# Patient Record
Sex: FEMALE | Race: WHITE | NOT HISPANIC OR LATINO | Employment: OTHER | URBAN - METROPOLITAN AREA
[De-identification: names, ages, dates, MRNs, and addresses within clinical notes are randomized per-mention and may not be internally consistent; named-entity substitution may affect disease eponyms.]

---

## 2017-01-24 RX ORDER — INSULIN ASPART 100 [IU]/ML
INJECTION, SOLUTION INTRAVENOUS; SUBCUTANEOUS
Qty: 45 ML | Refills: 5 | Status: SHIPPED | OUTPATIENT
Start: 2017-01-24 | End: 2017-06-29 | Stop reason: SDUPTHER

## 2017-01-25 ENCOUNTER — TELEPHONE (OUTPATIENT)
Dept: ENDOCRINOLOGY | Facility: CLINIC | Age: 62
End: 2017-01-25

## 2017-01-25 NOTE — TELEPHONE ENCOUNTER
A quantity override was approved by her insurance company, [email or web address] and [employer] for her Novolog Flexpens. She uses 45 ml a month.

## 2017-01-31 RX ORDER — CALCIUM CITRATE/VITAMIN D3 200MG-6.25
TABLET ORAL
Qty: 100 EACH | Refills: 12 | Status: SHIPPED | OUTPATIENT
Start: 2017-01-31 | End: 2018-02-27 | Stop reason: SDUPTHER

## 2017-02-06 ENCOUNTER — LAB VISIT (OUTPATIENT)
Dept: LAB | Facility: HOSPITAL | Age: 62
End: 2017-02-06
Attending: INTERNAL MEDICINE
Payer: MEDICAID

## 2017-02-06 DIAGNOSIS — I10 ESSENTIAL HYPERTENSION: ICD-10-CM

## 2017-02-06 DIAGNOSIS — Z79.4 TYPE 2 DIABETES MELLITUS WITH MICROALBUMINURIA, WITH LONG-TERM CURRENT USE OF INSULIN: ICD-10-CM

## 2017-02-06 DIAGNOSIS — E89.0 POSTOPERATIVE HYPOTHYROIDISM: ICD-10-CM

## 2017-02-06 DIAGNOSIS — R80.9 TYPE 2 DIABETES MELLITUS WITH MICROALBUMINURIA, WITH LONG-TERM CURRENT USE OF INSULIN: ICD-10-CM

## 2017-02-06 DIAGNOSIS — E11.29 TYPE 2 DIABETES MELLITUS WITH MICROALBUMINURIA, WITH LONG-TERM CURRENT USE OF INSULIN: ICD-10-CM

## 2017-02-06 DIAGNOSIS — E78.5 HYPERLIPIDEMIA, UNSPECIFIED HYPERLIPIDEMIA TYPE: ICD-10-CM

## 2017-02-06 LAB
ALBUMIN SERPL BCP-MCNC: 3.9 G/DL
ALP SERPL-CCNC: 69 U/L
ALT SERPL W/O P-5'-P-CCNC: 32 U/L
ANION GAP SERPL CALC-SCNC: 10 MMOL/L
AST SERPL-CCNC: 38 U/L
BILIRUB SERPL-MCNC: 0.4 MG/DL
BUN SERPL-MCNC: 24 MG/DL
CALCIUM SERPL-MCNC: 9.8 MG/DL
CHLORIDE SERPL-SCNC: 105 MMOL/L
CO2 SERPL-SCNC: 22 MMOL/L
CREAT SERPL-MCNC: 0.9 MG/DL
EST. GFR  (AFRICAN AMERICAN): >60 ML/MIN/1.73 M^2
EST. GFR  (NON AFRICAN AMERICAN): >60 ML/MIN/1.73 M^2
GLUCOSE SERPL-MCNC: 234 MG/DL
POTASSIUM SERPL-SCNC: 5.2 MMOL/L
PROT SERPL-MCNC: 7.6 G/DL
SODIUM SERPL-SCNC: 137 MMOL/L
TSH SERPL DL<=0.005 MIU/L-ACNC: 0.47 UIU/ML

## 2017-02-06 PROCEDURE — 83036 HEMOGLOBIN GLYCOSYLATED A1C: CPT

## 2017-02-06 PROCEDURE — 36415 COLL VENOUS BLD VENIPUNCTURE: CPT | Mod: PO

## 2017-02-06 PROCEDURE — 80053 COMPREHEN METABOLIC PANEL: CPT

## 2017-02-06 PROCEDURE — 84443 ASSAY THYROID STIM HORMONE: CPT

## 2017-02-07 LAB
ESTIMATED AVG GLUCOSE: 177 MG/DL
HBA1C MFR BLD HPLC: 7.8 %

## 2017-02-13 ENCOUNTER — OFFICE VISIT (OUTPATIENT)
Dept: ENDOCRINOLOGY | Facility: CLINIC | Age: 62
End: 2017-02-13
Payer: MEDICAID

## 2017-02-13 VITALS
HEART RATE: 86 BPM | DIASTOLIC BLOOD PRESSURE: 78 MMHG | SYSTOLIC BLOOD PRESSURE: 138 MMHG | HEIGHT: 61 IN | BODY MASS INDEX: 39.23 KG/M2 | WEIGHT: 207.81 LBS

## 2017-02-13 DIAGNOSIS — E03.9 HYPOTHYROIDISM, UNSPECIFIED TYPE: ICD-10-CM

## 2017-02-13 DIAGNOSIS — I10 ESSENTIAL HYPERTENSION: ICD-10-CM

## 2017-02-13 DIAGNOSIS — E11.8 TYPE 2 DIABETES MELLITUS WITH COMPLICATION, WITH LONG-TERM CURRENT USE OF INSULIN: Primary | ICD-10-CM

## 2017-02-13 DIAGNOSIS — Z79.4 TYPE 2 DIABETES MELLITUS WITH COMPLICATION, WITH LONG-TERM CURRENT USE OF INSULIN: Primary | ICD-10-CM

## 2017-02-13 DIAGNOSIS — E78.5 HYPERLIPIDEMIA, UNSPECIFIED HYPERLIPIDEMIA TYPE: ICD-10-CM

## 2017-02-13 PROCEDURE — 99999 PR PBB SHADOW E&M-EST. PATIENT-LVL III: CPT | Mod: PBBFAC,,, | Performed by: INTERNAL MEDICINE

## 2017-02-13 PROCEDURE — 99213 OFFICE O/P EST LOW 20 MIN: CPT | Mod: PBBFAC,PO | Performed by: INTERNAL MEDICINE

## 2017-02-13 PROCEDURE — 99214 OFFICE O/P EST MOD 30 MIN: CPT | Mod: S$PBB,,, | Performed by: INTERNAL MEDICINE

## 2017-02-13 RX ORDER — PEN NEEDLE, DIABETIC 31 GX5/16"
NEEDLE, DISPOSABLE MISCELLANEOUS
Refills: 0 | COMMUNITY
Start: 2017-01-19 | End: 2017-10-12 | Stop reason: SDUPTHER

## 2017-02-13 NOTE — MR AVS SNAPSHOT
[facility] Endocrinology  [address]  [facility] [address]  Phone: [phone]  Fax: [phone]                  Jessica Rojas   2017 8:30 AM   Office Visit    Description:  Female : 1955   Provider:  Fortino Qureshi DO   Department:  [place] - Endocrinology           Diagnoses this Visit        Comments    Type 2 diabetes mellitus with complication, with long-term current use of insulin    -  Primary     Hypothyroidism, unspecified type                To Do List           Future Appointments        Provider Department Dept Phone    2017 11:30 AM LABORATORY, [place] [facility] [phone]    7/3/2017 8:15 AM LABORATORY, [place] [facility] [phone]    7/10/2017 10:30 AM Fortino Qureshi DO [facility] [phone]      Goals (5 Years of Data)     None      [facility]     [facility] Nurse Care [facility] -  Assistance  Registered nurses in the [facility] provide clinical advisement, health education, appointment booking, and other advisory services.  Call for this free service at [phone].             Medications           Message regarding Medications     Verify the changes and/or additions to your medication regime listed below are the same as discussed with your clinician today.  If any of these changes or additions are incorrect, please notify your healthcare provider.        STOP taking these medications     hydrocodone-acetaminophen 7.5-325mg (NORCO) 7.5-325 mg per tablet Take 1 tablet by mouth every 4 (four) hours as needed for Pain.    ondansetron (ZOFRAN-ODT) 8 MG TbDL Take 1 tablet (8 mg total) by mouth every 8 (eight) hours as needed.           Verify that the below list of medications is an accurate representation of the medications you are currently taking.  If none reported, the list may be blank. If incorrect, please contact your healthcare provider. Carry this list with you in  "case of emergency.           Current Medications     BD INSULIN PEN NEEDLE UF MINI 31 gauge x 3/16" Ndle use as directed with insulin    budesonide-formoterol 160-4.5 mcg (SYMBICORT) 160-4.5 mcg/actuation HFAA Inhale 2 puffs into the lungs.    cholecalciferol, vitamin D3, (VITAMIN D3) 400 unit Cap Take 1,200 Units by mouth once daily.    cyclobenzaprine (FLEXERIL) 10 MG tablet TAKE ONE TABLET BY MOUTH ONCE DAILY IN THE EVENING    ezetimibe (ZETIA) 10 mg tablet Take 1 tablet (10 mg total) by mouth once daily.    gabapentin (NEURONTIN) 400 MG capsule Take 1 capsule (400 mg total) by mouth 3 (three) times daily.    ibuprofen (ADVIL) 200 MG tablet Take 200 mg by mouth every 6 (six) hours as needed for Pain.    insulin aspart (NOVOLOG FLEXPEN) 100 unit/mL InPn pen Inject 40 units AC, unless eating small meals then use 35 units AC, plus SSI. TDD is 150 units    insulin detemir (LEVEMIR FLEXTOUCH) 100 unit/mL (3 mL) SubQ InPn pen Inject 80 Units into the skin 2 (two) times daily.    levothyroxine (SYNTHROID) 200 MCG tablet 200 mcg 5 days a week and 1.5 tablets 2 days a week (Tues and Thurs).    meclizine (ANTIVERT) 25 mg tablet Take 1 tablet (25 mg total) by mouth 3 (three) times daily as needed.    meloxicam (MOBIC) 15 MG tablet Take 15 mg by mouth once daily.    MULTIVITAMIN W-MINERALS/LUTEIN (CENTRUM SILVER ORAL) Take 1 tablet by mouth once daily.     TRUE METRIX GLUCOSE TEST STRIP Strp test FOUR TIMES DAILY or as directed    valsartan-hydrochlorothiazide (DIOVAN-HCT) 320-12.5 mg per tablet Take 1 tablet by mouth once daily.    VITAMIN D2 50,000 unit capsule TAKE ONE CAPSULE BY MOUTH EVERY 7 DAYS           Clinical Reference Information           Your Vitals Were     BP Pulse Height Weight BMI    138/78 (BP Location: Right arm, Patient Position: Sitting, BP Method: Manual) 86 5' 0.5" (1.537 m) 94.3 kg (207 lb 12.5 oz) 39.91 kg/m2      Blood Pressure          Most Recent Value    BP  138/78      Allergies as of 2/13/2017 "     Lipitor [Atorvastatin]      Immunizations Administered on Date of Encounter - 2/13/2017     None      Orders Placed During Today's Visit     Future Labs/Procedures Expected by Expires    Comprehensive metabolic panel  2/13/2017 2/14/2018    Hemoglobin A1c  2/13/2017 2/14/2018    TSH  2/13/2017 2/13/2018      Language Assistance Services     ATTENTION: Language assistance services are available, free of charge. Please call [phone].      ATENCIÓN: Si habla español, tiene a bridges disposición servicios gratuitos de asistencia lingüística. Llame al [phone].     CHÚ Ý: N?u b?n nói Ti?ng Vi?t, có các d?ch v? h? tr? ngôn ng? mi?n phí dành cho b?n. G?i s? [phone].         [place] - Endocrinology complies with applicable Federal civil rights laws and does not discriminate on the basis of race, color, national origin, age, disability, or sex.

## 2017-02-13 NOTE — PROGRESS NOTES
CHIEF COMPLAINT: DM 2/Hypothyroidism  61 year old being seen as a f/u. Nodules was found on on a PET scan. FNA 11/25/14 shows adenomatous nodule with cystic changes. S/P thyroidectomy 5/24/16. Levemir 80 BID. Humalog 40 TID. Saw DM ED 4/2016. No Glucose logs today. Had an episode of hypoglycemia but thinks that it was due to eating less than before. She does take insulin immediately before eating now. She does have some paresthesias. Optometry 11/2/16. Tolerating zetia.         PATH  THYROID GLAND, TOTAL THYROIDECTOMY:  - NODULAR GOITER WITH FOCAL DEGENERATIVE CHANGES.  - NO EVIDENCE OF MALIGNANCY.        PAST MEDICAL HISTORY/PAST SURGICAL HISTORY:  Reviewed in [facility]    SOCIAL HISTORY: No T/A.    FAMILY HISTORY:  No known thyroid disease or thyroid cancer. + DM 2.     MEDICATIONS/ALLERGIES: The patient's MedCard has been updated and reviewed.      ROS:   Constitutional: weight stable.   Eyes: No recent visual changes  ENT: No dysphagia  Cardiovascular: Denies current anginal symptoms  Respiratory: SOB stable  Gastrointestinal: No N/V  GenitoUrinary - No dysuria  Skin: No new skin rash  Neurologic: No focal neurologic complaints  Remainder ROS negative        PE:    GENERAL: Well developed, well nourished.  PSYCH:  appropriate mood and affect  EYES:  PERRL, EOM intact.  ENT: Nares patent, oropharynx clear, mucosa pink,   NECK: Supple, trachea midline, thyroidectomy scar intact  CHEST: Resp even and unlabored, CTA bilateral.  CARDIAC: RRR, S1, S2 heard, no murmurs, rubs, S3, or S4      Results for ROBERTO MULLIGAN (MRN [id number]) as of 2/13/2017 08:54   Ref. Range 2/6/2017 09:31   Sodium Latest Ref Range: 136 - 145 mmol/L 137   Potassium Latest Ref Range: 3.5 - 5.1 mmol/L 5.2 (H)   Chloride Latest Ref Range: 95 - 110 mmol/L 105   CO2 Latest Ref Range: 23 - 29 mmol/L 22 (L)   Anion Gap Latest Ref Range: 8 - 16 mmol/L 10   BUN, Bld Latest Ref Range: 8 - 23 mg/dL 24 (H)   Creatinine Latest Ref Range: 0.5 - 1.4 mg/dL 0.9    eGFR if non African American Latest Ref Range: >60 mL/min/1.73 m^2 >60.0   eGFR if African American Latest Ref Range: >60 mL/min/1.73 m^2 >60.0   Glucose Latest Ref Range: 70 - 110 mg/dL 234 (H)   Calcium Latest Ref Range: 8.7 - 10.5 mg/dL 9.8   Alkaline Phosphatase Latest Ref Range: 55 - 135 U/L 69   Total Protein Latest Ref Range: 6.0 - 8.4 g/dL 7.6   Albumin Latest Ref Range: 3.5 - 5.2 g/dL 3.9   Total Bilirubin Latest Ref Range: 0.1 - 1.0 mg/dL 0.4   AST Latest Ref Range: 10 - 40 U/L 38   ALT Latest Ref Range: 10 - 44 U/L 32   Hemoglobin A1C Latest Ref Range: 4.5 - 6.2 % 7.8 (H)   Estimated Avg Glucose Latest Ref Range: 68 - 131 mg/dL 177 (H)   TSH Latest Ref Range: 0.400 - 4.000 uIU/mL 0.471           ASSESSMENT/PLAN:  1. Post Surgical Hypothyroidism- For MNG. Path benign. TSH WNL. Continue current tx.      2. HTN- controlled. Continue current Tx.     3. DM 2- With nephropathy and neuropathy. Discussed eating adequate amount of carbs when taking insulin. She will send us logs so we can make changes    5. Hyperlipidemia- cannot tolerate statin. Taking zetia and seems to be tolerating well.     FOLLOWUP  F/U 4 months with CMP, A1c, TSH,

## 2017-02-20 RX ORDER — INSULIN GLARGINE 100 [IU]/ML
80 INJECTION, SOLUTION SUBCUTANEOUS 2 TIMES DAILY
Qty: 2 BOX | Refills: 3 | Status: SHIPPED | OUTPATIENT
Start: 2017-02-20 | End: 2017-02-22 | Stop reason: SDUPTHER

## 2017-02-20 NOTE — TELEPHONE ENCOUNTER
A prior auth was done for Levemir via [employer].Her insurnace would only approve Lantus. A new rx for lantus was sent to her pharmacy for Lantus. .

## 2017-02-20 NOTE — TELEPHONE ENCOUNTER
----- Message from Mahesh Shaw sent at 2/20/2017  1:48 PM CST -----  Contact: Kam steele/ [facility]  Need a new prescription for pt for Sendy Cramer(insline pin)  Call Back#[phone]  Thanks

## 2017-02-22 ENCOUNTER — TELEPHONE (OUTPATIENT)
Dept: ENDOCRINOLOGY | Facility: CLINIC | Age: 62
End: 2017-02-22

## 2017-02-22 ENCOUNTER — PATIENT MESSAGE (OUTPATIENT)
Dept: ENDOCRINOLOGY | Facility: CLINIC | Age: 62
End: 2017-02-22

## 2017-02-22 RX ORDER — INSULIN GLARGINE 100 [IU]/ML
80 INJECTION, SOLUTION SUBCUTANEOUS 2 TIMES DAILY
Qty: 3 BOX | Refills: 3 | Status: SHIPPED | OUTPATIENT
Start: 2017-02-22 | End: 2017-06-14 | Stop reason: SDUPTHER

## 2017-02-22 NOTE — TELEPHONE ENCOUNTER
I spoke to Virginia at [facility]. I had previously did a PA for Levemir, which the patient had taken for 2 years, but it was denied. I did another PA for Lantus, and it was denied, so I sent another PA via [employer], and am awaiting a response.

## 2017-02-22 NOTE — TELEPHONE ENCOUNTER
----- Message from Cris Plascencia sent at 2/22/2017  1:31 PM CST -----  Contact: Virginia from [facility] [phone]  Erica called and asked for a call back about the lantis it needs a prior authorization.

## 2017-03-29 ENCOUNTER — LAB VISIT (OUTPATIENT)
Dept: LAB | Facility: HOSPITAL | Age: 62
End: 2017-03-29
Attending: NURSE PRACTITIONER
Payer: MEDICAID

## 2017-03-29 ENCOUNTER — PATIENT MESSAGE (OUTPATIENT)
Dept: ENDOCRINOLOGY | Facility: CLINIC | Age: 62
End: 2017-03-29

## 2017-03-29 ENCOUNTER — OFFICE VISIT (OUTPATIENT)
Dept: FAMILY MEDICINE | Facility: CLINIC | Age: 62
End: 2017-03-29
Payer: MEDICAID

## 2017-03-29 VITALS — TEMPERATURE: 99 F | BODY MASS INDEX: 40.06 KG/M2 | WEIGHT: 212.19 LBS | OXYGEN SATURATION: 97 % | HEIGHT: 61 IN

## 2017-03-29 DIAGNOSIS — G89.29 CHRONIC BACK PAIN GREATER THAN 3 MONTHS DURATION: ICD-10-CM

## 2017-03-29 DIAGNOSIS — M54.9 CHRONIC BACK PAIN GREATER THAN 3 MONTHS DURATION: ICD-10-CM

## 2017-03-29 DIAGNOSIS — E55.9 VITAMIN D DEFICIENCY: ICD-10-CM

## 2017-03-29 DIAGNOSIS — M54.16 LUMBAR RADICULOPATHY: Primary | ICD-10-CM

## 2017-03-29 LAB — 25(OH)D3+25(OH)D2 SERPL-MCNC: 26 NG/ML

## 2017-03-29 PROCEDURE — 82306 VITAMIN D 25 HYDROXY: CPT

## 2017-03-29 PROCEDURE — 36415 COLL VENOUS BLD VENIPUNCTURE: CPT | Mod: PO

## 2017-03-29 PROCEDURE — 99213 OFFICE O/P EST LOW 20 MIN: CPT | Mod: S$PBB,,, | Performed by: NURSE PRACTITIONER

## 2017-03-29 PROCEDURE — 99999 PR PBB SHADOW E&M-EST. PATIENT-LVL IV: CPT | Mod: PBBFAC,,, | Performed by: NURSE PRACTITIONER

## 2017-03-29 NOTE — MR AVS SNAPSHOT
"    [facility]  [address] [place] [address]  Phone: [phone]  Fax: [phone]                  Jessica Rojas   3/29/2017 9:00 AM   Office Visit    Description:  Female : 1955   Provider:  Sanjuana Gleason NP   Department:  [facility]           Reason for Visit     Back Pain           Diagnoses this Visit        Comments    Lumbar radiculopathy    -  Primary     Chronic back pain greater than 3 months duration                To Do List           Future Appointments        Provider Department Dept Phone    2017 11:30 AM LABORATORY, [place] [facility] [phone]    7/3/2017 8:15 AM [facility], [place] [facility] [phone]    7/10/2017 10:30 AM DO [facility] [phone]      Goals (5 Years of Data)     None      [facility]     [facility] Nurse Care [facility] -  Assistance  Registered nurses in the [facility] provide clinical advisement, health education, appointment booking, and other advisory services.  Call for this free service at [phone].             Medications           Message regarding Medications     Verify the changes and/or additions to your medication regime listed below are the same as discussed with your clinician today.  If any of these changes or additions are incorrect, please notify your healthcare provider.             Verify that the below list of medications is an accurate representation of the medications you are currently taking.  If none reported, the list may be blank. If incorrect, please contact your healthcare provider. Carry this list with you in case of emergency.           Current Medications     BD INSULIN PEN NEEDLE UF MINI 31 gauge x 3/" Ndle use as directed with insulin    budesonide-formoterol 160-4.5 mcg (SYMBICORT) 160-4.5 mcg/actuation HFAA Inhale 2 puffs into the lungs.    cholecalciferol, " "vitamin D3, (VITAMIN D3) 400 unit Cap Take 1,200 Units by mouth once daily.    cyclobenzaprine (FLEXERIL) 10 MG tablet TAKE ONE TABLET BY MOUTH ONCE DAILY IN THE EVENING    ezetimibe (ZETIA) 10 mg tablet Take 1 tablet (10 mg total) by mouth once daily.    gabapentin (NEURONTIN) 400 MG capsule Take 1 capsule (400 mg total) by mouth 3 (three) times daily.    ibuprofen (ADVIL) 200 MG tablet Take 200 mg by mouth every 6 (six) hours as needed for Pain.    insulin aspart (NOVOLOG FLEXPEN) 100 unit/mL InPn pen Inject 40 units AC, unless eating small meals then use 35 units AC, plus SSI. TDD is 150 units    insulin glargine (LANTUS SOLOSTAR) 100 unit/mL (3 mL) InPn pen Inject 80 Units into the skin 2 (two) times daily.    levothyroxine (SYNTHROID) 200 MCG tablet 200 mcg 5 days a week and 1.5 tablets 2 days a week (Tues and Thurs).    meclizine (ANTIVERT) 25 mg tablet Take 1 tablet (25 mg total) by mouth 3 (three) times daily as needed.    meloxicam (MOBIC) 15 MG tablet Take 15 mg by mouth once daily.    MULTIVITAMIN W-MINERALS/LUTEIN (CENTRUM SILVER ORAL) Take 1 tablet by mouth once daily.     TRUE METRIX GLUCOSE TEST STRIP Strp test FOUR TIMES DAILY or as directed    valsartan-hydrochlorothiazide (DIOVAN-HCT) 320-12.5 mg per tablet Take 1 tablet by mouth once daily.    VITAMIN D2 50,000 unit capsule TAKE ONE CAPSULE BY MOUTH EVERY 7 DAYS           Clinical Reference Information           Your Vitals Were     Temp Height Weight SpO2 BMI    99.4 °F (37.4 °C) 5' 1" (1.549 m) 96.2 kg (212 lb 3.1 oz) 97% 40.09 kg/m2      Allergies as of 3/29/2017     Lipitor [Atorvastatin]      Immunizations Administered on Date of Encounter - 3/29/2017     None      Orders Placed During Today's Visit      Normal Orders This Visit    Ambulatory referral to Neurosurgery       Instructions    Ultram request sent to PCP to approve  Report to ER immediately if symptoms worsen       Language Assistance Services     ATTENTION: Language assistance " services are available, free of charge. Please call [phone].      ATENCIÓN: Si habla halie, tiene a bridges disposición servicios gratuitos de asistencia lingüística. Llame al [phone].     CHÚ Ý: N?u b?n nói Ti?ng Vi?t, có các d?ch v? h? tr? ngôn ng? mi?n phí dành cho b?n. G?i s? [phone].         [facility] complies with applicable Federal civil rights laws and does not discriminate on the basis of race, color, national origin, age, disability, or sex.

## 2017-03-29 NOTE — PROGRESS NOTES
Subjective:       Patient ID: Jessica Rojas is a 61 y.o. female.    Chief Complaint: Back Pain    Back Pain   This is a chronic problem. The current episode started more than 1 year ago. The problem occurs daily. The problem has been gradually worsening since onset. The pain is present in the lumbar spine. The quality of the pain is described as aching. Radiates to: Bilateral hips. The pain is moderate. The pain is the same all the time. The symptoms are aggravated by sitting, standing, lying down and bending. Pertinent negatives include no abdominal pain, bladder incontinence, bowel incontinence, chest pain, dysuria, fever, headaches, leg pain, numbness, paresis, paresthesias, pelvic pain, perianal numbness, tingling, weakness or weight loss. Risk factors include history of cancer, obesity and menopause. She has tried muscle relaxant, NSAIDs, walking and analgesics (Physical therapy) for the symptoms. The treatment provided no relief (MRI done 8/2016; xray l spine 6/2016: advised to see neurosurgery by PCP; did not make appt. Decline pain management also recommended by PCP).   Pt also requests vitamin D check; takes oral vitamin D for deficiency.  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/2014    Mediastinal lymphadenopathy 8/26/2015    Obesity, morbid 5/6/2014    Pituitary adenoma 1980's    took parladel for 3 yrs    Pneumonia     Thyroid disease     Wheeze 6/23/2014     Social History     Social History    Marital status:      Spouse name: N/A    Number of children: N/A     Years of education: N/A     Occupational History    housewife      Social History Main Topics    Smoking status: Never Smoker    Smokeless tobacco: Never Used    Alcohol use Yes      Comment: once per month    Drug use: No    Sexual activity: Not on file     Social History Narrative     Past Surgical History:   Procedure Laterality Date    APPENDECTOMY      BRONCHOSCOPY  2014, 2014     SECTION  , 1986    x2    LUNG REMOVAL, PARTIAL Right     with 17 lymph nodes    THYROIDECTOMY  2016    TONSILLECTOMY  1969       Review of Systems   Constitutional: Negative.  Negative for fever and weight loss.   HENT: Negative.    Eyes: Negative.    Respiratory: Negative.    Cardiovascular: Negative.  Negative for chest pain.   Gastrointestinal: Negative.  Negative for abdominal pain and bowel incontinence.   Endocrine: Negative.    Genitourinary: Negative.  Negative for bladder incontinence, dysuria and pelvic pain.   Musculoskeletal: Positive for back pain.   Skin: Negative.    Allergic/Immunologic: Negative.    Neurological: Negative.  Negative for tingling, weakness, numbness, headaches and paresthesias.   Psychiatric/Behavioral: Negative.        Objective:      Physical Exam   Constitutional: She is oriented to person, place, and time. She appears well-developed and well-nourished.   HENT:   Head: Normocephalic.   Right Ear: External ear normal.   Left Ear: External ear normal.   Nose: Nose normal.   Mouth/Throat: Oropharynx is clear and moist.   Eyes: Conjunctivae are normal. Pupils are equal, round, and reactive to light.   Neck: Normal range of motion. Neck supple.   Cardiovascular: Normal rate, regular rhythm and normal heart sounds.    Pulmonary/Chest: Effort normal.   Abdominal: Soft. Bowel sounds are normal.   Musculoskeletal:        Lumbar back: She exhibits pain. She exhibits normal range of motion, no tenderness, no bony tenderness, no swelling, no edema, no laceration, no  spasm and normal pulse.   Neurological: She is alert and oriented to person, place, and time.   Skin: Skin is warm and dry.   Psychiatric: She has a normal mood and affect. Her behavior is normal. Judgment and thought content normal.   Nursing note and vitals reviewed.      Assessment:       1. Lumbar radiculopathy    2. Chronic back pain greater than 3 months duration    3. Vitamin D deficiency        Plan:           Jessica was seen today for back pain.    Diagnoses and all orders for this visit:    Lumbar radiculopathy  Chronic back pain greater than 3 months duration  -     Ambulatory referral to Neurosurgery  Ultram request sent to PCP to approve    Vitamin D deficiency  -     Vitamin D; Future

## 2017-03-31 ENCOUNTER — TELEPHONE (OUTPATIENT)
Dept: FAMILY MEDICINE | Facility: CLINIC | Age: 62
End: 2017-03-31

## 2017-03-31 DIAGNOSIS — E55.9 VITAMIN D DEFICIENCY: Primary | ICD-10-CM

## 2017-03-31 NOTE — TELEPHONE ENCOUNTER
Reviewed; vitamin D insufficiency noted. Continue oral vitamin D as prescribed. Repeat vit D in 6 m     Pt states she been taking the supplement for the last 5 months asking can she increase dosage.

## 2017-04-05 ENCOUNTER — TELEPHONE (OUTPATIENT)
Dept: NEUROSURGERY | Facility: CLINIC | Age: 62
End: 2017-04-05

## 2017-04-05 NOTE — TELEPHONE ENCOUNTER
Pt called to schedule an appt per referral. Appt date, time, and location given. Pt verbalized understanding.

## 2017-04-17 ENCOUNTER — OFFICE VISIT (OUTPATIENT)
Dept: NEUROSURGERY | Facility: CLINIC | Age: 62
End: 2017-04-17
Payer: MEDICAID

## 2017-04-17 ENCOUNTER — TELEPHONE (OUTPATIENT)
Dept: PAIN MEDICINE | Facility: CLINIC | Age: 62
End: 2017-04-17

## 2017-04-17 VITALS
WEIGHT: 209.56 LBS | HEIGHT: 61 IN | SYSTOLIC BLOOD PRESSURE: 177 MMHG | DIASTOLIC BLOOD PRESSURE: 97 MMHG | BODY MASS INDEX: 39.56 KG/M2 | HEART RATE: 91 BPM

## 2017-04-17 DIAGNOSIS — E11.8 UNCONTROLLED TYPE 2 DIABETES MELLITUS WITH COMPLICATION, UNSPECIFIED LONG TERM INSULIN USE STATUS: ICD-10-CM

## 2017-04-17 DIAGNOSIS — C34.01 MALIGNANT NEOPLASM OF HILUS OF RIGHT LUNG: ICD-10-CM

## 2017-04-17 DIAGNOSIS — R07.81 RIB PAIN: ICD-10-CM

## 2017-04-17 DIAGNOSIS — E66.01 MORBID OBESITY, UNSPECIFIED OBESITY TYPE: ICD-10-CM

## 2017-04-17 DIAGNOSIS — M54.10 RADICULOPATHY, UNSPECIFIED SPINAL REGION: Primary | ICD-10-CM

## 2017-04-17 DIAGNOSIS — C7A.090 MALIGNANT CARCINOID TUMOR OF BRONCHUS AND LUNG: ICD-10-CM

## 2017-04-17 DIAGNOSIS — D3A.00 CARCINOID TUMOR: ICD-10-CM

## 2017-04-17 DIAGNOSIS — E11.65 UNCONTROLLED TYPE 2 DIABETES MELLITUS WITH COMPLICATION, UNSPECIFIED LONG TERM INSULIN USE STATUS: ICD-10-CM

## 2017-04-17 PROCEDURE — 99204 OFFICE O/P NEW MOD 45 MIN: CPT | Mod: S$GLB,,, | Performed by: NEUROLOGICAL SURGERY

## 2017-04-17 NOTE — PROGRESS NOTES
Neurosurgery History and Physical    Patient ID: Jessica Rojas is a 61 y.o. female.    Chief Complaint   Patient presents with    Lumbar Spine Pain (L-Spine)     Chronic back pain. Pt states pain to entire spine. No pain to legs. denies weakness, numbness, and tingling. Denies bowel and bladder dysfunction. PT without relief.          Review of Systems   Constitutional: Negative.    HENT: Negative.    Eyes: Negative.    Respiratory: Negative.    Cardiovascular: Negative.    Gastrointestinal: Negative.    Endocrine: Negative.    Genitourinary: Negative.    Musculoskeletal: Positive for back pain.   Skin: Negative.    Allergic/Immunologic: Negative.    Neurological: Negative for weakness and numbness.   Hematological: Negative.    Psychiatric/Behavioral: Negative.        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/2014    Mediastinal lymphadenopathy 8/26/2015    Obesity, morbid 5/6/2014    Pituitary adenoma 1980's    took parladel for 3 yrs    Pneumonia     Thyroid disease     Wheeze 6/23/2014     Social History     Social History    Marital status:      Spouse name: N/A    Number of children: N/A    Years of education: N/A     Occupational History    housewife      Social History Main Topics    Smoking status: Never Smoker    Smokeless tobacco: Never Used    Alcohol use Yes      Comment: once per month    Drug use: No    Sexual activity: Not on file     Other Topics Concern    Not on file     Social History Narrative     Family History   Problem Relation Age of Onset     "Cancer Maternal Aunt      breast    Cancer Maternal Grandmother      unknown    Cancer Maternal Aunt      unknown    Diabetes Mother     Glaucoma Mother     Diabetes Father     Diabetes Sister     Macular degeneration Sister     Amblyopia Neg Hx     Blindness Neg Hx     Cataracts Neg Hx     Hypertension Neg Hx     Retinal detachment Neg Hx     Stroke Neg Hx     Strabismus Neg Hx     Thyroid disease Neg Hx      Review of patient's allergies indicates:   Allergen Reactions    Lipitor [atorvastatin] Other (See Comments)     Myalgia, muscle pain       Current Outpatient Prescriptions:     BD INSULIN PEN NEEDLE UF MINI 31 gauge x 3/16" Ndle, use as directed with insulin, Disp: , Rfl: 0    budesonide-formoterol 160-4.5 mcg (SYMBICORT) 160-4.5 mcg/actuation HFAA, Inhale 2 puffs into the lungs., Disp: , Rfl:     cholecalciferol, vitamin D3, (VITAMIN D3) 400 unit Cap, Take 1,200 Units by mouth once daily., Disp: , Rfl:     ezetimibe (ZETIA) 10 mg tablet, Take 1 tablet (10 mg total) by mouth once daily., Disp: 30 tablet, Rfl: 11    gabapentin (NEURONTIN) 400 MG capsule, Take 1 capsule (400 mg total) by mouth 3 (three) times daily., Disp: 90 capsule, Rfl: 11    GLUCOSAMINE HCL/CHONDR MCCARTHY A NA (OSTEO BI-FLEX ORAL), Take by mouth once daily., Disp: , Rfl:     ibuprofen (ADVIL) 200 MG tablet, Take 200 mg by mouth every 6 (six) hours as needed for Pain., Disp: , Rfl:     insulin aspart (NOVOLOG FLEXPEN) 100 unit/mL InPn pen, Inject 40 units AC, unless eating small meals then use 35 units AC, plus SSI. TDD is 150 units, Disp: 45 mL, Rfl: 5    insulin glargine (LANTUS SOLOSTAR) 100 unit/mL (3 mL) InPn pen, Inject 80 Units into the skin 2 (two) times daily., Disp: 3 Box, Rfl: 3    levothyroxine (SYNTHROID) 200 MCG tablet, 200 mcg 5 days a week and 1.5 tablets 2 days a week (Tues and Thurs)., Disp: 35 tablet, Rfl: 6    meclizine (ANTIVERT) 25 mg tablet, Take 1 tablet (25 mg total) by mouth 3 (three) times " "daily as needed., Disp: 30 tablet, Rfl: 0    meloxicam (MOBIC) 15 MG tablet, Take 15 mg by mouth once daily., Disp: , Rfl: 6    MULTIVITAMIN W-MINERALS/LUTEIN (CENTRUM SILVER ORAL), Take 1 tablet by mouth once daily. , Disp: , Rfl:     TRUE METRIX GLUCOSE TEST STRIP Strp, test FOUR TIMES DAILY or as directed, Disp: 100 each, Rfl: 12    valsartan-hydrochlorothiazide (DIOVAN-HCT) 320-12.5 mg per tablet, Take 1 tablet by mouth once daily., Disp: 30 tablet, Rfl: 11    VITAMIN D2 50,000 unit capsule, TAKE ONE CAPSULE BY MOUTH EVERY 7 DAYS, Disp: 4 capsule, Rfl: 11    cyclobenzaprine (FLEXERIL) 10 MG tablet, TAKE ONE TABLET BY MOUTH ONCE DAILY IN THE EVENING, Disp: 30 tablet, Rfl: 11  Blood pressure (!) 177/97, pulse 91, height 5' 1" (1.549 m), weight 95.1 kg (209 lb 8.8 oz).      Neurologic Exam     Mental Status   Oriented to person, place, and time.   Attention: normal. Concentration: normal.   Speech: speech is normal   Level of consciousness: alert  Knowledge: good.     Cranial Nerves     CN II   Visual acuity: normal    CN III, IV, VI   Pupils are equal, round, and reactive to light.  Extraocular motions are normal.     CN V   Facial sensation intact.     CN VII   Facial expression full, symmetric.     CN VIII   Hearing: intact    CN IX, X   Palate: symmetric    CN XI   CN XI normal.     CN XII   CN XII normal.     Motor Exam   Muscle bulk: normal  Overall muscle tone: normal  Right arm pronator drift: absent  Left arm pronator drift: absent    Strength   Right neck flexion: 5/5  Left neck flexion: 5/5  Right neck extension: 5/5  Left neck extension: 5/5  Right deltoid: 5/5  Left deltoid: 5/5  Right biceps: 5/5  Left biceps: 5/5  Right triceps: 5/5  Left triceps: 5/5  Right wrist flexion: 5/5  Left wrist flexion: 5/5  Right wrist extension: 5/5  Left wrist extension: 5/5  Right interossei: 5/5  Left interossei: 5/5  Right abdominals: 5/5  Left abdominals: 5/5  Right iliopsoas: 5/5  Left iliopsoas: 5/5  Right " quadriceps: 5/5  Left quadriceps: 5/5  Right hamstrin/5  Left hamstrin/5  Right glutei: 5/5  Left glutei: 5/5  Right anterior tibial: 5/5  Left anterior tibial: 5/5  Right posterior tibial: 5/5  Left posterior tibial: 5/5  Right peroneal: 5/5  Left peroneal: 5/5  Right gastroc: 5/5  Left gastroc: 5/5    Sensory Exam   Light touch normal.     Gait, Coordination, and Reflexes     Gait  Gait: normal    Coordination   Romberg: negative  Finger to nose coordination: normal    Tremor   Resting tremor: absent    Reflexes   Right brachioradialis: 2+  Left brachioradialis: 2+  Right biceps: 2+  Left biceps: 2+  Right triceps: 2+  Left triceps: 2+  Right patellar: 3+  Left patellar: 3+  Right achilles: 1+  Left achilles: 1+  Right plantar: normal  Left plantar: normal  Right Chase: absent  Left Chase: absent  Right ankle clonus: absent  Left ankle clonus: absent      Physical Exam   Constitutional: She is oriented to person, place, and time. She appears well-developed and well-nourished.   HENT:   Head: Normocephalic and atraumatic.   Eyes: EOM are normal. Pupils are equal, round, and reactive to light.   Neck: Normal range of motion. Neck supple.   Cardiovascular: Normal rate and regular rhythm.    Pulmonary/Chest: Effort normal.   Abdominal: Soft.   Musculoskeletal: Normal range of motion.   Neurological: She is alert and oriented to person, place, and time. She has a normal Finger-Nose-Finger Test and a normal Romberg Test. Gait normal.   Reflex Scores:       Tricep reflexes are 2+ on the right side and 2+ on the left side.       Bicep reflexes are 2+ on the right side and 2+ on the left side.       Brachioradialis reflexes are 2+ on the right side and 2+ on the left side.       Patellar reflexes are 3+ on the right side and 3+ on the left side.       Achilles reflexes are 1+ on the right side and 1+ on the left side.  Skin: Skin is warm and dry.   Psychiatric: She has a normal mood and affect. Her speech is  "normal and behavior is normal. Judgment and thought content normal.   Nursing note and vitals reviewed.      Vital Signs  Pulse: 91  BP: (!) 177/97  Pain Score:   7  Height and Weight  Height: 5' 1" (154.9 cm)  Weight: 95.1 kg (209 lb 8.8 oz)  BSA (Calculated - sq m): 2.02 sq meters  BMI (Calculated): 39.7  Weight in (lb) to have BMI = 25: 132]    Provider dictation:  I reviewed the imaging. She has mild degenerative changes in her lumbar spine with no compression of the neural elements. She does have some disc degeneration and Schmorl's nodes in the visualized portion of the lower thoracic spine. Pure axial back pain with unremarkable neuro exam. No Neurosurgical intervention is indicated at this point. She has had PT but states it did not help and is not interested in it. I will refer her to Pain.    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

## 2017-04-17 NOTE — MR AVS SNAPSHOT
"    [facility] Neurosurgery  [address] [facility] [address]  [facility] [address]  Phone: [phone]  Fax: [phone]                  Jessica Rojas   2017 11:30 AM   Office Visit    Description:  Female : 1955   Provider:  Ranjeet Bragg MD   Department:  [facility] - Neurosurgery           Reason for Visit     Lumbar Spine Pain (L-Spine)           Diagnoses this Visit        Comments    Radiculopathy, unspecified spinal region    -  Primary            To Do List           Future Appointments        Provider Department Dept Phone    2017 11:30 AM LABORATORY, [place] [facility] [phone]    2017 9:00 AM LABORATORY, [place] [facility] [phone]    2017 2:30 PM Fortino Qureshi DO [facility] Endocrinology [phone]      Goals (5 Years of Data)     None      [facility]     [facility] Nurse Care Line -  Assistance  Unless otherwise directed by your provider, please contact [facility], our nurse care line that is available for  assistance.     Registered nurses in the [facility] provide: appointment scheduling, clinical advisement, health education, and other advisory services.  Call: [phone] (toll free)               Medications           Message regarding Medications     Verify the changes and/or additions to your medication regime listed below are the same as discussed with your clinician today.  If any of these changes or additions are incorrect, please notify your healthcare provider.             Verify that the below list of medications is an accurate representation of the medications you are currently taking.  If none reported, the list may be blank. If incorrect, please contact your healthcare provider. Carry this list with you in case of emergency.           Current Medications     BD INSULIN PEN NEEDLE UF MINI 31 gauge x 3/16" Ndle use as directed with insulin    budesonide-formoterol " "160-4.5 mcg (SYMBICORT) 160-4.5 mcg/actuation HFAA Inhale 2 puffs into the lungs.    cholecalciferol, vitamin D3, (VITAMIN D3) 400 unit Cap Take 1,200 Units by mouth once daily.    ezetimibe (ZETIA) 10 mg tablet Take 1 tablet (10 mg total) by mouth once daily.    gabapentin (NEURONTIN) 400 MG capsule Take 1 capsule (400 mg total) by mouth 3 (three) times daily.    GLUCOSAMINE HCL/CHONDR MCCARTHY A NA (OSTEO BI-FLEX ORAL) Take by mouth once daily.    ibuprofen (ADVIL) 200 MG tablet Take 200 mg by mouth every 6 (six) hours as needed for Pain.    insulin aspart (NOVOLOG FLEXPEN) 100 unit/mL InPn pen Inject 40 units AC, unless eating small meals then use 35 units AC, plus SSI. TDD is 150 units    insulin glargine (LANTUS SOLOSTAR) 100 unit/mL (3 mL) InPn pen Inject 80 Units into the skin 2 (two) times daily.    levothyroxine (SYNTHROID) 200 MCG tablet 200 mcg 5 days a week and 1.5 tablets 2 days a week (Tues and Thurs).    meclizine (ANTIVERT) 25 mg tablet Take 1 tablet (25 mg total) by mouth 3 (three) times daily as needed.    meloxicam (MOBIC) 15 MG tablet Take 15 mg by mouth once daily.    MULTIVITAMIN W-MINERALS/LUTEIN (CENTRUM SILVER ORAL) Take 1 tablet by mouth once daily.     TRUE METRIX GLUCOSE TEST STRIP Strp test FOUR TIMES DAILY or as directed    valsartan-hydrochlorothiazide (DIOVAN-HCT) 320-12.5 mg per tablet Take 1 tablet by mouth once daily.    VITAMIN D2 50,000 unit capsule TAKE ONE CAPSULE BY MOUTH EVERY 7 DAYS    cyclobenzaprine (FLEXERIL) 10 MG tablet TAKE ONE TABLET BY MOUTH ONCE DAILY IN THE EVENING           Clinical Reference Information           Your Vitals Were     BP Pulse Height Weight BMI    177/97 91 5' 1" (1.549 m) 95.1 kg (209 lb 8.8 oz) 39.59 kg/m2      Blood Pressure          Most Recent Value    BP  (!)  177/97      Allergies as of 4/17/2017     Lipitor [Atorvastatin]      Immunizations Administered on Date of Encounter - 4/17/2017     None      Orders Placed During Today's Visit      Normal " Orders This Visit    Ambulatory Referral to Pain Clinic       Language Assistance Services     ATTENTION: Language assistance services are available, free of charge. Please call [phone].      ATENCIÓN: Si habla halie, tiene a bridges disposición servicios gratuitos de asistencia lingüística. Llame al [phone].     CHÚ Ý: N?u b?n nói Ti?ng Vi?t, có các d?ch v? h? tr? ngôn ng? mi?n phí dành cho b?n. G?i s? [phone].         [facility] complies with applicable Federal civil rights laws and does not discriminate on the basis of race, color, national origin, age, disability, or sex.

## 2017-04-17 NOTE — LETTER
April 17, 2017      [place]  [address] [address]  [place] [address]           [facility] - Neurosurgery  [address] [facility] [address] [place] [address]  Phone: [phone]  Fax: [phone]          Patient: Jessica Rojas   MR Number: [id number]   YOB: 1955   Date of Visit: 4/17/2017       Dear Sanjuana Gleason:    Thank you for referring Jessica Rojas to me for evaluation. Attached you will find relevant portions of my assessment and plan of care.    If you have questions, please do not hesitate to call me. I look forward to following Jessica Rojas along with you.    Sincerely,    Ranjeet Bragg MD    Enclosure  CC:  No Recipients    If you would like to receive this communication electronically, please contact [email or web address] or [phone] to request more information on [facility] access.    For providers and/or their staff who would like to refer a patient to [facility], please contact us through our one-stop-shop provider referral line, [facility], at [phone].    If you feel you have received this communication in error or would no longer like to receive these types of communications, please e-mail [email or web address]

## 2017-04-17 NOTE — TELEPHONE ENCOUNTER
----- Message from Tamera Boykin LPN sent at 4/17/2017 12:21 PM CDT -----  Please call pt to schedule appt. Thank you!

## 2017-04-17 NOTE — TELEPHONE ENCOUNTER
Spoke with the patient regarding her referral to pain Management. The patient has Medicaid as a primary insurance and under the Medicaid guidelines, Pain management is not a covered service. Based on these guidelines we are unable to see the patient.

## 2017-05-15 ENCOUNTER — LAB VISIT (OUTPATIENT)
Dept: LAB | Facility: HOSPITAL | Age: 62
End: 2017-05-15
Attending: FAMILY MEDICINE
Payer: MEDICAID

## 2017-05-15 ENCOUNTER — OFFICE VISIT (OUTPATIENT)
Dept: FAMILY MEDICINE | Facility: CLINIC | Age: 62
End: 2017-05-15
Payer: MEDICAID

## 2017-05-15 VITALS
HEART RATE: 84 BPM | SYSTOLIC BLOOD PRESSURE: 136 MMHG | DIASTOLIC BLOOD PRESSURE: 88 MMHG | WEIGHT: 210.13 LBS | BODY MASS INDEX: 39.67 KG/M2 | HEIGHT: 61 IN

## 2017-05-15 DIAGNOSIS — E55.9 VITAMIN D DEFICIENCY: ICD-10-CM

## 2017-05-15 DIAGNOSIS — R25.1 TREMOR: ICD-10-CM

## 2017-05-15 DIAGNOSIS — E11.8 UNCONTROLLED TYPE 2 DIABETES MELLITUS WITH COMPLICATION, UNSPECIFIED LONG TERM INSULIN USE STATUS: ICD-10-CM

## 2017-05-15 DIAGNOSIS — I10 BENIGN ESSENTIAL HTN: ICD-10-CM

## 2017-05-15 DIAGNOSIS — E11.65 UNCONTROLLED TYPE 2 DIABETES MELLITUS WITH COMPLICATION, UNSPECIFIED LONG TERM INSULIN USE STATUS: ICD-10-CM

## 2017-05-15 DIAGNOSIS — E04.1 NODULAR THYROID DISEASE: ICD-10-CM

## 2017-05-15 DIAGNOSIS — M54.10 RADICULOPATHY, UNSPECIFIED SPINAL REGION: Primary | ICD-10-CM

## 2017-05-15 LAB
25(OH)D3+25(OH)D2 SERPL-MCNC: 42 NG/ML
FOLATE SERPL-MCNC: 10.1 NG/ML
T4 FREE SERPL-MCNC: 1.93 NG/DL
TSH SERPL DL<=0.005 MIU/L-ACNC: <0.01 UIU/ML
VIT B12 SERPL-MCNC: 418 PG/ML

## 2017-05-15 PROCEDURE — 82306 VITAMIN D 25 HYDROXY: CPT

## 2017-05-15 PROCEDURE — 99999 PR PBB SHADOW E&M-EST. PATIENT-LVL II: CPT | Mod: PBBFAC,,, | Performed by: FAMILY MEDICINE

## 2017-05-15 PROCEDURE — 82746 ASSAY OF FOLIC ACID SERUM: CPT

## 2017-05-15 PROCEDURE — 84439 ASSAY OF FREE THYROXINE: CPT

## 2017-05-15 PROCEDURE — 36415 COLL VENOUS BLD VENIPUNCTURE: CPT | Mod: PO

## 2017-05-15 PROCEDURE — 84443 ASSAY THYROID STIM HORMONE: CPT

## 2017-05-15 PROCEDURE — 99214 OFFICE O/P EST MOD 30 MIN: CPT | Mod: S$PBB,,, | Performed by: FAMILY MEDICINE

## 2017-05-15 PROCEDURE — 82607 VITAMIN B-12: CPT

## 2017-05-15 RX ORDER — GABAPENTIN 400 MG/1
400 CAPSULE ORAL 4 TIMES DAILY
Qty: 120 CAPSULE | Refills: 11 | Status: SHIPPED | OUTPATIENT
Start: 2017-05-15 | End: 2018-04-27 | Stop reason: SDUPTHER

## 2017-05-15 NOTE — PROGRESS NOTES
"The patient presents today co persistent upper lumbar pain radiating to low back p several years-rec NS con. He rec PMR but ins not covering . Meloxicam 15 and PT helped for a short time then relapsed. Inna Chastity 400 tid .She followed w neuroendocrine lung cancer p lobectomy. Also fu DM on insulin-to submit reading soon to Dr Qureshi . 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 2014    Mediastinal lymphadenopathy 2015    Obesity, morbid 2014    Pituitary adenoma 's    took parladel for 3 yrs    Pneumonia     Thyroid disease     Wheeze 2014     Past Surgical History:   Procedure Laterality Date    APPENDECTOMY      BRONCHOSCOPY  2014, 2014     SECTION  , 1986    x2    LUNG REMOVAL, PARTIAL Right     with 17 lymph nodes    THYROIDECTOMY  2016    TONSILLECTOMY  1969     Review of patient's allergies indicates:   Allergen Reactions    Lipitor [atorvastatin] Other (See Comments)     Myalgia, muscle pain     Current Outpatient Prescriptions on File Prior to Visit   Medication Sig Dispense Refill    BD INSULIN PEN NEEDLE UF MINI 31 gauge x 3/16" Ndle use as directed with insulin  0    cholecalciferol, vitamin D3, (VITAMIN D3) 400 unit Cap Take 1,200 Units by mouth once daily.      cyclobenzaprine (FLEXERIL) 10 MG tablet TAKE ONE TABLET BY MOUTH ONCE DAILY IN THE EVENING 30 tablet 11    ezetimibe (ZETIA) 10 mg tablet Take 1 tablet (10 mg " total) by mouth once daily. 30 tablet 11    gabapentin (NEURONTIN) 400 MG capsule Take 1 capsule (400 mg total) by mouth 3 (three) times daily. 90 capsule 11    GLUCOSAMINE HCL/CHONDR MCCARTHY A NA (OSTEO BI-FLEX ORAL) Take by mouth once daily.      ibuprofen (ADVIL) 200 MG tablet Take 200 mg by mouth every 6 (six) hours as needed for Pain.      insulin aspart (NOVOLOG FLEXPEN) 100 unit/mL InPn pen Inject 40 units AC, unless eating small meals then use 35 units AC, plus SSI. TDD is 150 units 45 mL 5    insulin glargine (LANTUS SOLOSTAR) 100 unit/mL (3 mL) InPn pen Inject 80 Units into the skin 2 (two) times daily. 3 Box 3    levothyroxine (SYNTHROID) 200 MCG tablet 200 mcg 5 days a week and 1.5 tablets 2 days a week (Tues and Thurs). 35 tablet 6    meclizine (ANTIVERT) 25 mg tablet Take 1 tablet (25 mg total) by mouth 3 (three) times daily as needed. 30 tablet 0    meloxicam (MOBIC) 15 MG tablet Take 15 mg by mouth once daily.  6    MULTIVITAMIN W-MINERALS/LUTEIN (CENTRUM SILVER ORAL) Take 1 tablet by mouth once daily.       TRUE METRIX GLUCOSE TEST STRIP Strp test FOUR TIMES DAILY or as directed 100 each 12    valsartan-hydrochlorothiazide (DIOVAN-HCT) 320-12.5 mg per tablet Take 1 tablet by mouth once daily. 30 tablet 11    VITAMIN D2 50,000 unit capsule TAKE ONE CAPSULE BY MOUTH EVERY 7 DAYS 4 capsule 11    [DISCONTINUED] budesonide-formoterol 160-4.5 mcg (SYMBICORT) 160-4.5 mcg/actuation HFAA Inhale 2 puffs into the lungs.       No current facility-administered medications on file prior to visit.      Social History     Social History    Marital status:      Spouse name: N/A    Number of children: N/A    Years of education: N/A     Occupational History    housewife      Social History Main Topics    Smoking status: Never Smoker    Smokeless tobacco: Never Used    Alcohol use Yes      Comment: once per month    Drug use: No    Sexual activity: Not on file     Other Topics Concern    Not on  file     Social History Narrative     Family History   Problem Relation Age of Onset    Cancer Maternal Aunt      breast    Cancer Maternal Grandmother      unknown    Cancer Maternal Aunt      unknown    Diabetes Mother     Glaucoma Mother     Diabetes Father     Diabetes Sister     Macular degeneration Sister     Amblyopia Neg Hx     Blindness Neg Hx     Cataracts Neg Hx     Hypertension Neg Hx     Retinal detachment Neg Hx     Stroke Neg Hx     Strabismus Neg Hx     Thyroid disease Neg Hx          ROS:GENERAL: No fever, chills, fatigability or weight loss.  SKIN: No rashes, itching or changes in color or texture of skin.  HEAD: No headaches or recent head trauma.EYES: Visual acuity fine. No photophobia, ocular pain or diplopia.EARS: Denies ear pain, discharge or vertigo.NOSE: No loss of smell, no epistaxis or postnasal drip.MOUTH & THROAT: No hoarseness or change in voice. No excessive gum bleeding.NODES: Denies swollen glands.  CHEST: Denies ESPINAL, cyanosis, wheezing, cough and sputum production.  CARDIOVASCULAR: Denies chest pain, PND, orthopnea or reduced exercise tolerance.  ABDOMEN: Appetite fine. No weight loss. Denies diarrhea, abdominal pain, hematemesis or blood in stool.  URINARY: No flank pain, dysuria or hematuria.  PERIPHERAL VASCULAR: No claudication or cyanosis.  MUSCULOSKELETAL: See above.  NEUROLOGIC: No history of seizures, paralysis, alteration of gait or coordination.  PE:   HEAD: Normocephalic, atraumatic.EYES: PERRL. EOMI.   EARS: TM's intact. Light reflex normal. No retraction or perforation.   NOSE: Mucosa pink. Airway clear.MOUTH & THROAT: No tonsillar enlargement. No pharyngeal erythema or exudate. No stridor.  NODES: No cervical, axillary or inguinal lymph node enlargement.  CHEST: Lungs clear to auscultation.  CARDIOVASCULAR: Normal S1, S2. No rubs, murmurs or gallops.  ABDOMEN: Bowel sounds normal. Not distended. Soft. No tenderness or masses.  MUSCULOSKELETAL: Tender  T10-L3 mild spasm;neg SLR   NEUROLOGIC: Cranial Nerves: II-XII grossly intact.  Motor: 5/5 strength major flexors/extensors.  DTR's: Knees, Ankles 2+ and equal bilaterally; downgoing toes.  Sensory: Intact to light touch distally.  Gait & Posture: Normal gait and fine motion. No cerebellar signs.     Diagnoses and all orders for this visit:    Radiculopathy, unspecified spinal region    Nodular thyroid disease  -     TSH; Future  -     T4, free; Future    Uncontrolled type 2 diabetes mellitus with complication, unspecified long term insulin use status    Benign essential HTN    Tremor  -     Folate; Future  -     Vitamin B12; Future    Vitamin D deficiency  -     Vitamin D; Future    Other orders  -     gabapentin (NEURONTIN) 400 MG capsule; Take 1 capsule (400 mg total) by mouth 4 (four) times daily.      Endo fu  FU Neuroendochrine

## 2017-05-16 ENCOUNTER — TELEPHONE (OUTPATIENT)
Dept: FAMILY MEDICINE | Facility: CLINIC | Age: 62
End: 2017-05-16

## 2017-05-16 DIAGNOSIS — E04.1 NODULAR THYROID DISEASE: Primary | ICD-10-CM

## 2017-05-17 ENCOUNTER — TELEPHONE (OUTPATIENT)
Dept: ENDOCRINOLOGY | Facility: CLINIC | Age: 62
End: 2017-05-17

## 2017-05-17 ENCOUNTER — PATIENT MESSAGE (OUTPATIENT)
Dept: ENDOCRINOLOGY | Facility: CLINIC | Age: 62
End: 2017-05-17

## 2017-05-17 DIAGNOSIS — E03.9 HYPOTHYROIDISM, UNSPECIFIED TYPE: Primary | ICD-10-CM

## 2017-05-17 RX ORDER — LEVOTHYROXINE SODIUM 200 UG/1
200 TABLET ORAL DAILY
Qty: 30 TABLET | Refills: 6 | Status: SHIPPED | OUTPATIENT
Start: 2017-05-17 | End: 2017-06-29

## 2017-05-17 NOTE — TELEPHONE ENCOUNTER
----- Message from Virginia Mcpherson sent at 5/17/2017  2:11 PM CDT -----  Contact: self   Placed call to pod, patient missed call from your office please call back at [phone] (oddu)

## 2017-05-17 NOTE — TELEPHONE ENCOUNTER
Verify that currently on synthroid 200 mcg 5 days a week and 1.5 tablets 2 days a week. Decrease to o200 mcg daily. Check TSH 6 weeks.

## 2017-06-08 ENCOUNTER — TELEPHONE (OUTPATIENT)
Dept: FAMILY MEDICINE | Facility: CLINIC | Age: 62
End: 2017-06-08

## 2017-06-08 ENCOUNTER — PATIENT MESSAGE (OUTPATIENT)
Dept: FAMILY MEDICINE | Facility: CLINIC | Age: 62
End: 2017-06-08

## 2017-06-08 DIAGNOSIS — G89.29 CHRONIC BACK PAIN, UNSPECIFIED BACK LOCATION, UNSPECIFIED BACK PAIN LATERALITY: Primary | ICD-10-CM

## 2017-06-08 DIAGNOSIS — M54.9 CHRONIC BACK PAIN, UNSPECIFIED BACK LOCATION, UNSPECIFIED BACK PAIN LATERALITY: Primary | ICD-10-CM

## 2017-06-08 DIAGNOSIS — R25.1 TREMOR: ICD-10-CM

## 2017-06-13 ENCOUNTER — OFFICE VISIT (OUTPATIENT)
Dept: FAMILY MEDICINE | Facility: CLINIC | Age: 62
End: 2017-06-13
Payer: MEDICAID

## 2017-06-13 ENCOUNTER — HOSPITAL ENCOUNTER (OUTPATIENT)
Dept: RADIOLOGY | Facility: HOSPITAL | Age: 62
Discharge: HOME | End: 2017-06-13
Payer: MEDICAID

## 2017-06-13 VITALS
OXYGEN SATURATION: 95 % | DIASTOLIC BLOOD PRESSURE: 86 MMHG | TEMPERATURE: 98 F | BODY MASS INDEX: 39.38 KG/M2 | SYSTOLIC BLOOD PRESSURE: 132 MMHG | WEIGHT: 208.56 LBS | HEART RATE: 97 BPM | HEIGHT: 61 IN

## 2017-06-13 DIAGNOSIS — J20.9 ACUTE BRONCHITIS, UNSPECIFIED ORGANISM: ICD-10-CM

## 2017-06-13 DIAGNOSIS — J20.9 ACUTE BRONCHITIS, UNSPECIFIED ORGANISM: Primary | ICD-10-CM

## 2017-06-13 PROCEDURE — 99999 PR PBB SHADOW E&M-EST. PATIENT-LVL IV: CPT | Mod: PBBFAC,,,

## 2017-06-13 PROCEDURE — 71020 XR CHEST PA AND LATERAL: CPT | Mod: TC,PO

## 2017-06-13 PROCEDURE — 99213 OFFICE O/P EST LOW 20 MIN: CPT | Mod: S$PBB,,,

## 2017-06-13 PROCEDURE — 71020 XR CHEST PA AND LATERAL: CPT | Mod: 26,,, | Performed by: RADIOLOGY

## 2017-06-13 RX ORDER — HYDROCODONE POLISTIREX AND CHLORPHENIRAMINE POLISTIREX 10; 8 MG/5ML; MG/5ML
5 SUSPENSION, EXTENDED RELEASE ORAL EVERY 12 HOURS PRN
Qty: 115 ML | Refills: 0 | Status: SHIPPED | OUTPATIENT
Start: 2017-06-13 | End: 2017-06-14

## 2017-06-13 RX ORDER — METHYLPREDNISOLONE 4 MG/1
TABLET ORAL
Qty: 1 PACKAGE | Refills: 0 | Status: SHIPPED | OUTPATIENT
Start: 2017-06-13 | End: 2017-06-19

## 2017-06-13 NOTE — PROGRESS NOTES
Subjective:       Patient ID: Jessica Rojas is a 61 y.o. female.    Chief Complaint: Cough    HPI   Patient presents today with a one-week complaint of a cough that she says is constant and moderate in intensity.  She voices associated postnasal drip and a low-grade fever that has been 100.5°F at its acme.  The patient denies any shortness of breath but does voice some wheezing upon waking in the mornings.  She also voices some associated chest wall pain with the cough and with deep breathing.  She cannot identify exacerbating or mitigating factors.     Review of Systems   Constitutional: Positive for fever. Negative for activity change, appetite change, fatigue and unexpected weight change.   HENT: Positive for postnasal drip. Negative for ear pain.    Eyes: Negative.    Respiratory: Positive for wheezing. Negative for chest tightness and shortness of breath.         Chest wall pain with cough   Cardiovascular: Negative for chest pain, palpitations and leg swelling.   Gastrointestinal: Negative for constipation, diarrhea, nausea and vomiting.   Endocrine: Negative.    Genitourinary: Negative.    Musculoskeletal: Negative.    Skin: Negative for color change.   Allergic/Immunologic: Negative.    Neurological: Negative for dizziness, weakness, light-headedness and headaches.   Hematological: Negative.    Psychiatric/Behavioral: Negative for sleep disturbance.         Objective:      Physical Exam   Constitutional: She is oriented to person, place, and time. She appears well-developed and well-nourished. No distress.   HENT:   Head: Normocephalic and atraumatic. Hair is normal.   Right Ear: Hearing, tympanic membrane, external ear and ear canal normal.   Left Ear: Hearing, tympanic membrane, external ear and ear canal normal.   Nose: No mucosal edema, rhinorrhea, nose lacerations, sinus tenderness, nasal deformity, septal deviation or nasal septal hematoma. No epistaxis.  No foreign bodies. Right sinus exhibits no  maxillary sinus tenderness and no frontal sinus tenderness. Left sinus exhibits no maxillary sinus tenderness and no frontal sinus tenderness.   Mouth/Throat: Uvula is midline, oropharynx is clear and moist and mucous membranes are normal.   Eyes: Conjunctivae are normal. Pupils are equal, round, and reactive to light. Right eye exhibits no discharge. Left eye exhibits no discharge.   Neck: Trachea normal, normal range of motion and phonation normal. Neck supple. No tracheal deviation present.   Cardiovascular: Normal rate, regular rhythm, normal heart sounds and intact distal pulses.  Exam reveals no gallop and no friction rub.    No murmur heard.  Pulmonary/Chest: Effort normal and breath sounds normal. No respiratory distress. She has no decreased breath sounds. She has no wheezes. She has no rhonchi. She has no rales. She exhibits no tenderness.   Musculoskeletal: Normal range of motion.   Lymphadenopathy:        Head (right side): No submental, no submandibular, no tonsillar, no preauricular, no posterior auricular and no occipital adenopathy present.        Head (left side): No submental, no submandibular, no tonsillar, no preauricular, no posterior auricular and no occipital adenopathy present.     She has no cervical adenopathy.        Right cervical: No superficial cervical, no deep cervical and no posterior cervical adenopathy present.       Left cervical: No superficial cervical, no deep cervical and no posterior cervical adenopathy present.   Neurological: She is alert and oriented to person, place, and time. She exhibits normal muscle tone. GCS eye subscore is 4. GCS verbal subscore is 5. GCS motor subscore is 6.   Skin: Skin is warm and dry. No rash noted. She is not diaphoretic. No erythema. No pallor.   Psychiatric: She has a normal mood and affect. Her speech is normal and behavior is normal. Judgment and thought content normal.       Assessment:       1. Acute bronchitis, unspecified organism           Plan:   Acute bronchitis, unspecified organism  -     hydrocodone-chlorpheniramine (TUSSIONEX) 10-8 mg/5 mL suspension; Take 5 mLs by mouth every 12 (twelve) hours as needed.  Dispense: 115 mL; Refill: 0  -     X-Ray Chest PA And Lateral; Future; Expected date: 06/13/2017  -     methylPREDNISolone (MEDROL DOSEPACK) 4 mg tablet; use as directed  Dispense: 1 Package; Refill: 0            Disclaimer: This note is prepared using voice recognition software.  As such there may be errors in the dictation.  It has not been proofread.

## 2017-06-14 ENCOUNTER — TELEPHONE (OUTPATIENT)
Dept: FAMILY MEDICINE | Facility: CLINIC | Age: 62
End: 2017-06-14

## 2017-06-14 DIAGNOSIS — J20.9 ACUTE BRONCHITIS, UNSPECIFIED ORGANISM: Primary | ICD-10-CM

## 2017-06-14 RX ORDER — BENZONATATE 100 MG/1
200 CAPSULE ORAL 3 TIMES DAILY PRN
Qty: 30 CAPSULE | Refills: 0 | Status: SHIPPED | OUTPATIENT
Start: 2017-06-14 | End: 2017-06-24

## 2017-06-14 NOTE — TELEPHONE ENCOUNTER
----- Message from Jessica Arreaga sent at 6/13/2017  4:26 PM CDT -----  [facility]   Caller states that the TUSSIONEX is not covered under pt insurance and want to see if something else can be sent in for pt.     ...[phone]

## 2017-06-15 RX ORDER — INSULIN GLARGINE 100 [IU]/ML
80 INJECTION, SOLUTION SUBCUTANEOUS 2 TIMES DAILY
Qty: 45 ML | Refills: 3 | Status: SHIPPED | OUTPATIENT
Start: 2017-06-15 | End: 2017-06-29 | Stop reason: SDUPTHER

## 2017-06-15 RX ORDER — LEVOTHYROXINE SODIUM 200 UG/1
TABLET ORAL
Qty: 35 TABLET | Refills: 3 | Status: SHIPPED | OUTPATIENT
Start: 2017-06-15 | End: 2017-06-29

## 2017-06-23 ENCOUNTER — LAB VISIT (OUTPATIENT)
Dept: LAB | Facility: HOSPITAL | Age: 62
End: 2017-06-23
Attending: FAMILY MEDICINE
Payer: MEDICAID

## 2017-06-23 DIAGNOSIS — E11.8 TYPE 2 DIABETES MELLITUS WITH COMPLICATION, WITH LONG-TERM CURRENT USE OF INSULIN: ICD-10-CM

## 2017-06-23 DIAGNOSIS — E03.9 HYPOTHYROIDISM, UNSPECIFIED TYPE: ICD-10-CM

## 2017-06-23 DIAGNOSIS — Z79.4 TYPE 2 DIABETES MELLITUS WITH COMPLICATION, WITH LONG-TERM CURRENT USE OF INSULIN: ICD-10-CM

## 2017-06-23 LAB
ALBUMIN SERPL BCP-MCNC: 3.7 G/DL
ALP SERPL-CCNC: 87 U/L
ALT SERPL W/O P-5'-P-CCNC: 15 U/L
ANION GAP SERPL CALC-SCNC: 7 MMOL/L
AST SERPL-CCNC: 14 U/L
BILIRUB SERPL-MCNC: 0.5 MG/DL
BUN SERPL-MCNC: 26 MG/DL
CALCIUM SERPL-MCNC: 9.5 MG/DL
CHLORIDE SERPL-SCNC: 105 MMOL/L
CO2 SERPL-SCNC: 29 MMOL/L
CREAT SERPL-MCNC: 0.9 MG/DL
EST. GFR  (AFRICAN AMERICAN): >60 ML/MIN/1.73 M^2
EST. GFR  (NON AFRICAN AMERICAN): >60 ML/MIN/1.73 M^2
ESTIMATED AVG GLUCOSE: 131 MG/DL
GLUCOSE SERPL-MCNC: 99 MG/DL
HBA1C MFR BLD HPLC: 6.2 %
POTASSIUM SERPL-SCNC: 4.4 MMOL/L
PROT SERPL-MCNC: 7.2 G/DL
SODIUM SERPL-SCNC: 141 MMOL/L
T4 FREE SERPL-MCNC: 1.56 NG/DL
TSH SERPL DL<=0.005 MIU/L-ACNC: 0.1 UIU/ML

## 2017-06-23 PROCEDURE — 80053 COMPREHEN METABOLIC PANEL: CPT

## 2017-06-23 PROCEDURE — 83036 HEMOGLOBIN GLYCOSYLATED A1C: CPT

## 2017-06-23 PROCEDURE — 36415 COLL VENOUS BLD VENIPUNCTURE: CPT | Mod: PO

## 2017-06-23 PROCEDURE — 84443 ASSAY THYROID STIM HORMONE: CPT

## 2017-06-23 PROCEDURE — 84439 ASSAY OF FREE THYROXINE: CPT

## 2017-06-29 ENCOUNTER — OFFICE VISIT (OUTPATIENT)
Dept: ENDOCRINOLOGY | Facility: CLINIC | Age: 62
End: 2017-06-29
Payer: MEDICAID

## 2017-06-29 VITALS
HEART RATE: 76 BPM | WEIGHT: 209.31 LBS | DIASTOLIC BLOOD PRESSURE: 72 MMHG | SYSTOLIC BLOOD PRESSURE: 126 MMHG | BODY MASS INDEX: 39.55 KG/M2

## 2017-06-29 DIAGNOSIS — G25.2 COARSE TREMORS: ICD-10-CM

## 2017-06-29 DIAGNOSIS — E11.8 TYPE 2 DIABETES MELLITUS WITH COMPLICATION, WITH LONG-TERM CURRENT USE OF INSULIN: ICD-10-CM

## 2017-06-29 DIAGNOSIS — E78.5 HYPERLIPIDEMIA, UNSPECIFIED HYPERLIPIDEMIA TYPE: ICD-10-CM

## 2017-06-29 DIAGNOSIS — Z79.4 TYPE 2 DIABETES MELLITUS WITH COMPLICATION, WITH LONG-TERM CURRENT USE OF INSULIN: ICD-10-CM

## 2017-06-29 DIAGNOSIS — I10 BENIGN ESSENTIAL HTN: ICD-10-CM

## 2017-06-29 DIAGNOSIS — E03.9 HYPOTHYROIDISM, UNSPECIFIED TYPE: Primary | ICD-10-CM

## 2017-06-29 PROCEDURE — 4010F ACE/ARB THERAPY RXD/TAKEN: CPT | Mod: ,,, | Performed by: INTERNAL MEDICINE

## 2017-06-29 PROCEDURE — 3044F HG A1C LEVEL LT 7.0%: CPT | Mod: ,,, | Performed by: INTERNAL MEDICINE

## 2017-06-29 PROCEDURE — 99999 PR PBB SHADOW E&M-EST. PATIENT-LVL III: CPT | Mod: PBBFAC,,, | Performed by: INTERNAL MEDICINE

## 2017-06-29 PROCEDURE — 99213 OFFICE O/P EST LOW 20 MIN: CPT | Mod: PBBFAC,PO | Performed by: INTERNAL MEDICINE

## 2017-06-29 PROCEDURE — 99214 OFFICE O/P EST MOD 30 MIN: CPT | Mod: S$PBB,,, | Performed by: INTERNAL MEDICINE

## 2017-06-29 RX ORDER — EZETIMIBE 10 MG/1
10 TABLET ORAL DAILY
Qty: 30 TABLET | Refills: 3 | Status: SHIPPED | OUTPATIENT
Start: 2017-06-29 | End: 2018-09-18

## 2017-06-29 RX ORDER — LEVOTHYROXINE SODIUM 175 UG/1
175 TABLET ORAL DAILY
Qty: 30 TABLET | Refills: 6 | Status: SHIPPED | OUTPATIENT
Start: 2017-06-29 | End: 2017-08-21 | Stop reason: DRUGHIGH

## 2017-06-29 RX ORDER — INSULIN GLARGINE 100 [IU]/ML
35 INJECTION, SOLUTION SUBCUTANEOUS 2 TIMES DAILY
Qty: 45 ML | Refills: 3 | Status: SHIPPED | OUTPATIENT
Start: 2017-06-29 | End: 2017-10-30 | Stop reason: SDUPTHER

## 2017-06-29 RX ORDER — PROPRANOLOL HYDROCHLORIDE 10 MG/1
10 TABLET ORAL 3 TIMES DAILY
Qty: 90 TABLET | Refills: 0 | Status: SHIPPED | OUTPATIENT
Start: 2017-06-29 | End: 2017-07-27 | Stop reason: SDUPTHER

## 2017-06-29 RX ORDER — INSULIN ASPART 100 [IU]/ML
INJECTION, SOLUTION INTRAVENOUS; SUBCUTANEOUS
Qty: 45 ML | Refills: 5 | Status: SHIPPED | OUTPATIENT
Start: 2017-06-29 | End: 2018-03-28 | Stop reason: SDUPTHER

## 2017-06-29 NOTE — PROGRESS NOTES
CHIEF COMPLAINT: DM 2/Hypothyroidism  61 year old being seen as a f/u. Nodules was found on on a PET scan. FNA 11/25/14 shows adenomatous nodule with cystic changes. S/P thyroidectomy 5/24/16. Lantus 40 BID. Humalog 40 TID. Saw DM ED 4/2016. On synthroid 200 mcg once daily. + Tremors. No palpitations. Has been getting more agitated. Getting hypoglycemic at night. Appears she decreased to 40 BID of Lantus. Still getting some hypoglycemia. Usually takes humalog 30 with meals. Stopped zetia.             PATH  THYROID GLAND, TOTAL THYROIDECTOMY:  - NODULAR GOITER WITH FOCAL DEGENERATIVE CHANGES.  - NO EVIDENCE OF MALIGNANCY.        PAST MEDICAL HISTORY/PAST SURGICAL HISTORY:  Reviewed in [facility]    SOCIAL HISTORY: No T/A.    FAMILY HISTORY:  No known thyroid disease or thyroid cancer. + DM 2.     MEDICATIONS/ALLERGIES: The patient's MedCard has been updated and reviewed.      ROS:   Constitutional: weight stable.   Eyes: No recent visual changes  ENT: No dysphagia  Cardiovascular: Denies current anginal symptoms  Respiratory: SOB stable  Gastrointestinal: No N/V  GenitoUrinary - No dysuria  Skin: No new skin rash  Neurologic: No focal neurologic complaints  Remainder ROS negative        PE:    GENERAL: Well developed, well nourished.  NECK: Supple, trachea midline, thyroidectomy scar intact  CHEST: Resp even and unlabored, CTA bilateral.  CARDIAC: RRR, S1, S2 heard, no murmurs, rubs, S3, or S4  FEET: Normal monofilament. No cuts or abrasions. 2 + pedal pulses.         Results for ROBERTO MULLIGAN (MRN [id number]) as of 6/29/2017 14:33   Ref. Range 6/23/2017 08:57   Sodium Latest Ref Range: 136 - 145 mmol/L 141   Potassium Latest Ref Range: 3.5 - 5.1 mmol/L 4.4   Chloride Latest Ref Range: 95 - 110 mmol/L 105   CO2 Latest Ref Range: 23 - 29 mmol/L 29   Anion Gap Latest Ref Range: 8 - 16 mmol/L 7 (L)   BUN, Bld Latest Ref Range: 8 - 23 mg/dL 26 (H)   Creatinine Latest Ref Range: 0.5 - 1.4 mg/dL 0.9   eGFR if non African  American Latest Ref Range: >60 mL/min/1.73 m^2 >60.0   eGFR if African American Latest Ref Range: >60 mL/min/1.73 m^2 >60.0   Glucose Latest Ref Range: 70 - 110 mg/dL 99   Calcium Latest Ref Range: 8.7 - 10.5 mg/dL 9.5   Alkaline Phosphatase Latest Ref Range: 55 - 135 U/L 87   Total Protein Latest Ref Range: 6.0 - 8.4 g/dL 7.2   Albumin Latest Ref Range: 3.5 - 5.2 g/dL 3.7   Total Bilirubin Latest Ref Range: 0.1 - 1.0 mg/dL 0.5   AST Latest Ref Range: 10 - 40 U/L 14   ALT Latest Ref Range: 10 - 44 U/L 15   Hemoglobin A1C Latest Ref Range: 4.0 - 5.6 % 6.2 (H)   Estimated Avg Glucose Latest Ref Range: 68 - 131 mg/dL 131   TSH Latest Ref Range: 0.400 - 4.000 uIU/mL 0.097 (L)   Free T4 Latest Ref Range: 0.71 - 1.51 ng/dL 1.56 (H)         ASSESSMENT/PLAN:  1. Post Surgical Hypothyroidism- For MNG. Path benign. Decrease synthroid to 175 mcg.     2. HTN- controlled. Continue current Tx.     3. DM 2- With nephropathy and neuropathy. Advised that she not self medicate. She should notify us when she gets hypoglycemic. Also must bring a log each visit. Decrease Lantus to 35 BID and humalog 30 with meals. Call if developing hypoglycemia still.     5. Hyperlipidemia- cannot tolerate statin. Restart zetia  5. Tremors- PRN propranolol. If no improvement after normalization of thyroid levels will need to see neurology.     FOLLOWUP  TSH- 6 weeks  F/U 4 months with CMP, A1c, TSH,

## 2017-07-03 ENCOUNTER — PATIENT MESSAGE (OUTPATIENT)
Dept: ENDOCRINOLOGY | Facility: CLINIC | Age: 62
End: 2017-07-03

## 2017-07-11 ENCOUNTER — PATIENT MESSAGE (OUTPATIENT)
Dept: FAMILY MEDICINE | Facility: CLINIC | Age: 62
End: 2017-07-11

## 2017-07-11 DIAGNOSIS — R25.1 TREMOR: Primary | ICD-10-CM

## 2017-07-12 ENCOUNTER — TELEPHONE (OUTPATIENT)
Dept: NEUROLOGY | Facility: CLINIC | Age: 62
End: 2017-07-12

## 2017-07-12 NOTE — TELEPHONE ENCOUNTER
----- Message from Linh Nunez sent at 7/12/2017  2:55 PM CDT -----  Contact: Deni Ngo Pt states she is returning a call to you    Pt contact number [phone]  Thanks

## 2017-07-12 NOTE — TELEPHONE ENCOUNTER
----- Message from Krista Carlson sent at 7/11/2017  4:44 PM CDT -----  Contact: PT  PT is requesting to schedule a NP appt @ the [facility]      Please call [phone]

## 2017-07-12 NOTE — TELEPHONE ENCOUNTER
----- Message from Jose Clement sent at 7/12/2017 10:59 AM CDT -----  Contact: Self [phone]  Patient is returning a missed call about scheduling an appt,  pls return call

## 2017-07-25 ENCOUNTER — OFFICE VISIT (OUTPATIENT)
Dept: NEUROLOGY | Facility: CLINIC | Age: 62
End: 2017-07-25
Payer: MEDICAID

## 2017-07-25 DIAGNOSIS — R29.898 COGWHEEL RIGIDITY: ICD-10-CM

## 2017-07-25 DIAGNOSIS — R59.0 HILAR LYMPHADENOPATHY: ICD-10-CM

## 2017-07-25 DIAGNOSIS — G20.C PARKINSONISM, UNSPECIFIED PARKINSONISM TYPE: ICD-10-CM

## 2017-07-25 DIAGNOSIS — E55.9 VITAMIN D DEFICIENCY: ICD-10-CM

## 2017-07-25 DIAGNOSIS — E89.0 POSTOPERATIVE HYPOTHYROIDISM: ICD-10-CM

## 2017-07-25 DIAGNOSIS — E04.1 NODULAR THYROID DISEASE: ICD-10-CM

## 2017-07-25 DIAGNOSIS — R59.0 MEDIASTINAL LYMPHADENOPATHY: ICD-10-CM

## 2017-07-25 DIAGNOSIS — C7A.090 MALIGNANT CARCINOID TUMOR OF BRONCHUS AND LUNG: ICD-10-CM

## 2017-07-25 DIAGNOSIS — G25.2 RESTING TREMOR: Primary | ICD-10-CM

## 2017-07-25 PROCEDURE — 99203 OFFICE O/P NEW LOW 30 MIN: CPT | Mod: S$PBB,,, | Performed by: PSYCHIATRY & NEUROLOGY

## 2017-07-25 PROCEDURE — 99999 PR PBB SHADOW E&M-EST. PATIENT-LVL I: CPT | Mod: PBBFAC,,, | Performed by: PSYCHIATRY & NEUROLOGY

## 2017-07-25 PROCEDURE — 99211 OFF/OP EST MAY X REQ PHY/QHP: CPT | Mod: PBBFAC,25 | Performed by: PSYCHIATRY & NEUROLOGY

## 2017-07-25 NOTE — PROGRESS NOTES
"Patient Name: Jessica Rojas  MRN: [id number]    CC: tremor    HPI: Jessica Rojas is a 61 y.o. female with PMHx of carcinoid tumor (lung), multinodular goiter (s/p thyroidectomy), post-op acquired hypothyroidism, DMII, Vitamin D deficiency who presents to neurology clinic for evaluation of tremor. Patient reports a 4 month history of tremors, more notable in her hands; mostly at rest, improves with movement, L hand worse than the R. Reports symptoms having worsened in the past 6 weeks. Was seen by endocrinology; concerns for over dosing of synthroid given elevated T4 and low TSH; dose was decreased 4 weeks back with no improvement in her tremors. Patient states no concerns of tremors prior to 4 months back. Patient also reports history of nightmares in her sleep, also has noticed changes in her walk in the past month.  reported to have been telling her to "pick her feet up more when she walks".   No recurrence of tumor per imaging from last year; patient due for follow up 1 year scan and oncology visit with Dr. Quintero in October 2017      ROS:   Review of Systems   Constitutional: Negative for malaise/fatigue. Negative for weight loss.   HENT: Negative for hearing loss.   Eyes: Negative for blurred vision and double vision.   Respiratory: Negative for shortness of breath and stridor.   Cardiovascular: Negative for chest pain and palpitations.   Gastrointestinal: Negative for nausea, vomiting and constipation.   Genitourinary: Negative for frequency. Negative for urgency.   Musculoskeletal: Negative for joint pain.    Skin: Negative for rash.   Neurological: Negative for focal weakness and seizures.   Endo/Heme/Allergies: Does not bruise/bleed easily.   Psychiatric/Behavioral: Negative for memory loss. Negative for depression and hallucinations. The patient is not nervous/anxious.      Past Medical History  Past Medical History:   Diagnosis Date    Abdominal pain 2/27/2015    Diabetes mellitus, type 2     " "DM (diabetes mellitus)     on insulin    Elevated transaminase level 4/18/2016    Encounter for blood transfusion     History of neuroendocrine cancer     HTN (hypertension) 5/6/2014    Hypercalcemia 4/18/2016    Lung cancer, hilus 5/6/2014    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    Malignant carcinoid tumor of the bronchus and lung 5/2014    Mediastinal lymphadenopathy 8/26/2015    Obesity, morbid 5/6/2014    Pituitary adenoma 1980's    took parladel for 3 yrs    Pneumonia     Thyroid disease     Wheeze 6/23/2014       Medications    Current Outpatient Prescriptions:     BD INSULIN PEN NEEDLE UF MINI 31 gauge x 3/16" Ndle, use as directed with insulin, Disp: , Rfl: 0    cholecalciferol, vitamin D3, (VITAMIN D3) 400 unit Cap, Take 1,200 Units by mouth once daily., Disp: , Rfl:     ezetimibe (ZETIA) 10 mg tablet, Take 1 tablet (10 mg total) by mouth once daily., Disp: 30 tablet, Rfl: 3    gabapentin (NEURONTIN) 400 MG capsule, Take 1 capsule (400 mg total) by mouth 4 (four) times daily., Disp: 120 capsule, Rfl: 11    GLUCOSAMINE HCL/CHONDR MCCARTHY A NA (OSTEO BI-FLEX ORAL), Take by mouth once daily., Disp: , Rfl:     insulin aspart (NOVOLOG FLEXPEN) 100 unit/mL InPn pen, Inject 30 units AC, unless eating small meals then use 25 units AC, plus SSI., Disp: 45 mL, Rfl: 5    insulin glargine (LANTUS SOLOSTAR) 100 unit/mL (3 mL) InPn pen, Inject 35 Units into the skin 2 (two) times daily., Disp: 45 mL, Rfl: 3    levothyroxine (SYNTHROID) 175 MCG tablet, Take 1 tablet (175 mcg total) by mouth once daily., Disp: 30 tablet, Rfl: 6    meclizine (ANTIVERT) 25 mg tablet, Take 1 tablet (25 mg total) by mouth 3 (three) times daily as needed., Disp: 30 tablet, Rfl: 0    meloxicam (MOBIC) 15 MG tablet, Take 15 mg by mouth once daily., Disp: , Rfl: 6    MULTIVITAMIN W-MINERALS/LUTEIN (CENTRUM SILVER ORAL), Take 1 tablet by mouth once daily. , Disp: , Rfl:     propranolol (INDERAL) 10 MG " tablet, Take 1 tablet (10 mg total) by mouth 3 (three) times daily., Disp: 90 tablet, Rfl: 0    TRUE METRIX GLUCOSE TEST STRIP Strp, test FOUR TIMES DAILY or as directed, Disp: 100 each, Rfl: 12    valsartan-hydrochlorothiazide (DIOVAN-HCT) 320-12.5 mg per tablet, Take 1 tablet by mouth once daily., Disp: 30 tablet, Rfl: 11    VITAMIN D2 50,000 unit capsule, TAKE ONE CAPSULE BY MOUTH EVERY 7 DAYS, Disp: 4 capsule, Rfl: 11    Allergies  Review of patient's allergies indicates:   Allergen Reactions    Propranolol Nausea And Vomiting     Drops pressure and raised sugar    Lipitor [atorvastatin] Other (See Comments)     Myalgia, muscle pain       Social History  Social History     Social History    Marital status:      Spouse name: N/A    Number of children: N/A    Years of education: N/A     Occupational History    housewife      Social History Main Topics    Smoking status: Never Smoker    Smokeless tobacco: Never Used    Alcohol use Yes      Comment: once per month    Drug use: No    Sexual activity: Not on file     Other Topics Concern    Not on file     Social History Narrative    No narrative on file       Family History  Family History   Problem Relation Age of Onset    Cancer Maternal Aunt      breast    Cancer Maternal Grandmother      unknown    Cancer Maternal Aunt      unknown    Diabetes Mother     Glaucoma Mother     Diabetes Father     Diabetes Sister     Macular degeneration Sister     Amblyopia Neg Hx     Blindness Neg Hx     Cataracts Neg Hx     Hypertension Neg Hx     Retinal detachment Neg Hx     Stroke Neg Hx     Strabismus Neg Hx     Thyroid disease Neg Hx        Physical Exam  There were no vitals taken for this visit.    General appearance: Well-developed, well-groomed.     Neurologic Exam: The patient is awake, alert and oriented. Language is fluent. Recent and remote memory are normal. Attention span and concentration are normal. Fund of knowledge is  appropriate.     Cranial nerves: pupils are round and reactive to light and accommodation. Visual fields are full to confrontation. Ocular motility is full in all cardinal positions of gaze. Facial sensation is normal to pinprick and light touch. Facial activation is symmetric. Hearing is normal bilaterally. Palate elevates symmetrically and gag reflex is intact bilaterally. Shoulder elevation is symmetric and full strength bilaterally. Tongue is midline and neck rotation strength is normal bilaterally. Neck range of motion is normal.     Motor examination of all extremities demonstrates normal bulk and tone in all four limbs. There are no atrophy or fasciculations. Strength is 5/5 in the upper and lower extremities bilaterally without pronator drift.     Sensory examination is normal to pinprick, vibration and proprioception in the upper and lower extremities bilaterally.     Deep tendon reflexes are 2+ and symmetric in the upper and lower extremities bilaterally.     Gait: decreased arm swing (L>R). Robotic gait.     Movement exam: +masked facies. +hypophonic voice.  Bradykinesia (mild to moderate; L>R). Resting and postural tremor in the upper extremities (L>R). +increased tone and cogwheel rigidity (mild to moderate, L>R).  Postural instability not evaluated.    Coordination: Finger to nose and heel to shin testing is normal in both upper and lower extremities. Rapid alternating movements are normal in both upper and lower extremities.       Lab and Test Results    WBC   Date Value Ref Range Status   05/17/2016 10.11 3.90 - 12.70 K/uL Final   04/18/2016 9.36 3.90 - 12.70 K/uL Final   02/16/2016 12.22 3.90 - 12.70 K/uL Final     Hemoglobin   Date Value Ref Range Status   05/17/2016 14.8 12.0 - 16.0 g/dL Final   04/18/2016 14.6 12.0 - 16.0 g/dL Final   02/16/2016 15.7 12.0 - 16.0 g/dL Final     POC Hematocrit   Date Value Ref Range Status   08/07/2014 34 (L) 36 - 54 %PCV Final     Hematocrit   Date Value Ref Range  Status   05/17/2016 44.8 37.0 - 48.5 % Final   04/18/2016 43.8 37.0 - 48.5 % Final   02/16/2016 47.3 37.0 - 48.5 % Final     Platelets   Date Value Ref Range Status   05/17/2016 255 150 - 350 K/uL Final   04/18/2016 222 150 - 350 K/uL Final   02/16/2016 278 150 - 350 K/uL Final     Glucose   Date Value Ref Range Status   06/23/2017 99 70 - 110 mg/dL Final   02/06/2017 234 (H) 70 - 110 mg/dL Final   10/03/2016 218 (H) 70 - 110 mg/dL Final     Sodium   Date Value Ref Range Status   06/23/2017 141 136 - 145 mmol/L Final   02/06/2017 137 136 - 145 mmol/L Final   10/03/2016 142 136 - 145 mmol/L Final     Potassium   Date Value Ref Range Status   06/23/2017 4.4 3.5 - 5.1 mmol/L Final   02/06/2017 5.2 (H) 3.5 - 5.1 mmol/L Final     Comment:     *Slightly Hemolyzed   10/03/2016 4.8 3.5 - 5.1 mmol/L Final     Chloride   Date Value Ref Range Status   06/23/2017 105 95 - 110 mmol/L Final   02/06/2017 105 95 - 110 mmol/L Final   10/03/2016 105 95 - 110 mmol/L Final     CO2   Date Value Ref Range Status   06/23/2017 29 23 - 29 mmol/L Final   02/06/2017 22 (L) 23 - 29 mmol/L Final   10/03/2016 26 23 - 29 mmol/L Final     BUN, Bld   Date Value Ref Range Status   06/23/2017 26 (H) 8 - 23 mg/dL Final   02/06/2017 24 (H) 8 - 23 mg/dL Final   10/03/2016 21 8 - 23 mg/dL Final     Creatinine   Date Value Ref Range Status   06/23/2017 0.9 0.5 - 1.4 mg/dL Final   02/06/2017 0.9 0.5 - 1.4 mg/dL Final   10/03/2016 1.0 0.5 - 1.4 mg/dL Final     Calcium   Date Value Ref Range Status   06/23/2017 9.5 8.7 - 10.5 mg/dL Final   02/06/2017 9.8 8.7 - 10.5 mg/dL Final   10/03/2016 9.6 8.7 - 10.5 mg/dL Final     Magnesium   Date Value Ref Range Status   02/16/2016 2.5 1.6 - 2.6 mg/dL Final   08/15/2014 2.0 1.6 - 2.6 mg/dL Final   08/15/2014 2.0 1.6 - 2.6 mg/dL Final     Phosphorus   Date Value Ref Range Status   08/15/2014 2.8 2.7 - 4.5 mg/dL Final   08/15/2014 2.8 2.7 - 4.5 mg/dL Final   08/14/2014 4.1 2.7 - 4.5 mg/dL Final     Alkaline Phosphatase    Date Value Ref Range Status   06/23/2017 87 55 - 135 U/L Final   02/06/2017 69 55 - 135 U/L Final   10/03/2016 71 55 - 135 U/L Final     ALT   Date Value Ref Range Status   06/23/2017 15 10 - 44 U/L Final   02/06/2017 32 10 - 44 U/L Final   10/03/2016 37 10 - 44 U/L Final     AST   Date Value Ref Range Status   06/23/2017 14 10 - 40 U/L Final   02/06/2017 38 10 - 40 U/L Final   10/03/2016 36 10 - 40 U/L Final       Assessment and Plan    Jessica Rojas is a 61 y.o. female with PMHx of carcinoid tumor (lung), multinodular goiter (s/p thyroidectomy), post-op acquired hypothyroidism, DMII, Vitamin D deficiency presenting to neurology clinic with acute parkinsonism. No reported history of anti-psychotic or anti-emetics use as an outpatient.     Problem List Items Addressed This Visit     Malignant carcinoid tumor of bronchus and lung    Overview     S/p right lung - middle and lower lobectomy in 2014; without evidence of recurrence         Current Assessment & Plan     Follow up with Dr. Quintero in clinic and 1 year scan due in 10/2017         Mediastinal lymphadenopathy    Nodular thyroid disease    Parkinsonism    Overview     Acute parkinsonism         Current Assessment & Plan     Will consider trial of L dopa in the future         Relevant Orders    MRI Brain W WO Contrast    CODI    RHEUMATOID FACTOR    ANTI -SSA ANTIBODY    ANTI-SSB ANTIBODY    Paraneoplastic Autoantibody Evaulation, Serum    THYROID PEROXIDASE ANTIBODY    SEDIMENTATION RATE, MANUAL    C-REACTIVE PROTEIN    Resting tremor - Primary    Cogwheel rigidity    Vitamin D deficiency    Current Assessment & Plan     Continue vitamin D supplementation         Postoperative hypothyroidism    Overview     - s/p total thyroidectomy in 2016 (parathyroids intact) with post op hypothyroidism; on synthroid         Current Assessment & Plan     Sees Dr. Qureshi in endocrinology; synthroid doses being adjusted given concern for overdosing - noted on labs since  "05/2017; with elevated T4 levels and subsequent lowering of TSH         Relevant Orders    THYROID PEROXIDASE ANTIBODY    Hilar lymphadenopathy    Overview     S/p transbronchial biopsy by Dr. Koroma in 2015  Pathology - "non caseating granuloma"         Current Assessment & Plan     ACE levels ordered; patient not currently on an ACE-I; on an ARB         Relevant Orders    ANGIOTENSIN CONVERTING ENZYME      Other Visit Diagnoses    None.       Follow up in 4 weeks or sooner as needed.       Елена Aviles  Neurology Resident - PGY 2  Department of Neurology  [address] [place] [id number]    "

## 2017-07-25 NOTE — ASSESSMENT & PLAN NOTE
Sees Dr. Qureshi in endocrinology; synthroid doses being adjusted given concern for overdosing - noted on labs since 05/2017; with elevated T4 levels and subsequent lowering of TSH

## 2017-07-27 ENCOUNTER — PATIENT MESSAGE (OUTPATIENT)
Dept: NEUROLOGY | Facility: CLINIC | Age: 62
End: 2017-07-27

## 2017-07-27 RX ORDER — PROPRANOLOL HYDROCHLORIDE 10 MG/1
TABLET ORAL
Qty: 90 TABLET | Refills: 3 | Status: SHIPPED | OUTPATIENT
Start: 2017-07-27 | End: 2018-08-07

## 2017-08-01 NOTE — PROGRESS NOTES
Patient seen and examined.  I agree with the history, exam, assessment and plan within the resident's note with any notable exceptions detailed below:    Fairly acute parkinsonism, will consider ldopa trial +/- DaTSCAN, with workup for reversibles now.          Leonel Canales MD, MPH  Division of Movement and Memory Disorders  [facility]

## 2017-08-02 ENCOUNTER — PATIENT MESSAGE (OUTPATIENT)
Dept: NEUROLOGY | Facility: CLINIC | Age: 62
End: 2017-08-02

## 2017-08-02 RX ORDER — CARBIDOPA AND LEVODOPA 25; 100 MG/1; MG/1
TABLET ORAL
Qty: 90 TABLET | Refills: 2 | Status: SHIPPED | OUTPATIENT
Start: 2017-08-02 | End: 2017-10-02 | Stop reason: SDUPTHER

## 2017-08-03 DIAGNOSIS — G20.C PARKINSONISM: Primary | ICD-10-CM

## 2017-08-04 ENCOUNTER — HOSPITAL ENCOUNTER (OUTPATIENT)
Dept: RADIOLOGY | Facility: HOSPITAL | Age: 62
Discharge: HOME | End: 2017-08-04
Attending: PSYCHIATRY & NEUROLOGY
Payer: MEDICAID

## 2017-08-04 DIAGNOSIS — G20.C PARKINSONISM, UNSPECIFIED PARKINSONISM TYPE: ICD-10-CM

## 2017-08-04 PROCEDURE — 70553 MRI BRAIN STEM W/O & W/DYE: CPT | Mod: TC,PO

## 2017-08-04 PROCEDURE — 70553 MRI BRAIN STEM W/O & W/DYE: CPT | Mod: 26,,, | Performed by: RADIOLOGY

## 2017-08-04 PROCEDURE — A9585 GADOBUTROL INJECTION: HCPCS | Mod: PO | Performed by: PSYCHIATRY & NEUROLOGY

## 2017-08-04 PROCEDURE — 25500020 PHARM REV CODE 255: Mod: PO | Performed by: PSYCHIATRY & NEUROLOGY

## 2017-08-04 RX ORDER — GADOBUTROL 604.72 MG/ML
9 INJECTION INTRAVENOUS
Status: COMPLETED | OUTPATIENT
Start: 2017-08-04 | End: 2017-08-04

## 2017-08-04 RX ADMIN — GADOBUTROL 9 ML: 604.72 INJECTION INTRAVENOUS at 09:08

## 2017-08-11 ENCOUNTER — PATIENT MESSAGE (OUTPATIENT)
Dept: NEUROLOGY | Facility: CLINIC | Age: 62
End: 2017-08-11

## 2017-08-15 ENCOUNTER — OFFICE VISIT (OUTPATIENT)
Dept: NEUROLOGY | Facility: CLINIC | Age: 62
End: 2017-08-15
Payer: MEDICAID

## 2017-08-15 VITALS
HEART RATE: 96 BPM | DIASTOLIC BLOOD PRESSURE: 91 MMHG | SYSTOLIC BLOOD PRESSURE: 151 MMHG | WEIGHT: 207 LBS | HEIGHT: 61 IN | BODY MASS INDEX: 39.08 KG/M2

## 2017-08-15 DIAGNOSIS — G20.C PARKINSONISM, UNSPECIFIED PARKINSONISM TYPE: Primary | ICD-10-CM

## 2017-08-15 PROCEDURE — 99214 OFFICE O/P EST MOD 30 MIN: CPT | Mod: PBBFAC | Performed by: PSYCHIATRY & NEUROLOGY

## 2017-08-15 PROCEDURE — 99214 OFFICE O/P EST MOD 30 MIN: CPT | Mod: S$PBB,,, | Performed by: PSYCHIATRY & NEUROLOGY

## 2017-08-15 PROCEDURE — 3080F DIAST BP >= 90 MM HG: CPT | Mod: ,,, | Performed by: PSYCHIATRY & NEUROLOGY

## 2017-08-15 PROCEDURE — 3077F SYST BP >= 140 MM HG: CPT | Mod: ,,, | Performed by: PSYCHIATRY & NEUROLOGY

## 2017-08-15 PROCEDURE — 99999 PR PBB SHADOW E&M-EST. PATIENT-LVL IV: CPT | Mod: PBBFAC,,, | Performed by: PSYCHIATRY & NEUROLOGY

## 2017-08-15 PROCEDURE — 3008F BODY MASS INDEX DOCD: CPT | Mod: ,,, | Performed by: PSYCHIATRY & NEUROLOGY

## 2017-08-15 RX ORDER — ALBUTEROL SULFATE 90 UG/1
1-2 AEROSOL, METERED RESPIRATORY (INHALATION)
COMMUNITY
End: 2018-09-11

## 2017-08-15 RX ORDER — ONDANSETRON 4 MG/1
4 TABLET, ORALLY DISINTEGRATING ORAL
COMMUNITY
Start: 2015-08-06 | End: 2018-09-18

## 2017-08-16 ENCOUNTER — LAB VISIT (OUTPATIENT)
Dept: LAB | Facility: HOSPITAL | Age: 62
End: 2017-08-16
Attending: FAMILY MEDICINE
Payer: MEDICAID

## 2017-08-16 DIAGNOSIS — E04.1 NODULAR THYROID DISEASE: ICD-10-CM

## 2017-08-16 LAB
T4 FREE SERPL-MCNC: 1.7 NG/DL
TSH SERPL DL<=0.005 MIU/L-ACNC: 0.02 UIU/ML

## 2017-08-16 PROCEDURE — 84443 ASSAY THYROID STIM HORMONE: CPT

## 2017-08-16 PROCEDURE — 36415 COLL VENOUS BLD VENIPUNCTURE: CPT | Mod: PO

## 2017-08-16 PROCEDURE — 84436 ASSAY OF TOTAL THYROXINE: CPT

## 2017-08-16 PROCEDURE — 84439 ASSAY OF FREE THYROXINE: CPT

## 2017-08-17 ENCOUNTER — TELEPHONE (OUTPATIENT)
Dept: FAMILY MEDICINE | Facility: CLINIC | Age: 62
End: 2017-08-17

## 2017-08-17 DIAGNOSIS — E04.1 NODULAR THYROID DISEASE: Primary | ICD-10-CM

## 2017-08-17 LAB — T4 SERPL-MCNC: 12.9 UG/DL

## 2017-08-17 NOTE — PROGRESS NOTES
"Patient Name: Jessica Rojas  MRN: [id number]    CC: tremor    Interval history:    Since last visit; patient was started on sinemet  TID; currently on Week 2 of the titration schedule; reported fatigue the first week which has now resolved; states tremor and stiffness is some better but not optimal. Denies worsening of dizziness or symptomatic orthostatic hypotension on current regimen. MRI brain with some areas of white matter changes that are possible areas of demyelination - images reviewed with Dr. Hogan    HPI: Jessica Rojas is a 61 y.o. female with PMHx of carcinoid tumor (lung), multinodular goiter (s/p thyroidectomy), post-op acquired hypothyroidism, DMII, Vitamin D deficiency who presents to neurology clinic for evaluation of tremor. Patient reports a 4 month history of tremors, more notable in her hands; mostly at rest, improves with movement, L hand worse than the R. Reports symptoms having worsened in the past 6 weeks. Was seen by endocrinology; concerns for over dosing of synthroid given elevated T4 and low TSH; dose was decreased 4 weeks back with no improvement in her tremors. Patient states no concerns of tremors prior to 4 months back. Patient also reports history of nightmares in her sleep, also has noticed changes in her walk in the past month.  reported to have been telling her to "pick her feet up more when she walks".   No recurrence of tumor per imaging from last year; patient due for follow up 1 year scan and oncology visit with Dr. Quintero in October 2017      ROS:   Review of Systems   Constitutional: Negative for malaise/fatigue. Negative for weight loss.   HENT: Negative for hearing loss.   Eyes: Negative for blurred vision and double vision.   Respiratory: Negative for shortness of breath and stridor.   Cardiovascular: Negative for chest pain and palpitations.   Gastrointestinal: Negative for nausea, vomiting and constipation.   Genitourinary: Negative for frequency. " "Negative for urgency.   Musculoskeletal: Negative for joint pain.    Skin: Negative for rash.   Neurological: Negative for focal weakness and seizures.   Endo/Heme/Allergies: Does not bruise/bleed easily.   Psychiatric/Behavioral: Negative for memory loss. Negative for depression and hallucinations. The patient is not nervous/anxious.      Past Medical History  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/2014    Mediastinal lymphadenopathy 8/26/2015    Obesity, morbid 5/6/2014    Pituitary adenoma 1980's    took parladel for 3 yrs    Pneumonia     Thyroid disease     Wheeze 6/23/2014       Medications    Current Outpatient Prescriptions:     albuterol 90 mcg/actuation inhaler, Inhale 1-2 puffs into the lungs., Disp: , Rfl:     BD INSULIN PEN NEEDLE UF MINI 31 gauge x 3/16" Ndle, use as directed with insulin, Disp: , Rfl: 0    carbidopa-levodopa  mg (SINEMET)  mg per tablet, 3X daily; every 8 hours. 0.5 tab,0.5 tab,0.5 [id number].1 tab,0.5 tab,0.5 yhdO0locc. 1 tab,1 tab,0.5 [id number]. Then 1 tab,1 tab,1 tab daily., Disp: 90 tablet, Rfl: 2    cholecalciferol, vitamin D3, (VITAMIN D3) 400 unit Cap, Take 1,200 Units by mouth once daily., Disp: , Rfl:     ezetimibe (ZETIA) 10 mg tablet, Take 1 tablet (10 mg total) by mouth once daily., Disp: 30 tablet, Rfl: 3    gabapentin (NEURONTIN) 400 MG capsule, Take 1 capsule (400 mg total) by mouth 4 (four) times daily., Disp: 120 capsule, Rfl: 11    GLUCOSAMINE HCL/CHONDR MCCARTHY A NA (OSTEO BI-FLEX ORAL), Take by mouth once daily., Disp: , Rfl: "     insulin aspart (NOVOLOG FLEXPEN) 100 unit/mL InPn pen, Inject 30 units AC, unless eating small meals then use 25 units AC, plus SSI., Disp: 45 mL, Rfl: 5    insulin glargine (LANTUS SOLOSTAR) 100 unit/mL (3 mL) InPn pen, Inject 35 Units into the skin 2 (two) times daily., Disp: 45 mL, Rfl: 3    levothyroxine (SYNTHROID) 175 MCG tablet, Take 1 tablet (175 mcg total) by mouth once daily., Disp: 30 tablet, Rfl: 6    meclizine (ANTIVERT) 25 mg tablet, Take 1 tablet (25 mg total) by mouth 3 (three) times daily as needed., Disp: 30 tablet, Rfl: 0    meloxicam (MOBIC) 15 MG tablet, Take 15 mg by mouth once daily., Disp: , Rfl: 6    MULTIVITAMIN W-MINERALS/LUTEIN (CENTRUM SILVER ORAL), Take 1 tablet by mouth once daily. , Disp: , Rfl:     ondansetron (ZOFRAN-ODT) 4 MG TbDL, Take 4 mg by mouth., Disp: , Rfl:     propranolol (INDERAL) 10 MG tablet, TAKE ONE TABLET BY MOUTH THREE TIMES DAILY, Disp: 90 tablet, Rfl: 3    TRUE METRIX GLUCOSE TEST STRIP Strp, test FOUR TIMES DAILY or as directed, Disp: 100 each, Rfl: 12    valsartan-hydrochlorothiazide (DIOVAN-HCT) 320-12.5 mg per tablet, Take 1 tablet by mouth once daily., Disp: 30 tablet, Rfl: 11    VITAMIN D2 50,000 unit capsule, TAKE ONE CAPSULE BY MOUTH EVERY 7 DAYS, Disp: 4 capsule, Rfl: 11    Allergies  Review of patient's allergies indicates:   Allergen Reactions    Propranolol Nausea And Vomiting     Drops pressure and raised sugar    Lipitor [atorvastatin] Other (See Comments)     Myalgia, muscle pain       Social History  Social History     Social History    Marital status:      Spouse name: N/A    Number of children: N/A    Years of education: N/A     Occupational History    housewife      Social History Main Topics    Smoking status: Never Smoker    Smokeless tobacco: Never Used    Alcohol use Yes      Comment: once per month    Drug use: No    Sexual activity: Not on file     Other Topics Concern    Not on file     Social History  "Narrative    No narrative on file       Family History  Family History   Problem Relation Age of Onset    Cancer Maternal Aunt      breast    Cancer Maternal Grandmother      unknown    Cancer Maternal Aunt      unknown    Diabetes Mother     Glaucoma Mother     Diabetes Father     Diabetes Sister     Macular degeneration Sister     Amblyopia Neg Hx     Blindness Neg Hx     Cataracts Neg Hx     Hypertension Neg Hx     Retinal detachment Neg Hx     Stroke Neg Hx     Strabismus Neg Hx     Thyroid disease Neg Hx        Physical Exam  BP (!) 151/91   Pulse 96   Ht 5' 1" (1.549 m)   Wt 93.9 kg (207 lb 0.2 oz)   BMI 39.11 kg/m²     General appearance: Well-developed, well-groomed.     Neurologic Exam: The patient is awake, alert and oriented. Language is fluent. Recent and remote memory are normal. Attention span and concentration are normal. Fund of knowledge is appropriate.     Cranial nerves: pupils are round and reactive to light and accommodation. Visual fields are full to confrontation. Ocular motility is full in all cardinal positions of gaze. Facial sensation is normal to pinprick and light touch. Facial activation is symmetric. Hearing is normal bilaterally. Palate elevates symmetrically and gag reflex is intact bilaterally. Shoulder elevation is symmetric and full strength bilaterally. Tongue is midline and neck rotation strength is normal bilaterally. Neck range of motion is normal.     Motor examination of all extremities demonstrates normal bulk and tone in all four limbs. There are no atrophy or fasciculations. Strength is 5/5 in the upper and lower extremities bilaterally without pronator drift.     Sensory examination is normal to pinprick, vibration and proprioception in the upper and lower extremities bilaterally.     Deep tendon reflexes are 2+ and symmetric in the upper and lower extremities bilaterally.     Gait: decreased arm swing (L>R). Robotic gait.     Movement exam: +masked " facies. +hypophonic voice.  Bradykinesia (mild to moderate; L>R). Resting and postural tremor in the upper extremities (L>R). +increased tone and cogwheel rigidity (mild to moderate, L>R).  Postural instability not evaluated.    Coordination: Finger to nose and heel to shin testing is normal in both upper and lower extremities. Rapid alternating movements are normal in both upper and lower extremities.       Lab and Test Results    WBC   Date Value Ref Range Status   05/17/2016 10.11 3.90 - 12.70 K/uL Final   04/18/2016 9.36 3.90 - 12.70 K/uL Final   02/16/2016 12.22 3.90 - 12.70 K/uL Final     Hemoglobin   Date Value Ref Range Status   05/17/2016 14.8 12.0 - 16.0 g/dL Final   04/18/2016 14.6 12.0 - 16.0 g/dL Final   02/16/2016 15.7 12.0 - 16.0 g/dL Final     POC Hematocrit   Date Value Ref Range Status   08/07/2014 34 (L) 36 - 54 %PCV Final     Hematocrit   Date Value Ref Range Status   05/17/2016 44.8 37.0 - 48.5 % Final   04/18/2016 43.8 37.0 - 48.5 % Final   02/16/2016 47.3 37.0 - 48.5 % Final     Platelets   Date Value Ref Range Status   05/17/2016 255 150 - 350 K/uL Final   04/18/2016 222 150 - 350 K/uL Final   02/16/2016 278 150 - 350 K/uL Final     Glucose   Date Value Ref Range Status   06/23/2017 99 70 - 110 mg/dL Final   02/06/2017 234 (H) 70 - 110 mg/dL Final   10/03/2016 218 (H) 70 - 110 mg/dL Final     Sodium   Date Value Ref Range Status   06/23/2017 141 136 - 145 mmol/L Final   02/06/2017 137 136 - 145 mmol/L Final   10/03/2016 142 136 - 145 mmol/L Final     Potassium   Date Value Ref Range Status   06/23/2017 4.4 3.5 - 5.1 mmol/L Final   02/06/2017 5.2 (H) 3.5 - 5.1 mmol/L Final     Comment:     *Slightly Hemolyzed   10/03/2016 4.8 3.5 - 5.1 mmol/L Final     Chloride   Date Value Ref Range Status   06/23/2017 105 95 - 110 mmol/L Final   02/06/2017 105 95 - 110 mmol/L Final   10/03/2016 105 95 - 110 mmol/L Final     CO2   Date Value Ref Range Status   06/23/2017 29 23 - 29 mmol/L Final   02/06/2017  22 (L) 23 - 29 mmol/L Final   10/03/2016 26 23 - 29 mmol/L Final     BUN, Bld   Date Value Ref Range Status   06/23/2017 26 (H) 8 - 23 mg/dL Final   02/06/2017 24 (H) 8 - 23 mg/dL Final   10/03/2016 21 8 - 23 mg/dL Final     Creatinine   Date Value Ref Range Status   08/04/2017 0.9 0.5 - 1.4 mg/dL Final   06/23/2017 0.9 0.5 - 1.4 mg/dL Final   02/06/2017 0.9 0.5 - 1.4 mg/dL Final     Calcium   Date Value Ref Range Status   06/23/2017 9.5 8.7 - 10.5 mg/dL Final   02/06/2017 9.8 8.7 - 10.5 mg/dL Final   10/03/2016 9.6 8.7 - 10.5 mg/dL Final     Magnesium   Date Value Ref Range Status   02/16/2016 2.5 1.6 - 2.6 mg/dL Final   08/15/2014 2.0 1.6 - 2.6 mg/dL Final   08/15/2014 2.0 1.6 - 2.6 mg/dL Final     Phosphorus   Date Value Ref Range Status   08/15/2014 2.8 2.7 - 4.5 mg/dL Final   08/15/2014 2.8 2.7 - 4.5 mg/dL Final   08/14/2014 4.1 2.7 - 4.5 mg/dL Final     Alkaline Phosphatase   Date Value Ref Range Status   06/23/2017 87 55 - 135 U/L Final   02/06/2017 69 55 - 135 U/L Final   10/03/2016 71 55 - 135 U/L Final     ALT   Date Value Ref Range Status   06/23/2017 15 10 - 44 U/L Final   02/06/2017 32 10 - 44 U/L Final   10/03/2016 37 10 - 44 U/L Final     AST   Date Value Ref Range Status   06/23/2017 14 10 - 40 U/L Final   02/06/2017 38 10 - 40 U/L Final   10/03/2016 36 10 - 40 U/L Final     MRI brain W WO contrast (08/04/2017)             L cerebellar, right temporal./insula, periventricular white matter lesions. Some of the periventricular lesions are perpendicular to corpus callosum    Assessment and Plan    Jessica T Graham is a 61 y.o. female with PMHx of carcinoid tumor (lung), multinodular goiter (s/p thyroidectomy), post-op acquired hypothyroidism, DMII, Vitamin D deficiency with acute parkinsonism. No reported history of anti-psychotic or anti-emetics use as an outpatient. MRI concerning for areas that could be potential areas of demyelination; will workup with imaging of spine and obtain LP. Labs  unremarkable.     Problem List Items Addressed This Visit     Parkinsonism - Primary    Overview     Acute parkinsonism;  Continue sinemet         Relevant Orders    MRI Cervical Spine W WO Cont    MRI Thoracic Spine W WO Contrast    FL Lumbar Puncture (xpd)    CSF cell count with differential    MS PROFILE    Glucose, CSF    Protein, CSF    Cytology Specimen-Medical Cytology (Fluid/Wash/Brush)    CULTURE, CSF  (INCLUDES STAIN)    Miscellaneous Sendout Test Blood    NMO AQUAPORIN-4-IGG, SERUM    Freeze and Hold,       Other Visit Diagnoses    None.       Follow up after LP and MRI or as needed.       Елена Aviles  Neurology Resident - PGY 2  Department of Neurology  [address] [place] [address]

## 2017-08-18 ENCOUNTER — PATIENT MESSAGE (OUTPATIENT)
Dept: HEMATOLOGY/ONCOLOGY | Facility: CLINIC | Age: 62
End: 2017-08-18

## 2017-08-18 ENCOUNTER — PATIENT MESSAGE (OUTPATIENT)
Dept: ENDOCRINOLOGY | Facility: CLINIC | Age: 62
End: 2017-08-18

## 2017-08-20 RX ORDER — VALSARTAN AND HYDROCHLOROTHIAZIDE 320; 12.5 MG/1; MG/1
TABLET, FILM COATED ORAL
Qty: 30 TABLET | Refills: 12 | Status: SHIPPED | OUTPATIENT
Start: 2017-08-20 | End: 2018-08-22 | Stop reason: SDUPTHER

## 2017-08-21 ENCOUNTER — DOCUMENTATION ONLY (OUTPATIENT)
Dept: FAMILY MEDICINE | Facility: CLINIC | Age: 62
End: 2017-08-21

## 2017-08-21 RX ORDER — LEVOTHYROXINE SODIUM 150 UG/1
150 TABLET ORAL DAILY
COMMUNITY
End: 2017-08-23 | Stop reason: SDUPTHER

## 2017-08-23 ENCOUNTER — PATIENT MESSAGE (OUTPATIENT)
Dept: FAMILY MEDICINE | Facility: CLINIC | Age: 62
End: 2017-08-23

## 2017-08-23 RX ORDER — LEVOTHYROXINE SODIUM 150 UG/1
150 TABLET ORAL DAILY
Qty: 30 TABLET | Refills: 6 | Status: SHIPPED | OUTPATIENT
Start: 2017-08-23 | End: 2017-10-30 | Stop reason: SDUPTHER

## 2017-08-23 NOTE — PROGRESS NOTES
Patient seen and examined.  I agree with the history, exam, assessment and plan within the resident's note.        Leonel Canales MD, MPH  Division of Movement and Memory Disorders  [facility]

## 2017-08-28 ENCOUNTER — HOSPITAL ENCOUNTER (OUTPATIENT)
Dept: RADIOLOGY | Facility: HOSPITAL | Age: 62
Discharge: HOME | End: 2017-08-28
Attending: PSYCHIATRY & NEUROLOGY
Payer: MEDICAID

## 2017-08-28 ENCOUNTER — HOSPITAL ENCOUNTER (OUTPATIENT)
Dept: RADIOLOGY | Facility: HOSPITAL | Age: 62
Discharge: HOME | End: 2017-08-28
Attending: INTERNAL MEDICINE
Payer: MEDICAID

## 2017-08-28 DIAGNOSIS — G20.C PARKINSONISM, UNSPECIFIED PARKINSONISM TYPE: ICD-10-CM

## 2017-08-28 DIAGNOSIS — Z91.09 ENVIRONMENTAL ALLERGIES: ICD-10-CM

## 2017-08-28 DIAGNOSIS — C7A.090 MALIGNANT CARCINOID TUMOR OF BRONCHUS AND LUNG: ICD-10-CM

## 2017-08-28 PROCEDURE — A9585 GADOBUTROL INJECTION: HCPCS | Mod: PO | Performed by: PSYCHIATRY & NEUROLOGY

## 2017-08-28 PROCEDURE — 71250 CT THORAX DX C-: CPT | Mod: TC,PO

## 2017-08-28 PROCEDURE — 72157 MRI CHEST SPINE W/O & W/DYE: CPT | Mod: 26,,, | Performed by: RADIOLOGY

## 2017-08-28 PROCEDURE — 72156 MRI NECK SPINE W/O & W/DYE: CPT | Mod: TC,PO

## 2017-08-28 PROCEDURE — 71250 CT THORAX DX C-: CPT | Mod: 26,,, | Performed by: RADIOLOGY

## 2017-08-28 PROCEDURE — 72156 MRI NECK SPINE W/O & W/DYE: CPT | Mod: 26,,, | Performed by: RADIOLOGY

## 2017-08-28 PROCEDURE — 72157 MRI CHEST SPINE W/O & W/DYE: CPT | Mod: TC,PO

## 2017-08-28 PROCEDURE — 25500020 PHARM REV CODE 255: Mod: PO | Performed by: PSYCHIATRY & NEUROLOGY

## 2017-08-28 RX ORDER — GADOBUTROL 604.72 MG/ML
9 INJECTION INTRAVENOUS
Status: COMPLETED | OUTPATIENT
Start: 2017-08-28 | End: 2017-08-28

## 2017-08-28 RX ADMIN — GADOBUTROL 9 ML: 604.72 INJECTION INTRAVENOUS at 02:08

## 2017-08-30 ENCOUNTER — PATIENT MESSAGE (OUTPATIENT)
Dept: NEUROLOGY | Facility: CLINIC | Age: 62
End: 2017-08-30

## 2017-09-02 ENCOUNTER — PATIENT MESSAGE (OUTPATIENT)
Dept: NEUROLOGY | Facility: CLINIC | Age: 62
End: 2017-09-02

## 2017-09-06 ENCOUNTER — TELEPHONE (OUTPATIENT)
Dept: HEMATOLOGY/ONCOLOGY | Facility: CLINIC | Age: 62
End: 2017-09-06

## 2017-09-06 DIAGNOSIS — C7A.090 MALIGNANT CARCINOID TUMOR OF THE BRONCHUS AND LUNG: Primary | ICD-10-CM

## 2017-09-06 NOTE — TELEPHONE ENCOUNTER
----- Message from Jabier Vela sent at 9/6/2017  9:46 AM CDT -----  Contact: [email or web address]  Appointment Request From: Jessica Rojas    With Provider: Justen Quintero DO, FACP [[facility]]    Would Accept With:Only the person I've selected    Preferred Date Range: From 9/6/2017 To 10/31/2017    Preferred Times: Any    Reason for visit: Request an Appt    Comments:  need appt. for my yearly check up a morning appt. is best for me

## 2017-09-11 ENCOUNTER — PATIENT MESSAGE (OUTPATIENT)
Dept: NEUROLOGY | Facility: CLINIC | Age: 62
End: 2017-09-11

## 2017-09-12 ENCOUNTER — OFFICE VISIT (OUTPATIENT)
Dept: NEUROLOGY | Facility: CLINIC | Age: 62
End: 2017-09-12
Payer: MEDICAID

## 2017-09-12 VITALS
BODY MASS INDEX: 39.54 KG/M2 | HEART RATE: 88 BPM | HEIGHT: 61 IN | WEIGHT: 209.44 LBS | SYSTOLIC BLOOD PRESSURE: 160 MMHG | DIASTOLIC BLOOD PRESSURE: 89 MMHG

## 2017-09-12 DIAGNOSIS — G20.C PARKINSONISM, UNSPECIFIED PARKINSONISM TYPE: Primary | ICD-10-CM

## 2017-09-12 PROCEDURE — 3079F DIAST BP 80-89 MM HG: CPT | Mod: ,,, | Performed by: PSYCHIATRY & NEUROLOGY

## 2017-09-12 PROCEDURE — 99214 OFFICE O/P EST MOD 30 MIN: CPT | Mod: S$PBB,,, | Performed by: PSYCHIATRY & NEUROLOGY

## 2017-09-12 PROCEDURE — 3008F BODY MASS INDEX DOCD: CPT | Mod: ,,, | Performed by: PSYCHIATRY & NEUROLOGY

## 2017-09-12 PROCEDURE — 99214 OFFICE O/P EST MOD 30 MIN: CPT | Mod: PBBFAC | Performed by: PSYCHIATRY & NEUROLOGY

## 2017-09-12 PROCEDURE — 99999 PR PBB SHADOW E&M-EST. PATIENT-LVL IV: CPT | Mod: PBBFAC,,, | Performed by: PSYCHIATRY & NEUROLOGY

## 2017-09-12 PROCEDURE — 3077F SYST BP >= 140 MM HG: CPT | Mod: ,,, | Performed by: PSYCHIATRY & NEUROLOGY

## 2017-09-12 NOTE — PROGRESS NOTES
"Patient Name: Jessica Rojas  MRN: [id number]    CC: tremor    Interval history:  Patient feels like the stiffness has improved and the left hand tremors have improved some but still present. Currently on sinemet 25/100; 1 pill TID (6:30, 2:30, 10pm). Around 5pm she feels like her tremors are the worst. No reported dyskinesia or orthostatic symptoms since being on the medications. Has been at full dose for 2 weeks. Symptoms have not been interfering with her daily activities. Patient to get LP rescheduled.     HPI: Jessica Rojas is a 61 y.o. female with PMHx of carcinoid tumor (lung), multinodular goiter (s/p thyroidectomy), post-op acquired hypothyroidism, DMII, Vitamin D deficiency who presents to neurology clinic for evaluation of tremor. Patient reports a 4 month history of tremors, more notable in her hands; mostly at rest, improves with movement, L hand worse than the R. Reports symptoms having worsened in the past 6 weeks. Was seen by endocrinology; concerns for over dosing of synthroid given elevated T4 and low TSH; dose was decreased 4 weeks back with no improvement in her tremors. Patient states no concerns of tremors prior to 4 months back. Patient also reports history of nightmares in her sleep, also has noticed changes in her walk in the past month.  reported to have been telling her to "pick her feet up more when she walks".   No recurrence of tumor per imaging from last year; patient due for follow up 1 year scan and oncology visit with Dr. Quintero in October 2017    ROS:   Review of Systems   Constitutional: Negative for malaise/fatigue. Negative for weight loss.   HENT: Negative for hearing loss.   Eyes: Negative for blurred vision and double vision.   Respiratory: Negative for shortness of breath and stridor.   Cardiovascular: Negative for chest pain and palpitations.   Gastrointestinal: Negative for nausea, vomiting and constipation.   Genitourinary: Negative for frequency. Negative for " "urgency.   Musculoskeletal: Negative for joint pain.    Skin: Negative for rash.   Neurological: Negative for focal weakness and seizures.   Endo/Heme/Allergies: Does not bruise/bleed easily.   Psychiatric/Behavioral: Negative for memory loss. Negative for depression and hallucinations. The patient is not nervous/anxious.      Past Medical History  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/2014    Mediastinal lymphadenopathy 8/26/2015    Obesity, morbid 5/6/2014    Pituitary adenoma 1980's    took parladel for 3 yrs    Pneumonia     Thyroid disease     Wheeze 6/23/2014       Medications    Current Outpatient Prescriptions:     albuterol 90 mcg/actuation inhaler, Inhale 1-2 puffs into the lungs., Disp: , Rfl:     BD INSULIN PEN NEEDLE UF MINI 31 gauge x 3/16" Ndle, use as directed with insulin, Disp: , Rfl: 0    carbidopa-levodopa  mg (SINEMET)  mg per tablet, 3X daily; every 8 hours. 0.5 tab,0.5 tab,0.5 [id number] tab,0.5 tab,0.5 [id number]. 1 tab,1 tab,0.5 [id number]. Then 1 tab,1 tab,1 tab daily., Disp: 90 tablet, Rfl: 2    cholecalciferol, vitamin D3, (VITAMIN D3) 400 unit Cap, Take 1,200 Units by mouth once daily., Disp: , Rfl:     ezetimibe (ZETIA) 10 mg tablet, Take 1 tablet (10 mg total) by mouth once daily., Disp: 30 tablet, Rfl: 3    gabapentin (NEURONTIN) 400 MG capsule, Take 1 capsule (400 mg total) by mouth 4 (four) times daily., Disp: 120 capsule, Rfl: 11    GLUCOSAMINE HCL/CHONDR MCCARTHY A NA (OSTEO BI-FLEX ORAL), Take by mouth once daily., Disp: , Rfl:     insulin " aspart (NOVOLOG FLEXPEN) 100 unit/mL InPn pen, Inject 30 units AC, unless eating small meals then use 25 units AC, plus SSI., Disp: 45 mL, Rfl: 5    insulin glargine (LANTUS SOLOSTAR) 100 unit/mL (3 mL) InPn pen, Inject 35 Units into the skin 2 (two) times daily., Disp: 45 mL, Rfl: 3    levothyroxine (SYNTHROID) 150 MCG tablet, Take 1 tablet (150 mcg total) by mouth once daily., Disp: 30 tablet, Rfl: 6    meclizine (ANTIVERT) 25 mg tablet, Take 1 tablet (25 mg total) by mouth 3 (three) times daily as needed., Disp: 30 tablet, Rfl: 0    meloxicam (MOBIC) 15 MG tablet, Take 15 mg by mouth once daily., Disp: , Rfl: 6    MULTIVITAMIN W-MINERALS/LUTEIN (CENTRUM SILVER ORAL), Take 1 tablet by mouth once daily. , Disp: , Rfl:     ondansetron (ZOFRAN-ODT) 4 MG TbDL, Take 4 mg by mouth., Disp: , Rfl:     propranolol (INDERAL) 10 MG tablet, TAKE ONE TABLET BY MOUTH THREE TIMES DAILY, Disp: 90 tablet, Rfl: 3    TRUE METRIX GLUCOSE TEST STRIP Strp, test FOUR TIMES DAILY or as directed, Disp: 100 each, Rfl: 12    valsartan-hydrochlorothiazide (DIOVAN-HCT) 320-12.5 mg per tablet, TAKE ONE TABLET BY MOUTH ONCE DAILY, Disp: 30 tablet, Rfl: 12    VITAMIN D2 50,000 unit capsule, TAKE ONE CAPSULE BY MOUTH EVERY 7 DAYS, Disp: 4 capsule, Rfl: 11    Allergies  Review of patient's allergies indicates:   Allergen Reactions    Propranolol Nausea And Vomiting     Drops pressure and raised sugar    Lipitor [atorvastatin] Other (See Comments)     Myalgia, muscle pain       Social History  Social History     Social History    Marital status:      Spouse name: N/A    Number of children: N/A    Years of education: N/A     Occupational History    housewife      Social History Main Topics    Smoking status: Never Smoker    Smokeless tobacco: Never Used    Alcohol use Yes      Comment: once per month    Drug use: No    Sexual activity: Not on file     Other Topics Concern    Not on file     Social History Narrative     "No narrative on file       Family History  Family History   Problem Relation Age of Onset    Cancer Maternal Aunt      breast    Cancer Maternal Grandmother      unknown    Cancer Maternal Aunt      unknown    Diabetes Mother     Glaucoma Mother     Diabetes Father     Diabetes Sister     Macular degeneration Sister     Amblyopia Neg Hx     Blindness Neg Hx     Cataracts Neg Hx     Hypertension Neg Hx     Retinal detachment Neg Hx     Stroke Neg Hx     Strabismus Neg Hx     Thyroid disease Neg Hx        Physical Exam  BP (!) 160/89   Pulse 88   Ht 5' 1" (1.549 m)   Wt 95 kg (209 lb 7 oz)   BMI 39.57 kg/m²     General appearance: Well-developed, well-groomed.     Neurologic Exam: The patient is awake, alert and oriented. Language is fluent. Recent and remote memory are normal. Attention span and concentration are normal. Fund of knowledge is appropriate.     Cranial nerves: pupils are round and reactive to light and accommodation. Visual fields are full to confrontation. Ocular motility is full in all cardinal positions of gaze. Facial sensation is normal to pinprick and light touch. Facial activation is symmetric. Hearing is normal bilaterally. Palate elevates symmetrically and gag reflex is intact bilaterally. Shoulder elevation is symmetric and full strength bilaterally. Tongue is midline and neck rotation strength is normal bilaterally. Neck range of motion is normal.     Motor examination of all extremities demonstrates normal bulk and tone in all four limbs. There are no atrophy or fasciculations. Strength is 5/5 in the upper and lower extremities bilaterally without pronator drift.     Sensory examination is normal to pinprick, vibration and proprioception in the upper and lower extremities bilaterally.     Deep tendon reflexes are 2+ and symmetric in the upper and lower extremities bilaterally.     Gait: decreased arm swing (L>R). Robotic gait.     Movement exam: +masked facies. " +hypophonic voice.  Bradykinesia (mild to moderate; L>R). Resting and postural tremor in the upper extremities (L>R). +increased tone and cogwheel rigidity (mild to moderate, L>R).  Postural instability not evaluated.    Coordination: Finger to nose and heel to shin testing is normal in both upper and lower extremities. Rapid alternating movements are normal in both upper and lower extremities.       Lab and Test Results    WBC   Date Value Ref Range Status   05/17/2016 10.11 3.90 - 12.70 K/uL Final   04/18/2016 9.36 3.90 - 12.70 K/uL Final   02/16/2016 12.22 3.90 - 12.70 K/uL Final     Hemoglobin   Date Value Ref Range Status   05/17/2016 14.8 12.0 - 16.0 g/dL Final   04/18/2016 14.6 12.0 - 16.0 g/dL Final   02/16/2016 15.7 12.0 - 16.0 g/dL Final     POC Hematocrit   Date Value Ref Range Status   08/07/2014 34 (L) 36 - 54 %PCV Final     Hematocrit   Date Value Ref Range Status   05/17/2016 44.8 37.0 - 48.5 % Final   04/18/2016 43.8 37.0 - 48.5 % Final   02/16/2016 47.3 37.0 - 48.5 % Final     Platelets   Date Value Ref Range Status   05/17/2016 255 150 - 350 K/uL Final   04/18/2016 222 150 - 350 K/uL Final   02/16/2016 278 150 - 350 K/uL Final     Glucose   Date Value Ref Range Status   06/23/2017 99 70 - 110 mg/dL Final   02/06/2017 234 (H) 70 - 110 mg/dL Final   10/03/2016 218 (H) 70 - 110 mg/dL Final     Sodium   Date Value Ref Range Status   06/23/2017 141 136 - 145 mmol/L Final   02/06/2017 137 136 - 145 mmol/L Final   10/03/2016 142 136 - 145 mmol/L Final     Potassium   Date Value Ref Range Status   06/23/2017 4.4 3.5 - 5.1 mmol/L Final   02/06/2017 5.2 (H) 3.5 - 5.1 mmol/L Final     Comment:     *Slightly Hemolyzed   10/03/2016 4.8 3.5 - 5.1 mmol/L Final     Chloride   Date Value Ref Range Status   06/23/2017 105 95 - 110 mmol/L Final   02/06/2017 105 95 - 110 mmol/L Final   10/03/2016 105 95 - 110 mmol/L Final     CO2   Date Value Ref Range Status   06/23/2017 29 23 - 29 mmol/L Final   02/06/2017 22 (L) 23  - 29 mmol/L Final   10/03/2016 26 23 - 29 mmol/L Final     BUN, Bld   Date Value Ref Range Status   06/23/2017 26 (H) 8 - 23 mg/dL Final   02/06/2017 24 (H) 8 - 23 mg/dL Final   10/03/2016 21 8 - 23 mg/dL Final     Creatinine   Date Value Ref Range Status   08/04/2017 0.9 0.5 - 1.4 mg/dL Final   06/23/2017 0.9 0.5 - 1.4 mg/dL Final   02/06/2017 0.9 0.5 - 1.4 mg/dL Final     Calcium   Date Value Ref Range Status   06/23/2017 9.5 8.7 - 10.5 mg/dL Final   02/06/2017 9.8 8.7 - 10.5 mg/dL Final   10/03/2016 9.6 8.7 - 10.5 mg/dL Final     Magnesium   Date Value Ref Range Status   02/16/2016 2.5 1.6 - 2.6 mg/dL Final   08/15/2014 2.0 1.6 - 2.6 mg/dL Final   08/15/2014 2.0 1.6 - 2.6 mg/dL Final     Phosphorus   Date Value Ref Range Status   08/15/2014 2.8 2.7 - 4.5 mg/dL Final   08/15/2014 2.8 2.7 - 4.5 mg/dL Final   08/14/2014 4.1 2.7 - 4.5 mg/dL Final     Alkaline Phosphatase   Date Value Ref Range Status   06/23/2017 87 55 - 135 U/L Final   02/06/2017 69 55 - 135 U/L Final   10/03/2016 71 55 - 135 U/L Final     ALT   Date Value Ref Range Status   06/23/2017 15 10 - 44 U/L Final   02/06/2017 32 10 - 44 U/L Final   10/03/2016 37 10 - 44 U/L Final     AST   Date Value Ref Range Status   06/23/2017 14 10 - 40 U/L Final   02/06/2017 38 10 - 40 U/L Final   10/03/2016 36 10 - 40 U/L Final     MRI brain W WO contrast (08/04/2017)             L cerebellar, right temporal./insula, periventricular white matter lesions. Some of the periventricular lesions are perpendicular to corpus callosum    Assessment and Plan    Jessica Rojas is a 61 y.o. female with PMHx of carcinoid tumor (lung), multinodular goiter (s/p thyroidectomy), post-op acquired hypothyroidism, DMII, Vitamin D deficiency with acute parkinsonism. No reported history of anti-psychotic or anti-emetics use as an outpatient. MRI concerning for areas that could be potential areas of demyelination; MRI c and t spine unremarkable. Patient to reschedule LP. Labs  unremarkable.     Problem List Items Addressed This Visit     Parkinsonism - Primary    Overview     Acute parkinsonism;  Continue sinemet         Current Assessment & Plan     - Recommended PT for balance and gait training  - Recommended taking third dose of sinemet in the evening rather than at 10pm for symptom relief.          Relevant Orders    Ambulatory Referral to Physical/Occupational Therapy    Ambulatory Referral to Physical/Occupational Therapy      Other Visit Diagnoses    None.         Елена Aviles  Neurology Resident - PGY 2  Department of Neurology  [address] [place] [id number]

## 2017-09-13 ENCOUNTER — TELEPHONE (OUTPATIENT)
Dept: HEMATOLOGY/ONCOLOGY | Facility: CLINIC | Age: 62
End: 2017-09-13

## 2017-09-13 NOTE — TELEPHONE ENCOUNTER
----- Message from Etta Abel RN sent at 9/12/2017  5:16 PM CDT -----  Was this taken care of?  Etta  ----- Message -----  From: Kvng Vasques  Sent: 9/7/2017   9:54 AM  To: Etta Abel RN, #    Etta Kay I am not sure, [place] do you know? Thanks   ----- Message -----  From: Etta Abel RN  Sent: 9/7/2017   9:00 AM  To: Kvng Vasques    what day and time does she need an appt? I am sorry   Etta  ----- Message -----  From: Etta Abel RN  Sent: 9/6/2017   5:11 PM  To: Etta Abel RN    Caller: lena Samson with Dr. Quintero   Previous Messages        ----- Message -----   From: Carolyn Amador RN   Sent: 9/6/2017  11:13 AM   To: Kvng Vasques     She is calling to schedule her yearly appt.  I will put in an order for a non-contrasted CT of the chest which she needs also.

## 2017-09-16 NOTE — ASSESSMENT & PLAN NOTE
- Recommended PT for balance and gait training  - Recommended taking third dose of sinemet in the evening rather than at 10pm for symptom relief.

## 2017-09-18 ENCOUNTER — PATIENT MESSAGE (OUTPATIENT)
Dept: NEUROLOGY | Facility: CLINIC | Age: 62
End: 2017-09-18

## 2017-09-26 ENCOUNTER — HOSPITAL ENCOUNTER (OUTPATIENT)
Dept: RADIOLOGY | Facility: HOSPITAL | Age: 62
Discharge: HOME | End: 2017-09-26
Attending: PSYCHIATRY & NEUROLOGY
Payer: MEDICAID

## 2017-09-26 DIAGNOSIS — G20.C PARKINSONISM, UNSPECIFIED PARKINSONISM TYPE: ICD-10-CM

## 2017-09-26 LAB
CLARITY CSF: ABNORMAL
COLOR CSF: ABNORMAL
CSF, COMMENT: ABNORMAL
GLUCOSE CSF-MCNC: 65 MG/DL
LYMPHOCYTES NFR CSF MANUAL: 33 %
MONOS+MACROS NFR CSF MANUAL: 3 %
NEUTROPHILS NFR CSF MANUAL: 64 %
PROT CSF-MCNC: 87 MG/DL
RBC # CSF: ABNORMAL /CU MM
SPECIMEN VOL CSF: 1 ML
WBC # CSF: 21 /CU MM

## 2017-09-26 PROCEDURE — 82945 GLUCOSE OTHER FLUID: CPT

## 2017-09-26 PROCEDURE — 62270 DX LMBR SPI PNXR: CPT | Mod: ,,, | Performed by: RADIOLOGY

## 2017-09-26 PROCEDURE — 83916 OLIGOCLONAL BANDS: CPT | Mod: 91

## 2017-09-26 PROCEDURE — 89051 BODY FLUID CELL COUNT: CPT

## 2017-09-26 PROCEDURE — 87205 SMEAR GRAM STAIN: CPT

## 2017-09-26 PROCEDURE — 87070 CULTURE OTHR SPECIMN AEROBIC: CPT

## 2017-09-26 PROCEDURE — 84157 ASSAY OF PROTEIN OTHER: CPT

## 2017-09-26 PROCEDURE — 99000 SPECIMEN HANDLING OFFICE-LAB: CPT

## 2017-09-26 PROCEDURE — 62270 DX LMBR SPI PNXR: CPT | Mod: TC

## 2017-09-26 PROCEDURE — 88108 CYTOPATH CONCENTRATE TECH: CPT | Performed by: PATHOLOGY

## 2017-09-26 PROCEDURE — 77003 FLUOROGUIDE FOR SPINE INJECT: CPT | Mod: 26,,, | Performed by: RADIOLOGY

## 2017-09-26 NOTE — H&P
"Radiology History & Physical      SUBJECTIVE:     Chief Complaint: Tremor, concern for Parkinsonism.     History of Present Illness:  Jessica Rojas is a 61 y.o. female who presents for LP to obtain CSF fluid. Patient has developed a tremor over the last 2 months and there is concern that she may have acute Parkinsonism.      Past Medical History:   Diagnosis Date    Abdominal pain 2015    Diabetes mellitus, type 2     DM (diabetes mellitus)     on insulin    Elevated transaminase level 2016    Encounter for blood transfusion     History of neuroendocrine cancer     HTN (hypertension) 2014    Hypercalcemia 2016    Lung cancer, hilus 2014    Malignant carcinoid tumor of bronchus and lung 2014    Malignant carcinoid tumor of the bronchus and lung 2014    Mediastinal lymphadenopathy 2015    Obesity, morbid 2014    Pituitary adenoma     took parladel for 3 yrs    Pneumonia     Thyroid disease     Wheeze 2014     Past Surgical History:   Procedure Laterality Date    APPENDECTOMY      BRONCHOSCOPY  2014, 2014     SECTION  , 1986    x2    LUNG REMOVAL, PARTIAL Right     with 17 lymph nodes    THYROIDECTOMY  2016    TONSILLECTOMY  1969       Home Meds:   Prior to Admission medications    Medication Sig Start Date End Date Taking? Authorizing Provider   albuterol 90 mcg/actuation inhaler Inhale 1-2 puffs into the lungs.    Historical Provider, MD   BD INSULIN PEN NEEDLE UF MINI 31 gauge x 3/16" Ndle use as directed with insulin 17   Historical Provider, MD   carbidopa-levodopa  mg (SINEMET)  mg per tablet 3X daily; every 8 hours. 0.5 tab,0.5 tab,0.5 [id number].1 tab,0.5 tab,0.5 [id number]. 1 tab,1 tab,0.5 [id number]. Then 1 tab,1 tab,1 tab daily. 17   Елена Aviles MD   cholecalciferol, vitamin D3, (VITAMIN D3) 400 unit Cap Take 1,200 Units by mouth once daily.    Historical Provider, MD   ezetimibe " (ZETIA) 10 mg tablet Take 1 tablet (10 mg total) by mouth once daily. 6/29/17 6/29/18  Fortino Qureshi DO   gabapentin (NEURONTIN) 400 MG capsule Take 1 capsule (400 mg total) by mouth 4 (four) times daily. 5/15/17 5/15/18  Jose Balbuena MD   GLUCOSAMINE HCL/CHONDR MCCARTHY A NA (OSTEO BI-FLEX ORAL) Take by mouth once daily.    Historical Provider, MD   insulin aspart (NOVOLOG FLEXPEN) 100 unit/mL InPn pen Inject 30 units AC, unless eating small meals then use 25 units AC, plus SSI. 6/29/17   Fortino Qureshi DO   insulin glargine (LANTUS SOLOSTAR) 100 unit/mL (3 mL) InPn pen Inject 35 Units into the skin 2 (two) times daily. 6/29/17 6/29/18  Fortino Qureshi DO   levothyroxine (SYNTHROID) 150 MCG tablet Take 1 tablet (150 mcg total) by mouth once daily. 8/23/17   Jose Balbuena MD   meclizine (ANTIVERT) 25 mg tablet Take 1 tablet (25 mg total) by mouth 3 (three) times daily as needed. 2/29/16   Any Hansen NP   meloxicam (MOBIC) 15 MG tablet Take 15 mg by mouth once daily. 11/30/15   Historical Provider, MD   MULTIVITAMIN W-MINERALS/LUTEIN (CENTRUM SILVER ORAL) Take 1 tablet by mouth once daily.     Historical Provider, MD   ondansetron (ZOFRAN-ODT) 4 MG TbDL Take 4 mg by mouth. 8/6/15   Historical Provider, MD   propranolol (INDERAL) 10 MG tablet TAKE ONE TABLET BY MOUTH THREE TIMES DAILY 7/27/17   Fortino Qureshi DO   TRUE METRIX GLUCOSE TEST STRIP Strp test FOUR TIMES DAILY or as directed 1/31/17   Fortino Qursehi DO   valsartan-hydrochlorothiazide (DIOVAN-HCT) 320-12.5 mg per tablet TAKE ONE TABLET BY MOUTH ONCE DAILY 8/20/17   Jose Balbuena MD   VITAMIN D2 50,000 unit capsule TAKE ONE CAPSULE BY MOUTH EVERY 7 DAYS 12/21/16   Lottie Tovar NP     Anticoagulants/Antiplatelets: no anticoagulation    Allergies:   Review of patient's allergies indicates:   Allergen Reactions    Propranolol Nausea And Vomiting     Drops pressure and raised sugar    Lipitor [atorvastatin] Other (See Comments)     Myalgia, muscle pain      Sedation History:  no adverse reactions    Review of Systems:   Hematological: no known coagulopathies  Respiratory: no shortness of breath  Cardiovascular: no chest pain  Gastrointestinal: no abdominal pain  Genito-Urinary: no dysuria  Musculoskeletal: Positive for tremor  Neurological: no TIA or stroke symptoms  Positive for tremor.          OBJECTIVE:     Vital Signs (Most Recent)       Physical Exam:  ASA: 2  Mallampati: 2    General: no acute distress  Mental Status: alert and oriented to person, place and time  HEENT: normocephalic, atraumatic  Chest: unlabored breathing  Abdomen: nondistended  Extremity: moves all extremities    Laboratory  Lab Results   Component Value Date    INR 1.0 08/09/2014       Lab Results   Component Value Date    WBC 10.11 05/17/2016    HGB 14.8 05/17/2016    HCT 44.8 05/17/2016    MCV 93 05/17/2016     05/17/2016      Lab Results   Component Value Date    GLU 99 06/23/2017     06/23/2017    K 4.4 06/23/2017     06/23/2017    CO2 29 06/23/2017    BUN 26 (H) 06/23/2017    CREATININE 0.9 08/04/2017    CALCIUM 9.5 06/23/2017    MG 2.5 02/16/2016    ALT 15 06/23/2017    AST 14 06/23/2017    ALBUMIN 3.7 06/23/2017    BILITOT 0.5 06/23/2017       ASSESSMENT/PLAN:     Sedation Plan: Local  Patient will undergo LP to obtain CSF.    Ankit Velazquez MD  PGY - 2  Department of Radiology

## 2017-09-26 NOTE — PROCEDURES
Radiology Post-Procedure Note    Pre Op Diagnosis: Acute parkinsonism    Post Op Diagnosis: Same    Procedure: lumbar puncture    Procedure performed by: Joseph TREVINO, Loida Huntley MD and Ankit Velazquez MD.    Written Informed Consent Obtained: Yes    Specimen Removed: 8 mL CSF    Estimated Blood Loss: Minimal    Findings: Following written informed consent and sterile prep and drape, a 22 gauge spinal needle was inserted at L5-S1 intralaminar space under fluoroscopic surveillance.  8 mL blood-tinged CSF removed and sent to the lab for further analysis.  There were no complications.    Patient tolerated procedure well.    Ankit Velazquez MD  PGY - 2  Department of Radiology

## 2017-09-28 LAB
ALBUMIN CSF-MCNC: 53.3 MG/DL
ALBUMIN SERPL-MCNC: 4520 MG/DL
IGG CSF-MCNC: 5.5 MG/DL
IGG SERPL-MCNC: 857 MG/DL
IGG SYNTH RATE SER+CSF CALC-MRATE: 4.11 MG/24 H
IGG/ALB CLEAR SER+CSF-RTO: 0.53
IGG/ALB CSF: 0.1 {RATIO}
IGG/ALB SER: 0.19 {RATIO}
OLIGOCLONAL BANDS CSF ELPH-IMP: 0 BANDS
OLIGOCLONAL BANDS CSF ELPH-IMP: 2 BANDS
OLIGOCLONAL BANDS SERPL: 2 BANDS

## 2017-09-29 ENCOUNTER — PATIENT MESSAGE (OUTPATIENT)
Dept: NEUROLOGY | Facility: CLINIC | Age: 62
End: 2017-09-29

## 2017-10-01 LAB
CMV SPEC QL SHELL VIAL CULT: NO GROWTH
GRAM STN SPEC: NORMAL
GRAM STN SPEC: NORMAL

## 2017-10-02 ENCOUNTER — PATIENT MESSAGE (OUTPATIENT)
Dept: NEUROLOGY | Facility: CLINIC | Age: 62
End: 2017-10-02

## 2017-10-02 RX ORDER — CARBIDOPA AND LEVODOPA 25; 100 MG/1; MG/1
TABLET ORAL
Qty: 150 TABLET | Refills: 6 | Status: SHIPPED | OUTPATIENT
Start: 2017-10-02 | End: 2019-08-22

## 2017-10-04 RX ORDER — CARBIDOPA AND LEVODOPA 25; 100 MG/1; MG/1
TABLET ORAL
Qty: 120 TABLET | Refills: 6 | Status: SHIPPED | OUTPATIENT
Start: 2017-10-04 | End: 2018-08-07 | Stop reason: SDUPTHER

## 2017-10-06 DIAGNOSIS — E11.9 TYPE 2 DIABETES MELLITUS WITHOUT COMPLICATION: ICD-10-CM

## 2017-10-09 ENCOUNTER — PATIENT MESSAGE (OUTPATIENT)
Dept: NEUROLOGY | Facility: CLINIC | Age: 62
End: 2017-10-09

## 2017-10-12 RX ORDER — PEN NEEDLE, DIABETIC 31 GX5/16"
NEEDLE, DISPOSABLE MISCELLANEOUS
Qty: 150 EACH | Refills: 12 | Status: SHIPPED | OUTPATIENT
Start: 2017-10-12 | End: 2019-03-27

## 2017-10-16 ENCOUNTER — PATIENT MESSAGE (OUTPATIENT)
Dept: NEUROLOGY | Facility: CLINIC | Age: 62
End: 2017-10-16

## 2017-10-17 ENCOUNTER — LAB VISIT (OUTPATIENT)
Dept: LAB | Facility: HOSPITAL | Age: 62
End: 2017-10-17
Attending: FAMILY MEDICINE
Payer: MEDICAID

## 2017-10-17 DIAGNOSIS — E04.1 NODULAR THYROID DISEASE: ICD-10-CM

## 2017-10-17 LAB
T4 FREE SERPL-MCNC: 1.55 NG/DL
T4 SERPL-MCNC: 11.6 UG/DL
TSH SERPL DL<=0.005 MIU/L-ACNC: 0.18 UIU/ML

## 2017-10-17 PROCEDURE — 84439 ASSAY OF FREE THYROXINE: CPT | Mod: 91

## 2017-10-17 PROCEDURE — 36415 COLL VENOUS BLD VENIPUNCTURE: CPT | Mod: PO

## 2017-10-17 PROCEDURE — 84443 ASSAY THYROID STIM HORMONE: CPT

## 2017-10-17 PROCEDURE — 84436 ASSAY OF TOTAL THYROXINE: CPT

## 2017-10-18 ENCOUNTER — PATIENT MESSAGE (OUTPATIENT)
Dept: NEUROLOGY | Facility: CLINIC | Age: 62
End: 2017-10-18

## 2017-10-25 ENCOUNTER — OFFICE VISIT (OUTPATIENT)
Dept: HEMATOLOGY/ONCOLOGY | Facility: CLINIC | Age: 62
End: 2017-10-25
Payer: MEDICAID

## 2017-10-25 VITALS
BODY MASS INDEX: 39.57 KG/M2 | OXYGEN SATURATION: 95 % | SYSTOLIC BLOOD PRESSURE: 139 MMHG | RESPIRATION RATE: 18 BRPM | HEART RATE: 89 BPM | TEMPERATURE: 98 F | DIASTOLIC BLOOD PRESSURE: 67 MMHG | WEIGHT: 209.44 LBS

## 2017-10-25 DIAGNOSIS — Z85.110 HISTORY OF MALIGNANT CARCINOID TUMOR OF BRONCHUS AND LUNG: ICD-10-CM

## 2017-10-25 PROCEDURE — 99214 OFFICE O/P EST MOD 30 MIN: CPT | Mod: PBBFAC | Performed by: INTERNAL MEDICINE

## 2017-10-25 PROCEDURE — 99214 OFFICE O/P EST MOD 30 MIN: CPT | Mod: S$PBB,,, | Performed by: INTERNAL MEDICINE

## 2017-10-25 PROCEDURE — 99999 PR PBB SHADOW E&M-EST. PATIENT-LVL IV: CPT | Mod: PBBFAC,,, | Performed by: INTERNAL MEDICINE

## 2017-10-25 NOTE — PROGRESS NOTES
NOLANETS:  [facility]  A collaboration between [facility] and [facility]    PATIENT: Jessica Rojas  MRN: [id number]  DATE: 10/25/2017      Diagnosis:   1. 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 Rojas is a 61 y.o. female seen in follow up for a bronchial carcinoid.  Since I had seen her one year ago she has been diagnosed with Parkinson's disease.  She is currently under the care of a neurologist who is adjusting medications.  She states she still has some ongoing wheezing which happens intermittently.  She also complains of chest wall pain site of prior surgery.  She has no other new complaints.    Her history dates to 2009 when she was diagnosed with pneumonia.  She had yearly bouts of this until last year when she had a CT scan done which showed a right hilar mass, 3.4 cm.  A bronchoscopy was done showing an obstructive tumor in the right bronchus intermedius.  Pathology was initially read as possible small cell.  Re-review here shows and atypical carcinoid, intermediate grade, well differentiated with Ki-67 of 4%.   She underwent a right middle and right lower bilobectomy on 8/7/14.  She was found to have a T2a, N0, M0 typical carcinoid.      Past Medical History:   Past Medical History:   Diagnosis Date    Abdominal pain 2/27/2015    Diabetes mellitus, type 2     DM (diabetes mellitus)     on insulin    Elevated transaminase level 4/18/2016    Encounter for blood transfusion     History of malignant carcinoid tumor of bronchus and lung 10/25/2017    History of neuroendocrine cancer      "HTN (hypertension) 2014    Hypercalcemia 2016    Lung cancer, hilus 2014    Malignant carcinoid tumor of bronchus and lung 2014    Malignant carcinoid tumor of the bronchus and lung 2014    Mediastinal lymphadenopathy 2015    Obesity, morbid 2014    Pituitary adenoma     took parladel for 3 yrs    Pneumonia     Thyroid disease     Wheeze 2014       Past Surgical HIstory:   Past Surgical History:   Procedure Laterality Date    APPENDECTOMY      BRONCHOSCOPY  2014, 2014     SECTION  , 1986    x2    LUNG REMOVAL, PARTIAL Right     with 17 lymph nodes    THYROIDECTOMY  2016    TONSILLECTOMY  1969       Family History:   Family History   Problem Relation Age of Onset    Cancer Maternal Aunt      breast    Cancer Maternal Grandmother      unknown    Cancer Maternal Aunt      unknown    Diabetes Mother     Glaucoma Mother     Diabetes Father     Diabetes Sister     Macular degeneration Sister     Amblyopia Neg Hx     Blindness Neg Hx     Cataracts Neg Hx     Hypertension Neg Hx     Retinal detachment Neg Hx     Stroke Neg Hx     Strabismus Neg Hx     Thyroid disease Neg Hx        Social History:  reports that she has never smoked. She has never used smokeless tobacco. She reports that she drinks alcohol. She reports that she does not use drugs.    Allergies:  Allergies   Allergen Reactions    Propranolol Nausea And Vomiting     Drops pressure and raised sugar    Lipitor [Atorvastatin] Other (See Comments)     Myalgia, muscle pain       Medications:  Current Outpatient Prescriptions   Medication Sig Dispense Refill    albuterol 90 mcg/actuation inhaler Inhale 1-2 puffs into the lungs.      BD INSULIN PEN NEEDLE UF MINI 31 gauge x 3/16" Ndle use as directed with insulin 150 each 12    carbidopa-levodopa  mg (SINEMET)  mg per tablet 1.5 tabs, 3 times daily, 6-8 hours apart. 150 tablet 6    carbidopa-levodopa "  mg (SINEMET)  mg per tablet Please take 1.5 tabs three times daily. 120 tablet 6    cholecalciferol, vitamin D3, (VITAMIN D3) 400 unit Cap Take 1,200 Units by mouth once daily.      ezetimibe (ZETIA) 10 mg tablet Take 1 tablet (10 mg total) by mouth once daily. 30 tablet 3    gabapentin (NEURONTIN) 400 MG capsule Take 1 capsule (400 mg total) by mouth 4 (four) times daily. 120 capsule 11    GLUCOSAMINE HCL/CHONDR MCCARTHY A NA (OSTEO BI-FLEX ORAL) Take by mouth once daily.      insulin aspart (NOVOLOG FLEXPEN) 100 unit/mL InPn pen Inject 30 units AC, unless eating small meals then use 25 units AC, plus SSI. 45 mL 5    insulin glargine (LANTUS SOLOSTAR) 100 unit/mL (3 mL) InPn pen Inject 35 Units into the skin 2 (two) times daily. 45 mL 3    levothyroxine (SYNTHROID) 150 MCG tablet Take 1 tablet (150 mcg total) by mouth once daily. 30 tablet 6    meclizine (ANTIVERT) 25 mg tablet Take 1 tablet (25 mg total) by mouth 3 (three) times daily as needed. 30 tablet 0    meloxicam (MOBIC) 15 MG tablet Take 15 mg by mouth once daily.  6    MULTIVITAMIN W-MINERALS/LUTEIN (CENTRUM SILVER ORAL) Take 1 tablet by mouth once daily.       ondansetron (ZOFRAN-ODT) 4 MG TbDL Take 4 mg by mouth.      propranolol (INDERAL) 10 MG tablet TAKE ONE TABLET BY MOUTH THREE TIMES DAILY 90 tablet 3    TRUE METRIX GLUCOSE TEST STRIP Strp test FOUR TIMES DAILY or as directed 100 each 12    valsartan-hydrochlorothiazide (DIOVAN-HCT) 320-12.5 mg per tablet TAKE ONE TABLET BY MOUTH ONCE DAILY 30 tablet 12    VITAMIN D2 50,000 unit capsule TAKE ONE CAPSULE BY MOUTH EVERY 7 DAYS 4 capsule 11     No current facility-administered medications for this visit.        Review of Systems   Constitutional: Negative for appetite change, chills, fatigue, fever and unexpected weight change.        No flushing   HENT: Negative for congestion, hearing loss, nosebleeds and postnasal drip.    Eyes: Negative for visual disturbance.   Respiratory:  Positive for wheezing. Negative for cough and shortness of breath.    Cardiovascular: Negative for chest pain and palpitations.   Gastrointestinal: Negative for abdominal pain, blood in stool, constipation, diarrhea, nausea and vomiting.   Genitourinary: Negative for dysuria and frequency.   Musculoskeletal: Negative for back pain and gait problem.        Right-sided chest wall pain   Skin: Negative for color change and rash.   Allergic/Immunologic: Positive for environmental allergies.   Neurological: Positive for tremors. Negative for dizziness, weakness and headaches.   Hematological: Negative for adenopathy. Does not bruise/bleed easily.   Psychiatric/Behavioral: Negative for confusion. The patient is not nervous/anxious.        ECOG Performance Status: 0     Objective:      Vitals:   Vitals:    10/25/17 0950   BP: 139/67   BP Location: Right arm   Patient Position: Sitting   BP Method: Medium (Automatic)   Pulse: 89   Resp: 18   Temp: 98 °F (36.7 °C)   TempSrc: Oral   SpO2: 95%   Weight: 95 kg (209 lb 7 oz)     BMI: Body mass index is 39.57 kg/m².    Physical Exam   Constitutional: She is oriented to person, place, and time. She appears well-developed and well-nourished. No distress.   HENT:   Head: Normocephalic.   Mouth/Throat: No oropharyngeal exudate.   Eyes: EOM are normal. No scleral icterus.   Neck: Neck supple. No tracheal deviation present. No thyromegaly present.   Cardiovascular: Normal rate and regular rhythm.    Pulmonary/Chest: Effort normal. No respiratory distress. She has wheezes. She has no rales.   Abdominal: Soft. She exhibits no distension and no mass. There is no tenderness. There is no rebound and no guarding.   Musculoskeletal: Normal range of motion. She exhibits no edema.   Lymphadenopathy:     She has no cervical adenopathy.   Neurological: She is alert and oriented to person, place, and time. No cranial nerve deficit.   Skin: Skin is warm and dry.   Psychiatric: She has a normal mood  and affect.       Laboratory Data:     Lab Visit on 10/17/2017   Component Date Value Ref Range Status    TSH 10/17/2017 0.183* 0.400 - 4.000 uIU/mL Final    T4, Total 10/17/2017 11.6* 4.5 - 11.5 ug/dL Final    Free T4 10/17/2017 1.55* 0.71 - 1.51 ng/dL Final   Lab Visit on 10/17/2017   Component Date Value Ref Range Status    Sodium 10/17/2017 138  136 - 145 mmol/L Final    Potassium 10/17/2017 4.2  3.5 - 5.1 mmol/L Final    Chloride 10/17/2017 102  95 - 110 mmol/L Final    CO2 10/17/2017 23  23 - 29 mmol/L Final    Glucose 10/17/2017 129* 70 - 110 mg/dL Final    BUN, Bld 10/17/2017 24* 8 - 23 mg/dL Final    Creatinine 10/17/2017 0.9  0.5 - 1.4 mg/dL Final    Calcium 10/17/2017 9.9  8.7 - 10.5 mg/dL Final    Total Protein 10/17/2017 7.7  6.0 - 8.4 g/dL Final    Albumin 10/17/2017 3.8  3.5 - 5.2 g/dL Final    Total Bilirubin 10/17/2017 0.4  0.1 - 1.0 mg/dL Final    Comment: For infants and newborns, interpretation of results should be based  on gestational age, weight and in agreement with clinical  observations.  Premature Infant recommended reference ranges:  Up to 24 hours.............<8.0 mg/dL  Up to 48 hours............<12.0 mg/dL  3-5 days..................<15.0 mg/dL  6-29 days.................<15.0 mg/dL      Alkaline Phosphatase 10/17/2017 92  55 - 135 U/L Final    AST 10/17/2017 17  10 - 40 U/L Final    ALT 10/17/2017 7* 10 - 44 U/L Final    Anion Gap 10/17/2017 13  8 - 16 mmol/L Final    eGFR if African American 10/17/2017 >60.0  >60 mL/min/1.73 m^2 Final    eGFR if non African American 10/17/2017 >60.0  >60 mL/min/1.73 m^2 Final    Comment: Calculation used to obtain the estimated glomerular filtration  rate (eGFR) is the CKD-EPI equation. Since race is unknown   in our information system, the eGFR values for   -American and Non--American patients are given   for each creatinine result.      Hemoglobin A1C 10/18/2017 6.4* 4.0 - 5.6 % Final    Comment: According to ADA  guidelines, hemoglobin A1c <7.0% represents  optimal control in non-pregnant diabetic patients. Different  metrics may apply to specific patient populations.   Standards of Medical Care in Diabetes-2016.  For the purpose of screening for the presence of diabetes:  <5.7%     Consistent with the absence of diabetes  5.7-6.4%  Consistent with increasing risk for diabetes   (prediabetes)  >or=6.5%  Consistent with diabetes  Currently, no consensus exists for use of hemoglobin A1c  for diagnosis of diabetes for children.  This Hemoglobin A1c assay has significant interference with fetal   hemoglobin   (HbF). The results are invalid for patients with abnormal amounts of   HbF,   including those with known Hereditary Persistence   of Fetal Hemoglobin. Heterozygous hemoglobin variants (HbAS, HbAC,   HbAD, HbAE, HbA2) do not significantly interfere with this assay;   however, presence of multiple variants in a sample may impact the %   interference.      Estimated Avg Glucose 10/18/2017 137* 68 - 131 mg/dL Final    TSH 10/17/2017 0.178* 0.400 - 4.000 uIU/mL Final    Free T4 10/17/2017 1.50  0.71 - 1.51 ng/dL Final   Hospital Outpatient Visit on 09/26/2017   Component Date Value Ref Range Status    Heme Aliquot 09/26/2017 1.0  mL Final    Appearance, CSF 09/26/2017 Slightly bloody* Clear Final    Color, CSF 09/26/2017 Red* Colorless Final    WBC, CSF 09/26/2017 21* 0 - 5 /cu mm Final    RBC, CSF 09/26/2017 34736* 0 /cu mm Final    Segmented Neutrophils, CSF 09/26/2017 64* 0 - 6 % Final    Lymphs, CSF 09/26/2017 33* 40 - 80 % Final    Mono/Macrophage, CSF 09/26/2017 3* 15 - 45 % Final    CSF, Comment 09/26/2017 SEE COMMENT   Final    Comment: See comment  SUPERNATANT IS COLORLESS      CSF CULTURE 10/01/2017 No Growth   Final    Gram Stain Result 10/01/2017 No WBC's   Final    Gram Stain Result 10/01/2017 No organisms seen   Final    Glucose, CSF 09/26/2017 65  40 - 70 mg/dL Final    CSF Bands 09/28/2017 2   bands Final    Olig Bands Interpretation, CSF 09/28/2017 0  <4 bands Final    Comment: The oligoclonal band assay detected 3 or fewer unique IgG   bands in the CSF. This is a negative result.      Serum Bands 09/28/2017 2  bands Final    IgG Index, CSF 09/28/2017 0.53  <=0.85 Final    IgG, CSF 09/28/2017 5.5  <=8.1 mg/dL Final    Comment: -------------------ADDITIONAL INFORMATION-------------------  This test has been modified from the 's   instructions. Its performance characteristics were   determined by [facility] in a manner consistent with   CLIA requirements. This test has not been cleared or   approved by the U.S. Food and Drug Administration.      Albumin, CSF 09/28/2017 53.3* <=27.0 mg/dL Final    Comment: -------------------ADDITIONAL INFORMATION-------------------  This test has been modified from the 's   instructions. Its performance characteristics were   determined by [facility] in a manner consistent with   CLIA requirements. This test has not been cleared or   approved by the U.S. Food and Drug Administration.      IgG/Albumin Ratio, CSF 09/28/2017 0.10  <=0.21 Final    IgG Synthetic Rate 09/28/2017 4.11  <=12 mg/24 h Final    IgG,S 09/28/2017 857  767 - 1590 mg/dL Final    Albumin, Serum 09/28/2017 4520  3200 - 4800 mg/dL Final    IgG/Albumin, S 09/28/2017 0.19  <=0.40 Final    Comment: Test Performed by:  [facility] - [address] [address]      Protein, CSF 09/26/2017 87* 15 - 40 mg/dL Final    Comment: Infants can have higher CSF protein results due to increased  permeability of the blood-brain barrier.       Neuroendocrine Labs Latest Ref Rng 7/29/2015   EXT 5 HIAA BLOOD 0 - 22 ng/ml 14   EXT SEROTONIN 56 - 244 ng/ml    EXT CHROMOGRANIN A 0 - 15 ng/ml 14           FINAL PATHOLOGIC DIAGNOSIS 8/7/14  1. Right fifth rib (demineralized), biopsy:  Bone with trilineage bone marrow.  Negative for  neoplasm.    2. Right pleura, partial pleurectomy:  Pleural tissue with congested vasculature and no evidence of neoplasm.    3. Right lung, right middle and lower lobes, lobectomies:  Typical carcinoid tumor, multifocal with 2 lesions, measuring 4.5 cm and 0.5 cm in greatest dimension  respectively.  Mitotic index: 1 mitosis seen in 10 high powered fields.  No evidence of necrosis or significant nuclear pleomorphism are seen.  Ki-67 proliferation index: 4% of tumor cells staining.  Bronchial and vascular margins are negative for malignancy.  17 benign lymph nodes (0/17).  Separate lesonal area consists of lung with necrotizing graulomatous inflammation and calcified granulomata.  Special stains for mycobacterial and fungi are completed on the granulomatous lesion and are negative for fungal  and mycobacterial organisms with satisfactory positive control.  See synoptic report in comment section for further details.     4. Right lung lymph node, station 4, biopsy:  Fibroadipose tissue, negative for lymph nodes.  No evidence of neoplasm.    5. Right lung lymph nodes, station for alpha, biopsy:  Two (2) lymph nodes, negative for neoplasm (0/2).  Comment: Synoptic report  Specimen: Lung, right middle and lower lobes  Specimen integrity: Intact  Specimen laterality: Right  Tumor site: Right bronchus intermedius  Tumor size:  Lesion#1: 4.5 x 4 x 2.5 cm  Lesion#2: 0.5 cm in greatest dimension.  Tumor focality: Multifocal  Histologic Type:  Typical carcinoid tumor  Visceral Pleural Invasion: Not identified  Tumor Extension: Tumor involves the bronchus intermedius  Margins: Bronchial & Vasular margins are uninvolved by tumor  Distance of tumor from closest bronchial margin: 0.2 cm.  Treatment effect: Unknown  Lymphovascular invasion: Not identified.  Pathologic Staging:  Primary tumor:  pT2a: Tumor greater than 3 cm but 5 cm or less in greatest dimension without evidence of invasion into the main  bronchus  Regional lymph  nodes:  pN0: No regional lymph node metastasis  Number examined: 16  Number involved: 0  Distant metastasis:  pMX: Cannot be assessed.  Note: Immunohistochemical stain results:  AE1/AE3: Negative in tumor cells.  Chromogranin staining:  Intensity Positive cells (0%)  0: 0%  1+: 10%  2+: 85%  3+: 5%  Synaptophysin staining:  Intensity Positive cells (0%)  0: 0%  1+: 0%  2+: 0%  3+: 100%  CD31: 44 vessels per 1 HPF  Factor VIII: 69 vessels per 1 HPF  Ki-67: 4% cells staining  Note: This tumor has only 1 mitotic figure in 10 high power fields, no evidence of necrosis and fairly bland  appearing nulei with salt and pepper chromatin pattern and no significant nuclear pleomorphism. This is best  categorized as a typical carcinoid tumor.  All immunostains have satisfactory positive and negative controls.    IMAGING   CT 8/28/17  Coronary calcifications are noted postsurgical changes are noted with multiple unfused rib fractures in the right hemithorax including at the L3, L4, L5, L6 levels    A surgical line appears to be present at the right lung hilum that appears to relate to lobectomy, reportedly a lower and middle lobectomy.    A 5 mm pulmonary nodule is noted near the right lung hilum unchanged since 10/17/16. A tiny 2 mm nodule is noted at the right lung base on image 37 unchanged.    A 3 mm nodule is noted at the left lung apex on image #10 unchanged.     A 4 mm nodule is noted on image #16 within the left upper lobe unchanged. A 3 mm nodule is noted on image #21 in the left upper lobe unchanged and on image 22 unchanged.     A 3 mm nodule is noted on image 36 in the left lower lobe unchanged. At the left lung base approximately 5 nodule is noted the largest is of 6 mm unchanged      Several normal-sized mediastinal nodes are noted unchanged since 10/17/16.    Density is noted within the gallbladder as can be seen with cholelithiasis with a calcification of approximately 3 mm.   Impression         Evidence of  postsurgical change with multiple pulmonary nodules the largest of 6 mm unchanged since 10/17/16 and 11/4/15 suggesting a benign etiology. No worrisome changes are noted.          Assessment:       1. History of malignant carcinoid tumor of bronchus and lung           Plan:       Mrs. Rojas continues to do well from an oncologic perspective.  We have reviewed her images from this year and also last year and make comparisons.  These are essentially unchanged and she has no detrimental changes or evidence of recurrent disease.  We will continue ongoing surveillance with a noncontrasted CT in one year.  All questions were answered and she is agreeable with this plan.    Justen Quintero DO, [facility]  Hematology & Oncology  [address]  [place]  [address]  ph. [phone]; [phone]  fax. [phone]    25 minutes were spent in coordination of patient's care, record review and counseling.  More than 50% of the time was face-to-face.

## 2017-10-26 ENCOUNTER — OFFICE VISIT (OUTPATIENT)
Dept: FAMILY MEDICINE | Facility: CLINIC | Age: 62
End: 2017-10-26
Payer: MEDICAID

## 2017-10-26 VITALS
BODY MASS INDEX: 41.46 KG/M2 | DIASTOLIC BLOOD PRESSURE: 85 MMHG | HEIGHT: 60 IN | TEMPERATURE: 98 F | WEIGHT: 211.19 LBS | HEART RATE: 86 BPM | SYSTOLIC BLOOD PRESSURE: 139 MMHG

## 2017-10-26 DIAGNOSIS — E66.01 OBESITY, MORBID: ICD-10-CM

## 2017-10-26 DIAGNOSIS — E11.9 TYPE 2 DIABETES MELLITUS WITHOUT COMPLICATION, WITH LONG-TERM CURRENT USE OF INSULIN: ICD-10-CM

## 2017-10-26 DIAGNOSIS — M54.5 CHRONIC BILATERAL LOW BACK PAIN, WITH SCIATICA PRESENCE UNSPECIFIED: Primary | ICD-10-CM

## 2017-10-26 DIAGNOSIS — M54.10 RADICULOPATHY, UNSPECIFIED SPINAL REGION: ICD-10-CM

## 2017-10-26 DIAGNOSIS — G89.29 CHRONIC BILATERAL LOW BACK PAIN, WITH SCIATICA PRESENCE UNSPECIFIED: Primary | ICD-10-CM

## 2017-10-26 DIAGNOSIS — Z79.4 TYPE 2 DIABETES MELLITUS WITHOUT COMPLICATION, WITH LONG-TERM CURRENT USE OF INSULIN: ICD-10-CM

## 2017-10-26 DIAGNOSIS — G20.C PARKINSONISM, UNSPECIFIED PARKINSONISM TYPE: ICD-10-CM

## 2017-10-26 PROBLEM — R59.0 HILAR LYMPHADENOPATHY: Status: RESOLVED | Noted: 2017-07-25 | Resolved: 2017-10-26

## 2017-10-26 PROCEDURE — 99214 OFFICE O/P EST MOD 30 MIN: CPT | Mod: S$PBB,,, | Performed by: FAMILY MEDICINE

## 2017-10-26 PROCEDURE — 99999 PR PBB SHADOW E&M-EST. PATIENT-LVL IV: CPT | Mod: PBBFAC,,, | Performed by: FAMILY MEDICINE

## 2017-10-26 PROCEDURE — 99214 OFFICE O/P EST MOD 30 MIN: CPT | Mod: PBBFAC,PO | Performed by: FAMILY MEDICINE

## 2017-10-26 RX ORDER — NABUMETONE 500 MG/1
1000 TABLET, FILM COATED ORAL 2 TIMES DAILY
Qty: 60 TABLET | Refills: 6 | Status: SHIPPED | OUTPATIENT
Start: 2017-10-26 | End: 2018-04-27 | Stop reason: SDUPTHER

## 2017-10-26 NOTE — PROGRESS NOTES
"The patient presents today co persistent upper lumbar pain radiating to low back and occasionally to ankles bilaterally p several months. Meloxicam 15 and PT helped some but mod severe w standing and bending, sitting helps.She followed w neuroendocrine lung cancer p lobectomy. Also fu DM on insulin Last a1c 6.4   Also follows w Neurology recent dx PD    Past Medical History:  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 2014    Mediastinal lymphadenopathy 2015    Obesity, morbid 2014    Parkinsons 10/2017    Pituitary adenoma 's    took parladel for 3 yrs    Pneumonia     Thyroid disease     Wheeze 2014     Past Surgical History:   Procedure Laterality Date    APPENDECTOMY      BRONCHOSCOPY  2014, 2014     SECTION  , 1986    x2    LUNG REMOVAL, PARTIAL Right     with 17 lymph nodes    THYROIDECTOMY  2016    TONSILLECTOMY  1969     Allergies   Allergen Reactions    Propranolol Nausea And Vomiting     Drops pressure and raised sugar    Lipitor [Atorvastatin] Other (See Comments)     Myalgia, muscle pain     Current Outpatient Prescriptions on File Prior to Visit   Medication Sig Dispense Refill    albuterol 90 mcg/actuation inhaler Inhale 1-2 puffs into the lungs.      BD INSULIN PEN NEEDLE UF MINI 31 gauge x 3/16" Ndle use as directed with insulin 150 each 12    carbidopa-levodopa  mg (SINEMET)  mg per tablet 1.5 tabs, 3 times daily, 6-8 hours apart. 150 " tablet 6    carbidopa-levodopa  mg (SINEMET)  mg per tablet Please take 1.5 tabs three times daily. 120 tablet 6    cholecalciferol, vitamin D3, (VITAMIN D3) 400 unit Cap Take 1,200 Units by mouth once daily.      ezetimibe (ZETIA) 10 mg tablet Take 1 tablet (10 mg total) by mouth once daily. 30 tablet 3    gabapentin (NEURONTIN) 400 MG capsule Take 1 capsule (400 mg total) by mouth 4 (four) times daily. 120 capsule 11    GLUCOSAMINE HCL/CHONDR MCCARTHY A NA (OSTEO BI-FLEX ORAL) Take by mouth once daily.      insulin aspart (NOVOLOG FLEXPEN) 100 unit/mL InPn pen Inject 30 units AC, unless eating small meals then use 25 units AC, plus SSI. 45 mL 5    insulin glargine (LANTUS SOLOSTAR) 100 unit/mL (3 mL) InPn pen Inject 35 Units into the skin 2 (two) times daily. 45 mL 3    levothyroxine (SYNTHROID) 150 MCG tablet Take 1 tablet (150 mcg total) by mouth once daily. 30 tablet 6    meclizine (ANTIVERT) 25 mg tablet Take 1 tablet (25 mg total) by mouth 3 (three) times daily as needed. 30 tablet 0    meloxicam (MOBIC) 15 MG tablet Take 15 mg by mouth once daily.  6    MULTIVITAMIN W-MINERALS/LUTEIN (CENTRUM SILVER ORAL) Take 1 tablet by mouth once daily.       ondansetron (ZOFRAN-ODT) 4 MG TbDL Take 4 mg by mouth.      propranolol (INDERAL) 10 MG tablet TAKE ONE TABLET BY MOUTH THREE TIMES DAILY 90 tablet 3    TRUE METRIX GLUCOSE TEST STRIP Strp test FOUR TIMES DAILY or as directed 100 each 12    valsartan-hydrochlorothiazide (DIOVAN-HCT) 320-12.5 mg per tablet TAKE ONE TABLET BY MOUTH ONCE DAILY 30 tablet 12    VITAMIN D2 50,000 unit capsule TAKE ONE CAPSULE BY MOUTH EVERY 7 DAYS 4 capsule 11     No current facility-administered medications on file prior to visit.      Social History     Social History    Marital status:      Spouse name: N/A    Number of children: N/A    Years of education: N/A     Occupational History    housewife      Social History Main Topics    Smoking status: Never  Smoker    Smokeless tobacco: Never Used    Alcohol use Yes      Comment: once per month    Drug use: No    Sexual activity: Not on file     Other Topics Concern    Not on file     Social History Narrative    No narrative on file     Family History   Problem Relation Age of Onset    Cancer Maternal Aunt      breast    Cancer Maternal Grandmother      unknown    Cancer Maternal Aunt      unknown    Diabetes Mother     Glaucoma Mother     Diabetes Father     Diabetes Sister     Macular degeneration Sister     Amblyopia Neg Hx     Blindness Neg Hx     Cataracts Neg Hx     Hypertension Neg Hx     Retinal detachment Neg Hx     Stroke Neg Hx     Strabismus Neg Hx     Thyroid disease Neg Hx          ROS:GENERAL: No fever, chills, fatigability or weight loss.  SKIN: No rashes, itching or changes in color or texture of skin.  HEAD: No headaches or recent head trauma.EYES: Visual acuity fine. No photophobia, ocular pain or diplopia.EARS: Denies ear pain, discharge or vertigo.NOSE: No loss of smell, no epistaxis or postnasal drip.MOUTH & THROAT: No hoarseness or change in voice. No excessive gum bleeding.NODES: Denies swollen glands.  CHEST: Denies ESPINAL, cyanosis, wheezing, cough and sputum production.  CARDIOVASCULAR: Denies chest pain, PND, orthopnea or reduced exercise tolerance.  ABDOMEN: Appetite fine. No weight loss. Denies diarrhea, abdominal pain, hematemesis or blood in stool.  URINARY: No flank pain, dysuria or hematuria.  PERIPHERAL VASCULAR: No claudication or cyanosis.  MUSCULOSKELETAL: See above.  NEUROLOGIC: No history of seizures, paralysis, alteration of gait or coordination.  PE:   HEAD: Normocephalic, atraumatic.EYES: PERRL. EOMI.   EARS: TM's intact. Light reflex normal. No retraction or perforation.   NOSE: Mucosa pink. Airway clear.MOUTH & THROAT: No tonsillar enlargement. No pharyngeal erythema or exudate. No stridor.  NODES: No cervical, axillary or inguinal lymph node  enlargement.  CHEST: Lungs clear to auscultation.  CARDIOVASCULAR: Normal S1, S2. No rubs, murmurs or gallops.  ABDOMEN: Bowel sounds normal. Not distended. Soft. No tenderness or masses.  MUSCULOSKELETAL: Tender T10-L3 mild spasm;neg SLR   NEUROLOGIC: Cranial Nerves: II-XII grossly intact.  Motor: 5/5 strength major flexors/extensors.  DTR's: Knees, Ankles 2+ and equal bilaterally; downgoing toes.  Sensory: Intact to light touch distally.  Gait & Posture: Normal gait and fine motion. No cerebellar signs.     Jessica was seen today for follow-up.    Diagnoses and all orders for this visit:    Chronic bilateral low back pain, with sciatica presence unspecified    Type 2 diabetes mellitus without complication, with long-term current use of insulin    Obesity, morbid    Radiculopathy, unspecified spinal region    Parkinsonism, unspecified Parkinsonism type      Trial relafen instead of meloxicam 15 qd pc   Endo fu  NS consult   FU Neuroendochrine

## 2017-10-30 ENCOUNTER — OFFICE VISIT (OUTPATIENT)
Dept: ENDOCRINOLOGY | Facility: CLINIC | Age: 62
End: 2017-10-30
Payer: MEDICAID

## 2017-10-30 VITALS
DIASTOLIC BLOOD PRESSURE: 90 MMHG | HEIGHT: 60 IN | SYSTOLIC BLOOD PRESSURE: 152 MMHG | HEART RATE: 88 BPM | WEIGHT: 211 LBS | BODY MASS INDEX: 41.43 KG/M2

## 2017-10-30 DIAGNOSIS — E11.8 TYPE 2 DIABETES MELLITUS WITH COMPLICATION, WITH LONG-TERM CURRENT USE OF INSULIN: ICD-10-CM

## 2017-10-30 DIAGNOSIS — I10 BENIGN ESSENTIAL HTN: ICD-10-CM

## 2017-10-30 DIAGNOSIS — E03.9 HYPOTHYROIDISM, UNSPECIFIED TYPE: Primary | ICD-10-CM

## 2017-10-30 DIAGNOSIS — Z79.4 TYPE 2 DIABETES MELLITUS WITH COMPLICATION, WITH LONG-TERM CURRENT USE OF INSULIN: ICD-10-CM

## 2017-10-30 PROCEDURE — 99999 PR PBB SHADOW E&M-EST. PATIENT-LVL III: CPT | Mod: PBBFAC,,, | Performed by: INTERNAL MEDICINE

## 2017-10-30 PROCEDURE — 99213 OFFICE O/P EST LOW 20 MIN: CPT | Mod: PBBFAC,PO | Performed by: INTERNAL MEDICINE

## 2017-10-30 PROCEDURE — 99214 OFFICE O/P EST MOD 30 MIN: CPT | Mod: S$PBB,,, | Performed by: INTERNAL MEDICINE

## 2017-10-30 RX ORDER — LEVOTHYROXINE SODIUM 137 UG/1
137 TABLET ORAL DAILY
Qty: 30 TABLET | Refills: 3 | Status: SHIPPED | OUTPATIENT
Start: 2017-10-30 | End: 2018-01-29 | Stop reason: SDUPTHER

## 2017-10-30 RX ORDER — INSULIN GLARGINE 100 [IU]/ML
INJECTION, SOLUTION SUBCUTANEOUS
Qty: 45 ML | Refills: 3
Start: 2017-10-30 | End: 2018-08-07

## 2017-10-30 NOTE — PROGRESS NOTES
CHIEF COMPLAINT: DM 2/Hypothyroidism  61 year old being seen as a f/u. Nodules was found on on a PET scan. FNA 11/25/14 shows adenomatous nodule with cystic changes. S/P thyroidectomy 5/24/16. Lantus 35 BID. Humalog 35 TID. Saw DM ED 4/2016. On synthroid 150 mcg once daily. No palpitations. No worsening of tremors. No paresthesias. Eye exam due in Nov. Apparently having hypoglycemia. Happening at night. Did not write hypoglycemic episodes on glucose logs.       PATH  THYROID GLAND, TOTAL THYROIDECTOMY:  - NODULAR GOITER WITH FOCAL DEGENERATIVE CHANGES.  - NO EVIDENCE OF MALIGNANCY.        PAST MEDICAL HISTORY/PAST SURGICAL HISTORY:  Reviewed in [place]    SOCIAL HISTORY: No T/A.    FAMILY HISTORY:  No known thyroid disease or thyroid cancer. + DM 2.     MEDICATIONS/ALLERGIES: The patient's MedCard has been updated and reviewed.      ROS:   Constitutional: weight stable.   Eyes: No recent visual changes  ENT: No dysphagia  Cardiovascular: Denies current anginal symptoms  Respiratory: SOB stable  Gastrointestinal: No N/V  GenitoUrinary - No dysuria  Skin: No new skin rash  Neurologic: No focal neurologic complaints  Remainder ROS negative        PE:    GENERAL: Well developed, well nourished.  NECK: Supple, trachea midline, thyroidectomy scar intact  CHEST: Resp even and unlabored, CTA bilateral.  CARDIAC: RRR, S1, S2 heard, no murmurs, rubs, S3, or S4  FEET: Normal monofilament. No cuts or abrasions. 2 + pedal pulses.       Results for ROBERTO MULLIGAN (MRN [id number]) as of 10/30/2017 09:57   Ref. Range 10/17/2017 10:46 10/17/2017 10:46   Sodium Latest Ref Range: 136 - 145 mmol/L 138    Potassium Latest Ref Range: 3.5 - 5.1 mmol/L 4.2    Chloride Latest Ref Range: 95 - 110 mmol/L 102    CO2 Latest Ref Range: 23 - 29 mmol/L 23    Anion Gap Latest Ref Range: 8 - 16 mmol/L 13    BUN, Bld Latest Ref Range: 8 - 23 mg/dL 24 (H)    Creatinine Latest Ref Range: 0.5 - 1.4 mg/dL 0.9    eGFR if non African American Latest Ref Range:  >60 mL/min/1.73 m^2 >60.0    eGFR if African American Latest Ref Range: >60 mL/min/1.73 m^2 >60.0    Glucose Latest Ref Range: 70 - 110 mg/dL 129 (H)    Calcium Latest Ref Range: 8.7 - 10.5 mg/dL 9.9    Alkaline Phosphatase Latest Ref Range: 55 - 135 U/L 92    Total Protein Latest Ref Range: 6.0 - 8.4 g/dL 7.7    Albumin Latest Ref Range: 3.5 - 5.2 g/dL 3.8    Total Bilirubin Latest Ref Range: 0.1 - 1.0 mg/dL 0.4    AST Latest Ref Range: 10 - 40 U/L 17    ALT Latest Ref Range: 10 - 44 U/L 7 (L)    Hemoglobin A1C Latest Ref Range: 4.0 - 5.6 % 6.4 (H)    Estimated Avg Glucose Latest Ref Range: 68 - 131 mg/dL 137 (H)    TSH Latest Ref Range: 0.400 - 4.000 uIU/mL 0.183 (L) 0.178 (L)   T4 Total Latest Ref Range: 4.5 - 11.5 ug/dL 11.6 (H)    Free T4 Latest Ref Range: 0.71 - 1.51 ng/dL 1.55 (H) 1.50         ASSESSMENT/PLAN:  1. Post Surgical Hypothyroidism- For MNG. Path benign. Decrease synthroid to 137 mcg.     2. HTN- Will have her f/u with PCP.     3. DM 2- With nephropathy and neuropathy. Discussed importance of writing hypoglycemic episodes on her glucose logs.  Decrease to lantus 35/30.     5. Hyperlipidemia-     5. parkinsons- no worsening of tremors    FOLLOWUP  TSH- 6 weeks  F/U 4 months with CMP, A1c, TSH, FLP

## 2017-11-01 ENCOUNTER — PATIENT MESSAGE (OUTPATIENT)
Dept: NEUROLOGY | Facility: CLINIC | Age: 62
End: 2017-11-01

## 2017-11-02 ENCOUNTER — TELEPHONE (OUTPATIENT)
Dept: NEUROLOGY | Facility: CLINIC | Age: 62
End: 2017-11-02

## 2017-11-02 NOTE — TELEPHONE ENCOUNTER
----- Message from Lexy Caceres sent at 11/1/2017  9:55 AM CDT -----  Contact: self  Patient called regarding scheduling an appt from a referral from Dr. Balbuena. Please contact [phone] ([email or web address])

## 2017-11-03 NOTE — TELEPHONE ENCOUNTER
Left voice message with pt to set up appt from referral. Pt needs imaging, appt is to be made with PA.

## 2017-11-07 NOTE — TELEPHONE ENCOUNTER
Attempted to make appt and was unable to, pt johanna Kiglore, tried to give her Dr. Hidalgo's phone number and she stated that she would try a doctor in [place] first.

## 2017-11-11 RX ORDER — RASAGILINE 1 MG/1
1 TABLET ORAL DAILY
Qty: 30 TABLET | Refills: 3 | Status: SHIPPED | OUTPATIENT
Start: 2017-11-11 | End: 2017-12-01 | Stop reason: CLARIF

## 2017-11-17 ENCOUNTER — PATIENT MESSAGE (OUTPATIENT)
Dept: NEUROLOGY | Facility: CLINIC | Age: 62
End: 2017-11-17

## 2017-12-01 ENCOUNTER — PATIENT MESSAGE (OUTPATIENT)
Dept: NEUROLOGY | Facility: CLINIC | Age: 62
End: 2017-12-01

## 2017-12-01 ENCOUNTER — PATIENT MESSAGE (OUTPATIENT)
Dept: PSYCHIATRY | Facility: HOSPITAL | Age: 62
End: 2017-12-01

## 2017-12-01 RX ORDER — SELEGILINE HYDROCHLORIDE 5 MG/1
5 CAPSULE ORAL 2 TIMES DAILY
Qty: 60 CAPSULE | Refills: 6 | Status: SHIPPED | OUTPATIENT
Start: 2017-12-01 | End: 2018-05-25 | Stop reason: SDUPTHER

## 2017-12-11 ENCOUNTER — LAB VISIT (OUTPATIENT)
Dept: LAB | Facility: HOSPITAL | Age: 62
End: 2017-12-11
Attending: INTERNAL MEDICINE
Payer: MEDICAID

## 2017-12-11 DIAGNOSIS — E03.9 HYPOTHYROIDISM, UNSPECIFIED TYPE: ICD-10-CM

## 2017-12-11 DIAGNOSIS — E11.8 TYPE 2 DIABETES MELLITUS WITH COMPLICATION, WITH LONG-TERM CURRENT USE OF INSULIN: ICD-10-CM

## 2017-12-11 DIAGNOSIS — Z79.4 TYPE 2 DIABETES MELLITUS WITH COMPLICATION, WITH LONG-TERM CURRENT USE OF INSULIN: ICD-10-CM

## 2017-12-11 LAB — TSH SERPL DL<=0.005 MIU/L-ACNC: 0.88 UIU/ML

## 2017-12-11 PROCEDURE — 84443 ASSAY THYROID STIM HORMONE: CPT

## 2017-12-11 PROCEDURE — 36415 COLL VENOUS BLD VENIPUNCTURE: CPT | Mod: PO

## 2018-01-29 RX ORDER — LEVOTHYROXINE SODIUM 137 UG/1
137 TABLET ORAL DAILY
Qty: 30 TABLET | Refills: 3 | Status: SHIPPED | OUTPATIENT
Start: 2018-01-29 | End: 2018-05-25 | Stop reason: SDUPTHER

## 2018-02-15 ENCOUNTER — LAB VISIT (OUTPATIENT)
Dept: LAB | Facility: HOSPITAL | Age: 63
End: 2018-02-15
Attending: FAMILY MEDICINE
Payer: MEDICAID

## 2018-02-15 DIAGNOSIS — E03.9 HYPOTHYROIDISM, UNSPECIFIED TYPE: ICD-10-CM

## 2018-02-15 DIAGNOSIS — Z79.4 TYPE 2 DIABETES MELLITUS WITH COMPLICATION, WITH LONG-TERM CURRENT USE OF INSULIN: ICD-10-CM

## 2018-02-15 DIAGNOSIS — E11.8 TYPE 2 DIABETES MELLITUS WITH COMPLICATION, WITH LONG-TERM CURRENT USE OF INSULIN: ICD-10-CM

## 2018-02-15 LAB
ALBUMIN SERPL BCP-MCNC: 3.8 G/DL
ALP SERPL-CCNC: 74 U/L
ALT SERPL W/O P-5'-P-CCNC: 15 U/L
ANION GAP SERPL CALC-SCNC: 13 MMOL/L
AST SERPL-CCNC: 12 U/L
BILIRUB SERPL-MCNC: 0.5 MG/DL
BUN SERPL-MCNC: 20 MG/DL
CALCIUM SERPL-MCNC: 9.9 MG/DL
CHLORIDE SERPL-SCNC: 100 MMOL/L
CHOLEST SERPL-MCNC: 302 MG/DL
CHOLEST/HDLC SERPL: 5.3 {RATIO}
CO2 SERPL-SCNC: 28 MMOL/L
CREAT SERPL-MCNC: 0.8 MG/DL
EST. GFR  (AFRICAN AMERICAN): >60 ML/MIN/1.73 M^2
EST. GFR  (NON AFRICAN AMERICAN): >60 ML/MIN/1.73 M^2
ESTIMATED AVG GLUCOSE: 134 MG/DL
GLUCOSE SERPL-MCNC: 168 MG/DL
HBA1C MFR BLD HPLC: 6.3 %
HDLC SERPL-MCNC: 57 MG/DL
HDLC SERPL: 18.9 %
LDLC SERPL CALC-MCNC: 200.4 MG/DL
NONHDLC SERPL-MCNC: 245 MG/DL
POTASSIUM SERPL-SCNC: 4.5 MMOL/L
PROT SERPL-MCNC: 7.8 G/DL
SODIUM SERPL-SCNC: 141 MMOL/L
TRIGL SERPL-MCNC: 223 MG/DL
TSH SERPL DL<=0.005 MIU/L-ACNC: 1.12 UIU/ML

## 2018-02-15 PROCEDURE — 84443 ASSAY THYROID STIM HORMONE: CPT

## 2018-02-15 PROCEDURE — 36415 COLL VENOUS BLD VENIPUNCTURE: CPT | Mod: PO

## 2018-02-15 PROCEDURE — 80061 LIPID PANEL: CPT

## 2018-02-15 PROCEDURE — 83036 HEMOGLOBIN GLYCOSYLATED A1C: CPT

## 2018-02-15 PROCEDURE — 80053 COMPREHEN METABOLIC PANEL: CPT

## 2018-02-22 ENCOUNTER — CLINICAL SUPPORT (OUTPATIENT)
Dept: FAMILY MEDICINE | Facility: CLINIC | Age: 63
End: 2018-02-22
Attending: INTERNAL MEDICINE
Payer: MEDICAID

## 2018-02-22 ENCOUNTER — OFFICE VISIT (OUTPATIENT)
Dept: ENDOCRINOLOGY | Facility: CLINIC | Age: 63
End: 2018-02-22
Payer: MEDICAID

## 2018-02-22 VITALS
DIASTOLIC BLOOD PRESSURE: 76 MMHG | RESPIRATION RATE: 18 BRPM | HEART RATE: 95 BPM | SYSTOLIC BLOOD PRESSURE: 133 MMHG | HEIGHT: 61 IN | BODY MASS INDEX: 40.02 KG/M2 | WEIGHT: 212 LBS

## 2018-02-22 DIAGNOSIS — E11.69 HYPERLIPIDEMIA ASSOCIATED WITH TYPE 2 DIABETES MELLITUS: ICD-10-CM

## 2018-02-22 DIAGNOSIS — E78.5 HYPERLIPIDEMIA ASSOCIATED WITH TYPE 2 DIABETES MELLITUS: ICD-10-CM

## 2018-02-22 DIAGNOSIS — E03.9 HYPOTHYROIDISM, UNSPECIFIED TYPE: ICD-10-CM

## 2018-02-22 DIAGNOSIS — E11.8 TYPE 2 DIABETES MELLITUS WITH COMPLICATION, WITH LONG-TERM CURRENT USE OF INSULIN: ICD-10-CM

## 2018-02-22 DIAGNOSIS — Z79.4 TYPE 2 DIABETES MELLITUS WITH COMPLICATION, WITH LONG-TERM CURRENT USE OF INSULIN: Primary | ICD-10-CM

## 2018-02-22 DIAGNOSIS — E11.8 TYPE 2 DIABETES MELLITUS WITH COMPLICATION, WITH LONG-TERM CURRENT USE OF INSULIN: Primary | ICD-10-CM

## 2018-02-22 DIAGNOSIS — Z79.4 TYPE 2 DIABETES MELLITUS WITH COMPLICATION, WITH LONG-TERM CURRENT USE OF INSULIN: ICD-10-CM

## 2018-02-22 PROCEDURE — 99212 OFFICE O/P EST SF 10 MIN: CPT | Mod: PBBFAC,27,PO,25

## 2018-02-22 PROCEDURE — 99999 PR PBB SHADOW E&M-EST. PATIENT-LVL II: CPT | Mod: PBBFAC,,,

## 2018-02-22 PROCEDURE — 99214 OFFICE O/P EST MOD 30 MIN: CPT | Mod: PBBFAC,PO,25 | Performed by: INTERNAL MEDICINE

## 2018-02-22 PROCEDURE — 99999 PR PBB SHADOW E&M-EST. PATIENT-LVL IV: CPT | Mod: PBBFAC,,, | Performed by: INTERNAL MEDICINE

## 2018-02-22 PROCEDURE — 3008F BODY MASS INDEX DOCD: CPT | Mod: ,,, | Performed by: INTERNAL MEDICINE

## 2018-02-22 PROCEDURE — 92250 FUNDUS PHOTOGRAPHY W/I&R: CPT | Mod: 26,S$PBB,, | Performed by: OPTOMETRIST

## 2018-02-22 PROCEDURE — 99214 OFFICE O/P EST MOD 30 MIN: CPT | Mod: S$PBB,,, | Performed by: INTERNAL MEDICINE

## 2018-02-22 PROCEDURE — 92250 FUNDUS PHOTOGRAPHY W/I&R: CPT | Mod: 50,PBBFAC,PO

## 2018-02-22 RX ORDER — SIMVASTATIN 10 MG/1
10 TABLET, FILM COATED ORAL NIGHTLY
Qty: 30 TABLET | Refills: 3 | Status: SHIPPED | OUTPATIENT
Start: 2018-02-22 | End: 2018-05-25 | Stop reason: SDUPTHER

## 2018-02-22 NOTE — PROGRESS NOTES
CHIEF COMPLAINT: DM 2/Hypothyroidism  62 year old being seen as a f/u. Nodules was found on on a PET scan. FNA 11/25/14 shows adenomatous nodule with cystic changes. S/P thyroidectomy 5/24/16. Basaglar 35/30. Humalog 35 TID. Saw DM ED 4/2016. On synthroid 137 mcg once daily. Has history of myalgias with statin. States that insurance not covering Zetia. Being treated for parkinsons. No paresthesias. No falls. No Hypoglycemia.                     PATH  THYROID GLAND, TOTAL THYROIDECTOMY:  - NODULAR GOITER WITH FOCAL DEGENERATIVE CHANGES.  - NO EVIDENCE OF MALIGNANCY.        PAST MEDICAL HISTORY/PAST SURGICAL HISTORY:  Reviewed in [facility]    SOCIAL HISTORY: No T/A.    FAMILY HISTORY:  No known thyroid disease or thyroid cancer. + DM 2.     MEDICATIONS/ALLERGIES: The patient's MedCard has been updated and reviewed.      ROS:   Constitutional: weight stable.   Eyes: No recent visual changes  ENT: No dysphagia  Cardiovascular: Denies current anginal symptoms  Respiratory: SOB stable  Gastrointestinal: No N/V  GenitoUrinary - No dysuria  Skin: No new skin rash  Neurologic: No focal neurologic complaints  Remainder ROS negative        PE:    GENERAL: Well developed, well nourished.  NECK: Supple, trachea midline, thyroidectomy scar intact  CHEST: Resp even and unlabored, CTA bilateral.  CARDIAC: RRR, S1, S2 heard, no murmurs, rubs, S3, or S4  FEET: Normal monofilament. No cuts or abrasions. 2 + pedal pulses.     Results for RAÚL MULLIGANA FOREIGN (MRN [id number]) as of 2/22/2018 09:27   Ref. Range 2/15/2018 08:53   Sodium Latest Ref Range: 136 - 145 mmol/L 141   Potassium Latest Ref Range: 3.5 - 5.1 mmol/L 4.5   Chloride Latest Ref Range: 95 - 110 mmol/L 100   CO2 Latest Ref Range: 23 - 29 mmol/L 28   Anion Gap Latest Ref Range: 8 - 16 mmol/L 13   BUN, Bld Latest Ref Range: 8 - 23 mg/dL 20   Creatinine Latest Ref Range: 0.5 - 1.4 mg/dL 0.8   eGFR if non African American Latest Ref Range: >60 mL/min/1.73 m^2 >60.0   eGFR if African  American Latest Ref Range: >60 mL/min/1.73 m^2 >60.0   Glucose Latest Ref Range: 70 - 110 mg/dL 168 (H)   Calcium Latest Ref Range: 8.7 - 10.5 mg/dL 9.9   Alkaline Phosphatase Latest Ref Range: 55 - 135 U/L 74   Total Protein Latest Ref Range: 6.0 - 8.4 g/dL 7.8   Albumin Latest Ref Range: 3.5 - 5.2 g/dL 3.8   Total Bilirubin Latest Ref Range: 0.1 - 1.0 mg/dL 0.5   AST Latest Ref Range: 10 - 40 U/L 12   ALT Latest Ref Range: 10 - 44 U/L 15   Triglycerides Latest Ref Range: 30 - 150 mg/dL 223 (H)   Cholesterol Latest Ref Range: 120 - 199 mg/dL 302 (H)   HDL Latest Ref Range: 40 - 75 mg/dL 57   LDL Cholesterol Latest Ref Range: 63.0 - 159.0 mg/dL 200.4 (H)   Total Cholesterol/HDL Ratio Latest Ref Range: 2.0 - 5.0  5.3 (H)   Hemoglobin A1C Latest Ref Range: 4.0 - 5.6 % 6.3 (H)   Estimated Avg Glucose Latest Ref Range: 68 - 131 mg/dL 134 (H)   TSH Latest Ref Range: 0.400 - 4.000 uIU/mL 1.118            ASSESSMENT/PLAN:  1. Post Surgical Hypothyroidism- For MNG. Path benign. Continue current dose of synthroid.     2. HTN- controlled. Continue current Tx.     3. DM 2- With nephropathy and neuropathy. Denies hypoglycemia. A1c shows good control. Past due for eye exam. Would continue current Tx.     5. Hyperlipidemia- Has had myalgias with lipitor. Can try low dose simvastatin to see if she can tolerate.     5. parkinsons- no worsening of tremors    FOLLOWUP  Eye camera  F/U 4 months with CMP, A1c, TSH, FLP

## 2018-02-22 NOTE — PROGRESS NOTES
Jessica Rojas is a 62 y.o. female here for a diabetic eye screening with non-dilated fundus photos per Dr Qureshi.    Patient cooperative?: Yes  Small pupils?: Yes  Last eye exam: 11/2/16    For exam results, see Encounter Report.

## 2018-02-28 RX ORDER — CALCIUM CITRATE/VITAMIN D3 200MG-6.25
TABLET ORAL
Qty: 150 EACH | Refills: 6 | Status: SHIPPED | OUTPATIENT
Start: 2018-02-28 | End: 2018-08-22 | Stop reason: SDUPTHER

## 2018-03-09 DIAGNOSIS — E11.9 TYPE 2 DIABETES MELLITUS WITHOUT COMPLICATION: ICD-10-CM

## 2018-03-29 RX ORDER — INSULIN ASPART 100 [IU]/ML
INJECTION, SOLUTION INTRAVENOUS; SUBCUTANEOUS
Qty: 45 ML | Refills: 1 | Status: SHIPPED | OUTPATIENT
Start: 2018-03-29 | End: 2018-06-25 | Stop reason: SDUPTHER

## 2018-04-20 ENCOUNTER — PATIENT MESSAGE (OUTPATIENT)
Dept: NEUROLOGY | Facility: CLINIC | Age: 63
End: 2018-04-20

## 2018-04-27 RX ORDER — GABAPENTIN 400 MG/1
CAPSULE ORAL
Qty: 120 CAPSULE | Refills: 12 | Status: SHIPPED | OUTPATIENT
Start: 2018-04-27 | End: 2019-05-14 | Stop reason: SDUPTHER

## 2018-04-27 RX ORDER — NABUMETONE 500 MG/1
TABLET, FILM COATED ORAL
Qty: 60 TABLET | Refills: 12 | Status: SHIPPED | OUTPATIENT
Start: 2018-04-27 | End: 2019-03-20 | Stop reason: SDUPTHER

## 2018-05-15 ENCOUNTER — PATIENT MESSAGE (OUTPATIENT)
Dept: NEUROLOGY | Facility: CLINIC | Age: 63
End: 2018-05-15

## 2018-05-16 RX ORDER — AMANTADINE HYDROCHLORIDE 100 MG/1
100 CAPSULE, GELATIN COATED ORAL 2 TIMES DAILY
Qty: 60 CAPSULE | Refills: 11 | Status: SHIPPED | OUTPATIENT
Start: 2018-05-16 | End: 2019-04-16 | Stop reason: SDUPTHER

## 2018-05-25 RX ORDER — LEVOTHYROXINE SODIUM 137 UG/1
TABLET ORAL
Qty: 30 TABLET | Refills: 3 | Status: SHIPPED | OUTPATIENT
Start: 2018-05-25 | End: 2018-09-21 | Stop reason: SDUPTHER

## 2018-05-25 RX ORDER — SELEGILINE HYDROCHLORIDE 5 MG/1
CAPSULE ORAL
Qty: 60 CAPSULE | Refills: 6 | Status: SHIPPED | OUTPATIENT
Start: 2018-05-25 | End: 2018-12-18 | Stop reason: SDUPTHER

## 2018-05-25 RX ORDER — SIMVASTATIN 10 MG/1
TABLET, FILM COATED ORAL
Qty: 30 TABLET | Refills: 3 | Status: SHIPPED | OUTPATIENT
Start: 2018-05-25 | End: 2018-09-21 | Stop reason: SDUPTHER

## 2018-06-14 RX ORDER — INSULIN GLARGINE 100 [IU]/ML
35 INJECTION, SOLUTION SUBCUTANEOUS 2 TIMES DAILY
Qty: 45 ML | Refills: 3 | Status: SHIPPED | OUTPATIENT
Start: 2018-06-14 | End: 2019-03-27

## 2018-06-22 DIAGNOSIS — Z12.39 BREAST CANCER SCREENING: ICD-10-CM

## 2018-06-26 RX ORDER — INSULIN ASPART 100 [IU]/ML
INJECTION, SOLUTION INTRAVENOUS; SUBCUTANEOUS
Qty: 45 ML | Refills: 12 | Status: SHIPPED | OUTPATIENT
Start: 2018-06-26 | End: 2018-10-21

## 2018-07-20 ENCOUNTER — PATIENT MESSAGE (OUTPATIENT)
Dept: ADMINISTRATIVE | Facility: HOSPITAL | Age: 63
End: 2018-07-20

## 2018-07-25 ENCOUNTER — PATIENT MESSAGE (OUTPATIENT)
Dept: HEMATOLOGY/ONCOLOGY | Facility: CLINIC | Age: 63
End: 2018-07-25

## 2018-07-25 ENCOUNTER — PATIENT MESSAGE (OUTPATIENT)
Dept: ENDOCRINOLOGY | Facility: CLINIC | Age: 63
End: 2018-07-25

## 2018-07-27 ENCOUNTER — PATIENT MESSAGE (OUTPATIENT)
Dept: NEUROLOGY | Facility: HOSPITAL | Age: 63
End: 2018-07-27

## 2018-07-31 ENCOUNTER — OFFICE VISIT (OUTPATIENT)
Dept: NEUROSURGERY | Facility: CLINIC | Age: 63
End: 2018-07-31
Payer: MEDICAID

## 2018-07-31 ENCOUNTER — PATIENT MESSAGE (OUTPATIENT)
Dept: NEUROLOGY | Facility: CLINIC | Age: 63
End: 2018-07-31

## 2018-07-31 ENCOUNTER — PATIENT MESSAGE (OUTPATIENT)
Dept: ENDOCRINOLOGY | Facility: CLINIC | Age: 63
End: 2018-07-31

## 2018-07-31 VITALS
SYSTOLIC BLOOD PRESSURE: 155 MMHG | BODY MASS INDEX: 39.61 KG/M2 | DIASTOLIC BLOOD PRESSURE: 72 MMHG | WEIGHT: 209.81 LBS | HEIGHT: 61 IN | TEMPERATURE: 98 F

## 2018-07-31 DIAGNOSIS — M54.10 RADICULOPATHY, UNSPECIFIED SPINAL REGION: Primary | ICD-10-CM

## 2018-07-31 DIAGNOSIS — M54.16 LUMBAR RADICULOPATHY: ICD-10-CM

## 2018-07-31 PROCEDURE — 99999 PR PBB SHADOW E&M-EST. PATIENT-LVL III: CPT | Mod: PBBFAC,,, | Performed by: NEUROLOGICAL SURGERY

## 2018-07-31 PROCEDURE — 99213 OFFICE O/P EST LOW 20 MIN: CPT | Mod: PBBFAC | Performed by: NEUROLOGICAL SURGERY

## 2018-07-31 PROCEDURE — 99214 OFFICE O/P EST MOD 30 MIN: CPT | Mod: S$PBB,,, | Performed by: NEUROLOGICAL SURGERY

## 2018-07-31 RX ORDER — PEN NEEDLE, DIABETIC 32GX 5/32"
NEEDLE, DISPOSABLE MISCELLANEOUS
Refills: 12 | COMMUNITY
Start: 2018-07-24 | End: 2019-06-10

## 2018-07-31 NOTE — LETTER
August 5, 2018      Jose Balbuena MD  [address] [facility] [address]           [facility] - Neurosurgery 7th Fl  [address]  [place] [address]  Phone: [phone]          Patient: Jessica Rojas   MR Number: [id number]   YOB: 1955   Date of Visit: 7/31/2018       Dear Dr. Jose Balbuena:    Thank you for referring Jessica Rojas to me for evaluation. Attached you will find relevant portions of my assessment and plan of care.    If you have questions, please do not hesitate to call me. I look forward to following Jessica Rojas along with you.    Sincerely,    Rubio Sahu MD    Enclosure  CC:  No Recipients    If you would like to receive this communication electronically, please contact [email or web address] or [phone] to request more information on [facility] access.    For providers and/or their staff who would like to refer a patient to [facility], please contact us through our one-stop-shop provider referral line, Krys Salinas, at [phone].    If you feel you have received this communication in error or would no longer like to receive these types of communications, please e-mail [email or web address]

## 2018-07-31 NOTE — PROGRESS NOTES
Subjective:    I, Lorie Lemus, attest that this documentation has been prepared under the direction and in the presence of ALEXEI Sahu MD.     Patient ID: Jessica Rojas is a 62 y.o. female.    Chief Complaint: Consult    HPI   Pt is a 62 y.o. female who presents per referral by Dr. Jose Balbuena for evaluation of  cervical spine. Pt states that she had endured mid and lower back pain radiating to LLE for the past 5 years. Pt has apparently been recently diagnosed with parkinson's.       Review of Systems   Constitutional: Negative for chills, fatigue and fever.   HENT: Negative for sinus pressure and trouble swallowing.    Eyes: Negative.  Negative for visual disturbance.   Respiratory: Negative.    Cardiovascular: Negative.    Gastrointestinal: Negative.  Negative for nausea and vomiting.   Endocrine: Negative.    Genitourinary: Negative.    Musculoskeletal: Negative.    Neurological: Negative for dizziness, seizures, syncope, speech difficulty, weakness and numbness.       Objective:      Physical Exam:  Nursing note and vitals reviewed.    Constitutional: She appears well-developed.     Eyes: Pupils are equal, round, and reactive to light. Conjunctivae and EOM are normal.     Cardiovascular: Normal rate, regular rhythm, normal pulses and intact distal pulses.     Abdominal: Soft.     Psych/Behavior: She is alert. She is oriented to person, place, and time. She has a normal mood and affect.     Musculoskeletal: Gait is normal.        Neck: Range of motion is full. There is no tenderness. Muscle strength is 5/5. Tone is normal.        Back: Range of motion is full. There is no tenderness. Muscle strength is 5/5. Tone is normal.        Right Upper Extremities: Range of motion is full. There is no tenderness. Muscle strength is 5/5. Tone is normal.        Left Upper Extremities: Range of motion is full. There is no tenderness. Muscle strength is 5/5. Tone is normal.       Right Lower Extremities: Range of motion is  full. There is no tenderness. Muscle strength is 5/5. Tone is normal.        Left Lower Extremities: Range of motion is full. There is no tenderness. Muscle strength is 5/5. Tone is normal.     Neurological:        Coordination: She has a normal Romberg Test, normal finger to nose coordination, normal heel to shin coordination and normal tandem walking coordination.        DTRs: DTRs are normal. Tricep reflexes are 2+ on the right side and 2+ on the left side. Bicep reflexes are 2+ on the right side and 2+ on the left side. Brachioradialis reflexes are 2+ on the right side and 2+ on the left side. Patellar reflexes are 2+ on the right side and 2+ on the left side. Achilles reflexes are 2+ on the right side and 2+ on the left side.        Cranial nerves: Cranial nerve(s) II, III, IV, V, VI, VII, VIII, IX, X, XI and XII are intact.       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 She has been imaged din the past. Last imaging in 2017. Brain and cervical MRI scan. No thoracic MRI scan.     Does not have signs of myelopathy.   Evidence of radiculopathy. Negative SLR.   No focal deficits.   Back paini to palpation.       Imaging:   MRI T spine, dated 8/28/2017, I don't see anything other than several hemangiomas.     MRI spine, dated 8/28/2018, C, shows C4-5 and C6-7 disc bulge and some mild cervical stenosis, but no cord signal change.     IALEXEI MD, personally reviewed the imaging and interpreted independent of the radiology report.    Assessment/Plan:   Pt with lumbar radic. I think we need updated MRI scan given that she has a tumor history. We will have her see one of our PA-C when that is done.     IALEXEI MD, personally performed the services described in this documentation. All medical record entries made by the scribe, Lorie Lemus, were at my direction and in my presence.  I have reviewed the chart and agree that  the record reflects my personal performance and is accurate and complete.

## 2018-08-01 ENCOUNTER — PATIENT MESSAGE (OUTPATIENT)
Dept: ENDOCRINOLOGY | Facility: CLINIC | Age: 63
End: 2018-08-01

## 2018-08-01 DIAGNOSIS — E11.8 TYPE 2 DIABETES MELLITUS WITH COMPLICATION, WITH LONG-TERM CURRENT USE OF INSULIN: Primary | ICD-10-CM

## 2018-08-01 DIAGNOSIS — Z79.4 TYPE 2 DIABETES MELLITUS WITH COMPLICATION, WITH LONG-TERM CURRENT USE OF INSULIN: Primary | ICD-10-CM

## 2018-08-01 DIAGNOSIS — E03.9 HYPOTHYROIDISM, UNSPECIFIED TYPE: ICD-10-CM

## 2018-08-06 ENCOUNTER — HOSPITAL ENCOUNTER (OUTPATIENT)
Dept: RADIOLOGY | Facility: HOSPITAL | Age: 63
Discharge: HOME | End: 2018-08-06
Attending: FAMILY MEDICINE
Payer: MEDICAID

## 2018-08-06 VITALS — HEIGHT: 61 IN | BODY MASS INDEX: 39.63 KG/M2 | WEIGHT: 209.88 LBS

## 2018-08-06 DIAGNOSIS — Z12.39 BREAST CANCER SCREENING: ICD-10-CM

## 2018-08-06 PROCEDURE — 77067 SCR MAMMO BI INCL CAD: CPT | Mod: TC,PO

## 2018-08-06 PROCEDURE — 77063 BREAST TOMOSYNTHESIS BI: CPT | Mod: 26,,, | Performed by: RADIOLOGY

## 2018-08-06 PROCEDURE — 77067 SCR MAMMO BI INCL CAD: CPT | Mod: 26,,, | Performed by: RADIOLOGY

## 2018-08-07 ENCOUNTER — TELEPHONE (OUTPATIENT)
Dept: ENDOCRINOLOGY | Facility: CLINIC | Age: 63
End: 2018-08-07

## 2018-08-07 ENCOUNTER — OFFICE VISIT (OUTPATIENT)
Dept: ENDOCRINOLOGY | Facility: CLINIC | Age: 63
End: 2018-08-07
Payer: MEDICAID

## 2018-08-07 ENCOUNTER — TELEPHONE (OUTPATIENT)
Dept: PODIATRY | Facility: CLINIC | Age: 63
End: 2018-08-07

## 2018-08-07 VITALS
DIASTOLIC BLOOD PRESSURE: 68 MMHG | WEIGHT: 212.06 LBS | BODY MASS INDEX: 40.04 KG/M2 | HEART RATE: 81 BPM | SYSTOLIC BLOOD PRESSURE: 122 MMHG | HEIGHT: 61 IN

## 2018-08-07 DIAGNOSIS — E11.21 TYPE 2 DIABETES MELLITUS WITH DIABETIC NEPHROPATHY, WITH LONG-TERM CURRENT USE OF INSULIN: ICD-10-CM

## 2018-08-07 DIAGNOSIS — I10 BENIGN ESSENTIAL HTN: ICD-10-CM

## 2018-08-07 DIAGNOSIS — E55.9 VITAMIN D DEFICIENCY: ICD-10-CM

## 2018-08-07 DIAGNOSIS — E89.0 POSTOPERATIVE HYPOTHYROIDISM: Primary | ICD-10-CM

## 2018-08-07 DIAGNOSIS — Z79.4 TYPE 2 DIABETES MELLITUS WITH DIABETIC NEPHROPATHY, WITH LONG-TERM CURRENT USE OF INSULIN: ICD-10-CM

## 2018-08-07 PROCEDURE — 99999 PR PBB SHADOW E&M-EST. PATIENT-LVL IV: CPT | Mod: PBBFAC,,, | Performed by: NURSE PRACTITIONER

## 2018-08-07 PROCEDURE — 99213 OFFICE O/P EST LOW 20 MIN: CPT | Mod: S$PBB,,, | Performed by: NURSE PRACTITIONER

## 2018-08-07 PROCEDURE — 99214 OFFICE O/P EST MOD 30 MIN: CPT | Mod: PBBFAC,PO | Performed by: NURSE PRACTITIONER

## 2018-08-07 NOTE — TELEPHONE ENCOUNTER
Spoke with patient.  Informed her that Dr Aldana would be in surgery on 13th at 1 and offered her a later appointment that day.  Patient refused a later appointment.  Several options given.  Appointment made for 20th per request.

## 2018-08-07 NOTE — PROGRESS NOTES
CC: This 62 y.o. female presents for management of diabetes and chronic conditions pending review including HTN, HLP, morbid obesity, hypothyroidism     HPI: She was diagnosed with T2DM in ~ 2013. She was hospitalized r/t DM in 2016 for DKA.   Family hx of DM: mom, dad, and sisters  Denies missing doses of DM medication.   hypoglycemia at home- rare 60 in the middle of the night- will wake her up, also her dog will wake her up if BG low    monitoring BG at home: ac/hs and pp 2 hr            Diet: Eats 2  Meals a day, snacks as below  Brunch at 10am  Snack- tomato and cucumber   Dinner 6-7 pm  Exercise:  Walks daily, 30 minutes  CURRENT DM MEDS: Basaglar 35/30. Humalog 25-35 u AC    Timing prandial insulin 5-15 minutes before meals: yes  Vial/pen:  Uses pens  Glucometer type:  ? 2017    Standards of Care:  Eye exam: 2/2018 No DM retinopathy    Nodules was found on on a PET scan. FNA 11/25/14 shows adenomatous nodule with cystic changes. S/P thyroidectomy 5/24/16.       On synthroid 137 mcg once daily. Has history of myalgias with statin.   Being treated for parkinsons. No paresthesias. No falls. No Hypoglycemia.     ROS:   Gen: Appetite good, no weight gain or loss, + fatigue and weakness.  Skin: Skin is intact and heals well, no rashes, no hair changes  Eyes: Denies visual disturbances  Resp: no SOB or ESPINAL, no cough  Cardiac: No palpitations, chest pain, no edema   GI: No nausea or vomiting, diarrhea, constipation, or abdominal pain.  /GYN: No nocturia, burning or pain.   MS/Neuro: Denies numbness/ tingling in BLE; Gait steady, speech clear  Psych: Denies drug/ETOH abuse, no hx of depression.  Other systems: negative.    PE:  GENERAL: Well developed, well nourished.  PSYCH: AAOx3, appropriate mood and affect, pleasant expression, conversant, appears relaxed, well groomed.   EYES: Conjunctiva, corneas clear  NECK: Supple, trachea midline   ABDOMEN: Soft, non-tender, non-distended   VASCULAR: DP pulses +2/4  bilaterally, no edema.  NEURO: Gait steady  SKIN: Skin warm and dry no acanthosis nigracans.  FOOT EXAMINATION: 2/2018     Lab Results   Component Value Date    MICALBCREAT 232.0 (H) 10/03/2016       Hemoglobin A1C   Date Value Ref Range Status   08/06/2018 6.2 (H) 4.0 - 5.6 % Final     Comment:     ADA Screening Guidelines:  5.7-6.4%  Consistent with prediabetes  >or=6.5%  Consistent with diabetes  High levels of fetal hemoglobin interfere with the HbA1C  assay. Heterozygous hemoglobin variants (HbS, HgC, etc)do  not significantly interfere with this assay.   However, presence of multiple variants may affect accuracy.     02/15/2018 6.3 (H) 4.0 - 5.6 % Final     Comment:     According to ADA guidelines, hemoglobin A1c <7.0% represents  optimal control in non-pregnant diabetic patients. Different  metrics may apply to specific patient populations.   Standards of Medical Care in Diabetes-2016.  For the purpose of screening for the presence of diabetes:  <5.7%     Consistent with the absence of diabetes  5.7-6.4%  Consistent with increasing risk for diabetes   (prediabetes)  >or=6.5%  Consistent with diabetes  Currently, no consensus exists for use of hemoglobin A1c  for diagnosis of diabetes for children.  This Hemoglobin A1c assay has significant interference with fetal   hemoglobin   (HbF). The results are invalid for patients with abnormal amounts of   HbF,   including those with known Hereditary Persistence   of Fetal Hemoglobin. Heterozygous hemoglobin variants (HbAS, HbAC,   HbAD, HbAE, HbA2) do not significantly interfere with this assay;   however, presence of multiple variants in a sample may impact the %   interference.     10/17/2017 6.4 (H) 4.0 - 5.6 % Final     Comment:     According to ADA guidelines, hemoglobin A1c <7.0% represents  optimal control in non-pregnant diabetic patients. Different  metrics may apply to specific patient populations.   Standards of Medical Care in Diabetes-2016.  For the  purpose of screening for the presence of diabetes:  <5.7%     Consistent with the absence of diabetes  5.7-6.4%  Consistent with increasing risk for diabetes   (prediabetes)  >or=6.5%  Consistent with diabetes  Currently, no consensus exists for use of hemoglobin A1c  for diagnosis of diabetes for children.  This Hemoglobin A1c assay has significant interference with fetal   hemoglobin   (HbF). The results are invalid for patients with abnormal amounts of   HbF,   including those with known Hereditary Persistence   of Fetal Hemoglobin. Heterozygous hemoglobin variants (HbAS, HbAC,   HbAD, HbAE, HbA2) do not significantly interfere with this assay;   however, presence of multiple variants in a sample may impact the %   interference.          ASSESSMENT and PLAN:    1. T2DM with nephropathy-   No changes bg well controlled  Continue checking bg ac/hs  Discussed A1c and BG goals.   Reviewed  hypoglycemia, s/s and appropriate tx.   Instructed to monitor BG and bring meter/ log to every clinic visit.   - takes ASA, ACEi, statin    2. HTN - controlled, continue meds as previously prescribed and monitor.     3. HLP - on statin therapy, LFTs WNL    4. Post Surgical Hypothyroidism- For MNG. Path benign. Continue current dose of synthroid. Has f/u w Dr Qureshi in feb 2019    5. Vitamin d deficiency - continue OTC replacement, recheck lab w RTC    6. Morbid obesity portion control, exercise as tolerated, increases insulin resistance      Follow-up: in 3 months with lab prior

## 2018-08-20 ENCOUNTER — OFFICE VISIT (OUTPATIENT)
Dept: PODIATRY | Facility: CLINIC | Age: 63
End: 2018-08-20
Payer: MEDICAID

## 2018-08-20 VITALS — WEIGHT: 211.44 LBS | BODY MASS INDEX: 39.92 KG/M2 | HEIGHT: 61 IN

## 2018-08-20 DIAGNOSIS — L84 CORN OR CALLUS: ICD-10-CM

## 2018-08-20 DIAGNOSIS — E11.51 TYPE II DIABETES MELLITUS WITH PERIPHERAL CIRCULATORY DISORDER: Primary | ICD-10-CM

## 2018-08-20 PROCEDURE — 99202 OFFICE O/P NEW SF 15 MIN: CPT | Mod: S$PBB,,, | Performed by: PODIATRIST

## 2018-08-20 PROCEDURE — 99213 OFFICE O/P EST LOW 20 MIN: CPT | Mod: PBBFAC,PN | Performed by: PODIATRIST

## 2018-08-20 PROCEDURE — 99999 PR PBB SHADOW E&M-EST. PATIENT-LVL III: CPT | Mod: PBBFAC,,, | Performed by: PODIATRIST

## 2018-08-20 NOTE — LETTER
August 20, 2018      Daniela Sauer, DNP, NP  [address] [facility] [address] [address]           [facility]  [address] [facility] [address] [place] [address]  Phone: [phone]          Patient: Jessica Rojas   MR Number: [id number]   YOB: 1955   Date of Visit: 8/20/2018       Dear Daniela Sauer:    Thank you for referring Jessica Rojas to me for evaluation. Attached you will find relevant portions of my assessment and plan of care.    If you have questions, please do not hesitate to call me. I look forward to following Jessica Rojas along with you.    Sincerely,    Candido Aldana, KESHAWN    Enclosure  CC:  No Recipients    If you would like to receive this communication electronically, please contact [email or web address] or [phone] to request more information on [facility] access.    For providers and/or their staff who would like to refer a patient to [facility], please contact us through our one-stop-shop provider referral line, [facility] , at [phone].    If you feel you have received this communication in error or would no longer like to receive these types of communications, please e-mail [email or web address]

## 2018-08-20 NOTE — PROGRESS NOTES
Subjective:      Patient ID: Jessica Rojas is a 62 y.o. female.    Chief Complaint: Foot Ulcer (Left foot. bottom of big toe )    Jessica is a 62 y.o. female who presents to the clinic upon referral from Dr. Sauer  for evaluation and treatment of diabetic feet. 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 Patient relates no major problem with feet. Only complaints today consist of left great toe there is a lesion concerning for an ulcer, she thought it was a corn then noticed some skin came off, is concerned as she is diabetic.    PCP: Jose Balbuena MD      Current shoe gear: joelle    Hemoglobin A1C   Date Value Ref Range Status   08/06/2018 6.2 (H) 4.0 - 5.6 % Final     Comment:     ADA Screening Guidelines:  5.7-6.4%  Consistent with prediabetes  >or=6.5%  Consistent with diabetes  High levels of fetal hemoglobin interfere with the HbA1C  assay. Heterozygous hemoglobin variants (HbS, HgC, etc)do  not significantly interfere with this assay.   However, presence of multiple variants may affect accuracy.     02/15/2018 6.3 (H) 4.0 - 5.6 % Final     Comment:     According to ADA guidelines, hemoglobin A1c <7.0% represents  optimal control in non-pregnant diabetic patients. Different  metrics may apply to specific patient populations.   Standards of Medical Care in Diabetes-2016.  For the  purpose of screening for the presence of diabetes:  <5.7%     Consistent with the absence of diabetes  5.7-6.4%  Consistent with increasing risk for diabetes   (prediabetes)  >or=6.5%  Consistent with diabetes  Currently, no consensus exists for use of hemoglobin A1c  for diagnosis of diabetes for children.  This Hemoglobin A1c assay has significant interference with fetal   hemoglobin   (HbF). The results are invalid for patients with abnormal amounts of   HbF,   including those with known Hereditary Persistence   of Fetal Hemoglobin. Heterozygous hemoglobin variants (HbAS, HbAC,   HbAD, HbAE, HbA2) do not significantly interfere with this assay;   however, presence of multiple variants in a sample may impact the %   interference.     10/17/2017 6.4 (H) 4.0 - 5.6 % Final     Comment:     According to ADA guidelines, hemoglobin A1c <7.0% represents  optimal control in non-pregnant diabetic patients. Different  metrics may apply to specific patient populations.   Standards of Medical Care in Diabetes-2016.  For the purpose of screening for the presence of diabetes:  <5.7%     Consistent with the absence of diabetes  5.7-6.4%  Consistent with increasing risk for diabetes   (prediabetes)  >or=6.5%  Consistent with diabetes  Currently, no consensus exists for use of hemoglobin A1c  for diagnosis of diabetes for children.  This Hemoglobin A1c assay has significant interference with fetal   hemoglobin   (HbF). The results are invalid for patients with abnormal amounts of   HbF,   including those with known Hereditary Persistence   of Fetal Hemoglobin. Heterozygous hemoglobin variants (HbAS, HbAC,   HbAD, HbAE, HbA2) do not significantly interfere with this assay;   however, presence of multiple variants in a sample may impact the %   interference.             Review of Systems   Constitution: Negative for chills and fever.   Cardiovascular: Negative for claudication and leg swelling.   Respiratory: Negative for shortness  of breath.    Skin: Positive for suspicious lesions. Negative for itching, nail changes and rash.   Musculoskeletal: Negative for muscle cramps, muscle weakness and myalgias.   Gastrointestinal: Negative for nausea and vomiting.   Neurological: Negative for focal weakness, loss of balance, numbness and paresthesias.           Objective:      Physical Exam   Constitutional: She is oriented to person, place, and time. She appears well-developed and well-nourished. No distress.   Cardiovascular:   Pulses:       Dorsalis pedis pulses are 2+ on the right side, and 2+ on the left side.        Posterior tibial pulses are 1+ on the right side, and 1+ on the left side.   < 3 sec capillary refill time to toes 1-5 bilateral. Toes and feet are warm to touch proximally with normal distal cooling b/l. There is some hair growth on the feet and toes b/l. There is minimal edema b/l. No spider veins or varicosities present b/l.      Musculoskeletal:   Equinus noted b/l ankles with < 10 deg DF noted. MMT 5/5 in DF/PF/Inv/Ev resistance with no reproduction of pain in any direction. Passive range of motion of ankle and pedal joints is painless b/l.     Neurological: She is alert and oriented to person, place, and time. She has normal strength. She displays no atrophy and no tremor. No sensory deficit. She exhibits normal muscle tone.   Reflex Scores:       Achilles reflexes are 2+ on the right side and 2+ on the left side.  Negative tinel sign bilateral.   Skin: Skin is warm, dry and intact. Lesion noted. No abrasion, no bruising, no burn, no ecchymosis, no laceration, no petechiae and no rash noted. She is not diaphoretic. No cyanosis or erythema. No pallor. Nails show no clubbing.   Skin temperature, texture and turgor within normal limits    Left plantar hallux IPJ there is a discolored hyperkeratotic lesion, however after trimming the underlying skin is intact and there is no noted ulceration, erythema, or drainage.   Psychiatric:  She has a normal mood and affect. Her behavior is normal.             Assessment:       Encounter Diagnoses   Name Primary?    Type II diabetes mellitus with peripheral circulatory disorder Yes    Corn or callus          Plan:       Jessica was seen today for foot ulcer.    Diagnoses and all orders for this visit:    Type II diabetes mellitus with peripheral circulatory disorder    Corn or callus      I counseled the patient on her conditions, their implications and medical management.    Shoe inspection. Diabetic Foot Education. Patient reminded of the importance of good nutrition and blood sugar control to help prevent podiatric complications of diabetes. Patient instructed on proper foot hygeine. We discussed wearing proper shoe gear, daily foot inspections, never walking without protective shoe gear, never putting sharp instruments to feet    With patient's verbal consent the hyperkeratotic lesion left hallux was trimmed to healthy appearing skin using a # 15 scalpel. Patient relates relief of pain following the procedure. Patient tolerated the procedure well without complications. No anesthesia or hemostasis required. No blood loss. No underlying ulceration    She will keep an eye on the area for future problems    Return 1 year diabetic foot check up.    Candido Aldana DPM

## 2018-08-22 RX ORDER — CALCIUM CITRATE/VITAMIN D3 200MG-6.25
TABLET ORAL
Qty: 150 EACH | Refills: 12 | Status: SHIPPED | OUTPATIENT
Start: 2018-08-22 | End: 2019-02-06 | Stop reason: SDUPTHER

## 2018-08-22 RX ORDER — VALSARTAN AND HYDROCHLOROTHIAZIDE 320; 12.5 MG/1; MG/1
TABLET, FILM COATED ORAL
Qty: 30 TABLET | Refills: 12 | Status: SHIPPED | OUTPATIENT
Start: 2018-08-22 | End: 2019-08-16 | Stop reason: SDUPTHER

## 2018-09-10 ENCOUNTER — HOSPITAL ENCOUNTER (OUTPATIENT)
Dept: RADIOLOGY | Facility: HOSPITAL | Age: 63
Discharge: HOME | End: 2018-09-10
Attending: INTERNAL MEDICINE
Payer: MEDICAID

## 2018-09-10 DIAGNOSIS — Z85.110 HISTORY OF MALIGNANT CARCINOID TUMOR OF BRONCHUS AND LUNG: ICD-10-CM

## 2018-09-10 PROCEDURE — 71250 CT THORAX DX C-: CPT | Mod: TC,PO

## 2018-09-10 PROCEDURE — 71250 CT THORAX DX C-: CPT | Mod: 26,,, | Performed by: RADIOLOGY

## 2018-09-11 ENCOUNTER — OFFICE VISIT (OUTPATIENT)
Dept: NEUROLOGY | Facility: HOSPITAL | Age: 63
End: 2018-09-11
Attending: INTERNAL MEDICINE
Payer: MEDICAID

## 2018-09-11 ENCOUNTER — TELEPHONE (OUTPATIENT)
Dept: NEUROLOGY | Facility: HOSPITAL | Age: 63
End: 2018-09-11

## 2018-09-11 ENCOUNTER — OFFICE VISIT (OUTPATIENT)
Dept: NEUROLOGY | Facility: CLINIC | Age: 63
End: 2018-09-11
Payer: MEDICAID

## 2018-09-11 VITALS
DIASTOLIC BLOOD PRESSURE: 70 MMHG | HEART RATE: 87 BPM | BODY MASS INDEX: 39.75 KG/M2 | WEIGHT: 210.56 LBS | SYSTOLIC BLOOD PRESSURE: 123 MMHG | HEIGHT: 61 IN

## 2018-09-11 VITALS
HEIGHT: 61 IN | DIASTOLIC BLOOD PRESSURE: 84 MMHG | TEMPERATURE: 99 F | SYSTOLIC BLOOD PRESSURE: 127 MMHG | HEART RATE: 84 BPM | OXYGEN SATURATION: 100 % | BODY MASS INDEX: 39.75 KG/M2 | WEIGHT: 210.56 LBS

## 2018-09-11 DIAGNOSIS — E83.52 HYPERCALCEMIA: ICD-10-CM

## 2018-09-11 DIAGNOSIS — G25.2 RESTING TREMOR: ICD-10-CM

## 2018-09-11 DIAGNOSIS — G47.52 REM SLEEP BEHAVIOR DISORDER: ICD-10-CM

## 2018-09-11 DIAGNOSIS — M54.16 LUMBAR RADICULOPATHY: ICD-10-CM

## 2018-09-11 DIAGNOSIS — R91.8 PULMONARY NODULES: ICD-10-CM

## 2018-09-11 DIAGNOSIS — C7A.090 MALIGNANT CARCINOID TUMOR OF BRONCHUS AND LUNG: Primary | ICD-10-CM

## 2018-09-11 DIAGNOSIS — C7A.090 MALIGNANT CARCINOID TUMOR OF BRONCHUS AND LUNG: ICD-10-CM

## 2018-09-11 DIAGNOSIS — E55.9 VITAMIN D DEFICIENCY: ICD-10-CM

## 2018-09-11 DIAGNOSIS — E89.0 POST-OPERATIVE HYPOTHYROIDISM: ICD-10-CM

## 2018-09-11 DIAGNOSIS — G20.C PARKINSONISM, UNSPECIFIED PARKINSONISM TYPE: Primary | ICD-10-CM

## 2018-09-11 DIAGNOSIS — R93.0 ABNORMAL MRI OF HEAD: ICD-10-CM

## 2018-09-11 PROCEDURE — 99999 PR PBB SHADOW E&M-EST. PATIENT-LVL IV: CPT | Mod: PBBFAC,,, | Performed by: PSYCHIATRY & NEUROLOGY

## 2018-09-11 PROCEDURE — 99215 OFFICE O/P EST HI 40 MIN: CPT | Performed by: INTERNAL MEDICINE

## 2018-09-11 PROCEDURE — 99214 OFFICE O/P EST MOD 30 MIN: CPT | Mod: ,,, | Performed by: INTERNAL MEDICINE

## 2018-09-11 PROCEDURE — 99214 OFFICE O/P EST MOD 30 MIN: CPT | Mod: PBBFAC,27 | Performed by: PSYCHIATRY & NEUROLOGY

## 2018-09-11 PROCEDURE — 99214 OFFICE O/P EST MOD 30 MIN: CPT | Mod: S$PBB,,, | Performed by: PSYCHIATRY & NEUROLOGY

## 2018-09-11 NOTE — ASSESSMENT & PLAN NOTE
Followed by Dr. Quintero - seen in clinic today  Recent imaging with evidence of pulmonary nodules - plan at this time to do surveillance imaging

## 2018-09-11 NOTE — PROGRESS NOTES
"Patient Name: Jessica Rojas  MRN: [id number]    CC: tremor    Interval history:  Patient now on sinemet 25/100 - 1.5 tabs TID and selegiline 5mg BID. Since being started on the selegiline in May 2018 - patient reports significant improvement in her symptoms.   She has complaints of low back pain that does not radiate - she is seeing Dr. Sahu in NSGY clinic with MR lumbar spine pending  Patient is following up with Dr. Quintero - recent imaging with pulmonary nodules not present on prior imaging.     Interval history (9/2017):  Patient feels like the stiffness has improved and the left hand tremors have improved some but still present. Currently on sinemet 25/100; 1 pill TID (6:30, 2:30, 10pm). Around 5pm she feels like her tremors are the worst. No reported dyskinesia or orthostatic symptoms since being on the medications. Has been at full dose for 2 weeks. Symptoms have not been interfering with her daily activities. Patient to get LP rescheduled.     HPI: Jessica Rojas is a 62 y.o. female with PMHx of carcinoid tumor (lung) Aug 2014, multinodular goiter (s/p thyroidectomy) Aug 2015, post-op acquired hypothyroidism, DMII, Vitamin D deficiency who presents to neurology clinic for evaluation of tremor. Patient reports a 4 month history of tremors, more notable in her hands; mostly at rest, improves with movement, L hand worse than the R. Reports symptoms having worsened in the past 6 weeks. Was seen by endocrinology; concerns for over dosing of synthroid given elevated T4 and low TSH; dose was decreased 4 weeks back with no improvement in her tremors. Patient states no concerns of tremors prior to 4 months back. Patient also reports history of nightmares in her sleep, also has noticed changes in her walk in the past month.  reported to have been telling her to "pick her feet up more when she walks".   No recurrence of tumor per imaging from last year; patient due for follow up 1 year scan and oncology visit " with Dr. Quintero in October 2017    ROS:   Review of Systems   Constitutional: Negative for malaise/fatigue. Negative for weight loss.   HENT: Negative for hearing loss.   Eyes: Negative for blurred vision and double vision.   Respiratory: Negative for shortness of breath and stridor.   Cardiovascular: Negative for chest pain and palpitations.   Gastrointestinal: Negative for nausea, vomiting and constipation.   Genitourinary: Negative for frequency. Negative for urgency.   Musculoskeletal: Negative for joint pain.    Skin: Negative for rash.   Neurological: Negative for focal weakness and seizures.   Endo/Heme/Allergies: Does not bruise/bleed easily.   Psychiatric/Behavioral: Negative for memory loss. Negative for depression and hallucinations. The patient is not nervous/anxious.      Past Medical History  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    Malignant carcinoid tumor of the bronchus and lung 5/2014    Mediastinal lymphadenopathy 8/26/2015    Obesity, morbid 5/6/2014    Parkinsons 10/2017    Pituitary adenoma 1980's    took parladel for 3 yrs    Pneumonia     Pulmonary nodules 9/11/2018    Thyroid disease     Wheeze 6/23/2014       Medications    Current Outpatient Medications:     amantadine HCl (SYMMETREL) 100 mg capsule, Take 1 capsule (100 mg total) by mouth 2 (two) times daily. Once a day for 1 week and then twice a day the following week and thereafter., Disp: 60 capsule, Rfl: 11    BASAGLAR KWIKPEN U-100 INSULIN 100 unit/mL (3 mL) InPn pen, Inject 35 Units  "into the skin 2 (two) times daily., Disp: 45 mL, Rfl: 3    BD INSULIN PEN NEEDLE UF MINI 31 gauge x 3/16" Ndle, use as directed with insulin, Disp: 150 each, Rfl: 12    carbidopa-levodopa  mg (SINEMET)  mg per tablet, 1.5 tabs, 3 times daily, 6-8 hours apart., Disp: 150 tablet, Rfl: 6    cholecalciferol, vitamin D3, (VITAMIN D3) 400 unit Cap, Take 1,200 Units by mouth once daily., Disp: , Rfl:     ezetimibe (ZETIA) 10 mg tablet, Take 1 tablet (10 mg total) by mouth once daily., Disp: 30 tablet, Rfl: 3    gabapentin (NEURONTIN) 400 MG capsule, TAKE ONE CAPSULE BY MOUTH FOUR TIMES DAILY, Disp: 120 capsule, Rfl: 12    GLUCOSAMINE HCL/CHONDR MCCARTHY A NA (OSTEO BI-FLEX ORAL), Take by mouth once daily., Disp: , Rfl:     levothyroxine (SYNTHROID) 137 MCG Tab tablet, TAKE ONE TABLET BY MOUTH ONCE DAILY, Disp: 30 tablet, Rfl: 3    meclizine (ANTIVERT) 25 mg tablet, Take 1 tablet (25 mg total) by mouth 3 (three) times daily as needed., Disp: 30 tablet, Rfl: 0    MULTIVITAMIN W-MINERALS/LUTEIN (CENTRUM SILVER ORAL), Take 1 tablet by mouth once daily. , Disp: , Rfl:     nabumetone (RELAFEN) 500 MG tablet, TAKE TWO TABLETS BY MOUTH twice daily, Disp: 60 tablet, Rfl: 12    NOVOLOG FLEXPEN U-100 INSULIN 100 unit/mL InPn pen, inject 30 units SUBCUTANEOUSLY BEFORE MEALS, unless eating small meals then use 25 units BEFORE MEALS, Disp: 45 mL, Rfl: 12    ondansetron (ZOFRAN-ODT) 4 MG TbDL, Take 4 mg by mouth., Disp: , Rfl:     selegiline (ELDEPRYL) 5 mg Cap, TAKE ONE CAPSULE BY MOUTH twice daily, Disp: 60 capsule, Rfl: 6    simvastatin (ZOCOR) 10 MG tablet, TAKE ONE TABLET BY MOUTH EVERY EVENING, Disp: 30 tablet, Rfl: 3    TRUE METRIX GLUCOSE TEST STRIP Strp, TEST BLOOD SUGAR FOUR TIMES DAILY OR as directed, Disp: 150 each, Rfl: 12    ULTICARE PEN NEEDLE 31 gauge x 1/4" Ndle, use with insulin SIX times DAILY or as directed, Disp: , Rfl: 12    valsartan-hydrochlorothiazide (DIOVAN-HCT) 320-12.5 mg per tablet, " TAKE ONE TABLET BY MOUTH ONCE DAILY, Disp: 30 tablet, Rfl: 12    VITAMIN D2 50,000 unit capsule, TAKE ONE CAPSULE BY MOUTH EVERY 7 DAYS, Disp: 4 capsule, Rfl: 11    Allergies  Review of patient's allergies indicates:   Allergen Reactions    Propranolol Nausea And Vomiting     Drops pressure and raised sugar    Lipitor [atorvastatin] Other (See Comments)     Myalgia, muscle pain       Social History  Social History     Socioeconomic History    Marital status:      Spouse name: Not on file    Number of children: Not on file    Years of education: Not on file    Highest education level: Not on file   Social Needs    Financial resource strain: Not on file    Food insecurity - worry: Not on file    Food insecurity - inability: Not on file    Transportation needs - medical: Not on file    Transportation needs - non-medical: Not on file   Occupational History    Occupation: housewife   Tobacco Use    Smoking status: Never Smoker    Smokeless tobacco: Never Used   Substance and Sexual Activity    Alcohol use: Yes     Comment: once per month    Drug use: No    Sexual activity: Not on file   Other Topics Concern    Not on file   Social History Narrative    Not on file       Family History  Family History   Problem Relation Age of Onset    Cancer Maternal Aunt         breast    Cancer Maternal Grandmother         unknown    Cancer Maternal Aunt         unknown    Diabetes Mother     Glaucoma Mother     Diabetes Father     Diabetes Sister     Macular degeneration Sister     Breast cancer Paternal Aunt     Breast cancer Paternal Aunt     Amblyopia Neg Hx     Blindness Neg Hx     Cataracts Neg Hx     Hypertension Neg Hx     Retinal detachment Neg Hx     Stroke Neg Hx     Strabismus Neg Hx     Thyroid disease Neg Hx        Physical Exam  There were no vitals taken for this visit.    General appearance: Well-developed, well-groomed.     Neurologic Exam: The patient is awake, alert and  oriented. Language is fluent. Recent and remote memory are normal. Attention span and concentration are normal. Fund of knowledge is appropriate.     Cranial nerves: pupils are round and reactive to light and accommodation. Visual fields are full to confrontation. Ocular motility is full in all cardinal positions of gaze. Facial sensation is normal to pinprick and light touch. Facial activation is symmetric. Hearing is normal bilaterally. Palate elevates symmetrically and gag reflex is intact bilaterally. Shoulder elevation is symmetric and full strength bilaterally. Tongue is midline and neck rotation strength is normal bilaterally. Neck range of motion is normal.     Motor examination of all extremities demonstrates normal bulk and tone in all four limbs. There are no atrophy or fasciculations. Strength is 5/5 in the upper and lower extremities bilaterally without pronator drift.     Sensory examination is normal to pinprick, vibration and proprioception in the upper and lower extremities bilaterally.     Deep tendon reflexes are 2+ and symmetric in the upper and lower extremities bilaterally.     Gait: decreased arm swing (L>R). Robotic gait.     Movement exam: +masked facies. +hypophonic voice.  Bradykinesia (mild to moderate; L>R). Resting and postural tremor in the upper extremities (L>R). +increased tone and cogwheel rigidity (mild to moderate, L>R).  Postural instability not evaluated.    Coordination: Finger to nose and heel to shin testing is normal in both upper and lower extremities. Rapid alternating movements are normal in both upper and lower extremities.       Lab and Test Results    WBC   Date Value Ref Range Status   05/17/2016 10.11 3.90 - 12.70 K/uL Final   04/18/2016 9.36 3.90 - 12.70 K/uL Final   02/16/2016 12.22 3.90 - 12.70 K/uL Final     Hemoglobin   Date Value Ref Range Status   05/17/2016 14.8 12.0 - 16.0 g/dL Final   04/18/2016 14.6 12.0 - 16.0 g/dL Final   02/16/2016 15.7 12.0 - 16.0  g/dL Final     POC Hematocrit   Date Value Ref Range Status   08/07/2014 34 (L) 36 - 54 %PCV Final     Hematocrit   Date Value Ref Range Status   05/17/2016 44.8 37.0 - 48.5 % Final   04/18/2016 43.8 37.0 - 48.5 % Final   02/16/2016 47.3 37.0 - 48.5 % Final     Platelets   Date Value Ref Range Status   05/17/2016 255 150 - 350 K/uL Final   04/18/2016 222 150 - 350 K/uL Final   02/16/2016 278 150 - 350 K/uL Final     Glucose   Date Value Ref Range Status   08/06/2018 186 (H) 70 - 110 mg/dL Final   02/15/2018 168 (H) 70 - 110 mg/dL Final   10/17/2017 129 (H) 70 - 110 mg/dL Final     Sodium   Date Value Ref Range Status   08/06/2018 141 136 - 145 mmol/L Final   02/15/2018 141 136 - 145 mmol/L Final   10/17/2017 138 136 - 145 mmol/L Final     Potassium   Date Value Ref Range Status   08/06/2018 4.3 3.5 - 5.1 mmol/L Final   02/15/2018 4.5 3.5 - 5.1 mmol/L Final   10/17/2017 4.2 3.5 - 5.1 mmol/L Final     Comment:     *Slightly Hemolyzed     Chloride   Date Value Ref Range Status   08/06/2018 101 95 - 110 mmol/L Final   02/15/2018 100 95 - 110 mmol/L Final   10/17/2017 102 95 - 110 mmol/L Final     CO2   Date Value Ref Range Status   08/06/2018 28 23 - 29 mmol/L Final   02/15/2018 28 23 - 29 mmol/L Final   10/17/2017 23 23 - 29 mmol/L Final     BUN, Bld   Date Value Ref Range Status   08/06/2018 24 (H) 8 - 23 mg/dL Final   02/15/2018 20 8 - 23 mg/dL Final   10/17/2017 24 (H) 8 - 23 mg/dL Final     Creatinine   Date Value Ref Range Status   08/06/2018 1.1 0.5 - 1.4 mg/dL Final   02/15/2018 0.8 0.5 - 1.4 mg/dL Final   10/17/2017 0.9 0.5 - 1.4 mg/dL Final     Calcium   Date Value Ref Range Status   08/06/2018 10.3 8.7 - 10.5 mg/dL Final   02/15/2018 9.9 8.7 - 10.5 mg/dL Final   10/17/2017 9.9 8.7 - 10.5 mg/dL Final     Magnesium   Date Value Ref Range Status   02/16/2016 2.5 1.6 - 2.6 mg/dL Final   08/15/2014 2.0 1.6 - 2.6 mg/dL Final   08/15/2014 2.0 1.6 - 2.6 mg/dL Final     Phosphorus   Date Value Ref Range Status    08/15/2014 2.8 2.7 - 4.5 mg/dL Final   08/15/2014 2.8 2.7 - 4.5 mg/dL Final   08/14/2014 4.1 2.7 - 4.5 mg/dL Final     Alkaline Phosphatase   Date Value Ref Range Status   08/06/2018 90 55 - 135 U/L Final   02/15/2018 74 55 - 135 U/L Final   10/17/2017 92 55 - 135 U/L Final     ALT   Date Value Ref Range Status   08/06/2018 <5 (L) 10 - 44 U/L Final   02/15/2018 15 10 - 44 U/L Final   10/17/2017 7 (L) 10 - 44 U/L Final     AST   Date Value Ref Range Status   08/06/2018 12 10 - 40 U/L Final   02/15/2018 12 10 - 40 U/L Final   10/17/2017 17 10 - 40 U/L Final     MRI brain W WO contrast (08/04/2017)             L cerebellar, right temporal./insula, periventricular white matter lesions. Some of the periventricular lesions are perpendicular to corpus callosum    Assessment and Plan    Jessica Rojas is a 62 y.o. female with PMHx of carcinoid tumor (lung), multinodular goiter (s/p thyroidectomy), post-op acquired hypothyroidism, DMII, Vitamin D deficiency with parkinsonism. No reported history of anti-psychotic or anti-emetics use as an outpatient. MRI concerning for areas that could be potential areas of demyelination; MRI c and t spine unremarkable. LP done with normal WBC and protein when corrected for RBC.     Problem List Items Addressed This Visit        1 - High    Parkinsonism - Primary    Overview     Parkinsonism (L side tremor predominant)         Current Assessment & Plan     stable  Continue sinemet 25/100 - 1.5 tabs TID and selegiline 5mg BID            2     Resting tremor    Overview     Left hand predominant         REM sleep behavior disorder    Current Assessment & Plan     Part of non motor sx of parkinsons  Taking selegiline earlier on in the evening and so unlikely to be contributing to symptoms.   Recommend melatonin 3-5mg nightly            3     Abnormal MRI of head    Overview     MRI brain done after onset of parkinsonian symptoms in 8/2017         Current Assessment & Plan     LP done after  MRI given some concern for possible demyelinating lesions - CSF WBC: 21, RBC 85746, protein 87. WBC corrected for RBC is within normal limits. Protein correction for RBC in the CSF is 76.   MS profile unremarkable.   Likely these are chronic ischemic white matter changes            4     Radiculopathy    Overview     Low back pain. Followed by NSGY - Dr. Sahu         Current Assessment & Plan     MRI lumbar spine pending - ordered by NSGY         Vitamin D deficiency    Current Assessment & Plan     Continue supplementation            5     RESOLVED: Hypercalcemia       6     Malignant carcinoid tumor of bronchus and lung    Overview     S/p right lung - middle and lower lobectomy in 2014; without evidence of recurrence         Current Assessment & Plan     Followed by Dr. Quintero - seen in clinic today  Recent imaging with evidence of pulmonary nodules - plan at this time to do surveillance imaging         Post-operative hypothyroidism    Overview     - s/p total thyroidectomy in 2016 (parathyroids intact) with post op hypothyroidism; on synthroid         Current Assessment & Plan     Stable. Follow up with endocrinology         Pulmonary nodules    Current Assessment & Plan     Follow up with Dr. Quintero             Follow up in 6 months    Елена Cowan  Neurology Resident - PGY 4  Department of Neurology  [facility] [place] [address]

## 2018-09-11 NOTE — ASSESSMENT & PLAN NOTE
Part of non motor sx of parkinsons  Taking selegiline earlier on in the evening and so unlikely to be contributing to symptoms.   Recommend melatonin 3-5mg nightly

## 2018-09-11 NOTE — ASSESSMENT & PLAN NOTE
LP done after MRI given some concern for possible demyelinating lesions - CSF WBC: 21, RBC 47483, protein 87. WBC corrected for RBC is within normal limits. Protein correction for RBC in the CSF is 76.   MS profile unremarkable.   Likely these are chronic ischemic white matter changes

## 2018-09-11 NOTE — PROGRESS NOTES
NOLANETS:  [facility]  A collaboration between [facility] and [facility]    PATIENT: Jessica Rojas  MRN: [id number]  DATE: 9/11/2018      Diagnosis:   1. Malignant carcinoid tumor of bronchus and lung    2. 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 Rojas is a 62 y.o. female seen in follow up for a bronchial carcinoid.  She states she generally has been feeling well. She does note some intermittent wheezing.  Denies any cough or shortness of breath.  She has no other new complaints.    Her history dates to 2009 when she was diagnosed with pneumonia.  She had yearly bouts of this until last year when she had a CT scan done which showed a right hilar mass, 3.4 cm.  A bronchoscopy was done showing an obstructive tumor in the right bronchus intermedius.  Pathology was initially read as possible small cell.  Re-review here shows and atypical carcinoid, intermediate grade, well differentiated with Ki-67 of 4%.   She underwent a right middle and right lower bilobectomy on 8/7/14.  She was found to have a T2a, N0, M0 typical carcinoid.      Past Medical History:   Past Medical History:   Diagnosis Date    Abdominal pain 2/27/2015    Diabetes mellitus, type 2     DM (diabetes mellitus)     on insulin    Elevated transaminase level 4/18/2016    Encounter for blood transfusion     History of malignant carcinoid tumor of bronchus and lung 10/25/2017    History of neuroendocrine cancer     HTN (hypertension) 5/6/2014    Hypercalcemia 4/18/2016    Lung cancer, hilus 5/6/2014    Malignant carcinoid tumor of bronchus and  lung 2014    Malignant carcinoid tumor of the bronchus and lung 2014    Mediastinal lymphadenopathy 2015    Obesity, morbid 2014    Parkinsons 10/2017    Pituitary adenoma     took parladel for 3 yrs    Pneumonia     Pulmonary nodules 2018    Thyroid disease     Wheeze 2014       Past Surgical HIstory:   Past Surgical History:   Procedure Laterality Date    APPENDECTOMY      BREAST CYST ASPIRATION      BRONCHOSCOPY  2014, 2014    BRONCHOSCOPY N/A 2015    Performed by [facility] at [facility] OR [address] [place] [address]    BRONCHOSCOPY-FIBEROPTIC N/A 2014    Performed by Jose Bland MD at [facility] OR [address] [place] [address]    BRONCHOSCOPY-OPERATIVE,FLEXIBLE Right 2014    Performed by Jong Wiggins MD at [facility] OR [address] [place] [address]     SECTION  , 1986    x2    DISSECTION-LYMPH NODE Right 2014    Performed by Jose [facility]land MD at [facility] OR [address] [place] [address]    ENDOBRONCHIAL ULTRASOUND (EBUS) N/A 2015    [facility]    LAVAGE-ALVEOLAR Right 2014    Performed by Jong Wiggins MD at [facility] OR [address] [place] [address]    LAVAGE-ALVEOLAR N/A 2014    Performed by Jose [facility]land MD at [facility] OR [address] [place] [address]    LUNG REMOVAL, PARTIAL Right     with 17 lymph nodes    THORACOTOMY/LOBECTOMY RML SLEEVE LOBECTOMY WITH BRONCHOPLASTY CPT 97180 Right 2014    Performed by Jose Bland MD at [facility] OR [address] [place] [address]    THYROIDECTOMY  2016    THYROIDECTOMY N/A 2016    [email or web address] OR    TONSILLECTOMY  1969       Family History:   Family History   Problem Relation Age of Onset    Cancer Maternal Aunt         breast    Cancer Maternal Grandmother         unknown    Cancer Maternal Aunt         unknown    Diabetes Mother     Glaucoma Mother     Diabetes Father     Diabetes Sister     Macular degeneration Sister     Breast cancer Paternal Aunt     Breast cancer Paternal Aunt     Amblyopia Neg  "Hx     Blindness Neg Hx     Cataracts Neg Hx     Hypertension Neg Hx     Retinal detachment Neg Hx     Stroke Neg Hx     Strabismus Neg Hx     Thyroid disease Neg Hx        Social History:  reports that  has never smoked. she has never used smokeless tobacco. She reports that she drinks alcohol. She reports that she does not use drugs.    Allergies:  Allergies   Allergen Reactions    Propranolol Nausea And Vomiting     Drops pressure and raised sugar    Lipitor [Atorvastatin] Other (See Comments)     Myalgia, muscle pain       Medications:  Current Outpatient Medications   Medication Sig Dispense Refill    amantadine HCl (SYMMETREL) 100 mg capsule Take 1 capsule (100 mg total) by mouth 2 (two) times daily. Once a day for 1 week and then twice a day the following week and thereafter. 60 capsule 11    BASAGLAR KWIKPEN U-100 INSULIN 100 unit/mL (3 mL) InPn pen Inject 35 Units into the skin 2 (two) times daily. 45 mL 3    BD INSULIN PEN NEEDLE UF MINI 31 gauge x 3/16" Ndle use as directed with insulin 150 each 12    carbidopa-levodopa  mg (SINEMET)  mg per tablet 1.5 tabs, 3 times daily, 6-8 hours apart. 150 tablet 6    cholecalciferol, vitamin D3, (VITAMIN D3) 400 unit Cap Take 1,200 Units by mouth once daily.      gabapentin (NEURONTIN) 400 MG capsule TAKE ONE CAPSULE BY MOUTH FOUR TIMES DAILY 120 capsule 12    GLUCOSAMINE HCL/CHONDR MCCARTHY A NA (OSTEO BI-FLEX ORAL) Take by mouth once daily.      levothyroxine (SYNTHROID) 137 MCG Tab tablet TAKE ONE TABLET BY MOUTH ONCE DAILY 30 tablet 3    MULTIVITAMIN W-MINERALS/LUTEIN (CENTRUM SILVER ORAL) Take 1 tablet by mouth once daily.       nabumetone (RELAFEN) 500 MG tablet TAKE TWO TABLETS BY MOUTH twice daily 60 tablet 12    NOVOLOG FLEXPEN U-100 INSULIN 100 unit/mL InPn pen inject 30 units SUBCUTANEOUSLY BEFORE MEALS, unless eating small meals then use 25 units BEFORE MEALS 45 mL 12    ondansetron (ZOFRAN-ODT) 4 MG TbDL Take 4 mg by mouth.   " "   selegiline (ELDEPRYL) 5 mg Cap TAKE ONE CAPSULE BY MOUTH twice daily 60 capsule 6    simvastatin (ZOCOR) 10 MG tablet TAKE ONE TABLET BY MOUTH EVERY EVENING 30 tablet 3    TRUE METRIX GLUCOSE TEST STRIP Strp TEST BLOOD SUGAR FOUR TIMES DAILY OR as directed 150 each 12    ULTICARE PEN NEEDLE 31 gauge x 1/4" Ndle use with insulin SIX times DAILY or as directed  12    valsartan-hydrochlorothiazide (DIOVAN-HCT) 320-12.5 mg per tablet TAKE ONE TABLET BY MOUTH ONCE DAILY 30 tablet 12    VITAMIN D2 50,000 unit capsule TAKE ONE CAPSULE BY MOUTH EVERY 7 DAYS 4 capsule 11    ezetimibe (ZETIA) 10 mg tablet Take 1 tablet (10 mg total) by mouth once daily. 30 tablet 3    meclizine (ANTIVERT) 25 mg tablet Take 1 tablet (25 mg total) by mouth 3 (three) times daily as needed. 30 tablet 0     No current facility-administered medications for this visit.        Review of Systems   Constitutional: Negative for appetite change, chills, fatigue, fever and unexpected weight change.        No flushing   HENT: Negative for congestion, hearing loss, nosebleeds and postnasal drip.    Eyes: Negative for visual disturbance.   Respiratory: Positive for wheezing. Negative for cough and shortness of breath.    Cardiovascular: Negative for chest pain and palpitations.   Gastrointestinal: Negative for abdominal pain, blood in stool, constipation, diarrhea, nausea and vomiting.   Genitourinary: Negative for dysuria and frequency.   Musculoskeletal: Negative for back pain and gait problem.        Right-sided chest wall pain   Skin: Negative for color change and rash.   Allergic/Immunologic: Positive for environmental allergies.   Neurological: Positive for tremors. Negative for dizziness, weakness and headaches.   Hematological: Negative for adenopathy. Does not bruise/bleed easily.   Psychiatric/Behavioral: Negative for confusion. The patient is not nervous/anxious.        ECOG Performance Status: 0     Objective:      Vitals:   Vitals:    " "09/11/18 1123   BP: 127/84   BP Location: Right arm   Patient Position: Sitting   BP Method: Large (Automatic)   Pulse: 84   Temp: 99.1 °F (37.3 °C)   TempSrc: Oral   SpO2: 100%   Weight: 95.5 kg (210 lb 8.6 oz)   Height: 5' 1" (1.549 m)     BMI: Body mass index is 39.78 kg/m².    Physical Exam   Constitutional: She is oriented to person, place, and time. She appears well-developed and well-nourished. No distress.   HENT:   Head: Normocephalic.   Mouth/Throat: No oropharyngeal exudate.   Eyes: EOM are normal. No scleral icterus.   Neck: Neck supple. No tracheal deviation present. No thyromegaly present.   Cardiovascular: Normal rate and regular rhythm.   Pulmonary/Chest: Effort normal. No respiratory distress. She has wheezes. She has no rales.   Abdominal: Soft. She exhibits no distension and no mass. There is no tenderness. There is no rebound and no guarding.   Musculoskeletal: Normal range of motion. She exhibits no edema.   Lymphadenopathy:     She has no cervical adenopathy.   Neurological: She is alert and oriented to person, place, and time. No cranial nerve deficit.   Skin: Skin is warm and dry.   Psychiatric: She has a normal mood and affect.       Laboratory Data:     No visits with results within 1 Month(s) from this visit.   Latest known visit with results is:   Lab Visit on 08/06/2018   Component Date Value Ref Range Status    Sodium 08/06/2018 141  136 - 145 mmol/L Final    Potassium 08/06/2018 4.3  3.5 - 5.1 mmol/L Final    Chloride 08/06/2018 101  95 - 110 mmol/L Final    CO2 08/06/2018 28  23 - 29 mmol/L Final    Glucose 08/06/2018 186* 70 - 110 mg/dL Final    BUN, Bld 08/06/2018 24* 8 - 23 mg/dL Final    Creatinine 08/06/2018 1.1  0.5 - 1.4 mg/dL Final    Calcium 08/06/2018 10.3  8.7 - 10.5 mg/dL Final    Total Protein 08/06/2018 7.7  6.0 - 8.4 g/dL Final    Albumin 08/06/2018 4.0  3.5 - 5.2 g/dL Final    Total Bilirubin 08/06/2018 0.5  0.1 - 1.0 mg/dL Final    Comment: For infants and " newborns, interpretation of results should be based  on gestational age, weight and in agreement with clinical  observations.  Premature Infant recommended reference ranges:  Up to 24 hours.............<8.0 mg/dL  Up to 48 hours............<12.0 mg/dL  3-5 days..................<15.0 mg/dL  6-29 days.................<15.0 mg/dL      Alkaline Phosphatase 08/06/2018 90  55 - 135 U/L Final    AST 08/06/2018 12  10 - 40 U/L Final    ALT 08/06/2018 <5* 10 - 44 U/L Final    Anion Gap 08/06/2018 12  8 - 16 mmol/L Final    eGFR if African American 08/06/2018 >60.0  >60 mL/min/1.73 m^2 Final    eGFR if non African American 08/06/2018 53.2* >60 mL/min/1.73 m^2 Final    Comment: Calculation used to obtain the estimated glomerular filtration  rate (eGFR) is the CKD-EPI equation.       Hemoglobin A1C 08/06/2018 6.2* 4.0 - 5.6 % Final    Comment: ADA Screening Guidelines:  5.7-6.4%  Consistent with prediabetes  >or=6.5%  Consistent with diabetes  High levels of fetal hemoglobin interfere with the HbA1C  assay. Heterozygous hemoglobin variants (HbS, HgC, etc)do  not significantly interfere with this assay.   However, presence of multiple variants may affect accuracy.      Estimated Avg Glucose 08/06/2018 131  68 - 131 mg/dL Final    Cholesterol 08/06/2018 275* 120 - 199 mg/dL Final    Comment: The National Cholesterol Education Program (NCEP) has set the  following guidelines (reference ranges) for Cholesterol:  Optimal.....................<200 mg/dL  Borderline High.............200-239 mg/dL  High........................> or = 240 mg/dL      Triglycerides 08/06/2018 263* 30 - 150 mg/dL Final    Comment: The National Cholesterol Education Program (NCEP) has set the  following guidelines (reference values) for triglycerides:  Normal......................<150 mg/dL  Borderline High.............150-199 mg/dL  High........................200-499 mg/dL      HDL 08/06/2018 57  40 - 75 mg/dL Final    Comment: The National  Cholesterol Education Program (NCEP) has set the  following guidelines (reference values) for HDL Cholesterol:  Low...............<40 mg/dL  Optimal...........>60 mg/dL      LDL Cholesterol 08/06/2018 165.4* 63.0 - 159.0 mg/dL Final    Comment: The National Cholesterol Education Program (NCEP) has set the  following guidelines (reference values) for LDL Cholesterol:  Optimal.......................<130 mg/dL  Borderline High...............130-159 mg/dL  High..........................160-189 mg/dL  Very High.....................>190 mg/dL      HDL/Chol Ratio 08/06/2018 20.7  20.0 - 50.0 % Final    Total Cholesterol/HDL Ratio 08/06/2018 4.8  2.0 - 5.0 Final    Non-HDL Cholesterol 08/06/2018 218  mg/dL Final    Comment: Risk category and Non-HDL cholesterol goals:  Coronary heart disease (CHD)or equivalent (10-year risk of CHD >20%):  Non-HDL cholesterol goal     <130 mg/dL  Two or more CHD risk factors and 10-year risk of CHD <= 20%:  Non-HDL cholesterol goal     <160 mg/dL  0 to 1 CHD risk factor:  Non-HDL cholesterol goal     <190 mg/dL      TSH 08/06/2018 0.566  0.400 - 4.000 uIU/mL Final     Neuroendocrine Labs Latest Ref Rng 7/29/2015   EXT 5 HIAA BLOOD 0 - 22 ng/ml 14   EXT SEROTONIN 56 - 244 ng/ml    EXT CHROMOGRANIN A 0 - 15 ng/ml 14           FINAL PATHOLOGIC DIAGNOSIS 8/7/14  1. Right fifth rib (demineralized), biopsy:  Bone with trilineage bone marrow.  Negative for neoplasm.    2. Right pleura, partial pleurectomy:  Pleural tissue with congested vasculature and no evidence of neoplasm.    3. Right lung, right middle and lower lobes, lobectomies:  Typical carcinoid tumor, multifocal with 2 lesions, measuring 4.5 cm and 0.5 cm in greatest dimension  respectively.  Mitotic index: 1 mitosis seen in 10 high powered fields.  No evidence of necrosis or significant nuclear pleomorphism are seen.  Ki-67 proliferation index: 4% of tumor cells staining.  Bronchial and vascular margins are negative for  malignancy.  17 benign lymph nodes (0/17).  Separate lesonal area consists of lung with necrotizing graulomatous inflammation and calcified granulomata.  Special stains for mycobacterial and fungi are completed on the granulomatous lesion and are negative for fungal  and mycobacterial organisms with satisfactory positive control.  See synoptic report in comment section for further details.     4. Right lung lymph node, station 4, biopsy:  Fibroadipose tissue, negative for lymph nodes.  No evidence of neoplasm.    5. Right lung lymph nodes, station for alpha, biopsy:  Two (2) lymph nodes, negative for neoplasm (0/2).  Comment: Synoptic report  Specimen: Lung, right middle and lower lobes  Specimen integrity: Intact  Specimen laterality: Right  Tumor site: Right bronchus intermedius  Tumor size:  Lesion#1: 4.5 x 4 x 2.5 cm  Lesion#2: 0.5 cm in greatest dimension.  Tumor focality: Multifocal  Histologic Type:  Typical carcinoid tumor  Visceral Pleural Invasion: Not identified  Tumor Extension: Tumor involves the bronchus intermedius  Margins: Bronchial & Vasular margins are uninvolved by tumor  Distance of tumor from closest bronchial margin: 0.2 cm.  Treatment effect: Unknown  Lymphovascular invasion: Not identified.  Pathologic Staging:  Primary tumor:  pT2a: Tumor greater than 3 cm but 5 cm or less in greatest dimension without evidence of invasion into the main  bronchus  Regional lymph nodes:  pN0: No regional lymph node metastasis  Number examined: 16  Number involved: 0  Distant metastasis:  pMX: Cannot be assessed.  Note: Immunohistochemical stain results:  AE1/AE3: Negative in tumor cells.  Chromogranin staining:  Intensity Positive cells (0%)  0: 0%  1+: 10%  2+: 85%  3+: 5%  Synaptophysin staining:  Intensity Positive cells (0%)  0: 0%  1+: 0%  2+: 0%  3+: 100%  CD31: 44 vessels per 1 HPF  Factor VIII: 69 vessels per 1 HPF  Ki-67: 4% cells staining  Note: This tumor has only 1 mitotic figure in 10 high power  fields, no evidence of necrosis and fairly bland  appearing nulei with salt and pepper chromatin pattern and no significant nuclear pleomorphism. This is best  categorized as a typical carcinoid tumor.  All immunostains have satisfactory positive and negative controls.    IMAGING:  CT 09/10/2018  EXAMINATION:  CT CHEST WITHOUT CONTRAST    CLINICAL HISTORY:  Personal history of malignant carcinoid tumor of bronchus and lung    TECHNIQUE:  Low dose axial images, sagittal and coronal reformations were obtained from the thoracic inlet to the lung bases. Contrast was not administered.    COMPARISON:  08/28/2017 and 10/17/2016    FINDINGS:  Postsurgical changes appear to be present involving the right lung with removal of the right lower lobe suspected.    Some coronary calcifications are noted.    Calcification of the splenic artery is noted possibly with some ectasia or dilatation to 7 mm unchanged.  Unchanged geographic liver changes are noted with some atrophy of the left lobe since 10/17/2016    Cholelithiasis is suspected    Within the left upper lobe on image 40 sequence 4 a 3 mm nodule is noted unchanged    A 2-3 mm nodule is noted on image 44 sequence 4 in the left upper lobe new since the priors.  A 2 mm nodule is noted on image 50 sequence 4 in the left upper lobe not definitively seen on the priors.  A 2 mm nodule is noted on image 64 in the left upper lobe not obviously seen on the priors.A 3 mm nodule is noted on image 92 in the left lower lobe not obviously seen on the priors.    A 4 mm nodule is noted on image number 64 and left upper lobe stable since the priors.    A 4 mm nodule is noted on image 77 in the left upper lobe stable since the priors.    A 4 mm nodule is noted on image 91 in the left upper lobe stable since the priors.   A nodule is noted on image number 95 in the left upper lobe stable since the priors.    Within left lower lobe nodule is noted on image 151 of 5 mm stable since the prior.   In the left lower lobe on image 64 a 3 mm nodule is noted stable since the priors.  On image 168 3 mm nodule is noted stable since the priors.  On image 16 8 a 2nd 3 mm nodule is noted stable since the priors.  A 3 mm nodule is noted on image 174 stable since the priors.  A nodule is noted on 179 and left lower lobe stable since the priors.  A 6 mm nodule is noted on image 184 in the left lower lobe stable since the prior.  A 5-6 mm nodule is noted on image 195 in the left lower lobe stable since the prior.    On the right a 3 mm nodule is noted on image 64 favored to be stable since the prior.  On image 82 a stable nodule is noted of 3 mm in the right upper lobe.  On image 122 a nodule is noted in the right upper lobe or right middle lobe that appears stable since the prior of 5 mm      Impression       Numerous small pulmonary nodules at least 4 of which are questioned not to have been visualized on the prior of 08/28/2017 but that are of less than 4 mm in size.  Follow-up in 6 months to a year for stability would be reasonable.  The majority of the nodules are stable                Assessment:       1. Malignant carcinoid tumor of bronchus and lung    2. Pulmonary nodules           Plan:       Mrs. Rojas scan appears relatively stable from 1 year ago.  It is noted that she has potentially a few new subcentimeter nodules.  At this point we will continue to monitor and check a CT at 6 months instead of a year.  All questions were answered and she is agreeable with this plan.  Report any new symptoms in the interim.    Justen Quintero DO, [facility]  Hematology & Oncology  [address], [address]  [place]  [address]  ph. [phone]; [phone]  fax. [phone]    25 minutes were spent in coordination of patient's care, record review and counseling.  More than 50% of the time was face-to-face.

## 2018-09-18 ENCOUNTER — OFFICE VISIT (OUTPATIENT)
Dept: NEUROSURGERY | Facility: CLINIC | Age: 63
End: 2018-09-18
Payer: MEDICAID

## 2018-09-18 ENCOUNTER — HOSPITAL ENCOUNTER (OUTPATIENT)
Dept: RADIOLOGY | Facility: HOSPITAL | Age: 63
Discharge: HOME | End: 2018-09-18
Attending: NEUROLOGICAL SURGERY
Payer: MEDICAID

## 2018-09-18 VITALS
HEART RATE: 88 BPM | BODY MASS INDEX: 39.68 KG/M2 | TEMPERATURE: 98 F | SYSTOLIC BLOOD PRESSURE: 125 MMHG | WEIGHT: 210 LBS | DIASTOLIC BLOOD PRESSURE: 83 MMHG

## 2018-09-18 DIAGNOSIS — M47.816 LUMBAR SPONDYLOSIS: Primary | ICD-10-CM

## 2018-09-18 DIAGNOSIS — M54.10 RADICULOPATHY, UNSPECIFIED SPINAL REGION: ICD-10-CM

## 2018-09-18 DIAGNOSIS — M43.16 SPONDYLOLISTHESIS OF LUMBAR REGION: ICD-10-CM

## 2018-09-18 PROCEDURE — 99214 OFFICE O/P EST MOD 30 MIN: CPT | Mod: PBBFAC,25 | Performed by: PHYSICIAN ASSISTANT

## 2018-09-18 PROCEDURE — 99999 PR PBB SHADOW E&M-EST. PATIENT-LVL IV: CPT | Mod: PBBFAC,,, | Performed by: PHYSICIAN ASSISTANT

## 2018-09-18 PROCEDURE — 72158 MRI LUMBAR SPINE W/O & W/DYE: CPT | Mod: TC

## 2018-09-18 PROCEDURE — 72158 MRI LUMBAR SPINE W/O & W/DYE: CPT | Mod: 26,,, | Performed by: RADIOLOGY

## 2018-09-18 PROCEDURE — 25500020 PHARM REV CODE 255: Performed by: NEUROLOGICAL SURGERY

## 2018-09-18 PROCEDURE — 99214 OFFICE O/P EST MOD 30 MIN: CPT | Mod: S$PBB,,, | Performed by: PHYSICIAN ASSISTANT

## 2018-09-18 PROCEDURE — A9585 GADOBUTROL INJECTION: HCPCS | Performed by: NEUROLOGICAL SURGERY

## 2018-09-18 RX ORDER — GUAIFENESIN 600 MG/1
1200 TABLET, EXTENDED RELEASE ORAL
COMMUNITY
End: 2019-03-27

## 2018-09-18 RX ORDER — VALSARTAN AND HYDROCHLOROTHIAZIDE 320; 12.5 MG/1; MG/1
1 TABLET, FILM COATED ORAL
COMMUNITY
End: 2018-09-18

## 2018-09-18 RX ORDER — GADOBUTROL 604.72 MG/ML
10 INJECTION INTRAVENOUS
Status: COMPLETED | OUTPATIENT
Start: 2018-09-18 | End: 2018-09-18

## 2018-09-18 RX ADMIN — GADOBUTROL 10 ML: 604.72 INJECTION INTRAVENOUS at 12:09

## 2018-09-18 NOTE — PROGRESS NOTES
[facility] - Neurosurgery 7th Fl  Neurosurgery    SUBJECTIVE:     History of Present Illness:  Jessica Rojas is a 62 y.o. female seen by Dr. Sahu previously for low back and RLE pain. She was referred for MRI prior to her appt with me. She reports constant lbp worse with standing and improved with leaning forward. RLE radicular pain which travels down the lateral aspect of her leg. Back pain>leg pain.     Review of patient's allergies indicates:   Allergen Reactions    Propranolol Nausea And Vomiting     Drops pressure and raised sugar    Lipitor [atorvastatin] Other (See Comments)     Myalgia, muscle pain       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    Malignant carcinoid tumor of the bronchus and lung 5/2014    Mediastinal lymphadenopathy 8/26/2015    Obesity, morbid 5/6/2014    Parkinsons 10/2017    Pituitary adenoma 1980's    took parladel for 3 yrs    Pneumonia     Postoperative hypothyroidism 7/25/2017    - s/p total thyroidectomy in 2016 (parathyroids intact) with post op hypothyroidism; on synthroid    Pulmonary nodules 9/11/2018    Thyroid disease     Wheeze 6/23/2014       Past Surgical History:   Procedure Laterality Date    APPENDECTOMY  1959    BREAST CYST ASPIRATION      BRONCHOSCOPY  4/2014, 5/2014    BRONCHOSCOPY N/A 9/1/2015    Performed by [facility] at [facility] OR [facility]    BRONCHOSCOPY-FIBEROPTIC N/A 8/7/2014    Performed by Jose [facility]land [facility] at [facility] OR [facility]    BRONCHOSCOPY-OPERATIVE,FLEXIBLE Right 8/11/2014    [facility]     SECTION  , 1986    x2    DISSECTION-LYMPH NODE Right 2014    Performed by Jose Bland MD at [facility] OR [address] [place] [address]    ENDOBRONCHIAL ULTRASOUND (EBUS) N/A 2015    [facility]    LAVAGE-ALVEOLAR Right 2014    Performed by Jong Wiggins MD at [facility] OR [address] [place] [address]    LAVAGE-ALVEOLAR N/A 2014    Performed by Jose Bland MD at [facility] OR [address] [place] [address]    LUNG REMOVAL, PARTIAL Right     with 17 lymph nodes    THORACOTOMY/LOBECTOMY RML SLEEVE LOBECTOMY WITH BRONCHOPLASTY CPT 19282 Right 2014    Performed by Jose Bland MD at [facility] OR [address] [place] [address]    THYROIDECTOMY  2016    THYROIDECTOMY N/A 2016    Performed by [facility] at [facility]    TONSILLECTOMY  1969        Family History   Problem Relation Age of Onset    Cancer Maternal Aunt         breast    Cancer Maternal Grandmother         unknown    Cancer Maternal Aunt         unknown    Diabetes Mother     Glaucoma Mother     Diabetes Father     Diabetes Sister     Macular degeneration Sister     Breast cancer Paternal Aunt     Breast cancer Paternal Aunt     Amblyopia Neg Hx     Blindness Neg Hx     Cataracts Neg Hx     Hypertension Neg Hx     Retinal detachment Neg Hx     Stroke Neg Hx     Strabismus Neg Hx     Thyroid disease Neg Hx        Social History     Socioeconomic History    Marital status:      Spouse name: Not on file    Number of children: Not on file    Years of education: Not on file    Highest education level: Not on file   Social Needs    Financial resource strain: Not on file    Food insecurity - worry: Not on file    Food insecurity - inability: Not on file    Transportation needs - medical: Not on file    Transportation needs - non-medical: Not on file   Occupational History    Occupation: housewife   Tobacco Use    Smoking status: Never Smoker    Smokeless tobacco:  Never Used   Substance and Sexual Activity    Alcohol use: Yes     Comment: once per month    Drug use: No    Sexual activity: Not on file   Other Topics Concern    Not on file   Social History Narrative    Not on file       Review of Systems:  Constitutional: no fever, chills or night sweats. No changes in weight   Eyes: no visual changes   ENT: no nasal congestion or sore throat   Respiratory: no cough or shortness of breath   Cardiovascular: no chest pain or palpitations   Gastrointestinal: no nausea or vomiting   Genitourinary: no hematuria or dysuria   Integument/Breast: no rash or pruritis   Hematologic/Lymphatic: no easy bruising or lymphadenopathy   Musculoskeletal: no arthralgias or myalgias.   Neurological: no seizures or tremors   Behavioral/Psych: no auditory or visual hallucinations   Endocrine: no heat or cold intolerance     OBJECTIVE:     Vital Signs (Most Recent):  Vitals:    09/18/18 1404   BP: 125/83   Pulse: 88   Temp: 98.2 °F (36.8 °C)   Weight: 95.3 kg (210 lb)       Physical Exam:  General: obese, no distress  Head: normocephalic, atraumatic  Neurologic: Alert and oriented. Thought content appropriate.  GCS: Motor: 6/Verbal: 5/Eyes: 4 GCS Total: 15  Mental Status: Awake, Alert, Oriented x 4  Language: No aphasia  Speech: No dysarthria  Cranial nerves: face symmetric, tongue midline, CN II-XII grossly intact.   Eyes: pupils equal, round, reactive to light with accomodation, EOMI.  Pulmonary: normal respirations, no signs of respiratory distress  Abdomen: soft, non-distended, not tender to palpation  Sensory: intact to light touch throughout    Motor Strength: Moves all extremities spontaneously with good tone.  Full strength upper and lower extremities. No abnormal movements seen.     Strength  Deltoids Triceps Biceps Wrist Extension Wrist Flexion Hand    Upper: R 5/5 5/5 5/5 5/5 5/5 5/5    L 5/5 5/5 5/5 5/5 5/5 5/5     Iliopsoas Quadriceps Knee  Flexion Tibialis  anterior Gastro-  cnemius EHL   Lower: R 5/5 5/5 5/5 5/5 5/5 5/5    L 5/5 5/5 5/5 5/5 5/5 5/5     DTR's: 2 + and symmetric in UE and LE  Pronator Drift: no drift noted  Finger-to-nose: Intact bilaterally  Chase: absent  Clonus: absent  Babinski: absent  Pulses: 2+ and symmetric radial and dorsalis pedis.  Skin: Skin is warm, dry and intact.    Diagnostic Results:  MRI lumbar spine reviewed 09/18/2018  Multilevel spondylosis. Grade I spondylolisthesis of L4-5 with facet hypertrophy and bilateral nf stenosis. Facet hypertrophy also noted of L3-4 with resultant bilateral nf stenosis.    ASSESSMENT/PLAN:     Jessica Rojas is a 62 y.o. female with lumbar spondylolisthesis of L4-5. Will refer for flexion extension films and facet injections of L3-4, L4-5 considering her back pain bothers her most. Discussed weight loss, pt does not wish to exercise. Discussed diet, pt defers nutrition referral. Discussed PT, pt defers this as well, stating there are no clinics which accept medicaid close to her but she will complete exercises at home. F/u after the injection.        Daylin Graves PA-C  Neurosurgery

## 2018-09-20 ENCOUNTER — PATIENT MESSAGE (OUTPATIENT)
Dept: NEUROSURGERY | Facility: CLINIC | Age: 63
End: 2018-09-20

## 2018-09-21 ENCOUNTER — PATIENT MESSAGE (OUTPATIENT)
Dept: NEUROSURGERY | Facility: CLINIC | Age: 63
End: 2018-09-21

## 2018-09-21 RX ORDER — SIMVASTATIN 10 MG/1
TABLET, FILM COATED ORAL
Qty: 30 TABLET | Refills: 3 | Status: SHIPPED | OUTPATIENT
Start: 2018-09-21 | End: 2019-01-17 | Stop reason: SDUPTHER

## 2018-09-21 RX ORDER — LEVOTHYROXINE SODIUM 137 UG/1
TABLET ORAL
Qty: 30 TABLET | Refills: 3 | Status: SHIPPED | OUTPATIENT
Start: 2018-09-21 | End: 2019-01-17 | Stop reason: SDUPTHER

## 2018-10-03 ENCOUNTER — TELEPHONE (OUTPATIENT)
Dept: INTERVENTIONAL RADIOLOGY/VASCULAR | Facility: HOSPITAL | Age: 63
End: 2018-10-03

## 2018-10-04 ENCOUNTER — HOSPITAL ENCOUNTER (OUTPATIENT)
Dept: INTERVENTIONAL RADIOLOGY/VASCULAR | Facility: HOSPITAL | Age: 63
Discharge: HOME | End: 2018-10-04
Attending: PHYSICIAN ASSISTANT
Payer: MEDICAID

## 2018-10-04 VITALS
OXYGEN SATURATION: 99 % | HEART RATE: 78 BPM | RESPIRATION RATE: 18 BRPM | DIASTOLIC BLOOD PRESSURE: 88 MMHG | SYSTOLIC BLOOD PRESSURE: 152 MMHG

## 2018-10-04 DIAGNOSIS — M47.816 LUMBAR SPONDYLOSIS: ICD-10-CM

## 2018-10-04 PROCEDURE — 64493 INJ PARAVERT F JNT L/S 1 LEV: CPT | Mod: 50,,, | Performed by: RADIOLOGY

## 2018-10-04 PROCEDURE — 64494 INJ PARAVERT F JNT L/S 2 LEV: CPT | Mod: 50,,, | Performed by: RADIOLOGY

## 2018-10-04 PROCEDURE — 64494 INJ PARAVERT F JNT L/S 2 LEV: CPT | Mod: 50

## 2018-10-04 PROCEDURE — 27200940 IR FACET INJ LUMBAR 1ST VERT BI

## 2018-10-04 NOTE — PROGRESS NOTES
Bilateral L3-4 and L4-5 facet injections completed. PT given discharge instructions. Pt verbalizes understanding of instructions.Pt escorted to lobby to meet .

## 2018-10-04 NOTE — PROCEDURES
Radiology Post-Procedure Note    Pre Op Diagnosis: LBP    Post Op Diagnosis: Same    Procedure: Lumbar Facet    Procedure performed by: Anthony Goff MD    Written Informed Consent Obtained: Yes    Specimen Removed: NO    Estimated Blood Loss: Minimal    Findings: Successful bilateral L3-4 and L4-5 facet injections    Level injected: L3-L4 and L4-5  Needle used: 22 gauge  Dose:  20 mg Depo-methylprednisolone x4   1 mL Lidocaine 1% MPF x4    Patient tolerated procedure well.    Anthony Goff MD  Diagnostic and Interventional Radiologist  Department of Radiology  Pager: [phone]

## 2018-10-04 NOTE — H&P
Radiology History & Physical      SUBJECTIVE:     Chief Complaint: Low back pain and RLE pain    History of Present Illness:  Jessica Rojas is a 62 y.o. female who presents for Bilateral L3-4 and L4-5 facet injections.    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 2014    Mediastinal lymphadenopathy 2015    Obesity, morbid 2014    Parkinsons 10/2017    Pituitary adenoma 's    took parladel for 3 yrs    Pneumonia     Postoperative hypothyroidism 2017    - s/p total thyroidectomy in  (parathyroids intact) with post op hypothyroidism; on synthroid    Pulmonary nodules 2018    Thyroid disease     Wheeze 2014     Past Surgical History:   Procedure Laterality Date    APPENDECTOMY      BREAST CYST ASPIRATION      BRONCHOSCOPY  2014, 2014    BRONCHOSCOPY N/A 2015    [facility]formed by [facility] at [facility] OR 2ND [facility]    BRONCHOSCOPY-FIBEROPTIC N/A 2014    Performed by [facility]land [facility] at [facility] OR [facility]    BRONCHOSCOPY-OPERATIVE,FLEXIBLE Right 2014    Performed by Jong [facility]iggins MD at [facility] OR [facility]     SECTION  , 1986    x2    DISSECTION-LYMPH NODE Right 2014    Performed by [facility]land [facility] at [facility] OR [facility]    ENDOBRONCHIAL ULTRASOUND (EBUS) N/A 2015    Performed by [facility] at [facility] OR [facility]    LAVAGE-ALVEOLAR Right 2014    [facility]    LAVAGE-ALVEOLAR N/A  "8/7/2014    [facility] by [facility] at [facility] OR 2ND [facility]    LUNG REMOVAL, PARTIAL Right 2014    with 17 lymph nodes    THORACOTOMY/LOBECTOMY RML SLEEVE LOBECTOMY WITH BRONCHOPLASTY CPT 33018 Right 8/7/2014    Performed by [facility] at [facility] OR 2ND [facility]LR    THYROIDECTOMY  05/24/2016    THYROIDECTOMY N/A 5/24/2016    [facility] at [facility] OR    TONSILLECTOMY  [address] [place] [address] Meds:   Prior to Admission medications    Medication Sig Start Date End Date Taking? Authorizing Provider   amantadine HCl (SYMMETREL) 100 mg capsule Take 1 capsule (100 mg total) by mouth 2 (two) times daily. Once a day for 1 week and then twice a day the following week and thereafter. 5/16/18 5/16/19  Елена Cowan MD   BASAGLAR KWIKPEN U-100 INSULIN 100 unit/mL (3 mL) InPn pen Inject 35 Units into the skin 2 (two) times daily. 6/14/18 6/14/19  Fortino Qureshi DO   BD INSULIN PEN NEEDLE UF MINI 31 gauge x 3/16" Ndle use as directed with insulin 10/12/17   Fortino Qureshi DO   BLACK COHOSH ORAL Take 135 mg by mouth.    Historical Provider, MD   carbidopa-levodopa  mg (SINEMET)  mg per tablet 1.5 tabs, 3 times daily, 6-8 hours apart. 10/2/17   Елена Cowan MD   cholecalciferol, vitamin D3, (VITAMIN D3) 400 unit Cap Take 1,200 Units by mouth once daily.    Historical Provider, MD   gabapentin (NEURONTIN) 400 MG capsule TAKE ONE CAPSULE BY MOUTH FOUR TIMES DAILY  Patient taking differently: TAKE ONE CAPSULE BY MOUTH FOUR TIMES DAILY or 3 TIMES 4/27/18   Jose Balbuena MD   GLUCOSAMINE HCL/CHONDR MCCARTHY A NA (OSTEO BI-FLEX ORAL) Take by mouth once daily.    Historical Provider, MD   guaiFENesin (MUCINEX) 600 mg 12 hr tablet Take 1,200 mg by mouth.    Historical Provider, MD   insulin regular 100 unit/mL Inj injection Inject 5 Units into the skin.    Historical Provider, MD   levothyroxine (SYNTHROID) 137 MCG Tab tablet TAKE ONE TABLET BY MOUTH ONCE DAILY 9/21/18   Fortino Qureshi DO   MULTIVITAMIN " "W-MINERALS/LUTEIN (CENTRUM SILVER ORAL) Take 1 tablet by mouth once daily.     Historical Provider, MD   nabumetone (RELAFEN) 500 MG tablet TAKE TWO TABLETS BY MOUTH twice daily 4/27/18   Jose Balbuena MD   NOVOLOG FLEXPEN U-100 INSULIN 100 unit/mL InPn pen inject 30 units SUBCUTANEOUSLY BEFORE MEALS, unless eating small meals then use 25 units BEFORE MEALS 6/26/18   Izabel Aguilar, APRN,ANP-C   selegiline (ELDEPRYL) 5 mg Cap TAKE ONE CAPSULE BY MOUTH twice daily 5/25/18   Елена Cowan MD   simvastatin (ZOCOR) 10 MG tablet TAKE ONE TABLET BY MOUTH EVERY EVENING 9/21/18   Fortino Qureshi, DO   TRUE METRIX GLUCOSE TEST STRIP Strp TEST BLOOD SUGAR FOUR TIMES DAILY OR as directed 8/22/18   Fortino Qureshi, DO   ULTICARE PEN NEEDLE 31 gauge x 1/4" Ndle use with insulin SIX times DAILY or as directed 7/24/18   Historical Provider, MD   valsartan-hydrochlorothiazide (DIOVAN-HCT) 320-12.5 mg per tablet TAKE ONE TABLET BY MOUTH ONCE DAILY 8/22/18   Jose Balbuena MD   VITAMIN D2 50,000 unit capsule TAKE ONE CAPSULE BY MOUTH EVERY 7 DAYS 12/21/16   Lotite Tovar NP     Anticoagulants/Antiplatelets: no anticoagulation    Allergies:   Review of patient's allergies indicates:   Allergen Reactions    Propranolol Nausea And Vomiting     Drops pressure and raised sugar    Lipitor [atorvastatin] Other (See Comments)     Myalgia, muscle pain     Sedation History:  no adverse reactions    Review of Systems:   Hematological: no known coagulopathies  Respiratory: no shortness of breath  Cardiovascular: no chest pain  Gastrointestinal: no abdominal pain  Genito-Urinary: no dysuria  Musculoskeletal: negative  Neurological: no TIA or stroke symptoms         OBJECTIVE:     Vital Signs (Most Recent)       Physical Exam:  ASA: 2  Mallampati: 2    General: no acute distress  Mental Status: alert and oriented to person, place and time  HEENT: normocephalic, atraumatic  Chest: unlabored breathing  Heart: regular heart rate  Abdomen: " nondistended  Extremity: moves all extremities    Laboratory  Lab Results   Component Value Date    INR 1.0 08/09/2014       Lab Results   Component Value Date    WBC 10.11 05/17/2016    HGB 14.8 05/17/2016    HCT 44.8 05/17/2016    MCV 93 05/17/2016     05/17/2016      Lab Results   Component Value Date     (H) 08/06/2018     08/06/2018    K 4.3 08/06/2018     08/06/2018    CO2 28 08/06/2018    BUN 24 (H) 08/06/2018    CREATININE 1.1 08/06/2018    CALCIUM 10.3 08/06/2018    MG 2.5 02/16/2016    ALT <5 (L) 08/06/2018    AST 12 08/06/2018    ALBUMIN 4.0 08/06/2018    BILITOT 0.5 08/06/2018       ASSESSMENT/PLAN:     Sedation Plan: Local    Patient will undergo bilateral L3-4 and L4-5 facet injections.      Dipak Rubio MD  Radiology, PGY - III  [phone]

## 2018-10-16 ENCOUNTER — OFFICE VISIT (OUTPATIENT)
Dept: FAMILY MEDICINE | Facility: CLINIC | Age: 63
End: 2018-10-16
Payer: MEDICAID

## 2018-10-16 ENCOUNTER — PATIENT MESSAGE (OUTPATIENT)
Dept: FAMILY MEDICINE | Facility: CLINIC | Age: 63
End: 2018-10-16

## 2018-10-16 VITALS
BODY MASS INDEX: 41.66 KG/M2 | SYSTOLIC BLOOD PRESSURE: 121 MMHG | HEART RATE: 82 BPM | WEIGHT: 212.19 LBS | TEMPERATURE: 98 F | HEIGHT: 60 IN | DIASTOLIC BLOOD PRESSURE: 73 MMHG

## 2018-10-16 DIAGNOSIS — Z12.11 COLON CANCER SCREENING: ICD-10-CM

## 2018-10-16 DIAGNOSIS — J06.9 UPPER RESPIRATORY TRACT INFECTION, UNSPECIFIED TYPE: Primary | ICD-10-CM

## 2018-10-16 PROCEDURE — 99999 PR PBB SHADOW E&M-EST. PATIENT-LVL IV: CPT | Mod: PBBFAC,,, | Performed by: FAMILY MEDICINE

## 2018-10-16 PROCEDURE — 99214 OFFICE O/P EST MOD 30 MIN: CPT | Mod: PBBFAC,PO | Performed by: FAMILY MEDICINE

## 2018-10-16 PROCEDURE — 99213 OFFICE O/P EST LOW 20 MIN: CPT | Mod: S$PBB,,, | Performed by: FAMILY MEDICINE

## 2018-10-16 RX ORDER — MONTELUKAST SODIUM 10 MG/1
10 TABLET ORAL NIGHTLY
Qty: 30 TABLET | Refills: 3 | Status: SHIPPED | OUTPATIENT
Start: 2018-10-16 | End: 2019-01-17 | Stop reason: SDUPTHER

## 2018-10-16 RX ORDER — BENZONATATE 200 MG/1
200 CAPSULE ORAL 3 TIMES DAILY PRN
Qty: 30 CAPSULE | Refills: 1 | Status: SHIPPED | OUTPATIENT
Start: 2018-10-16 | End: 2018-10-26

## 2018-10-16 NOTE — PROGRESS NOTES
Jessica Rojas presents with moderate upper respiratory congestion,rhinnorhea,moderate cough past 2-3 days. OTC help slightly. Denies nausea,vomiting,diarrhea or significant fever.    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 2014    Mediastinal lymphadenopathy 2015    Obesity, morbid 2014    Parkinsons 10/2017    Pituitary adenoma 's    took parladel for 3 yrs    Pneumonia     Postoperative hypothyroidism 2017    - s/p total thyroidectomy in  (parathyroids intact) with post op hypothyroidism; on synthroid    Pulmonary nodules 2018    Thyroid disease     Wheeze 2014     Past Surgical History:   Procedure Laterality Date    APPENDECTOMY      BREAST CYST ASPIRATION      BRONCHOSCOPY  2014, 2014    BRONCHOSCOPY N/A 2015    Performed by [facility] at [facility] OR 2ND FLR    BRONCHOSCOPY-FIBEROPTIC N/A 2014    Performed by Jose Bland MD at [facility] OR 2ND [facility]LR    BRONCHOSCOPY-OPERATIVE,FLEXIBLE Right 2014    Performed by Jong Wiggins MD at [facility] OR 2ND FLR     SECTION  , 1986    x2    DISSECTION-LYMPH NODE Right 2014    Performed by Jose [facility]land MD at [facility] OR 2ND FLR    ENDOBRONCHIAL ULTRASOUND (EBUS) N/A 2015    Performed by Soni Koroma MD at [facility] OR 2ND [facility]LR    LAVAGE-ALVEOLAR Right 2014    Performed by Jong Wiggins MD at [facility] OR 2ND FLR    LAVAGE-ALVEOLAR N/A 2014    Performed by Jose PRATHER  "MD Baldo at [facility] OR 2ND FLR    LUNG REMOVAL, PARTIAL Right 2014    with 17 lymph nodes    THORACOTOMY/LOBECTOMY RML SLEEVE LOBECTOMY WITH BRONCHOPLASTY CPT 21949 Right 8/7/2014    Performed by [facility]land MD at [facility] OR 2ND FLR    THYROIDECTOMY  05/24/2016    THYROIDECTOMY N/A 5/24/2016    [facility]formed by [facility]et [facility] OR    TONSILLECTOMY  1969     Review of patient's allergies indicates:   Allergen Reactions    Propranolol Nausea And Vomiting     Drops pressure and raised sugar    Lipitor [atorvastatin] Other (See Comments)     Myalgia, muscle pain     Current Outpatient Medications on File Prior to Visit   Medication Sig Dispense Refill    amantadine HCl (SYMMETREL) 100 mg capsule Take 1 capsule (100 mg total) by mouth 2 (two) times daily. Once a day for 1 week and then twice a day the following week and thereafter. 60 capsule 11    BASAGLAR KWIKPEN U-100 INSULIN 100 unit/mL (3 mL) InPn pen Inject 35 Units into the skin 2 (two) times daily. 45 mL 3    BD INSULIN PEN NEEDLE UF MINI 31 gauge x 3/16" Ndle use as directed with insulin 150 each 12    BLACK COHOSH ORAL Take 135 mg by mouth.      carbidopa-levodopa  mg (SINEMET)  mg per tablet 1.5 tabs, 3 times daily, 6-8 hours apart. 150 tablet 6    cholecalciferol, vitamin D3, (VITAMIN D3) 400 unit Cap Take 1,200 Units by mouth once daily.      gabapentin (NEURONTIN) 400 MG capsule TAKE ONE CAPSULE BY MOUTH FOUR TIMES DAILY (Patient taking differently: TAKE ONE CAPSULE BY MOUTH FOUR TIMES DAILY or 3 TIMES) 120 capsule 12    GLUCOSAMINE HCL/CHONDR MCCARTHY A NA (OSTEO BI-FLEX ORAL) Take by mouth once daily.      guaiFENesin (MUCINEX) 600 mg 12 hr tablet Take 1,200 mg by mouth.      insulin regular 100 unit/mL Inj injection Inject 5 Units into the skin.       levothyroxine (SYNTHROID) 137 MCG Tab tablet TAKE ONE TABLET BY MOUTH ONCE DAILY 30 tablet 3    MULTIVITAMIN W-MINERALS/LUTEIN (CENTRUM SILVER ORAL) Take 1 tablet by " "mouth once daily.       nabumetone (RELAFEN) 500 MG tablet TAKE TWO TABLETS BY MOUTH twice daily 60 tablet 12    selegiline (ELDEPRYL) 5 mg Cap TAKE ONE CAPSULE BY MOUTH twice daily 60 capsule 6    simvastatin (ZOCOR) 10 MG tablet TAKE ONE TABLET BY MOUTH EVERY EVENING 30 tablet 3    TRUE METRIX GLUCOSE TEST STRIP Strp TEST BLOOD SUGAR FOUR TIMES DAILY OR as directed 150 each 12    ULTICARE PEN NEEDLE 31 gauge x 1/4" Ndle use with insulin SIX times DAILY or as directed  12    valsartan-hydrochlorothiazide (DIOVAN-HCT) 320-12.5 mg per tablet TAKE ONE TABLET BY MOUTH ONCE DAILY 30 tablet 12    VITAMIN D2 50,000 unit capsule TAKE ONE CAPSULE BY MOUTH EVERY 7 DAYS 4 capsule 11    NOVOLOG FLEXPEN U-100 INSULIN 100 unit/mL InPn pen inject 30 units SUBCUTANEOUSLY BEFORE MEALS, unless eating small meals then use 25 units BEFORE MEALS 45 mL 12     No current facility-administered medications on file prior to visit.      Social History     Socioeconomic History    Marital status:      Spouse name: Not on file    Number of children: Not on file    Years of education: Not on file    Highest education level: Not on file   Social Needs    Financial resource strain: Not on file    Food insecurity - worry: Not on file    Food insecurity - inability: Not on file    Transportation needs - medical: Not on file    Transportation needs - non-medical: Not on file   Occupational History    Occupation: housewife   Tobacco Use    Smoking status: Never Smoker    Smokeless tobacco: Never Used   Substance and Sexual Activity    Alcohol use: Yes     Comment: once per month    Drug use: No    Sexual activity: Not on file   Other Topics Concern    Not on file   Social History Narrative    Not on file     Family History   Problem Relation Age of Onset    Cancer Maternal Aunt         breast    Cancer Maternal Grandmother         unknown    Cancer Maternal Aunt         unknown    Diabetes Mother     Glaucoma " Mother     Diabetes Father     Diabetes Sister     Macular degeneration Sister     Breast cancer Paternal Aunt     Breast cancer Paternal Aunt     Amblyopia Neg Hx     Blindness Neg Hx     Cataracts Neg Hx     Hypertension Neg Hx     Retinal detachment Neg Hx     Stroke Neg Hx     Strabismus Neg Hx     Thyroid disease Neg Hx          ROS:  SKIN: No rashes, itching or changes in color or texture of skin.  EYES: Visual acuity fine. No photophobia, ocular pain or diplopia.EARS: Denies ear pain, discharge or vertigo.NOSE: No loss of smell, no epistaxis some postnasal drip.MOUTH & THROAT: No hoarseness or change in voice. No excessive gum bleeding.CHEST: Denies ESPINAL, cyanosis, wheezing  CARDIOVASCULAR: Denies chest pain, PND, orthopnea or reduced exercise tolerance.  ABDOMEN:  No weight loss.No abdominal pain, no hematemesis or blood in stool.  URINARY: No flank pain, dysuria or hematuria.  PERIPHERAL VASCULAR: No claudication or cyanosis.  MUSCULOSKELETAL: Negative   NEUROLOGIC: No history of seizures, paralysis, alteration of gait or coordination.    PE: Vital signs as noted  Heent:Normocephalic with no recent cranial trauma,PERRLA,EOMI,conjunctiva clear,fundi reveal no hemmorhage exudate or papilledema.Otic canals clear, tympanic membranes slightly dull bilaterally.Nasal mucosa slightly red and edematous.Posterior pharynx slightly red but without exudate.  Neck:Supple with minimal anterior cervical adenopathy.  Chest:Clear bilateral breath sounds with mild scattered ronchi  Heart:Regular rhthym without murmer  Abdomen:Soft, non tender,no masses, no hepatosplenomegalyExtremeties and Neurologic:Grossly within normal limits  Impression: Upper Respiratory Infection. 465.9  Plan:   Montelusantosh Sepulvedaon

## 2018-10-17 ENCOUNTER — PATIENT MESSAGE (OUTPATIENT)
Dept: FAMILY MEDICINE | Facility: CLINIC | Age: 63
End: 2018-10-17

## 2018-10-17 RX ORDER — CEPHALEXIN 500 MG/1
500 CAPSULE ORAL EVERY 12 HOURS
Qty: 14 CAPSULE | Refills: 0 | Status: SHIPPED | OUTPATIENT
Start: 2018-10-17 | End: 2019-02-14

## 2018-10-18 ENCOUNTER — APPOINTMENT (OUTPATIENT)
Dept: LAB | Facility: HOSPITAL | Age: 63
End: 2018-10-18
Attending: FAMILY MEDICINE
Payer: MEDICAID

## 2018-10-19 RX ORDER — CARBIDOPA AND LEVODOPA 25; 100 MG/1; MG/1
TABLET ORAL
Qty: 120 TABLET | Refills: 6 | Status: SHIPPED | OUTPATIENT
Start: 2018-10-19 | End: 2019-03-27

## 2018-10-19 NOTE — TELEPHONE ENCOUNTER
Received PA for Novolog. Called in script for Admelog with same instructions per Daniela. Called pt and adv of change, voiced understanding.

## 2018-10-21 RX ORDER — INSULIN LISPRO 100 [IU]/ML
INJECTION, SOLUTION INTRAVENOUS; SUBCUTANEOUS
Qty: 30 ML | Refills: 11 | Status: SHIPPED | OUTPATIENT
Start: 2018-10-21 | End: 2019-02-14

## 2018-10-29 ENCOUNTER — TELEPHONE (OUTPATIENT)
Dept: ENDOCRINOLOGY | Facility: CLINIC | Age: 63
End: 2018-10-29

## 2018-10-29 NOTE — TELEPHONE ENCOUNTER
----- Message from Cris Plascencia sent at 10/29/2018  9:22 AM CDT -----  Contact: Jessica from [facility] [phone] ref [id number]    Jessica called about the prior authorization [facility] log Flex pen the Pa was faxed on Oct 26

## 2018-10-31 ENCOUNTER — TELEPHONE (OUTPATIENT)
Dept: FAMILY MEDICINE | Facility: CLINIC | Age: 63
End: 2018-10-31

## 2018-10-31 ENCOUNTER — PATIENT MESSAGE (OUTPATIENT)
Dept: FAMILY MEDICINE | Facility: CLINIC | Age: 63
End: 2018-10-31

## 2018-10-31 NOTE — TELEPHONE ENCOUNTER
Ok per Dr. Balbuena, message sent to pt through [employer] to let her know that the forms will be left at the

## 2018-10-31 NOTE — TELEPHONE ENCOUNTER
----- Message from Marilou Alejo MA sent at 10/31/2018  8:39 AM CDT -----  Pt is asking if you can clear her for cataract surgery without an appt. She was last seen on 10/16/18

## 2018-11-01 ENCOUNTER — TELEPHONE (OUTPATIENT)
Dept: ENDOCRINOLOGY | Facility: CLINIC | Age: 63
End: 2018-11-01

## 2018-11-01 NOTE — TELEPHONE ENCOUNTER
----- Message from Maggie Taylor sent at 11/1/2018  2:34 PM CDT -----  Contact: danie-cover my meds  called rg status of novalog flex pen 100 unit req, faxed 10/29/18...[phone]

## 2018-11-02 ENCOUNTER — PATIENT MESSAGE (OUTPATIENT)
Dept: FAMILY MEDICINE | Facility: CLINIC | Age: 63
End: 2018-11-02

## 2018-11-02 RX ORDER — ALBUTEROL SULFATE 90 UG/1
2 AEROSOL, METERED RESPIRATORY (INHALATION) EVERY 6 HOURS PRN
Qty: 18 G | Refills: 0 | Status: SHIPPED | OUTPATIENT
Start: 2018-11-02 | End: 2018-12-18 | Stop reason: SDUPTHER

## 2018-12-03 ENCOUNTER — LAB VISIT (OUTPATIENT)
Dept: LAB | Facility: HOSPITAL | Age: 63
End: 2018-12-03
Attending: NURSE PRACTITIONER
Payer: MEDICARE

## 2018-12-03 DIAGNOSIS — Z79.4 TYPE 2 DIABETES MELLITUS WITH DIABETIC NEPHROPATHY, WITH LONG-TERM CURRENT USE OF INSULIN: ICD-10-CM

## 2018-12-03 DIAGNOSIS — E11.21 TYPE 2 DIABETES MELLITUS WITH DIABETIC NEPHROPATHY, WITH LONG-TERM CURRENT USE OF INSULIN: ICD-10-CM

## 2018-12-03 LAB
25(OH)D3+25(OH)D2 SERPL-MCNC: 57 NG/ML
ALBUMIN SERPL BCP-MCNC: 3.7 G/DL
ALP SERPL-CCNC: 73 U/L
ALT SERPL W/O P-5'-P-CCNC: <5 U/L
ANION GAP SERPL CALC-SCNC: 10 MMOL/L
AST SERPL-CCNC: 10 U/L
BILIRUB SERPL-MCNC: 0.4 MG/DL
BUN SERPL-MCNC: 22 MG/DL
CALCIUM SERPL-MCNC: 9.5 MG/DL
CHLORIDE SERPL-SCNC: 97 MMOL/L
CHOLEST SERPL-MCNC: 235 MG/DL
CHOLEST/HDLC SERPL: 4.1 {RATIO}
CO2 SERPL-SCNC: 30 MMOL/L
CREAT SERPL-MCNC: 0.9 MG/DL
EST. GFR  (AFRICAN AMERICAN): >60 ML/MIN/1.73 M^2
EST. GFR  (NON AFRICAN AMERICAN): >60 ML/MIN/1.73 M^2
ESTIMATED AVG GLUCOSE: 128 MG/DL
GLUCOSE SERPL-MCNC: 171 MG/DL
HBA1C MFR BLD HPLC: 6.1 %
HDLC SERPL-MCNC: 58 MG/DL
HDLC SERPL: 24.7 %
LDLC SERPL CALC-MCNC: 136 MG/DL
NONHDLC SERPL-MCNC: 177 MG/DL
POTASSIUM SERPL-SCNC: 4.4 MMOL/L
PROT SERPL-MCNC: 7.4 G/DL
SODIUM SERPL-SCNC: 137 MMOL/L
TRIGL SERPL-MCNC: 205 MG/DL
TSH SERPL DL<=0.005 MIU/L-ACNC: 0.64 UIU/ML

## 2018-12-03 PROCEDURE — 82306 VITAMIN D 25 HYDROXY: CPT

## 2018-12-03 PROCEDURE — 36415 COLL VENOUS BLD VENIPUNCTURE: CPT | Mod: PO

## 2018-12-03 PROCEDURE — 80061 LIPID PANEL: CPT

## 2018-12-03 PROCEDURE — 83036 HEMOGLOBIN GLYCOSYLATED A1C: CPT

## 2018-12-03 PROCEDURE — 80053 COMPREHEN METABOLIC PANEL: CPT

## 2018-12-03 PROCEDURE — 84443 ASSAY THYROID STIM HORMONE: CPT

## 2018-12-10 ENCOUNTER — OFFICE VISIT (OUTPATIENT)
Dept: ENDOCRINOLOGY | Facility: CLINIC | Age: 63
End: 2018-12-10
Payer: MEDICARE

## 2018-12-10 VITALS
BODY MASS INDEX: 40.4 KG/M2 | HEART RATE: 88 BPM | HEIGHT: 61 IN | WEIGHT: 214 LBS | RESPIRATION RATE: 18 BRPM | SYSTOLIC BLOOD PRESSURE: 148 MMHG | DIASTOLIC BLOOD PRESSURE: 82 MMHG

## 2018-12-10 DIAGNOSIS — Z79.4 TYPE 2 DIABETES MELLITUS WITH DIABETIC NEPHROPATHY, WITH LONG-TERM CURRENT USE OF INSULIN: Primary | ICD-10-CM

## 2018-12-10 DIAGNOSIS — E11.21 TYPE 2 DIABETES MELLITUS WITH DIABETIC NEPHROPATHY, WITH LONG-TERM CURRENT USE OF INSULIN: Primary | ICD-10-CM

## 2018-12-10 PROCEDURE — 99213 OFFICE O/P EST LOW 20 MIN: CPT | Mod: S$PBB,,, | Performed by: NURSE PRACTITIONER

## 2018-12-10 PROCEDURE — 99215 OFFICE O/P EST HI 40 MIN: CPT | Mod: PBBFAC,PO | Performed by: NURSE PRACTITIONER

## 2018-12-10 PROCEDURE — 99999 PR PBB SHADOW E&M-EST. PATIENT-LVL V: CPT | Mod: PBBFAC,,, | Performed by: NURSE PRACTITIONER

## 2018-12-10 RX ORDER — INSULIN LISPRO 100 U/ML
INJECTION, SOLUTION SUBCUTANEOUS
Refills: 11 | COMMUNITY
Start: 2018-11-19 | End: 2019-03-18 | Stop reason: CLARIF

## 2018-12-18 RX ORDER — ALBUTEROL SULFATE 90 UG/1
AEROSOL, METERED RESPIRATORY (INHALATION)
Qty: 18 G | Refills: 12 | Status: SHIPPED | OUTPATIENT
Start: 2018-12-18 | End: 2020-02-25

## 2018-12-19 RX ORDER — SELEGILINE HYDROCHLORIDE 5 MG/1
CAPSULE ORAL
Qty: 60 CAPSULE | Refills: 6 | Status: SHIPPED | OUTPATIENT
Start: 2018-12-19 | End: 2019-08-27 | Stop reason: SDUPTHER

## 2019-01-08 ENCOUNTER — TELEPHONE (OUTPATIENT)
Dept: NEUROLOGY | Facility: HOSPITAL | Age: 64
End: 2019-01-08

## 2019-01-08 DIAGNOSIS — C7A.090 MALIGNANT CARCINOID TUMOR OF BRONCHUS AND LUNG: Primary | ICD-10-CM

## 2019-01-17 RX ORDER — SIMVASTATIN 10 MG/1
TABLET, FILM COATED ORAL
Qty: 30 TABLET | Refills: 3 | Status: SHIPPED | OUTPATIENT
Start: 2019-01-17 | End: 2019-05-14 | Stop reason: SDUPTHER

## 2019-01-17 RX ORDER — MONTELUKAST SODIUM 10 MG/1
TABLET ORAL
Qty: 30 TABLET | Refills: 12 | Status: SHIPPED | OUTPATIENT
Start: 2019-01-17 | End: 2020-02-10

## 2019-01-17 RX ORDER — LEVOTHYROXINE SODIUM 137 UG/1
TABLET ORAL
Qty: 30 TABLET | Refills: 3 | Status: SHIPPED | OUTPATIENT
Start: 2019-01-17 | End: 2019-05-14 | Stop reason: SDUPTHER

## 2019-02-06 ENCOUNTER — PATIENT MESSAGE (OUTPATIENT)
Dept: ENDOCRINOLOGY | Facility: CLINIC | Age: 64
End: 2019-02-06

## 2019-02-06 RX ORDER — LANCETS 33 GAUGE
1 EACH MISCELLANEOUS 4 TIMES DAILY
Qty: 200 EACH | Refills: 11 | Status: SHIPPED | OUTPATIENT
Start: 2019-02-06 | End: 2019-02-20 | Stop reason: SDUPTHER

## 2019-02-07 ENCOUNTER — LAB VISIT (OUTPATIENT)
Dept: LAB | Facility: HOSPITAL | Age: 64
End: 2019-02-07
Attending: FAMILY MEDICINE
Payer: MEDICARE

## 2019-02-07 DIAGNOSIS — E03.9 HYPOTHYROIDISM, UNSPECIFIED TYPE: ICD-10-CM

## 2019-02-07 DIAGNOSIS — E11.8 TYPE 2 DIABETES MELLITUS WITH COMPLICATION, WITH LONG-TERM CURRENT USE OF INSULIN: ICD-10-CM

## 2019-02-07 DIAGNOSIS — Z79.4 TYPE 2 DIABETES MELLITUS WITH COMPLICATION, WITH LONG-TERM CURRENT USE OF INSULIN: ICD-10-CM

## 2019-02-07 LAB
ALBUMIN SERPL BCP-MCNC: 3.9 G/DL
ALP SERPL-CCNC: 69 U/L
ALT SERPL W/O P-5'-P-CCNC: <5 U/L
ANION GAP SERPL CALC-SCNC: 10 MMOL/L
AST SERPL-CCNC: 13 U/L
BILIRUB SERPL-MCNC: 0.4 MG/DL
BUN SERPL-MCNC: 28 MG/DL
CALCIUM SERPL-MCNC: 9.8 MG/DL
CHLORIDE SERPL-SCNC: 101 MMOL/L
CHOLEST SERPL-MCNC: 225 MG/DL
CHOLEST/HDLC SERPL: 3.9 {RATIO}
CO2 SERPL-SCNC: 29 MMOL/L
CREAT SERPL-MCNC: 1 MG/DL
EST. GFR  (AFRICAN AMERICAN): >60 ML/MIN/1.73 M^2
EST. GFR  (NON AFRICAN AMERICAN): 59.3 ML/MIN/1.73 M^2
ESTIMATED AVG GLUCOSE: 137 MG/DL
GLUCOSE SERPL-MCNC: 137 MG/DL
HBA1C MFR BLD HPLC: 6.4 %
HDLC SERPL-MCNC: 57 MG/DL
HDLC SERPL: 25.3 %
LDLC SERPL CALC-MCNC: 129 MG/DL
NONHDLC SERPL-MCNC: 168 MG/DL
POTASSIUM SERPL-SCNC: 4.5 MMOL/L
PROT SERPL-MCNC: 7.3 G/DL
SODIUM SERPL-SCNC: 140 MMOL/L
TRIGL SERPL-MCNC: 195 MG/DL
TSH SERPL DL<=0.005 MIU/L-ACNC: 1.25 UIU/ML

## 2019-02-07 PROCEDURE — 84443 ASSAY THYROID STIM HORMONE: CPT

## 2019-02-07 PROCEDURE — 36415 COLL VENOUS BLD VENIPUNCTURE: CPT | Mod: PO

## 2019-02-07 PROCEDURE — 80053 COMPREHEN METABOLIC PANEL: CPT

## 2019-02-07 PROCEDURE — 83036 HEMOGLOBIN GLYCOSYLATED A1C: CPT

## 2019-02-07 PROCEDURE — 80061 LIPID PANEL: CPT

## 2019-02-14 ENCOUNTER — OFFICE VISIT (OUTPATIENT)
Dept: ENDOCRINOLOGY | Facility: CLINIC | Age: 64
End: 2019-02-14
Payer: MEDICARE

## 2019-02-14 VITALS
HEIGHT: 61 IN | BODY MASS INDEX: 40.44 KG/M2 | SYSTOLIC BLOOD PRESSURE: 110 MMHG | WEIGHT: 214.19 LBS | DIASTOLIC BLOOD PRESSURE: 64 MMHG | HEART RATE: 87 BPM

## 2019-02-14 DIAGNOSIS — R13.10 DYSPHAGIA, UNSPECIFIED TYPE: ICD-10-CM

## 2019-02-14 DIAGNOSIS — N18.6 TYPE 2 DIABETES MELLITUS WITH CHRONIC KIDNEY DISEASE ON CHRONIC DIALYSIS, WITH LONG-TERM CURRENT USE OF INSULIN: ICD-10-CM

## 2019-02-14 DIAGNOSIS — Z99.2 TYPE 2 DIABETES MELLITUS WITH CHRONIC KIDNEY DISEASE ON CHRONIC DIALYSIS, WITH LONG-TERM CURRENT USE OF INSULIN: ICD-10-CM

## 2019-02-14 DIAGNOSIS — E03.9 HYPOTHYROIDISM, UNSPECIFIED TYPE: Primary | ICD-10-CM

## 2019-02-14 DIAGNOSIS — E11.22 TYPE 2 DIABETES MELLITUS WITH CHRONIC KIDNEY DISEASE ON CHRONIC DIALYSIS, WITH LONG-TERM CURRENT USE OF INSULIN: ICD-10-CM

## 2019-02-14 DIAGNOSIS — I10 BENIGN ESSENTIAL HTN: ICD-10-CM

## 2019-02-14 DIAGNOSIS — Z79.4 TYPE 2 DIABETES MELLITUS WITH CHRONIC KIDNEY DISEASE ON CHRONIC DIALYSIS, WITH LONG-TERM CURRENT USE OF INSULIN: ICD-10-CM

## 2019-02-14 DIAGNOSIS — E78.5 HYPERLIPIDEMIA, UNSPECIFIED HYPERLIPIDEMIA TYPE: ICD-10-CM

## 2019-02-14 PROCEDURE — 99213 OFFICE O/P EST LOW 20 MIN: CPT | Mod: PBBFAC,PO | Performed by: INTERNAL MEDICINE

## 2019-02-14 PROCEDURE — 99499 RISK ADDL DX/OHS AUDIT: ICD-10-PCS | Mod: S$GLB,,, | Performed by: INTERNAL MEDICINE

## 2019-02-14 PROCEDURE — 99214 OFFICE O/P EST MOD 30 MIN: CPT | Mod: S$PBB,,, | Performed by: INTERNAL MEDICINE

## 2019-02-14 PROCEDURE — 99499 UNLISTED E&M SERVICE: CPT | Mod: S$PBB,,, | Performed by: INTERNAL MEDICINE

## 2019-02-14 PROCEDURE — 99214 PR OFFICE/OUTPT VISIT, EST, LEVL IV, 30-39 MIN: ICD-10-PCS | Mod: S$PBB,,, | Performed by: INTERNAL MEDICINE

## 2019-02-14 PROCEDURE — 99999 PR PBB SHADOW E&M-EST. PATIENT-LVL III: CPT | Mod: PBBFAC,,, | Performed by: INTERNAL MEDICINE

## 2019-02-14 PROCEDURE — 99999 PR PBB SHADOW E&M-EST. PATIENT-LVL III: ICD-10-PCS | Mod: PBBFAC,,, | Performed by: INTERNAL MEDICINE

## 2019-02-14 RX ORDER — AMOXICILLIN 500 MG
4 CAPSULE ORAL DAILY
COMMUNITY
End: 2024-01-26

## 2019-02-14 NOTE — PROGRESS NOTES
CHIEF COMPLAINT: DM 2/Hypothyroidism  63 year old being seen as a f/u. Nodules was found on on a PET scan. FNA 11/25/14 shows adenomatous nodule with cystic changes. S/P thyroidectomy 5/24/16. Basaglar 35/35. Admelog 30 TID. Saw DM ED 4/2016. On synthroid 137 mcg once daily. Has history of myalgias with statin. Currently on simvastatin 10 mg. No myalgias. + Fatigue. Gets 6 hours of sleep. She did get a flu vaccine at [facility] in Dec.           PATH  THYROID GLAND, TOTAL THYROIDECTOMY:  - NODULAR GOITER WITH FOCAL DEGENERATIVE CHANGES.  - NO EVIDENCE OF MALIGNANCY.        PAST MEDICAL HISTORY/PAST SURGICAL HISTORY:  Reviewed in [employer]    SOCIAL HISTORY: No T/A.    FAMILY HISTORY:  No known thyroid disease or thyroid cancer. + DM 2.     MEDICATIONS/ALLERGIES: The patient's MedCard has been updated and reviewed.      ROS:   Constitutional: weight stable.   Eyes: No recent visual changes  ENT: + dysphagia  Cardiovascular: Denies current anginal symptoms  Respiratory: No SOB  Gastrointestinal: No N/V  GenitoUrinary - No dysuria  Skin: No new skin rash  Neurologic: States parkinsons under control.   Remainder ROS negative        PE:    GENERAL: Well developed, well nourished.  NECK: Supple, trachea midline, thyroidectomy scar intact  CHEST: Resp even and unlabored, CTA bilateral.  CARDIAC: RRR, S1, S2 heard, no murmurs, rubs, S3, or S4  FEET: Normal monofilament. No cuts or abrasions. 2 + pedal pulses.       Results for ROBERTO MULLIGAN (MRN [id number]) as of 2/14/2019 08:35   Ref. Range 2/7/2019 08:01   Sodium Latest Ref Range: 136 - 145 mmol/L 140   Potassium Latest Ref Range: 3.5 - 5.1 mmol/L 4.5   Chloride Latest Ref Range: 95 - 110 mmol/L 101   CO2 Latest Ref Range: 23 - 29 mmol/L 29   Anion Gap Latest Ref Range: 8 - 16 mmol/L 10   BUN, Bld Latest Ref Range: 8 - 23 mg/dL 28 (H)   Creatinine Latest Ref Range: 0.5 - 1.4 mg/dL 1.0   eGFR if non African American Latest Ref Range: >60 mL/min/1.73 m^2 59.3  (A)   eGFR if  Latest Ref Range: >60 mL/min/1.73 m^2 >60.0   Glucose Latest Ref Range: 70 - 110 mg/dL 137 (H)   Calcium Latest Ref Range: 8.7 - 10.5 mg/dL 9.8   Alkaline Phosphatase Latest Ref Range: 55 - 135 U/L 69   Total Protein Latest Ref Range: 6.0 - 8.4 g/dL 7.3   Albumin Latest Ref Range: 3.5 - 5.2 g/dL 3.9   Total Bilirubin Latest Ref Range: 0.1 - 1.0 mg/dL 0.4   AST Latest Ref Range: 10 - 40 U/L 13   ALT Latest Ref Range: 10 - 44 U/L <5 (L)   Triglycerides Latest Ref Range: 30 - 150 mg/dL 195 (H)   Cholesterol Latest Ref Range: 120 - 199 mg/dL 225 (H)   HDL Latest Ref Range: 40 - 75 mg/dL 57   HDL/Chol Ratio Latest Ref Range: 20.0 - 50.0 % 25.3   LDL Cholesterol Latest Ref Range: 63.0 - 159.0 mg/dL 129.0   Non-HDL Cholesterol Latest Units: mg/dL 168   Total Cholesterol/HDL Ratio Latest Ref Range: 2.0 - 5.0  3.9   Hemoglobin A1C External Latest Ref Range: 4.0 - 5.6 % 6.4 (H)   Estimated Avg Glucose Latest Ref Range: 68 - 131 mg/dL 137 (H)   TSH Latest Ref Range: 0.400 - 4.000 uIU/mL 1.254         ASSESSMENT/PLAN:  1. Post Surgical Hypothyroidism- For MNG. Path benign. TSH WNL. Discussed unlikely that the fatigue is thyroid related based on current Tx. Possible that fatigue may be multifactorial including from medications. Continue current dose of synthroid. Can f/u with me PRN if uncontrolled.     2. HTN- controlled. Continue current Tx.     3. DM 2- With nephropathy and neuropathy. Denies hypoglycemia. A1c shows good control. Update HM. Updated foot exam and she did receive flu vaccine.      4. Hyperlipidemia- Has had myalgias with lipitor. Tolerating lower dose of statin     5. parkinsons- no worsening of tremors    6. Dysphagia- discussed seeing GI. States she would like to discuss with her neurologist 1st.     FOLLOWUP  AVS

## 2019-02-18 ENCOUNTER — TELEPHONE (OUTPATIENT)
Dept: ENDOCRINOLOGY | Facility: CLINIC | Age: 64
End: 2019-02-18

## 2019-02-18 NOTE — TELEPHONE ENCOUNTER
----- Message from Ursula Blanc sent at 2/18/2019 10:30 AM CST -----  Contact: Britany from [employer] [facility]  Type: Needs Medical Advice    Who Called:  Britany  Best Call Back Number: [phone]  Additional Information:  States needs diagnosis code for diabetes to do the request. Thanks!

## 2019-02-18 NOTE — TELEPHONE ENCOUNTER
Spoke with Britany with [facility], dx code given, faxed clinical notes as requested

## 2019-02-19 ENCOUNTER — PATIENT MESSAGE (OUTPATIENT)
Dept: ENDOCRINOLOGY | Facility: CLINIC | Age: 64
End: 2019-02-19

## 2019-02-19 NOTE — TELEPHONE ENCOUNTER
Dr. Qureshi, pt states the Dx code used for her 2/14/19 visit is incorrect.  She does not have kidney disease and has never been on dialysis.  Can this be changed?

## 2019-02-20 ENCOUNTER — TELEPHONE (OUTPATIENT)
Dept: ENDOCRINOLOGY | Facility: CLINIC | Age: 64
End: 2019-02-20

## 2019-02-20 RX ORDER — LANCETS 33 GAUGE
EACH MISCELLANEOUS
Qty: 250 EACH | Refills: 11 | Status: SHIPPED | OUTPATIENT
Start: 2019-02-20 | End: 2022-06-01

## 2019-02-20 NOTE — TELEPHONE ENCOUNTER
Spoke to pt, she stated [facility] adv her sending enough testing supplies for QID and she test 7 or 8 times daily and wants new scripts sent to [facility]. Please advise.

## 2019-02-26 ENCOUNTER — PATIENT OUTREACH (OUTPATIENT)
Dept: ADMINISTRATIVE | Facility: HOSPITAL | Age: 64
End: 2019-02-26

## 2019-03-18 ENCOUNTER — PATIENT MESSAGE (OUTPATIENT)
Dept: ENDOCRINOLOGY | Facility: CLINIC | Age: 64
End: 2019-03-18

## 2019-03-18 RX ORDER — INSULIN LISPRO 100 U/ML
INJECTION, SOLUTION SUBCUTANEOUS
Refills: 11 | Status: CANCELLED | OUTPATIENT
Start: 2019-03-18

## 2019-03-18 RX ORDER — INSULIN ASPART 100 [IU]/ML
30 INJECTION, SOLUTION INTRAVENOUS; SUBCUTANEOUS
Qty: 2 BOX | Refills: 11 | Status: SHIPPED | OUTPATIENT
Start: 2019-03-18 | End: 2019-04-16

## 2019-03-20 RX ORDER — NABUMETONE 500 MG/1
TABLET, FILM COATED ORAL
Qty: 60 TABLET | Refills: 12 | Status: SHIPPED | OUTPATIENT
Start: 2019-03-20 | End: 2019-03-27

## 2019-03-22 ENCOUNTER — HOSPITAL ENCOUNTER (OUTPATIENT)
Dept: RADIOLOGY | Facility: HOSPITAL | Age: 64
Discharge: HOME | End: 2019-03-22
Attending: INTERNAL MEDICINE
Payer: MEDICARE

## 2019-03-22 DIAGNOSIS — C7A.090 MALIGNANT CARCINOID TUMOR OF BRONCHUS AND LUNG: ICD-10-CM

## 2019-03-22 PROCEDURE — 71250 CT THORAX DX C-: CPT | Mod: 26,,, | Performed by: RADIOLOGY

## 2019-03-22 PROCEDURE — 71250 CT CHEST WITHOUT CONTRAST: ICD-10-PCS | Mod: 26,,, | Performed by: RADIOLOGY

## 2019-03-22 PROCEDURE — 71250 CT THORAX DX C-: CPT | Mod: TC,PO

## 2019-03-27 ENCOUNTER — OFFICE VISIT (OUTPATIENT)
Dept: NEUROLOGY | Facility: CLINIC | Age: 64
End: 2019-03-27
Payer: MEDICARE

## 2019-03-27 ENCOUNTER — OFFICE VISIT (OUTPATIENT)
Dept: HEMATOLOGY/ONCOLOGY | Facility: CLINIC | Age: 64
End: 2019-03-27
Payer: MEDICARE

## 2019-03-27 VITALS
WEIGHT: 212.31 LBS | DIASTOLIC BLOOD PRESSURE: 73 MMHG | HEIGHT: 61 IN | BODY MASS INDEX: 40.08 KG/M2 | HEART RATE: 80 BPM | RESPIRATION RATE: 16 BRPM | OXYGEN SATURATION: 95 % | TEMPERATURE: 98 F | SYSTOLIC BLOOD PRESSURE: 134 MMHG

## 2019-03-27 DIAGNOSIS — E53.8 DEFICIENCY OF OTHER SPECIFIED B GROUP VITAMINS: ICD-10-CM

## 2019-03-27 DIAGNOSIS — G47.00 INSOMNIA, UNSPECIFIED TYPE: ICD-10-CM

## 2019-03-27 DIAGNOSIS — R91.8 PULMONARY NODULES: ICD-10-CM

## 2019-03-27 DIAGNOSIS — G20.C PARKINSONISM, UNSPECIFIED PARKINSONISM TYPE: ICD-10-CM

## 2019-03-27 DIAGNOSIS — M54.16 LUMBAR RADICULOPATHY: ICD-10-CM

## 2019-03-27 DIAGNOSIS — C7A.090 MALIGNANT CARCINOID TUMOR OF BRONCHUS AND LUNG: Primary | ICD-10-CM

## 2019-03-27 DIAGNOSIS — G25.2 RESTING TREMOR: ICD-10-CM

## 2019-03-27 DIAGNOSIS — E55.9 VITAMIN D DEFICIENCY: ICD-10-CM

## 2019-03-27 DIAGNOSIS — K59.00 CONSTIPATION, UNSPECIFIED CONSTIPATION TYPE: ICD-10-CM

## 2019-03-27 DIAGNOSIS — G47.52 REM SLEEP BEHAVIOR DISORDER: ICD-10-CM

## 2019-03-27 DIAGNOSIS — D49.1 NEOPLASM OF LUNG: ICD-10-CM

## 2019-03-27 DIAGNOSIS — R53.83 FATIGUE, UNSPECIFIED TYPE: Primary | ICD-10-CM

## 2019-03-27 DIAGNOSIS — E89.0 POST-OPERATIVE HYPOTHYROIDISM: ICD-10-CM

## 2019-03-27 PROCEDURE — 3078F PR MOST RECENT DIASTOLIC BLOOD PRESSURE < 80 MM HG: ICD-10-PCS | Mod: CPTII,S$GLB,, | Performed by: INTERNAL MEDICINE

## 2019-03-27 PROCEDURE — 3066F NEPHROPATHY DOC TX: CPT | Mod: CPTII,GC,S$GLB, | Performed by: PSYCHIATRY & NEUROLOGY

## 2019-03-27 PROCEDURE — 99999 PR PBB SHADOW E&M-EST. PATIENT-LVL III: ICD-10-PCS | Mod: PBBFAC,GC,, | Performed by: PSYCHIATRY & NEUROLOGY

## 2019-03-27 PROCEDURE — 3075F SYST BP GE 130 - 139MM HG: CPT | Mod: CPTII,S$GLB,, | Performed by: INTERNAL MEDICINE

## 2019-03-27 PROCEDURE — 1101F PT FALLS ASSESS-DOCD LE1/YR: CPT | Mod: CPTII,S$GLB,, | Performed by: INTERNAL MEDICINE

## 2019-03-27 PROCEDURE — 1101F PR PT FALLS ASSESS DOC 0-1 FALLS W/OUT INJ PAST YR: ICD-10-PCS | Mod: CPTII,S$GLB,, | Performed by: INTERNAL MEDICINE

## 2019-03-27 PROCEDURE — 3061F NEG MICROALBUMINURIA REV: CPT | Mod: CPTII,GC,S$GLB, | Performed by: PSYCHIATRY & NEUROLOGY

## 2019-03-27 PROCEDURE — 3075F PR MOST RECENT SYSTOLIC BLOOD PRESS GE 130-139MM HG: ICD-10-PCS | Mod: CPTII,S$GLB,, | Performed by: INTERNAL MEDICINE

## 2019-03-27 PROCEDURE — 99999 PR PBB SHADOW E&M-EST. PATIENT-LVL III: CPT | Mod: PBBFAC,,, | Performed by: INTERNAL MEDICINE

## 2019-03-27 PROCEDURE — 3008F PR BODY MASS INDEX (BMI) DOCUMENTED: ICD-10-PCS | Mod: CPTII,S$GLB,, | Performed by: INTERNAL MEDICINE

## 2019-03-27 PROCEDURE — 3008F BODY MASS INDEX DOCD: CPT | Mod: CPTII,S$GLB,, | Performed by: INTERNAL MEDICINE

## 2019-03-27 PROCEDURE — 99214 OFFICE O/P EST MOD 30 MIN: CPT | Mod: GC,S$GLB,, | Performed by: PSYCHIATRY & NEUROLOGY

## 2019-03-27 PROCEDURE — 99214 OFFICE O/P EST MOD 30 MIN: CPT | Mod: S$GLB,,, | Performed by: INTERNAL MEDICINE

## 2019-03-27 PROCEDURE — 99999 PR PBB SHADOW E&M-EST. PATIENT-LVL III: ICD-10-PCS | Mod: PBBFAC,,, | Performed by: INTERNAL MEDICINE

## 2019-03-27 PROCEDURE — 3044F HG A1C LEVEL LT 7.0%: CPT | Mod: CPTII,GC,S$GLB, | Performed by: PSYCHIATRY & NEUROLOGY

## 2019-03-27 PROCEDURE — 99999 PR PBB SHADOW E&M-EST. PATIENT-LVL III: CPT | Mod: PBBFAC,GC,, | Performed by: PSYCHIATRY & NEUROLOGY

## 2019-03-27 PROCEDURE — 99214 PR OFFICE/OUTPT VISIT, EST, LEVL IV, 30-39 MIN: ICD-10-PCS | Mod: S$GLB,,, | Performed by: INTERNAL MEDICINE

## 2019-03-27 PROCEDURE — 99499 RISK ADDL DX/OHS AUDIT: ICD-10-PCS | Mod: S$GLB,,, | Performed by: INTERNAL MEDICINE

## 2019-03-27 PROCEDURE — 99499 UNLISTED E&M SERVICE: CPT | Mod: S$GLB,,, | Performed by: INTERNAL MEDICINE

## 2019-03-27 PROCEDURE — 3078F DIAST BP <80 MM HG: CPT | Mod: CPTII,S$GLB,, | Performed by: INTERNAL MEDICINE

## 2019-03-27 RX ORDER — CLONAZEPAM 0.5 MG/1
0.5 TABLET ORAL NIGHTLY
Qty: 30 TABLET | Refills: 5 | Status: SHIPPED | OUTPATIENT
Start: 2019-03-27 | End: 2020-02-25

## 2019-03-27 RX ORDER — INSULIN LISPRO 100 U/ML
INJECTION, SOLUTION SUBCUTANEOUS
Refills: 11 | COMMUNITY
Start: 2019-03-20 | End: 2019-06-10

## 2019-03-27 RX ORDER — TALC
6 POWDER (GRAM) TOPICAL NIGHTLY
COMMUNITY
End: 2024-01-26

## 2019-03-27 NOTE — PATIENT INSTRUCTIONS
Constipation:  - Drink prune juice everyday  - Take fiber supplement every as well.   - Take miralax only if you have not had a bowel movement in 24 hours.     Insomnia  - Take the second dose of the selegiline earlier in the day.  - Start klonopin 0.5mg at night for sleep.

## 2019-03-27 NOTE — PROGRESS NOTES
NOLANETS:  [facility]  A collaboration between [facility] and [facility]    PATIENT: Jessica Rojas  MRN: [id number]  DATE: 3/27/2019      Diagnosis:   1. Malignant carcinoid tumor of bronchus and lung    2. 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 Rojas is a 63 y.o. female seen in follow up for a bronchial carcinoid.  She states she generally has been feeling well. She still has some intermittent wheezing.  No other new complaints.    Her history dates to 2009 when she was diagnosed with pneumonia.  She had yearly bouts of this until last year when she had a CT scan done which showed a right hilar mass, 3.4 cm.  A bronchoscopy was done showing an obstructive tumor in the right bronchus intermedius.  Pathology was initially read as possible small cell.  Re-review here shows and atypical carcinoid, intermediate grade, well differentiated with Ki-67 of 4%.   She underwent a right middle and right lower bilobectomy on 8/7/14.  She was found to have a T2a, N0, M0 typical carcinoid.      Past Medical History:   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    Malignant carcinoid tumor of the bronchus  and lung 2014    Mediastinal lymphadenopathy 2015    Obesity, morbid 2014    Parkinsons 10/2017    Pituitary adenoma 's    took parladel for 3 yrs    Pneumonia     Postoperative hypothyroidism 2017    - s/p total thyroidectomy in  (parathyroids intact) with post op hypothyroidism; on synthroid    Pulmonary nodules 2018    Thyroid disease     Wheeze 2014       Past Surgical HIstory:   Past Surgical History:   Procedure Laterality Date    APPENDECTOMY      BREAST CYST ASPIRATION      BRONCHOSCOPY  2014, 2014    BRONCHOSCOPY N/A 2015    Performed by [facility]i Koroma [facility] at [facility] OR [address] [place] [address]    BRONCHOSCOPY-FIBEROPTIC N/A 2014    Performed by Jose Bland MD at [facility] OR [address] [place] [address]    BRONCHOSCOPY-OPERATIVE,FLEXIBLE Right 2014    Performed by Jong Wiggins MD at [facility] OR [address] [place] [address]     SECTION  , 1986    x2    DISSECTION-LYMPH NODE Right 2014    Performed by Jose Bland MD at [facility] OR [address] [place] [address]    ENDOBRONCHIAL ULTRASOUND (EBUS) N/A 2015    [facility]    LAVAGE-ALVEOLAR Right 2014    Performed by Jong Wiggins MD at [facility] OR [address] [place] [address]    LAVAGE-ALVEOLAR N/A 2014    Performed by Jose Bland MD at [facility] OR [address] [place] [address]    LUNG REMOVAL, PARTIAL Right     with 17 lymph nodes    THORACOTOMY/LOBECTOMY RML SLEEVE LOBECTOMY WITH BRONCHOPLASTY CPT 22132 Right 2014    [facility]    THYROIDECTOMY  2016    THYROIDECTOMY N/A 2016    [facility] OR    TONSILLECTOMY         Family History:   Family History   Problem Relation Age of Onset    Cancer Maternal Aunt         breast    Cancer Maternal Grandmother         unknown    Cancer Maternal Aunt         unknown    Diabetes Mother     Glaucoma Mother     Diabetes Father     Diabetes Sister     Macular degeneration Sister      Breast cancer Paternal Aunt     Breast cancer Paternal Aunt     Amblyopia Neg Hx     Blindness Neg Hx     Cataracts Neg Hx     Hypertension Neg Hx     Retinal detachment Neg Hx     Stroke Neg Hx     Strabismus Neg Hx     Thyroid disease Neg Hx        Social History:  reports that she has never smoked. She has never used smokeless tobacco. She reports that she drinks alcohol. She reports that she does not use drugs.    Allergies:  Allergies   Allergen Reactions    Propranolol Nausea And Vomiting     Drops pressure and raised sugar    Lipitor [Atorvastatin] Other (See Comments)     Myalgia, muscle pain       Medications:  Current Outpatient Medications   Medication Sig Dispense Refill    ADMELOG SOLOSTAR U-100 INSULIN 100 unit/mL pen INJECT 30 units SUBCUTANEOUSLY before meals, unless eating small meals then use 25 units before meals  11    amantadine HCl (SYMMETREL) 100 mg capsule Take 1 capsule (100 mg total) by mouth 2 (two) times daily. Once a day for 1 week and then twice a day the following week and thereafter. 60 capsule 11    blood sugar diagnostic (TRUE METRIX GLUCOSE TEST STRIP) Strp Check bg 7-8 times/day 250 each 12    carbidopa-levodopa  mg (SINEMET)  mg per tablet 1.5 tabs, 3 times daily, 6-8 hours apart. 150 tablet 6    cholecalciferol, vitamin D3, (VITAMIN D3) 400 unit Cap Take 1,200 Units by mouth once daily.      fish oil-omega-3 fatty acids 300-1,000 mg capsule Take 4 capsules by mouth once daily.      gabapentin (NEURONTIN) 400 MG capsule TAKE ONE CAPSULE BY MOUTH FOUR TIMES DAILY (Patient taking differently: 1 tab BID) 120 capsule 12    GLUCOSAMINE HCL/CHONDR MCCARTHY A NA (OSTEO BI-FLEX ORAL) Take by mouth once daily.      insulin aspart U-100 (NOVOLOG FLEXPEN U-100 INSULIN) 100 unit/mL InPn pen Inject 30 Units into the skin 3 (three) times daily with meals. 2 Box 11    lancets 33 gauge Misc Checks bg 7-8 times a dayTrue metrix 250 each 11    levothyroxine  "(SYNTHROID) 137 MCG Tab tablet TAKE ONE TABLET BY MOUTH ONCE DAILY 30 tablet 3    melatonin 3 mg Tab Take by mouth.      montelukast (SINGULAIR) 10 mg tablet TAKE ONE TABLET BY MOUTH EVERY EVENING 30 tablet 12    MULTIVITAMIN W-MINERALS/LUTEIN (CENTRUM SILVER ORAL) Take 1 tablet by mouth once daily.       selegiline (ELDEPRYL) 5 mg Cap TAKE ONE CAPSULE BY MOUTH twice daily 60 capsule 6    simvastatin (ZOCOR) 10 MG tablet TAKE ONE TABLET BY MOUTH EVERY EVENING 30 tablet 3    ULTICARE PEN NEEDLE 31 gauge x 1/4" Ndle use with insulin SIX times DAILY or as directed  12    valsartan-hydrochlorothiazide (DIOVAN-HCT) 320-12.5 mg per tablet TAKE ONE TABLET BY MOUTH ONCE DAILY 30 tablet 12    VENTOLIN HFA 90 mcg/actuation inhaler Inhale 2 puffs into the lungs every 6 (six) hours as needed for Wheezing. Rescue 18 g 12    clonazePAM (KLONOPIN) 0.5 MG tablet Take 1 tablet (0.5 mg total) by mouth every evening. 30 tablet 5     No current facility-administered medications for this visit.        Review of Systems   Constitutional: Negative for appetite change, chills, fatigue, fever and unexpected weight change.        No flushing   HENT: Negative for congestion, hearing loss, nosebleeds and postnasal drip.    Eyes: Negative for visual disturbance.   Respiratory: Positive for wheezing. Negative for cough and shortness of breath.    Cardiovascular: Negative for chest pain and palpitations.   Gastrointestinal: Negative for abdominal pain, blood in stool, constipation, diarrhea, nausea and vomiting.   Genitourinary: Negative for dysuria and frequency.   Musculoskeletal: Negative for back pain and gait problem.        Right-sided chest wall pain   Skin: Negative for color change and rash.   Allergic/Immunologic: Positive for environmental allergies.   Neurological: Positive for tremors. Negative for dizziness, weakness and headaches.   Hematological: Negative for adenopathy. Does not bruise/bleed easily. " "  Psychiatric/Behavioral: Negative for confusion. The patient is not nervous/anxious.        ECOG Performance Status: 0     Objective:      Vitals:   Vitals:    03/27/19 1153   BP: 134/73   Pulse: 80   Resp: 16   Temp: 98 °F (36.7 °C)   SpO2: 95%   Weight: 96.3 kg (212 lb 4.9 oz)   Height: 5' 1" (1.549 m)     BMI: Body mass index is 40.11 kg/m².    Physical Exam   Constitutional: She is oriented to person, place, and time. She appears well-developed and well-nourished. No distress.   HENT:   Head: Normocephalic.   Mouth/Throat: No oropharyngeal exudate.   Eyes: EOM are normal. No scleral icterus.   Neck: Neck supple. No tracheal deviation present. No thyromegaly present.   Cardiovascular: Normal rate and regular rhythm.   Pulmonary/Chest: Effort normal. No respiratory distress. She has no wheezes. She has no rales.   Abdominal: Soft. She exhibits no distension and no mass. There is no tenderness. There is no rebound and no guarding.   Musculoskeletal: Normal range of motion. She exhibits no edema.   Lymphadenopathy:     She has no cervical adenopathy.   Neurological: She is alert and oriented to person, place, and time. No cranial nerve deficit.   Skin: Skin is warm and dry.   Psychiatric: She has a normal mood and affect.       Laboratory Data:     No visits with results within 1 Month(s) from this visit.   Latest known visit with results is:   Lab Visit on 02/07/2019   Component Date Value Ref Range Status    TSH 02/07/2019 1.254  0.400 - 4.000 uIU/mL Final    Hemoglobin A1C 02/07/2019 6.4* 4.0 - 5.6 % Final    Comment: ADA Screening Guidelines:  5.7-6.4%  Consistent with prediabetes  >or=6.5%  Consistent with diabetes  High levels of fetal hemoglobin interfere with the HbA1C  assay. Heterozygous hemoglobin variants (HbS, HgC, etc)do  not significantly interfere with this assay.   However, presence of multiple variants may affect accuracy.      Estimated Avg Glucose 02/07/2019 137* 68 - 131 mg/dL Final    " Cholesterol 02/07/2019 225* 120 - 199 mg/dL Final    Comment: The National Cholesterol Education Program (NCEP) has set the  following guidelines (reference ranges) for Cholesterol:  Optimal.....................<200 mg/dL  Borderline High.............200-239 mg/dL  High........................> or = 240 mg/dL      Triglycerides 02/07/2019 195* 30 - 150 mg/dL Final    Comment: The National Cholesterol Education Program (NCEP) has set the  following guidelines (reference values) for triglycerides:  Normal......................<150 mg/dL  Borderline High.............150-199 mg/dL  High........................200-499 mg/dL      HDL 02/07/2019 57  40 - 75 mg/dL Final    Comment: The National Cholesterol Education Program (NCEP) has set the  following guidelines (reference values) for HDL Cholesterol:  Low...............<40 mg/dL  Optimal...........>60 mg/dL      LDL Cholesterol 02/07/2019 129.0  63.0 - 159.0 mg/dL Final    Comment: The National Cholesterol Education Program (NCEP) has set the  following guidelines (reference values) for LDL Cholesterol:  Optimal.......................<130 mg/dL  Borderline High...............130-159 mg/dL  High..........................160-189 mg/dL  Very High.....................>190 mg/dL      HDL/Chol Ratio 02/07/2019 25.3  20.0 - 50.0 % Final    Total Cholesterol/HDL Ratio 02/07/2019 3.9  2.0 - 5.0 Final    Non-HDL Cholesterol 02/07/2019 168  mg/dL Final    Comment: Risk category and Non-HDL cholesterol goals:  Coronary heart disease (CHD)or equivalent (10-year risk of CHD >20%):  Non-HDL cholesterol goal     <130 mg/dL  Two or more CHD risk factors and 10-year risk of CHD <= 20%:  Non-HDL cholesterol goal     <160 mg/dL  0 to 1 CHD risk factor:  Non-HDL cholesterol goal     <190 mg/dL      Sodium 02/07/2019 140  136 - 145 mmol/L Final    Potassium 02/07/2019 4.5  3.5 - 5.1 mmol/L Final    Chloride 02/07/2019 101  95 - 110 mmol/L Final    CO2 02/07/2019 29  23 - 29 mmol/L  Final    Glucose 02/07/2019 137* 70 - 110 mg/dL Final    BUN, Bld 02/07/2019 28* 8 - 23 mg/dL Final    Creatinine 02/07/2019 1.0  0.5 - 1.4 mg/dL Final    Calcium 02/07/2019 9.8  8.7 - 10.5 mg/dL Final    Total Protein 02/07/2019 7.3  6.0 - 8.4 g/dL Final    Albumin 02/07/2019 3.9  3.5 - 5.2 g/dL Final    Total Bilirubin 02/07/2019 0.4  0.1 - 1.0 mg/dL Final    Comment: For infants and newborns, interpretation of results should be based  on gestational age, weight and in agreement with clinical  observations.  Premature Infant recommended reference ranges:  Up to 24 hours.............<8.0 mg/dL  Up to 48 hours............<12.0 mg/dL  3-5 days..................<15.0 mg/dL  6-29 days.................<15.0 mg/dL      Alkaline Phosphatase 02/07/2019 69  55 - 135 U/L Final    AST 02/07/2019 13  10 - 40 U/L Final    ALT 02/07/2019 <5* 10 - 44 U/L Final    Anion Gap 02/07/2019 10  8 - 16 mmol/L Final    eGFR if  02/07/2019 >60.0  >60 mL/min/1.73 m^2 Final    eGFR if non African American 02/07/2019 59.3* >60 mL/min/1.73 m^2 Final    Comment: Calculation used to obtain the estimated glomerular filtration  rate (eGFR) is the CKD-EPI equation.        Neuroendocrine Labs Latest Ref Rng 7/29/2015   EXT 5 HIAA BLOOD 0 - 22 ng/ml 14   EXT SEROTONIN 56 - 244 ng/ml    EXT CHROMOGRANIN A 0 - 15 ng/ml 14           FINAL PATHOLOGIC DIAGNOSIS 8/7/14  1. Right fifth rib (demineralized), biopsy:  Bone with trilineage bone marrow.  Negative for neoplasm.    2. Right pleura, partial pleurectomy:  Pleural tissue with congested vasculature and no evidence of neoplasm.    3. Right lung, right middle and lower lobes, lobectomies:  Typical carcinoid tumor, multifocal with 2 lesions, measuring 4.5 cm and 0.5 cm in greatest dimension  respectively.  Mitotic index: 1 mitosis seen in 10 high powered fields.  No evidence of necrosis or significant nuclear pleomorphism are seen.  Ki-67 proliferation index: 4% of tumor  cells staining.  Bronchial and vascular margins are negative for malignancy.  17 benign lymph nodes (0/17).  Separate lesonal area consists of lung with necrotizing graulomatous inflammation and calcified granulomata.  Special stains for mycobacterial and fungi are completed on the granulomatous lesion and are negative for fungal  and mycobacterial organisms with satisfactory positive control.  See synoptic report in comment section for further details.     4. Right lung lymph node, station 4, biopsy:  Fibroadipose tissue, negative for lymph nodes.  No evidence of neoplasm.    5. Right lung lymph nodes, station for alpha, biopsy:  Two (2) lymph nodes, negative for neoplasm (0/2).  Comment: Synoptic report  Specimen: Lung, right middle and lower lobes  Specimen integrity: Intact  Specimen laterality: Right  Tumor site: Right bronchus intermedius  Tumor size:  Lesion#1: 4.5 x 4 x 2.5 cm  Lesion#2: 0.5 cm in greatest dimension.  Tumor focality: Multifocal  Histologic Type:  Typical carcinoid tumor  Visceral Pleural Invasion: Not identified  Tumor Extension: Tumor involves the bronchus intermedius  Margins: Bronchial & Vasular margins are uninvolved by tumor  Distance of tumor from closest bronchial margin: 0.2 cm.  Treatment effect: Unknown  Lymphovascular invasion: Not identified.  Pathologic Staging:  Primary tumor:  pT2a: Tumor greater than 3 cm but 5 cm or less in greatest dimension without evidence of invasion into the main  bronchus  Regional lymph nodes:  pN0: No regional lymph node metastasis  Number examined: 16  Number involved: 0  Distant metastasis:  pMX: Cannot be assessed.  Note: Immunohistochemical stain results:  AE1/AE3: Negative in tumor cells.  Chromogranin staining:  Intensity Positive cells (0%)  0: 0%  1+: 10%  2+: 85%  3+: 5%  Synaptophysin staining:  Intensity Positive cells (0%)  0: 0%  1+: 0%  2+: 0%  3+: 100%  CD31: 44 vessels per 1 HPF  Factor VIII: 69 vessels per 1 HPF  Ki-67: 4% cells  staining  Note: This tumor has only 1 mitotic figure in 10 high power fields, no evidence of necrosis and fairly bland  appearing nulei with salt and pepper chromatin pattern and no significant nuclear pleomorphism. This is best  categorized as a typical carcinoid tumor.  All immunostains have satisfactory positive and negative controls.    IMAGING:  CT 03/22/2019  COMPARISON:  09/10/2018    FINDINGS:  The thyroid gland appears small or absent.  Please clinically correlate.    Atherosclerotic calcifications are noted at the aortic arch.  Coronary calcifications are noted.    Curvilinear calcification is noted at the splenic hilum suggestive of a fusiform aneurysm of the splenic artery to 7 mm without detrimental change    Calcification is noted within the gallbladder suggestive of cholelithiasis    Postsurgical changes appear to be present involving the right lung hilum suggestive of removal of the right lower lobe and possibly middle lobe    Band like changes are noted at the right lung base similar to on the prior suggestive of atelectasis or scarring.    At the left lung apex image 49 and image 47 two 3 mm nodules are noted without detrimental change, on image 55 a 3 mm nodule is noted stable since the prior in the left upper lobe on image 70 and 69 within left upper lobe multiple small adjacent nodules are noted stable since 09/10/2018.  On image 81 in the left upper lobe a stable nodule is noted of 4 mm, on image 84 a stable 4 mm nodule is noted adjacent to the left lung hilum on image 93 in the left upper lobe a stable nodule is noted of 3-4 mm, on image 96 a stable 3 mm nodule is noted in the left upper lobe, a stable nodule is noted on image 112 within the left upper lobe on image 139 a stable nodule is noted within the left lower lobe of less than 3 mm, on image 150 in the left lower lobe a ground-glass nodule is noted stable since the prior of approximately 6 mm, a stable 5 mm nodule is noted on image 157  in the left lower lobe, a stable 2-3 mm nodule is noted on image 159 sequence 4 in the left lower lobe, a stable nodule is noted on image 155 in the left lower lobe, a 2nd stable nodule is noted on image 159, 3 stable nodules are noted on image 175, 3 stable nodules are noted on image 182 in the left lower lobe, 2 stable nodules are noted on image 186 in the left lower lobe, a stable nodule is noted on image 190 in that 1 in the left lower lobe    Within the right upper lobe a 3 mm nodule is noted on image 68 a 3 mm nodule is noted on image 74 sequence 4 in the right upper lobe 2 less than 3 mm nodules are noted on image 92 sequence 4 stable, a stable nodule is noted on image 132, a stable nodule is noted on image 127, a stable nodule is noted on 163 in the right lower lung zone      Impression       Numerous pulmonary nodules without convincing detrimental change since 09/10/2018 the largest of 6 mm.  Longer term follow-up for size stability is necessary imaging in 1 year would be suggested                Assessment:       1. Malignant carcinoid tumor of bronchus and lung    2. Pulmonary nodules           Plan:       Mrs. Rojas is doing well from an oncologic standpoint.  Review of her CT shows no detrimental changes.  We did discuss further treatment options should enlarging or new nodules show including checking a gallium 68 PET-CT followed by possible Lanreotide PRRT or Afinitor.  For now we will continue surveillance and repeat a CT in another 12 months or sooner if symptoms develop.  All questions were answered and she is agreeable with this plan.    Justen Quintero DO, [facility]  Hematology & Oncology  [address] [place] [address], [address]  [facility] [place]  [address]  ph. [phone]; [phone]  fax. [phone]    25 minutes were spent in coordination of patient's care, record review and counseling.  More than 50% of the time was face-to-face.

## 2019-03-28 ENCOUNTER — PATIENT MESSAGE (OUTPATIENT)
Dept: HEMATOLOGY/ONCOLOGY | Facility: CLINIC | Age: 64
End: 2019-03-28

## 2019-03-28 ENCOUNTER — PATIENT MESSAGE (OUTPATIENT)
Dept: ENDOCRINOLOGY | Facility: CLINIC | Age: 64
End: 2019-03-28

## 2019-03-28 RX ORDER — INSULIN GLARGINE 100 [IU]/ML
35 INJECTION, SOLUTION SUBCUTANEOUS 2 TIMES DAILY
COMMUNITY
End: 2019-06-10 | Stop reason: SDUPTHER

## 2019-03-31 PROBLEM — K59.00 CONSTIPATION: Status: ACTIVE | Noted: 2019-03-31

## 2019-03-31 PROBLEM — G47.00 INSOMNIA: Status: ACTIVE | Noted: 2019-03-31

## 2019-03-31 NOTE — PROGRESS NOTES
Patient Name: Jessica Rojas  MRN: [id number]    CC: tremor    Interval history (3/27/19)  Since last visit, patient has been stable. Reports she is less stiffer and noticed improvement in tremors on the sinemet. No reported worsening of orthostasis that is occasional now. No falls. No dyskinesias.   Patient does report trouble with going to sleep. Takes melatonin 10mg nightly which has helped with getting to sleep but does wake up around 3AM. She takes her selegiline second dose around 5pm.  also reports that 3-4 nights a week, patient has vivid dreams and patient does not feel like she has restful sleep.   Also has concerns of constipation - takes stool softener daily, alternates between miralax and benefiber and with this regimen, is able to go only once every 3 days.     Interval history (9/11/18):  Patient now on sinemet 25/100 - 1.5 tabs TID and selegiline 5mg BID. Since being started on the selegiline in May 2018 - patient reports significant improvement in her symptoms.   She has complaints of low back pain that does not radiate - she is seeing Dr. Sahu in NSGY clinic with MR lumbar spine pending  Patient is following up with Dr. Quintero - recent imaging with pulmonary nodules not present on prior imaging.     Interval history (9/2017):  Patient feels like the stiffness has improved and the left hand tremors have improved some but still present. Currently on sinemet 25/100; 1 pill TID (6:30, 2:30, 10pm). Around 5pm she feels like her tremors are the worst. No reported dyskinesia or orthostatic symptoms since being on the medications. Has been at full dose for 2 weeks. Symptoms have not been interfering with her daily activities. Patient to get LP rescheduled.     HPI: Jessica Rojas is a 63 y.o. female with PMHx of carcinoid tumor (lung) Aug 2014, multinodular goiter (s/p thyroidectomy) Aug 2015, post-op acquired hypothyroidism, DMII, Vitamin D deficiency who presents to neurology clinic for evaluation  "of tremor. Patient reports a 4 month history of tremors, more notable in her hands; mostly at rest, improves with movement, L hand worse than the R. Reports symptoms having worsened in the past 6 weeks. Was seen by endocrinology; concerns for over dosing of synthroid given elevated T4 and low TSH; dose was decreased 4 weeks back with no improvement in her tremors. Patient states no concerns of tremors prior to 4 months back. Patient also reports history of nightmares in her sleep, also has noticed changes in her walk in the past month.  reported to have been telling her to "pick her feet up more when she walks".   No recurrence of tumor per imaging from last year; patient due for follow up 1 year scan and oncology visit with Dr. Quintero in October 2017    ROS:   Review of Systems   Constitutional: Negative for malaise. Negative for weight loss.   HENT: Negative for hearing loss.   Eyes: Negative for blurred vision and double vision.   Respiratory: Negative for shortness of breath and stridor.   Cardiovascular: Negative for chest pain and palpitations.   Gastrointestinal: Negative for nausea, vomiting. +constipation  Genitourinary: Negative for frequency. Negative for urgency.   Musculoskeletal: Negative for joint pain.    Skin: Negative for rash.   Neurological: Negative for focal weakness and seizures.   Endo/Heme/Allergies: Does not bruise/bleed easily.   Psychiatric/Behavioral: Negative for memory loss. Negative for depression and hallucinations. The patient is not nervous/anxious.      Past Medical History  Past Medical History:   Diagnosis Date    Abdominal pain 2/27/2015    Diabetes mellitus, type 2     DM (diabetes mellitus)     on insulin    Elevated transaminase level 4/18/2016    Encounter for blood transfusion     History of malignant carcinoid tumor of bronchus and lung 10/25/2017    History of neuroendocrine cancer     HTN (hypertension) 5/6/2014    Hypercalcemia 4/18/2016    Lung " cancer, hilus 5/6/2014    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    Malignant carcinoid tumor of the bronchus and lung 5/2014    Mediastinal lymphadenopathy 8/26/2015    Obesity, morbid 5/6/2014    Parkinsons 10/2017    Pituitary adenoma 1980's    took parladel for 3 yrs    Pneumonia     Postoperative hypothyroidism 7/25/2017    - s/p total thyroidectomy in 2016 (parathyroids intact) with post op hypothyroidism; on synthroid    Pulmonary nodules 9/11/2018    Thyroid disease     Wheeze 6/23/2014       Medications    Current Outpatient Medications:     ADMELOG SOLOSTAR U-100 INSULIN 100 unit/mL pen, INJECT 30 units SUBCUTANEOUSLY before meals, unless eating small meals then use 25 units before meals, Disp: , Rfl: 11    amantadine HCl (SYMMETREL) 100 mg capsule, Take 1 capsule (100 mg total) by mouth 2 (two) times daily. Once a day for 1 week and then twice a day the following week and thereafter., Disp: 60 capsule, Rfl: 11    blood sugar diagnostic (TRUE METRIX GLUCOSE TEST STRIP) Strp, Check bg 7-8 times/day, Disp: 250 each, Rfl: 12    carbidopa-levodopa  mg (SINEMET)  mg per tablet, 1.5 tabs, 3 times daily, 6-8 hours apart., Disp: 150 tablet, Rfl: 6    cholecalciferol, vitamin D3, (VITAMIN D3) 400 unit Cap, Take 1,200 Units by mouth once daily., Disp: , Rfl:     clonazePAM (KLONOPIN) 0.5 MG tablet, Take 1 tablet (0.5 mg total) by mouth every evening., Disp: 30 tablet, Rfl: 5    fish oil-omega-3 fatty acids 300-1,000 mg capsule, Take 4 capsules by mouth once daily., Disp: , Rfl:     gabapentin (NEURONTIN) 400 MG capsule, TAKE ONE CAPSULE BY MOUTH FOUR TIMES DAILY (Patient taking differently: 1 tab BID), Disp: 120 capsule, Rfl: 12    GLUCOSAMINE HCL/CHONDR MCCARTHY A NA (OSTEO BI-FLEX ORAL), Take by mouth once daily., Disp: , Rfl:     insulin (BASAGLAR KWIKPEN U-100 INSULIN) glargine 100 units/mL (3mL) SubQ pen, Inject into the skin., Disp: , Rfl:     insulin aspart U-100  "(NOVOLOG FLEXPEN U-100 INSULIN) 100 unit/mL InPn pen, Inject 30 Units into the skin 3 (three) times daily with meals., Disp: 2 Box, Rfl: 11    lancets 33 gauge Misc, Checks bg 7-8 times a dayTrue metrix, Disp: 250 each, Rfl: 11    levothyroxine (SYNTHROID) 137 MCG Tab tablet, TAKE ONE TABLET BY MOUTH ONCE DAILY, Disp: 30 tablet, Rfl: 3    melatonin 3 mg Tab, Take by mouth., Disp: , Rfl:     montelukast (SINGULAIR) 10 mg tablet, TAKE ONE TABLET BY MOUTH EVERY EVENING, Disp: 30 tablet, Rfl: 12    MULTIVITAMIN W-MINERALS/LUTEIN (CENTRUM SILVER ORAL), Take 1 tablet by mouth once daily. , Disp: , Rfl:     selegiline (ELDEPRYL) 5 mg Cap, TAKE ONE CAPSULE BY MOUTH twice daily, Disp: 60 capsule, Rfl: 6    simvastatin (ZOCOR) 10 MG tablet, TAKE ONE TABLET BY MOUTH EVERY EVENING, Disp: 30 tablet, Rfl: 3    ULTICARE PEN NEEDLE 31 gauge x 1/4" Ndle, use with insulin SIX times DAILY or as directed, Disp: , Rfl: 12    valsartan-hydrochlorothiazide (DIOVAN-HCT) 320-12.5 mg per tablet, TAKE ONE TABLET BY MOUTH ONCE DAILY, Disp: 30 tablet, Rfl: 12    VENTOLIN HFA 90 mcg/actuation inhaler, Inhale 2 puffs into the lungs every 6 (six) hours as needed for Wheezing. Rescue, Disp: 18 g, Rfl: 12    Allergies  Review of patient's allergies indicates:   Allergen Reactions    Propranolol Nausea And Vomiting     Drops pressure and raised sugar    Lipitor [atorvastatin] Other (See Comments)     Myalgia, muscle pain       Social History  Social History     Socioeconomic History    Marital status:      Spouse name: Not on file    Number of children: Not on file    Years of education: Not on file    Highest education level: Not on file   Occupational History    Occupation: housewife   Social Needs    Financial resource strain: Not on file    Food insecurity:     Worry: Not on file     Inability: Not on file    Transportation needs:     Medical: Not on file     Non-medical: Not on file   Tobacco Use    Smoking status: " Never Smoker    Smokeless tobacco: Never Used   Substance and Sexual Activity    Alcohol use: Yes     Comment: once per month    Drug use: No    Sexual activity: Not on file   Lifestyle    Physical activity:     Days per week: Not on file     Minutes per session: Not on file    Stress: Not on file   Relationships    Social connections:     Talks on phone: Not on file     Gets together: Not on file     Attends Gnosticist service: Not on file     Active member of club or organization: Not on file     Attends meetings of clubs or organizations: Not on file     Relationship status: Not on file    Intimate partner violence:     Fear of current or ex partner: Not on file     Emotionally abused: Not on file     Physically abused: Not on file     Forced sexual activity: Not on file   Other Topics Concern    Not on file   Social History Narrative    Not on file       Family History  Family History   Problem Relation Age of Onset    Cancer Maternal Aunt         breast    Cancer Maternal Grandmother         unknown    Cancer Maternal Aunt         unknown    Diabetes Mother     Glaucoma Mother     Diabetes Father     Diabetes Sister     Macular degeneration Sister     Breast cancer Paternal Aunt     Breast cancer Paternal Aunt     Amblyopia Neg Hx     Blindness Neg Hx     Cataracts Neg Hx     Hypertension Neg Hx     Retinal detachment Neg Hx     Stroke Neg Hx     Strabismus Neg Hx     Thyroid disease Neg Hx        Physical Exam  There were no vitals taken for this visit.    General appearance: Well-developed, well-groomed.     Neurologic Exam: The patient is awake, alert and oriented. Language is fluent. Recent and remote memory are normal. Attention span and concentration are normal. Fund of knowledge is appropriate.     Cranial nerves: pupils are round and reactive to light and accommodation. Visual fields are full to confrontation. Ocular motility is full in all cardinal positions of gaze. Facial  sensation is normal to pinprick and light touch. Facial activation is symmetric. Hearing is normal bilaterally. Palate elevates symmetrically and gag reflex is intact bilaterally. Shoulder elevation is symmetric and full strength bilaterally. Tongue is midline and neck rotation strength is normal bilaterally. Neck range of motion is normal.     Motor examination of all extremities demonstrates normal bulk and tone in all four limbs. There are no atrophy or fasciculations. Strength is 5/5 in the upper and lower extremities bilaterally without pronator drift.     Sensory examination is normal to pinprick, vibration and proprioception in the upper and lower extremities bilaterally.     Deep tendon reflexes are 2+ and symmetric in the upper and lower extremities bilaterally.     Gait: decreased arm swing (L>R). No notable turn en bloc or festination today     Movement exam: +masked facies. +hypophonic voice.  Bradykinesia (mild to moderate; L>R). Resting and postural tremor in the upper extremities (L>R). +increased tone and cogwheel rigidity (mild to moderate, L>R).  Postural instability not evaluated.    Coordination: Finger to nose and heel to shin testing is normal in both upper and lower extremities. Rapid alternating movements are normal in both upper and lower extremities.       Lab and Test Results    WBC   Date Value Ref Range Status   05/17/2016 10.11 3.90 - 12.70 K/uL Final   04/18/2016 9.36 3.90 - 12.70 K/uL Final   02/16/2016 12.22 3.90 - 12.70 K/uL Final     Hemoglobin   Date Value Ref Range Status   05/17/2016 14.8 12.0 - 16.0 g/dL Final   04/18/2016 14.6 12.0 - 16.0 g/dL Final   02/16/2016 15.7 12.0 - 16.0 g/dL Final     POC Hematocrit   Date Value Ref Range Status   08/07/2014 34 (L) 36 - 54 %PCV Final     Hematocrit   Date Value Ref Range Status   05/17/2016 44.8 37.0 - 48.5 % Final   04/18/2016 43.8 37.0 - 48.5 % Final   02/16/2016 47.3 37.0 - 48.5 % Final     Platelets   Date Value Ref Range Status    05/17/2016 255 150 - 350 K/uL Final   04/18/2016 222 150 - 350 K/uL Final   02/16/2016 278 150 - 350 K/uL Final     Glucose   Date Value Ref Range Status   02/07/2019 137 (H) 70 - 110 mg/dL Final   12/03/2018 171 (H) 70 - 110 mg/dL Final   08/06/2018 186 (H) 70 - 110 mg/dL Final     Sodium   Date Value Ref Range Status   02/07/2019 140 136 - 145 mmol/L Final   12/03/2018 137 136 - 145 mmol/L Final   08/06/2018 141 136 - 145 mmol/L Final     Potassium   Date Value Ref Range Status   02/07/2019 4.5 3.5 - 5.1 mmol/L Final   12/03/2018 4.4 3.5 - 5.1 mmol/L Final   08/06/2018 4.3 3.5 - 5.1 mmol/L Final     Chloride   Date Value Ref Range Status   02/07/2019 101 95 - 110 mmol/L Final   12/03/2018 97 95 - 110 mmol/L Final   08/06/2018 101 95 - 110 mmol/L Final     CO2   Date Value Ref Range Status   02/07/2019 29 23 - 29 mmol/L Final   12/03/2018 30 (H) 23 - 29 mmol/L Final   08/06/2018 28 23 - 29 mmol/L Final     BUN, Bld   Date Value Ref Range Status   02/07/2019 28 (H) 8 - 23 mg/dL Final   12/03/2018 22 8 - 23 mg/dL Final   08/06/2018 24 (H) 8 - 23 mg/dL Final     Creatinine   Date Value Ref Range Status   02/07/2019 1.0 0.5 - 1.4 mg/dL Final   12/03/2018 0.9 0.5 - 1.4 mg/dL Final   08/06/2018 1.1 0.5 - 1.4 mg/dL Final     Calcium   Date Value Ref Range Status   02/07/2019 9.8 8.7 - 10.5 mg/dL Final   12/03/2018 9.5 8.7 - 10.5 mg/dL Final   08/06/2018 10.3 8.7 - 10.5 mg/dL Final     Magnesium   Date Value Ref Range Status   02/16/2016 2.5 1.6 - 2.6 mg/dL Final   08/15/2014 2.0 1.6 - 2.6 mg/dL Final   08/15/2014 2.0 1.6 - 2.6 mg/dL Final     Phosphorus   Date Value Ref Range Status   08/15/2014 2.8 2.7 - 4.5 mg/dL Final   08/15/2014 2.8 2.7 - 4.5 mg/dL Final   08/14/2014 4.1 2.7 - 4.5 mg/dL Final     Alkaline Phosphatase   Date Value Ref Range Status   02/07/2019 69 55 - 135 U/L Final   12/03/2018 73 55 - 135 U/L Final   08/06/2018 90 55 - 135 U/L Final     ALT   Date Value Ref Range Status   02/07/2019 <5 (L) 10 - 44  U/L Final   12/03/2018 <5 (L) 10 - 44 U/L Final   08/06/2018 <5 (L) 10 - 44 U/L Final     AST   Date Value Ref Range Status   02/07/2019 13 10 - 40 U/L Final   12/03/2018 10 10 - 40 U/L Final   08/06/2018 12 10 - 40 U/L Final     MRI brain W WO contrast (08/04/2017)             L cerebellar, right temporal./insula, periventricular white matter lesions. Some of the periventricular lesions are perpendicular to corpus callosum    Assessment and Plan    Jessica Rojas is a 63 y.o. female with PMHx of carcinoid tumor (lung), multinodular goiter (s/p thyroidectomy), post-op acquired hypothyroidism, DMII, Vitamin D deficiency with parkinsonism. No reported history of anti-psychotic or anti-emetics use as an outpatient. MRI concerning for areas that could be potential areas of demyelination; MRI c and t spine unremarkable. LP done with normal WBC and protein when corrected for RBC.     Problem List Items Addressed This Visit        1 - High    Parkinsonism    Overview     Parkinsonism (L side tremor predominant)         Current Assessment & Plan     - continue selegiline 5mg BID and sinemet 25/100 1.5 tabs TID         Relevant Orders    Ambulatory consult to Physical Therapy       2     Resting tremor    Overview     Left hand predominant         REM sleep behavior disorder    Current Assessment & Plan     Start klonopin 0.5mg nightly         Constipation    Overview     Likely non motor sx of PD         Current Assessment & Plan     - continue stool softener daily.   - taker fiber daily and add prune juice daily  - use miralax as needed if no BM in over 24 hours            4     Lumbar radiculopathy    Overview     Low back pain. Followed by NSGY - Dr. Sahu         Relevant Orders    Ambulatory consult to Physical Therapy    Vitamin D deficiency       6     Post-operative hypothyroidism    Overview     - s/p total thyroidectomy in 2016 (parathyroids intact) with post op hypothyroidism; on synthroid            Unprioritized     Insomnia    Current Assessment & Plan     Melatonin helping get to sleep however not restful sleep and reported vivid dreaming    - start klonopin 0.5mg nightly for REM sleep d/o  - take second dose of selegiline earlier in the day to avoid side effect of insomnia  - continue melatonin PRN           Other Visit Diagnoses     Fatigue, unspecified type    -  Primary    Relevant Orders    VITAMIN B12 (Completed)    Deficiency of other specified B group vitamins         Relevant Orders    VITAMIN B12 (Completed)        [facility]  Neurology Resident - PGY 4  Department of Neurology  [address] [place] [id number]

## 2019-03-31 NOTE — ASSESSMENT & PLAN NOTE
- continue stool softener daily.   - taker fiber daily and add prune juice daily  - use miralax as needed if no BM in over 24 hours

## 2019-03-31 NOTE — ASSESSMENT & PLAN NOTE
Melatonin helping get to sleep however not restful sleep and reported vivid dreaming    - start klonopin 0.5mg nightly for REM sleep d/o  - take second dose of selegiline earlier in the day to avoid side effect of insomnia  - continue melatonin PRN

## 2019-04-04 RX ORDER — CARBIDOPA AND LEVODOPA 25; 100 MG/1; MG/1
TABLET ORAL
Qty: 120 TABLET | Refills: 6 | Status: SHIPPED | OUTPATIENT
Start: 2019-04-04 | End: 2019-12-17 | Stop reason: SDUPTHER

## 2019-04-16 ENCOUNTER — OFFICE VISIT (OUTPATIENT)
Dept: FAMILY MEDICINE | Facility: CLINIC | Age: 64
End: 2019-04-16
Payer: MEDICARE

## 2019-04-16 VITALS
WEIGHT: 212.38 LBS | TEMPERATURE: 98 F | DIASTOLIC BLOOD PRESSURE: 66 MMHG | HEART RATE: 83 BPM | HEIGHT: 61 IN | SYSTOLIC BLOOD PRESSURE: 118 MMHG | BODY MASS INDEX: 40.1 KG/M2

## 2019-04-16 DIAGNOSIS — E89.0 POST-OPERATIVE HYPOTHYROIDISM: ICD-10-CM

## 2019-04-16 DIAGNOSIS — Z79.4 TYPE 2 DIABETES MELLITUS WITH DIABETIC NEPHROPATHY, WITH LONG-TERM CURRENT USE OF INSULIN: ICD-10-CM

## 2019-04-16 DIAGNOSIS — I70.0 ATHEROSCLEROSIS OF AORTA: ICD-10-CM

## 2019-04-16 DIAGNOSIS — M54.16 LUMBAR RADICULOPATHY: ICD-10-CM

## 2019-04-16 DIAGNOSIS — E66.01 OBESITY, MORBID: ICD-10-CM

## 2019-04-16 DIAGNOSIS — Z85.110 HISTORY OF MALIGNANT CARCINOID TUMOR OF BRONCHUS AND LUNG: ICD-10-CM

## 2019-04-16 DIAGNOSIS — Z23 IMMUNIZATION DUE: ICD-10-CM

## 2019-04-16 DIAGNOSIS — E11.21 TYPE 2 DIABETES MELLITUS WITH DIABETIC NEPHROPATHY, WITH LONG-TERM CURRENT USE OF INSULIN: ICD-10-CM

## 2019-04-16 DIAGNOSIS — G20.C PARKINSONISM, UNSPECIFIED PARKINSONISM TYPE: Primary | ICD-10-CM

## 2019-04-16 DIAGNOSIS — I10 BENIGN ESSENTIAL HTN: ICD-10-CM

## 2019-04-16 PROCEDURE — 99499 RISK ADDL DX/OHS AUDIT: ICD-10-PCS | Mod: S$GLB,,, | Performed by: FAMILY MEDICINE

## 2019-04-16 PROCEDURE — G0009 PNEUMOCOCCAL POLYSACCHARIDE VACCINE 23-VALENT =>2YO SQ IM: ICD-10-PCS | Mod: S$GLB,,, | Performed by: FAMILY MEDICINE

## 2019-04-16 PROCEDURE — 99214 PR OFFICE/OUTPT VISIT, EST, LEVL IV, 30-39 MIN: ICD-10-PCS | Mod: 25,S$GLB,, | Performed by: FAMILY MEDICINE

## 2019-04-16 PROCEDURE — 90732 PNEUMOCOCCAL POLYSACCHARIDE VACCINE 23-VALENT =>2YO SQ IM: ICD-10-PCS | Mod: S$GLB,,, | Performed by: FAMILY MEDICINE

## 2019-04-16 PROCEDURE — 99214 OFFICE O/P EST MOD 30 MIN: CPT | Mod: 25,S$GLB,, | Performed by: FAMILY MEDICINE

## 2019-04-16 PROCEDURE — 3074F PR MOST RECENT SYSTOLIC BLOOD PRESSURE < 130 MM HG: ICD-10-PCS | Mod: CPTII,S$GLB,, | Performed by: FAMILY MEDICINE

## 2019-04-16 PROCEDURE — 99499 UNLISTED E&M SERVICE: CPT | Mod: S$GLB,,, | Performed by: FAMILY MEDICINE

## 2019-04-16 PROCEDURE — 90732 PPSV23 VACC 2 YRS+ SUBQ/IM: CPT | Mod: S$GLB,,, | Performed by: FAMILY MEDICINE

## 2019-04-16 PROCEDURE — 99999 PR PBB SHADOW E&M-EST. PATIENT-LVL IV: CPT | Mod: PBBFAC,,, | Performed by: FAMILY MEDICINE

## 2019-04-16 PROCEDURE — G0009 ADMIN PNEUMOCOCCAL VACCINE: HCPCS | Mod: S$GLB,,, | Performed by: FAMILY MEDICINE

## 2019-04-16 PROCEDURE — 3044F PR MOST RECENT HEMOGLOBIN A1C LEVEL <7.0%: ICD-10-PCS | Mod: CPTII,S$GLB,, | Performed by: FAMILY MEDICINE

## 2019-04-16 PROCEDURE — 3078F DIAST BP <80 MM HG: CPT | Mod: CPTII,S$GLB,, | Performed by: FAMILY MEDICINE

## 2019-04-16 PROCEDURE — 3008F PR BODY MASS INDEX (BMI) DOCUMENTED: ICD-10-PCS | Mod: CPTII,S$GLB,, | Performed by: FAMILY MEDICINE

## 2019-04-16 PROCEDURE — 1101F PR PT FALLS ASSESS DOC 0-1 FALLS W/OUT INJ PAST YR: ICD-10-PCS | Mod: CPTII,S$GLB,, | Performed by: FAMILY MEDICINE

## 2019-04-16 PROCEDURE — 3044F HG A1C LEVEL LT 7.0%: CPT | Mod: CPTII,S$GLB,, | Performed by: FAMILY MEDICINE

## 2019-04-16 PROCEDURE — 3078F PR MOST RECENT DIASTOLIC BLOOD PRESSURE < 80 MM HG: ICD-10-PCS | Mod: CPTII,S$GLB,, | Performed by: FAMILY MEDICINE

## 2019-04-16 PROCEDURE — 1101F PT FALLS ASSESS-DOCD LE1/YR: CPT | Mod: CPTII,S$GLB,, | Performed by: FAMILY MEDICINE

## 2019-04-16 PROCEDURE — 99999 PR PBB SHADOW E&M-EST. PATIENT-LVL IV: ICD-10-PCS | Mod: PBBFAC,,, | Performed by: FAMILY MEDICINE

## 2019-04-16 PROCEDURE — 3008F BODY MASS INDEX DOCD: CPT | Mod: CPTII,S$GLB,, | Performed by: FAMILY MEDICINE

## 2019-04-16 PROCEDURE — 3074F SYST BP LT 130 MM HG: CPT | Mod: CPTII,S$GLB,, | Performed by: FAMILY MEDICINE

## 2019-04-16 RX ORDER — INSULIN GLARGINE 100 [IU]/ML
INJECTION, SOLUTION SUBCUTANEOUS
Qty: 45 ML | Refills: 3 | Status: SHIPPED | OUTPATIENT
Start: 2019-04-16 | End: 2019-12-03

## 2019-04-16 RX ORDER — AMANTADINE HYDROCHLORIDE 100 MG/1
CAPSULE, GELATIN COATED ORAL
Qty: 60 CAPSULE | Refills: 11 | Status: SHIPPED | OUTPATIENT
Start: 2019-04-16 | End: 2020-05-18 | Stop reason: SDUPTHER

## 2019-04-16 NOTE — PROGRESS NOTES
The patient presents today to fu HBP stable now but having incr LBP worse w standing and not much better p injections. Pain is 10/10   Follows c Neurology parkinsonism; Endo-DM hx surg hypothyroid dt MNG  And Heme /Onc w hx carcinoid lung  Past Medical History:  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 2014    Mediastinal lymphadenopathy 2015    Obesity, morbid 2014    Parkinsons 10/2017    Pituitary adenoma 's    took parladel for 3 yrs    Pneumonia     Postoperative hypothyroidism 2017    - s/p total thyroidectomy in  (parathyroids intact) with post op hypothyroidism; on synthroid    Pulmonary nodules 2018    Thyroid disease     Wheeze 2014     Past Surgical History:   Procedure Laterality Date    APPENDECTOMY      BREAST CYST ASPIRATION      BRONCHOSCOPY  2014, 2014    BRONCHOSCOPY N/A 2015    [facility]    BRONCHOSCOPY-FIBEROPTIC N/A 2014    [facility]formed [facility]land [facility] at [facility]    BRONCHOSCOPY-OPERATIVE,FLEXIBLE Right 2014    [facility] at [facility]     SECTION  , 1986    x2    DISSECTION-LYMPH NODE Right 2014    [facility]    ENDOBRONCHIAL ULTRASOUND (EBUS) N/A 2015    [facility]    LAVAGE-ALVEOLAR Right 2014    Performed [facility] at  [facility]    LAVAGE-ALVEOLAR N/A 8/7/2014    Per[facility] at [facility]    LUNG REMOVAL, PARTIAL Right 2014    with 17 lymph nodes    THORACOTOMY/LOBECTOMY RML SLEEVE LOBECTOMY WITH BRONCHOPLASTY CPT 65940 Right 8/7/2014    Per[facility] at [facility]    THYROIDECTOMY  05/24/2016    THYROIDECTOMY N/A 5/24/2016    [facility]    TONSILLECTOMY  1969     Review of patient's allergies indicates:   Allergen Reactions    Propranolol Nausea And Vomiting     Drops pressure and raised sugar    Lipitor [atorvastatin] Other (See Comments)     Myalgia, muscle pain     Current Outpatient Medications on File Prior to Visit   Medication Sig Dispense Refill    ADMELOG SOLOSTAR U-100 INSULIN 100 unit/mL pen INJECT 30 units SUBCUTANEOUSLY before meals, unless eating small meals then use 25 units before meals  11    amantadine HCl (SYMMETREL) 100 mg capsule Take 1 capsule (100 mg total) by mouth 2 (two) times daily. Once a day for 1 week and then twice a day the following week and thereafter. 60 capsule 11    blood sugar diagnostic (TRUE METRIX GLUCOSE TEST STRIP) Strp Check bg 7-8 times/day 250 each 12    carbidopa-levodopa  mg (SINEMET)  mg per tablet 1.5 tabs, 3 times daily, 6-8 hours apart. 150 tablet 6    carbidopa-levodopa  mg (SINEMET)  mg per tablet TAKE 1 AND 1/2 TABLETS BY MOUTH THREE TIMES DAILY 120 tablet 6    cholecalciferol, vitamin D3, (VITAMIN D3) 400 unit Cap Take 1,200 Units by mouth once daily.      clonazePAM (KLONOPIN) 0.5 MG tablet Take 1 tablet (0.5 mg total) by mouth every evening. 30 tablet 5    fish oil-omega-3 fatty acids 300-1,000 mg capsule Take 4 capsules by mouth once daily.      gabapentin (NEURONTIN) 400 MG capsule TAKE ONE CAPSULE BY MOUTH FOUR TIMES DAILY (Patient taking differently: 1 tab BID) 120 capsule 12    GLUCOSAMINE HCL/CHONDR MCCARTHY A NA (OSTEO BI-FLEX ORAL)  "Take by mouth once daily.      insulin (BASAGLAR KWIKPEN U-100 INSULIN) glargine 100 units/mL (3mL) SubQ pen Inject into the skin.      insulin aspart U-100 (NOVOLOG FLEXPEN U-100 INSULIN) 100 unit/mL InPn pen Inject 30 Units into the skin 3 (three) times daily with meals. 2 Box 11    lancets 33 gauge Misc Checks bg 7-8 times a dayTrue metrix 250 each 11    levothyroxine (SYNTHROID) 137 MCG Tab tablet TAKE ONE TABLET BY MOUTH ONCE DAILY 30 tablet 3    melatonin 3 mg Tab Take by mouth.      montelukast (SINGULAIR) 10 mg tablet TAKE ONE TABLET BY MOUTH EVERY EVENING 30 tablet 12    MULTIVITAMIN W-MINERALS/LUTEIN (CENTRUM SILVER ORAL) Take 1 tablet by mouth once daily.       selegiline (ELDEPRYL) 5 mg Cap TAKE ONE CAPSULE BY MOUTH twice daily 60 capsule 6    simvastatin (ZOCOR) 10 MG tablet TAKE ONE TABLET BY MOUTH EVERY EVENING 30 tablet 3    ULTICARE PEN NEEDLE 31 gauge x 1/4" Ndle use with insulin SIX times DAILY or as directed  12    valsartan-hydrochlorothiazide (DIOVAN-HCT) 320-12.5 mg per tablet TAKE ONE TABLET BY MOUTH ONCE DAILY 30 tablet 12    VENTOLIN HFA 90 mcg/actuation inhaler Inhale 2 puffs into the lungs every 6 (six) hours as needed for Wheezing. Rescue 18 g 12     No current facility-administered medications on file prior to visit.      Social History     Socioeconomic History    Marital status:      Spouse name: Not on file    Number of children: Not on file    Years of education: Not on file    Highest education level: Not on file   Occupational History    Occupation: housewife   Social Needs    Financial resource strain: Not on file    Food insecurity:     Worry: Not on file     Inability: Not on file    Transportation needs:     Medical: Not on file     Non-medical: Not on file   Tobacco Use    Smoking status: Never Smoker    Smokeless tobacco: Never Used   Substance and Sexual Activity    Alcohol use: Yes     Comment: once per month    Drug use: No    Sexual " activity: Not on file   Lifestyle    Physical activity:     Days per week: Not on file     Minutes per session: Not on file    Stress: Not on file   Relationships    Social connections:     Talks on phone: Not on file     Gets together: Not on file     Attends Denominational service: Not on file     Active member of club or organization: Not on file     Attends meetings of clubs or organizations: Not on file     Relationship status: Not on file   Other Topics Concern    Not on file   Social History Narrative    Not on file     Family History   Problem Relation Age of Onset    Cancer Maternal Aunt         breast    Cancer Maternal Grandmother         unknown    Cancer Maternal Aunt         unknown    Diabetes Mother     Glaucoma Mother     Diabetes Father     Diabetes Sister     Macular degeneration Sister     Breast cancer Paternal Aunt     Breast cancer Paternal Aunt     Amblyopia Neg Hx     Blindness Neg Hx     Cataracts Neg Hx     Hypertension Neg Hx     Retinal detachment Neg Hx     Stroke Neg Hx     Strabismus Neg Hx     Thyroid disease Neg Hx          ROS:GENERAL: No fever, chills, fatigability or weight loss.  SKIN: No rashes, itching or changes in color or texture of skin.  HEAD: No headaches or recent head trauma.EYES: Visual acuity fine. No photophobia, ocular pain or diplopia.EARS: Denies ear pain, discharge or vertigo.NOSE: No loss of smell, no epistaxis or postnasal drip.MOUTH & THROAT: No hoarseness or change in voice. No excessive gum bleeding.NODES: Denies swollen glands.  CHEST: Denies ESPINAL, cyanosis, wheezing, cough and sputum production.  CARDIOVASCULAR: Denies chest pain, PND, orthopnea or reduced exercise tolerance.  ABDOMEN: Appetite fine. No weight loss. Denies diarrhea, abdominal pain, hematemesis or blood in stool.  URINARY: No flank pain, dysuria or hematuria.  PERIPHERAL VASCULAR: No claudication or cyanosis.  MUSCULOSKELETAL: See above.  NEUROLOGIC: No history of  seizures, paralysis, alteration of gait or coordination.  PE:   HEAD: Normocephalic, atraumatic.EYES: PERRL. EOMI.   EARS: TM's intact. Light reflex normal. No retraction or perforation.   NOSE: Mucosa pink. Airway clear.MOUTH & THROAT: No tonsillar enlargement. No pharyngeal erythema or exudate. No stridor.  NODES: No cervical, axillary or inguinal lymph node enlargement.  CHEST: Lungs clear to auscultation.  CARDIOVASCULAR: Normal S1, S2. No rubs, murmurs or gallops.  ABDOMEN: Bowel sounds normal. Not distended. Soft. No tenderness or masses.  MUSCULOSKELETAL: No palpable abnormality  NEUROLOGIC: Cranial Nerves: II-XII grossly intact.  Motor: 5/5 strength major flexors/extensors.  DTR's: Knees, Ankles 2+ and equal bilaterally; downgoing toes.  Sensory: Intact to light touch distally.  Gait & Posture: Normal gait and fine motion. No cerebellar signs.     Impression:HBP  DJD   Jessica was seen today for f/u htn and moles on back.    Diagnoses and all orders for this visit:    Parkinsonism, unspecified Parkinsonism type    Lumbar radiculopathy    History of malignant carcinoid tumor of bronchus and lung    Post-operative hypothyroidism    Type 2 diabetes mellitus with diabetic nephropathy, with long-term current use of insulin    Benign essential HTN    Atherosclerosis of aorta    Obesity, morbid      Plan:Lab eval  Rec diet and ex recs   Careful trial diclofenac 75 bid w precautions  May add extra dose tarun 400   Fu Endo con  Fu Heme Onc

## 2019-04-29 ENCOUNTER — PATIENT MESSAGE (OUTPATIENT)
Dept: NEUROLOGY | Facility: HOSPITAL | Age: 64
End: 2019-04-29

## 2019-05-14 RX ORDER — GABAPENTIN 400 MG/1
CAPSULE ORAL
Qty: 120 CAPSULE | Refills: 12 | Status: SHIPPED | OUTPATIENT
Start: 2019-05-14 | End: 2020-08-19

## 2019-05-15 RX ORDER — SIMVASTATIN 10 MG/1
TABLET, FILM COATED ORAL
Qty: 30 TABLET | Refills: 3 | Status: SHIPPED | OUTPATIENT
Start: 2019-05-15 | End: 2019-09-12 | Stop reason: SDUPTHER

## 2019-05-15 RX ORDER — LEVOTHYROXINE SODIUM 137 UG/1
TABLET ORAL
Qty: 30 TABLET | Refills: 3 | Status: SHIPPED | OUTPATIENT
Start: 2019-05-15 | End: 2019-09-12 | Stop reason: SDUPTHER

## 2019-06-03 ENCOUNTER — LAB VISIT (OUTPATIENT)
Dept: LAB | Facility: HOSPITAL | Age: 64
End: 2019-06-03
Attending: NURSE PRACTITIONER
Payer: MEDICARE

## 2019-06-03 DIAGNOSIS — Z79.4 TYPE 2 DIABETES MELLITUS WITH DIABETIC NEPHROPATHY, WITH LONG-TERM CURRENT USE OF INSULIN: ICD-10-CM

## 2019-06-03 DIAGNOSIS — E11.21 TYPE 2 DIABETES MELLITUS WITH DIABETIC NEPHROPATHY, WITH LONG-TERM CURRENT USE OF INSULIN: ICD-10-CM

## 2019-06-03 LAB
ALBUMIN SERPL BCP-MCNC: 3.9 G/DL (ref 3.5–5.2)
ALP SERPL-CCNC: 70 U/L (ref 55–135)
ALT SERPL W/O P-5'-P-CCNC: 10 U/L (ref 10–44)
ANION GAP SERPL CALC-SCNC: 13 MMOL/L (ref 8–16)
AST SERPL-CCNC: 12 U/L (ref 10–40)
BILIRUB SERPL-MCNC: 0.3 MG/DL (ref 0.1–1)
BUN SERPL-MCNC: 32 MG/DL (ref 8–23)
CALCIUM SERPL-MCNC: 9.6 MG/DL (ref 8.7–10.5)
CHLORIDE SERPL-SCNC: 102 MMOL/L (ref 95–110)
CO2 SERPL-SCNC: 26 MMOL/L (ref 23–29)
CREAT SERPL-MCNC: 1.3 MG/DL (ref 0.5–1.4)
EST. GFR  (AFRICAN AMERICAN): 49.7 ML/MIN/1.73 M^2
EST. GFR  (NON AFRICAN AMERICAN): 43.2 ML/MIN/1.73 M^2
ESTIMATED AVG GLUCOSE: 131 MG/DL (ref 68–131)
GLUCOSE SERPL-MCNC: 209 MG/DL (ref 70–110)
HBA1C MFR BLD HPLC: 6.2 % (ref 4–5.6)
POTASSIUM SERPL-SCNC: 4 MMOL/L (ref 3.5–5.1)
PROT SERPL-MCNC: 7.4 G/DL (ref 6–8.4)
SODIUM SERPL-SCNC: 141 MMOL/L (ref 136–145)
T4 FREE SERPL-MCNC: 1.49 NG/DL (ref 0.71–1.51)
TSH SERPL DL<=0.005 MIU/L-ACNC: 1.15 UIU/ML (ref 0.4–4)

## 2019-06-03 PROCEDURE — 80053 COMPREHEN METABOLIC PANEL: CPT

## 2019-06-03 PROCEDURE — 36415 COLL VENOUS BLD VENIPUNCTURE: CPT | Mod: PO

## 2019-06-03 PROCEDURE — 83036 HEMOGLOBIN GLYCOSYLATED A1C: CPT

## 2019-06-03 PROCEDURE — 84439 ASSAY OF FREE THYROXINE: CPT

## 2019-06-03 PROCEDURE — 84443 ASSAY THYROID STIM HORMONE: CPT

## 2019-06-07 NOTE — PROGRESS NOTES
CC: This 63 y.o. female presents for management of diabetes and chronic conditions pending review including HTN, HLP, morbid obesity, hypothyroidism     HPI: She was diagnosed with T2DM in ~ 2013. She was hospitalized r/t DM in 2016 for DKA.   Family hx of DM: mom, dad, and sisters     hypoglycemia at home- very rare,  also her dog will wake her up if BG low     monitoring BG at home: ac/hs and pp 2 hr  Brings logs         Diet: Eats 2-3  Meals a day, snacks as below. Eats mostly at home   Brunch at 10am   Snack- tomato and cucumber  Dinner 6-7 pm  Exercise: rides her bike (stationary) or walks daily, 30 minutes  CURRENT DM MEDS: Basaglar 35/30. Humalog 25-35 u AC    Timing prandial insulin 5-15 minutes before meals: yes  Vial/pen:  Uses pens  Glucometer type:  Relion    Standards of Care:  Eye exam: 12/2018 No DM retinopathy       Nodules was found on on a PET scan. FNA 11/25/14 shows adenomatous nodule with cystic changes. S/P thyroidectomy 5/24/16.       On synthroid 137 mcg once daily. Takes all alone with water 30-60 mins prior to first meal     Has history of myalgias with statin higher doses and intensity    Being treated for parkinsons. No paresthesias. No falls. No Hypoglycemia.     ROS:   Gen: Appetite good,  Weight stable, + fatigue and weakness.  Skin: Skin is intact and heals well, no rashes, no hair changes  Eyes: Denies visual disturbances  Resp: no SOB or ESPINAL, no cough  Cardiac: No palpitations, chest pain, no edema   GI: No nausea or vomiting, diarrhea, constipation, or abdominal pain.  /GYN: No nocturia, burning or pain.   MS/Neuro: Gait steady, speech clear  Psych: Denies drug/ETOH abuse, no hx of depression.  Other systems: negative.    PE:  GENERAL: Well developed, well nourished.  PSYCH: AAOx3, appropriate mood and affect, pleasant expression, conversant, appears relaxed, well groomed.   EYES: Conjunctiva, corneas clear  NECK: Supple, trachea midline   ABDOMEN: Soft, non-tender, non-distended    VASCULAR: DP pulses +2/4 bilaterally, 1+ bLE edema.  NEURO: Gait steady  SKIN: Skin warm and dry no acanthosis nigracans.  FOOT EXAMINATION: 2/2018     Lab Results   Component Value Date    MICALBCREAT 12.0 12/03/2018       Hemoglobin A1C   Date Value Ref Range Status   06/03/2019 6.2 (H) 4.0 - 5.6 % Final     Comment:     ADA Screening Guidelines:  5.7-6.4%  Consistent with prediabetes  >or=6.5%  Consistent with diabetes  High levels of fetal hemoglobin interfere with the HbA1C  assay. Heterozygous hemoglobin variants (HbS, HgC, etc)do  not significantly interfere with this assay.   However, presence of multiple variants may affect accuracy.     02/07/2019 6.4 (H) 4.0 - 5.6 % Final     Comment:     ADA Screening Guidelines:  5.7-6.4%  Consistent with prediabetes  >or=6.5%  Consistent with diabetes  High levels of fetal hemoglobin interfere with the HbA1C  assay. Heterozygous hemoglobin variants (HbS, HgC, etc)do  not significantly interfere with this assay.   However, presence of multiple variants may affect accuracy.     12/03/2018 6.1 (H) 4.0 - 5.6 % Final     Comment:     ADA Screening Guidelines:  5.7-6.4%  Consistent with prediabetes  >or=6.5%  Consistent with diabetes  High levels of fetal hemoglobin interfere with the HbA1C  assay. Heterozygous hemoglobin variants (HbS, HgC, etc)do  not significantly interfere with this assay.   However, presence of multiple variants may affect accuracy.          ASSESSMENT and PLAN:    1. T2DM with nephropathy-   No changes bg well controlled  Continue checking bg ac/hs    2. HTN - controlled, continue meds as previously prescribed and monitor.     3. HLP - on statin therapy, LFTs WNL. Myalgia with higher dose statin,fish oil to 2 tabs bid    4. Post Surgical Hypothyroidism- For MNG. Path benign. Continue current dose of synthroid     5. Vitamin d deficiency - continue OTC replacement, at goal, check lab annually    6. Morbid obesity portion control, exercise as  tolerated, increases insulin resistance. Body mass index is 39.93 kg/m².         Follow-up: in 6 months with lab prior

## 2019-06-10 ENCOUNTER — OFFICE VISIT (OUTPATIENT)
Dept: ENDOCRINOLOGY | Facility: CLINIC | Age: 64
End: 2019-06-10
Payer: MEDICARE

## 2019-06-10 VITALS
BODY MASS INDEX: 39.9 KG/M2 | RESPIRATION RATE: 18 BRPM | DIASTOLIC BLOOD PRESSURE: 78 MMHG | WEIGHT: 211.31 LBS | HEIGHT: 61 IN | HEART RATE: 88 BPM | SYSTOLIC BLOOD PRESSURE: 144 MMHG

## 2019-06-10 DIAGNOSIS — E55.9 VITAMIN D DEFICIENCY: ICD-10-CM

## 2019-06-10 DIAGNOSIS — N18.30 CHRONIC KIDNEY DISEASE, STAGE III (MODERATE): ICD-10-CM

## 2019-06-10 DIAGNOSIS — E11.21 TYPE 2 DIABETES MELLITUS WITH DIABETIC NEPHROPATHY, WITH LONG-TERM CURRENT USE OF INSULIN: Primary | ICD-10-CM

## 2019-06-10 DIAGNOSIS — Z79.4 TYPE 2 DIABETES MELLITUS WITH DIABETIC NEPHROPATHY, WITH LONG-TERM CURRENT USE OF INSULIN: Primary | ICD-10-CM

## 2019-06-10 DIAGNOSIS — E89.0 POST-OPERATIVE HYPOTHYROIDISM: ICD-10-CM

## 2019-06-10 DIAGNOSIS — I10 BENIGN ESSENTIAL HTN: ICD-10-CM

## 2019-06-10 DIAGNOSIS — E66.01 OBESITY, MORBID: ICD-10-CM

## 2019-06-10 PROCEDURE — 99999 PR PBB SHADOW E&M-EST. PATIENT-LVL IV: CPT | Mod: PBBFAC,,, | Performed by: NURSE PRACTITIONER

## 2019-06-10 PROCEDURE — 99999 PR PBB SHADOW E&M-EST. PATIENT-LVL IV: ICD-10-PCS | Mod: PBBFAC,,, | Performed by: NURSE PRACTITIONER

## 2019-06-10 PROCEDURE — 99213 OFFICE O/P EST LOW 20 MIN: CPT | Mod: S$GLB,,, | Performed by: NURSE PRACTITIONER

## 2019-06-10 PROCEDURE — 99213 PR OFFICE/OUTPT VISIT, EST, LEVL III, 20-29 MIN: ICD-10-PCS | Mod: S$GLB,,, | Performed by: NURSE PRACTITIONER

## 2019-06-10 RX ORDER — PEN NEEDLE, DIABETIC 30 GX3/16"
NEEDLE, DISPOSABLE MISCELLANEOUS
Qty: 400 EACH | Refills: 3 | Status: SHIPPED | OUTPATIENT
Start: 2019-06-10 | End: 2020-06-05 | Stop reason: SDUPTHER

## 2019-06-10 RX ORDER — INSULIN LISPRO 100 [IU]/ML
30 INJECTION, SOLUTION INTRAVENOUS; SUBCUTANEOUS
Qty: 30 ML | Refills: 12
Start: 2019-06-10 | End: 2019-12-03 | Stop reason: SDUPTHER

## 2019-06-10 RX ORDER — NABUMETONE 500 MG/1
1000 TABLET, FILM COATED ORAL 2 TIMES DAILY
Refills: 12 | COMMUNITY
Start: 2019-05-30 | End: 2019-10-16

## 2019-06-10 RX ORDER — ISOPROPYL ALCOHOL 70 ML/100ML
SWAB TOPICAL
Qty: 400 EACH | Refills: 4 | Status: SHIPPED | OUTPATIENT
Start: 2019-06-10

## 2019-06-10 NOTE — TELEPHONE ENCOUNTER
Contacted pt via PP messages    appears well-developed and well-nourished. No distress. HENT:   Head: Normocephalic and atraumatic. Right Ear: External ear normal.   Left Ear: External ear normal.   Neck: Normal range of motion. Neck supple. Pulmonary/Chest: Effort normal. No respiratory distress. Abdominal: There is tenderness in the epigastric area. Neurological: He is alert. Skin: Skin is warm and dry. He is not diaphoretic. Psychiatric: He has a normal mood and affect. His behavior is normal.   Vitals reviewed. POC Testing Today:No results found for this visit on 06/10/19. Assessment & Plan    Diagnosis Orders   1. Epigastric pain  omeprazole (PRILOSEC) 40 MG delayed release capsule     Will start on PPI, then follow up with pcp to determine efficacy and discuss chronic management if neccessary. No orders of the defined types were placed in this encounter. Patient Instructions       Patient Education        Learning About Acid-Reducing Medicines  What are they? Acid-reducing medicines can help relieve heartburn and other symptoms of indigestion. They can help prevent damage to your digestive system from stomach acids. They also are used to treat reflux and ulcer symptoms. These medicines include H2 blockers and proton pump inhibitors (PPIs). They help your stomach make less acid. You can buy them over the counter. Some of them also come in prescription strengths. Antacids can also help relieve heartburn symptoms. They reduce the acid that is already in your stomach. You can buy them over the counter. Which medicine is best for you depends on what is causing your symptoms. How do they work? Acid-reducing medicines work in two ways. H2 blockers and proton pump inhibitors (PPIs) lower the amount of acid your stomach makes. They don't work on the acid that's already there. Antacids work by making stomach juices less acidic. But your heartburn may come back as your stomach makes more acid.   What are some learn more? Go to [email or web address]://[email or web address] org and sign in to your [employer] account. Enter [phone] in the [facility] box to learn more about \"Learning About Acid-Reducing Medicines. \"     If you do not have an account, please click on the \"Sign Up Now\" link. Current as of: November 7, 2018  Content Version: 12.0  © 1200-4245 [employer]. Care instructions adapted under license by [facility] ([facility]). If you have questions about a medical condition or this instruction, always ask your healthcare professional. [place] [address] any warranty or liability for your use of this information. Patient Education        Indigestion (Dyspepsia or Heartburn): Care Instructions  Your Care Instructions  Sometimes it can be hard to pinpoint the cause of indigestion. (It is also called dyspepsia or heartburn.) Most cases of an upset stomach with bloating, burning, burping, and nausea are minor and go away within several hours. Home treatment and over-the-counter medicine often are able to control symptoms. But if you take medicine to relieve your indigestion without making diet and lifestyle changes, your symptoms are likely to return again and again. If you get indigestion often, it may be a sign of a more serious medical problem. Be sure to follow up with your doctor, who may want to do tests to be sure of the cause of your indigestion. Follow-up care is a key part of your treatment and safety. Be sure to make and go to all appointments, and call your doctor if you are having problems. It's also a good idea to know your test results and keep a list of the medicines you take. How can you care for yourself at home? · Your doctor may recommend over-the-counter medicine. For mild or occasional indigestion, antacids such as Gaviscon, Mylanta, Maalox, or Tums, may help. Be safe with medicines. Be careful when you take over-the-counter antacid medicines.  Many of these medicines have aspirin in them. Read the label to make sure that you are not taking more than the recommended dose. Too much aspirin can be harmful. · Your doctor also may recommend over-the-counter acid reducers, such as Pepcid AC, Tagamet HB, Zantac 75, or Prilosec. Read and follow all instructions on the label. If you use these medicines often, talk with your doctor. · Change your eating habits. ? It's best to eat several small meals instead of two or three large meals. ? After you eat, wait 2 to 3 hours before you lie down. ? Chocolate, mint, and alcohol can make GERD worse. ? Spicy foods, foods that have a lot of acid (like tomatoes and oranges), and coffee can make GERD symptoms worse in some people. If your symptoms are worse after you eat a certain food, you may want to stop eating that food to see if your symptoms get better. · Do not smoke or chew tobacco. Smoking can make GERD worse. If you need help quitting, talk to your doctor about stop-smoking programs and medicines. These can increase your chances of quitting for good. · If you have GERD symptoms at night, raise the head of your bed 6 to 8 inches. You can do this by putting the frame on blocks or placing a foam wedge under the head of your mattress. (Adding extra pillows does not work.)  · Do not wear tight clothing around your middle. · Lose weight if you need to. Losing just 5 to 10 pounds can help. · Do not take anti-inflammatory medicines, such as aspirin, ibuprofen (Advil, Motrin), or naproxen (Aleve). These can irritate the stomach. If you need a pain medicine, try acetaminophen (Tylenol), which does not cause stomach upset. When should you call for help?   Call your doctor now or seek immediate medical care if:    · You have new or worse belly pain.     · You are vomiting.    Watch closely for changes in your health, and be sure to contact your doctor if:    · You have new or worse symptoms of indigestion.     · You have trouble or pain swallowing.     · You are losing weight.     · You do not get better as expected. Where can you learn more? Go to [email or web address] org and sign in to your [employer] account. Enter K604 in the [facility] box to learn more about \"Indigestion (Dyspepsia or Heartburn): Care Instructions. \"     If you do not have an account, please click on the \"Sign Up Now\" link. Current as of: November 7, 2018  Content Version: 12.0  © 7022-8374 [employer], [employer]. Care instructions adapted under license by [facility] [place] [address] If you have questions about a medical condition or this instruction, always ask your healthcare professional. [place] [address] any warranty or liability for your use of this information. Return in about 3 weeks (around 6/30/2019), or if symptoms worsen or fail to improve, for follow up with your PCP. Side effects and adverse effects of any medication prescribed today, as well as treatment plan/rationale, follow-up care, and result expectations have been discussed with the patient. Expresses understanding and desires to proceed with treatment plan. Discussed signs and symptoms which require immediate follow-up in ED/call to 911. Understanding verbalized. I have reviewed and updated the electronic medical record.     Janiya Norwoodite, APRN - CNP

## 2019-07-18 RX ORDER — SELEGILINE HYDROCHLORIDE 5 MG/1
CAPSULE ORAL
Qty: 60 CAPSULE | Refills: 6 | OUTPATIENT
Start: 2019-07-18

## 2019-08-16 RX ORDER — VALSARTAN AND HYDROCHLOROTHIAZIDE 320; 12.5 MG/1; MG/1
TABLET, FILM COATED ORAL
Qty: 30 TABLET | Refills: 12 | Status: SHIPPED | OUTPATIENT
Start: 2019-08-16 | End: 2020-08-19 | Stop reason: DRUGHIGH

## 2019-08-20 ENCOUNTER — TELEPHONE (OUTPATIENT)
Dept: INTERNAL MEDICINE | Facility: CLINIC | Age: 64
End: 2019-08-20

## 2019-08-20 NOTE — TELEPHONE ENCOUNTER
Spoke with patient and scheduled an appointment with Dr. Jose Balbuena on 10/16/2019 at 10:00am for hypertension.

## 2019-08-21 NOTE — PROGRESS NOTES
Jessica NAPOLES Chief Complaints during this visit:  New Patient visit for  PD    Елена Cowan MD  [address] [place] [address]    Primary Care Physician  Jose Balbuena MD  [address]  [place] [address]          History of present illness:   63 y.o. W seen in f/u for Parkinson's Disease.      No falls.  Left hand tremors.  Sleeping better since melatonin, less active.  In past month, getting a bit more achey in neck, back (had resolved after starting carbidopa-levodopa).    Continued constipation.  Never went to PT (was watching grandson)      Interval history (3/27/19)  Since last visit, patient has been stable. Reports she is less stiffer and noticed improvement in tremors on the sinemet. No reported worsening of orthostasis that is occasional now. No falls. No dyskinesias.   Patient does report trouble with going to sleep. Takes melatonin 10mg nightly which has helped with getting to sleep but does wake up around 3AM. She takes her selegiline second dose around 5pm.  also reports that 3-4 nights a week, patient has vivid dreams and patient does not feel like she has restful sleep.   Also has concerns of constipation - takes stool softener daily, alternates between miralax and benefiber and with this regimen, is able to go only once every 3 days.     Interval history (9/11/18):  Patient now on sinemet 25/100 - 1.5 tabs TID and selegiline 5mg BID. Since being started on the selegiline in May 2018 - patient reports significant improvement in her symptoms.   She has complaints of low back pain that does not radiate - she is seeing Dr. Sauh in NSGY clinic with MR lumbar spine pending  Patient is following up with Dr. Quintero - recent imaging with pulmonary nodules not present on prior imaging.     Interval history (9/2017):  Patient feels like the stiffness has improved and the left hand tremors have improved some but still present. Currently on sinemet 25/100; 1 pill TID (6:30, 2:30,  "10pm). Around 5pm she feels like her tremors are the worst. No reported dyskinesia or orthostatic symptoms since being on the medications. Has been at full dose for 2 weeks. Symptoms have not been interfering with her daily activities. Patient to get LP rescheduled.     HPI: Jessica Rojas is a 63 y.o. female with PMHx of carcinoid tumor (lung) Aug 2014, multinodular goiter (s/p thyroidectomy) Aug 2015, post-op acquired hypothyroidism, DMII, Vitamin D deficiency who presents to neurology clinic for evaluation of tremor. Patient reports a 4 month history of tremors, more notable in her hands; mostly at rest, improves with movement, L hand worse than the R. Reports symptoms having worsened in the past 6 weeks. Was seen by endocrinology; concerns for over dosing of synthroid given elevated T4 and low TSH; dose was decreased 4 weeks back with no improvement in her tremors. Patient states no concerns of tremors prior to 4 months back. Patient also reports history of nightmares in her sleep, also has noticed changes in her walk in the past month.  reported to have been telling her to "pick her feet up more when she walks".   No recurrence of tumor per imaging from last year; patient due for follow up 1 year scan and oncology visit with Dr. Quintero in October 2017    II.  Review of systems:  As in HPI,  otherwise, balance 3 systems reviewed and are negative.    III.  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    Malignant carcinoid tumor of the bronchus and lung 5/2014    " Mediastinal lymphadenopathy 8/26/2015    Obesity, morbid 5/6/2014    Parkinsons 10/2017    Pituitary adenoma 1980's    took parladel for 3 yrs    Pneumonia     Postoperative hypothyroidism 7/25/2017    - s/p total thyroidectomy in 2016 (parathyroids intact) with post op hypothyroidism; on synthroid    Pulmonary nodules 9/11/2018    Thyroid disease     Wheeze 6/23/2014     Family History   Problem Relation Age of Onset    Cancer Maternal Aunt         breast    Cancer Maternal Grandmother         unknown    Cancer Maternal Aunt         unknown    Diabetes Mother     Glaucoma Mother     Diabetes Father     Diabetes Sister     Macular degeneration Sister     Breast cancer Paternal Aunt     Breast cancer Paternal Aunt     Amblyopia Neg Hx     Blindness Neg Hx     Cataracts Neg Hx     Hypertension Neg Hx     Retinal detachment Neg Hx     Stroke Neg Hx     Strabismus Neg Hx     Thyroid disease Neg Hx      Social history:          Medication Sig    alcohol swabs (ALCOHOL WIPES) PadM Uses 5 daily    amantadine HCl (SYMMETREL) 100 mg capsule TAKE ONE CAPSULE BY MOUTH twice daily (taking am,qhs)    BASAGLAR KWIKPEN U-100 INSULIN glargine 100 units/mL (3mL) SubQ pen INJECT 35 UNITS SUBCUTANEOUSLY twice daily    blood sugar diagnostic (TRUE METRIX GLUCOSE TEST STRIP) Strp Check bg 7-8 times/day    carbidopa-levodopa  mg (SINEMET)  mg per tablet TAKE 1 AND 1/2 TABLETS BY MOUTH THREE TIMES DAILY    cholecalciferol, vitamin D3, (VITAMIN D3) 400 unit Cap Take 1,200 Units by mouth once daily.    clonazePAM (KLONOPIN) 0.5 MG tablet Take 1 tablet (0.5 mg total) by mouth every evening.    cyanocobalamin (VITAMIN B-12) 1000 MCG tablet Take 100 mcg by mouth once daily.    fish oil-omega-3 fatty acids 300-1,000 mg capsule Take 4 capsules by mouth once daily.    gabapentin (NEURONTIN) 400 MG capsule TAKE ONE CAPSULE BY MOUTH FOUR TIMES DAILY    GLUCOSAMINE HCL/CHONDR MCCARTHY A NA (OSTEO  "BI-FLEX ORAL) Take by mouth once daily.    insulin lispro (HUMALOG KWIKPEN INSULIN) 100 unit/mL pen Inject 30 Units into the skin 3 (three) times daily before meals.    lancets 33 gauge Misc Checks bg 7-8 times a dayTrue metrix    levothyroxine (SYNTHROID) 137 MCG Tab tablet TAKE ONE TABLET BY MOUTH ONCE DAILY    melatonin 3 mg Tab Take by mouth.    montelukast (SINGULAIR) 10 mg tablet TAKE ONE TABLET BY MOUTH EVERY EVENING    MULTIVITAMIN W-MINERALS/LUTEIN (CENTRUM SILVER ORAL) Take 1 tablet by mouth once daily.     nabumetone (RELAFEN) 500 MG tablet Take 1,000 mg by mouth 2 (two) times daily.    pen needle, diabetic (BD ANA 2ND GEN PEN NEEDLE) 32 gauge x 5/32" Ndle Uses 5 daily    selegiline (ELDEPRYL) 5 mg Cap TAKE ONE CAPSULE BY MOUTH twice daily    simvastatin (ZOCOR) 10 MG tablet TAKE ONE TABLET BY MOUTH EVERY EVENING    valsartan-hydrochlorothiazide (DIOVAN-HCT) 320-12.5 mg per tablet TAKE ONE TABLET BY MOUTH ONCE DAILY    VENTOLIN HFA 90 mcg/actuation inhaler Inhale 2 puffs into the lungs every 6 (six) hours as needed for Wheezing. Rescue          No current facility-administered medications on file prior to visit.        PRIOR problem-specific medications tried:  none    Review of patient's allergies indicates:   Allergen Reactions    Propranolol Nausea And Vomiting     Drops pressure and raised sugar    Lipitor [atorvastatin] Other (See Comments)     Myalgia, muscle pain       IV. Physical Exam    Vitals:    08/22/19 0949   BP: 125/76   Pulse: 87   Weight: 95.1 kg (209 lb 10.5 oz)   Height: 5' 1" (1.549 m)     General appearance: Well nourished, well developed, no acute distress.         Cardiovascular:  pedal pulses 2, no edema or cyanosis, heart regular rate and rhythym, no carotid bruits.         -------------------------------------------------------------  Facial Expression: 2: Mild: In addition to decreased eye-blink frequency, Masked facies present in the lower  face as well, namely " fewer movements around the mouth, such as less  spontaneous smiling, but lips not parted.      Affect: full       Orientation to time & place:  Oriented to time, place, person and situation       Attention & concentration:  Normal attention span and concentration       Memory:  Recent and remote memory intact  Language: Spontaneous, fluent; able to repeat and name objects        Fund of knowledge:  Aware of current events        Speech:  1: Slight: Loss of modulation, diction or volume, but still all words easy to understand.   -------------------------------------------------------  Cranial nerves: glasses, visual fields full, optic discs not visualized, pupils equal round and reactive, extraocular movements intact,       facial sensation intact, face symmetrical, hearing intact to whisper, palate raises midline, shoulder shrug strength normal, tongue protrudes midline.        -------------------------------------------------------  Musculoskeletal  Muscle tone: LUE cogwheel      Muscle Bulk: all 4 extremities normal        Muscle strength:  5/5 in all 4 extremities        No pronator drift  Sensation: Intact to light touch in all extremities               --------------------------------------------------------------  Cerebellar and Coordination  Gait:  1: Slight: Independent walking with minor gait impairment.        Finger-nose: no dysmetria       Rapid Alternating Movements (pronation/supination):  R normal; L normal  --------------------------------------------------------------  Abnormality of movement (bradykinesia, hyperkinesia) present? Yes, current dyskinesias right hand, foot and neck    Tremor present?   No   Posture: 1: Slight: Not quite erect, but posture could be normal for older person.     V.  Laboratory/ Radiological Data: (Personally reviewed images)            MRI brain W WO contrast (08/04/2017)               L cerebellar, right temporal./insula, periventricular white matter lesions. Some of  the periventricular lesions are perpendicular to corpus callosum    CSF 9/26/17:  Oligoclonal bands 0    VI. Assessment and Plan            Problem List Items Addressed This Visit        1 - High    Parkinson's disease - Primary    Overview     2018 left tremor; 2019 R dyskinesias         Current Assessment & Plan     Tremor and apraxia controlled, perhaps even over-controlled by exam today.  Responsive to carbidopa-levodopa.   -> no changes in dosing, but advised amantadine morning and noon rather than evening   -> consider adding in agonist with goal of reducing carbidopa-levodopa doses (given her youth and dyskinesias)            2     Levodopa-induced dyskinesia    Overview     Right hand, foot.         Current Assessment & Plan     New onset?  Obviously has started this year, but as early as April?  I cannot determine why amantadine was started in April.   -> continue amantadine            3     REM sleep behavior disorder    Current Assessment & Plan     Better since taking melatonin            4     Constipation    Overview     Likely non motor sx of PD         Current Assessment & Plan     Moderate, not well controlled.   -> given instructions today   -> consider linzess            5     Pulmonary nodules    Current Assessment & Plan     Being followed, no ca recurrence, per patient.         History of malignant carcinoid tumor of bronchus and lung       6     Type 2 diabetes mellitus with diabetic nephropathy, with long-term current use of insulin    Current Assessment & Plan     controlled                     Follow up in about 3 months (around 11/22/2019) for PD.   Jay scott.  Will alternate NP/MD visits q3-4 months.

## 2019-08-22 ENCOUNTER — OFFICE VISIT (OUTPATIENT)
Dept: NEUROLOGY | Facility: CLINIC | Age: 64
End: 2019-08-22
Payer: MEDICARE

## 2019-08-22 VITALS
SYSTOLIC BLOOD PRESSURE: 125 MMHG | WEIGHT: 209.69 LBS | HEIGHT: 61 IN | BODY MASS INDEX: 39.59 KG/M2 | DIASTOLIC BLOOD PRESSURE: 76 MMHG | HEART RATE: 87 BPM

## 2019-08-22 DIAGNOSIS — T42.8X5A LEVODOPA-INDUCED DYSKINESIA: ICD-10-CM

## 2019-08-22 DIAGNOSIS — G20.C PARKINSONISM, UNSPECIFIED PARKINSONISM TYPE: Primary | ICD-10-CM

## 2019-08-22 DIAGNOSIS — E11.21 TYPE 2 DIABETES MELLITUS WITH DIABETIC NEPHROPATHY, WITH LONG-TERM CURRENT USE OF INSULIN: ICD-10-CM

## 2019-08-22 DIAGNOSIS — K59.00 CONSTIPATION, UNSPECIFIED CONSTIPATION TYPE: ICD-10-CM

## 2019-08-22 DIAGNOSIS — Z79.4 TYPE 2 DIABETES MELLITUS WITH DIABETIC NEPHROPATHY, WITH LONG-TERM CURRENT USE OF INSULIN: ICD-10-CM

## 2019-08-22 DIAGNOSIS — G24.01 LEVODOPA-INDUCED DYSKINESIA: ICD-10-CM

## 2019-08-22 DIAGNOSIS — G47.52 REM SLEEP BEHAVIOR DISORDER: ICD-10-CM

## 2019-08-22 DIAGNOSIS — G20.A1 PARKINSON'S DISEASE: ICD-10-CM

## 2019-08-22 DIAGNOSIS — R91.8 PULMONARY NODULES: ICD-10-CM

## 2019-08-22 DIAGNOSIS — Z85.110 HISTORY OF MALIGNANT CARCINOID TUMOR OF BRONCHUS AND LUNG: ICD-10-CM

## 2019-08-22 PROBLEM — G47.00 INSOMNIA: Status: RESOLVED | Noted: 2019-03-31 | Resolved: 2019-08-22

## 2019-08-22 PROCEDURE — 3078F PR MOST RECENT DIASTOLIC BLOOD PRESSURE < 80 MM HG: ICD-10-PCS | Mod: CPTII,S$GLB,, | Performed by: PSYCHIATRY & NEUROLOGY

## 2019-08-22 PROCEDURE — 3074F PR MOST RECENT SYSTOLIC BLOOD PRESSURE < 130 MM HG: ICD-10-PCS | Mod: CPTII,S$GLB,, | Performed by: PSYCHIATRY & NEUROLOGY

## 2019-08-22 PROCEDURE — 3078F DIAST BP <80 MM HG: CPT | Mod: CPTII,S$GLB,, | Performed by: PSYCHIATRY & NEUROLOGY

## 2019-08-22 PROCEDURE — 99214 PR OFFICE/OUTPT VISIT, EST, LEVL IV, 30-39 MIN: ICD-10-PCS | Mod: S$GLB,,, | Performed by: PSYCHIATRY & NEUROLOGY

## 2019-08-22 PROCEDURE — 3074F SYST BP LT 130 MM HG: CPT | Mod: CPTII,S$GLB,, | Performed by: PSYCHIATRY & NEUROLOGY

## 2019-08-22 PROCEDURE — 99499 UNLISTED E&M SERVICE: CPT | Mod: S$GLB,,, | Performed by: PSYCHIATRY & NEUROLOGY

## 2019-08-22 PROCEDURE — 99999 PR PBB SHADOW E&M-EST. PATIENT-LVL IV: ICD-10-PCS | Mod: PBBFAC,,, | Performed by: PSYCHIATRY & NEUROLOGY

## 2019-08-22 PROCEDURE — 3008F PR BODY MASS INDEX (BMI) DOCUMENTED: ICD-10-PCS | Mod: CPTII,S$GLB,, | Performed by: PSYCHIATRY & NEUROLOGY

## 2019-08-22 PROCEDURE — 99999 PR PBB SHADOW E&M-EST. PATIENT-LVL IV: CPT | Mod: PBBFAC,,, | Performed by: PSYCHIATRY & NEUROLOGY

## 2019-08-22 PROCEDURE — 3044F HG A1C LEVEL LT 7.0%: CPT | Mod: CPTII,S$GLB,, | Performed by: PSYCHIATRY & NEUROLOGY

## 2019-08-22 PROCEDURE — 1101F PR PT FALLS ASSESS DOC 0-1 FALLS W/OUT INJ PAST YR: ICD-10-PCS | Mod: CPTII,S$GLB,, | Performed by: PSYCHIATRY & NEUROLOGY

## 2019-08-22 PROCEDURE — 3044F PR MOST RECENT HEMOGLOBIN A1C LEVEL <7.0%: ICD-10-PCS | Mod: CPTII,S$GLB,, | Performed by: PSYCHIATRY & NEUROLOGY

## 2019-08-22 PROCEDURE — 99214 OFFICE O/P EST MOD 30 MIN: CPT | Mod: S$GLB,,, | Performed by: PSYCHIATRY & NEUROLOGY

## 2019-08-22 PROCEDURE — 1101F PT FALLS ASSESS-DOCD LE1/YR: CPT | Mod: CPTII,S$GLB,, | Performed by: PSYCHIATRY & NEUROLOGY

## 2019-08-22 PROCEDURE — 99499 RISK ADDL DX/OHS AUDIT: ICD-10-PCS | Mod: S$GLB,,, | Performed by: PSYCHIATRY & NEUROLOGY

## 2019-08-22 PROCEDURE — 3008F BODY MASS INDEX DOCD: CPT | Mod: CPTII,S$GLB,, | Performed by: PSYCHIATRY & NEUROLOGY

## 2019-08-22 RX ORDER — LANOLIN ALCOHOL/MO/W.PET/CERES
100 CREAM (GRAM) TOPICAL DAILY
COMMUNITY

## 2019-08-22 NOTE — LETTER
August 22, 2019      Елена Cowan MD  [address]  [facility] [address]           [facility]  [address] [facility] [address] [place] [address]  Phone: [phone]  Fax: [phone]          Patient: Jessica Rojas   MR Number: [id number]   YOB: 1955   Date of Visit: 8/22/2019       Dear Dr. Елена Cowan:    Thank you for referring Jessica Rojas to me for evaluation. Attached you will find relevant portions of my assessment and plan of care.    If you have questions, please do not hesitate to call me. I look forward to following Jessica Rojas along with you.    Sincerely,    Jeri Lux MD    Enclosure  CC:  No Recipients    If you would like to receive this communication electronically, please contact [email or web address] or [phone] to request more information on [facility] Link access.    For providers and/or their staff who would like to refer a patient to [facility], please contact us through our one-stop-shop provider referral line, [facility], at [phone].    If you feel you have received this communication in error or would no longer like to receive these types of communications, please e-mail [email or web address]

## 2019-08-22 NOTE — ASSESSMENT & PLAN NOTE
New onset?  Obviously has started this year, but as early as April?  I cannot determine why amantadine was started in April.   -> continue amantadine

## 2019-08-22 NOTE — ASSESSMENT & PLAN NOTE
Tremor and apraxia controlled, perhaps even over-controlled by exam today.  Responsive to carbidopa-levodopa.   -> no changes in dosing, but advised amantadine morning and noon rather than evening   -> consider adding in agonist with goal of reducing carbidopa-levodopa doses (given her youth and dyskinesias)

## 2019-08-29 RX ORDER — SELEGILINE HYDROCHLORIDE 5 MG/1
5 CAPSULE ORAL 2 TIMES DAILY
Qty: 60 CAPSULE | Refills: 6 | Status: SHIPPED | OUTPATIENT
Start: 2019-08-29 | End: 2020-02-24

## 2019-09-12 RX ORDER — SIMVASTATIN 10 MG/1
TABLET, FILM COATED ORAL
Qty: 30 TABLET | Refills: 3 | Status: SHIPPED | OUTPATIENT
Start: 2019-09-12 | End: 2019-12-03 | Stop reason: SDUPTHER

## 2019-09-12 RX ORDER — LEVOTHYROXINE SODIUM 137 UG/1
TABLET ORAL
Qty: 30 TABLET | Refills: 3 | Status: SHIPPED | OUTPATIENT
Start: 2019-09-12 | End: 2019-12-03 | Stop reason: SDUPTHER

## 2019-10-16 ENCOUNTER — OFFICE VISIT (OUTPATIENT)
Dept: FAMILY MEDICINE | Facility: CLINIC | Age: 64
End: 2019-10-16
Payer: MEDICARE

## 2019-10-16 VITALS
SYSTOLIC BLOOD PRESSURE: 133 MMHG | BODY MASS INDEX: 39.39 KG/M2 | HEART RATE: 83 BPM | HEIGHT: 61 IN | DIASTOLIC BLOOD PRESSURE: 74 MMHG | WEIGHT: 208.63 LBS | TEMPERATURE: 98 F

## 2019-10-16 DIAGNOSIS — Z00.00 ROUTINE CHECK-UP: Primary | ICD-10-CM

## 2019-10-16 PROCEDURE — 99999 PR PBB SHADOW E&M-EST. PATIENT-LVL IV: ICD-10-PCS | Mod: PBBFAC,,, | Performed by: FAMILY MEDICINE

## 2019-10-16 PROCEDURE — 99397 PER PM REEVAL EST PAT 65+ YR: CPT | Mod: S$GLB,,, | Performed by: FAMILY MEDICINE

## 2019-10-16 PROCEDURE — 3078F DIAST BP <80 MM HG: CPT | Mod: CPTII,S$GLB,, | Performed by: FAMILY MEDICINE

## 2019-10-16 PROCEDURE — 3078F PR MOST RECENT DIASTOLIC BLOOD PRESSURE < 80 MM HG: ICD-10-PCS | Mod: CPTII,S$GLB,, | Performed by: FAMILY MEDICINE

## 2019-10-16 PROCEDURE — 3075F PR MOST RECENT SYSTOLIC BLOOD PRESS GE 130-139MM HG: ICD-10-PCS | Mod: CPTII,S$GLB,, | Performed by: FAMILY MEDICINE

## 2019-10-16 PROCEDURE — 99999 PR PBB SHADOW E&M-EST. PATIENT-LVL IV: CPT | Mod: PBBFAC,,, | Performed by: FAMILY MEDICINE

## 2019-10-16 PROCEDURE — 99397 PR PREVENTIVE VISIT,EST,65 & OVER: ICD-10-PCS | Mod: S$GLB,,, | Performed by: FAMILY MEDICINE

## 2019-10-16 PROCEDURE — 3075F SYST BP GE 130 - 139MM HG: CPT | Mod: CPTII,S$GLB,, | Performed by: FAMILY MEDICINE

## 2019-10-16 RX ORDER — MELOXICAM 15 MG/1
15 TABLET ORAL DAILY
Qty: 30 TABLET | Refills: 11 | Status: SHIPPED | OUTPATIENT
Start: 2019-10-16 | End: 2020-08-19 | Stop reason: ALTCHOICE

## 2019-10-16 NOTE — PROGRESS NOTES
The patient presents today for general health evaluation and counseling      Past Medical History:  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 2014    Mediastinal lymphadenopathy 2015    Obesity, morbid 2014    Parkinsons 10/2017    Pituitary adenoma 's    took parladel for 3 yrs    Pneumonia     Postoperative hypothyroidism 2017    - s/p total thyroidectomy in  (parathyroids intact) with post op hypothyroidism; on synthroid    Pulmonary nodules 2018    Thyroid disease     Wheeze 2014     Past Surgical History:   Procedure Laterality Date    APPENDECTOMY      BREAST CYST ASPIRATION      BRONCHOSCOPY  2014, 2014     SECTION  , 1986    x2    EYE SURGERY      LUNG REMOVAL, PARTIAL Right     with 17 lymph nodes    THYROIDECTOMY  2016    TONSILLECTOMY  1969     Review of patient's allergies indicates:   Allergen Reactions    Propranolol Nausea And Vomiting     Drops pressure and raised sugar    Lipitor [atorvastatin] Other (See Comments)     Myalgia, muscle pain     Current Outpatient Medications on File Prior to Visit   Medication Sig Dispense Refill    alcohol swabs (ALCOHOL WIPES) PadM Uses 5 daily 400 each 4    amantadine HCl (SYMMETREL) 100 mg capsule TAKE ONE CAPSULE BY MOUTH twice daily 60 capsule 11    BASAGLAR KWIKPEN U-100 INSULIN glargine 100 units/mL (3mL) SubQ pen INJECT 35 UNITS SUBCUTANEOUSLY twice daily 45 mL 3    blood sugar  "diagnostic (TRUE METRIX GLUCOSE TEST STRIP) Strp Check bg 7-8 times/day 250 each 12    carbidopa-levodopa  mg (SINEMET)  mg per tablet TAKE 1 AND 1/2 TABLETS BY MOUTH THREE TIMES DAILY 120 tablet 6    cholecalciferol, vitamin D3, (VITAMIN D3) 400 unit Cap Take 1,200 Units by mouth once daily.      clonazePAM (KLONOPIN) 0.5 MG tablet Take 1 tablet (0.5 mg total) by mouth every evening. 30 tablet 5    cyanocobalamin (VITAMIN B-12) 1000 MCG tablet Take 100 mcg by mouth once daily.      fish oil-omega-3 fatty acids 300-1,000 mg capsule Take 4 capsules by mouth once daily.      gabapentin (NEURONTIN) 400 MG capsule TAKE ONE CAPSULE BY MOUTH FOUR TIMES DAILY 120 capsule 12    GLUCOSAMINE HCL/CHONDR MCCARTHY A NA (OSTEO BI-FLEX ORAL) Take by mouth once daily.      insulin lispro (HUMALOG KWIKPEN INSULIN) 100 unit/mL pen Inject 30 Units into the skin 3 (three) times daily before meals. 30 mL 12    lancets 33 gauge Misc Checks bg 7-8 times a dayTrue metrix 250 each 11    levothyroxine (SYNTHROID) 137 MCG Tab tablet TAKE ONE TABLET BY MOUTH ONCE DAILY 30 tablet 3    melatonin 3 mg Tab Take by mouth.      montelukast (SINGULAIR) 10 mg tablet TAKE ONE TABLET BY MOUTH EVERY EVENING 30 tablet 12    MULTIVITAMIN W-MINERALS/LUTEIN (CENTRUM SILVER ORAL) Take 1 tablet by mouth once daily.       pen needle, diabetic (BD ANA 2ND GEN PEN NEEDLE) 32 gauge x 5/32" Ndle Uses 5 daily 400 each 3    selegiline (ELDEPRYL) 5 mg Cap Take 1 capsule (5 mg total) by mouth 2 (two) times daily. 60 capsule 6    simvastatin (ZOCOR) 10 MG tablet TAKE ONE TABLET BY MOUTH ONCE DAILY IN THE EVENING 30 tablet 3    valsartan-hydrochlorothiazide (DIOVAN-HCT) 320-12.5 mg per tablet TAKE ONE TABLET BY MOUTH ONCE DAILY 30 tablet 12    VENTOLIN HFA 90 mcg/actuation inhaler Inhale 2 puffs into the lungs every 6 (six) hours as needed for Wheezing. Rescue 18 g 12    [DISCONTINUED] nabumetone (RELAFEN) 500 MG tablet Take 1,000 mg by mouth 2 " (two) times daily.  12     No current facility-administered medications on file prior to visit.      Social History     Socioeconomic History    Marital status:      Spouse name: Not on file    Number of children: Not on file    Years of education: Not on file    Highest education level: Not on file   Occupational History    Occupation: housewife   Social Needs    Financial resource strain: Not on file    Food insecurity:     Worry: Not on file     Inability: Not on file    Transportation needs:     Medical: Not on file     Non-medical: Not on file   Tobacco Use    Smoking status: Never Smoker    Smokeless tobacco: Never Used   Substance and Sexual Activity    Alcohol use: Yes     Comment: once per month    Drug use: No    Sexual activity: Not on file   Lifestyle    Physical activity:     Days per week: Not on file     Minutes per session: Not on file    Stress: Not on file   Relationships    Social connections:     Talks on phone: Not on file     Gets together: Not on file     Attends Islam service: Not on file     Active member of club or organization: Not on file     Attends meetings of clubs or organizations: Not on file     Relationship status: Not on file   Other Topics Concern    Not on file   Social History Narrative    Not on file     Family History   Problem Relation Age of Onset    Cancer Maternal Aunt         breast    Cancer Maternal Grandmother         unknown    Cancer Maternal Aunt         unknown    Diabetes Mother     Glaucoma Mother     Diabetes Father     Diabetes Sister     Macular degeneration Sister     Breast cancer Paternal Aunt     Breast cancer Paternal Aunt     Amblyopia Neg Hx     Blindness Neg Hx     Cataracts Neg Hx     Hypertension Neg Hx     Retinal detachment Neg Hx     Stroke Neg Hx     Strabismus Neg Hx     Thyroid disease Neg Hx          ROS:GENERAL: No fever, chills, fatigability or weight loss.  SKIN: No rashes, itching or changes  in color or texture of skin.  HEAD: No headaches or recent head trauma.EYES: Visual acuity fine. No photophobia, ocular pain or diplopia.EARS: Denies ear pain, discharge or vertigo.NOSE: No loss of smell, no epistaxis or postnasal drip.MOUTH & THROAT: No hoarseness or change in voice. No excessive gum bleeding.NODES: Denies swollen glands.  CHEST: Denies ESPINAL, cyanosis, wheezing, cough and sputum production.  CARDIOVASCULAR: Denies chest pain, PND, orthopnea or reduced exercise tolerance.  ABDOMEN: Appetite fine. No weight loss. Denies diarrhea, abdominal pain, hematemesis or blood in stool.  URINARY: No flank pain, dysuria or hematuria.  PERIPHERAL VASCULAR: No claudication or cyanosis.  MUSCULOSKELETAL: See above.  NEUROLOGIC: No history of seizures, paralysis, alteration of gait or coordination.  PE:    HEAD: Normocephalic, atraumatic.EYES: PERRL. EOMI.   EARS: TM's intact. Light reflex normal. No retraction or perforation.   NOSE: Mucosa pink. Airway clear.MOUTH & THROAT: No tonsillar enlargement. No pharyngeal erythema or exudate. No stridor.  NODES: No cervical, axillary or inguinal lymph node enlargement.  CHEST: Lungs clear to auscultation.  CARDIOVASCULAR: Normal S1, S2. No rubs, murmurs or gallops.  ABDOMEN: Bowel sounds normal. Not distended. Soft. No tenderness or masses.  MUSCULOSKELETAL: No palpable abnormality  NEUROLOGIC: Cranial Nerves: II-XII grossly intact.  Motor: 5/5 strength major flexors/extensors.  DTR's: Knees, Ankles 2+ and equal bilaterally; downgoing toes.  Sensory: Intact to light touch distally.  Gait & Posture: Normal gait and fine motion. No cerebellar signs.     Impression:Routine health check  Plan:Lab eval  Rec diet and ex recs  Rev age appropriate screenings    Health Maintenance Due   Topic Date Due    DEXA SCAN  06/20/2019

## 2019-11-11 RX ORDER — NABUMETONE 500 MG/1
TABLET, FILM COATED ORAL
Qty: 60 TABLET | Refills: 12 | Status: SHIPPED | OUTPATIENT
Start: 2019-11-11 | End: 2019-11-25

## 2019-11-25 ENCOUNTER — OFFICE VISIT (OUTPATIENT)
Dept: NEUROLOGY | Facility: CLINIC | Age: 64
End: 2019-11-25
Payer: MEDICARE

## 2019-11-25 ENCOUNTER — TELEPHONE (OUTPATIENT)
Dept: NEUROLOGY | Facility: CLINIC | Age: 64
End: 2019-11-25

## 2019-11-25 VITALS — WEIGHT: 209.69 LBS | HEIGHT: 61 IN | BODY MASS INDEX: 39.59 KG/M2 | RESPIRATION RATE: 20 BRPM

## 2019-11-25 DIAGNOSIS — G24.01 LEVODOPA-INDUCED DYSKINESIA: ICD-10-CM

## 2019-11-25 DIAGNOSIS — E11.21 TYPE 2 DIABETES MELLITUS WITH DIABETIC NEPHROPATHY, WITH LONG-TERM CURRENT USE OF INSULIN: ICD-10-CM

## 2019-11-25 DIAGNOSIS — G47.52 REM SLEEP BEHAVIOR DISORDER: ICD-10-CM

## 2019-11-25 DIAGNOSIS — G20.A1 PARKINSON DISEASE: Primary | ICD-10-CM

## 2019-11-25 DIAGNOSIS — G47.00 INSOMNIA, UNSPECIFIED TYPE: ICD-10-CM

## 2019-11-25 DIAGNOSIS — M54.16 LUMBAR RADICULOPATHY: ICD-10-CM

## 2019-11-25 DIAGNOSIS — Z85.110 HISTORY OF MALIGNANT CARCINOID TUMOR OF BRONCHUS AND LUNG: ICD-10-CM

## 2019-11-25 DIAGNOSIS — Z79.4 TYPE 2 DIABETES MELLITUS WITH DIABETIC NEPHROPATHY, WITH LONG-TERM CURRENT USE OF INSULIN: ICD-10-CM

## 2019-11-25 DIAGNOSIS — T42.8X5A LEVODOPA-INDUCED DYSKINESIA: ICD-10-CM

## 2019-11-25 DIAGNOSIS — K59.00 CONSTIPATION, UNSPECIFIED CONSTIPATION TYPE: ICD-10-CM

## 2019-11-25 PROCEDURE — 3008F PR BODY MASS INDEX (BMI) DOCUMENTED: ICD-10-PCS | Mod: CPTII,S$GLB,, | Performed by: NURSE PRACTITIONER

## 2019-11-25 PROCEDURE — 1101F PR PT FALLS ASSESS DOC 0-1 FALLS W/OUT INJ PAST YR: ICD-10-PCS | Mod: CPTII,S$GLB,, | Performed by: NURSE PRACTITIONER

## 2019-11-25 PROCEDURE — 99215 OFFICE O/P EST HI 40 MIN: CPT | Mod: S$GLB,,, | Performed by: NURSE PRACTITIONER

## 2019-11-25 PROCEDURE — 99215 PR OFFICE/OUTPT VISIT, EST, LEVL V, 40-54 MIN: ICD-10-PCS | Mod: S$GLB,,, | Performed by: NURSE PRACTITIONER

## 2019-11-25 PROCEDURE — 1101F PT FALLS ASSESS-DOCD LE1/YR: CPT | Mod: CPTII,S$GLB,, | Performed by: NURSE PRACTITIONER

## 2019-11-25 PROCEDURE — 3008F BODY MASS INDEX DOCD: CPT | Mod: CPTII,S$GLB,, | Performed by: NURSE PRACTITIONER

## 2019-11-25 PROCEDURE — 99999 PR PBB SHADOW E&M-EST. PATIENT-LVL III: CPT | Mod: PBBFAC,,, | Performed by: NURSE PRACTITIONER

## 2019-11-25 PROCEDURE — 3044F PR MOST RECENT HEMOGLOBIN A1C LEVEL <7.0%: ICD-10-PCS | Mod: CPTII,S$GLB,, | Performed by: NURSE PRACTITIONER

## 2019-11-25 PROCEDURE — 3044F HG A1C LEVEL LT 7.0%: CPT | Mod: CPTII,S$GLB,, | Performed by: NURSE PRACTITIONER

## 2019-11-25 PROCEDURE — 99999 PR PBB SHADOW E&M-EST. PATIENT-LVL III: ICD-10-PCS | Mod: PBBFAC,,, | Performed by: NURSE PRACTITIONER

## 2019-11-25 RX ORDER — PRAMIPEXOLE DIHYDROCHLORIDE 0.25 MG/1
TABLET ORAL
Qty: 90 TABLET | Refills: 11 | Status: SHIPPED | OUTPATIENT
Start: 2019-11-25 | End: 2020-04-01 | Stop reason: SDUPTHER

## 2019-11-25 RX ORDER — PRAMIPEXOLE DIHYDROCHLORIDE 0.25 MG/1
TABLET ORAL
Qty: 90 TABLET | Refills: 11 | Status: SHIPPED | OUTPATIENT
Start: 2019-11-25 | End: 2019-11-25 | Stop reason: SDUPTHER

## 2019-11-25 NOTE — TELEPHONE ENCOUNTER
----- Message from Virgie Solis NP sent at 11/25/2019  1:32 PM CST -----  If you can call pharmacy and back and let them know I re-did the Rx but it is 1/2 to 1 tab TID as directed.   ----- Message -----  From: Chantale Kaufman MA  Sent: 11/25/2019   1:07 PM CST  To: Virgie Solis NP    Good afternoon,     Kathe is clarification on Mirapex. How many times a day is the pt to take 1/2 to 1 tab?      ----- Message -----  From: Yarelis Shay  Sent: 11/25/2019  12:12 PM CST  To: Will ALMODOVAR Staff    Type:  Pharmacy Calling to Clarify an RX    Name of Caller:  Kathe   Pharmacy Name:    [facility] [place] [facility] [address] [address]  [address]  [facility] [address]  Phone: [phone] Fax: [phone]  Prescription Name:  pramipexole (MIRAPEX) 0.25 MG tablet  What do they need to clarify?:  Frequency : 1/2 to 1 tablet how often   Best Call Back Number:  [phone]   Additional Information: please clarify

## 2019-11-25 NOTE — LETTER
November 25, 2019      Jeri Lux MD  [address] [facility] [address]           [facility]  [address] [place] [address]  Phone: [phone]  Fax: [phone]          Patient: Jessica Rojas   MR Number: [id number]   YOB: 1955   Date of Visit: 11/25/2019       Dear Dr. Jeri Lux:    Thank you for referring Jessica Rojas to me for evaluation. Attached you will find relevant portions of my assessment and plan of care.    If you have questions, please do not hesitate to call me. I look forward to following Jessica Rojas along with you.    Sincerely,    Virgie Solis NP    Enclosure  CC:  No Recipients    If you would like to receive this communication electronically, please contact [email or web address] or [phone] to request more information on [email or web address] Link access.    For providers and/or their staff who would like to refer a patient to [facility], please contact us through our one-stop-shop provider referral line, [facility], at [phone].    If you feel you have received this communication in error or would no longer like to receive these types of communications, please e-mail [email or web address]

## 2019-11-25 NOTE — PATIENT INSTRUCTIONS
Switch melatonin from 3 mg to 5 mg bi-layer tablet (immediate and extended release) to see if this helps maintain sleep.     Try taking pills with applesauce, pudding, butter or olive oil to see if this helps.     Add pramipexole (Mirapex) 0.25 mg as follows...  Week 1- take 1/2 tablet three times daily + carbidopa/levodopa 1.5 tablets  Week 2 and after- take 1 tablet three times daily with carbidopa/levodopa but reduce to 1 tablet     This is a low dose. It can be increased if needed. Please watch for the SE we discussed.     Call for problems or questions.     Also consider having the 2nd back injection to see if this helps.     Follow up with Dr. Lux 3-4 months.

## 2019-11-25 NOTE — PROGRESS NOTES
Name: Jessica Rojas  MRN: [id number]   CSN: [id number]      Date: 11-25-19      Referring physician:  Jeri Lux MD  [address] [place] [address]    Subjective:      Chief Complaint: Parkinson's disease     History of Present Illness (HPI):    Jessica Rojas is a 63 y.o. right-handed female who presents today for a follow-up evaluation of Parkinson's Disease (2018 L tremor, 2019 dyskinesias) and is alone. Last seen by Dr. Lux 8-22-19. At that time, she asked her to take amantadine morning and noon to see if this helped insomnia. They also discussed constipation in Parkinson's disease.     Changing dose of amantadine at noon, did not prevent her from awaking at 2AM. She is not sure what awakes her. She will sometimes drift back off but it never feels like a deep sleep. This is not nightly but probably 2-3 times per week.   Takes melatonin- about 30 minutes before bedtime. No trouble falling asleep.   Avoids caffeine after 6PM.     Does not have lot of tremor at this point.   + stiff all over, back is the worst   Slowness at times, not often.  Balance okay. No falls. Shuffles at times but will try to correct this once she realizes. She has found that mineral oil helps.    Swallowing more noticeable with pills only- feels like gets clogged- no choking or coughing. No trouble with swallowing food, liquid.     Currently taking carbidopa/levodopa - 1.5 tabs  6AM, noon, 6PM  Taking amantadine 6A and noon  No pattern to back pain. Hurts when she bends down to pick something up. Just picking up gallon of milk will cause back spasms.   The back is mainly triggered by standing too long (at stove) or bending, lifting. She has had steroid shot in back and did not help (had one injection).     When asked about dyskinesias, says she notices that her shirts want to come off L shoulder and not stay up. She says she does note a little movement that is not bothersome, more in the evening. She says it is not a jerk or  wiggle, not tremor but movement.     Still has vivid dreams.        Review of Systems   HENT: Positive for trouble swallowing. Negative for drooling.    Gastrointestinal: Positive for constipation.   Musculoskeletal: Negative for gait problem.   Neurological: Positive for tremors (minimal).   Psychiatric/Behavioral: Positive for sleep disturbance. Negative for hallucinations.       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    Social History: The patient  reports that she has never smoked. She has never used smokeless tobacco. She reports that she drinks alcohol. She reports that she does not use drugs.    Family History: Their family history includes Breast cancer in her paternal aunt and paternal aunt; Cancer in her maternal aunt, maternal aunt, and maternal grandmother; Diabetes in her father, mother, and sister; Glaucoma in her mother; Macular degeneration in her sister.    Allergies: Propranolol and Lipitor [atorvastatin]     Meds:   Current Outpatient Medications on File Prior to Visit   Medication Sig Dispense Refill    alcohol swabs (ALCOHOL WIPES) Pad Uses 5 daily 400 each 4    amantadine HCl (SYMMETREL) 100 mg capsule TAKE ONE CAPSULE BY MOUTH twice daily 60 capsule 11    BASAGLAR  "KWIKPEN U-100 INSULIN glargine 100 units/mL (3mL) SubQ pen INJECT 35 UNITS SUBCUTANEOUSLY twice daily 45 mL 3    blood sugar diagnostic (TRUE METRIX GLUCOSE TEST STRIP) Strp Check bg 7-8 times/day 250 each 12    carbidopa-levodopa  mg (SINEMET)  mg per tablet TAKE 1 AND 1/2 TABLETS BY MOUTH THREE TIMES DAILY 120 tablet 6    cholecalciferol, vitamin D3, (VITAMIN D3) 400 unit Cap Take 1,200 Units by mouth once daily.      clonazePAM (KLONOPIN) 0.5 MG tablet Take 1 tablet (0.5 mg total) by mouth every evening. 30 tablet 5    cyanocobalamin (VITAMIN B-12) 1000 MCG tablet Take 100 mcg by mouth once daily.      fish oil-omega-3 fatty acids 300-1,000 mg capsule Take 4 capsules by mouth once daily.      gabapentin (NEURONTIN) 400 MG capsule TAKE ONE CAPSULE BY MOUTH FOUR TIMES DAILY 120 capsule 12    GLUCOSAMINE HCL/CHONDR MCCARTHY A NA (OSTEO BI-FLEX ORAL) Take by mouth once daily.      insulin lispro (HUMALOG KWIKPEN INSULIN) 100 unit/mL pen Inject 30 Units into the skin 3 (three) times daily before meals. 30 mL 12    lancets 33 gauge Misc Checks bg 7-8 times a dayTrue metrix 250 each 11    levothyroxine (SYNTHROID) 137 MCG Tab tablet TAKE ONE TABLET BY MOUTH ONCE DAILY 30 tablet 3    melatonin 3 mg Tab Take by mouth.      meloxicam (MOBIC) 15 MG tablet Take 1 tablet (15 mg total) by mouth once daily. 30 tablet 11    montelukast (SINGULAIR) 10 mg tablet TAKE ONE TABLET BY MOUTH EVERY EVENING 30 tablet 12    MULTIVITAMIN W-MINERALS/LUTEIN (CENTRUM SILVER ORAL) Take 1 tablet by mouth once daily.       pen needle, diabetic (BD ANA 2ND GEN PEN NEEDLE) 32 gauge x 5/32" Ndle Uses 5 daily 400 each 3    selegiline (ELDEPRYL) 5 mg Cap Take 1 capsule (5 mg total) by mouth 2 (two) times daily. 60 capsule 6    simvastatin (ZOCOR) 10 MG tablet TAKE ONE TABLET BY MOUTH ONCE DAILY IN THE EVENING 30 tablet 3    valsartan-hydrochlorothiazide (DIOVAN-HCT) 320-12.5 mg per tablet TAKE ONE TABLET BY MOUTH ONCE DAILY " "30 tablet 12    VENTOLIN HFA 90 mcg/actuation inhaler Inhale 2 puffs into the lungs every 6 (six) hours as needed for Wheezing. Rescue 18 g 12    [DISCONTINUED] nabumetone (RELAFEN) 500 MG tablet TAKE TWO TABLETS BY MOUTH twice daily 60 tablet 12     No current facility-administered medications on file prior to visit.        Objective:     Physical Exam:    Vitals:    11/25/19 0945   Resp: 20   Weight: 95.1 kg (209 lb 10.5 oz)   Height: 5' 1" (1.549 m)     Body mass index is 39.61 kg/m².    Constitutional  Well-developed, well-nourished, appears stated age   Cardiovascular  Radial pulses 2+ and symmetric, no LE edema bilaterally     ..III.  MOTOR EXAMINATION - last dose 4.5 hours ago       Speech  1 - Slight loss of expression, dictation, and/or volumn.   Facial Expression  1 - Minimal Hypomimia, could be normal "Poker Face".   Tremor at Rest:      Face, lips, chin 0 - Absent.    Hands:      right 0 - Absent.    left 0 - Absent.    Feet:      right 0 - Absent.    left 0 - Absent.    Action or Postural Tremor of Hands      right 0 - Absent.    left 0 - Absent.    Rigidity      Neck 1 - Slight or detectable only when activated by mirror or other movements.   Upper Extremity: Right 1 - Slight or detectable only when activated by mirror or other movements.   Upper Extremity: Left 2 - Mild or moderate.   Lower Extremity: Right 0 - Absent.   Lower Extremity: Left 1 - Slight or detectable only when activated by mirror or other movements.   Finger Taps      right 1 - Mild slowing and/or reduction in amplitude.   left 1 - Mild slowing and/or reduction in amplitude.   Hand Movements      right 0 - Normal.   left 1 - Mild slowing and/or reduction in amplitude.   Rapid Alternating Movements of Hands      right 0 - Normal.   left 2 - Moderately impaired. Definite and early fatiguing. May have occasional arrests in movement.   Leg Agility      right 0 - Normal.   left 2 - Moderately impaired. Definite and early fatiguing. May " have occasional arrests in movement.   Arising from Chair  1 - Slow; or may need more than one attempt.   Posture  2 - Moderately stooped posture, definitely abnormal; can be slightly leaning ot one side.    Gait  1 - Walks slowly, may shuffle with short steps, but no festination (hastening steps) or propulsion.   Postural Stability (Response to sudden, strong posterior displacement produced by pull on shoulders while patient erect with eyes open and feet slightly apart. Patient is prepared, and can have had some practice runs.)  deferred   Body Bradykinesia and Hypokinesia (Combining slowness, hesitancy, decreased armswing, small amplitude, and poverty of movement in general)  1 - Minimal slowness, giving movement a deliberate character; could be normal for some persons. Possibly reduced amplitude.     Subtle, intermittent, generalized dyskinesias. Noted slight dyskinesia mouth on diversion only.   With ambulation, obvious drop to left shoulder. Keeps L elbow flexed.         Laboratory Results:  No visits with results within 3 Month(s) from this visit.   Latest known visit with results is:   Lab Visit on 06/03/2019   Component Date Value Ref Range Status    Hemoglobin A1C 06/03/2019 6.2* 4.0 - 5.6 % Final    Estimated Avg Glucose 06/03/2019 131  68 - 131 mg/dL Final    Sodium 06/03/2019 141  136 - 145 mmol/L Final    Potassium 06/03/2019 4.0  3.5 - 5.1 mmol/L Final    Chloride 06/03/2019 102  95 - 110 mmol/L Final    CO2 06/03/2019 26  23 - 29 mmol/L Final    Glucose 06/03/2019 209* 70 - 110 mg/dL Final    BUN, Bld 06/03/2019 32* 8 - 23 mg/dL Final    Creatinine 06/03/2019 1.3  0.5 - 1.4 mg/dL Final    Calcium 06/03/2019 9.6  8.7 - 10.5 mg/dL Final    Total Protein 06/03/2019 7.4  6.0 - 8.4 g/dL Final    Albumin 06/03/2019 3.9  3.5 - 5.2 g/dL Final    Total Bilirubin 06/03/2019 0.3  0.1 - 1.0 mg/dL Final    Alkaline Phosphatase 06/03/2019 70  55 - 135 U/L Final    AST 06/03/2019 12  10 - 40 U/L Final     ALT 06/03/2019 10  10 - 44 U/L Final    Anion Gap 06/03/2019 13  8 - 16 mmol/L Final    eGFR if  06/03/2019 49.7* >60 mL/min/1.73 m^2 Final    eGFR if non African American 06/03/2019 43.2* >60 mL/min/1.73 m^2 Final    TSH 06/03/2019 1.147  0.400 - 4.000 uIU/mL Final    Free T4 06/03/2019 1.49  0.71 - 1.51 ng/dL Final       Imaging:   Per Dr. Lux notes:  MRI brain W WO contrast (08/04/2017)               L cerebellar, right temporal./insula, periventricular white matter lesions. Some of the periventricular lesions are perpendicular to corpus callosum     CSF 9/26/17:  Oligoclonal bands 0  Assessment and Plan     Parkinson disease  -     pramipexole (MIRAPEX) 0.25 MG tablet; Please take 1/2 to 1 tab as directed.  Dispense: 90 tablet; Refill: 11    Levodopa-induced dyskinesia    Constipation, unspecified constipation type    Lumbar radiculopathy    History of malignant carcinoid tumor of bronchus and lung    REM sleep behavior disorder    Type 2 diabetes mellitus with diabetic nephropathy, with long-term current use of insulin    Insomnia, unspecified type        Medical Decision Making:    L-dopa responsive but early dyskinesias. No apraxia noted today.     Add pramipexole (Mirapex) 0.25 mg as follows...  Week 1- take 1/2 tablet three times daily + carbidopa/levodopa 1.5 tablets  Week 2 and after- take 1 tablet three times daily with carbidopa/levodopa but reduce to 1 tablet   Discussed SE of Mirapex- ICD.     Switch melatonin to extended release 5 mg to see if this helps.     Try applesauce, pudding, butter to pills to see if this helps swallow pills. Does not have issues with food, liquids, saliva. If worsens, let us know.     She has had one steroid injection for back. Did not help so did not go back. Enc her to consider completing series.      Follow up with Dr. Lux 3-4 mos Jay.       I spent 45 minutes face-to-face with the patient with >50% of the time spent with counseling and  education regarding:  - results of data, diagnosis, and recommendations stated above  - the prognosis of PD  - risks and benefits of MPX  - importance of diet and exercise    Virgie Solis, CHAMP, NP-C  Division of Movement and Memory Disorders  [facility]  [phone]

## 2019-11-26 ENCOUNTER — LAB VISIT (OUTPATIENT)
Dept: LAB | Facility: HOSPITAL | Age: 64
End: 2019-11-26
Attending: FAMILY MEDICINE
Payer: MEDICARE

## 2019-11-26 DIAGNOSIS — E11.21 TYPE 2 DIABETES MELLITUS WITH DIABETIC NEPHROPATHY, WITH LONG-TERM CURRENT USE OF INSULIN: ICD-10-CM

## 2019-11-26 DIAGNOSIS — Z79.4 TYPE 2 DIABETES MELLITUS WITH DIABETIC NEPHROPATHY, WITH LONG-TERM CURRENT USE OF INSULIN: ICD-10-CM

## 2019-11-26 DIAGNOSIS — N18.30 CHRONIC KIDNEY DISEASE, STAGE III (MODERATE): ICD-10-CM

## 2019-11-26 LAB
25(OH)D3+25(OH)D2 SERPL-MCNC: 38 NG/ML (ref 30–96)
ALBUMIN SERPL BCP-MCNC: 3.9 G/DL (ref 3.5–5.2)
ALP SERPL-CCNC: 73 U/L (ref 55–135)
ALT SERPL W/O P-5'-P-CCNC: 10 U/L (ref 10–44)
ANION GAP SERPL CALC-SCNC: 9 MMOL/L (ref 8–16)
AST SERPL-CCNC: 17 U/L (ref 10–40)
BILIRUB SERPL-MCNC: 0.4 MG/DL (ref 0.1–1)
BUN SERPL-MCNC: 23 MG/DL (ref 8–23)
CALCIUM SERPL-MCNC: 10.2 MG/DL (ref 8.7–10.5)
CHLORIDE SERPL-SCNC: 103 MMOL/L (ref 95–110)
CHOLEST SERPL-MCNC: 219 MG/DL (ref 120–199)
CHOLEST/HDLC SERPL: 3.3 {RATIO} (ref 2–5)
CO2 SERPL-SCNC: 28 MMOL/L (ref 23–29)
CREAT SERPL-MCNC: 1.1 MG/DL (ref 0.5–1.4)
EST. GFR  (AFRICAN AMERICAN): >60 ML/MIN/1.73 M^2
EST. GFR  (NON AFRICAN AMERICAN): 52.8 ML/MIN/1.73 M^2
ESTIMATED AVG GLUCOSE: 137 MG/DL (ref 68–131)
GLUCOSE SERPL-MCNC: 140 MG/DL (ref 70–110)
HBA1C MFR BLD HPLC: 6.4 % (ref 4–5.6)
HDLC SERPL-MCNC: 66 MG/DL (ref 40–75)
HDLC SERPL: 30.1 % (ref 20–50)
LDLC SERPL CALC-MCNC: 123.4 MG/DL (ref 63–159)
NONHDLC SERPL-MCNC: 153 MG/DL
POTASSIUM SERPL-SCNC: 4.6 MMOL/L (ref 3.5–5.1)
PROT SERPL-MCNC: 7.3 G/DL (ref 6–8.4)
SODIUM SERPL-SCNC: 140 MMOL/L (ref 136–145)
T4 FREE SERPL-MCNC: 1.18 NG/DL (ref 0.71–1.51)
TRIGL SERPL-MCNC: 148 MG/DL (ref 30–150)
TSH SERPL DL<=0.005 MIU/L-ACNC: 1.41 UIU/ML (ref 0.4–4)

## 2019-11-26 PROCEDURE — 84443 ASSAY THYROID STIM HORMONE: CPT

## 2019-11-26 PROCEDURE — 82306 VITAMIN D 25 HYDROXY: CPT

## 2019-11-26 PROCEDURE — 84439 ASSAY OF FREE THYROXINE: CPT

## 2019-11-26 PROCEDURE — 80061 LIPID PANEL: CPT

## 2019-11-26 PROCEDURE — 83036 HEMOGLOBIN GLYCOSYLATED A1C: CPT

## 2019-11-26 PROCEDURE — 80053 COMPREHEN METABOLIC PANEL: CPT

## 2019-11-26 PROCEDURE — 36415 COLL VENOUS BLD VENIPUNCTURE: CPT | Mod: PO

## 2019-12-03 ENCOUNTER — OFFICE VISIT (OUTPATIENT)
Dept: ENDOCRINOLOGY | Facility: CLINIC | Age: 64
End: 2019-12-03
Payer: MEDICARE

## 2019-12-03 VITALS
WEIGHT: 213.38 LBS | DIASTOLIC BLOOD PRESSURE: 90 MMHG | HEIGHT: 61 IN | BODY MASS INDEX: 40.29 KG/M2 | SYSTOLIC BLOOD PRESSURE: 150 MMHG | HEART RATE: 88 BPM

## 2019-12-03 DIAGNOSIS — E89.0 POST-OPERATIVE HYPOTHYROIDISM: ICD-10-CM

## 2019-12-03 DIAGNOSIS — E55.9 VITAMIN D DEFICIENCY: ICD-10-CM

## 2019-12-03 DIAGNOSIS — E11.21 TYPE 2 DIABETES MELLITUS WITH DIABETIC NEPHROPATHY, WITH LONG-TERM CURRENT USE OF INSULIN: Primary | ICD-10-CM

## 2019-12-03 DIAGNOSIS — I10 BENIGN ESSENTIAL HTN: ICD-10-CM

## 2019-12-03 DIAGNOSIS — Z79.4 TYPE 2 DIABETES MELLITUS WITH DIABETIC NEPHROPATHY, WITH LONG-TERM CURRENT USE OF INSULIN: Primary | ICD-10-CM

## 2019-12-03 DIAGNOSIS — E78.2 MIXED HYPERLIPIDEMIA: ICD-10-CM

## 2019-12-03 DIAGNOSIS — E66.01 OBESITY, MORBID: ICD-10-CM

## 2019-12-03 PROCEDURE — 99999 PR PBB SHADOW E&M-EST. PATIENT-LVL IV: CPT | Mod: PBBFAC,,, | Performed by: NURSE PRACTITIONER

## 2019-12-03 PROCEDURE — 99499 UNLISTED E&M SERVICE: CPT | Mod: S$GLB,,, | Performed by: NURSE PRACTITIONER

## 2019-12-03 PROCEDURE — 99214 OFFICE O/P EST MOD 30 MIN: CPT | Mod: S$GLB,,, | Performed by: NURSE PRACTITIONER

## 2019-12-03 PROCEDURE — 99999 PR PBB SHADOW E&M-EST. PATIENT-LVL IV: ICD-10-PCS | Mod: PBBFAC,,, | Performed by: NURSE PRACTITIONER

## 2019-12-03 PROCEDURE — 99499 RISK ADDL DX/OHS AUDIT: ICD-10-PCS | Mod: S$GLB,,, | Performed by: NURSE PRACTITIONER

## 2019-12-03 PROCEDURE — 99214 PR OFFICE/OUTPT VISIT, EST, LEVL IV, 30-39 MIN: ICD-10-PCS | Mod: S$GLB,,, | Performed by: NURSE PRACTITIONER

## 2019-12-03 RX ORDER — LEVOTHYROXINE SODIUM 137 UG/1
137 TABLET ORAL DAILY
Qty: 30 TABLET | Refills: 12 | Status: SHIPPED | OUTPATIENT
Start: 2019-12-03 | End: 2020-01-13

## 2019-12-03 RX ORDER — SIMVASTATIN 10 MG/1
TABLET, FILM COATED ORAL
Qty: 30 TABLET | Refills: 12 | Status: SHIPPED | OUTPATIENT
Start: 2019-12-03 | End: 2020-01-13

## 2019-12-03 RX ORDER — INSULIN ASPART 100 [IU]/ML
INJECTION, SOLUTION INTRAVENOUS; SUBCUTANEOUS
Qty: 1 BOX | Refills: 11 | OUTPATIENT
Start: 2019-12-03

## 2019-12-03 RX ORDER — INSULIN GLARGINE 100 [IU]/ML
INJECTION, SOLUTION SUBCUTANEOUS
Qty: 30 ML | Refills: 12 | Status: SHIPPED | OUTPATIENT
Start: 2019-12-03 | End: 2020-01-13

## 2019-12-03 RX ORDER — INSULIN LISPRO 100 [IU]/ML
30 INJECTION, SOLUTION INTRAVENOUS; SUBCUTANEOUS
Qty: 30 ML | Refills: 12 | Status: SHIPPED | OUTPATIENT
Start: 2019-12-03 | End: 2020-12-07 | Stop reason: SDUPTHER

## 2019-12-03 NOTE — PROGRESS NOTES
CC: This 63 y.o. female presents for management of diabetes and chronic conditions pending review including HTN, HLP, morbid obesity, hypothyroidism     HPI: She was diagnosed with T2DM in ~ . She was hospitalized r/t DM in 2016 for DKA.   Family hx of DM: mom, dad, and sisters     hypoglycemia at home-  Having lower readings 50-75 ~ 3 pm occasionally      monitoring BG at home: ac/hs + 2 hrs pp  Fastin-120s  Pp 2hr 155-180  Lunch: 140s  2 hrs 55,   Supper 130-135  -160s    Diet: Eats 2-3  Meals a day, snacks as below. Eats mostly at home   Eats breakfast 8am - oatmeal w blueberries  Snack- cherry tomatoes  Lunch- yogurt w fruit  Dinner- 6 home cooked- red bens, no rice, or homemade soup, sweet potatoes   Exercise: rides her bike (stationary)   daily, 30 minutes  CURRENT DM MEDS: Basaglar 35/30. Humalog 30 u AC    Timing prandial insulin 5-15 minutes before meals: yes  Vial/pen:  Uses pens  Glucometer type:  Relion    Standards of Care:  Eye exam: 2018 No DM retinopathy (Dr Ramirez)     Nodules was found on on a PET scan. FNA 14 shows adenomatous nodule with cystic changes. S/P thyroidectomy 16.       On synthroid 137 mcg once daily. Takes all alone with water 30-60 mins prior to first meal     Has history of myalgias with statin higher doses and intensity    Being treated for parkinsons. No paresthesias. No falls. No Hypoglycemia.     ROS:   Gen: Appetite good,  Weight stable, + fatigue and weakness.  Skin: Skin is intact and heals well, no rashes, no hair changes  Eyes: Denies visual disturbances  Resp: no SOB or ESPINAL, no cough  Cardiac: No palpitations, chest pain, trace BLE edema   GI: No nausea or vomiting, diarrhea, constipation, or abdominal pain.  /GYN: No nocturia, burning or pain.   MS/Neuro: Gait steady, speech clear, toes tingle at night  Psych: Denies drug/ETOH abuse, no hx of depression.  Other systems: negative.    PE:  GENERAL: Well developed, well  nourished.  PSYCH: AAOx3, appropriate mood and affect, pleasant expression, conversant, appears relaxed, well groomed.   EYES: Conjunctiva, corneas clear  NECK: Supple, trachea midline   ABDOMEN: Soft, non-tender, non-distended   VASCULAR: DP pulses +2/4 bilaterally, trace bLE edema.  NEURO: Gait steady  SKIN: Skin warm and dry no acanthosis nigracans.  FOOT EXAMINATION: 2/2019    Lab Results   Component Value Date    MICALBCREAT 10.7 11/26/2019       Hemoglobin A1C   Date Value Ref Range Status   11/26/2019 6.4 (H) 4.0 - 5.6 % Final     Comment:     ADA Screening Guidelines:  5.7-6.4%  Consistent with prediabetes  >or=6.5%  Consistent with diabetes  High levels of fetal hemoglobin interfere with the HbA1C  assay. Heterozygous hemoglobin variants (HbS, HgC, etc)do  not significantly interfere with this assay.   However, presence of multiple variants may affect accuracy.     06/03/2019 6.2 (H) 4.0 - 5.6 % Final     Comment:     ADA Screening Guidelines:  5.7-6.4%  Consistent with prediabetes  >or=6.5%  Consistent with diabetes  High levels of fetal hemoglobin interfere with the HbA1C  assay. Heterozygous hemoglobin variants (HbS, HgC, etc)do  not significantly interfere with this assay.   However, presence of multiple variants may affect accuracy.     02/07/2019 6.4 (H) 4.0 - 5.6 % Final     Comment:     ADA Screening Guidelines:  5.7-6.4%  Consistent with prediabetes  >or=6.5%  Consistent with diabetes  High levels of fetal hemoglobin interfere with the HbA1C  assay. Heterozygous hemoglobin variants (HbS, HgC, etc)do  not significantly interfere with this assay.   However, presence of multiple variants may affect accuracy.          ASSESSMENT and PLAN:    1. T2DM with nephropathy-   Decrease lunch dose of Humalog to 25 units  Change basaglar to lantus for ins coverage purposes  RX for Continuous Glucose Monitor   -Recommend Dexcom  Continuous Glucose monitor                         Pt tests glucoses a minimum of 4 x  a day.                            Pt would benefit from therapeutic continuous glucose monitor to be able                         to make frequent insulin adjustments, based on current glucoses.   Continue checking bg 4 x's a day    2. HTN - uncontrolled today, continue meds as previously prescribed and monitor.     3. HLP - on statin therapy, LFTs WNL. Myalgia with higher dose statin,fish oil  2 tabs bid    4. Post Surgical Hypothyroidism- For MNG. Pathology benign. Continue current dose of synthroid, labs w RTC    5. Vitamin d deficiency - continue OTC replacement, at goal, check lab annually    6. Morbid obesity - portion control, exercise as tolerated, increases insulin resistance. Body mass index is 40.32 kg/m².           Follow-up: in 6 months with lab prior

## 2019-12-12 ENCOUNTER — TELEPHONE (OUTPATIENT)
Dept: ENDOCRINOLOGY | Facility: CLINIC | Age: 64
End: 2019-12-12

## 2019-12-12 NOTE — TELEPHONE ENCOUNTER
----- Message from Jaycee Slade sent at 12/12/2019 12:33 PM CST -----  Contact: [phone]/ Daniela with [employer]   Daniela with [employer] would like to speak with you in regards to patient testing and injection frequency. Please call.

## 2019-12-17 ENCOUNTER — PATIENT MESSAGE (OUTPATIENT)
Dept: NEUROLOGY | Facility: CLINIC | Age: 64
End: 2019-12-17

## 2019-12-17 RX ORDER — CARBIDOPA AND LEVODOPA 25; 100 MG/1; MG/1
TABLET ORAL
Qty: 135 TABLET | Refills: 6 | Status: SHIPPED | OUTPATIENT
Start: 2019-12-17 | End: 2020-05-27

## 2020-01-13 RX ORDER — INSULIN GLARGINE 100 [IU]/ML
INJECTION, SOLUTION SUBCUTANEOUS
Qty: 45 ML | Refills: 3 | Status: SHIPPED | OUTPATIENT
Start: 2020-01-13 | End: 2020-02-25 | Stop reason: ALTCHOICE

## 2020-01-13 RX ORDER — LEVOTHYROXINE SODIUM 137 UG/1
TABLET ORAL
Qty: 30 TABLET | Refills: 12 | Status: SHIPPED | OUTPATIENT
Start: 2020-01-13 | End: 2020-12-07 | Stop reason: SDUPTHER

## 2020-01-13 RX ORDER — SIMVASTATIN 10 MG/1
TABLET, FILM COATED ORAL
Qty: 30 TABLET | Refills: 12 | Status: SHIPPED | OUTPATIENT
Start: 2020-01-13 | End: 2021-01-04

## 2020-02-10 RX ORDER — MONTELUKAST SODIUM 10 MG/1
TABLET ORAL
Qty: 30 TABLET | Refills: 4 | Status: SHIPPED | OUTPATIENT
Start: 2020-02-10 | End: 2020-05-26

## 2020-02-18 ENCOUNTER — PATIENT OUTREACH (OUTPATIENT)
Dept: ADMINISTRATIVE | Facility: HOSPITAL | Age: 65
End: 2020-02-18

## 2020-02-19 ENCOUNTER — PATIENT MESSAGE (OUTPATIENT)
Dept: ENDOCRINOLOGY | Facility: CLINIC | Age: 65
End: 2020-02-19

## 2020-02-21 ENCOUNTER — PATIENT MESSAGE (OUTPATIENT)
Dept: ENDOCRINOLOGY | Facility: CLINIC | Age: 65
End: 2020-02-21

## 2020-02-22 ENCOUNTER — LAB VISIT (OUTPATIENT)
Dept: LAB | Facility: HOSPITAL | Age: 65
End: 2020-02-22
Payer: MEDICARE

## 2020-02-22 DIAGNOSIS — Z12.11 COLON CANCER SCREENING: ICD-10-CM

## 2020-02-22 PROCEDURE — 82274 ASSAY TEST FOR BLOOD FECAL: CPT

## 2020-02-24 RX ORDER — SELEGILINE HYDROCHLORIDE 5 MG/1
CAPSULE ORAL
Qty: 60 CAPSULE | Refills: 6 | Status: SHIPPED | OUTPATIENT
Start: 2020-02-24 | End: 2020-04-01 | Stop reason: SDUPTHER

## 2020-02-25 ENCOUNTER — OFFICE VISIT (OUTPATIENT)
Dept: FAMILY MEDICINE | Facility: CLINIC | Age: 65
End: 2020-02-25
Payer: MEDICARE

## 2020-02-25 ENCOUNTER — TELEPHONE (OUTPATIENT)
Dept: FAMILY MEDICINE | Facility: CLINIC | Age: 65
End: 2020-02-25

## 2020-02-25 VITALS
DIASTOLIC BLOOD PRESSURE: 74 MMHG | SYSTOLIC BLOOD PRESSURE: 138 MMHG | TEMPERATURE: 98 F | BODY MASS INDEX: 39.65 KG/M2 | WEIGHT: 210 LBS | HEIGHT: 61 IN | HEART RATE: 87 BPM

## 2020-02-25 DIAGNOSIS — E78.2 MIXED HYPERLIPIDEMIA: ICD-10-CM

## 2020-02-25 DIAGNOSIS — E11.21 TYPE 2 DIABETES MELLITUS WITH DIABETIC NEPHROPATHY, WITH LONG-TERM CURRENT USE OF INSULIN: ICD-10-CM

## 2020-02-25 DIAGNOSIS — Z85.110 HISTORY OF MALIGNANT CARCINOID TUMOR OF BRONCHUS AND LUNG: ICD-10-CM

## 2020-02-25 DIAGNOSIS — I10 BENIGN ESSENTIAL HTN: Primary | ICD-10-CM

## 2020-02-25 DIAGNOSIS — E89.0 POST-OPERATIVE HYPOTHYROIDISM: ICD-10-CM

## 2020-02-25 DIAGNOSIS — Z79.4 TYPE 2 DIABETES MELLITUS WITH DIABETIC NEPHROPATHY, WITH LONG-TERM CURRENT USE OF INSULIN: ICD-10-CM

## 2020-02-25 DIAGNOSIS — Z12.11 COLON CANCER SCREENING: ICD-10-CM

## 2020-02-25 DIAGNOSIS — G20.A1 PARKINSON'S DISEASE: ICD-10-CM

## 2020-02-25 DIAGNOSIS — M54.16 LUMBAR RADICULOPATHY: ICD-10-CM

## 2020-02-25 DIAGNOSIS — Z78.0 ASYMPTOMATIC AGE-RELATED POSTMENOPAUSAL STATE: ICD-10-CM

## 2020-02-25 PROCEDURE — 99999 PR PBB SHADOW E&M-EST. PATIENT-LVL V: CPT | Mod: PBBFAC,,, | Performed by: INTERNAL MEDICINE

## 2020-02-25 PROCEDURE — 3078F DIAST BP <80 MM HG: CPT | Mod: CPTII,S$GLB,, | Performed by: INTERNAL MEDICINE

## 2020-02-25 PROCEDURE — 99214 PR OFFICE/OUTPT VISIT, EST, LEVL IV, 30-39 MIN: ICD-10-PCS | Mod: S$GLB,,, | Performed by: INTERNAL MEDICINE

## 2020-02-25 PROCEDURE — 1101F PT FALLS ASSESS-DOCD LE1/YR: CPT | Mod: CPTII,S$GLB,, | Performed by: INTERNAL MEDICINE

## 2020-02-25 PROCEDURE — 99499 UNLISTED E&M SERVICE: CPT | Mod: S$GLB,,, | Performed by: INTERNAL MEDICINE

## 2020-02-25 PROCEDURE — 3075F SYST BP GE 130 - 139MM HG: CPT | Mod: CPTII,S$GLB,, | Performed by: INTERNAL MEDICINE

## 2020-02-25 PROCEDURE — 3075F PR MOST RECENT SYSTOLIC BLOOD PRESS GE 130-139MM HG: ICD-10-PCS | Mod: CPTII,S$GLB,, | Performed by: INTERNAL MEDICINE

## 2020-02-25 PROCEDURE — 3044F PR MOST RECENT HEMOGLOBIN A1C LEVEL <7.0%: ICD-10-PCS | Mod: CPTII,S$GLB,, | Performed by: INTERNAL MEDICINE

## 2020-02-25 PROCEDURE — 99999 PR PBB SHADOW E&M-EST. PATIENT-LVL V: ICD-10-PCS | Mod: PBBFAC,,, | Performed by: INTERNAL MEDICINE

## 2020-02-25 PROCEDURE — 99214 OFFICE O/P EST MOD 30 MIN: CPT | Mod: S$GLB,,, | Performed by: INTERNAL MEDICINE

## 2020-02-25 PROCEDURE — 1159F PR MEDICATION LIST DOCUMENTED IN MEDICAL RECORD: ICD-10-PCS | Mod: S$GLB,,, | Performed by: INTERNAL MEDICINE

## 2020-02-25 PROCEDURE — 1126F PR PAIN SEVERITY QUANTIFIED, NO PAIN PRESENT: ICD-10-PCS | Mod: S$GLB,,, | Performed by: INTERNAL MEDICINE

## 2020-02-25 PROCEDURE — 1101F PR PT FALLS ASSESS DOC 0-1 FALLS W/OUT INJ PAST YR: ICD-10-PCS | Mod: CPTII,S$GLB,, | Performed by: INTERNAL MEDICINE

## 2020-02-25 PROCEDURE — 3044F HG A1C LEVEL LT 7.0%: CPT | Mod: CPTII,S$GLB,, | Performed by: INTERNAL MEDICINE

## 2020-02-25 PROCEDURE — 1126F AMNT PAIN NOTED NONE PRSNT: CPT | Mod: S$GLB,,, | Performed by: INTERNAL MEDICINE

## 2020-02-25 PROCEDURE — 3078F PR MOST RECENT DIASTOLIC BLOOD PRESSURE < 80 MM HG: ICD-10-PCS | Mod: CPTII,S$GLB,, | Performed by: INTERNAL MEDICINE

## 2020-02-25 PROCEDURE — 99499 RISK ADDL DX/OHS AUDIT: ICD-10-PCS | Mod: S$GLB,,, | Performed by: INTERNAL MEDICINE

## 2020-02-25 PROCEDURE — 1159F MED LIST DOCD IN RCRD: CPT | Mod: S$GLB,,, | Performed by: INTERNAL MEDICINE

## 2020-02-25 RX ORDER — INSULIN GLARGINE 100 [IU]/ML
30 INJECTION, SOLUTION SUBCUTANEOUS 2 TIMES DAILY
COMMUNITY
End: 2020-08-19 | Stop reason: SDUPTHER

## 2020-02-25 RX ORDER — NABUMETONE 500 MG/1
1000 TABLET, FILM COATED ORAL 2 TIMES DAILY
Refills: 12 | COMMUNITY
Start: 2019-12-11 | End: 2021-02-22

## 2020-02-25 NOTE — ASSESSMENT & PLAN NOTE
-Followed by Dr. Quintero with oncology  -S/p resection of R middle and lower lung resection  -Followed with surveillance CT

## 2020-02-25 NOTE — ASSESSMENT & PLAN NOTE
Last A1C   Lab Results   Component Value Date    HGBA1C 6.4 (H) 11/26/2019     Current meds: Lantus 35 u am; 30u pm; Humalog 30 AC but uses more with sliding scale  Foot exam completed- No- Followed by podiatry. Need f/u appt  Eye exam completed- No- Plan to follow up soon  Microalbumin completed- Yes  On statin therapy- yes  On ACEI/ARB- yes    Followed by endocrinology as well. Intolerant of metformin. Endocrinology attempted to get patient a Dexcom meter but she could not obtain  Reports glucose ranging from 130-160. She has noticed several occasional swings since switching from basaglar to lantus. Not keeping log now as she usually does this the month leading up to endo appt. Does not have another appt until June. Discussed starting a log and returning back to see me if swings are still occurring.

## 2020-02-25 NOTE — PROGRESS NOTES
Assessment/Plan:    Lumbar radiculopathy  -Chronic lower back pain  -Previously followed by neurosurgery with injections, however no improvement of pain so she has not continued to see them  -Uses PRN aleve and biofreeze for pain. Also on relafen 1000 mg BID    Parkinson's disease  -Followed by neurology, Dr. Lux  -Currently on carbidope-levodopa  mg TID, pramipexole 1 mg TID, selegiline 5 mg BID and amantidine 100 mg BID    History of malignant carcinoid tumor of bronchus and lung  -Followed by Dr. Quintero with oncology  -S/p resection of R middle and lower lung resection  -Followed with surveillance CT    Benign essential HTN  -At goal today  -Currently on valsartan-HCTZ 320-12.5 mg daily  -Denies adverse effects of medications  -Continue lifestyle modification with low sodium diet and exercise     Mixed hyperlipidemia  -Remains on statin    Post-operative hypothyroidism  Lab Results   Component Value Date    TSH 1.410 11/26/2019     - s/p total thyroidectomy in 2016 (parathyroids intact) with post op hypothyroidis  -Remains on synthroid 137 mcg  -Followed by endocrinology, Dr. Qureshi    Type 2 diabetes mellitus with diabetic nephropathy, with long-term current use of insulin  Last A1C   Lab Results   Component Value Date    HGBA1C 6.4 (H) 11/26/2019     Current meds: Lantus 35 u am; 30u pm; Humalog 30 AC but uses more with sliding scale  Foot exam completed- No- Followed by podiatry. Need f/u appt  Eye exam completed- No- Plan to follow up soon  Microalbumin completed- Yes  On statin therapy- yes  On ACEI/ARB- yes    Followed by endocrinology as well. Intolerant of metformin. Endocrinology attempted to get patient a Dexcom meter but she could not obtain  Reports glucose ranging from 130-160. She has noticed several occasional swings since switching from basaglar to lantus. Not keeping log now as she usually does this the month leading up to endo appt. Does not have another appt until June. Discussed starting  a log and returning back to see me if swings are still occurring.    _____________________________________________________________________________________________________________________________________________________    Orders this visit:    Benign essential HTN    Lumbar radiculopathy    Parkinson's disease    History of malignant carcinoid tumor of bronchus and lung    Mixed hyperlipidemia    Post-operative hypothyroidism    Type 2 diabetes mellitus with diabetic nephropathy, with long-term current use of insulin    Asymptomatic age-related postmenopausal state  -     DXA Bone Density Spine And Hip; Future; Expected date: 02/25/2020    Colon cancer screening  -     Fecal Immunochemical Test (iFOBT); Future; Expected date: 02/25/2020      Follow up in about 6 months (around 8/25/2020).    Estela Grace MD  _____________________________________________________________________________________________________________________________________________________    HPI:    Patient is in clinic today as an established patient, new to me, here to establish care. Patient is a 63yo CF with a PMH of chronic back pain, parkinson's, carcinoid of lung, s/p resection, hypothyroidism, DM2, and HTN.    Patient is followed by neurology for her history of parkinson's. Patient reports that tremor controlled with current therapy. She does suffer from symptoms of dysphagia also thought 2/2 parkinson's. This is mostly controlled with diet choices. Plan for follow up with neurology soon.    Also followed by oncology for hx of pulmonary carcinoid, s/p resection. Due for follow up soon with repeat surveillance CT. Denies any recent pulmonary symptoms, including dyspnea, cough, chest pain or hemoptysis.     Also followed by endocrinology for hx of DM2 and hypothyroidism. Diabetes has been controlled but patient concerned about recent fluctuations in glucose since switching from basaglar to lantus. Readings mostly in low 100's but has went  as high as 300. She also reports several readings in 60's. These always occur after lunch dosing of short acting insulin. Symptomatic with these episodes. She does not have her log with her today but is agreeable in starting one for me and bringing to discuss considering she does not see endocrine for several months. She is due for eye and foot exams this month.    No other complaints today in clinic. Health maintenance reviewed. FIT test and DEXA ordered.       Past Medical History:  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 2014    Mediastinal lymphadenopathy 2015    Obesity, morbid 2014    Parkinsons 10/2017    Pituitary adenoma     took parladel for 3 yrs    Pneumonia     Postoperative hypothyroidism 2017    - s/p total thyroidectomy in  (parathyroids intact) with post op hypothyroidism; on synthroid    Pulmonary nodules 2018    Thyroid disease     Wheeze 2014     Past Surgical History:   Procedure Laterality Date    APPENDECTOMY      BREAST CYST ASPIRATION      BRONCHOSCOPY  2014, 2014     SECTION  , 1986    x2    EYE SURGERY      LUNG REMOVAL, PARTIAL Right     with 17 lymph nodes    THYROIDECTOMY  2016    TONSILLECTOMY  1969     Review of patient's allergies indicates:   Allergen Reactions    Propranolol Nausea And Vomiting     Drops pressure and raised sugar    Lipitor [atorvastatin] Other (See Comments)     Myalgia, muscle pain      Social History     Tobacco Use    Smoking status: Never Smoker    Smokeless tobacco: Never Used   Substance Use Topics    Alcohol use: Yes     Comment: once per month    Drug use: No     Family History   Problem Relation Age of Onset    Cancer Maternal Aunt         breast    Cancer Maternal Grandmother         unknown    Cancer Maternal Aunt         unknown    Diabetes Mother     Glaucoma Mother     Diabetes Father     Diabetes Sister     Macular degeneration Sister     Breast cancer Paternal Aunt     Breast cancer Paternal Aunt     Amblyopia Neg Hx     Blindness Neg Hx     Cataracts Neg Hx     Hypertension Neg Hx     Retinal detachment Neg Hx     Stroke Neg Hx     Strabismus Neg Hx     Thyroid disease Neg Hx      Current Outpatient Medications on File Prior to Visit   Medication Sig Dispense Refill    alcohol swabs (ALCOHOL WIPES) PadM Uses 5 daily 400 each 4    amantadine HCl (SYMMETREL) 100 mg capsule TAKE ONE CAPSULE BY MOUTH twice daily 60 capsule 11    blood sugar diagnostic (TRUE METRIX GLUCOSE TEST STRIP) Strp Check bg 7-8 times/day 250 each 12    carbidopa-levodopa  mg (SINEMET)  mg per tablet Take 1.5 tablets three times daily. 135 tablet 6    cholecalciferol, vitamin D3, (VITAMIN D3) 400 unit Cap Take 1,200 Units by mouth once daily.      cyanocobalamin (VITAMIN B-12) 1000 MCG tablet Take 100 mcg by mouth once daily.      fish oil-omega-3 fatty acids 300-1,000 mg capsule Take 4 capsules by mouth once daily.      gabapentin (NEURONTIN) 400 MG capsule TAKE ONE CAPSULE BY MOUTH FOUR TIMES DAILY 120 capsule 12    GLUCOSAMINE HCL/CHONDR MCCARTHY A NA (OSTEO BI-FLEX ORAL) Take by mouth once daily.      insulin glargine (LANTUS) 100 unit/mL injection Inject 30 Units into the skin 2 (two) times daily. 35 in am; 30 units at night      insulin lispro (HUMALOG KWIKPEN INSULIN) 100 unit/mL pen Inject 30 Units into the skin 3 (three) times daily before meals. 30 mL 12     "lancets 33 gauge Misc Checks bg 7-8 times a dayTrue metrix 250 each 11    levothyroxine (SYNTHROID) 137 MCG Tab tablet TAKE ONE TABLET BY MOUTH ONCE DAILY 30 tablet 12    melatonin 3 mg Tab Take by mouth.      meloxicam (MOBIC) 15 MG tablet Take 1 tablet (15 mg total) by mouth once daily. 30 tablet 11    montelukast (SINGULAIR) 10 mg tablet TAKE ONE TABLET BY MOUTH EVERY EVENING 30 tablet 4    MULTIVITAMIN W-MINERALS/LUTEIN (CENTRUM SILVER ORAL) Take 1 tablet by mouth once daily.       pen needle, diabetic (BD ANA 2ND GEN PEN NEEDLE) 32 gauge x 5/32" Ndle Uses 5 daily 400 each 3    pramipexole (MIRAPEX) 0.25 MG tablet Please take 1/2 to 1 tab as directed. 90 tablet 11    selegiline (ELDEPRYL) 5 mg Cap TAKE ONE CAPSULE BY MOUTH twice daily 60 capsule 6    simvastatin (ZOCOR) 10 MG tablet TAKE ONE TABLET BY MOUTH ONCE DAILY IN THE EVENING 30 tablet 12    valsartan-hydrochlorothiazide (DIOVAN-HCT) 320-12.5 mg per tablet TAKE ONE TABLET BY MOUTH ONCE DAILY 30 tablet 12    [DISCONTINUED] BASAGLAR KWIKPEN U-100 INSULIN glargine 100 units/mL (3mL) SubQ pen INJECT 35 UNITS SUBCUTANEOUSLY twice daily (Patient not taking: Reported on 2/25/2020) 45 mL 3    [DISCONTINUED] blood-glucose meter,continuous (DEXCOM G6 ) Misc Dispense 1  (Patient not taking: Reported on 2/25/2020) 1 each 0    [DISCONTINUED] blood-glucose sensor (DEXCOM G6 SENSOR) Lizbeth Dispense 3 sensors/month (Patient not taking: Reported on 2/25/2020) 3 Device 12    [DISCONTINUED] blood-glucose transmitter (DEXCOM G6 TRANSMITTER) Lizbeth Dispense 1 transmitter (Patient not taking: Reported on 2/25/2020) 1 Device 3    [DISCONTINUED] clonazePAM (KLONOPIN) 0.5 MG tablet Take 1 tablet (0.5 mg total) by mouth every evening. 30 tablet 5    [DISCONTINUED] VENTOLIN HFA 90 mcg/actuation inhaler Inhale 2 puffs into the lungs every 6 (six) hours as needed for Wheezing. Rescue (Patient not taking: Reported on 2/25/2020) 18 g 12     No current " "facility-administered medications on file prior to visit.        Review of Systems   Constitutional: Negative for activity change and unexpected weight change.   HENT: Positive for trouble swallowing. Negative for hearing loss and rhinorrhea.    Eyes: Negative for discharge and visual disturbance.   Respiratory: Negative for chest tightness and wheezing.    Cardiovascular: Negative for chest pain and palpitations.   Gastrointestinal: Positive for constipation. Negative for blood in stool, diarrhea and vomiting.   Endocrine: Negative for polydipsia and polyuria.   Genitourinary: Negative for difficulty urinating, dysuria, hematuria and menstrual problem.   Musculoskeletal: Positive for arthralgias, joint swelling and neck pain.   Neurological: Positive for weakness and headaches.   Psychiatric/Behavioral: Negative for confusion and dysphoric mood.       Vitals:    02/25/20 1005   BP: 138/74   Pulse: 87   Temp: 97.9 °F (36.6 °C)   Weight: 95.3 kg (210 lb)   Height: 5' 1" (1.549 m)       Wt Readings from Last 3 Encounters:   02/25/20 95.3 kg (210 lb)   12/03/19 96.8 kg (213 lb 6.5 oz)   11/25/19 95.1 kg (209 lb 10.5 oz)       Physical Exam   Constitutional: She is oriented to person, place, and time. She appears well-developed and well-nourished. No distress.   HENT:   Head: Normocephalic and atraumatic.   Eyes: Conjunctivae and EOM are normal.   Neck: Normal range of motion. Neck supple.   Cardiovascular: Normal rate, regular rhythm, normal heart sounds and intact distal pulses.   No murmur heard.  Pulmonary/Chest: Effort normal and breath sounds normal. No respiratory distress.   Absent breath sounds at base of R lung   Abdominal: She exhibits no distension.   Musculoskeletal: Normal range of motion.   Neurological: She is alert and oriented to person, place, and time.   Skin: Skin is warm and dry.   Psychiatric: She has a normal mood and affect.       "

## 2020-02-25 NOTE — ASSESSMENT & PLAN NOTE
-At goal today  -Currently on valsartan-HCTZ 320-12.5 mg daily  -Denies adverse effects of medications  -Continue lifestyle modification with low sodium diet and exercise

## 2020-02-25 NOTE — ASSESSMENT & PLAN NOTE
-Chronic lower back pain  -Previously followed by neurosurgery with injections, however no improvement of pain so she has not continued to see them  -Uses PRN aleve and biofreeze for pain. Also on relafen 1000 mg BID

## 2020-02-25 NOTE — ASSESSMENT & PLAN NOTE
-Followed by neurology, Dr. Lux  -Currently on carbidope-levodopa  mg TID, pramipexole 1 mg TID, selegiline 5 mg BID and amantidine 100 mg BID

## 2020-02-25 NOTE — ASSESSMENT & PLAN NOTE
Lab Results   Component Value Date    TSH 1.410 11/26/2019     - s/p total thyroidectomy in 2016 (parathyroids intact) with post op hypothyroidis  -Remains on synthroid 137 mcg  -Followed by endocrinology, Dr. Qureshi

## 2020-02-28 ENCOUNTER — TELEPHONE (OUTPATIENT)
Dept: ENDOCRINOLOGY | Facility: CLINIC | Age: 65
End: 2020-02-28

## 2020-03-02 ENCOUNTER — PATIENT OUTREACH (OUTPATIENT)
Dept: ADMINISTRATIVE | Facility: HOSPITAL | Age: 65
End: 2020-03-02

## 2020-03-02 NOTE — PROGRESS NOTES
I have contacted patient to schedule over due diabetic eye exam. Patient will schedule appointment once she finds doctor in network with ins.

## 2020-03-04 LAB — HEMOCCULT STL QL IA: NEGATIVE

## 2020-03-04 NOTE — PROGRESS NOTES
Fit test negative, repeat in 1 year, results released through [facility]. Please verify that patient has viewed results. If not, please call patient with interpretation below:    I have reviewed the results of your FIT test for colon cancer screening. This test is negative. We will plan to repeat in one year.     Also please see below health maintenance items that are due:    Eye Exam due on 02/22/2019  DEXA SCAN due on 06/20/2019  Fecal Occult Blood Test (FOBT)/[employer] due on 10/18/2019  Foot Exam due on 02/14/2020

## 2020-03-09 ENCOUNTER — HOSPITAL ENCOUNTER (OUTPATIENT)
Dept: RADIOLOGY | Facility: HOSPITAL | Age: 65
Discharge: HOME | End: 2020-03-09
Attending: INTERNAL MEDICINE
Payer: MEDICARE

## 2020-03-09 DIAGNOSIS — D49.1 NEOPLASM OF LUNG: ICD-10-CM

## 2020-03-09 PROCEDURE — 71250 CT THORAX DX C-: CPT | Mod: 26,,, | Performed by: RADIOLOGY

## 2020-03-09 PROCEDURE — 71250 CT THORAX DX C-: CPT | Mod: TC,PO

## 2020-03-09 PROCEDURE — 71250 CT CHEST WITHOUT CONTRAST: ICD-10-PCS | Mod: 26,,, | Performed by: RADIOLOGY

## 2020-03-10 ENCOUNTER — OFFICE VISIT (OUTPATIENT)
Dept: NEUROLOGY | Facility: HOSPITAL | Age: 65
End: 2020-03-10
Attending: INTERNAL MEDICINE
Payer: MEDICARE

## 2020-03-10 VITALS
SYSTOLIC BLOOD PRESSURE: 128 MMHG | BODY MASS INDEX: 39.84 KG/M2 | DIASTOLIC BLOOD PRESSURE: 74 MMHG | OXYGEN SATURATION: 96 % | WEIGHT: 211 LBS | HEART RATE: 86 BPM | HEIGHT: 61 IN

## 2020-03-10 DIAGNOSIS — R91.8 PULMONARY NODULES: Primary | ICD-10-CM

## 2020-03-10 DIAGNOSIS — Z85.110 HISTORY OF MALIGNANT CARCINOID TUMOR OF BRONCHUS AND LUNG: ICD-10-CM

## 2020-03-10 PROCEDURE — 1159F MED LIST DOCD IN RCRD: CPT | Mod: ,,, | Performed by: INTERNAL MEDICINE

## 2020-03-10 PROCEDURE — 3078F DIAST BP <80 MM HG: CPT | Mod: CPTII,,, | Performed by: INTERNAL MEDICINE

## 2020-03-10 PROCEDURE — 1125F AMNT PAIN NOTED PAIN PRSNT: CPT | Mod: ,,, | Performed by: INTERNAL MEDICINE

## 2020-03-10 PROCEDURE — 3078F PR MOST RECENT DIASTOLIC BLOOD PRESSURE < 80 MM HG: ICD-10-PCS | Mod: CPTII,,, | Performed by: INTERNAL MEDICINE

## 2020-03-10 PROCEDURE — 1125F PR PAIN SEVERITY QUANTIFIED, PAIN PRESENT: ICD-10-PCS | Mod: ,,, | Performed by: INTERNAL MEDICINE

## 2020-03-10 PROCEDURE — 99214 PR OFFICE/OUTPT VISIT, EST, LEVL IV, 30-39 MIN: ICD-10-PCS | Mod: ,,, | Performed by: INTERNAL MEDICINE

## 2020-03-10 PROCEDURE — 1101F PT FALLS ASSESS-DOCD LE1/YR: CPT | Mod: CPTII,,, | Performed by: INTERNAL MEDICINE

## 2020-03-10 PROCEDURE — 3074F PR MOST RECENT SYSTOLIC BLOOD PRESSURE < 130 MM HG: ICD-10-PCS | Mod: CPTII,,, | Performed by: INTERNAL MEDICINE

## 2020-03-10 PROCEDURE — 1101F PR PT FALLS ASSESS DOC 0-1 FALLS W/OUT INJ PAST YR: ICD-10-PCS | Mod: CPTII,,, | Performed by: INTERNAL MEDICINE

## 2020-03-10 PROCEDURE — 99214 OFFICE O/P EST MOD 30 MIN: CPT | Mod: ,,, | Performed by: INTERNAL MEDICINE

## 2020-03-10 PROCEDURE — 99215 OFFICE O/P EST HI 40 MIN: CPT | Performed by: INTERNAL MEDICINE

## 2020-03-10 PROCEDURE — 3074F SYST BP LT 130 MM HG: CPT | Mod: CPTII,,, | Performed by: INTERNAL MEDICINE

## 2020-03-10 PROCEDURE — 1159F PR MEDICATION LIST DOCUMENTED IN MEDICAL RECORD: ICD-10-PCS | Mod: ,,, | Performed by: INTERNAL MEDICINE

## 2020-03-10 NOTE — PROGRESS NOTES
[facility]:  [facility]  A collaboration between [facility], formerly [facility] and [facility]    PATIENT: Jessica Rojas  MRN: [id number]  DATE: 3/10/2020      Diagnosis:   1. Pulmonary nodules    2. History of malignant carcinoid tumor of bronchus and lung        Chief Complaint: No chief complaint on file.      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 Rojas is a 64 y.o. female seen in follow up for a bronchial carcinoid.  She states that she generally has been feeling well.  Tremors have improved and she is following up with Neurology.  She did have some wheezing during the colder months but this has improved as the weather warms up.  She has no other new complaints.    Her history dates to 2009 when she was diagnosed with pneumonia.  She had yearly bouts of this until last year when she had a CT scan done which showed a right hilar mass, 3.4 cm.  A bronchoscopy was done showing an obstructive tumor in the right bronchus intermedius.  Pathology was initially read as possible small cell.  Re-review here shows and atypical carcinoid, intermediate grade, well differentiated with Ki-67 of 4%.   She underwent a right middle and right lower bilobectomy on 8/7/14.  She was found to have a T2a, N0, M0 typical carcinoid.      Past Medical History:   Past Medical History:   Diagnosis Date    Abdominal pain 2/27/2015    Diabetes mellitus, type 2     DM (diabetes mellitus)     on insulin    Elevated transaminase level 4/18/2016    Encounter for blood transfusion     History of malignant carcinoid tumor of bronchus and lung 10/25/2017    History of neuroendocrine cancer     HTN (hypertension) 5/6/2014    Hypercalcemia  2016    Lung cancer, hilus 2014    Malignant carcinoid tumor of bronchus and lung 2014    Malignant carcinoid tumor of the bronchus and lung 2014    Mediastinal lymphadenopathy 2015    Obesity, morbid 2014    Parkinsons 10/2017    Pituitary adenoma 's    took parladel for 3 yrs    Pneumonia     Postoperative hypothyroidism 2017    - s/p total thyroidectomy in  (parathyroids intact) with post op hypothyroidism; on synthroid    Pulmonary nodules 2018    Thyroid disease     Wheeze 2014       Past Surgical HIstory:   Past Surgical History:   Procedure Laterality Date    APPENDECTOMY      BREAST CYST ASPIRATION      BRONCHOSCOPY  2014, 2014     SECTION  , 1986    x2    EYE SURGERY      LUNG REMOVAL, PARTIAL Right     with 17 lymph nodes    THYROIDECTOMY  2016    TONSILLECTOMY  1969       Family History:   Family History   Problem Relation Age of Onset    Cancer Maternal Aunt         breast    Cancer Maternal Grandmother         unknown    Cancer Maternal Aunt         unknown    Diabetes Mother     Glaucoma Mother     Diabetes Father     Diabetes Sister     Macular degeneration Sister     Breast cancer Paternal Aunt     Breast cancer Paternal Aunt     Amblyopia Neg Hx     Blindness Neg Hx     Cataracts Neg Hx     Hypertension Neg Hx     Retinal detachment Neg Hx     Stroke Neg Hx     Strabismus Neg Hx     Thyroid disease Neg Hx        Social History:  reports that she has never smoked. She has never used smokeless tobacco. She reports that she drinks alcohol. She reports that she does not use drugs.    Allergies:  Allergies   Allergen Reactions    Propranolol Nausea And Vomiting     Drops pressure and raised sugar    Lipitor [Atorvastatin] Other (See Comments)     Myalgia, muscle pain       Medications:  Current Outpatient Medications   Medication Sig Dispense Refill    alcohol swabs (ALCOHOL WIPES) PadM  "Uses 5 daily 400 each 4    amantadine HCl (SYMMETREL) 100 mg capsule TAKE ONE CAPSULE BY MOUTH twice daily 60 capsule 11    blood sugar diagnostic (TRUE METRIX GLUCOSE TEST STRIP) Strp Check bg 7-8 times/day 250 each 12    carbidopa-levodopa  mg (SINEMET)  mg per tablet Take 1.5 tablets three times daily. 135 tablet 6    cholecalciferol, vitamin D3, (VITAMIN D3) 400 unit Cap Take 1,200 Units by mouth once daily.      cyanocobalamin (VITAMIN B-12) 1000 MCG tablet Take 100 mcg by mouth once daily.      fish oil-omega-3 fatty acids 300-1,000 mg capsule Take 4 capsules by mouth once daily.      gabapentin (NEURONTIN) 400 MG capsule TAKE ONE CAPSULE BY MOUTH FOUR TIMES DAILY 120 capsule 12    GLUCOSAMINE HCL/CHONDR MCCARTHY A NA (OSTEO BI-FLEX ORAL) Take by mouth once daily.      insulin glargine (LANTUS) 100 unit/mL injection Inject 30 Units into the skin 2 (two) times daily. 35 in am; 30 units at night      insulin lispro (HUMALOG KWIKPEN INSULIN) 100 unit/mL pen Inject 30 Units into the skin 3 (three) times daily before meals. 30 mL 12    lancets 33 gauge Misc Checks bg 7-8 times a dayTrue metrix 250 each 11    levothyroxine (SYNTHROID) 137 MCG Tab tablet TAKE ONE TABLET BY MOUTH ONCE DAILY 30 tablet 12    melatonin 3 mg Tab Take by mouth.      meloxicam (MOBIC) 15 MG tablet Take 1 tablet (15 mg total) by mouth once daily. 30 tablet 11    montelukast (SINGULAIR) 10 mg tablet TAKE ONE TABLET BY MOUTH EVERY EVENING 30 tablet 4    MULTIVITAMIN W-MINERALS/LUTEIN (CENTRUM SILVER ORAL) Take 1 tablet by mouth once daily.       pen needle, diabetic (BD ANA 2ND GEN PEN NEEDLE) 32 gauge x 5/32" Ndle Uses 5 daily 400 each 3    pramipexole (MIRAPEX) 0.25 MG tablet Please take 1/2 to 1 tab as directed. 90 tablet 11    selegiline (ELDEPRYL) 5 mg Cap TAKE ONE CAPSULE BY MOUTH twice daily 60 capsule 6    simvastatin (ZOCOR) 10 MG tablet TAKE ONE TABLET BY MOUTH ONCE DAILY IN THE EVENING 30 tablet 12    " "valsartan-hydrochlorothiazide (DIOVAN-HCT) 320-12.5 mg per tablet TAKE ONE TABLET BY MOUTH ONCE DAILY 30 tablet 12    nabumetone (RELAFEN) 500 MG tablet Take 1,000 mg by mouth 2 (two) times daily.  12     No current facility-administered medications for this visit.        Review of Systems   Constitutional: Negative for appetite change, chills, fatigue, fever and unexpected weight change.        No flushing   HENT: Negative for congestion, hearing loss, nosebleeds and postnasal drip.    Eyes: Negative for visual disturbance.   Respiratory: Positive for wheezing. Negative for cough and shortness of breath.    Cardiovascular: Negative for chest pain and palpitations.   Gastrointestinal: Negative for abdominal pain, blood in stool, constipation, diarrhea, nausea and vomiting.   Genitourinary: Negative for dysuria and frequency.   Musculoskeletal: Negative for back pain and gait problem.        Right-sided chest wall pain   Skin: Negative for color change and rash.   Allergic/Immunologic: Positive for environmental allergies.   Neurological: Negative for dizziness, tremors, weakness and headaches.   Hematological: Negative for adenopathy. Does not bruise/bleed easily.   Psychiatric/Behavioral: Negative for confusion. The patient is not nervous/anxious.        ECOG Performance Status: 0     Objective:      Vitals:   Vitals:    03/10/20 0931   BP: 128/74   BP Location: Right arm   Patient Position: Sitting   BP Method: Medium (Automatic)   Pulse: 86   SpO2: 96%   Weight: 95.7 kg (210 lb 15.7 oz)   Height: 5' 1" (1.549 m)     BMI: Body mass index is 39.86 kg/m².    Physical Exam   Constitutional: She is oriented to person, place, and time. She appears well-developed and well-nourished. No distress.   HENT:   Head: Normocephalic.   Mouth/Throat: No oropharyngeal exudate.   Eyes: EOM are normal. No scleral icterus.   Neck: Neck supple. No tracheal deviation present. No thyromegaly present.   Cardiovascular: Normal rate and " regular rhythm.   Pulmonary/Chest: Effort normal. No respiratory distress. She has no wheezes. She has no rales.   Abdominal: Soft. She exhibits no distension and no mass. There is no tenderness. There is no rebound and no guarding.   Musculoskeletal: Normal range of motion. She exhibits no edema.   Lymphadenopathy:     She has no cervical adenopathy.   Neurological: She is alert and oriented to person, place, and time. No cranial nerve deficit.   Skin: Skin is warm and dry.   Psychiatric: She has a normal mood and affect.       Laboratory Data:     Lab Visit on 02/22/2020   Component Date Value Ref Range Status    Fecal Immunochemical Test (iFOBT) 03/04/2020 Negative  Negative Final     Neuroendocrine Labs Latest Ref Rng 7/29/2015   EXT 5 HIAA BLOOD 0 - 22 ng/ml 14   EXT SEROTONIN 56 - 244 ng/ml    EXT CHROMOGRANIN A 0 - 15 ng/ml 14           FINAL PATHOLOGIC DIAGNOSIS 8/7/14  1. Right fifth rib (demineralized), biopsy:  Bone with trilineage bone marrow.  Negative for neoplasm.    2. Right pleura, partial pleurectomy:  Pleural tissue with congested vasculature and no evidence of neoplasm.    3. Right lung, right middle and lower lobes, lobectomies:  Typical carcinoid tumor, multifocal with 2 lesions, measuring 4.5 cm and 0.5 cm in greatest dimension  respectively.  Mitotic index: 1 mitosis seen in 10 high powered fields.  No evidence of necrosis or significant nuclear pleomorphism are seen.  Ki-67 proliferation index: 4% of tumor cells staining.  Bronchial and vascular margins are negative for malignancy.  17 benign lymph nodes (0/17).  Separate lesonal area consists of lung with necrotizing graulomatous inflammation and calcified granulomata.  Special stains for mycobacterial and fungi are completed on the granulomatous lesion and are negative for fungal  and mycobacterial organisms with satisfactory positive control.  See synoptic report in comment section for further details.     4. Right lung lymph node,  station 4, biopsy:  Fibroadipose tissue, negative for lymph nodes.  No evidence of neoplasm.    5. Right lung lymph nodes, station for alpha, biopsy:  Two (2) lymph nodes, negative for neoplasm (0/2).  Comment: Synoptic report  Specimen: Lung, right middle and lower lobes  Specimen integrity: Intact  Specimen laterality: Right  Tumor site: Right bronchus intermedius  Tumor size:  Lesion#1: 4.5 x 4 x 2.5 cm  Lesion#2: 0.5 cm in greatest dimension.  Tumor focality: Multifocal  Histologic Type:  Typical carcinoid tumor  Visceral Pleural Invasion: Not identified  Tumor Extension: Tumor involves the bronchus intermedius  Margins: Bronchial & Vasular margins are uninvolved by tumor  Distance of tumor from closest bronchial margin: 0.2 cm.  Treatment effect: Unknown  Lymphovascular invasion: Not identified.  Pathologic Staging:  Primary tumor:  pT2a: Tumor greater than 3 cm but 5 cm or less in greatest dimension without evidence of invasion into the main  bronchus  Regional lymph nodes:  pN0: No regional lymph node metastasis  Number examined: 16  Number involved: 0  Distant metastasis:  pMX: Cannot be assessed.  Note: Immunohistochemical stain results:  AE1/AE3: Negative in tumor cells.  Chromogranin staining:  Intensity Positive cells (0%)  0: 0%  1+: 10%  2+: 85%  3+: 5%  Synaptophysin staining:  Intensity Positive cells (0%)  0: 0%  1+: 0%  2+: 0%  3+: 100%  CD31: 44 vessels per 1 HPF  Factor VIII: 69 vessels per 1 HPF  Ki-67: 4% cells staining  Note: This tumor has only 1 mitotic figure in 10 high power fields, no evidence of necrosis and fairly bland  appearing nulei with salt and pepper chromatin pattern and no significant nuclear pleomorphism. This is best  categorized as a typical carcinoid tumor.  All immunostains have satisfactory positive and negative controls.    IMAGING:  CT 03/09/2020    COMPARISON:  03/22/2019    FINDINGS:  There are numerous small pulmonary nodules less than 7 mm in diameter which remain  unchanged in size and appearance compared to the previous study.  In the right upper lobe, there are approximately 3 nodules measuring 3 mm in diameter each.  In the left lung, there are approximately 13 nodules ranging in size from 3-6 mm.  The nodules are best visualized on series 4.  Some of the larger nodules include a 5 mm nodule in the left upper lobe on image 124, a 5 mm nodule in the left lower lobe on image 372, a 6 mm nodule on image 295, and a 6 mm nodule on image 366.  No new nodules have developed.  No lymphadenopathy, pleural effusion, or pericardial effusion are present.  Chronic fibrotic changes are present at the right lung base.  The thoracic inlet, axillary regions, and upper abdomen are unremarkable.      Impression       1. Multiple small pulmonary nodules less than 7 mm in diameter which remain unchanged in size and appearance compared to CT studies dating back to 09/10/2018.  2. Chronic fibrotic changes at the right lung base.                Assessment:       1. Pulmonary nodules    2. History of malignant carcinoid tumor of bronchus and lung           Plan:       Mrs. Rojas continues to do well from an oncologic standpoint.  Review of her CT shows overall stability of her multiple pulmonary nodules with no new nodule seen.  Will continue ongoing surveillance and would recommend we repeat a CT in another year and see back at that time.  If symptoms worsen we can scan her sooner.  All questions were answered and she is agreeable with this plan.    Justen Quintero DO, [facility]  Hematology & Oncology  [address]  [place]  [address]  ph. [phone]; [phone]  fax. [phone]    25 minutes were spent in coordination of patient's care, record review and counseling.  More than 50% of the time was face-to-face.

## 2020-03-11 ENCOUNTER — HOSPITAL ENCOUNTER (OUTPATIENT)
Dept: RADIOLOGY | Facility: HOSPITAL | Age: 65
Discharge: HOME | End: 2020-03-11
Attending: INTERNAL MEDICINE
Payer: MEDICARE

## 2020-03-11 DIAGNOSIS — Z78.0 ASYMPTOMATIC AGE-RELATED POSTMENOPAUSAL STATE: ICD-10-CM

## 2020-03-11 PROCEDURE — 77080 DXA BONE DENSITY AXIAL: CPT | Mod: 26,,, | Performed by: RADIOLOGY

## 2020-03-11 PROCEDURE — 77080 DEXA BONE DENSITY SPINE HIP: ICD-10-PCS | Mod: 26,,, | Performed by: RADIOLOGY

## 2020-03-11 PROCEDURE — 77080 DXA BONE DENSITY AXIAL: CPT | Mod: TC,PO

## 2020-03-12 NOTE — PROGRESS NOTES
Normal DEXA. Results have been released to patient's Mychart. Please verify that these have been viewed. If not, please call with interpretation below:    I have reviewed your bone density test.  The findings show that you continue to have osteopenia. Continue Vitamin D and start calcium 1200 mg daily, along with weight bearing exercises for prevention of osteoporosis.  Plan to recheck DEXA in 2 years.    Also please see below health maintenance items that are due:    Eye Exam due on 02/22/2019  Foot Exam due on 02/14/2020

## 2020-04-01 ENCOUNTER — PATIENT MESSAGE (OUTPATIENT)
Dept: NEUROLOGY | Facility: CLINIC | Age: 65
End: 2020-04-01

## 2020-04-01 DIAGNOSIS — G20.A1 PARKINSON DISEASE: ICD-10-CM

## 2020-04-01 RX ORDER — SELEGILINE HYDROCHLORIDE 5 MG/1
5 CAPSULE ORAL 2 TIMES DAILY
Qty: 60 CAPSULE | Refills: 6 | Status: SHIPPED | OUTPATIENT
Start: 2020-04-01 | End: 2020-09-16

## 2020-04-01 RX ORDER — PRAMIPEXOLE DIHYDROCHLORIDE 0.25 MG/1
TABLET ORAL
Qty: 90 TABLET | Refills: 11 | Status: SHIPPED | OUTPATIENT
Start: 2020-04-01 | End: 2020-10-19

## 2020-04-06 ENCOUNTER — PATIENT MESSAGE (OUTPATIENT)
Dept: NEUROLOGY | Facility: CLINIC | Age: 65
End: 2020-04-06

## 2020-04-14 ENCOUNTER — TELEPHONE (OUTPATIENT)
Dept: NEUROLOGY | Facility: CLINIC | Age: 65
End: 2020-04-14

## 2020-04-14 NOTE — TELEPHONE ENCOUNTER
Spoke with Mrs. Rojas, she was informed appt for 4/17/2020 will need to be rescheduled due to Virgie not feeling well. Mrs. Rojas voiced understanding, rescheduling the appt for 4/20 at 9:15.

## 2020-04-16 ENCOUNTER — PATIENT OUTREACH (OUTPATIENT)
Dept: ADMINISTRATIVE | Facility: OTHER | Age: 65
End: 2020-04-16

## 2020-04-16 DIAGNOSIS — E11.21 TYPE 2 DIABETES MELLITUS WITH DIABETIC NEPHROPATHY, WITH LONG-TERM CURRENT USE OF INSULIN: Primary | ICD-10-CM

## 2020-04-16 DIAGNOSIS — Z79.4 TYPE 2 DIABETES MELLITUS WITH DIABETIC NEPHROPATHY, WITH LONG-TERM CURRENT USE OF INSULIN: Primary | ICD-10-CM

## 2020-04-17 ENCOUNTER — PATIENT MESSAGE (OUTPATIENT)
Dept: NEUROLOGY | Facility: CLINIC | Age: 65
End: 2020-04-17

## 2020-04-17 ENCOUNTER — TELEPHONE (OUTPATIENT)
Dept: NEUROLOGY | Facility: CLINIC | Age: 65
End: 2020-04-17

## 2020-04-17 NOTE — TELEPHONE ENCOUNTER
Spoke with Ms. Rojas, she was informed 4/20 appt will need to be rescheduled due to Virgie being under the weather. She voiced understanding and rescheduled for 4/28 at 3:15.

## 2020-04-17 NOTE — TELEPHONE ENCOUNTER
----- Message from Kimmy Espino sent at 4/17/2020  1:56 PM CDT -----  Patient returned your call, please call back at [phone].  Thank you!

## 2020-04-17 NOTE — TELEPHONE ENCOUNTER
Lm informing Mrs. Rojas her 4/20 appt will need to be reschedule due to provider feeling under the weather. Phone number given in message.

## 2020-04-27 ENCOUNTER — TELEPHONE (OUTPATIENT)
Dept: NEUROLOGY | Facility: CLINIC | Age: 65
End: 2020-04-27

## 2020-04-27 NOTE — TELEPHONE ENCOUNTER
Lm for pt to contact the clinic to reschedule 4/28 appt due to Virgie being under the weather. Phone number given in message

## 2020-04-28 ENCOUNTER — PATIENT MESSAGE (OUTPATIENT)
Dept: NEUROLOGY | Facility: CLINIC | Age: 65
End: 2020-04-28

## 2020-05-18 ENCOUNTER — PATIENT MESSAGE (OUTPATIENT)
Dept: NEUROLOGY | Facility: CLINIC | Age: 65
End: 2020-05-18

## 2020-05-18 RX ORDER — AMANTADINE HYDROCHLORIDE 100 MG/1
100 CAPSULE, GELATIN COATED ORAL 2 TIMES DAILY
Qty: 60 CAPSULE | Refills: 11 | Status: SHIPPED | OUTPATIENT
Start: 2020-05-18 | End: 2021-04-02 | Stop reason: SDUPTHER

## 2020-05-26 RX ORDER — MONTELUKAST SODIUM 10 MG/1
TABLET ORAL
Qty: 30 TABLET | Refills: 4 | Status: SHIPPED | OUTPATIENT
Start: 2020-05-26 | End: 2020-10-16

## 2020-05-27 RX ORDER — CARBIDOPA AND LEVODOPA 25; 100 MG/1; MG/1
TABLET ORAL
Qty: 135 TABLET | Refills: 6 | Status: SHIPPED | OUTPATIENT
Start: 2020-05-27 | End: 2020-12-08

## 2020-05-29 ENCOUNTER — LAB VISIT (OUTPATIENT)
Dept: LAB | Facility: HOSPITAL | Age: 65
End: 2020-05-29
Attending: NURSE PRACTITIONER
Payer: MEDICARE

## 2020-05-29 DIAGNOSIS — E11.21 TYPE 2 DIABETES MELLITUS WITH DIABETIC NEPHROPATHY, WITH LONG-TERM CURRENT USE OF INSULIN: ICD-10-CM

## 2020-05-29 DIAGNOSIS — Z79.4 TYPE 2 DIABETES MELLITUS WITH DIABETIC NEPHROPATHY, WITH LONG-TERM CURRENT USE OF INSULIN: ICD-10-CM

## 2020-05-29 LAB
ALBUMIN SERPL BCP-MCNC: 3.9 G/DL (ref 3.5–5.2)
ALP SERPL-CCNC: 88 U/L (ref 55–135)
ALT SERPL W/O P-5'-P-CCNC: 10 U/L (ref 10–44)
ANION GAP SERPL CALC-SCNC: 10 MMOL/L (ref 8–16)
AST SERPL-CCNC: 15 U/L (ref 10–40)
BILIRUB SERPL-MCNC: 0.4 MG/DL (ref 0.1–1)
BUN SERPL-MCNC: 20 MG/DL (ref 8–23)
CALCIUM SERPL-MCNC: 10.2 MG/DL (ref 8.7–10.5)
CHLORIDE SERPL-SCNC: 102 MMOL/L (ref 95–110)
CO2 SERPL-SCNC: 27 MMOL/L (ref 23–29)
CREAT SERPL-MCNC: 1 MG/DL (ref 0.5–1.4)
EST. GFR  (AFRICAN AMERICAN): >60 ML/MIN/1.73 M^2
EST. GFR  (NON AFRICAN AMERICAN): 58.8 ML/MIN/1.73 M^2
ESTIMATED AVG GLUCOSE: 143 MG/DL (ref 68–131)
GLUCOSE SERPL-MCNC: 180 MG/DL (ref 70–110)
HBA1C MFR BLD HPLC: 6.6 % (ref 4–5.6)
POTASSIUM SERPL-SCNC: 4.4 MMOL/L (ref 3.5–5.1)
PROT SERPL-MCNC: 7.4 G/DL (ref 6–8.4)
SODIUM SERPL-SCNC: 139 MMOL/L (ref 136–145)
T4 FREE SERPL-MCNC: 1.31 NG/DL (ref 0.71–1.51)
TSH SERPL DL<=0.005 MIU/L-ACNC: 1.42 UIU/ML (ref 0.4–4)

## 2020-05-29 PROCEDURE — 83036 HEMOGLOBIN GLYCOSYLATED A1C: CPT

## 2020-05-29 PROCEDURE — 36415 COLL VENOUS BLD VENIPUNCTURE: CPT | Mod: PO

## 2020-05-29 PROCEDURE — 84443 ASSAY THYROID STIM HORMONE: CPT

## 2020-05-29 PROCEDURE — 80053 COMPREHEN METABOLIC PANEL: CPT

## 2020-05-29 PROCEDURE — 84439 ASSAY OF FREE THYROXINE: CPT

## 2020-06-03 ENCOUNTER — PATIENT OUTREACH (OUTPATIENT)
Dept: ADMINISTRATIVE | Facility: OTHER | Age: 65
End: 2020-06-03

## 2020-06-05 ENCOUNTER — OFFICE VISIT (OUTPATIENT)
Dept: ENDOCRINOLOGY | Facility: CLINIC | Age: 65
End: 2020-06-05
Payer: MEDICARE

## 2020-06-05 VITALS
WEIGHT: 210.63 LBS | BODY MASS INDEX: 39.77 KG/M2 | HEIGHT: 61 IN | SYSTOLIC BLOOD PRESSURE: 142 MMHG | DIASTOLIC BLOOD PRESSURE: 86 MMHG | RESPIRATION RATE: 18 BRPM | HEART RATE: 88 BPM

## 2020-06-05 DIAGNOSIS — E11.21 TYPE 2 DIABETES MELLITUS WITH DIABETIC NEPHROPATHY, WITH LONG-TERM CURRENT USE OF INSULIN: Primary | ICD-10-CM

## 2020-06-05 DIAGNOSIS — E04.1 NODULAR THYROID DISEASE: ICD-10-CM

## 2020-06-05 DIAGNOSIS — E78.2 MIXED HYPERLIPIDEMIA: ICD-10-CM

## 2020-06-05 DIAGNOSIS — Z79.4 TYPE 2 DIABETES MELLITUS WITH STAGE 3 CHRONIC KIDNEY DISEASE, WITH LONG-TERM CURRENT USE OF INSULIN: ICD-10-CM

## 2020-06-05 DIAGNOSIS — E89.0 POST-OPERATIVE HYPOTHYROIDISM: ICD-10-CM

## 2020-06-05 DIAGNOSIS — I10 BENIGN ESSENTIAL HTN: ICD-10-CM

## 2020-06-05 DIAGNOSIS — E55.9 VITAMIN D DEFICIENCY: ICD-10-CM

## 2020-06-05 DIAGNOSIS — E11.22 TYPE 2 DIABETES MELLITUS WITH STAGE 3 CHRONIC KIDNEY DISEASE, WITH LONG-TERM CURRENT USE OF INSULIN: ICD-10-CM

## 2020-06-05 DIAGNOSIS — Z79.4 TYPE 2 DIABETES MELLITUS WITH DIABETIC NEPHROPATHY, WITH LONG-TERM CURRENT USE OF INSULIN: Primary | ICD-10-CM

## 2020-06-05 DIAGNOSIS — N18.30 TYPE 2 DIABETES MELLITUS WITH STAGE 3 CHRONIC KIDNEY DISEASE, WITH LONG-TERM CURRENT USE OF INSULIN: ICD-10-CM

## 2020-06-05 DIAGNOSIS — E66.01 OBESITY, MORBID: ICD-10-CM

## 2020-06-05 PROCEDURE — 99999 PR PBB SHADOW E&M-EST. PATIENT-LVL V: CPT | Mod: PBBFAC,,, | Performed by: NURSE PRACTITIONER

## 2020-06-05 PROCEDURE — 99213 OFFICE O/P EST LOW 20 MIN: CPT | Mod: S$GLB,,, | Performed by: NURSE PRACTITIONER

## 2020-06-05 PROCEDURE — 99213 PR OFFICE/OUTPT VISIT, EST, LEVL III, 20-29 MIN: ICD-10-PCS | Mod: S$GLB,,, | Performed by: NURSE PRACTITIONER

## 2020-06-05 PROCEDURE — 99999 PR PBB SHADOW E&M-EST. PATIENT-LVL V: ICD-10-PCS | Mod: PBBFAC,,, | Performed by: NURSE PRACTITIONER

## 2020-06-05 RX ORDER — PEN NEEDLE, DIABETIC 30 GX3/16"
NEEDLE, DISPOSABLE MISCELLANEOUS
Qty: 400 EACH | Refills: 3 | Status: SHIPPED | OUTPATIENT
Start: 2020-06-05 | End: 2020-12-07

## 2020-06-05 RX ORDER — INSULIN GLARGINE 100 [IU]/ML
INJECTION, SOLUTION SUBCUTANEOUS
Qty: 30 ML | Refills: 12
Start: 2020-06-05 | End: 2020-12-07 | Stop reason: SDUPTHER

## 2020-06-05 RX ORDER — INSULIN GLARGINE 100 [IU]/ML
INJECTION, SOLUTION SUBCUTANEOUS
COMMUNITY
Start: 2020-05-18 | End: 2020-06-05

## 2020-06-05 NOTE — PATIENT INSTRUCTIONS
[facility]     Tresiba or levemir are the 2 long acting insulins    [employer] are the make lantus

## 2020-06-08 ENCOUNTER — OFFICE VISIT (OUTPATIENT)
Dept: NEUROLOGY | Facility: CLINIC | Age: 65
End: 2020-06-08
Payer: MEDICARE

## 2020-06-08 DIAGNOSIS — G24.01 LEVODOPA-INDUCED DYSKINESIA: ICD-10-CM

## 2020-06-08 DIAGNOSIS — M79.89 BILATERAL SWELLING OF FEET: ICD-10-CM

## 2020-06-08 DIAGNOSIS — M54.16 LUMBAR RADICULOPATHY: ICD-10-CM

## 2020-06-08 DIAGNOSIS — T42.8X5A LEVODOPA-INDUCED DYSKINESIA: ICD-10-CM

## 2020-06-08 DIAGNOSIS — G20.A1 PARKINSON'S DISEASE: Primary | ICD-10-CM

## 2020-06-08 PROCEDURE — 1101F PT FALLS ASSESS-DOCD LE1/YR: CPT | Mod: CPTII,95,, | Performed by: NURSE PRACTITIONER

## 2020-06-08 PROCEDURE — 1159F PR MEDICATION LIST DOCUMENTED IN MEDICAL RECORD: ICD-10-PCS | Mod: 95,,, | Performed by: NURSE PRACTITIONER

## 2020-06-08 PROCEDURE — 99214 OFFICE O/P EST MOD 30 MIN: CPT | Mod: 95,,, | Performed by: NURSE PRACTITIONER

## 2020-06-08 PROCEDURE — 1101F PR PT FALLS ASSESS DOC 0-1 FALLS W/OUT INJ PAST YR: ICD-10-PCS | Mod: CPTII,95,, | Performed by: NURSE PRACTITIONER

## 2020-06-08 PROCEDURE — 1159F MED LIST DOCD IN RCRD: CPT | Mod: 95,,, | Performed by: NURSE PRACTITIONER

## 2020-06-08 PROCEDURE — 99214 PR OFFICE/OUTPT VISIT, EST, LEVL IV, 30-39 MIN: ICD-10-PCS | Mod: 95,,, | Performed by: NURSE PRACTITIONER

## 2020-06-08 NOTE — PROGRESS NOTES
6-8-2020    The patient location is: [place]  The chief complaint leading to consultation is: Parkinson's disease     Visit type: audio visual     Face to Face time with patient: 24 minutes (6555-1008) of total time spent on the encounter, which includes face to face time and non-face to face time preparing to see the patient (eg, review of tests), Obtaining and/or reviewing separately obtained history, Documenting clinical information in the electronic or other health record, Independently interpreting results (not separately reported) and communicating results to the patient/family/caregiver, or Care coordination (not separately reported).         Each patient to whom he or she provides medical services by telemedicine is:  (1) informed of the relationship between the physician and patient and the respective role of any other health care provider with respect to management of the patient; and (2) notified that he or she may decline to receive medical services by telemedicine and may withdraw from such care at any time.    Notes: 65 yo RH female with Parkinson's disease (20180 L tremor, 2019 dyskinesias), last seen in office 11-25-19. At that time, Mirapex added to carbidopa/levodopa.     Lot more stiffness.   Both feet have been swelling. She believes this is from amantadine but she has only noted this in the past 3 weeks. She has not increased or changed the dose.   She denies SOB, calf pain or weeping of legs.  Swelling goes down as soon as she elevates feet.   Describes aching in all joints.   Especially has low back pain when she bends over to pick something up.   More tied in the last month.   Describes trigger finger RH ring finger.   Balance good. No falls.   No tremors.    Denies dyskinesias.   Will get dizzy when she lays down and then gets up. No syncope.   +constipation- uses Miralax qod and this helps.  No issues with bladder control     She is taking Mirapex. She feels better with this.     Between 0600  and 0700  Sinemet 1 pill  Selegiline 1 tab  mpx 1 tab  Amantadine 1 tab    Between Noon and 1P  mpx 1 tab  Sinemet 1 tab     Between 5P-6P  mpx 1 tab  Sinemet 1 tab  Amantadine 1 tab  Selegiline 1 tab    She does not feel she needs an adjustment in her meds.     No issues with ICD. No sleep attacks. No hallucinations.     Still has nightmares.  says she sometimes hollers in sleep -usually  2-3 times per week.   She uses melatonin 10 mg and this does help.     Limited exam  Last dose carbidopa/levodopa 1230- exam time 1520    Slt masked face.   Slt hypophonia. No dysarthria. Easy to understand.   Good historian.   Slight global bradykinesia.   YOANDY- slt RH and mild LH  Slt postural tremor LH only.   Slight, intermittent dyskinesias noted head.       IMPRESSION:    Parkinson's disease , l-dopa responsive but early dyskinesias.   RBD -better with melatonin  Constipation- better with Miralax qod   Swelling B feet  Lumbar radiculopathy  Hx of malignant tumor bronchus and lung  DM 2     PLAN:  Continue current med regimen. If feels she needs an adjustment, let us know. Would consider adding a bit more l-dopa vs increasing Mirapex.   Denies ICD issues with Mirapex. No sleep attacks.  Describes swelling in B feet past few weeks with no other symptoms and resolves with elevation. Watch.   Discussed strategies for back health.  Call for problems or questions.   Follow up 4 mos in [place] with Dr. Lux.     ..  ..Virgie Solis, CHAMP, NP-C

## 2020-07-21 ENCOUNTER — PATIENT OUTREACH (OUTPATIENT)
Dept: ADMINISTRATIVE | Facility: HOSPITAL | Age: 65
End: 2020-07-21

## 2020-07-21 NOTE — PROGRESS NOTES
Attempted to contact patient to schedule over due diabetic eye exam. Unable to reach patient at this time left voice mail

## 2020-07-23 ENCOUNTER — TELEPHONE (OUTPATIENT)
Dept: NEUROLOGY | Facility: CLINIC | Age: 65
End: 2020-07-23

## 2020-08-19 ENCOUNTER — HOSPITAL ENCOUNTER (OUTPATIENT)
Dept: RADIOLOGY | Facility: HOSPITAL | Age: 65
Discharge: HOME | End: 2020-08-19
Attending: INTERNAL MEDICINE
Payer: MEDICARE

## 2020-08-19 ENCOUNTER — OFFICE VISIT (OUTPATIENT)
Dept: FAMILY MEDICINE | Facility: CLINIC | Age: 65
End: 2020-08-19
Payer: MEDICARE

## 2020-08-19 VITALS
BODY MASS INDEX: 40.15 KG/M2 | HEART RATE: 90 BPM | TEMPERATURE: 98 F | HEIGHT: 61 IN | SYSTOLIC BLOOD PRESSURE: 135 MMHG | WEIGHT: 212.63 LBS | DIASTOLIC BLOOD PRESSURE: 75 MMHG

## 2020-08-19 DIAGNOSIS — M25.561 ACUTE PAIN OF RIGHT KNEE: ICD-10-CM

## 2020-08-19 DIAGNOSIS — R23.3 PETECHIAL RASH: Primary | ICD-10-CM

## 2020-08-19 DIAGNOSIS — I10 BENIGN ESSENTIAL HTN: ICD-10-CM

## 2020-08-19 PROCEDURE — 73562 X-RAY EXAM OF KNEE 3: CPT | Mod: TC,PO,RT

## 2020-08-19 PROCEDURE — 3078F DIAST BP <80 MM HG: CPT | Mod: CPTII,S$GLB,, | Performed by: INTERNAL MEDICINE

## 2020-08-19 PROCEDURE — 1125F PR PAIN SEVERITY QUANTIFIED, PAIN PRESENT: ICD-10-PCS | Mod: S$GLB,,, | Performed by: INTERNAL MEDICINE

## 2020-08-19 PROCEDURE — 1159F MED LIST DOCD IN RCRD: CPT | Mod: S$GLB,,, | Performed by: INTERNAL MEDICINE

## 2020-08-19 PROCEDURE — 73562 X-RAY EXAM OF KNEE 3: CPT | Mod: 26,RT,, | Performed by: RADIOLOGY

## 2020-08-19 PROCEDURE — 1159F PR MEDICATION LIST DOCUMENTED IN MEDICAL RECORD: ICD-10-PCS | Mod: S$GLB,,, | Performed by: INTERNAL MEDICINE

## 2020-08-19 PROCEDURE — 3075F SYST BP GE 130 - 139MM HG: CPT | Mod: CPTII,S$GLB,, | Performed by: INTERNAL MEDICINE

## 2020-08-19 PROCEDURE — 3008F BODY MASS INDEX DOCD: CPT | Mod: CPTII,S$GLB,, | Performed by: INTERNAL MEDICINE

## 2020-08-19 PROCEDURE — 1101F PR PT FALLS ASSESS DOC 0-1 FALLS W/OUT INJ PAST YR: ICD-10-PCS | Mod: CPTII,S$GLB,, | Performed by: INTERNAL MEDICINE

## 2020-08-19 PROCEDURE — 1125F AMNT PAIN NOTED PAIN PRSNT: CPT | Mod: S$GLB,,, | Performed by: INTERNAL MEDICINE

## 2020-08-19 PROCEDURE — 3008F PR BODY MASS INDEX (BMI) DOCUMENTED: ICD-10-PCS | Mod: CPTII,S$GLB,, | Performed by: INTERNAL MEDICINE

## 2020-08-19 PROCEDURE — 1101F PT FALLS ASSESS-DOCD LE1/YR: CPT | Mod: CPTII,S$GLB,, | Performed by: INTERNAL MEDICINE

## 2020-08-19 PROCEDURE — 99214 PR OFFICE/OUTPT VISIT, EST, LEVL IV, 30-39 MIN: ICD-10-PCS | Mod: S$GLB,,, | Performed by: INTERNAL MEDICINE

## 2020-08-19 PROCEDURE — 3075F PR MOST RECENT SYSTOLIC BLOOD PRESS GE 130-139MM HG: ICD-10-PCS | Mod: CPTII,S$GLB,, | Performed by: INTERNAL MEDICINE

## 2020-08-19 PROCEDURE — 3078F PR MOST RECENT DIASTOLIC BLOOD PRESSURE < 80 MM HG: ICD-10-PCS | Mod: CPTII,S$GLB,, | Performed by: INTERNAL MEDICINE

## 2020-08-19 PROCEDURE — 99999 PR PBB SHADOW E&M-EST. PATIENT-LVL V: ICD-10-PCS | Mod: PBBFAC,,, | Performed by: INTERNAL MEDICINE

## 2020-08-19 PROCEDURE — 73560 X-RAY EXAM OF KNEE 1 OR 2: CPT | Mod: 26,LT,, | Performed by: RADIOLOGY

## 2020-08-19 PROCEDURE — 73560 X-RAY EXAM OF KNEE 1 OR 2: CPT | Mod: TC,PO,LT

## 2020-08-19 PROCEDURE — 99999 PR PBB SHADOW E&M-EST. PATIENT-LVL V: CPT | Mod: PBBFAC,,, | Performed by: INTERNAL MEDICINE

## 2020-08-19 PROCEDURE — 99214 OFFICE O/P EST MOD 30 MIN: CPT | Mod: S$GLB,,, | Performed by: INTERNAL MEDICINE

## 2020-08-19 PROCEDURE — 73562 XR KNEE ORTHO RIGHT: ICD-10-PCS | Mod: 26,RT,, | Performed by: RADIOLOGY

## 2020-08-19 PROCEDURE — 73560 XR KNEE ORTHO RIGHT: ICD-10-PCS | Mod: 26,LT,, | Performed by: RADIOLOGY

## 2020-08-19 RX ORDER — DICLOFENAC SODIUM 10 MG/G
2 GEL TOPICAL DAILY
Qty: 100 G | Refills: 1 | Status: SHIPPED | OUTPATIENT
Start: 2020-08-19 | End: 2021-02-22

## 2020-08-19 RX ORDER — VALSARTAN AND HYDROCHLOROTHIAZIDE 320; 25 MG/1; MG/1
1 TABLET, FILM COATED ORAL DAILY
Qty: 90 TABLET | Refills: 3 | Status: SHIPPED | OUTPATIENT
Start: 2020-08-19 | End: 2021-07-15

## 2020-08-19 NOTE — PROGRESS NOTES
Results have been released via [employer]. Please verify that these have been viewed by patient. If not, please call patient with results.      I have sent a message to them with the following interpretation (see below).    I have reviewed your recent XR results    XR does not show any abnormalities of joint.  Continue treatment as discussed in clinic.  If symptoms continue, please let me know so that we can place referral to orthopedics.    Please do not hesitate to call or message with any additional questions or concerns.    Estela Grace MD

## 2020-08-19 NOTE — PROGRESS NOTES
Assessment/Plan:    Benign essential HTN  -at goal today  -currently on valsartan-HCTZ 320-12.5 mg daily  -denies adverse effects of medications  -continue lifestyle modification with low sodium diet and exercise       Acute pain of right knee  -XR today  -continue conservative management with RICE therapy  -if pain persist, consider ortho eval    Petechial rash  -checking labs to rule out vasculitis or platelet disorders  -if above negative and symptoms persist, consider derm referral   _____________________________________________________________________________________________________________________________________________________    Orders this visit:    Petechial rash  -     ANTI-NEUTROPHILIC CYTOPLASMIC ANTIBODY; Future; Expected date: 08/19/2020  -     CRYOGLOBULIN; Future; Expected date: 08/19/2020  -     CBC auto differential; Future; Expected date: 08/19/2020  -     Protime-INR; Future; Expected date: 08/19/2020  -     C3 COMPLEMENT; Future; Expected date: 08/19/2020  -     C4 COMPLEMENT; Future; Expected date: 08/19/2020  -     Urinalysis; Future; Expected date: 08/19/2020  -     CODI Screen w/Reflex; Future; Expected date: 08/19/2020  -     Rheumatoid factor; Future; Expected date: 08/19/2020  -     Hepatitis C Antibody; Future; Expected date: 08/19/2020  -     Hepatitis B Surface Ab, Qualitative; Future; Expected date: 08/19/2020  -     Hepatitis B Core Antibody, Total; Future; Expected date: 08/19/2020  -     Hepatitis B Surface Antigen; Future; Expected date: 08/19/2020    Acute pain of right knee  -     Cancel: X-Ray Knee 3 View Right; Future; Expected date: 08/19/2020  -     diclofenac sodium (VOLTAREN) 1 % Gel; Apply 2 g topically once daily.  Dispense: 100 g; Refill: 1    Benign essential HTN  -     valsartan-hydrochlorothiazide (DIOVAN-HCT) 320-25 mg per tablet; Take 1 tablet by mouth once daily.  Dispense: 90 tablet; Refill: 3      Follow up if symptoms worsen or fail to improve.    Estela MEANS  MD Sanjay  _____________________________________________________________________________________________________________________________________________________    HPI:    Patient is in clinic today as an established patient with a new complaint of knee pain.    Complain of R knee pain. Started about one week ago. Located on medial side of knee. Worse when she puts weight on it, however able to bear weight on joint. Able to bend knee without any issues. Pain present when resting but not severe, only when she walks on it. Pain is progressing, now 10/10. Normal ROM. Denies any associated swelling or erythema of joint. Denies any specific injury or trauma. She does ride a bike daily for exercise but has not been able to do that the past week due to pain. She has been applying biofreeze and applying heating pad. Using daily mobic or aleve.    Only other complaint is a new rash. Located on bilateral ankles and feet. Noticed about one month ago. Has noticed progression of skin changes, gradually moving proximally. Denies associated pain. Rash not at any other location.     No other complaints today.    Past Medical History:  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    Malignant carcinoid tumor of the bronchus and lung 5/2014    Mediastinal lymphadenopathy 8/26/2015    Obesity, morbid 5/6/2014    Parkinsons 10/2017    Pituitary adenoma 1980's    took parladel for 3 yrs    Pneumonia     Postoperative hypothyroidism 7/25/2017    - s/p total thyroidectomy in 2016 (parathyroids intact)  with post op hypothyroidism; on synthroid    Pulmonary nodules 2018    Thyroid disease     Wheeze 2014     Past Surgical History:   Procedure Laterality Date    APPENDECTOMY      BREAST CYST ASPIRATION      BRONCHOSCOPY  2014, 2014     SECTION  , 1986    x2    EYE SURGERY      LUNG REMOVAL, PARTIAL Right     with 17 lymph nodes    THYROIDECTOMY  2016    TONSILLECTOMY  1969     Review of patient's allergies indicates:   Allergen Reactions    Propranolol Nausea And Vomiting     Drops pressure and raised sugar    Lipitor [atorvastatin] Other (See Comments)     Myalgia, muscle pain     Social History     Tobacco Use    Smoking status: Never Smoker    Smokeless tobacco: Never Used   Substance Use Topics    Alcohol use: Yes     Frequency: Monthly or less     Drinks per session: 1 or 2     Binge frequency: Never     Comment: once per month    Drug use: No     Family History   Problem Relation Age of Onset    Cancer Maternal Aunt         breast    Cancer Maternal Grandmother         unknown    Cancer Maternal Aunt         unknown    Diabetes Mother     Glaucoma Mother     Diabetes Father     Diabetes Sister     Macular degeneration Sister     Breast cancer Paternal Aunt     Breast cancer Paternal Aunt     Amblyopia Neg Hx     Blindness Neg Hx     Cataracts Neg Hx     Hypertension Neg Hx     Retinal detachment Neg Hx     Stroke Neg Hx     Strabismus Neg Hx     Thyroid disease Neg Hx      Current Outpatient Medications on File Prior to Visit   Medication Sig Dispense Refill    alcohol swabs (ALCOHOL WIPES) PadM Uses 5 daily 400 each 4    amantadine HCL (SYMMETREL) 100 mg capsule Take 1 capsule (100 mg total) by mouth 2 (two) times daily. 60 capsule 11    blood sugar diagnostic (TRUE METRIX GLUCOSE TEST STRIP) Strp Check bg 7-8 times/day 250 each 12    carbidopa-levodopa  mg (SINEMET)  mg per tablet TAKE 1 AND 1/2 TABLETS BY MOUTH THREE  "TIMES DAILY 135 tablet 6    cholecalciferol, vitamin D3, (VITAMIN D3) 400 unit Cap Take 1,200 Units by mouth once daily.      cyanocobalamin (VITAMIN B-12) 1000 MCG tablet Take 100 mcg by mouth once daily.      fish oil-omega-3 fatty acids 300-1,000 mg capsule Take 4 capsules by mouth once daily.      insulin (LANTUS SOLOSTAR U-100 INSULIN) glargine 100 units/mL (3mL) SubQ pen Takes 35 u qam, 30 u qhs 30 mL 12    insulin lispro (HUMALOG KWIKPEN INSULIN) 100 unit/mL pen Inject 30 Units into the skin 3 (three) times daily before meals. 30 mL 12    lancets 33 gauge Misc Checks bg 7-8 times a dayTrue metrix 250 each 11    levothyroxine (SYNTHROID) 137 MCG Tab tablet TAKE ONE TABLET BY MOUTH ONCE DAILY 30 tablet 12    melatonin 3 mg Tab Take by mouth.      montelukast (SINGULAIR) 10 mg tablet TAKE ONE TABLET BY MOUTH ONCE DAILY IN THE EVENING 30 tablet 4    MULTIVITAMIN W-MINERALS/LUTEIN (CENTRUM SILVER ORAL) Take 1 tablet by mouth once daily.       nabumetone (RELAFEN) 500 MG tablet Take 1,000 mg by mouth 2 (two) times daily.  12    pen needle, diabetic (BD ANA 2ND GEN PEN NEEDLE) 32 gauge x 5/32" Ndle Uses 5 daily 400 each 3    pramipexole (MIRAPEX) 0.25 MG tablet Please take 1/2 to 1 tab as directed. 90 tablet 11    selegiline (ELDEPRYL) 5 mg Cap Take 1 capsule (5 mg total) by mouth 2 (two) times daily. 60 capsule 6    simvastatin (ZOCOR) 10 MG tablet TAKE ONE TABLET BY MOUTH ONCE DAILY IN THE EVENING 30 tablet 12     No current facility-administered medications on file prior to visit.        Review of Systems   Constitutional: Negative for activity change and unexpected weight change.   HENT: Negative for hearing loss and rhinorrhea.    Eyes: Negative for discharge and visual disturbance.   Respiratory: Negative for chest tightness.    Cardiovascular: Negative for chest pain and palpitations.   Gastrointestinal: Negative for blood in stool, constipation, diarrhea and vomiting.   Endocrine: Negative " "for polydipsia and polyuria.   Genitourinary: Negative for difficulty urinating, dysuria, hematuria and menstrual problem.   Musculoskeletal: Positive for arthralgias and neck pain. Negative for joint swelling.   Skin: Positive for rash.   Neurological: Positive for weakness and headaches.   Psychiatric/Behavioral: Negative for confusion and dysphoric mood.       Vitals:    08/19/20 1144   BP: 135/75   Pulse: 90   Temp: 97.7 °F (36.5 °C)   TempSrc: Oral   Weight: 96.4 kg (212 lb 9.6 oz)   Height: 5' 1" (1.549 m)       Wt Readings from Last 3 Encounters:   08/19/20 96.4 kg (212 lb 9.6 oz)   06/05/20 95.5 kg (210 lb 9.7 oz)   03/10/20 95.7 kg (210 lb 15.7 oz)       Physical Exam  Constitutional:       General: She is not in acute distress.     Appearance: Normal appearance. She is well-developed.   HENT:      Head: Normocephalic and atraumatic.   Eyes:      Conjunctiva/sclera: Conjunctivae normal.   Neck:      Musculoskeletal: Normal range of motion and neck supple.   Cardiovascular:      Rate and Rhythm: Normal rate and regular rhythm.      Pulses: Normal pulses.      Heart sounds: Normal heart sounds. No murmur.   Pulmonary:      Effort: Pulmonary effort is normal. No respiratory distress.      Breath sounds: Normal breath sounds.   Abdominal:      General: Bowel sounds are normal. There is no distension.      Palpations: Abdomen is soft.      Tenderness: There is no abdominal tenderness.   Musculoskeletal: Normal range of motion.         General: No swelling, deformity or signs of injury.      Comments: Mild TTP at medial joint line of R knee   Skin:     General: Skin is warm and dry.      Findings: Rash present.      Comments: Diffuse, non-blanching petechial lesions located at b/l distal LE   Neurological:      General: No focal deficit present.      Mental Status: She is alert and oriented to person, place, and time.           "

## 2020-08-19 NOTE — PATIENT INSTRUCTIONS
Knee Pain  Knee pain is very common. Its especially common in active people who put a lot of pressure on their knees, like runners. It affects women more often than men.  Your kneecap (patella) is a thick, round bone. It covers and protects the front portion of your knee joint. It moves along a groove in your thighbone (femur) as part of the patellofemoral joint. A layer of cartilage surrounds the underside of your kneecap. This layer protects it from grinding against your femur.  When this cartilage softens and breaks down, it can cause knee pain. This is partly because of repetitive stress. The stress irritates the lining of the joint. This causes pain in the underlying bone.  What causes knee pain?  Many things can cause knee pain. You may have more than one cause. Some of these include:  · Overuse of the knee joint  · The kneecap doesnt line up with the tissue around it  · Damage to small nerves in the area  · Damage to the ligament-like structure that holds the kneecap in place (retinaculum)  · Breakdown of the bone under the cartilage  · Swelling in the soft tissues around the kneecap  · Injury  You might be more likely to have knee pain if you:  · Exercise a lot  · Recently increased the intensity of your workouts  · Have a body mass index (BMI) greater than 25  · Have poor alignment of your kneecap  · Walk with your feet turned overly outward or inward  · Have weakness in surrounding muscle groups (inner quad or hip adductor muscles)  · Have too much tightness in surrounding muscle groups (hamstrings or iliotibial band)  · Have a recent history of injury to the area  · Are female  Symptoms of knee pain  This type of knee pain is a dull, aching pain in the front of the knee in the area under and around the kneecap. This pain may start quickly or slowly. Your pain might be worse when you squat, run, or sit for a long time. You might also sometimes feel like your knee is giving out. You may have symptoms in  one or both of your knees.  Diagnosing knee pain  Your healthcare provider will ask about your medical history and your symptoms. Be sure to describe any activities that make your knee pain worse. He or she will look at your knee. This will include tests of your range of motion, strength, and areas of pain of your knee. Your knee alignment will be checked.  Your healthcare provider will need to rule out other causes of your knee pain, such as arthritis. You may need an imaging test, such as an X-ray or MRI.  Treatment for knee pain  Treatments that can help ease your symptoms may include:  · Avoiding activities for a while that make your pain worse, returning to activity over time  · Icing the outside of your knee when it causes you pain  · Taking over-the-counter pain medicine  · Wearing a knee brace or taping your knee to support it  · Wearing special shoe inserts to help keep your feet in the proper alignment  · Doing special exercises to stretch and strengthen the muscles around your hip and your knee  These steps help most people manage knee pain. But some cases of knee pain need to be treated with surgery. You may need surgery right away. Or you may need it later if other treatments dont work. Your healthcare provider may refer you to an orthopedic surgeon. He or she will talk with you about your choices.  Preventing knee pain  Losing weight and correcting excess muscle tightness or muscle weakness may help lower your risk.  In some cases, you can prevent knee pain. To help prevent a flare-up of knee pain, you do these things:  · Regularly do all the exercises your doctor or physical therapist advises  · Support your knee as advised by your doctor or physical therapist  · Increase training gradually, and ease up on training when needed  · Have an expert check your gait for running or other sporting activities  · Stretch properly before and after exercise  · Replace your running shoes regularly  · Lose excess  weight     When to call your healthcare provider  Call your healthcare provider right away if:  · Your symptoms dont get better after a few weeks of treatment  · You have any new symptoms   Date Last Reviewed: 4/1/2017  © 2126-4600 [employer]. [address] [place] [address], [place] [address]. All rights reserved. This information is not intended as a substitute for professional medical care. Always follow your healthcare professional's instructions.

## 2020-08-25 ENCOUNTER — PATIENT OUTREACH (OUTPATIENT)
Dept: ADMINISTRATIVE | Facility: HOSPITAL | Age: 65
End: 2020-08-25

## 2020-08-25 DIAGNOSIS — Z12.31 ENCOUNTER FOR SCREENING MAMMOGRAM FOR MALIGNANT NEOPLASM OF BREAST: Primary | ICD-10-CM

## 2020-08-26 ENCOUNTER — HOSPITAL ENCOUNTER (OUTPATIENT)
Dept: RADIOLOGY | Facility: HOSPITAL | Age: 65
Discharge: HOME | End: 2020-08-26
Attending: INTERNAL MEDICINE
Payer: MEDICARE

## 2020-08-26 VITALS — BODY MASS INDEX: 40.02 KG/M2 | WEIGHT: 212 LBS | HEIGHT: 61 IN

## 2020-08-26 DIAGNOSIS — Z12.31 ENCOUNTER FOR SCREENING MAMMOGRAM FOR MALIGNANT NEOPLASM OF BREAST: ICD-10-CM

## 2020-08-26 PROCEDURE — 77063 MAMMO DIGITAL SCREENING BILAT WITH TOMOSYNTHESIS_CAD: ICD-10-PCS | Mod: 26,,, | Performed by: RADIOLOGY

## 2020-08-26 PROCEDURE — 77067 MAMMO DIGITAL SCREENING BILAT WITH TOMOSYNTHESIS_CAD: ICD-10-PCS | Mod: 26,,, | Performed by: RADIOLOGY

## 2020-08-26 PROCEDURE — 77063 BREAST TOMOSYNTHESIS BI: CPT | Mod: 26,,, | Performed by: RADIOLOGY

## 2020-08-26 PROCEDURE — 77067 SCR MAMMO BI INCL CAD: CPT | Mod: TC,PO

## 2020-08-26 PROCEDURE — 77067 SCR MAMMO BI INCL CAD: CPT | Mod: 26,,, | Performed by: RADIOLOGY

## 2020-08-26 NOTE — PROGRESS NOTES
Normal mammogram, repeat in 1 year, results released through [facility]. Please verify that patient has viewed results. If not, please call patient with interpretation below:    I have reviewed the results of your mammogram and it appears that everything was read as normal.  Based on this, the radiologist has recommended that you recheck a mammogram in 1 year.     Also please see below health maintenance items that are due:    High Dose Statin due on 12/10/1974  Mammogram due on 08/06/2020

## 2020-09-28 ENCOUNTER — TELEPHONE (OUTPATIENT)
Dept: NEUROLOGY | Facility: CLINIC | Age: 65
End: 2020-09-28

## 2020-09-28 NOTE — TELEPHONE ENCOUNTER
"Reached out to pt to advise of need to cancel upcoming appt with Dr. Lux at [facility] (Dr. Lux out indefinitely). When asked if I may speak with Ms. Rojas, individual on the line (female and presumably the patient) said "Nope" and then hung up the phone. Will now send [employer] message to advise of appt cancellation for 10/15/20.   "

## 2020-10-09 ENCOUNTER — PATIENT OUTREACH (OUTPATIENT)
Dept: ADMINISTRATIVE | Facility: OTHER | Age: 65
End: 2020-10-09

## 2020-10-09 NOTE — PROGRESS NOTES
Health Maintenance Due   Topic Date Due    Influenza Vaccine (1) 08/01/2020     Updates were requested from care everywhere.  Chart was reviewed for overdue Proactive [facility] Encounters (NURIS) topics (CRS, Breast Cancer Screening, Eye exam)  Health Maintenance has been updated.  LINKS immunization registry triggered.  Immunizations were reconciled.

## 2020-10-12 ENCOUNTER — OFFICE VISIT (OUTPATIENT)
Dept: NEUROLOGY | Facility: CLINIC | Age: 65
End: 2020-10-12
Payer: MEDICARE

## 2020-10-12 DIAGNOSIS — G20.A1 PARKINSON DISEASE: ICD-10-CM

## 2020-10-12 DIAGNOSIS — R13.10 DYSPHAGIA, UNSPECIFIED TYPE: Primary | ICD-10-CM

## 2020-10-12 DIAGNOSIS — H02.403 PTOSIS OF BOTH EYELIDS: ICD-10-CM

## 2020-10-12 PROCEDURE — 99213 PR OFFICE/OUTPT VISIT, EST, LEVL III, 20-29 MIN: ICD-10-PCS | Mod: 95,,, | Performed by: NURSE PRACTITIONER

## 2020-10-12 PROCEDURE — 99499 RISK ADDL DX/OHS AUDIT: ICD-10-PCS | Mod: 95,,, | Performed by: NURSE PRACTITIONER

## 2020-10-12 PROCEDURE — 1159F PR MEDICATION LIST DOCUMENTED IN MEDICAL RECORD: ICD-10-PCS | Mod: 95,,, | Performed by: NURSE PRACTITIONER

## 2020-10-12 PROCEDURE — 1101F PR PT FALLS ASSESS DOC 0-1 FALLS W/OUT INJ PAST YR: ICD-10-PCS | Mod: CPTII,95,, | Performed by: NURSE PRACTITIONER

## 2020-10-12 PROCEDURE — 1101F PT FALLS ASSESS-DOCD LE1/YR: CPT | Mod: CPTII,95,, | Performed by: NURSE PRACTITIONER

## 2020-10-12 PROCEDURE — 99213 OFFICE O/P EST LOW 20 MIN: CPT | Mod: 95,,, | Performed by: NURSE PRACTITIONER

## 2020-10-12 PROCEDURE — 1159F MED LIST DOCD IN RCRD: CPT | Mod: 95,,, | Performed by: NURSE PRACTITIONER

## 2020-10-12 PROCEDURE — 99499 UNLISTED E&M SERVICE: CPT | Mod: 95,,, | Performed by: NURSE PRACTITIONER

## 2020-10-12 NOTE — Clinical Note
Follow up 4 months- probably best if in person this time but ok for virtual if she does not feel comfortable with this- Jay

## 2020-10-12 NOTE — PROGRESS NOTES
The patient location is: [place]  The chief complaint leading to consultation is: PD    Visit type: audiovisual    Face to Face time with patient: 22 minutes of total time spent on the encounter, which includes face to face time and non-face to face time preparing to see the patient (eg, review of tests), Obtaining and/or reviewing separately obtained history, Documenting clinical information in the electronic or other health record, Independently interpreting results (not separately reported) and communicating results to the patient/family/caregiver, or Care coordination (not separately reported).         Each patient to whom he or she provides medical services by telemedicine is:  (1) informed of the relationship between the physician and patient and the respective role of any other health care provider with respect to management of the patient; and (2) notified that he or she may decline to receive medical services by telemedicine and may withdraw from such care at any time.    Notes: .  65 yo RH female with Parkinson's disease (2018- L tremor, 2019- dyskinesias), last seen virtually 6-8-2020. At that time, meds cont without change as she was satisfied with current doses.     Still some swelling in legs.   Told had petechiae per pcp and she increased BP med and gave some meds but did not help. This was in the last 2 months.      In regards to Parkinson's disease  No stumble   balance good  Stiffer, mainly in AM-   No tremors. Will get little shaky if does not take medicine.   She feels her doses still do the trick- pleased .  swallowing little worse.   Feels like foods get cauing ni throat- could be anythign   Noticed this for  At least 6 motnhs, not getting worse.     Feels like eye lids drooping- started last year- lately feels like worse. No problem with eyes staying open   Konstantin R rafa.   No double vision  She does not appreciate that it get worse during the day.     Still have nightmares at night.     EXAM:  slt  masked face  Slight hypophonia, no dysarthria  Provides good hx  YOANDY w-slt RH and near mild LH  Slt global basim  No dyskinesias noted today  No tremor noted today    IMPRESSION:  Parkinson's disease   dysphagia  Reports eye lids drooping (don't particularly appreciate today virtually)    PLAN:  Swallow study  MG panel  Follow up 4 months   Call for problems

## 2020-10-14 NOTE — PROGRESS NOTES
Patient scheduled for 10/20/2020 for the swallow study.  Patient advised and expressed understanding.

## 2020-10-16 RX ORDER — MONTELUKAST SODIUM 10 MG/1
TABLET ORAL
Qty: 30 TABLET | Refills: 11 | Status: SHIPPED | OUTPATIENT
Start: 2020-10-16 | End: 2021-11-05

## 2020-10-21 ENCOUNTER — LAB VISIT (OUTPATIENT)
Dept: LAB | Facility: HOSPITAL | Age: 65
End: 2020-10-21
Attending: NURSE PRACTITIONER
Payer: MEDICARE

## 2020-10-21 DIAGNOSIS — H02.403 PTOSIS OF BOTH EYELIDS: ICD-10-CM

## 2020-10-21 PROCEDURE — 36415 COLL VENOUS BLD VENIPUNCTURE: CPT | Mod: PO

## 2020-10-21 PROCEDURE — 83519 RIA NONANTIBODY: CPT | Mod: 59

## 2020-10-21 PROCEDURE — 83520 IMMUNOASSAY QUANT NOS NONAB: CPT

## 2020-10-21 PROCEDURE — 83519 RIA NONANTIBODY: CPT

## 2020-10-22 ENCOUNTER — TELEPHONE (OUTPATIENT)
Dept: NEUROLOGY | Facility: CLINIC | Age: 65
End: 2020-10-22

## 2020-10-22 NOTE — TELEPHONE ENCOUNTER
rev'd swallow study. Swallow therapy recommended.  Spoke to pt. She is aware and understands.  She is interested but with COVID, she just does not feel comfortable going right now.   We will revisit this in a few months.  She understands that if she changes her mind or if she feels she is getting worse, she will let me know and I will order.

## 2020-10-27 LAB — MUSK ANTIBODY TEST: 0 NMOL/L (ref 0–0.02)

## 2020-10-27 RX ORDER — MELOXICAM 15 MG/1
15 TABLET ORAL DAILY
Status: ON HOLD | COMMUNITY
Start: 2020-09-16 | End: 2022-03-18 | Stop reason: HOSPADM

## 2020-10-28 RX ORDER — MELOXICAM 15 MG/1
15 TABLET ORAL DAILY
Qty: 30 TABLET | Refills: 1 | OUTPATIENT
Start: 2020-10-28

## 2020-11-05 ENCOUNTER — PATIENT MESSAGE (OUTPATIENT)
Dept: NEUROLOGY | Facility: CLINIC | Age: 65
End: 2020-11-05

## 2020-11-05 LAB
ACHR BIND AB SER-SCNC: 0 NMOL/L
ACHR MOD AB/ACHR TOTAL SFR SER: 0 %
MG INTERPRETIVE COMMENTS: NORMAL
STRIA MUS AB TITR SER: NEGATIVE TITER

## 2020-11-30 ENCOUNTER — LAB VISIT (OUTPATIENT)
Dept: LAB | Facility: HOSPITAL | Age: 65
End: 2020-11-30
Attending: NURSE PRACTITIONER
Payer: MEDICARE

## 2020-11-30 DIAGNOSIS — Z79.4 TYPE 2 DIABETES MELLITUS WITH DIABETIC NEPHROPATHY, WITH LONG-TERM CURRENT USE OF INSULIN: ICD-10-CM

## 2020-11-30 DIAGNOSIS — E11.21 TYPE 2 DIABETES MELLITUS WITH DIABETIC NEPHROPATHY, WITH LONG-TERM CURRENT USE OF INSULIN: ICD-10-CM

## 2020-11-30 LAB
25(OH)D3+25(OH)D2 SERPL-MCNC: 32 NG/ML (ref 30–96)
ALBUMIN SERPL BCP-MCNC: 4.3 G/DL (ref 3.5–5.2)
ALP SERPL-CCNC: 76 U/L (ref 55–135)
ALT SERPL W/O P-5'-P-CCNC: 8 U/L (ref 10–44)
ANION GAP SERPL CALC-SCNC: 14 MMOL/L (ref 8–16)
AST SERPL-CCNC: 16 U/L (ref 10–40)
BILIRUB SERPL-MCNC: 0.5 MG/DL (ref 0.1–1)
BUN SERPL-MCNC: 26 MG/DL (ref 8–23)
CALCIUM SERPL-MCNC: 9.8 MG/DL (ref 8.7–10.5)
CHLORIDE SERPL-SCNC: 101 MMOL/L (ref 95–110)
CHOLEST SERPL-MCNC: 293 MG/DL (ref 120–199)
CHOLEST/HDLC SERPL: 4.1 {RATIO} (ref 2–5)
CO2 SERPL-SCNC: 25 MMOL/L (ref 23–29)
CREAT SERPL-MCNC: 1.2 MG/DL (ref 0.5–1.4)
EST. GFR  (AFRICAN AMERICAN): 54.4 ML/MIN/1.73 M^2
EST. GFR  (NON AFRICAN AMERICAN): 47.2 ML/MIN/1.73 M^2
ESTIMATED AVG GLUCOSE: 137 MG/DL (ref 68–131)
GLUCOSE SERPL-MCNC: 173 MG/DL (ref 70–110)
HBA1C MFR BLD HPLC: 6.4 % (ref 4–5.6)
HDLC SERPL-MCNC: 72 MG/DL (ref 40–75)
HDLC SERPL: 24.6 % (ref 20–50)
LDLC SERPL CALC-MCNC: 178.4 MG/DL (ref 63–159)
NONHDLC SERPL-MCNC: 221 MG/DL
POTASSIUM SERPL-SCNC: 4.4 MMOL/L (ref 3.5–5.1)
PROT SERPL-MCNC: 7.8 G/DL (ref 6–8.4)
SODIUM SERPL-SCNC: 140 MMOL/L (ref 136–145)
TRIGL SERPL-MCNC: 213 MG/DL (ref 30–150)
TSH SERPL DL<=0.005 MIU/L-ACNC: 1.35 UIU/ML (ref 0.4–4)

## 2020-11-30 PROCEDURE — 80061 LIPID PANEL: CPT

## 2020-11-30 PROCEDURE — 36415 COLL VENOUS BLD VENIPUNCTURE: CPT | Mod: PO

## 2020-11-30 PROCEDURE — 83036 HEMOGLOBIN GLYCOSYLATED A1C: CPT

## 2020-11-30 PROCEDURE — 80053 COMPREHEN METABOLIC PANEL: CPT

## 2020-11-30 PROCEDURE — 82306 VITAMIN D 25 HYDROXY: CPT

## 2020-11-30 PROCEDURE — 84443 ASSAY THYROID STIM HORMONE: CPT

## 2020-12-04 ENCOUNTER — PATIENT MESSAGE (OUTPATIENT)
Dept: NEUROLOGY | Facility: CLINIC | Age: 65
End: 2020-12-04

## 2020-12-07 ENCOUNTER — TELEPHONE (OUTPATIENT)
Dept: PHARMACY | Facility: CLINIC | Age: 65
End: 2020-12-07

## 2020-12-07 ENCOUNTER — OFFICE VISIT (OUTPATIENT)
Dept: ENDOCRINOLOGY | Facility: CLINIC | Age: 65
End: 2020-12-07
Payer: MEDICARE

## 2020-12-07 VITALS
SYSTOLIC BLOOD PRESSURE: 138 MMHG | DIASTOLIC BLOOD PRESSURE: 80 MMHG | WEIGHT: 216.81 LBS | HEIGHT: 61 IN | BODY MASS INDEX: 40.93 KG/M2 | HEART RATE: 73 BPM

## 2020-12-07 DIAGNOSIS — E55.9 VITAMIN D DEFICIENCY: ICD-10-CM

## 2020-12-07 DIAGNOSIS — I10 BENIGN ESSENTIAL HTN: ICD-10-CM

## 2020-12-07 DIAGNOSIS — E66.01 OBESITY, MORBID: ICD-10-CM

## 2020-12-07 DIAGNOSIS — Z79.4 TYPE 2 DIABETES MELLITUS WITH STAGE 3A CHRONIC KIDNEY DISEASE, WITH LONG-TERM CURRENT USE OF INSULIN: ICD-10-CM

## 2020-12-07 DIAGNOSIS — E11.21 TYPE 2 DIABETES MELLITUS WITH DIABETIC NEPHROPATHY, WITH LONG-TERM CURRENT USE OF INSULIN: Primary | ICD-10-CM

## 2020-12-07 DIAGNOSIS — E89.0 POST-OPERATIVE HYPOTHYROIDISM: ICD-10-CM

## 2020-12-07 DIAGNOSIS — E11.22 TYPE 2 DIABETES MELLITUS WITH STAGE 3A CHRONIC KIDNEY DISEASE, WITH LONG-TERM CURRENT USE OF INSULIN: ICD-10-CM

## 2020-12-07 DIAGNOSIS — Z79.4 TYPE 2 DIABETES MELLITUS WITH DIABETIC NEPHROPATHY, WITH LONG-TERM CURRENT USE OF INSULIN: Primary | ICD-10-CM

## 2020-12-07 DIAGNOSIS — G20.A1 PARKINSON'S DISEASE: ICD-10-CM

## 2020-12-07 DIAGNOSIS — N18.31 TYPE 2 DIABETES MELLITUS WITH STAGE 3A CHRONIC KIDNEY DISEASE, WITH LONG-TERM CURRENT USE OF INSULIN: ICD-10-CM

## 2020-12-07 DIAGNOSIS — E78.2 MIXED HYPERLIPIDEMIA: ICD-10-CM

## 2020-12-07 DIAGNOSIS — E04.1 NODULAR THYROID DISEASE: ICD-10-CM

## 2020-12-07 PROCEDURE — 99999 PR PBB SHADOW E&M-EST. PATIENT-LVL IV: CPT | Mod: PBBFAC,,, | Performed by: NURSE PRACTITIONER

## 2020-12-07 PROCEDURE — 1101F PR PT FALLS ASSESS DOC 0-1 FALLS W/OUT INJ PAST YR: ICD-10-PCS | Mod: CPTII,S$GLB,, | Performed by: NURSE PRACTITIONER

## 2020-12-07 PROCEDURE — 99214 PR OFFICE/OUTPT VISIT, EST, LEVL IV, 30-39 MIN: ICD-10-PCS | Mod: S$GLB,,, | Performed by: NURSE PRACTITIONER

## 2020-12-07 PROCEDURE — 1101F PT FALLS ASSESS-DOCD LE1/YR: CPT | Mod: CPTII,S$GLB,, | Performed by: NURSE PRACTITIONER

## 2020-12-07 PROCEDURE — 3008F PR BODY MASS INDEX (BMI) DOCUMENTED: ICD-10-PCS | Mod: CPTII,S$GLB,, | Performed by: NURSE PRACTITIONER

## 2020-12-07 PROCEDURE — 3288F PR FALLS RISK ASSESSMENT DOCUMENTED: ICD-10-PCS | Mod: CPTII,S$GLB,, | Performed by: NURSE PRACTITIONER

## 2020-12-07 PROCEDURE — 99214 OFFICE O/P EST MOD 30 MIN: CPT | Mod: S$GLB,,, | Performed by: NURSE PRACTITIONER

## 2020-12-07 PROCEDURE — 1125F AMNT PAIN NOTED PAIN PRSNT: CPT | Mod: S$GLB,,, | Performed by: NURSE PRACTITIONER

## 2020-12-07 PROCEDURE — 3288F FALL RISK ASSESSMENT DOCD: CPT | Mod: CPTII,S$GLB,, | Performed by: NURSE PRACTITIONER

## 2020-12-07 PROCEDURE — 1125F PR PAIN SEVERITY QUANTIFIED, PAIN PRESENT: ICD-10-PCS | Mod: S$GLB,,, | Performed by: NURSE PRACTITIONER

## 2020-12-07 PROCEDURE — 99999 PR PBB SHADOW E&M-EST. PATIENT-LVL IV: ICD-10-PCS | Mod: PBBFAC,,, | Performed by: NURSE PRACTITIONER

## 2020-12-07 PROCEDURE — 3008F BODY MASS INDEX DOCD: CPT | Mod: CPTII,S$GLB,, | Performed by: NURSE PRACTITIONER

## 2020-12-07 RX ORDER — BLOOD-GLUCOSE,RECEIVER,CONT
EACH MISCELLANEOUS
Qty: 1 EACH | Refills: 0 | Status: SHIPPED | OUTPATIENT
Start: 2020-12-07 | End: 2020-12-07

## 2020-12-07 RX ORDER — BLOOD-GLUCOSE SENSOR
EACH MISCELLANEOUS
Qty: 3 DEVICE | Refills: 12 | Status: SHIPPED | OUTPATIENT
Start: 2020-12-07 | End: 2021-03-30

## 2020-12-07 RX ORDER — INSULIN LISPRO 100 [IU]/ML
30 INJECTION, SOLUTION INTRAVENOUS; SUBCUTANEOUS
Qty: 30 ML | Refills: 12 | Status: SHIPPED | OUTPATIENT
Start: 2020-12-07 | End: 2020-12-08 | Stop reason: SDUPTHER

## 2020-12-07 RX ORDER — PEN NEEDLE, DIABETIC 30 GX3/16"
NEEDLE, DISPOSABLE MISCELLANEOUS
Qty: 200 EACH | Refills: 12 | Status: SHIPPED | OUTPATIENT
Start: 2020-12-07 | End: 2022-06-01

## 2020-12-07 RX ORDER — BLOOD-GLUCOSE TRANSMITTER
EACH MISCELLANEOUS
Qty: 1 DEVICE | Refills: 3 | Status: SHIPPED | OUTPATIENT
Start: 2020-12-07 | End: 2023-04-10

## 2020-12-07 RX ORDER — CALCIUM CITRATE/VITAMIN D3 200MG-6.25
TABLET ORAL
Qty: 250 EACH | Refills: 12 | Status: SHIPPED | OUTPATIENT
Start: 2020-12-07 | End: 2021-06-21 | Stop reason: SDUPTHER

## 2020-12-07 RX ORDER — LEVOTHYROXINE SODIUM 137 UG/1
137 TABLET ORAL DAILY
Qty: 30 TABLET | Refills: 12 | Status: SHIPPED | OUTPATIENT
Start: 2020-12-07 | End: 2020-12-08 | Stop reason: SDUPTHER

## 2020-12-07 RX ORDER — INSULIN GLARGINE 100 [IU]/ML
INJECTION, SOLUTION SUBCUTANEOUS
Qty: 30 ML | Refills: 12
Start: 2020-12-07 | End: 2020-12-08 | Stop reason: SDUPTHER

## 2020-12-07 RX ORDER — LANCING DEVICE
EACH MISCELLANEOUS
Qty: 1 EACH | Refills: 0 | Status: SHIPPED | OUTPATIENT
Start: 2020-12-07

## 2020-12-07 RX ORDER — BLOOD-GLUCOSE,RECEIVER,CONT
EACH MISCELLANEOUS
Qty: 1 EACH | Refills: 0 | Status: SHIPPED | OUTPATIENT
Start: 2020-12-07 | End: 2021-03-30

## 2020-12-07 RX ORDER — BLOOD-GLUCOSE TRANSMITTER
EACH MISCELLANEOUS
Qty: 1 DEVICE | Refills: 3 | Status: SHIPPED | OUTPATIENT
Start: 2020-12-07 | End: 2020-12-07

## 2020-12-07 RX ORDER — BLOOD-GLUCOSE SENSOR
EACH MISCELLANEOUS
Qty: 3 DEVICE | Refills: 12 | Status: SHIPPED | OUTPATIENT
Start: 2020-12-07 | End: 2020-12-07

## 2020-12-07 NOTE — PROGRESS NOTES
CC: This 64 y.o. female presents for management of diabetes and chronic conditions pending review including HTN, HLP, morbid obesity, hypothyroidism     HPI: She was diagnosed with T2DM in ~ 2013. She was hospitalized r/t DM in 2016 for DKA.   Family hx of DM: mom, dad, and sisters     No acute events since her last visit  Checking her bg 4 times a day                 hypoglycemia at home-  Has had a few episodes w bg in 60s after taking full dose of lispro w salad for lunch   monitoring BG at home:  4 times a day  Diet: Eats 3  Meals a day, snacks - satsumas  Exercise: rides her bike (stationary) tries to do daily, 30 minutes  CURRENT DM MEDS:  lantus 35 u qam, 30 u qhs; Humalog 30 u AC  + correction 150/25  Timing prandial insulin 5-15 minutes before meals: before  Vial/pen:  Uses pens  Glucometer type:  [employer]    Standards of Care:  Eye exam: + mild DM retinopathy (Dr Ramirez)- 2/2020     Nodules was found on on a PET scan. FNA 11/25/14 shows adenomatous nodule with cystic changes. S/P thyroidectomy 5/24/16.       On synthroid 137 mcg once daily. Takes all alone with water 30-60 mins prior to first meal     Has history of myalgias with statin higher doses and intensity    Being treated for parkinsons. No falls     ROS:  Gen: Appetite good,  Weight gain 6 lbs, + fatigue and weakness.  Skin: Skin is intact and heals well, + BLE rash , no hair changes  Eyes: Denies visual disturbances  Resp: no SOB or ESPINAL, no cough  Cardiac: No palpitations, chest pain, trace BLE edema   GI: No nausea or vomiting, diarrhea, constipation, or abdominal pain.  /GYN: No nocturia, burning or pain.   MS/Neuro: Gait steady, speech clear, toes tingle at night  Psych: Denies drug/ETOH abuse, no hx of depression.  Other systems: negative.    PE:  GENERAL: Well developed, well nourished.  PSYCH: AAOx3, appropriate mood and affect, pleasant expression, conversant, appears relaxed, well groomed.   EYES: Conjunctiva, corneas clear  NECK: Supple,  trachea midline   ABDOMEN: Soft, non-tender, non-distended   VASCULAR: DP pulses +2/4 bilaterally,trace BLE edema.  NEURO: Gait steady  SKIN: Skin warm and dry no acanthosis nigracans.  FOOT EXAMINATION: 6/5/2020  No foot deformity, corns or callus formation,  nails in good condiiton and well trimmed, no interspace maceration or ulceration noted.  Decreased hair growth present over toes/feet.    Protective sensation intact with 10 gram monofilament.  +2 dorsalis pedis and posterior pulses noted.      Lab Results   Component Value Date    MICALBCREAT 24.3 11/30/2020       Hemoglobin A1C   Date Value Ref Range Status   11/30/2020 6.4 (H) 4.0 - 5.6 % Final     Comment:     ADA Screening Guidelines:  5.7-6.4%  Consistent with prediabetes  >or=6.5%  Consistent with diabetes  High levels of fetal hemoglobin interfere with the HbA1C  assay. Heterozygous hemoglobin variants (HbS, HgC, etc)do  not significantly interfere with this assay.   However, presence of multiple variants may affect accuracy.     05/29/2020 6.6 (H) 4.0 - 5.6 % Final     Comment:     ADA Screening Guidelines:  5.7-6.4%  Consistent with prediabetes  >or=6.5%  Consistent with diabetes  High levels of fetal hemoglobin interfere with the HbA1C  assay. Heterozygous hemoglobin variants (HbS, HgC, etc)do  not significantly interfere with this assay.   However, presence of multiple variants may affect accuracy.     11/26/2019 6.4 (H) 4.0 - 5.6 % Final     Comment:     ADA Screening Guidelines:  5.7-6.4%  Consistent with prediabetes  >or=6.5%  Consistent with diabetes  High levels of fetal hemoglobin interfere with the HbA1C  assay. Heterozygous hemoglobin variants (HbS, HgC, etc)do  not significantly interfere with this assay.   However, presence of multiple variants may affect accuracy.          ASSESSMENT and PLAN:    1. T2DM with nephropathy-     Continue checking bg 4 x's a day  Only taking 14 lispro when having salad for lunch   RX for Continuous Glucose  Monitor   -Recommend Dexcom  Continuous Glucose monitor                         Pt tests glucoses a minimum of 4 x a day.                          Pt would benefit from therapeutic continuous glucose monitor to be able                         to make frequent insulin adjustments, based on current glucoses.     2. HTN - controlled today, continue meds as previously prescribed and monitor.     3. HLP - on statin therapy, LFTs WNL. Myalgia with higher dose statin, fish oil  2 tabs bid    4. Post Surgical Hypothyroidism- For MNG. Pathology benign. Continue current dose of synthroid, lab at goal    5. Vitamin d deficiency - continue OTC replacement, at goal, check lab w RTC    6. Morbid obesity - portion control, resume exercise as tolerated, increases insulin resistance. Body mass index is 40.97 kg/m².    Follow-up: in 6 months with lab prior

## 2020-12-11 ENCOUNTER — PATIENT MESSAGE (OUTPATIENT)
Dept: OTHER | Facility: OTHER | Age: 65
End: 2020-12-11

## 2020-12-18 ENCOUNTER — TELEPHONE (OUTPATIENT)
Dept: ENDOCRINOLOGY | Facility: CLINIC | Age: 65
End: 2020-12-18

## 2020-12-18 NOTE — TELEPHONE ENCOUNTER
Spoke with pt and verified that pt does not want to cancel her Rx for glucometer and test strips just yet. Wants to find out how much a CGM would be first. If she can afford CGM then she will call us back to let us know to call [employer] (number below) to cancel her current glucose meter

## 2020-12-18 NOTE — TELEPHONE ENCOUNTER
----- Message from Dipak Cheatham sent at 12/18/2020  8:56 AM CST -----  Regarding: mayur with [facility] [employer]  Type: Needs Medical Advice    Who Called:  mayur with [employer]    Best Call Back Number: [phone]  Additional Information: member is deciding not to get equipment requested. Needs someone to call and verbally cancel order. Please call.

## 2021-01-29 ENCOUNTER — PATIENT MESSAGE (OUTPATIENT)
Dept: ADMINISTRATIVE | Facility: HOSPITAL | Age: 66
End: 2021-01-29

## 2021-02-09 ENCOUNTER — HOSPITAL ENCOUNTER (OUTPATIENT)
Dept: RADIOLOGY | Facility: HOSPITAL | Age: 66
Discharge: HOME | End: 2021-02-09
Attending: INTERNAL MEDICINE
Payer: COMMERCIAL

## 2021-02-09 DIAGNOSIS — R91.8 PULMONARY NODULES: ICD-10-CM

## 2021-02-09 DIAGNOSIS — Z85.110 HISTORY OF MALIGNANT CARCINOID TUMOR OF BRONCHUS AND LUNG: ICD-10-CM

## 2021-02-09 PROCEDURE — 71250 CT CHEST WITHOUT CONTRAST: ICD-10-PCS | Mod: 26,,, | Performed by: RADIOLOGY

## 2021-02-09 PROCEDURE — 71250 CT THORAX DX C-: CPT | Mod: TC,PO

## 2021-02-09 PROCEDURE — 71250 CT THORAX DX C-: CPT | Mod: 26,,, | Performed by: RADIOLOGY

## 2021-02-11 ENCOUNTER — PATIENT MESSAGE (OUTPATIENT)
Dept: ENDOCRINOLOGY | Facility: CLINIC | Age: 66
End: 2021-02-11

## 2021-02-11 DIAGNOSIS — Z79.4 TYPE 2 DIABETES MELLITUS WITH HYPERGLYCEMIA, WITH LONG-TERM CURRENT USE OF INSULIN: Primary | ICD-10-CM

## 2021-02-11 DIAGNOSIS — E11.65 TYPE 2 DIABETES MELLITUS WITH HYPERGLYCEMIA, WITH LONG-TERM CURRENT USE OF INSULIN: Primary | ICD-10-CM

## 2021-02-11 RX ORDER — INSULIN LISPRO 100 [IU]/ML
30 INJECTION, SOLUTION INTRAVENOUS; SUBCUTANEOUS
Qty: 30 ML | Refills: 11 | Status: SHIPPED | OUTPATIENT
Start: 2021-02-11 | End: 2021-02-17

## 2021-02-13 ENCOUNTER — PATIENT MESSAGE (OUTPATIENT)
Dept: ENDOCRINOLOGY | Facility: CLINIC | Age: 66
End: 2021-02-13

## 2021-02-13 DIAGNOSIS — E11.65 TYPE 2 DIABETES MELLITUS WITH HYPERGLYCEMIA, WITH LONG-TERM CURRENT USE OF INSULIN: ICD-10-CM

## 2021-02-13 DIAGNOSIS — Z79.4 TYPE 2 DIABETES MELLITUS WITH HYPERGLYCEMIA, WITH LONG-TERM CURRENT USE OF INSULIN: ICD-10-CM

## 2021-02-17 ENCOUNTER — TELEPHONE (OUTPATIENT)
Dept: ENDOCRINOLOGY | Facility: CLINIC | Age: 66
End: 2021-02-17

## 2021-02-17 DIAGNOSIS — Z79.4 TYPE 2 DIABETES MELLITUS WITH STAGE 3A CHRONIC KIDNEY DISEASE, WITH LONG-TERM CURRENT USE OF INSULIN: Primary | ICD-10-CM

## 2021-02-17 DIAGNOSIS — E11.65 TYPE 2 DIABETES MELLITUS WITH HYPERGLYCEMIA, WITH LONG-TERM CURRENT USE OF INSULIN: ICD-10-CM

## 2021-02-17 DIAGNOSIS — E11.22 TYPE 2 DIABETES MELLITUS WITH STAGE 3A CHRONIC KIDNEY DISEASE, WITH LONG-TERM CURRENT USE OF INSULIN: Primary | ICD-10-CM

## 2021-02-17 DIAGNOSIS — Z79.4 TYPE 2 DIABETES MELLITUS WITH HYPERGLYCEMIA, WITH LONG-TERM CURRENT USE OF INSULIN: ICD-10-CM

## 2021-02-17 DIAGNOSIS — N18.31 TYPE 2 DIABETES MELLITUS WITH STAGE 3A CHRONIC KIDNEY DISEASE, WITH LONG-TERM CURRENT USE OF INSULIN: Primary | ICD-10-CM

## 2021-02-17 RX ORDER — INSULIN LISPRO 100 [IU]/ML
30 INJECTION, SOLUTION INTRAVENOUS; SUBCUTANEOUS
Qty: 30 ML | Refills: 11 | Status: CANCELLED | OUTPATIENT
Start: 2021-02-17

## 2021-02-17 RX ORDER — INSULIN ASPART 100 [IU]/ML
INJECTION, SOLUTION INTRAVENOUS; SUBCUTANEOUS
Qty: 8 BOX | Refills: 4 | Status: SHIPPED | OUTPATIENT
Start: 2021-02-17 | End: 2021-03-01

## 2021-02-21 ENCOUNTER — PATIENT MESSAGE (OUTPATIENT)
Dept: NEUROLOGY | Facility: HOSPITAL | Age: 66
End: 2021-02-21

## 2021-02-22 ENCOUNTER — OFFICE VISIT (OUTPATIENT)
Dept: NEUROLOGY | Facility: CLINIC | Age: 66
End: 2021-02-22
Payer: COMMERCIAL

## 2021-02-22 VITALS
HEART RATE: 85 BPM | BODY MASS INDEX: 41.17 KG/M2 | DIASTOLIC BLOOD PRESSURE: 80 MMHG | SYSTOLIC BLOOD PRESSURE: 149 MMHG | HEIGHT: 61 IN | WEIGHT: 218.06 LBS

## 2021-02-22 DIAGNOSIS — E66.01 OBESITY, MORBID: ICD-10-CM

## 2021-02-22 DIAGNOSIS — G47.52 REM SLEEP BEHAVIOR DISORDER: ICD-10-CM

## 2021-02-22 DIAGNOSIS — G20.A1 PARKINSON'S DISEASE: Primary | ICD-10-CM

## 2021-02-22 DIAGNOSIS — E53.8 B12 DEFICIENCY: ICD-10-CM

## 2021-02-22 DIAGNOSIS — R13.10 DYSPHAGIA, UNSPECIFIED TYPE: ICD-10-CM

## 2021-02-22 DIAGNOSIS — G24.01 LEVODOPA-INDUCED DYSKINESIA: ICD-10-CM

## 2021-02-22 DIAGNOSIS — E89.0 POST-OPERATIVE HYPOTHYROIDISM: ICD-10-CM

## 2021-02-22 DIAGNOSIS — T42.8X5A LEVODOPA-INDUCED DYSKINESIA: ICD-10-CM

## 2021-02-22 DIAGNOSIS — R53.83 FATIGUE, UNSPECIFIED TYPE: ICD-10-CM

## 2021-02-22 PROCEDURE — 1159F PR MEDICATION LIST DOCUMENTED IN MEDICAL RECORD: ICD-10-PCS | Mod: S$GLB,,, | Performed by: NURSE PRACTITIONER

## 2021-02-22 PROCEDURE — 3079F PR MOST RECENT DIASTOLIC BLOOD PRESSURE 80-89 MM HG: ICD-10-PCS | Mod: CPTII,S$GLB,, | Performed by: NURSE PRACTITIONER

## 2021-02-22 PROCEDURE — 1125F PR PAIN SEVERITY QUANTIFIED, PAIN PRESENT: ICD-10-PCS | Mod: S$GLB,,, | Performed by: NURSE PRACTITIONER

## 2021-02-22 PROCEDURE — 3008F PR BODY MASS INDEX (BMI) DOCUMENTED: ICD-10-PCS | Mod: CPTII,S$GLB,, | Performed by: NURSE PRACTITIONER

## 2021-02-22 PROCEDURE — 99215 OFFICE O/P EST HI 40 MIN: CPT | Mod: S$GLB,,, | Performed by: NURSE PRACTITIONER

## 2021-02-22 PROCEDURE — 99499 UNLISTED E&M SERVICE: CPT | Mod: S$GLB,,, | Performed by: NURSE PRACTITIONER

## 2021-02-22 PROCEDURE — 3077F SYST BP >= 140 MM HG: CPT | Mod: CPTII,S$GLB,, | Performed by: NURSE PRACTITIONER

## 2021-02-22 PROCEDURE — 3008F BODY MASS INDEX DOCD: CPT | Mod: CPTII,S$GLB,, | Performed by: NURSE PRACTITIONER

## 2021-02-22 PROCEDURE — 99499 RISK ADDL DX/OHS AUDIT: ICD-10-PCS | Mod: S$GLB,,, | Performed by: NURSE PRACTITIONER

## 2021-02-22 PROCEDURE — 1159F MED LIST DOCD IN RCRD: CPT | Mod: S$GLB,,, | Performed by: NURSE PRACTITIONER

## 2021-02-22 PROCEDURE — 3079F DIAST BP 80-89 MM HG: CPT | Mod: CPTII,S$GLB,, | Performed by: NURSE PRACTITIONER

## 2021-02-22 PROCEDURE — 99999 PR PBB SHADOW E&M-EST. PATIENT-LVL V: ICD-10-PCS | Mod: PBBFAC,,, | Performed by: NURSE PRACTITIONER

## 2021-02-22 PROCEDURE — 1125F AMNT PAIN NOTED PAIN PRSNT: CPT | Mod: S$GLB,,, | Performed by: NURSE PRACTITIONER

## 2021-02-22 PROCEDURE — 99215 PR OFFICE/OUTPT VISIT, EST, LEVL V, 40-54 MIN: ICD-10-PCS | Mod: S$GLB,,, | Performed by: NURSE PRACTITIONER

## 2021-02-22 PROCEDURE — 3077F PR MOST RECENT SYSTOLIC BLOOD PRESSURE >= 140 MM HG: ICD-10-PCS | Mod: CPTII,S$GLB,, | Performed by: NURSE PRACTITIONER

## 2021-02-22 PROCEDURE — 99999 PR PBB SHADOW E&M-EST. PATIENT-LVL V: CPT | Mod: PBBFAC,,, | Performed by: NURSE PRACTITIONER

## 2021-02-22 RX ORDER — IBUPROFEN 200 MG
200 TABLET ORAL 2 TIMES DAILY
Status: ON HOLD | COMMUNITY
End: 2022-03-18 | Stop reason: HOSPADM

## 2021-03-01 ENCOUNTER — PATIENT MESSAGE (OUTPATIENT)
Dept: ENDOCRINOLOGY | Facility: CLINIC | Age: 66
End: 2021-03-01

## 2021-03-01 ENCOUNTER — TELEPHONE (OUTPATIENT)
Dept: ENDOCRINOLOGY | Facility: CLINIC | Age: 66
End: 2021-03-01

## 2021-03-01 DIAGNOSIS — N18.31 TYPE 2 DIABETES MELLITUS WITH STAGE 3A CHRONIC KIDNEY DISEASE, WITH LONG-TERM CURRENT USE OF INSULIN: Primary | ICD-10-CM

## 2021-03-01 DIAGNOSIS — E11.22 TYPE 2 DIABETES MELLITUS WITH STAGE 3A CHRONIC KIDNEY DISEASE, WITH LONG-TERM CURRENT USE OF INSULIN: Primary | ICD-10-CM

## 2021-03-01 DIAGNOSIS — Z79.4 TYPE 2 DIABETES MELLITUS WITH STAGE 3A CHRONIC KIDNEY DISEASE, WITH LONG-TERM CURRENT USE OF INSULIN: Primary | ICD-10-CM

## 2021-03-01 RX ORDER — INSULIN ASPART 100 [IU]/ML
INJECTION, SOLUTION INTRAVENOUS; SUBCUTANEOUS
Qty: 8 BOX | Refills: 4 | Status: SHIPPED | OUTPATIENT
Start: 2021-03-01 | End: 2021-03-30

## 2021-03-05 ENCOUNTER — LAB VISIT (OUTPATIENT)
Dept: LAB | Facility: HOSPITAL | Age: 66
End: 2021-03-05
Attending: NURSE PRACTITIONER
Payer: MEDICARE

## 2021-03-05 DIAGNOSIS — E53.8 B12 DEFICIENCY: ICD-10-CM

## 2021-03-05 PROCEDURE — 36415 COLL VENOUS BLD VENIPUNCTURE: CPT | Mod: PO | Performed by: NURSE PRACTITIONER

## 2021-03-05 PROCEDURE — 82746 ASSAY OF FOLIC ACID SERUM: CPT | Performed by: NURSE PRACTITIONER

## 2021-03-05 PROCEDURE — 82607 VITAMIN B-12: CPT | Performed by: NURSE PRACTITIONER

## 2021-03-06 LAB
FOLATE SERPL-MCNC: 9.2 NG/ML (ref 4–24)
VIT B12 SERPL-MCNC: 368 PG/ML (ref 210–950)

## 2021-03-08 DIAGNOSIS — E11.65 TYPE 2 DIABETES MELLITUS WITH HYPERGLYCEMIA, WITH LONG-TERM CURRENT USE OF INSULIN: Primary | ICD-10-CM

## 2021-03-08 DIAGNOSIS — Z79.4 TYPE 2 DIABETES MELLITUS WITH HYPERGLYCEMIA, WITH LONG-TERM CURRENT USE OF INSULIN: Primary | ICD-10-CM

## 2021-03-08 RX ORDER — INSULIN LISPRO 100 [IU]/ML
INJECTION, SOLUTION INTRAVENOUS; SUBCUTANEOUS
Qty: 8 BOX | Refills: 3 | Status: SHIPPED | OUTPATIENT
Start: 2021-03-08 | End: 2021-03-30

## 2021-03-09 ENCOUNTER — PATIENT MESSAGE (OUTPATIENT)
Dept: NEUROLOGY | Facility: CLINIC | Age: 66
End: 2021-03-09

## 2021-03-24 ENCOUNTER — TELEPHONE (OUTPATIENT)
Dept: NEUROLOGY | Facility: HOSPITAL | Age: 66
End: 2021-03-24

## 2021-03-26 ENCOUNTER — OFFICE VISIT (OUTPATIENT)
Dept: NEUROLOGY | Facility: HOSPITAL | Age: 66
End: 2021-03-26
Attending: SURGERY
Payer: COMMERCIAL

## 2021-03-26 DIAGNOSIS — R91.1 SOLITARY PULMONARY NODULE: ICD-10-CM

## 2021-05-31 ENCOUNTER — LAB VISIT (OUTPATIENT)
Dept: LAB | Facility: HOSPITAL | Age: 66
End: 2021-05-31
Attending: NURSE PRACTITIONER
Payer: MEDICARE

## 2021-05-31 ENCOUNTER — OFFICE VISIT (OUTPATIENT)
Dept: FAMILY MEDICINE | Facility: CLINIC | Age: 66
End: 2021-05-31
Payer: MEDICARE

## 2021-05-31 VITALS
WEIGHT: 217.94 LBS | TEMPERATURE: 98 F | HEIGHT: 61 IN | DIASTOLIC BLOOD PRESSURE: 79 MMHG | BODY MASS INDEX: 41.15 KG/M2 | SYSTOLIC BLOOD PRESSURE: 136 MMHG | HEART RATE: 94 BPM

## 2021-05-31 DIAGNOSIS — E89.0 POST-OPERATIVE HYPOTHYROIDISM: ICD-10-CM

## 2021-05-31 DIAGNOSIS — M25.561 CHRONIC PAIN OF RIGHT KNEE: Primary | ICD-10-CM

## 2021-05-31 DIAGNOSIS — N18.31 TYPE 2 DIABETES MELLITUS WITH STAGE 3A CHRONIC KIDNEY DISEASE, WITH LONG-TERM CURRENT USE OF INSULIN: ICD-10-CM

## 2021-05-31 DIAGNOSIS — Z78.9 STATIN INTOLERANCE: ICD-10-CM

## 2021-05-31 DIAGNOSIS — I10 BENIGN ESSENTIAL HTN: ICD-10-CM

## 2021-05-31 DIAGNOSIS — E11.21 TYPE 2 DIABETES MELLITUS WITH DIABETIC NEPHROPATHY, WITH LONG-TERM CURRENT USE OF INSULIN: ICD-10-CM

## 2021-05-31 DIAGNOSIS — G20.A1 PARKINSON'S DISEASE: ICD-10-CM

## 2021-05-31 DIAGNOSIS — Z79.4 TYPE 2 DIABETES MELLITUS WITH STAGE 3A CHRONIC KIDNEY DISEASE, WITH LONG-TERM CURRENT USE OF INSULIN: ICD-10-CM

## 2021-05-31 DIAGNOSIS — M54.16 LUMBAR RADICULOPATHY: ICD-10-CM

## 2021-05-31 DIAGNOSIS — Z79.4 TYPE 2 DIABETES MELLITUS WITH DIABETIC NEPHROPATHY, WITH LONG-TERM CURRENT USE OF INSULIN: ICD-10-CM

## 2021-05-31 DIAGNOSIS — Z85.110 HISTORY OF MALIGNANT CARCINOID TUMOR OF BRONCHUS AND LUNG: ICD-10-CM

## 2021-05-31 DIAGNOSIS — G89.29 CHRONIC PAIN OF RIGHT KNEE: Primary | ICD-10-CM

## 2021-05-31 DIAGNOSIS — E11.22 TYPE 2 DIABETES MELLITUS WITH STAGE 3A CHRONIC KIDNEY DISEASE, WITH LONG-TERM CURRENT USE OF INSULIN: ICD-10-CM

## 2021-05-31 DIAGNOSIS — N18.31 STAGE 3A CHRONIC KIDNEY DISEASE: ICD-10-CM

## 2021-05-31 DIAGNOSIS — Z12.11 COLON CANCER SCREENING: ICD-10-CM

## 2021-05-31 DIAGNOSIS — E78.2 MIXED HYPERLIPIDEMIA: ICD-10-CM

## 2021-05-31 LAB
ESTIMATED AVG GLUCOSE: 146 MG/DL (ref 68–131)
HBA1C MFR BLD: 6.7 % (ref 4–5.6)

## 2021-05-31 PROCEDURE — 1101F PR PT FALLS ASSESS DOC 0-1 FALLS W/OUT INJ PAST YR: ICD-10-PCS | Mod: CPTII,S$GLB,, | Performed by: INTERNAL MEDICINE

## 2021-05-31 PROCEDURE — 99499 UNLISTED E&M SERVICE: CPT | Mod: S$GLB,,, | Performed by: INTERNAL MEDICINE

## 2021-05-31 PROCEDURE — 99999 PR PBB SHADOW E&M-EST. PATIENT-LVL V: ICD-10-PCS | Mod: PBBFAC,,, | Performed by: INTERNAL MEDICINE

## 2021-05-31 PROCEDURE — 99499 RISK ADDL DX/OHS AUDIT: ICD-10-PCS | Mod: S$GLB,,, | Performed by: INTERNAL MEDICINE

## 2021-05-31 PROCEDURE — 1101F PT FALLS ASSESS-DOCD LE1/YR: CPT | Mod: CPTII,S$GLB,, | Performed by: INTERNAL MEDICINE

## 2021-05-31 PROCEDURE — 36415 COLL VENOUS BLD VENIPUNCTURE: CPT | Mod: PO | Performed by: NURSE PRACTITIONER

## 2021-05-31 PROCEDURE — 3288F PR FALLS RISK ASSESSMENT DOCUMENTED: ICD-10-PCS | Mod: CPTII,S$GLB,, | Performed by: INTERNAL MEDICINE

## 2021-05-31 PROCEDURE — 1125F PR PAIN SEVERITY QUANTIFIED, PAIN PRESENT: ICD-10-PCS | Mod: S$GLB,,, | Performed by: INTERNAL MEDICINE

## 2021-05-31 PROCEDURE — 3288F FALL RISK ASSESSMENT DOCD: CPT | Mod: CPTII,S$GLB,, | Performed by: INTERNAL MEDICINE

## 2021-05-31 PROCEDURE — 1125F AMNT PAIN NOTED PAIN PRSNT: CPT | Mod: S$GLB,,, | Performed by: INTERNAL MEDICINE

## 2021-05-31 PROCEDURE — 99999 PR PBB SHADOW E&M-EST. PATIENT-LVL V: CPT | Mod: PBBFAC,,, | Performed by: INTERNAL MEDICINE

## 2021-05-31 PROCEDURE — 99214 OFFICE O/P EST MOD 30 MIN: CPT | Mod: S$GLB,,, | Performed by: INTERNAL MEDICINE

## 2021-05-31 PROCEDURE — 1159F PR MEDICATION LIST DOCUMENTED IN MEDICAL RECORD: ICD-10-PCS | Mod: S$GLB,,, | Performed by: INTERNAL MEDICINE

## 2021-05-31 PROCEDURE — 3008F BODY MASS INDEX DOCD: CPT | Mod: CPTII,S$GLB,, | Performed by: INTERNAL MEDICINE

## 2021-05-31 PROCEDURE — 83036 HEMOGLOBIN GLYCOSYLATED A1C: CPT | Performed by: NURSE PRACTITIONER

## 2021-05-31 PROCEDURE — 1159F MED LIST DOCD IN RCRD: CPT | Mod: S$GLB,,, | Performed by: INTERNAL MEDICINE

## 2021-05-31 PROCEDURE — 99214 PR OFFICE/OUTPT VISIT, EST, LEVL IV, 30-39 MIN: ICD-10-PCS | Mod: S$GLB,,, | Performed by: INTERNAL MEDICINE

## 2021-05-31 PROCEDURE — 3008F PR BODY MASS INDEX (BMI) DOCUMENTED: ICD-10-PCS | Mod: CPTII,S$GLB,, | Performed by: INTERNAL MEDICINE

## 2021-05-31 RX ORDER — GABAPENTIN 100 MG/1
100 CAPSULE ORAL 3 TIMES DAILY
Qty: 90 CAPSULE | Refills: 11 | Status: SHIPPED | OUTPATIENT
Start: 2021-05-31 | End: 2022-06-30

## 2021-05-31 RX ORDER — INSULIN ASPART 100 [IU]/ML
30 INJECTION, SOLUTION INTRAVENOUS; SUBCUTANEOUS
COMMUNITY
Start: 2021-05-19 | End: 2022-03-02

## 2021-06-01 PROBLEM — N18.31 STAGE 3A CHRONIC KIDNEY DISEASE: Status: ACTIVE | Noted: 2021-06-01

## 2021-06-01 PROBLEM — Z78.9 STATIN INTOLERANCE: Status: ACTIVE | Noted: 2021-06-01

## 2021-06-02 ENCOUNTER — PATIENT OUTREACH (OUTPATIENT)
Dept: ADMINISTRATIVE | Facility: OTHER | Age: 66
End: 2021-06-02

## 2021-06-04 ENCOUNTER — TELEPHONE (OUTPATIENT)
Dept: PAIN MEDICINE | Facility: CLINIC | Age: 66
End: 2021-06-04

## 2021-06-04 ENCOUNTER — OFFICE VISIT (OUTPATIENT)
Dept: ORTHOPEDICS | Facility: CLINIC | Age: 66
End: 2021-06-04
Payer: MEDICARE

## 2021-06-04 VITALS — WEIGHT: 217 LBS | HEIGHT: 61 IN | BODY MASS INDEX: 40.97 KG/M2

## 2021-06-04 DIAGNOSIS — M25.561 CHRONIC PAIN OF RIGHT KNEE: Primary | ICD-10-CM

## 2021-06-04 DIAGNOSIS — G89.29 CHRONIC PAIN OF RIGHT KNEE: Primary | ICD-10-CM

## 2021-06-04 DIAGNOSIS — N18.31 TYPE 2 DIABETES MELLITUS WITH STAGE 3A CHRONIC KIDNEY DISEASE, WITH LONG-TERM CURRENT USE OF INSULIN: ICD-10-CM

## 2021-06-04 DIAGNOSIS — Z79.4 TYPE 2 DIABETES MELLITUS WITH DIABETIC NEPHROPATHY, WITH LONG-TERM CURRENT USE OF INSULIN: ICD-10-CM

## 2021-06-04 DIAGNOSIS — E11.22 TYPE 2 DIABETES MELLITUS WITH STAGE 3A CHRONIC KIDNEY DISEASE, WITH LONG-TERM CURRENT USE OF INSULIN: ICD-10-CM

## 2021-06-04 DIAGNOSIS — M54.9 BACK PAIN, UNSPECIFIED BACK LOCATION, UNSPECIFIED BACK PAIN LATERALITY, UNSPECIFIED CHRONICITY: Primary | ICD-10-CM

## 2021-06-04 DIAGNOSIS — Z79.4 TYPE 2 DIABETES MELLITUS WITH STAGE 3A CHRONIC KIDNEY DISEASE, WITH LONG-TERM CURRENT USE OF INSULIN: ICD-10-CM

## 2021-06-04 DIAGNOSIS — E11.21 TYPE 2 DIABETES MELLITUS WITH DIABETIC NEPHROPATHY, WITH LONG-TERM CURRENT USE OF INSULIN: ICD-10-CM

## 2021-06-04 PROCEDURE — 99999 PR PBB SHADOW E&M-EST. PATIENT-LVL V: CPT | Mod: PBBFAC,,, | Performed by: ORTHOPAEDIC SURGERY

## 2021-06-04 PROCEDURE — 1101F PR PT FALLS ASSESS DOC 0-1 FALLS W/OUT INJ PAST YR: ICD-10-PCS | Mod: CPTII,S$GLB,, | Performed by: ORTHOPAEDIC SURGERY

## 2021-06-04 PROCEDURE — 1101F PT FALLS ASSESS-DOCD LE1/YR: CPT | Mod: CPTII,S$GLB,, | Performed by: ORTHOPAEDIC SURGERY

## 2021-06-04 PROCEDURE — 3288F FALL RISK ASSESSMENT DOCD: CPT | Mod: CPTII,S$GLB,, | Performed by: ORTHOPAEDIC SURGERY

## 2021-06-04 PROCEDURE — 1125F AMNT PAIN NOTED PAIN PRSNT: CPT | Mod: S$GLB,,, | Performed by: ORTHOPAEDIC SURGERY

## 2021-06-04 PROCEDURE — 3288F PR FALLS RISK ASSESSMENT DOCUMENTED: ICD-10-PCS | Mod: CPTII,S$GLB,, | Performed by: ORTHOPAEDIC SURGERY

## 2021-06-04 PROCEDURE — 99204 PR OFFICE/OUTPT VISIT, NEW, LEVL IV, 45-59 MIN: ICD-10-PCS | Mod: 25,S$GLB,, | Performed by: ORTHOPAEDIC SURGERY

## 2021-06-04 PROCEDURE — 1159F PR MEDICATION LIST DOCUMENTED IN MEDICAL RECORD: ICD-10-PCS | Mod: S$GLB,,, | Performed by: ORTHOPAEDIC SURGERY

## 2021-06-04 PROCEDURE — 1125F PR PAIN SEVERITY QUANTIFIED, PAIN PRESENT: ICD-10-PCS | Mod: S$GLB,,, | Performed by: ORTHOPAEDIC SURGERY

## 2021-06-04 PROCEDURE — 3008F PR BODY MASS INDEX (BMI) DOCUMENTED: ICD-10-PCS | Mod: CPTII,S$GLB,, | Performed by: ORTHOPAEDIC SURGERY

## 2021-06-04 PROCEDURE — 99999 PR PBB SHADOW E&M-EST. PATIENT-LVL V: ICD-10-PCS | Mod: PBBFAC,,, | Performed by: ORTHOPAEDIC SURGERY

## 2021-06-04 PROCEDURE — 3008F BODY MASS INDEX DOCD: CPT | Mod: CPTII,S$GLB,, | Performed by: ORTHOPAEDIC SURGERY

## 2021-06-04 PROCEDURE — 20610 DRAIN/INJ JOINT/BURSA W/O US: CPT | Mod: RT,S$GLB,, | Performed by: ORTHOPAEDIC SURGERY

## 2021-06-04 PROCEDURE — 99204 OFFICE O/P NEW MOD 45 MIN: CPT | Mod: 25,S$GLB,, | Performed by: ORTHOPAEDIC SURGERY

## 2021-06-04 PROCEDURE — 20610 LARGE JOINT ASPIRATION/INJECTION: R KNEE: ICD-10-PCS | Mod: RT,S$GLB,, | Performed by: ORTHOPAEDIC SURGERY

## 2021-06-04 PROCEDURE — 1159F MED LIST DOCD IN RCRD: CPT | Mod: S$GLB,,, | Performed by: ORTHOPAEDIC SURGERY

## 2021-06-04 RX ORDER — TRIAMCINOLONE ACETONIDE 40 MG/ML
40 INJECTION, SUSPENSION INTRA-ARTICULAR; INTRAMUSCULAR
Status: DISCONTINUED | OUTPATIENT
Start: 2021-06-04 | End: 2021-06-04 | Stop reason: HOSPADM

## 2021-06-04 RX ADMIN — TRIAMCINOLONE ACETONIDE 40 MG: 40 INJECTION, SUSPENSION INTRA-ARTICULAR; INTRAMUSCULAR at 09:06

## 2021-06-07 ENCOUNTER — OFFICE VISIT (OUTPATIENT)
Dept: ENDOCRINOLOGY | Facility: CLINIC | Age: 66
End: 2021-06-07
Payer: MEDICARE

## 2021-06-07 ENCOUNTER — TELEPHONE (OUTPATIENT)
Dept: SPINE | Facility: CLINIC | Age: 66
End: 2021-06-07

## 2021-06-07 VITALS
SYSTOLIC BLOOD PRESSURE: 122 MMHG | HEIGHT: 61 IN | HEART RATE: 80 BPM | BODY MASS INDEX: 40.5 KG/M2 | RESPIRATION RATE: 18 BRPM | WEIGHT: 214.5 LBS | DIASTOLIC BLOOD PRESSURE: 68 MMHG

## 2021-06-07 DIAGNOSIS — E78.2 MIXED HYPERLIPIDEMIA: ICD-10-CM

## 2021-06-07 DIAGNOSIS — E11.22 TYPE 2 DIABETES MELLITUS WITH STAGE 3A CHRONIC KIDNEY DISEASE, WITH LONG-TERM CURRENT USE OF INSULIN: Primary | ICD-10-CM

## 2021-06-07 DIAGNOSIS — E55.9 VITAMIN D DEFICIENCY: ICD-10-CM

## 2021-06-07 DIAGNOSIS — E11.21 TYPE 2 DIABETES MELLITUS WITH DIABETIC NEPHROPATHY, WITH LONG-TERM CURRENT USE OF INSULIN: ICD-10-CM

## 2021-06-07 DIAGNOSIS — E66.01 OBESITY, MORBID: ICD-10-CM

## 2021-06-07 DIAGNOSIS — Z78.9 STATIN INTOLERANCE: ICD-10-CM

## 2021-06-07 DIAGNOSIS — N18.31 TYPE 2 DIABETES MELLITUS WITH STAGE 3A CHRONIC KIDNEY DISEASE, WITH LONG-TERM CURRENT USE OF INSULIN: Primary | ICD-10-CM

## 2021-06-07 DIAGNOSIS — E89.0 POST-OPERATIVE HYPOTHYROIDISM: ICD-10-CM

## 2021-06-07 DIAGNOSIS — I10 BENIGN ESSENTIAL HTN: ICD-10-CM

## 2021-06-07 DIAGNOSIS — Z79.4 TYPE 2 DIABETES MELLITUS WITH STAGE 3A CHRONIC KIDNEY DISEASE, WITH LONG-TERM CURRENT USE OF INSULIN: Primary | ICD-10-CM

## 2021-06-07 DIAGNOSIS — Z79.4 TYPE 2 DIABETES MELLITUS WITH DIABETIC NEPHROPATHY, WITH LONG-TERM CURRENT USE OF INSULIN: ICD-10-CM

## 2021-06-07 PROCEDURE — 3288F PR FALLS RISK ASSESSMENT DOCUMENTED: ICD-10-PCS | Mod: CPTII,S$GLB,, | Performed by: NURSE PRACTITIONER

## 2021-06-07 PROCEDURE — 99214 OFFICE O/P EST MOD 30 MIN: CPT | Mod: S$GLB,,, | Performed by: NURSE PRACTITIONER

## 2021-06-07 PROCEDURE — 1101F PR PT FALLS ASSESS DOC 0-1 FALLS W/OUT INJ PAST YR: ICD-10-PCS | Mod: CPTII,S$GLB,, | Performed by: NURSE PRACTITIONER

## 2021-06-07 PROCEDURE — 3044F HG A1C LEVEL LT 7.0%: CPT | Mod: CPTII,S$GLB,, | Performed by: NURSE PRACTITIONER

## 2021-06-07 PROCEDURE — 3008F BODY MASS INDEX DOCD: CPT | Mod: CPTII,S$GLB,, | Performed by: NURSE PRACTITIONER

## 2021-06-07 PROCEDURE — 3008F PR BODY MASS INDEX (BMI) DOCUMENTED: ICD-10-PCS | Mod: CPTII,S$GLB,, | Performed by: NURSE PRACTITIONER

## 2021-06-07 PROCEDURE — 3288F FALL RISK ASSESSMENT DOCD: CPT | Mod: CPTII,S$GLB,, | Performed by: NURSE PRACTITIONER

## 2021-06-07 PROCEDURE — 99214 PR OFFICE/OUTPT VISIT, EST, LEVL IV, 30-39 MIN: ICD-10-PCS | Mod: S$GLB,,, | Performed by: NURSE PRACTITIONER

## 2021-06-07 PROCEDURE — 99999 PR PBB SHADOW E&M-EST. PATIENT-LVL V: CPT | Mod: PBBFAC,,, | Performed by: NURSE PRACTITIONER

## 2021-06-07 PROCEDURE — 1125F PR PAIN SEVERITY QUANTIFIED, PAIN PRESENT: ICD-10-PCS | Mod: S$GLB,,, | Performed by: NURSE PRACTITIONER

## 2021-06-07 PROCEDURE — 99999 PR PBB SHADOW E&M-EST. PATIENT-LVL V: ICD-10-PCS | Mod: PBBFAC,,, | Performed by: NURSE PRACTITIONER

## 2021-06-07 PROCEDURE — 1125F AMNT PAIN NOTED PAIN PRSNT: CPT | Mod: S$GLB,,, | Performed by: NURSE PRACTITIONER

## 2021-06-07 PROCEDURE — 1101F PT FALLS ASSESS-DOCD LE1/YR: CPT | Mod: CPTII,S$GLB,, | Performed by: NURSE PRACTITIONER

## 2021-06-07 PROCEDURE — 3044F PR MOST RECENT HEMOGLOBIN A1C LEVEL <7.0%: ICD-10-PCS | Mod: CPTII,S$GLB,, | Performed by: NURSE PRACTITIONER

## 2021-06-14 ENCOUNTER — PROCEDURE VISIT (OUTPATIENT)
Dept: DIABETES | Facility: CLINIC | Age: 66
End: 2021-06-14
Payer: MEDICARE

## 2021-06-14 DIAGNOSIS — N18.31 TYPE 2 DIABETES MELLITUS WITH STAGE 3A CHRONIC KIDNEY DISEASE, WITH LONG-TERM CURRENT USE OF INSULIN: Primary | ICD-10-CM

## 2021-06-14 DIAGNOSIS — Z79.4 TYPE 2 DIABETES MELLITUS WITH STAGE 3A CHRONIC KIDNEY DISEASE, WITH LONG-TERM CURRENT USE OF INSULIN: Primary | ICD-10-CM

## 2021-06-14 DIAGNOSIS — E11.22 TYPE 2 DIABETES MELLITUS WITH STAGE 3A CHRONIC KIDNEY DISEASE, WITH LONG-TERM CURRENT USE OF INSULIN: Primary | ICD-10-CM

## 2021-06-15 ENCOUNTER — OFFICE VISIT (OUTPATIENT)
Dept: SPINE | Facility: CLINIC | Age: 66
End: 2021-06-15
Payer: MEDICARE

## 2021-06-15 VITALS
DIASTOLIC BLOOD PRESSURE: 77 MMHG | HEART RATE: 81 BPM | SYSTOLIC BLOOD PRESSURE: 140 MMHG | WEIGHT: 212.88 LBS | BODY MASS INDEX: 40.19 KG/M2 | HEIGHT: 61 IN

## 2021-06-15 DIAGNOSIS — M54.50 CHRONIC BILATERAL LOW BACK PAIN WITHOUT SCIATICA: Primary | ICD-10-CM

## 2021-06-15 DIAGNOSIS — G89.29 CHRONIC BILATERAL LOW BACK PAIN WITHOUT SCIATICA: Primary | ICD-10-CM

## 2021-06-15 DIAGNOSIS — G57.11 MERALGIA PARESTHETICA OF RIGHT SIDE: ICD-10-CM

## 2021-06-15 DIAGNOSIS — M54.9 BACK PAIN, UNSPECIFIED BACK LOCATION, UNSPECIFIED BACK PAIN LATERALITY, UNSPECIFIED CHRONICITY: ICD-10-CM

## 2021-06-15 PROCEDURE — 99204 PR OFFICE/OUTPT VISIT, NEW, LEVL IV, 45-59 MIN: ICD-10-PCS | Mod: S$GLB,,, | Performed by: PHYSICIAN ASSISTANT

## 2021-06-15 PROCEDURE — 99204 OFFICE O/P NEW MOD 45 MIN: CPT | Mod: S$GLB,,, | Performed by: PHYSICIAN ASSISTANT

## 2021-06-15 PROCEDURE — 3008F BODY MASS INDEX DOCD: CPT | Mod: CPTII,S$GLB,, | Performed by: PHYSICIAN ASSISTANT

## 2021-06-15 PROCEDURE — 1125F AMNT PAIN NOTED PAIN PRSNT: CPT | Mod: S$GLB,,, | Performed by: PHYSICIAN ASSISTANT

## 2021-06-15 PROCEDURE — 1125F PR PAIN SEVERITY QUANTIFIED, PAIN PRESENT: ICD-10-PCS | Mod: S$GLB,,, | Performed by: PHYSICIAN ASSISTANT

## 2021-06-15 PROCEDURE — 1101F PR PT FALLS ASSESS DOC 0-1 FALLS W/OUT INJ PAST YR: ICD-10-PCS | Mod: CPTII,S$GLB,, | Performed by: PHYSICIAN ASSISTANT

## 2021-06-15 PROCEDURE — 1159F PR MEDICATION LIST DOCUMENTED IN MEDICAL RECORD: ICD-10-PCS | Mod: S$GLB,,, | Performed by: PHYSICIAN ASSISTANT

## 2021-06-15 PROCEDURE — 3288F FALL RISK ASSESSMENT DOCD: CPT | Mod: CPTII,S$GLB,, | Performed by: PHYSICIAN ASSISTANT

## 2021-06-15 PROCEDURE — 3008F PR BODY MASS INDEX (BMI) DOCUMENTED: ICD-10-PCS | Mod: CPTII,S$GLB,, | Performed by: PHYSICIAN ASSISTANT

## 2021-06-15 PROCEDURE — 99999 PR PBB SHADOW E&M-EST. PATIENT-LVL V: CPT | Mod: PBBFAC,,, | Performed by: PHYSICIAN ASSISTANT

## 2021-06-15 PROCEDURE — 99999 PR PBB SHADOW E&M-EST. PATIENT-LVL V: ICD-10-PCS | Mod: PBBFAC,,, | Performed by: PHYSICIAN ASSISTANT

## 2021-06-15 PROCEDURE — 3288F PR FALLS RISK ASSESSMENT DOCUMENTED: ICD-10-PCS | Mod: CPTII,S$GLB,, | Performed by: PHYSICIAN ASSISTANT

## 2021-06-15 PROCEDURE — 1101F PT FALLS ASSESS-DOCD LE1/YR: CPT | Mod: CPTII,S$GLB,, | Performed by: PHYSICIAN ASSISTANT

## 2021-06-15 PROCEDURE — 1159F MED LIST DOCD IN RCRD: CPT | Mod: S$GLB,,, | Performed by: PHYSICIAN ASSISTANT

## 2021-06-15 RX ORDER — TIZANIDINE 4 MG/1
4 TABLET ORAL NIGHTLY PRN
Qty: 40 TABLET | Refills: 0 | Status: SHIPPED | OUTPATIENT
Start: 2021-06-15 | End: 2021-08-10 | Stop reason: SDUPTHER

## 2021-06-21 ENCOUNTER — NUTRITION (OUTPATIENT)
Dept: DIABETES | Facility: CLINIC | Age: 66
End: 2021-06-21
Payer: MEDICARE

## 2021-06-21 ENCOUNTER — OFFICE VISIT (OUTPATIENT)
Dept: ENDOCRINOLOGY | Facility: CLINIC | Age: 66
End: 2021-06-21
Payer: MEDICARE

## 2021-06-21 VITALS
DIASTOLIC BLOOD PRESSURE: 70 MMHG | SYSTOLIC BLOOD PRESSURE: 128 MMHG | WEIGHT: 214.75 LBS | RESPIRATION RATE: 18 BRPM | HEART RATE: 88 BPM | HEIGHT: 61 IN | BODY MASS INDEX: 40.55 KG/M2

## 2021-06-21 DIAGNOSIS — Z79.4 TYPE 2 DIABETES MELLITUS WITH DIABETIC NEPHROPATHY, WITH LONG-TERM CURRENT USE OF INSULIN: ICD-10-CM

## 2021-06-21 DIAGNOSIS — E89.0 POST-OPERATIVE HYPOTHYROIDISM: ICD-10-CM

## 2021-06-21 DIAGNOSIS — N18.31 TYPE 2 DIABETES MELLITUS WITH STAGE 3A CHRONIC KIDNEY DISEASE, WITH LONG-TERM CURRENT USE OF INSULIN: Primary | ICD-10-CM

## 2021-06-21 DIAGNOSIS — N18.31 TYPE 2 DIABETES MELLITUS WITH STAGE 3A CHRONIC KIDNEY DISEASE, WITH LONG-TERM CURRENT USE OF INSULIN: ICD-10-CM

## 2021-06-21 DIAGNOSIS — E11.21 TYPE 2 DIABETES MELLITUS WITH DIABETIC NEPHROPATHY, WITH LONG-TERM CURRENT USE OF INSULIN: ICD-10-CM

## 2021-06-21 DIAGNOSIS — Z79.4 TYPE 2 DIABETES MELLITUS WITH STAGE 3A CHRONIC KIDNEY DISEASE, WITH LONG-TERM CURRENT USE OF INSULIN: Primary | ICD-10-CM

## 2021-06-21 DIAGNOSIS — E11.22 TYPE 2 DIABETES MELLITUS WITH STAGE 3A CHRONIC KIDNEY DISEASE, WITH LONG-TERM CURRENT USE OF INSULIN: ICD-10-CM

## 2021-06-21 DIAGNOSIS — E78.2 MIXED HYPERLIPIDEMIA: ICD-10-CM

## 2021-06-21 DIAGNOSIS — E66.01 OBESITY, MORBID: ICD-10-CM

## 2021-06-21 DIAGNOSIS — E11.22 TYPE 2 DIABETES MELLITUS WITH STAGE 3A CHRONIC KIDNEY DISEASE, WITH LONG-TERM CURRENT USE OF INSULIN: Primary | ICD-10-CM

## 2021-06-21 DIAGNOSIS — Z78.9 STATIN INTOLERANCE: ICD-10-CM

## 2021-06-21 DIAGNOSIS — Z79.4 TYPE 2 DIABETES MELLITUS WITH STAGE 3A CHRONIC KIDNEY DISEASE, WITH LONG-TERM CURRENT USE OF INSULIN: ICD-10-CM

## 2021-06-21 DIAGNOSIS — I10 BENIGN ESSENTIAL HTN: ICD-10-CM

## 2021-06-21 PROCEDURE — 3288F FALL RISK ASSESSMENT DOCD: CPT | Mod: CPTII,S$GLB,, | Performed by: NURSE PRACTITIONER

## 2021-06-21 PROCEDURE — 95251 PR GLUCOSE MONITOR, 72 HOUR, PHYS INTERP: ICD-10-PCS | Mod: S$GLB,,, | Performed by: NURSE PRACTITIONER

## 2021-06-21 PROCEDURE — 95250 CONT GLUC MNTR PHYS/QHP EQP: CPT | Mod: S$GLB,,, | Performed by: DIETITIAN, REGISTERED

## 2021-06-21 PROCEDURE — 3044F HG A1C LEVEL LT 7.0%: CPT | Mod: CPTII,S$GLB,, | Performed by: NURSE PRACTITIONER

## 2021-06-21 PROCEDURE — 99999 PR PBB SHADOW E&M-EST. PATIENT-LVL I: CPT | Mod: PBBFAC,,, | Performed by: DIETITIAN, REGISTERED

## 2021-06-21 PROCEDURE — 95251 CONT GLUC MNTR ANALYSIS I&R: CPT | Mod: S$GLB,,, | Performed by: NURSE PRACTITIONER

## 2021-06-21 PROCEDURE — 1126F PR PAIN SEVERITY QUANTIFIED, NO PAIN PRESENT: ICD-10-PCS | Mod: S$GLB,,, | Performed by: NURSE PRACTITIONER

## 2021-06-21 PROCEDURE — 3044F PR MOST RECENT HEMOGLOBIN A1C LEVEL <7.0%: ICD-10-PCS | Mod: CPTII,S$GLB,, | Performed by: NURSE PRACTITIONER

## 2021-06-21 PROCEDURE — 1101F PT FALLS ASSESS-DOCD LE1/YR: CPT | Mod: CPTII,S$GLB,, | Performed by: NURSE PRACTITIONER

## 2021-06-21 PROCEDURE — 3008F PR BODY MASS INDEX (BMI) DOCUMENTED: ICD-10-PCS | Mod: CPTII,S$GLB,, | Performed by: NURSE PRACTITIONER

## 2021-06-21 PROCEDURE — 99214 OFFICE O/P EST MOD 30 MIN: CPT | Mod: S$GLB,,, | Performed by: NURSE PRACTITIONER

## 2021-06-21 PROCEDURE — 99214 PR OFFICE/OUTPT VISIT, EST, LEVL IV, 30-39 MIN: ICD-10-PCS | Mod: S$GLB,,, | Performed by: NURSE PRACTITIONER

## 2021-06-21 PROCEDURE — 1126F AMNT PAIN NOTED NONE PRSNT: CPT | Mod: S$GLB,,, | Performed by: NURSE PRACTITIONER

## 2021-06-21 PROCEDURE — 99999 PR PBB SHADOW E&M-EST. PATIENT-LVL V: CPT | Mod: PBBFAC,,, | Performed by: NURSE PRACTITIONER

## 2021-06-21 PROCEDURE — 95250 PR GLUCOSE MONITORING,72 HRS,SUB-Q SENSOR: ICD-10-PCS | Mod: S$GLB,,, | Performed by: DIETITIAN, REGISTERED

## 2021-06-21 PROCEDURE — 3288F PR FALLS RISK ASSESSMENT DOCUMENTED: ICD-10-PCS | Mod: CPTII,S$GLB,, | Performed by: NURSE PRACTITIONER

## 2021-06-21 PROCEDURE — 3008F BODY MASS INDEX DOCD: CPT | Mod: CPTII,S$GLB,, | Performed by: NURSE PRACTITIONER

## 2021-06-21 PROCEDURE — 1101F PR PT FALLS ASSESS DOC 0-1 FALLS W/OUT INJ PAST YR: ICD-10-PCS | Mod: CPTII,S$GLB,, | Performed by: NURSE PRACTITIONER

## 2021-06-21 PROCEDURE — 99999 PR PBB SHADOW E&M-EST. PATIENT-LVL V: ICD-10-PCS | Mod: PBBFAC,,, | Performed by: NURSE PRACTITIONER

## 2021-06-21 PROCEDURE — 99999 PR PBB SHADOW E&M-EST. PATIENT-LVL I: ICD-10-PCS | Mod: PBBFAC,,, | Performed by: DIETITIAN, REGISTERED

## 2021-06-22 LAB — NONINV COLON CA DNA+OCC BLD SCRN STL QL: POSITIVE

## 2021-06-23 ENCOUNTER — TELEPHONE (OUTPATIENT)
Dept: ENDOSCOPY | Facility: HOSPITAL | Age: 66
End: 2021-06-23

## 2021-06-23 ENCOUNTER — PATIENT MESSAGE (OUTPATIENT)
Dept: ENDOSCOPY | Facility: HOSPITAL | Age: 66
End: 2021-06-23

## 2021-06-23 ENCOUNTER — TELEPHONE (OUTPATIENT)
Dept: FAMILY MEDICINE | Facility: CLINIC | Age: 66
End: 2021-06-23

## 2021-06-23 DIAGNOSIS — R19.5 POSITIVE COLORECTAL CANCER SCREENING USING COLOGUARD TEST: Primary | ICD-10-CM

## 2021-06-30 ENCOUNTER — CLINICAL SUPPORT (OUTPATIENT)
Dept: REHABILITATION | Facility: HOSPITAL | Age: 66
End: 2021-06-30
Payer: MEDICARE

## 2021-06-30 DIAGNOSIS — G89.29 CHRONIC BILATERAL LOW BACK PAIN WITHOUT SCIATICA: ICD-10-CM

## 2021-06-30 DIAGNOSIS — M54.50 CHRONIC BILATERAL LOW BACK PAIN WITHOUT SCIATICA: ICD-10-CM

## 2021-06-30 DIAGNOSIS — M62.81 WEAKNESS OF TRUNK MUSCULATURE: ICD-10-CM

## 2021-06-30 PROCEDURE — 97110 THERAPEUTIC EXERCISES: CPT | Mod: PO | Performed by: PHYSICAL THERAPIST

## 2021-06-30 PROCEDURE — 97161 PT EVAL LOW COMPLEX 20 MIN: CPT | Mod: PO | Performed by: PHYSICAL THERAPIST

## 2021-07-07 ENCOUNTER — CLINICAL SUPPORT (OUTPATIENT)
Dept: REHABILITATION | Facility: HOSPITAL | Age: 66
End: 2021-07-07
Payer: MEDICARE

## 2021-07-07 DIAGNOSIS — M62.81 WEAKNESS OF TRUNK MUSCULATURE: ICD-10-CM

## 2021-07-07 DIAGNOSIS — M54.50 CHRONIC BILATERAL LOW BACK PAIN WITHOUT SCIATICA: ICD-10-CM

## 2021-07-07 DIAGNOSIS — G89.29 CHRONIC BILATERAL LOW BACK PAIN WITHOUT SCIATICA: ICD-10-CM

## 2021-07-07 PROCEDURE — 97110 THERAPEUTIC EXERCISES: CPT | Mod: PO | Performed by: PHYSICAL THERAPIST

## 2021-07-09 LAB
LEFT EYE DM RETINOPATHY: NEGATIVE
RIGHT EYE DM RETINOPATHY: NEGATIVE

## 2021-07-12 ENCOUNTER — CLINICAL SUPPORT (OUTPATIENT)
Dept: REHABILITATION | Facility: HOSPITAL | Age: 66
End: 2021-07-12
Payer: MEDICARE

## 2021-07-12 DIAGNOSIS — G89.29 CHRONIC BILATERAL LOW BACK PAIN WITHOUT SCIATICA: ICD-10-CM

## 2021-07-12 DIAGNOSIS — M62.81 WEAKNESS OF TRUNK MUSCULATURE: ICD-10-CM

## 2021-07-12 DIAGNOSIS — M54.50 CHRONIC BILATERAL LOW BACK PAIN WITHOUT SCIATICA: ICD-10-CM

## 2021-07-12 PROCEDURE — 97110 THERAPEUTIC EXERCISES: CPT | Mod: PO | Performed by: PHYSICAL THERAPIST

## 2021-07-13 ENCOUNTER — PATIENT OUTREACH (OUTPATIENT)
Dept: ADMINISTRATIVE | Facility: OTHER | Age: 66
End: 2021-07-13

## 2021-07-14 ENCOUNTER — CLINICAL SUPPORT (OUTPATIENT)
Dept: REHABILITATION | Facility: HOSPITAL | Age: 66
End: 2021-07-14
Payer: MEDICARE

## 2021-07-14 ENCOUNTER — OFFICE VISIT (OUTPATIENT)
Dept: GASTROENTEROLOGY | Facility: CLINIC | Age: 66
End: 2021-07-14
Payer: MEDICARE

## 2021-07-14 VITALS — HEIGHT: 61 IN | WEIGHT: 216.25 LBS | BODY MASS INDEX: 40.83 KG/M2

## 2021-07-14 DIAGNOSIS — K59.09 CHRONIC CONSTIPATION: ICD-10-CM

## 2021-07-14 DIAGNOSIS — C7A.090 MALIGNANT CARCINOID TUMOR OF LUNG: ICD-10-CM

## 2021-07-14 DIAGNOSIS — M62.81 WEAKNESS OF TRUNK MUSCULATURE: ICD-10-CM

## 2021-07-14 DIAGNOSIS — M54.50 CHRONIC BILATERAL LOW BACK PAIN WITHOUT SCIATICA: ICD-10-CM

## 2021-07-14 DIAGNOSIS — G89.29 CHRONIC BILATERAL LOW BACK PAIN WITHOUT SCIATICA: ICD-10-CM

## 2021-07-14 DIAGNOSIS — R19.5 POSITIVE COLORECTAL CANCER SCREENING USING COLOGUARD TEST: Primary | ICD-10-CM

## 2021-07-14 DIAGNOSIS — R11.0 NAUSEA: ICD-10-CM

## 2021-07-14 DIAGNOSIS — R13.19 ESOPHAGEAL DYSPHAGIA: ICD-10-CM

## 2021-07-14 PROCEDURE — 99499 RISK ADDL DX/OHS AUDIT: ICD-10-PCS | Mod: S$GLB,,, | Performed by: NURSE PRACTITIONER

## 2021-07-14 PROCEDURE — 99999 PR PBB SHADOW E&M-EST. PATIENT-LVL III: CPT | Mod: PBBFAC,,, | Performed by: NURSE PRACTITIONER

## 2021-07-14 PROCEDURE — 1101F PT FALLS ASSESS-DOCD LE1/YR: CPT | Mod: CPTII,S$GLB,, | Performed by: NURSE PRACTITIONER

## 2021-07-14 PROCEDURE — 3008F PR BODY MASS INDEX (BMI) DOCUMENTED: ICD-10-PCS | Mod: CPTII,S$GLB,, | Performed by: NURSE PRACTITIONER

## 2021-07-14 PROCEDURE — 3288F FALL RISK ASSESSMENT DOCD: CPT | Mod: CPTII,S$GLB,, | Performed by: NURSE PRACTITIONER

## 2021-07-14 PROCEDURE — 99999 PR PBB SHADOW E&M-EST. PATIENT-LVL III: ICD-10-PCS | Mod: PBBFAC,,, | Performed by: NURSE PRACTITIONER

## 2021-07-14 PROCEDURE — 1159F MED LIST DOCD IN RCRD: CPT | Mod: S$GLB,,, | Performed by: NURSE PRACTITIONER

## 2021-07-14 PROCEDURE — 3008F BODY MASS INDEX DOCD: CPT | Mod: CPTII,S$GLB,, | Performed by: NURSE PRACTITIONER

## 2021-07-14 PROCEDURE — 3288F PR FALLS RISK ASSESSMENT DOCUMENTED: ICD-10-PCS | Mod: CPTII,S$GLB,, | Performed by: NURSE PRACTITIONER

## 2021-07-14 PROCEDURE — 99204 PR OFFICE/OUTPT VISIT, NEW, LEVL IV, 45-59 MIN: ICD-10-PCS | Mod: S$GLB,,, | Performed by: NURSE PRACTITIONER

## 2021-07-14 PROCEDURE — 1159F PR MEDICATION LIST DOCUMENTED IN MEDICAL RECORD: ICD-10-PCS | Mod: S$GLB,,, | Performed by: NURSE PRACTITIONER

## 2021-07-14 PROCEDURE — 99204 OFFICE O/P NEW MOD 45 MIN: CPT | Mod: S$GLB,,, | Performed by: NURSE PRACTITIONER

## 2021-07-14 PROCEDURE — 97110 THERAPEUTIC EXERCISES: CPT | Mod: PO | Performed by: PHYSICAL THERAPIST

## 2021-07-14 PROCEDURE — 1101F PR PT FALLS ASSESS DOC 0-1 FALLS W/OUT INJ PAST YR: ICD-10-PCS | Mod: CPTII,S$GLB,, | Performed by: NURSE PRACTITIONER

## 2021-07-14 PROCEDURE — 99499 UNLISTED E&M SERVICE: CPT | Mod: S$GLB,,, | Performed by: NURSE PRACTITIONER

## 2021-07-14 RX ORDER — FLUOROMETHOLONE 1 MG/ML
1 SUSPENSION/ DROPS OPHTHALMIC 2 TIMES DAILY
COMMUNITY
Start: 2021-06-12 | End: 2021-08-10

## 2021-07-14 RX ORDER — SIMVASTATIN 10 MG/1
10 TABLET, FILM COATED ORAL DAILY
COMMUNITY
Start: 2021-07-09 | End: 2022-04-04

## 2021-07-19 ENCOUNTER — PATIENT OUTREACH (OUTPATIENT)
Dept: ADMINISTRATIVE | Facility: HOSPITAL | Age: 66
End: 2021-07-19

## 2021-07-19 ENCOUNTER — CLINICAL SUPPORT (OUTPATIENT)
Dept: REHABILITATION | Facility: HOSPITAL | Age: 66
End: 2021-07-19
Payer: MEDICARE

## 2021-07-19 DIAGNOSIS — G89.29 CHRONIC BILATERAL LOW BACK PAIN WITHOUT SCIATICA: ICD-10-CM

## 2021-07-19 DIAGNOSIS — M62.81 WEAKNESS OF TRUNK MUSCULATURE: ICD-10-CM

## 2021-07-19 DIAGNOSIS — M54.50 CHRONIC BILATERAL LOW BACK PAIN WITHOUT SCIATICA: ICD-10-CM

## 2021-07-19 PROCEDURE — 97110 THERAPEUTIC EXERCISES: CPT | Mod: PO | Performed by: PHYSICAL THERAPIST

## 2021-07-19 NOTE — PROGRESS NOTES
CC: This 62 y.o. female presents for management of diabetes and chronic conditions pending review including HTN, HLP, morbid obesity, hypothyroidism     HPI: She was diagnosed with T2DM in ~ . She was hospitalized r/t DM in 2016 for DKA.   Family hx of DM: mom, dad, and sisters     hypoglycemia at home- very rare,   also her dog will wake her up if BG low  Has had a few 70s in the afternoon - feels sluggish    monitoring BG at home: ac/hs and pp 2 hr  Fastin-159  Lunch: 135-160  Dinner:180-185  Bedtime: 135-170    Diet: Eats 2-3  Meals a day, snacks as below. Eats mostly at home   Brunch at 10am   Snack- tomato and cucumber or notes  Dinner 6-7 pm  Exercise:  Walks daily, 30 minutes  CURRENT DM MEDS: Basaglar 35/30. Ademlog 25-35 u AC    Timing prandial insulin 5-15 minutes before meals: yes  Vial/pen:  Uses pens  Glucometer type:  Relion    Standards of Care:  Eye exam: 2018 No DM retinopathy  Had cataract surgery right eye 2 weeks ago; left eye being done     Nodules was found on on a PET scan. FNA 14 shows adenomatous nodule with cystic changes. S/P thyroidectomy 16.       On synthroid 137 mcg once daily. Takes all alone with water 30-60 mins prior to first meal   Has history of myalgias with statin higher doses and intensity  Being treated for parkinsons. No paresthesias. No falls. No Hypoglycemia.     ROS:   Gen: Appetite good,  Weight stable, + fatigue and weakness.  Skin: Skin is intact and heals well, no rashes, no hair changes  Eyes: Denies visual disturbances  Resp: no SOB or ESPINAL, no cough  Cardiac: No palpitations, chest pain, no edema   GI: No nausea or vomiting, diarrhea, constipation, or abdominal pain.  /GYN: No nocturia, burning or pain.   MS/Neuro: + BLE ache at night; Gait steady, speech clear  Psych: Denies drug/ETOH abuse, no hx of depression.  Other systems: negative.    PE:  GENERAL: Well developed, well nourished.  PSYCH: AAOx3, appropriate mood and affect,  Left message to call office about n/s on 7/15/21.elijah   pleasant expression, conversant, appears relaxed, well groomed.   EYES: Conjunctiva, corneas clear  NECK: Supple, trachea midline   ABDOMEN: Soft, non-tender, non-distended   VASCULAR: DP pulses +2/4 bilaterally, no edema.  NEURO: Gait steady  SKIN: Skin warm and dry no acanthosis nigracans.  FOOT EXAMINATION: 2/2018     Lab Results   Component Value Date    MICALBCREAT 12.0 12/03/2018       Hemoglobin A1C   Date Value Ref Range Status   12/03/2018 6.1 (H) 4.0 - 5.6 % Final     Comment:     ADA Screening Guidelines:  5.7-6.4%  Consistent with prediabetes  >or=6.5%  Consistent with diabetes  High levels of fetal hemoglobin interfere with the HbA1C  assay. Heterozygous hemoglobin variants (HbS, HgC, etc)do  not significantly interfere with this assay.   However, presence of multiple variants may affect accuracy.     08/06/2018 6.2 (H) 4.0 - 5.6 % Final     Comment:     ADA Screening Guidelines:  5.7-6.4%  Consistent with prediabetes  >or=6.5%  Consistent with diabetes  High levels of fetal hemoglobin interfere with the HbA1C  assay. Heterozygous hemoglobin variants (HbS, HgC, etc)do  not significantly interfere with this assay.   However, presence of multiple variants may affect accuracy.     02/15/2018 6.3 (H) 4.0 - 5.6 % Final     Comment:     According to ADA guidelines, hemoglobin A1c <7.0% represents  optimal control in non-pregnant diabetic patients. Different  metrics may apply to specific patient populations.   Standards of Medical Care in Diabetes-2016.  For the purpose of screening for the presence of diabetes:  <5.7%     Consistent with the absence of diabetes  5.7-6.4%  Consistent with increasing risk for diabetes   (prediabetes)  >or=6.5%  Consistent with diabetes  Currently, no consensus exists for use of hemoglobin A1c  for diagnosis of diabetes for children.  This Hemoglobin A1c assay has significant interference with fetal   hemoglobin   (HbF). The results are invalid for patients with abnormal  amounts of   HbF,   including those with known Hereditary Persistence   of Fetal Hemoglobin. Heterozygous hemoglobin variants (HbAS, HbAC,   HbAD, HbAE, HbA2) do not significantly interfere with this assay;   however, presence of multiple variants in a sample may impact the %   interference.          ASSESSMENT and PLAN:    1. T2DM with nephropathy-   No changes bg well controlled  Continue checking bg ac/hs, bring in 1 month log for [facility]    Instructed to monitor BG and bring meter/ log to every clinic visit.   - takes ASA, ACEi, statin    2. HTN - controlled, continue meds as previously prescribed and monitor.     3. HLP - on statin therapy, LFTs WNL. Myalgia with higher dose statin, increase fish oil to 2 tabs bid    4. Post Surgical Hypothyroidism- For MNG. Path benign. Continue current dose of synthroid. Has f/u w Dr Qureshi in feb 2019    5. Vitamin d deficiency - continue OTC replacement, at goal, check lab annually    6. Morbid obesity portion control, exercise as tolerated, increases insulin resistance. Body mass index is 40.43 kg/m².       Follow-up: in 6 months with lab prior

## 2021-07-21 ENCOUNTER — CLINICAL SUPPORT (OUTPATIENT)
Dept: REHABILITATION | Facility: HOSPITAL | Age: 66
End: 2021-07-21
Payer: MEDICARE

## 2021-07-21 DIAGNOSIS — M54.50 CHRONIC BILATERAL LOW BACK PAIN WITHOUT SCIATICA: ICD-10-CM

## 2021-07-21 DIAGNOSIS — M62.81 WEAKNESS OF TRUNK MUSCULATURE: ICD-10-CM

## 2021-07-21 DIAGNOSIS — G89.29 CHRONIC BILATERAL LOW BACK PAIN WITHOUT SCIATICA: ICD-10-CM

## 2021-07-21 PROCEDURE — 97110 THERAPEUTIC EXERCISES: CPT | Mod: PO | Performed by: PHYSICAL THERAPIST

## 2021-07-26 ENCOUNTER — PATIENT MESSAGE (OUTPATIENT)
Dept: ENDOCRINOLOGY | Facility: CLINIC | Age: 66
End: 2021-07-26

## 2021-07-26 ENCOUNTER — TELEPHONE (OUTPATIENT)
Dept: ENDOCRINOLOGY | Facility: CLINIC | Age: 66
End: 2021-07-26

## 2021-07-26 DIAGNOSIS — Z79.4 TYPE 2 DIABETES MELLITUS WITH DIABETIC NEPHROPATHY, WITH LONG-TERM CURRENT USE OF INSULIN: Primary | ICD-10-CM

## 2021-07-26 DIAGNOSIS — E11.21 TYPE 2 DIABETES MELLITUS WITH DIABETIC NEPHROPATHY, WITH LONG-TERM CURRENT USE OF INSULIN: Primary | ICD-10-CM

## 2021-07-27 ENCOUNTER — PATIENT OUTREACH (OUTPATIENT)
Dept: DIABETES | Facility: CLINIC | Age: 66
End: 2021-07-27

## 2021-07-27 ENCOUNTER — CLINICAL SUPPORT (OUTPATIENT)
Dept: REHABILITATION | Facility: HOSPITAL | Age: 66
End: 2021-07-27
Payer: MEDICARE

## 2021-07-27 DIAGNOSIS — G89.29 CHRONIC BILATERAL LOW BACK PAIN WITHOUT SCIATICA: ICD-10-CM

## 2021-07-27 DIAGNOSIS — M62.81 WEAKNESS OF TRUNK MUSCULATURE: ICD-10-CM

## 2021-07-27 DIAGNOSIS — M54.50 CHRONIC BILATERAL LOW BACK PAIN WITHOUT SCIATICA: ICD-10-CM

## 2021-07-27 PROCEDURE — 97110 THERAPEUTIC EXERCISES: CPT | Mod: PO,CQ

## 2021-07-29 ENCOUNTER — NUTRITION (OUTPATIENT)
Dept: DIABETES | Facility: CLINIC | Age: 66
End: 2021-07-29
Payer: MEDICARE

## 2021-07-29 ENCOUNTER — CLINICAL SUPPORT (OUTPATIENT)
Dept: REHABILITATION | Facility: HOSPITAL | Age: 66
End: 2021-07-29
Payer: MEDICARE

## 2021-07-29 ENCOUNTER — PATIENT MESSAGE (OUTPATIENT)
Dept: DIABETES | Facility: CLINIC | Age: 66
End: 2021-07-29

## 2021-07-29 DIAGNOSIS — M62.81 WEAKNESS OF TRUNK MUSCULATURE: ICD-10-CM

## 2021-07-29 DIAGNOSIS — Z79.4 TYPE 2 DIABETES MELLITUS WITH DIABETIC NEPHROPATHY, WITH LONG-TERM CURRENT USE OF INSULIN: ICD-10-CM

## 2021-07-29 DIAGNOSIS — E11.21 TYPE 2 DIABETES MELLITUS WITH DIABETIC NEPHROPATHY, WITH LONG-TERM CURRENT USE OF INSULIN: ICD-10-CM

## 2021-07-29 DIAGNOSIS — G89.29 CHRONIC BILATERAL LOW BACK PAIN WITHOUT SCIATICA: ICD-10-CM

## 2021-07-29 DIAGNOSIS — M54.50 CHRONIC BILATERAL LOW BACK PAIN WITHOUT SCIATICA: ICD-10-CM

## 2021-07-29 PROCEDURE — G0108 PR DIAB MANAGE TRN  PER INDIV: ICD-10-PCS | Mod: S$GLB,,, | Performed by: NUTRITIONIST

## 2021-07-29 PROCEDURE — 99999 PR PBB SHADOW E&M-EST. PATIENT-LVL II: CPT | Mod: PBBFAC,,, | Performed by: NUTRITIONIST

## 2021-07-29 PROCEDURE — 99999 PR PBB SHADOW E&M-EST. PATIENT-LVL II: ICD-10-PCS | Mod: PBBFAC,,, | Performed by: NUTRITIONIST

## 2021-07-29 PROCEDURE — G0108 DIAB MANAGE TRN  PER INDIV: HCPCS | Mod: S$GLB,,, | Performed by: NUTRITIONIST

## 2021-07-29 PROCEDURE — 97110 THERAPEUTIC EXERCISES: CPT | Mod: PO | Performed by: PHYSICAL THERAPIST

## 2021-08-02 ENCOUNTER — CLINICAL SUPPORT (OUTPATIENT)
Dept: REHABILITATION | Facility: HOSPITAL | Age: 66
End: 2021-08-02
Payer: MEDICARE

## 2021-08-02 DIAGNOSIS — M62.81 WEAKNESS OF TRUNK MUSCULATURE: ICD-10-CM

## 2021-08-02 DIAGNOSIS — G89.29 CHRONIC BILATERAL LOW BACK PAIN WITHOUT SCIATICA: ICD-10-CM

## 2021-08-02 DIAGNOSIS — M54.50 CHRONIC BILATERAL LOW BACK PAIN WITHOUT SCIATICA: ICD-10-CM

## 2021-08-02 PROCEDURE — 97110 THERAPEUTIC EXERCISES: CPT | Mod: PO | Performed by: PHYSICAL THERAPIST

## 2021-08-04 ENCOUNTER — PATIENT MESSAGE (OUTPATIENT)
Dept: FAMILY MEDICINE | Facility: CLINIC | Age: 66
End: 2021-08-04

## 2021-08-04 ENCOUNTER — PATIENT MESSAGE (OUTPATIENT)
Dept: REHABILITATION | Facility: HOSPITAL | Age: 66
End: 2021-08-04

## 2021-08-04 DIAGNOSIS — Z11.52 ENCOUNTER FOR SCREENING FOR COVID-19: Primary | ICD-10-CM

## 2021-08-05 ENCOUNTER — CLINICAL SUPPORT (OUTPATIENT)
Dept: FAMILY MEDICINE | Facility: CLINIC | Age: 66
End: 2021-08-05
Payer: MEDICARE

## 2021-08-05 DIAGNOSIS — Z11.52 ENCOUNTER FOR SCREENING FOR COVID-19: ICD-10-CM

## 2021-08-05 LAB
SARS-COV-2 RNA RESP QL NAA+PROBE: NOT DETECTED
SARS-COV-2- CYCLE NUMBER: -1

## 2021-08-05 PROCEDURE — U0005 INFEC AGEN DETEC AMPLI PROBE: HCPCS | Performed by: INTERNAL MEDICINE

## 2021-08-05 PROCEDURE — U0003 INFECTIOUS AGENT DETECTION BY NUCLEIC ACID (DNA OR RNA); SEVERE ACUTE RESPIRATORY SYNDROME CORONAVIRUS 2 (SARS-COV-2) (CORONAVIRUS DISEASE [COVID-19]), AMPLIFIED PROBE TECHNIQUE, MAKING USE OF HIGH THROUGHPUT TECHNOLOGIES AS DESCRIBED BY CMS-2020-01-R: HCPCS | Performed by: INTERNAL MEDICINE

## 2021-08-09 ENCOUNTER — PATIENT MESSAGE (OUTPATIENT)
Dept: REHABILITATION | Facility: HOSPITAL | Age: 66
End: 2021-08-09

## 2021-08-10 ENCOUNTER — OFFICE VISIT (OUTPATIENT)
Dept: SPINE | Facility: CLINIC | Age: 66
End: 2021-08-10
Payer: MEDICARE

## 2021-08-10 VITALS
WEIGHT: 212.31 LBS | HEART RATE: 83 BPM | HEIGHT: 61 IN | BODY MASS INDEX: 40.08 KG/M2 | SYSTOLIC BLOOD PRESSURE: 129 MMHG | DIASTOLIC BLOOD PRESSURE: 71 MMHG

## 2021-08-10 DIAGNOSIS — M54.50 CHRONIC BILATERAL LOW BACK PAIN WITHOUT SCIATICA: ICD-10-CM

## 2021-08-10 DIAGNOSIS — G89.29 CHRONIC BILATERAL LOW BACK PAIN WITHOUT SCIATICA: ICD-10-CM

## 2021-08-10 PROCEDURE — 3044F HG A1C LEVEL LT 7.0%: CPT | Mod: CPTII,S$GLB,, | Performed by: PHYSICIAN ASSISTANT

## 2021-08-10 PROCEDURE — 3008F BODY MASS INDEX DOCD: CPT | Mod: CPTII,S$GLB,, | Performed by: PHYSICIAN ASSISTANT

## 2021-08-10 PROCEDURE — 99999 PR PBB SHADOW E&M-EST. PATIENT-LVL V: ICD-10-PCS | Mod: PBBFAC,,, | Performed by: PHYSICIAN ASSISTANT

## 2021-08-10 PROCEDURE — 99213 PR OFFICE/OUTPT VISIT, EST, LEVL III, 20-29 MIN: ICD-10-PCS | Mod: S$GLB,,, | Performed by: PHYSICIAN ASSISTANT

## 2021-08-10 PROCEDURE — 1125F AMNT PAIN NOTED PAIN PRSNT: CPT | Mod: CPTII,S$GLB,, | Performed by: PHYSICIAN ASSISTANT

## 2021-08-10 PROCEDURE — 1160F RVW MEDS BY RX/DR IN RCRD: CPT | Mod: CPTII,S$GLB,, | Performed by: PHYSICIAN ASSISTANT

## 2021-08-10 PROCEDURE — 3008F PR BODY MASS INDEX (BMI) DOCUMENTED: ICD-10-PCS | Mod: CPTII,S$GLB,, | Performed by: PHYSICIAN ASSISTANT

## 2021-08-10 PROCEDURE — 3078F DIAST BP <80 MM HG: CPT | Mod: CPTII,S$GLB,, | Performed by: PHYSICIAN ASSISTANT

## 2021-08-10 PROCEDURE — 3074F PR MOST RECENT SYSTOLIC BLOOD PRESSURE < 130 MM HG: ICD-10-PCS | Mod: CPTII,S$GLB,, | Performed by: PHYSICIAN ASSISTANT

## 2021-08-10 PROCEDURE — 1101F PR PT FALLS ASSESS DOC 0-1 FALLS W/OUT INJ PAST YR: ICD-10-PCS | Mod: CPTII,S$GLB,, | Performed by: PHYSICIAN ASSISTANT

## 2021-08-10 PROCEDURE — 3078F PR MOST RECENT DIASTOLIC BLOOD PRESSURE < 80 MM HG: ICD-10-PCS | Mod: CPTII,S$GLB,, | Performed by: PHYSICIAN ASSISTANT

## 2021-08-10 PROCEDURE — 1125F PR PAIN SEVERITY QUANTIFIED, PAIN PRESENT: ICD-10-PCS | Mod: CPTII,S$GLB,, | Performed by: PHYSICIAN ASSISTANT

## 2021-08-10 PROCEDURE — 1159F MED LIST DOCD IN RCRD: CPT | Mod: CPTII,S$GLB,, | Performed by: PHYSICIAN ASSISTANT

## 2021-08-10 PROCEDURE — 99999 PR PBB SHADOW E&M-EST. PATIENT-LVL V: CPT | Mod: PBBFAC,,, | Performed by: PHYSICIAN ASSISTANT

## 2021-08-10 PROCEDURE — 3044F PR MOST RECENT HEMOGLOBIN A1C LEVEL <7.0%: ICD-10-PCS | Mod: CPTII,S$GLB,, | Performed by: PHYSICIAN ASSISTANT

## 2021-08-10 PROCEDURE — 3288F PR FALLS RISK ASSESSMENT DOCUMENTED: ICD-10-PCS | Mod: CPTII,S$GLB,, | Performed by: PHYSICIAN ASSISTANT

## 2021-08-10 PROCEDURE — 3074F SYST BP LT 130 MM HG: CPT | Mod: CPTII,S$GLB,, | Performed by: PHYSICIAN ASSISTANT

## 2021-08-10 PROCEDURE — 1101F PT FALLS ASSESS-DOCD LE1/YR: CPT | Mod: CPTII,S$GLB,, | Performed by: PHYSICIAN ASSISTANT

## 2021-08-10 PROCEDURE — 3288F FALL RISK ASSESSMENT DOCD: CPT | Mod: CPTII,S$GLB,, | Performed by: PHYSICIAN ASSISTANT

## 2021-08-10 PROCEDURE — 1159F PR MEDICATION LIST DOCUMENTED IN MEDICAL RECORD: ICD-10-PCS | Mod: CPTII,S$GLB,, | Performed by: PHYSICIAN ASSISTANT

## 2021-08-10 PROCEDURE — 99213 OFFICE O/P EST LOW 20 MIN: CPT | Mod: S$GLB,,, | Performed by: PHYSICIAN ASSISTANT

## 2021-08-10 PROCEDURE — 1160F PR REVIEW ALL MEDS BY PRESCRIBER/CLIN PHARMACIST DOCUMENTED: ICD-10-PCS | Mod: CPTII,S$GLB,, | Performed by: PHYSICIAN ASSISTANT

## 2021-08-10 RX ORDER — TIZANIDINE 4 MG/1
4 TABLET ORAL NIGHTLY PRN
Qty: 40 TABLET | Refills: 0 | Status: SHIPPED | OUTPATIENT
Start: 2021-08-10 | End: 2022-01-07 | Stop reason: SDUPTHER

## 2021-08-11 ENCOUNTER — CLINICAL SUPPORT (OUTPATIENT)
Dept: REHABILITATION | Facility: HOSPITAL | Age: 66
End: 2021-08-11
Payer: MEDICARE

## 2021-08-11 DIAGNOSIS — G89.29 CHRONIC BILATERAL LOW BACK PAIN WITHOUT SCIATICA: ICD-10-CM

## 2021-08-11 DIAGNOSIS — M62.81 WEAKNESS OF TRUNK MUSCULATURE: ICD-10-CM

## 2021-08-11 DIAGNOSIS — M54.50 CHRONIC BILATERAL LOW BACK PAIN WITHOUT SCIATICA: ICD-10-CM

## 2021-08-11 PROCEDURE — 97110 THERAPEUTIC EXERCISES: CPT | Mod: PO | Performed by: PHYSICAL THERAPIST

## 2021-08-11 PROCEDURE — 97750 PHYSICAL PERFORMANCE TEST: CPT | Mod: PO | Performed by: PHYSICAL THERAPIST

## 2021-08-16 ENCOUNTER — CLINICAL SUPPORT (OUTPATIENT)
Dept: REHABILITATION | Facility: HOSPITAL | Age: 66
End: 2021-08-16
Payer: MEDICARE

## 2021-08-16 DIAGNOSIS — G89.29 CHRONIC BILATERAL LOW BACK PAIN WITHOUT SCIATICA: ICD-10-CM

## 2021-08-16 DIAGNOSIS — M62.81 WEAKNESS OF TRUNK MUSCULATURE: ICD-10-CM

## 2021-08-16 DIAGNOSIS — M54.50 CHRONIC BILATERAL LOW BACK PAIN WITHOUT SCIATICA: ICD-10-CM

## 2021-08-16 PROCEDURE — 97110 THERAPEUTIC EXERCISES: CPT | Mod: PO | Performed by: PHYSICAL THERAPIST

## 2021-08-17 ENCOUNTER — PATIENT OUTREACH (OUTPATIENT)
Dept: ADMINISTRATIVE | Facility: OTHER | Age: 66
End: 2021-08-17

## 2021-08-18 ENCOUNTER — CLINICAL SUPPORT (OUTPATIENT)
Dept: REHABILITATION | Facility: HOSPITAL | Age: 66
End: 2021-08-18
Payer: MEDICARE

## 2021-08-18 DIAGNOSIS — M62.81 WEAKNESS OF TRUNK MUSCULATURE: ICD-10-CM

## 2021-08-18 DIAGNOSIS — M54.50 CHRONIC BILATERAL LOW BACK PAIN WITHOUT SCIATICA: ICD-10-CM

## 2021-08-18 DIAGNOSIS — G89.29 CHRONIC BILATERAL LOW BACK PAIN WITHOUT SCIATICA: ICD-10-CM

## 2021-08-18 PROCEDURE — 97110 THERAPEUTIC EXERCISES: CPT | Mod: PO | Performed by: PHYSICAL THERAPIST

## 2021-08-19 ENCOUNTER — NUTRITION (OUTPATIENT)
Dept: DIABETES | Facility: CLINIC | Age: 66
End: 2021-08-19
Payer: MEDICARE

## 2021-08-19 DIAGNOSIS — Z79.4 TYPE 2 DIABETES MELLITUS WITH STAGE 3A CHRONIC KIDNEY DISEASE, WITH LONG-TERM CURRENT USE OF INSULIN: Primary | ICD-10-CM

## 2021-08-19 DIAGNOSIS — E11.22 TYPE 2 DIABETES MELLITUS WITH STAGE 3A CHRONIC KIDNEY DISEASE, WITH LONG-TERM CURRENT USE OF INSULIN: Primary | ICD-10-CM

## 2021-08-19 DIAGNOSIS — N18.31 TYPE 2 DIABETES MELLITUS WITH STAGE 3A CHRONIC KIDNEY DISEASE, WITH LONG-TERM CURRENT USE OF INSULIN: Primary | ICD-10-CM

## 2021-08-19 PROCEDURE — 95249 CONT GLUC MNTR PT PROV EQP: CPT | Mod: S$GLB,,, | Performed by: NUTRITIONIST

## 2021-08-19 PROCEDURE — 99999 PR PBB SHADOW E&M-EST. PATIENT-LVL I: CPT | Mod: PBBFAC,,, | Performed by: NUTRITIONIST

## 2021-08-19 PROCEDURE — 95249 PR GLUCOSE MONITORING, 72 HRS, SUB-Q SENSOR, PATIENT PROVIDED: ICD-10-PCS | Mod: S$GLB,,, | Performed by: NUTRITIONIST

## 2021-08-19 PROCEDURE — 99999 PR PBB SHADOW E&M-EST. PATIENT-LVL I: ICD-10-PCS | Mod: PBBFAC,,, | Performed by: NUTRITIONIST

## 2021-08-25 ENCOUNTER — CLINICAL SUPPORT (OUTPATIENT)
Dept: REHABILITATION | Facility: HOSPITAL | Age: 66
End: 2021-08-25
Payer: MEDICARE

## 2021-08-25 DIAGNOSIS — M54.50 CHRONIC BILATERAL LOW BACK PAIN WITHOUT SCIATICA: ICD-10-CM

## 2021-08-25 DIAGNOSIS — G89.29 CHRONIC BILATERAL LOW BACK PAIN WITHOUT SCIATICA: ICD-10-CM

## 2021-08-25 DIAGNOSIS — M62.81 WEAKNESS OF TRUNK MUSCULATURE: ICD-10-CM

## 2021-08-25 PROCEDURE — 97110 THERAPEUTIC EXERCISES: CPT | Mod: PO,CQ

## 2021-08-26 ENCOUNTER — PATIENT MESSAGE (OUTPATIENT)
Dept: REHABILITATION | Facility: HOSPITAL | Age: 66
End: 2021-08-26

## 2021-09-03 ENCOUNTER — PATIENT MESSAGE (OUTPATIENT)
Dept: NEUROLOGY | Facility: CLINIC | Age: 66
End: 2021-09-03

## 2021-09-07 ENCOUNTER — PATIENT MESSAGE (OUTPATIENT)
Dept: FAMILY MEDICINE | Facility: CLINIC | Age: 66
End: 2021-09-07

## 2021-09-07 DIAGNOSIS — H66.90 ACUTE OTITIS MEDIA, UNSPECIFIED OTITIS MEDIA TYPE: Primary | ICD-10-CM

## 2021-09-07 RX ORDER — ALBUTEROL SULFATE 90 UG/1
2 AEROSOL, METERED RESPIRATORY (INHALATION) EVERY 6 HOURS PRN
Qty: 18 G | Refills: 0 | Status: SHIPPED | OUTPATIENT
Start: 2021-09-07 | End: 2022-06-01

## 2021-09-07 RX ORDER — AMOXICILLIN 875 MG/1
875 TABLET, FILM COATED ORAL 2 TIMES DAILY
Qty: 14 TABLET | Refills: 0 | Status: SHIPPED | OUTPATIENT
Start: 2021-09-07 | End: 2021-09-15

## 2021-09-07 RX ORDER — FLUTICASONE PROPIONATE AND SALMETEROL 250; 50 UG/1; UG/1
1 POWDER RESPIRATORY (INHALATION) 2 TIMES DAILY
Qty: 60 EACH | Refills: 11 | Status: SHIPPED | OUTPATIENT
Start: 2021-09-07 | End: 2022-06-01

## 2021-09-10 ENCOUNTER — TELEPHONE (OUTPATIENT)
Dept: GASTROENTEROLOGY | Facility: CLINIC | Age: 66
End: 2021-09-10

## 2021-09-10 DIAGNOSIS — Z01.818 PRE-OP TESTING: ICD-10-CM

## 2021-09-14 ENCOUNTER — PATIENT MESSAGE (OUTPATIENT)
Dept: ORTHOPEDICS | Facility: CLINIC | Age: 66
End: 2021-09-14

## 2021-09-14 ENCOUNTER — LAB VISIT (OUTPATIENT)
Dept: FAMILY MEDICINE | Facility: CLINIC | Age: 66
End: 2021-09-14
Payer: MEDICARE

## 2021-09-14 ENCOUNTER — PATIENT MESSAGE (OUTPATIENT)
Dept: FAMILY MEDICINE | Facility: CLINIC | Age: 66
End: 2021-09-14

## 2021-09-14 DIAGNOSIS — Z01.818 PRE-OP TESTING: ICD-10-CM

## 2021-09-14 PROCEDURE — U0003 INFECTIOUS AGENT DETECTION BY NUCLEIC ACID (DNA OR RNA); SEVERE ACUTE RESPIRATORY SYNDROME CORONAVIRUS 2 (SARS-COV-2) (CORONAVIRUS DISEASE [COVID-19]), AMPLIFIED PROBE TECHNIQUE, MAKING USE OF HIGH THROUGHPUT TECHNOLOGIES AS DESCRIBED BY CMS-2020-01-R: HCPCS | Mod: HCNC | Performed by: INTERNAL MEDICINE

## 2021-09-14 PROCEDURE — U0005 INFEC AGEN DETEC AMPLI PROBE: HCPCS | Performed by: INTERNAL MEDICINE

## 2021-09-15 ENCOUNTER — OFFICE VISIT (OUTPATIENT)
Dept: FAMILY MEDICINE | Facility: CLINIC | Age: 66
End: 2021-09-15
Payer: MEDICARE

## 2021-09-15 VITALS
HEIGHT: 61 IN | TEMPERATURE: 98 F | SYSTOLIC BLOOD PRESSURE: 128 MMHG | WEIGHT: 213 LBS | HEART RATE: 85 BPM | DIASTOLIC BLOOD PRESSURE: 77 MMHG | BODY MASS INDEX: 40.22 KG/M2

## 2021-09-15 DIAGNOSIS — M17.10 PRIMARY OSTEOARTHRITIS OF KNEE, UNSPECIFIED LATERALITY: Primary | ICD-10-CM

## 2021-09-15 LAB
SARS-COV-2 RNA RESP QL NAA+PROBE: NOT DETECTED
SARS-COV-2- CYCLE NUMBER: NORMAL

## 2021-09-15 PROCEDURE — 3288F PR FALLS RISK ASSESSMENT DOCUMENTED: ICD-10-PCS | Mod: HCNC,CPTII,S$GLB, | Performed by: INTERNAL MEDICINE

## 2021-09-15 PROCEDURE — 1101F PT FALLS ASSESS-DOCD LE1/YR: CPT | Mod: HCNC,CPTII,S$GLB, | Performed by: INTERNAL MEDICINE

## 2021-09-15 PROCEDURE — 96372 THER/PROPH/DIAG INJ SC/IM: CPT | Mod: HCNC,S$GLB,, | Performed by: INTERNAL MEDICINE

## 2021-09-15 PROCEDURE — 3074F PR MOST RECENT SYSTOLIC BLOOD PRESSURE < 130 MM HG: ICD-10-PCS | Mod: HCNC,CPTII,S$GLB, | Performed by: INTERNAL MEDICINE

## 2021-09-15 PROCEDURE — 3008F BODY MASS INDEX DOCD: CPT | Mod: HCNC,CPTII,S$GLB, | Performed by: INTERNAL MEDICINE

## 2021-09-15 PROCEDURE — 96372 PR INJECTION,THERAP/PROPH/DIAG2ST, IM OR SUBCUT: ICD-10-PCS | Mod: HCNC,S$GLB,, | Performed by: INTERNAL MEDICINE

## 2021-09-15 PROCEDURE — 1126F PR PAIN SEVERITY QUANTIFIED, NO PAIN PRESENT: ICD-10-PCS | Mod: HCNC,CPTII,S$GLB, | Performed by: INTERNAL MEDICINE

## 2021-09-15 PROCEDURE — 99999 PR PBB SHADOW E&M-EST. PATIENT-LVL V: ICD-10-PCS | Mod: PBBFAC,HCNC,, | Performed by: INTERNAL MEDICINE

## 2021-09-15 PROCEDURE — 99213 PR OFFICE/OUTPT VISIT, EST, LEVL III, 20-29 MIN: ICD-10-PCS | Mod: 25,HCNC,S$GLB, | Performed by: INTERNAL MEDICINE

## 2021-09-15 PROCEDURE — 3078F PR MOST RECENT DIASTOLIC BLOOD PRESSURE < 80 MM HG: ICD-10-PCS | Mod: HCNC,CPTII,S$GLB, | Performed by: INTERNAL MEDICINE

## 2021-09-15 PROCEDURE — 99999 PR PBB SHADOW E&M-EST. PATIENT-LVL V: CPT | Mod: PBBFAC,HCNC,, | Performed by: INTERNAL MEDICINE

## 2021-09-15 PROCEDURE — 1159F PR MEDICATION LIST DOCUMENTED IN MEDICAL RECORD: ICD-10-PCS | Mod: HCNC,CPTII,S$GLB, | Performed by: INTERNAL MEDICINE

## 2021-09-15 PROCEDURE — 3078F DIAST BP <80 MM HG: CPT | Mod: HCNC,CPTII,S$GLB, | Performed by: INTERNAL MEDICINE

## 2021-09-15 PROCEDURE — 3288F FALL RISK ASSESSMENT DOCD: CPT | Mod: HCNC,CPTII,S$GLB, | Performed by: INTERNAL MEDICINE

## 2021-09-15 PROCEDURE — 99213 OFFICE O/P EST LOW 20 MIN: CPT | Mod: 25,HCNC,S$GLB, | Performed by: INTERNAL MEDICINE

## 2021-09-15 PROCEDURE — 1159F MED LIST DOCD IN RCRD: CPT | Mod: HCNC,CPTII,S$GLB, | Performed by: INTERNAL MEDICINE

## 2021-09-15 PROCEDURE — 3008F PR BODY MASS INDEX (BMI) DOCUMENTED: ICD-10-PCS | Mod: HCNC,CPTII,S$GLB, | Performed by: INTERNAL MEDICINE

## 2021-09-15 PROCEDURE — 3074F SYST BP LT 130 MM HG: CPT | Mod: HCNC,CPTII,S$GLB, | Performed by: INTERNAL MEDICINE

## 2021-09-15 PROCEDURE — 1126F AMNT PAIN NOTED NONE PRSNT: CPT | Mod: HCNC,CPTII,S$GLB, | Performed by: INTERNAL MEDICINE

## 2021-09-15 PROCEDURE — 3044F PR MOST RECENT HEMOGLOBIN A1C LEVEL <7.0%: ICD-10-PCS | Mod: HCNC,CPTII,S$GLB, | Performed by: INTERNAL MEDICINE

## 2021-09-15 PROCEDURE — 1101F PR PT FALLS ASSESS DOC 0-1 FALLS W/OUT INJ PAST YR: ICD-10-PCS | Mod: HCNC,CPTII,S$GLB, | Performed by: INTERNAL MEDICINE

## 2021-09-15 PROCEDURE — 3044F HG A1C LEVEL LT 7.0%: CPT | Mod: HCNC,CPTII,S$GLB, | Performed by: INTERNAL MEDICINE

## 2021-09-15 RX ORDER — METHYLPREDNISOLONE ACETATE 40 MG/ML
60 INJECTION, SUSPENSION INTRA-ARTICULAR; INTRALESIONAL; INTRAMUSCULAR; SOFT TISSUE
Status: COMPLETED | OUTPATIENT
Start: 2021-09-15 | End: 2021-09-15

## 2021-09-15 RX ORDER — LIDOCAINE HYDROCHLORIDE 20 MG/ML
3 INJECTION, SOLUTION INFILTRATION; PERINEURAL
Status: COMPLETED | OUTPATIENT
Start: 2021-09-15 | End: 2021-09-15

## 2021-09-15 RX ADMIN — METHYLPREDNISOLONE ACETATE 60 MG: 40 INJECTION, SUSPENSION INTRA-ARTICULAR; INTRALESIONAL; INTRAMUSCULAR; SOFT TISSUE at 10:09

## 2021-09-15 RX ADMIN — LIDOCAINE HYDROCHLORIDE 3 ML: 20 INJECTION, SOLUTION INFILTRATION; PERINEURAL at 10:09

## 2021-09-16 ENCOUNTER — HOSPITAL ENCOUNTER (OUTPATIENT)
Facility: HOSPITAL | Age: 66
Discharge: HOME | End: 2021-09-16
Attending: INTERNAL MEDICINE | Admitting: INTERNAL MEDICINE
Payer: MEDICARE

## 2021-09-16 ENCOUNTER — ANESTHESIA (OUTPATIENT)
Dept: ENDOSCOPY | Facility: HOSPITAL | Age: 66
End: 2021-09-16
Payer: MEDICARE

## 2021-09-16 ENCOUNTER — ANESTHESIA EVENT (OUTPATIENT)
Dept: ENDOSCOPY | Facility: HOSPITAL | Age: 66
End: 2021-09-16
Payer: MEDICARE

## 2021-09-16 VITALS
WEIGHT: 210 LBS | RESPIRATION RATE: 18 BRPM | DIASTOLIC BLOOD PRESSURE: 78 MMHG | SYSTOLIC BLOOD PRESSURE: 120 MMHG | HEIGHT: 61 IN | HEART RATE: 78 BPM | BODY MASS INDEX: 39.65 KG/M2 | TEMPERATURE: 98 F

## 2021-09-16 DIAGNOSIS — R19.5 POSITIVE COLORECTAL CANCER SCREENING USING COLOGUARD TEST: Primary | ICD-10-CM

## 2021-09-16 DIAGNOSIS — R13.10 DYSPHAGIA: ICD-10-CM

## 2021-09-16 DIAGNOSIS — Z85.110 HISTORY OF MALIGNANT CARCINOID TUMOR OF BRONCHUS AND LUNG: ICD-10-CM

## 2021-09-16 DIAGNOSIS — R13.19 ESOPHAGEAL DYSPHAGIA: ICD-10-CM

## 2021-09-16 LAB — GLUCOSE SERPL-MCNC: 172 MG/DL (ref 70–110)

## 2021-09-16 PROCEDURE — 45385 COLONOSCOPY W/LESION REMOVAL: CPT | Mod: HCNC,PO | Performed by: INTERNAL MEDICINE

## 2021-09-16 PROCEDURE — 88342 CHG IMMUNOCYTOCHEMISTRY: ICD-10-PCS | Mod: 26,HCNC,, | Performed by: PATHOLOGY

## 2021-09-16 PROCEDURE — D9220A PRA ANESTHESIA: ICD-10-PCS | Mod: HCNC,CRNA,, | Performed by: NURSE ANESTHETIST, CERTIFIED REGISTERED

## 2021-09-16 PROCEDURE — D9220A PRA ANESTHESIA: Mod: HCNC,CRNA,, | Performed by: NURSE ANESTHETIST, CERTIFIED REGISTERED

## 2021-09-16 PROCEDURE — 43248 PR EGD, FLEX, W/DILATION OVER GUIDEWIRE: ICD-10-PCS | Mod: HCNC,,, | Performed by: INTERNAL MEDICINE

## 2021-09-16 PROCEDURE — 43248 EGD GUIDE WIRE INSERTION: CPT | Mod: HCNC,PO | Performed by: INTERNAL MEDICINE

## 2021-09-16 PROCEDURE — 45380 COLONOSCOPY AND BIOPSY: CPT | Mod: 59,HCNC,, | Performed by: INTERNAL MEDICINE

## 2021-09-16 PROCEDURE — 88342 IMHCHEM/IMCYTCHM 1ST ANTB: CPT | Mod: 26,HCNC,, | Performed by: PATHOLOGY

## 2021-09-16 PROCEDURE — 43239 EGD BIOPSY SINGLE/MULTIPLE: CPT | Mod: 59,HCNC,, | Performed by: INTERNAL MEDICINE

## 2021-09-16 PROCEDURE — 25000003 PHARM REV CODE 250: Mod: HCNC,PO | Performed by: NURSE ANESTHETIST, CERTIFIED REGISTERED

## 2021-09-16 PROCEDURE — 43239 PR EGD, FLEX, W/BIOPSY, SGL/MULTI: ICD-10-PCS | Mod: 59,HCNC,, | Performed by: INTERNAL MEDICINE

## 2021-09-16 PROCEDURE — 43248 EGD GUIDE WIRE INSERTION: CPT | Mod: HCNC,,, | Performed by: INTERNAL MEDICINE

## 2021-09-16 PROCEDURE — 63600175 PHARM REV CODE 636 W HCPCS: Mod: HCNC,PO | Performed by: NURSE ANESTHETIST, CERTIFIED REGISTERED

## 2021-09-16 PROCEDURE — D9220A PRA ANESTHESIA: Mod: HCNC,ANES,, | Performed by: ANESTHESIOLOGY

## 2021-09-16 PROCEDURE — D9220A PRA ANESTHESIA: ICD-10-PCS | Mod: HCNC,ANES,, | Performed by: ANESTHESIOLOGY

## 2021-09-16 PROCEDURE — 45385 COLONOSCOPY W/LESION REMOVAL: CPT | Mod: HCNC,,, | Performed by: INTERNAL MEDICINE

## 2021-09-16 PROCEDURE — 88305 TISSUE EXAM BY PATHOLOGIST: ICD-10-PCS | Mod: 26,HCNC,, | Performed by: PATHOLOGY

## 2021-09-16 PROCEDURE — 43239 EGD BIOPSY SINGLE/MULTIPLE: CPT | Mod: HCNC,PO | Performed by: INTERNAL MEDICINE

## 2021-09-16 PROCEDURE — 27201012 HC FORCEPS, HOT/COLD, DISP: Mod: HCNC,PO | Performed by: INTERNAL MEDICINE

## 2021-09-16 PROCEDURE — 63600175 PHARM REV CODE 636 W HCPCS: Mod: HCNC,PO | Performed by: INTERNAL MEDICINE

## 2021-09-16 PROCEDURE — 37000009 HC ANESTHESIA EA ADD 15 MINS: Mod: HCNC,PO | Performed by: INTERNAL MEDICINE

## 2021-09-16 PROCEDURE — 45380 COLONOSCOPY AND BIOPSY: CPT | Mod: HCNC,PO | Performed by: INTERNAL MEDICINE

## 2021-09-16 PROCEDURE — 88305 TISSUE EXAM BY PATHOLOGIST: CPT | Mod: HCNC | Performed by: PATHOLOGY

## 2021-09-16 PROCEDURE — 45385 PR COLONOSCOPY,REMV LESN,SNARE: ICD-10-PCS | Mod: HCNC,,, | Performed by: INTERNAL MEDICINE

## 2021-09-16 PROCEDURE — 45380 PR COLONOSCOPY,BIOPSY: ICD-10-PCS | Mod: 59,HCNC,, | Performed by: INTERNAL MEDICINE

## 2021-09-16 PROCEDURE — 27201089 HC SNARE, DISP (ANY): Mod: HCNC,PO | Performed by: INTERNAL MEDICINE

## 2021-09-16 PROCEDURE — 37000008 HC ANESTHESIA 1ST 15 MINUTES: Mod: HCNC,PO | Performed by: INTERNAL MEDICINE

## 2021-09-16 PROCEDURE — 88342 IMHCHEM/IMCYTCHM 1ST ANTB: CPT | Performed by: PATHOLOGY

## 2021-09-16 PROCEDURE — 88305 TISSUE EXAM BY PATHOLOGIST: CPT | Mod: 26,HCNC,, | Performed by: PATHOLOGY

## 2021-09-16 RX ORDER — FENTANYL CITRATE 50 UG/ML
INJECTION, SOLUTION INTRAMUSCULAR; INTRAVENOUS
Status: DISCONTINUED | OUTPATIENT
Start: 2021-09-16 | End: 2021-09-16

## 2021-09-16 RX ORDER — SODIUM CHLORIDE 0.9 % (FLUSH) 0.9 %
10 SYRINGE (ML) INJECTION EVERY 6 HOURS PRN
Status: DISCONTINUED | OUTPATIENT
Start: 2021-09-16 | End: 2021-09-16 | Stop reason: HOSPADM

## 2021-09-16 RX ORDER — PROPOFOL 10 MG/ML
VIAL (ML) INTRAVENOUS CONTINUOUS PRN
Status: DISCONTINUED | OUTPATIENT
Start: 2021-09-16 | End: 2021-09-16

## 2021-09-16 RX ORDER — PROPOFOL 10 MG/ML
VIAL (ML) INTRAVENOUS
Status: DISCONTINUED | OUTPATIENT
Start: 2021-09-16 | End: 2021-09-16

## 2021-09-16 RX ORDER — LIDOCAINE HCL/PF 100 MG/5ML
SYRINGE (ML) INTRAVENOUS
Status: DISCONTINUED | OUTPATIENT
Start: 2021-09-16 | End: 2021-09-16

## 2021-09-16 RX ORDER — SODIUM CHLORIDE, SODIUM LACTATE, POTASSIUM CHLORIDE, CALCIUM CHLORIDE 600; 310; 30; 20 MG/100ML; MG/100ML; MG/100ML; MG/100ML
INJECTION, SOLUTION INTRAVENOUS CONTINUOUS
Status: DISCONTINUED | OUTPATIENT
Start: 2021-09-16 | End: 2021-09-16 | Stop reason: HOSPADM

## 2021-09-16 RX ADMIN — SODIUM CHLORIDE, SODIUM LACTATE, POTASSIUM CHLORIDE, AND CALCIUM CHLORIDE: .6; .31; .03; .02 INJECTION, SOLUTION INTRAVENOUS at 11:09

## 2021-09-16 RX ADMIN — PROPOFOL 100 MG: 10 INJECTION, EMULSION INTRAVENOUS at 12:09

## 2021-09-16 RX ADMIN — PROPOFOL 150 MCG/KG/MIN: 10 INJECTION, EMULSION INTRAVENOUS at 12:09

## 2021-09-16 RX ADMIN — FENTANYL CITRATE 50 MCG: 50 INJECTION, SOLUTION INTRAMUSCULAR; INTRAVENOUS at 12:09

## 2021-09-16 RX ADMIN — PROPOFOL 50 MG: 10 INJECTION, EMULSION INTRAVENOUS at 12:09

## 2021-09-16 RX ADMIN — LIDOCAINE HYDROCHLORIDE 100 MG: 20 INJECTION, SOLUTION INTRAVENOUS at 12:09

## 2021-09-20 ENCOUNTER — CLINICAL SUPPORT (OUTPATIENT)
Dept: REHABILITATION | Facility: HOSPITAL | Age: 66
End: 2021-09-20
Payer: MEDICARE

## 2021-09-20 ENCOUNTER — TELEPHONE (OUTPATIENT)
Dept: GASTROENTEROLOGY | Facility: CLINIC | Age: 66
End: 2021-09-20

## 2021-09-20 DIAGNOSIS — M54.50 CHRONIC BILATERAL LOW BACK PAIN WITHOUT SCIATICA: ICD-10-CM

## 2021-09-20 DIAGNOSIS — G89.29 CHRONIC BILATERAL LOW BACK PAIN WITHOUT SCIATICA: ICD-10-CM

## 2021-09-20 DIAGNOSIS — K63.5 POLYP OF COLON, UNSPECIFIED PART OF COLON, UNSPECIFIED TYPE: Primary | ICD-10-CM

## 2021-09-20 DIAGNOSIS — M62.81 WEAKNESS OF TRUNK MUSCULATURE: ICD-10-CM

## 2021-09-20 PROCEDURE — 97110 THERAPEUTIC EXERCISES: CPT | Mod: HCNC,PO | Performed by: PHYSICAL THERAPIST

## 2021-09-22 ENCOUNTER — CLINICAL SUPPORT (OUTPATIENT)
Dept: REHABILITATION | Facility: HOSPITAL | Age: 66
End: 2021-09-22
Payer: MEDICARE

## 2021-09-22 DIAGNOSIS — M62.81 WEAKNESS OF TRUNK MUSCULATURE: ICD-10-CM

## 2021-09-22 DIAGNOSIS — M54.50 CHRONIC BILATERAL LOW BACK PAIN WITHOUT SCIATICA: ICD-10-CM

## 2021-09-22 DIAGNOSIS — G89.29 CHRONIC BILATERAL LOW BACK PAIN WITHOUT SCIATICA: ICD-10-CM

## 2021-09-22 PROCEDURE — 97110 THERAPEUTIC EXERCISES: CPT | Mod: HCNC,PO | Performed by: PHYSICAL THERAPIST

## 2021-09-23 LAB
FINAL PATHOLOGIC DIAGNOSIS: NORMAL
Lab: NORMAL

## 2021-09-27 ENCOUNTER — CLINICAL SUPPORT (OUTPATIENT)
Dept: REHABILITATION | Facility: HOSPITAL | Age: 66
End: 2021-09-27
Payer: MEDICARE

## 2021-09-27 DIAGNOSIS — G89.29 CHRONIC BILATERAL LOW BACK PAIN WITHOUT SCIATICA: ICD-10-CM

## 2021-09-27 DIAGNOSIS — M62.81 WEAKNESS OF TRUNK MUSCULATURE: ICD-10-CM

## 2021-09-27 DIAGNOSIS — M54.50 CHRONIC BILATERAL LOW BACK PAIN WITHOUT SCIATICA: ICD-10-CM

## 2021-09-27 PROCEDURE — 97110 THERAPEUTIC EXERCISES: CPT | Mod: HCNC,PO | Performed by: PHYSICAL THERAPIST

## 2021-09-29 ENCOUNTER — CLINICAL SUPPORT (OUTPATIENT)
Dept: REHABILITATION | Facility: HOSPITAL | Age: 66
End: 2021-09-29
Payer: MEDICARE

## 2021-09-29 ENCOUNTER — PATIENT MESSAGE (OUTPATIENT)
Dept: GASTROENTEROLOGY | Facility: CLINIC | Age: 66
End: 2021-09-29

## 2021-09-29 DIAGNOSIS — M62.81 WEAKNESS OF TRUNK MUSCULATURE: ICD-10-CM

## 2021-09-29 DIAGNOSIS — G89.29 CHRONIC BILATERAL LOW BACK PAIN WITHOUT SCIATICA: ICD-10-CM

## 2021-09-29 DIAGNOSIS — M54.50 CHRONIC BILATERAL LOW BACK PAIN WITHOUT SCIATICA: ICD-10-CM

## 2021-09-29 PROCEDURE — 97110 THERAPEUTIC EXERCISES: CPT | Mod: HCNC,PO | Performed by: PHYSICAL THERAPIST

## 2021-09-30 ENCOUNTER — TELEPHONE (OUTPATIENT)
Dept: ENDOSCOPY | Facility: HOSPITAL | Age: 66
End: 2021-09-30

## 2021-10-01 ENCOUNTER — PATIENT MESSAGE (OUTPATIENT)
Dept: ENDOCRINOLOGY | Facility: CLINIC | Age: 66
End: 2021-10-01

## 2021-10-04 ENCOUNTER — PATIENT MESSAGE (OUTPATIENT)
Dept: ENDOCRINOLOGY | Facility: CLINIC | Age: 66
End: 2021-10-04

## 2021-10-04 ENCOUNTER — CLINICAL SUPPORT (OUTPATIENT)
Dept: REHABILITATION | Facility: HOSPITAL | Age: 66
End: 2021-10-04
Payer: MEDICARE

## 2021-10-04 DIAGNOSIS — M54.50 CHRONIC BILATERAL LOW BACK PAIN WITHOUT SCIATICA: ICD-10-CM

## 2021-10-04 DIAGNOSIS — M62.81 WEAKNESS OF TRUNK MUSCULATURE: ICD-10-CM

## 2021-10-04 DIAGNOSIS — G89.29 CHRONIC BILATERAL LOW BACK PAIN WITHOUT SCIATICA: ICD-10-CM

## 2021-10-04 PROCEDURE — 97110 THERAPEUTIC EXERCISES: CPT | Mod: KX,HCNC,PO | Performed by: PHYSICAL THERAPIST

## 2021-10-04 RX ORDER — INSULIN DETEMIR 100 [IU]/ML
INJECTION, SOLUTION SUBCUTANEOUS
Qty: 30 ML | Refills: 11 | Status: SHIPPED | OUTPATIENT
Start: 2021-10-04 | End: 2021-12-06

## 2021-10-05 ENCOUNTER — PATIENT MESSAGE (OUTPATIENT)
Dept: ENDOCRINOLOGY | Facility: CLINIC | Age: 66
End: 2021-10-05

## 2021-10-06 ENCOUNTER — CLINICAL SUPPORT (OUTPATIENT)
Dept: REHABILITATION | Facility: HOSPITAL | Age: 66
End: 2021-10-06
Payer: MEDICARE

## 2021-10-06 DIAGNOSIS — M62.81 WEAKNESS OF TRUNK MUSCULATURE: ICD-10-CM

## 2021-10-06 DIAGNOSIS — M54.50 CHRONIC BILATERAL LOW BACK PAIN WITHOUT SCIATICA: ICD-10-CM

## 2021-10-06 DIAGNOSIS — G89.29 CHRONIC BILATERAL LOW BACK PAIN WITHOUT SCIATICA: ICD-10-CM

## 2021-10-06 PROCEDURE — 97110 THERAPEUTIC EXERCISES: CPT | Mod: KX,HCNC,PO,CQ

## 2021-10-11 ENCOUNTER — CLINICAL SUPPORT (OUTPATIENT)
Dept: REHABILITATION | Facility: HOSPITAL | Age: 66
End: 2021-10-11
Payer: MEDICARE

## 2021-10-11 DIAGNOSIS — G89.29 CHRONIC BILATERAL LOW BACK PAIN WITHOUT SCIATICA: ICD-10-CM

## 2021-10-11 DIAGNOSIS — M62.81 WEAKNESS OF TRUNK MUSCULATURE: ICD-10-CM

## 2021-10-11 DIAGNOSIS — M54.50 CHRONIC BILATERAL LOW BACK PAIN WITHOUT SCIATICA: ICD-10-CM

## 2021-10-11 PROCEDURE — 97750 PHYSICAL PERFORMANCE TEST: CPT | Mod: KX,HCNC,PO | Performed by: PHYSICAL THERAPIST

## 2021-10-11 PROCEDURE — 97110 THERAPEUTIC EXERCISES: CPT | Mod: KX,HCNC,PO | Performed by: PHYSICAL THERAPIST

## 2021-10-14 ENCOUNTER — PATIENT MESSAGE (OUTPATIENT)
Dept: REHABILITATION | Facility: HOSPITAL | Age: 66
End: 2021-10-14
Payer: MEDICAID

## 2021-10-20 ENCOUNTER — TELEPHONE (OUTPATIENT)
Dept: ENDOSCOPY | Facility: HOSPITAL | Age: 66
End: 2021-10-20

## 2021-10-21 ENCOUNTER — DOCUMENTATION ONLY (OUTPATIENT)
Dept: REHABILITATION | Facility: HOSPITAL | Age: 66
End: 2021-10-21
Payer: MEDICARE

## 2021-10-22 ENCOUNTER — PATIENT MESSAGE (OUTPATIENT)
Dept: FAMILY MEDICINE | Facility: CLINIC | Age: 66
End: 2021-10-22

## 2021-10-22 ENCOUNTER — PATIENT MESSAGE (OUTPATIENT)
Dept: ENDOSCOPY | Facility: HOSPITAL | Age: 66
End: 2021-10-22
Payer: MEDICAID

## 2021-10-22 ENCOUNTER — TELEPHONE (OUTPATIENT)
Dept: ENDOSCOPY | Facility: HOSPITAL | Age: 66
End: 2021-10-22

## 2021-10-22 ENCOUNTER — PATIENT MESSAGE (OUTPATIENT)
Dept: FAMILY MEDICINE | Facility: CLINIC | Age: 66
End: 2021-10-22
Payer: MEDICAID

## 2021-10-22 DIAGNOSIS — Z12.11 SPECIAL SCREENING FOR MALIGNANT NEOPLASMS, COLON: Primary | ICD-10-CM

## 2021-10-22 DIAGNOSIS — Z12.31 SCREENING MAMMOGRAM, ENCOUNTER FOR: Primary | ICD-10-CM

## 2021-10-22 RX ORDER — SODIUM, POTASSIUM,MAG SULFATES 17.5-3.13G
1 SOLUTION, RECONSTITUTED, ORAL ORAL DAILY
Qty: 2 KIT | Refills: 0 | Status: SHIPPED | OUTPATIENT
Start: 2021-10-22 | End: 2021-10-24

## 2021-10-26 ENCOUNTER — PATIENT MESSAGE (OUTPATIENT)
Dept: ENDOSCOPY | Facility: HOSPITAL | Age: 66
End: 2021-10-26
Payer: MEDICAID

## 2021-10-28 ENCOUNTER — TELEPHONE (OUTPATIENT)
Dept: REHABILITATION | Facility: HOSPITAL | Age: 66
End: 2021-10-28
Payer: MEDICAID

## 2021-10-28 ENCOUNTER — HOSPITAL ENCOUNTER (OUTPATIENT)
Dept: RADIOLOGY | Facility: HOSPITAL | Age: 66
Discharge: HOME | End: 2021-10-28
Attending: INTERNAL MEDICINE
Payer: MEDICARE

## 2021-10-28 VITALS — HEIGHT: 61 IN | WEIGHT: 210.13 LBS | BODY MASS INDEX: 39.67 KG/M2

## 2021-10-28 DIAGNOSIS — Z12.31 SCREENING MAMMOGRAM, ENCOUNTER FOR: ICD-10-CM

## 2021-10-28 PROCEDURE — 77063 MAMMO DIGITAL SCREENING BILAT WITH TOMO: ICD-10-PCS | Mod: 26,HCNC,, | Performed by: RADIOLOGY

## 2021-10-28 PROCEDURE — 77067 SCR MAMMO BI INCL CAD: CPT | Mod: 26,HCNC,, | Performed by: RADIOLOGY

## 2021-10-28 PROCEDURE — 77063 BREAST TOMOSYNTHESIS BI: CPT | Mod: 26,HCNC,, | Performed by: RADIOLOGY

## 2021-10-28 PROCEDURE — 77067 MAMMO DIGITAL SCREENING BILAT WITH TOMO: ICD-10-PCS | Mod: 26,HCNC,, | Performed by: RADIOLOGY

## 2021-10-28 PROCEDURE — 77067 SCR MAMMO BI INCL CAD: CPT | Mod: TC,HCNC,PO

## 2021-11-01 ENCOUNTER — TELEPHONE (OUTPATIENT)
Dept: NEUROLOGY | Facility: CLINIC | Age: 66
End: 2021-11-01
Payer: MEDICARE

## 2021-11-02 ENCOUNTER — TELEPHONE (OUTPATIENT)
Dept: ENDOSCOPY | Facility: HOSPITAL | Age: 66
End: 2021-11-02
Payer: MEDICARE

## 2021-11-05 ENCOUNTER — OFFICE VISIT (OUTPATIENT)
Dept: ORTHOPEDICS | Facility: CLINIC | Age: 66
End: 2021-11-05
Payer: MEDICARE

## 2021-11-05 VITALS — HEIGHT: 61 IN | WEIGHT: 210.13 LBS | BODY MASS INDEX: 39.67 KG/M2

## 2021-11-05 DIAGNOSIS — E11.21 TYPE 2 DIABETES MELLITUS WITH DIABETIC NEPHROPATHY, WITH LONG-TERM CURRENT USE OF INSULIN: ICD-10-CM

## 2021-11-05 DIAGNOSIS — N18.31 STAGE 3A CHRONIC KIDNEY DISEASE: ICD-10-CM

## 2021-11-05 DIAGNOSIS — M17.11 OSTEOARTHRITIS OF RIGHT KNEE, UNSPECIFIED OSTEOARTHRITIS TYPE: Primary | ICD-10-CM

## 2021-11-05 DIAGNOSIS — M25.561 RIGHT KNEE PAIN, UNSPECIFIED CHRONICITY: ICD-10-CM

## 2021-11-05 DIAGNOSIS — Z79.4 TYPE 2 DIABETES MELLITUS WITH DIABETIC NEPHROPATHY, WITH LONG-TERM CURRENT USE OF INSULIN: ICD-10-CM

## 2021-11-05 PROCEDURE — 99214 PR OFFICE/OUTPT VISIT, EST, LEVL IV, 30-39 MIN: ICD-10-PCS | Mod: 25,S$PBB,, | Performed by: ORTHOPAEDIC SURGERY

## 2021-11-05 PROCEDURE — 99999 PR PBB SHADOW E&M-EST. PATIENT-LVL IV: ICD-10-PCS | Mod: PBBFAC,,, | Performed by: ORTHOPAEDIC SURGERY

## 2021-11-05 PROCEDURE — 99214 OFFICE O/P EST MOD 30 MIN: CPT | Mod: 25,S$PBB,, | Performed by: ORTHOPAEDIC SURGERY

## 2021-11-05 PROCEDURE — 99214 OFFICE O/P EST MOD 30 MIN: CPT | Mod: PBBFAC,PN | Performed by: ORTHOPAEDIC SURGERY

## 2021-11-05 PROCEDURE — 99999 PR PBB SHADOW E&M-EST. PATIENT-LVL IV: CPT | Mod: PBBFAC,,, | Performed by: ORTHOPAEDIC SURGERY

## 2021-11-05 PROCEDURE — 20610 DRAIN/INJ JOINT/BURSA W/O US: CPT | Mod: PBBFAC,PN | Performed by: ORTHOPAEDIC SURGERY

## 2021-11-05 PROCEDURE — 20610 LARGE JOINT ASPIRATION/INJECTION: R KNEE: ICD-10-PCS | Mod: S$PBB,RT,, | Performed by: ORTHOPAEDIC SURGERY

## 2021-11-05 RX ORDER — TRIAMCINOLONE ACETONIDE 40 MG/ML
40 INJECTION, SUSPENSION INTRA-ARTICULAR; INTRAMUSCULAR
Status: DISCONTINUED | OUTPATIENT
Start: 2021-11-05 | End: 2021-11-05 | Stop reason: HOSPADM

## 2021-11-05 RX ADMIN — TRIAMCINOLONE ACETONIDE 40 MG: 40 INJECTION, SUSPENSION INTRA-ARTICULAR; INTRAMUSCULAR at 09:11

## 2021-11-19 PROBLEM — G89.29 CHRONIC BILATERAL LOW BACK PAIN WITHOUT SCIATICA: Status: RESOLVED | Noted: 2021-06-30 | Resolved: 2021-10-11

## 2021-11-19 PROBLEM — M54.50 CHRONIC BILATERAL LOW BACK PAIN WITHOUT SCIATICA: Status: RESOLVED | Noted: 2021-06-30 | Resolved: 2021-10-11

## 2021-11-19 PROBLEM — M62.81 WEAKNESS OF TRUNK MUSCULATURE: Status: RESOLVED | Noted: 2021-06-30 | Resolved: 2021-10-11

## 2021-11-22 ENCOUNTER — TELEPHONE (OUTPATIENT)
Dept: ENDOSCOPY | Facility: HOSPITAL | Age: 66
End: 2021-11-22
Payer: MEDICARE

## 2021-11-23 ENCOUNTER — PATIENT MESSAGE (OUTPATIENT)
Dept: ENDOSCOPY | Facility: HOSPITAL | Age: 66
End: 2021-11-23
Payer: MEDICARE

## 2021-11-23 ENCOUNTER — TELEPHONE (OUTPATIENT)
Dept: ENDOSCOPY | Facility: HOSPITAL | Age: 66
End: 2021-11-23
Payer: MEDICARE

## 2021-11-23 DIAGNOSIS — Z12.11 SPECIAL SCREENING FOR MALIGNANT NEOPLASMS, COLON: Primary | ICD-10-CM

## 2021-11-23 RX ORDER — SODIUM, POTASSIUM,MAG SULFATES 17.5-3.13G
1 SOLUTION, RECONSTITUTED, ORAL ORAL DAILY
Qty: 1 KIT | Refills: 0 | Status: SHIPPED | OUTPATIENT
Start: 2021-11-23 | End: 2021-11-25

## 2021-11-23 RX ORDER — SODIUM, POTASSIUM,MAG SULFATES 17.5-3.13G
1 SOLUTION, RECONSTITUTED, ORAL ORAL DAILY
Qty: 1 KIT | Refills: 0 | OUTPATIENT
Start: 2021-11-23 | End: 2021-11-25

## 2021-11-24 ENCOUNTER — TELEPHONE (OUTPATIENT)
Dept: REHABILITATION | Facility: HOSPITAL | Age: 66
End: 2021-11-24
Payer: MEDICARE

## 2021-11-30 ENCOUNTER — PATIENT MESSAGE (OUTPATIENT)
Dept: ENDOCRINOLOGY | Facility: CLINIC | Age: 66
End: 2021-11-30
Payer: MEDICARE

## 2021-12-01 ENCOUNTER — HOSPITAL ENCOUNTER (OUTPATIENT)
Facility: HOSPITAL | Age: 66
Discharge: HOME | End: 2021-12-01
Attending: INTERNAL MEDICINE | Admitting: INTERNAL MEDICINE
Payer: MEDICARE

## 2021-12-01 ENCOUNTER — ANESTHESIA EVENT (OUTPATIENT)
Dept: ENDOSCOPY | Facility: HOSPITAL | Age: 66
End: 2021-12-01
Payer: MEDICARE

## 2021-12-01 ENCOUNTER — ANESTHESIA (OUTPATIENT)
Dept: ENDOSCOPY | Facility: HOSPITAL | Age: 66
End: 2021-12-01
Payer: MEDICARE

## 2021-12-01 VITALS
HEART RATE: 86 BPM | SYSTOLIC BLOOD PRESSURE: 124 MMHG | OXYGEN SATURATION: 97 % | RESPIRATION RATE: 18 BRPM | BODY MASS INDEX: 37.76 KG/M2 | HEIGHT: 61 IN | DIASTOLIC BLOOD PRESSURE: 68 MMHG | TEMPERATURE: 98 F | WEIGHT: 200 LBS

## 2021-12-01 DIAGNOSIS — K63.5 COLON POLYP: ICD-10-CM

## 2021-12-01 PROCEDURE — 88305 TISSUE EXAM BY PATHOLOGIST: CPT | Performed by: STUDENT IN AN ORGANIZED HEALTH CARE EDUCATION/TRAINING PROGRAM

## 2021-12-01 PROCEDURE — 37000008 HC ANESTHESIA 1ST 15 MINUTES: Performed by: INTERNAL MEDICINE

## 2021-12-01 PROCEDURE — 27202363 HC INJECTION AGENT, SUBMUCOSAL, ANY: Performed by: INTERNAL MEDICINE

## 2021-12-01 PROCEDURE — D9220A PRA ANESTHESIA: ICD-10-PCS | Mod: ANES,,, | Performed by: ANESTHESIOLOGY

## 2021-12-01 PROCEDURE — 45390 COLONOSCOPY W/RESECTION: CPT | Mod: ,,, | Performed by: INTERNAL MEDICINE

## 2021-12-01 PROCEDURE — 27201089 HC SNARE, DISP (ANY): Performed by: INTERNAL MEDICINE

## 2021-12-01 PROCEDURE — 88305 TISSUE EXAM BY PATHOLOGIST: ICD-10-PCS | Mod: 26,,, | Performed by: STUDENT IN AN ORGANIZED HEALTH CARE EDUCATION/TRAINING PROGRAM

## 2021-12-01 PROCEDURE — D9220A PRA ANESTHESIA: Mod: ANES,,, | Performed by: ANESTHESIOLOGY

## 2021-12-01 PROCEDURE — 27201042 HC RETRIEVAL NET: Performed by: INTERNAL MEDICINE

## 2021-12-01 PROCEDURE — 88305 TISSUE EXAM BY PATHOLOGIST: CPT | Mod: 26,,, | Performed by: STUDENT IN AN ORGANIZED HEALTH CARE EDUCATION/TRAINING PROGRAM

## 2021-12-01 PROCEDURE — 37000009 HC ANESTHESIA EA ADD 15 MINS: Performed by: INTERNAL MEDICINE

## 2021-12-01 PROCEDURE — 25000003 PHARM REV CODE 250: Performed by: INTERNAL MEDICINE

## 2021-12-01 PROCEDURE — D9220A PRA ANESTHESIA: Mod: CRNA,,, | Performed by: NURSE ANESTHETIST, CERTIFIED REGISTERED

## 2021-12-01 PROCEDURE — 63600175 PHARM REV CODE 636 W HCPCS: Performed by: NURSE ANESTHETIST, CERTIFIED REGISTERED

## 2021-12-01 PROCEDURE — 45390: ICD-10-PCS | Mod: ,,, | Performed by: INTERNAL MEDICINE

## 2021-12-01 PROCEDURE — 25000003 PHARM REV CODE 250: Performed by: NURSE ANESTHETIST, CERTIFIED REGISTERED

## 2021-12-01 PROCEDURE — 27201028 HC NEEDLE, SCLERO: Performed by: INTERNAL MEDICINE

## 2021-12-01 PROCEDURE — 45390 COLONOSCOPY W/RESECTION: CPT | Performed by: INTERNAL MEDICINE

## 2021-12-01 PROCEDURE — D9220A PRA ANESTHESIA: ICD-10-PCS | Mod: CRNA,,, | Performed by: NURSE ANESTHETIST, CERTIFIED REGISTERED

## 2021-12-01 PROCEDURE — 27202298: Performed by: INTERNAL MEDICINE

## 2021-12-01 RX ORDER — ONDANSETRON 2 MG/ML
4 INJECTION INTRAMUSCULAR; INTRAVENOUS DAILY PRN
Status: DISCONTINUED | OUTPATIENT
Start: 2021-12-01 | End: 2021-12-01 | Stop reason: HOSPADM

## 2021-12-01 RX ORDER — SODIUM CHLORIDE 0.9 % (FLUSH) 0.9 %
10 SYRINGE (ML) INJECTION
Status: DISCONTINUED | OUTPATIENT
Start: 2021-12-01 | End: 2021-12-01 | Stop reason: HOSPADM

## 2021-12-01 RX ORDER — LIDOCAINE HYDROCHLORIDE 20 MG/ML
INJECTION INTRAVENOUS
Status: DISCONTINUED | OUTPATIENT
Start: 2021-12-01 | End: 2021-12-01

## 2021-12-01 RX ORDER — SODIUM CHLORIDE 9 MG/ML
INJECTION, SOLUTION INTRAVENOUS CONTINUOUS
Status: DISCONTINUED | OUTPATIENT
Start: 2021-12-01 | End: 2021-12-01 | Stop reason: HOSPADM

## 2021-12-01 RX ORDER — PROPOFOL 10 MG/ML
VIAL (ML) INTRAVENOUS CONTINUOUS PRN
Status: DISCONTINUED | OUTPATIENT
Start: 2021-12-01 | End: 2021-12-01

## 2021-12-01 RX ADMIN — GLYCOPYRROLATE 0.1 MG: 0.2 INJECTION, SOLUTION INTRAMUSCULAR; INTRAVITREAL at 10:12

## 2021-12-01 RX ADMIN — LIDOCAINE HYDROCHLORIDE 30 MG: 20 INJECTION, SOLUTION INTRAVENOUS at 10:12

## 2021-12-01 RX ADMIN — SODIUM CHLORIDE: 0.9 INJECTION, SOLUTION INTRAVENOUS at 10:12

## 2021-12-01 RX ADMIN — PROPOFOL 175 MCG/KG/MIN: 10 INJECTION, EMULSION INTRAVENOUS at 10:12

## 2021-12-03 ENCOUNTER — LAB VISIT (OUTPATIENT)
Dept: LAB | Facility: HOSPITAL | Age: 66
End: 2021-12-03
Attending: NURSE PRACTITIONER
Payer: MEDICARE

## 2021-12-03 DIAGNOSIS — Z79.4 TYPE 2 DIABETES MELLITUS WITH STAGE 3A CHRONIC KIDNEY DISEASE, WITH LONG-TERM CURRENT USE OF INSULIN: ICD-10-CM

## 2021-12-03 DIAGNOSIS — N18.31 TYPE 2 DIABETES MELLITUS WITH STAGE 3A CHRONIC KIDNEY DISEASE, WITH LONG-TERM CURRENT USE OF INSULIN: ICD-10-CM

## 2021-12-03 DIAGNOSIS — E11.22 TYPE 2 DIABETES MELLITUS WITH STAGE 3A CHRONIC KIDNEY DISEASE, WITH LONG-TERM CURRENT USE OF INSULIN: ICD-10-CM

## 2021-12-03 LAB
25(OH)D3+25(OH)D2 SERPL-MCNC: 47 NG/ML (ref 30–96)
ALBUMIN SERPL BCP-MCNC: 3.8 G/DL (ref 3.5–5.2)
ALP SERPL-CCNC: 95 U/L (ref 55–135)
ALT SERPL W/O P-5'-P-CCNC: 14 U/L (ref 10–44)
ANION GAP SERPL CALC-SCNC: 15 MMOL/L (ref 8–16)
AST SERPL-CCNC: 16 U/L (ref 10–40)
BILIRUB SERPL-MCNC: 0.5 MG/DL (ref 0.1–1)
BUN SERPL-MCNC: 29 MG/DL (ref 8–23)
CALCIUM SERPL-MCNC: 10.4 MG/DL (ref 8.7–10.5)
CHLORIDE SERPL-SCNC: 99 MMOL/L (ref 95–110)
CHOLEST SERPL-MCNC: 264 MG/DL (ref 120–199)
CHOLEST/HDLC SERPL: 4.1 {RATIO} (ref 2–5)
CO2 SERPL-SCNC: 25 MMOL/L (ref 23–29)
CREAT SERPL-MCNC: 1.4 MG/DL (ref 0.5–1.4)
EST. GFR  (AFRICAN AMERICAN): 44.9 ML/MIN/1.73 M^2
EST. GFR  (NON AFRICAN AMERICAN): 38.9 ML/MIN/1.73 M^2
ESTIMATED AVG GLUCOSE: 146 MG/DL (ref 68–131)
GLUCOSE SERPL-MCNC: 169 MG/DL (ref 70–110)
HBA1C MFR BLD: 6.7 % (ref 4–5.6)
HDLC SERPL-MCNC: 64 MG/DL (ref 40–75)
HDLC SERPL: 24.2 % (ref 20–50)
LDLC SERPL CALC-MCNC: 170.6 MG/DL (ref 63–159)
NONHDLC SERPL-MCNC: 200 MG/DL
POTASSIUM SERPL-SCNC: 4.2 MMOL/L (ref 3.5–5.1)
PROT SERPL-MCNC: 7.7 G/DL (ref 6–8.4)
SODIUM SERPL-SCNC: 139 MMOL/L (ref 136–145)
T4 FREE SERPL-MCNC: 1.31 NG/DL (ref 0.71–1.51)
TRIGL SERPL-MCNC: 147 MG/DL (ref 30–150)
TSH SERPL DL<=0.005 MIU/L-ACNC: 0.2 UIU/ML (ref 0.4–4)

## 2021-12-03 PROCEDURE — 84439 ASSAY OF FREE THYROXINE: CPT | Performed by: NURSE PRACTITIONER

## 2021-12-03 PROCEDURE — 82306 VITAMIN D 25 HYDROXY: CPT | Performed by: NURSE PRACTITIONER

## 2021-12-03 PROCEDURE — 80053 COMPREHEN METABOLIC PANEL: CPT | Performed by: NURSE PRACTITIONER

## 2021-12-03 PROCEDURE — 80061 LIPID PANEL: CPT | Performed by: NURSE PRACTITIONER

## 2021-12-03 PROCEDURE — 83036 HEMOGLOBIN GLYCOSYLATED A1C: CPT | Performed by: NURSE PRACTITIONER

## 2021-12-03 PROCEDURE — 84443 ASSAY THYROID STIM HORMONE: CPT | Performed by: NURSE PRACTITIONER

## 2021-12-03 PROCEDURE — 36415 COLL VENOUS BLD VENIPUNCTURE: CPT | Mod: PO | Performed by: NURSE PRACTITIONER

## 2021-12-06 ENCOUNTER — OFFICE VISIT (OUTPATIENT)
Dept: ENDOCRINOLOGY | Facility: CLINIC | Age: 66
End: 2021-12-06
Payer: MEDICARE

## 2021-12-06 VITALS
HEART RATE: 91 BPM | SYSTOLIC BLOOD PRESSURE: 130 MMHG | WEIGHT: 217.5 LBS | DIASTOLIC BLOOD PRESSURE: 78 MMHG | BODY MASS INDEX: 41.07 KG/M2 | HEIGHT: 61 IN

## 2021-12-06 DIAGNOSIS — Z78.9 STATIN INTOLERANCE: ICD-10-CM

## 2021-12-06 DIAGNOSIS — I10 BENIGN ESSENTIAL HTN: ICD-10-CM

## 2021-12-06 DIAGNOSIS — Z79.4 TYPE 2 DIABETES MELLITUS WITH STAGE 3A CHRONIC KIDNEY DISEASE, WITH LONG-TERM CURRENT USE OF INSULIN: ICD-10-CM

## 2021-12-06 DIAGNOSIS — E66.01 OBESITY, MORBID: ICD-10-CM

## 2021-12-06 DIAGNOSIS — E04.1 NODULAR THYROID DISEASE: ICD-10-CM

## 2021-12-06 DIAGNOSIS — N18.31 TYPE 2 DIABETES MELLITUS WITH STAGE 3A CHRONIC KIDNEY DISEASE, WITH LONG-TERM CURRENT USE OF INSULIN: ICD-10-CM

## 2021-12-06 DIAGNOSIS — E11.22 TYPE 2 DIABETES MELLITUS WITH STAGE 3A CHRONIC KIDNEY DISEASE, WITH LONG-TERM CURRENT USE OF INSULIN: ICD-10-CM

## 2021-12-06 DIAGNOSIS — E89.0 POST-OPERATIVE HYPOTHYROIDISM: ICD-10-CM

## 2021-12-06 DIAGNOSIS — Z79.4 TYPE 2 DIABETES MELLITUS WITH DIABETIC NEPHROPATHY, WITH LONG-TERM CURRENT USE OF INSULIN: Primary | ICD-10-CM

## 2021-12-06 DIAGNOSIS — E78.2 MIXED HYPERLIPIDEMIA: ICD-10-CM

## 2021-12-06 DIAGNOSIS — E11.21 TYPE 2 DIABETES MELLITUS WITH DIABETIC NEPHROPATHY, WITH LONG-TERM CURRENT USE OF INSULIN: Primary | ICD-10-CM

## 2021-12-06 PROCEDURE — 99214 PR OFFICE/OUTPT VISIT, EST, LEVL IV, 30-39 MIN: ICD-10-PCS | Mod: S$GLB,,, | Performed by: NURSE PRACTITIONER

## 2021-12-06 PROCEDURE — 99999 PR PBB SHADOW E&M-EST. PATIENT-LVL V: ICD-10-PCS | Mod: PBBFAC,,, | Performed by: NURSE PRACTITIONER

## 2021-12-06 PROCEDURE — 99215 OFFICE O/P EST HI 40 MIN: CPT | Mod: PBBFAC,PO | Performed by: NURSE PRACTITIONER

## 2021-12-06 PROCEDURE — 99214 OFFICE O/P EST MOD 30 MIN: CPT | Mod: S$GLB,,, | Performed by: NURSE PRACTITIONER

## 2021-12-06 PROCEDURE — 99999 PR PBB SHADOW E&M-EST. PATIENT-LVL V: CPT | Mod: PBBFAC,,, | Performed by: NURSE PRACTITIONER

## 2021-12-06 RX ORDER — LEVOTHYROXINE SODIUM 137 UG/1
137 TABLET ORAL DAILY
Qty: 30 TABLET | Refills: 11
Start: 2021-12-06 | End: 2022-10-10

## 2021-12-06 RX ORDER — INSULIN DEGLUDEC 200 U/ML
INJECTION, SOLUTION SUBCUTANEOUS
Qty: 3 PEN | Refills: 0 | COMMUNITY
Start: 2021-12-06 | End: 2022-01-05

## 2021-12-06 RX ORDER — LEVOTHYROXINE SODIUM 125 UG/1
125 TABLET ORAL
Qty: 30 TABLET | Refills: 11 | Status: SHIPPED | OUTPATIENT
Start: 2021-12-06 | End: 2021-12-06

## 2021-12-10 LAB
FINAL PATHOLOGIC DIAGNOSIS: NORMAL
GROSS: NORMAL
Lab: NORMAL
MICROSCOPIC EXAM: NORMAL

## 2021-12-14 ENCOUNTER — TELEPHONE (OUTPATIENT)
Dept: ENDOSCOPY | Facility: HOSPITAL | Age: 66
End: 2021-12-14
Payer: MEDICARE

## 2021-12-20 ENCOUNTER — LAB VISIT (OUTPATIENT)
Dept: LAB | Facility: HOSPITAL | Age: 66
End: 2021-12-20
Attending: ANESTHESIOLOGY
Payer: MEDICARE

## 2021-12-20 ENCOUNTER — TELEPHONE (OUTPATIENT)
Dept: ENDOCRINOLOGY | Facility: CLINIC | Age: 66
End: 2021-12-20

## 2021-12-20 ENCOUNTER — CLINICAL SUPPORT (OUTPATIENT)
Dept: ENDOCRINOLOGY | Facility: CLINIC | Age: 66
End: 2021-12-20
Payer: MEDICAID

## 2021-12-20 DIAGNOSIS — E89.0 POST-OPERATIVE HYPOTHYROIDISM: ICD-10-CM

## 2021-12-20 LAB — TSH SERPL DL<=0.005 MIU/L-ACNC: 0.56 UIU/ML (ref 0.4–4)

## 2021-12-20 PROCEDURE — 84443 ASSAY THYROID STIM HORMONE: CPT | Performed by: NURSE PRACTITIONER

## 2021-12-20 PROCEDURE — 36415 COLL VENOUS BLD VENIPUNCTURE: CPT | Mod: PO | Performed by: NURSE PRACTITIONER

## 2022-01-06 ENCOUNTER — PATIENT MESSAGE (OUTPATIENT)
Dept: SPINE | Facility: CLINIC | Age: 67
End: 2022-01-06
Payer: MEDICARE

## 2022-01-06 RX ORDER — INSULIN DEGLUDEC 100 U/ML
INJECTION, SOLUTION SUBCUTANEOUS
Qty: 4 PEN | Refills: 11 | Status: ON HOLD | OUTPATIENT
Start: 2022-01-06 | End: 2022-03-19 | Stop reason: SDUPTHER

## 2022-01-06 NOTE — TELEPHONE ENCOUNTER
Spoke with patient and scheduled her an appointment to see Nirmala Sawyer tomorrow, she indicated understanding.

## 2022-01-07 ENCOUNTER — OFFICE VISIT (OUTPATIENT)
Dept: SPINE | Facility: CLINIC | Age: 67
End: 2022-01-07
Payer: MEDICARE

## 2022-01-07 VITALS
SYSTOLIC BLOOD PRESSURE: 176 MMHG | WEIGHT: 220.38 LBS | DIASTOLIC BLOOD PRESSURE: 84 MMHG | HEIGHT: 61 IN | HEART RATE: 92 BPM | BODY MASS INDEX: 41.61 KG/M2

## 2022-01-07 DIAGNOSIS — M48.07 SPINAL STENOSIS, LUMBOSACRAL REGION: ICD-10-CM

## 2022-01-07 DIAGNOSIS — M54.50 CHRONIC BILATERAL LOW BACK PAIN WITHOUT SCIATICA: ICD-10-CM

## 2022-01-07 DIAGNOSIS — M48.062 NEUROGENIC CLAUDICATION DUE TO LUMBAR SPINAL STENOSIS: Primary | ICD-10-CM

## 2022-01-07 DIAGNOSIS — G89.29 CHRONIC BILATERAL LOW BACK PAIN WITHOUT SCIATICA: ICD-10-CM

## 2022-01-07 PROCEDURE — 99215 OFFICE O/P EST HI 40 MIN: CPT | Mod: PBBFAC,PN | Performed by: PHYSICIAN ASSISTANT

## 2022-01-07 PROCEDURE — 99213 OFFICE O/P EST LOW 20 MIN: CPT | Mod: HCNC,S$GLB,, | Performed by: PHYSICIAN ASSISTANT

## 2022-01-07 PROCEDURE — 99213 PR OFFICE/OUTPT VISIT, EST, LEVL III, 20-29 MIN: ICD-10-PCS | Mod: HCNC,S$GLB,, | Performed by: PHYSICIAN ASSISTANT

## 2022-01-07 PROCEDURE — 99999 PR PBB SHADOW E&M-EST. PATIENT-LVL V: CPT | Mod: PBBFAC,HCNC,, | Performed by: PHYSICIAN ASSISTANT

## 2022-01-07 PROCEDURE — 99999 PR PBB SHADOW E&M-EST. PATIENT-LVL V: ICD-10-PCS | Mod: PBBFAC,HCNC,, | Performed by: PHYSICIAN ASSISTANT

## 2022-01-07 RX ORDER — TIZANIDINE 4 MG/1
4 TABLET ORAL NIGHTLY PRN
Qty: 40 TABLET | Refills: 0 | Status: ON HOLD | OUTPATIENT
Start: 2022-01-07 | End: 2022-03-18 | Stop reason: HOSPADM

## 2022-01-07 NOTE — PROGRESS NOTES
Back and Spine Follow Up    Patient ID: Jessica Rojas is a 66 y.o. female.    Chief Complaint   Patient presents with    Low-back Pain     She has low back pain that radiates down the right leg. She has had the pain for the last 2 months that has gotten worse over the last week.       Review of Systems   Constitutional: Negative for activity change, appetite change, chills, fever and unexpected weight change.   HENT: Negative for tinnitus, trouble swallowing and voice change.    Respiratory: Negative for apnea, cough, chest tightness and shortness of breath.    Cardiovascular: Negative for chest pain and palpitations.   Gastrointestinal: Negative for constipation, diarrhea, nausea and vomiting.   Genitourinary: Negative for difficulty urinating, dysuria, frequency and urgency.   Musculoskeletal: Positive for back pain and myalgias. Negative for arthralgias, gait problem, neck pain and neck stiffness.   Skin: Negative for wound.   Neurological: Negative for dizziness, tremors, seizures, facial asymmetry, speech difficulty, weakness, light-headedness, numbness and headaches.   Psychiatric/Behavioral: Negative for confusion and decreased concentration.       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    Malignant carcinoid tumor of the bronchus and lung 5/2014    Mediastinal lymphadenopathy 8/26/2015    Obesity, morbid 5/6/2014    Parkinsons 10/2017    Pituitary adenoma 1980's    took parladel for 3 yrs    Pneumonia     Postoperative hypothyroidism 7/25/2017    - s/p total  thyroidectomy in 2016 (parathyroids intact) with post op hypothyroidism; on synthroid    Pulmonary nodules 9/11/2018    Thyroid disease     Wheeze 6/23/2014     Social History     Socioeconomic History    Marital status:    Occupational History    Occupation: housewife   Tobacco Use    Smoking status: Never Smoker    Smokeless tobacco: Never Used   Substance and Sexual Activity    Alcohol use: Not Currently     Alcohol/week: 0.0 standard drinks     Comment: I rarely drink    Drug use: No    Sexual activity: Yes     Partners: Male     Birth control/protection: None     Family History   Problem Relation Age of Onset    Cancer Maternal Aunt         breast    Cancer Maternal Grandmother         unknown    Cancer Maternal Aunt         unknown    Diabetes Mother     Glaucoma Mother     Heart disease Mother     Hypertension Mother     Diabetes Father     Heart disease Father     Hypertension Father     Diabetes Sister     Macular degeneration Sister     Alcohol abuse Sister     Breast cancer Paternal Aunt     Breast cancer Paternal Aunt     Breast cancer Cousin     Amblyopia Neg Hx     Blindness Neg Hx     Cataracts Neg Hx     Retinal detachment Neg Hx     Stroke Neg Hx     Strabismus Neg Hx     Thyroid disease Neg Hx      Review of patient's allergies indicates:   Allergen Reactions    Propranolol Nausea And Vomiting     Drops pressure and raised sugar    Lipitor [atorvastatin] Other (See Comments)     Myalgia, muscle pain       Current Outpatient Medications:     albuterol (PROVENTIL/VENTOLIN HFA) 90 mcg/actuation inhaler, Inhale 2 puffs into the lungs every 6 (six) hours as needed for Wheezing or Shortness of Breath., Disp: 18 g, Rfl: 0    alcohol swabs (ALCOHOL WIPES) PadM, Uses 5 daily, Disp: 400 each, Rfl: 4    amantadine HCL (SYMMETREL) 100 mg capsule, TAKE ONE CAPSULE BY MOUTH twice daily, Disp: 60 capsule, Rfl: 11    blood sugar diagnostic (TRUE METRIX GLUCOSE TEST  STRIP) Strp, Check bg 7-8 times/day, Disp: 250 each, Rfl: 12    blood-glucose transmitter (DEXCOM G6 TRANSMITTER) Lizbeth, Dispense 1 transmitter, Disp: 1 Device, Rfl: 3    carbidopa-levodopa  mg (SINEMET)  mg per tablet, TAKE ONE & ONE-HALF TABLET BY MOUTH THREE TIMES DAILY, Disp: 135 tablet, Rfl: 6    cholecalciferol, vitamin D3, 10 mcg (400 unit) Cap, Take 1,200 Units by mouth once daily., Disp: , Rfl:     cyanocobalamin (VITAMIN B-12) 1000 MCG tablet, Take 100 mcg by mouth once daily., Disp: , Rfl:     fish oil-omega-3 fatty acids 300-1,000 mg capsule, Take 4 capsules by mouth once daily., Disp: , Rfl:     fluticasone-salmeterol diskus inhaler 250-50 mcg, Inhale 1 puff into the lungs 2 (two) times daily., Disp: 60 each, Rfl: 11    gabapentin (NEURONTIN) 100 MG capsule, Take 1 capsule (100 mg total) by mouth 3 (three) times daily., Disp: 90 capsule, Rfl: 11    ibuprofen (ADVIL,MOTRIN) 200 MG tablet, Take 200 mg by mouth 2 (two) times a day., Disp: , Rfl:     insulin degludec (TRESIBA FLEXTOUCH U-100) 100 unit/mL (3 mL) insulin pen, INJECT 35 UNITS EVERY MORNING THEN 30 UNITS EVERY NIGHT AT BEDTIME, Disp: 4 pen, Rfl: 11    lancets 33 gauge Misc, Checks bg 7-8 times a dayTrue metrix, Disp: 250 each, Rfl: 11    lancing device Misc, Checks bg 7-8 times a day, Disp: 1 each, Rfl: 0    levothyroxine (SYNTHROID) 137 MCG Tab tablet, Take 1 tablet (137 mcg total) by mouth once daily., Disp: 30 tablet, Rfl: 11    melatonin 3 mg Tab, Take 6 mg by mouth. , Disp: , Rfl:     meloxicam (MOBIC) 15 MG tablet, Take 15 mg by mouth once daily., Disp: , Rfl:     montelukast (SINGULAIR) 10 mg tablet, take ONE tablet BY MOUTH once DAILY EVERY EVENING, Disp: 30 tablet, Rfl: 11    MULTIVITAMIN W-MINERALS/LUTEIN (CENTRUM SILVER ORAL), Take 1 tablet by mouth once daily. , Disp: , Rfl:     NOVOLOG FLEXPEN U-100 INSULIN 100 unit/mL (3 mL) InPn pen, Inject 30 Units into the skin. Per sliding scale, Disp: , Rfl:      "pen needle, diabetic (BD ULTRA-FINE MINI PEN NEEDLE) 31 gauge x 3/16" Ndle, Uses 5 daily, Disp: 200 each, Rfl: 12    pramipexole (MIRAPEX) 0.25 MG tablet, TAKE ONE-HALF to ONE TABLET BY MOUTH THREE TIMES DAILY as directed, Disp: 90 tablet, Rfl: 11    selegiline (ELDEPRYL) 5 mg Cap, take ONE capsule BY MOUTH twice daily, Disp: 60 capsule, Rfl: 6    simvastatin (ZOCOR) 10 MG tablet, Take 10 mg by mouth., Disp: , Rfl:     UNABLE TO FIND, Place 0.5 mLs under the tongue 2 (two) times a day. medication name:CBD Oil, Disp: , Rfl:     valsartan-hydrochlorothiazide (DIOVAN-HCT) 320-25 mg per tablet, TAKE ONE TABLET BY MOUTH ONCE DAILY, Disp: 90 tablet, Rfl: 3    tiZANidine (ZANAFLEX) 4 MG tablet, Take 1 tablet (4 mg total) by mouth nightly as needed (muscle spasms/ pain)., Disp: 40 tablet, Rfl: 0    Vitals:    01/07/22 0822   BP: (!) 176/84   BP Location: Right arm   Patient Position: Sitting   BP Method: Large (Automatic)   Pulse: 92   Weight: 100 kg (220 lb 5.6 oz)   Height: 5' 1" (1.549 m)       Physical Exam  Vitals and nursing note reviewed.   Constitutional:       Appearance: She is well-developed.   HENT:      Head: Normocephalic and atraumatic.   Eyes:      Pupils: Pupils are equal, round, and reactive to light.   Cardiovascular:      Rate and Rhythm: Normal rate.   Pulmonary:      Effort: Pulmonary effort is normal.   Musculoskeletal:         General: Normal range of motion.      Cervical back: Normal range of motion and neck supple.   Skin:     General: Skin is warm and dry.   Neurological:      Mental Status: She is alert and oriented to person, place, and time.      Coordination: Finger-Nose-Finger Test, Heel to Shin Test and Romberg Test normal.      Gait: Gait is intact. Tandem walk normal.      Deep Tendon Reflexes:      Reflex Scores:       Tricep reflexes are 1+ on the right side and 1+ on the left side.       Bicep reflexes are 1+ on the right side and 1+ on the left side.       Brachioradialis " reflexes are 1+ on the right side and 1+ on the left side.       Patellar reflexes are 1+ on the right side and 1+ on the left side.       Achilles reflexes are 1+ on the right side and 1+ on the left side.  Psychiatric:         Speech: Speech normal.         Behavior: Behavior normal.         Thought Content: Thought content normal.         Judgment: Judgment normal.         Neurologic Exam     Mental Status   Oriented to person, place, and time.   Oriented to person.   Oriented to place.   Oriented to time.   Follows 3 step commands.   Attention: normal. Concentration: normal.   Speech: speech is normal   Level of consciousness: alert  Knowledge: consistent with education.   Able to name object. Able to read. Able to repeat. Able to write. Normal comprehension.     Cranial Nerves     CN II   Visual acuity: normal  Right visual field deficit: none  Left visual field deficit: none     CN III, IV, VI   Pupils are equal, round, and reactive to light.  Right pupil: Size: 3 mm. Shape: regular. Reactivity: brisk. Consensual response: intact.   Left pupil: Size: 3 mm. Shape: regular. Reactivity: brisk. Consensual response: intact.   CN III: no CN III palsy  CN VI: no CN VI palsy  Nystagmus: none   Diplopia: none  Ophthalmoparesis: none  Conjugate gaze: present    CN V   Right facial sensation deficit: none  Left facial sensation deficit: none    CN VII   Right facial weakness: none  Left facial weakness: none    CN VIII   Hearing: intact    CN IX, X   CN IX normal.   CN X normal.     CN XI   Right sternocleidomastoid strength: normal  Left sternocleidomastoid strength: normal  Right trapezius strength: normal  Left trapezius strength: normal    CN XII   Fasciculations: absent  Tongue deviation: none    Motor Exam   Muscle bulk: normal  Overall muscle tone: normal  Right arm pronator drift: absent  Left arm pronator drift: absent    Strength   Right neck flexion: 5/5  Left neck flexion: 5/5  Right neck extension: 5/5  Left  neck extension: 5/5  Right deltoid: 5/5  Left deltoid: 5/5  Right biceps: 5/5  Left biceps: 5/  Right triceps: 5  Left triceps: 5/  Right wrist flexion:   Left wrist flexion: 5  Right wrist extension: 5/  Left wrist extension:   Right interossei:   Left interossei: /  Right abdominals: /  Left abdominals:   Right iliopsoas:   Left iliopsoas:   Right quadriceps: 5  Left quadriceps: 5  Right hamstrin/5  Left hamstrin/5  Right glutei:   Left glutei:   Right anterior tibial:   Left anterior tibial:   Right posterior tibial:   Left posterior tibial:   Right peroneal:   Left peroneal:   Right gastroc:   Left gastroc:     Sensory Exam   Right arm light touch: normal  Left arm light touch: normal  Right leg light touch: normal  Left leg light touch: normal  Right arm vibration: normal  Left arm vibration: normal  Right arm pinprick: normal  Left arm pinprick: normal    Gait, Coordination, and Reflexes     Gait  Gait: normal    Coordination   Romberg: negative  Finger to nose coordination: normal  Heel to shin coordination: normal  Tandem walking coordination: normal    Tremor   Resting tremor: absent  Intention tremor: absent  Action tremor: absent    Reflexes   Right brachioradialis: 1+  Left brachioradialis: 1+  Right biceps: 1+  Left biceps: 1+  Right triceps: 1+  Left triceps: 1+  Right patellar: 1+  Left patellar: 1+  Right achilles: 1+  Left achilles: 1+  Right Chase: absent  Left Chase: absent  Right ankle clonus: absent  Left ankle clonus: absent      Provider dictation:    66 year old female with Parkinsons, history of lung cancer, hypertension, obesity, diabetes, kidney disease  Presents for follow up of back pain after completing [facility].  Recall, she has experienced back pain since  after thoracotomy.  In the past, pain has been felt across the lower back region and radiating up to the upper thoracic spine area without any  "radicular pain, numbness, tingling.  She also felt numbness in the right leg just below the right knee extending up through the right lateral thigh to the groin area in the afternoons to evenings.  " she was seen by Dr. Herrera (neurosurgery) in 2017 - no surgery recommended; referred to pain management, however did not go as Medicaid did not cover Pain Management Services at the time.  Was seen by Dr. Sahu (neurosurgery) in 2018 -  she was referred for injection and received L3-4, L4-5 facet injection in September 2018."    She has tried gabapentin at night, Advil morning and night, Tylenol mid day, zanaflex., and CBD oil for pain control. Currently takes gabapentin and ibuprofen.   Underwent physical therapy 5 years ago and again recently from 6/30/21 - 10/11/21; 22 visits.   She underwent 09/18/2018 bilateral L3-4 and L4-5 facet injection without improvement.    She has now completed 22 visits of [facility].  She has experienced 1 months of worsening pain.  Pain is felt in the lower back with radiation to the right posterior leg to the calf with standing.  She has cramps in the legs.  Pain improves with sitting and leaning forward.  .    On exam, there is 5/5 strength with 1+ DTR and no sensory deficits.  Gait and station fluid.  Denies bowel/ bladder dysfunction.  Full range of motion of the upper and lower extremities. No pain with axial facet loading.  No SI joint pain on palpation.  No pain with lumbar flexion/ extension.    MRI lumbar spine from 09/18/2018 independently reviewed.  There is L4 anterolisthesis on L5 with broad-based disc, facet hypertrophy, ligamentous hypertrophy at L4-5; this results in moderate central canal narrowing and right greater than left foraminal stenosis.    In conclusion, Ms. Lopez has lower back and right leg pain/ neurogenic claudication likely due to L4/5 lumbar spondylosis and stenosis.  She is not improving with extensive PT and medications.  At this time, I " recommend updating MRI and xray of the lumbar spine to further consider DEYA.  Follow up after imaging.  I am prescribing/ refilling zanaflex as it has helped in the past..    Visit Diagnosis:  Neurogenic claudication due to lumbar spinal stenosis  -     X-Ray Lumbar Complete Including Flex And Ext; Future; Expected date: 01/07/2022  -     MRI Lumbar Spine Without Contrast; Future; Expected date: 01/07/2022    Spinal stenosis, lumbosacral region  -     X-Ray Lumbar Complete Including Flex And Ext; Future; Expected date: 01/07/2022  -     MRI Lumbar Spine Without Contrast; Future; Expected date: 01/07/2022    Chronic bilateral low back pain without sciatica  -     tiZANidine (ZANAFLEX) 4 MG tablet; Take 1 tablet (4 mg total) by mouth nightly as needed (muscle spasms/ pain).  Dispense: 40 tablet; Refill: 0        Total time spent counseling greater than fifty percent of total visit time.  Counseling included discussion regarding imaging findings, diagnosis possibilities, treatment options, risks and benefits.   The patient had many questions regarding the options and long-term effects.

## 2022-01-14 ENCOUNTER — TELEPHONE (OUTPATIENT)
Dept: ENDOSCOPY | Facility: HOSPITAL | Age: 67
End: 2022-01-14
Payer: MEDICARE

## 2022-01-14 DIAGNOSIS — K63.5 POLYP OF COLON, UNSPECIFIED PART OF COLON, UNSPECIFIED TYPE: Primary | ICD-10-CM

## 2022-01-26 ENCOUNTER — HOSPITAL ENCOUNTER (OUTPATIENT)
Dept: RADIOLOGY | Facility: HOSPITAL | Age: 67
Discharge: HOME | End: 2022-01-26
Attending: PHYSICIAN ASSISTANT
Payer: MEDICARE

## 2022-01-26 DIAGNOSIS — M48.07 SPINAL STENOSIS, LUMBOSACRAL REGION: ICD-10-CM

## 2022-01-26 DIAGNOSIS — M48.062 NEUROGENIC CLAUDICATION DUE TO LUMBAR SPINAL STENOSIS: ICD-10-CM

## 2022-01-26 PROCEDURE — 72148 MRI LUMBAR SPINE W/O DYE: CPT | Mod: TC,HCNC,PO

## 2022-01-26 PROCEDURE — 72114 XR LUMBAR SPINE 5 VIEW WITH FLEX AND EXT: ICD-10-PCS | Mod: 26,HCNC,, | Performed by: RADIOLOGY

## 2022-01-26 PROCEDURE — 72114 X-RAY EXAM L-S SPINE BENDING: CPT | Mod: TC,HCNC,FY,PO

## 2022-01-26 PROCEDURE — 72148 MRI LUMBAR SPINE W/O DYE: CPT | Mod: 26,HCNC,, | Performed by: RADIOLOGY

## 2022-01-26 PROCEDURE — 72148 MRI LUMBAR SPINE WITHOUT CONTRAST: ICD-10-PCS | Mod: 26,HCNC,, | Performed by: RADIOLOGY

## 2022-01-26 PROCEDURE — 72114 X-RAY EXAM L-S SPINE BENDING: CPT | Mod: 26,HCNC,, | Performed by: RADIOLOGY

## 2022-01-27 ENCOUNTER — OFFICE VISIT (OUTPATIENT)
Dept: SPINE | Facility: CLINIC | Age: 67
End: 2022-01-27
Payer: MEDICARE

## 2022-01-27 VITALS
HEART RATE: 88 BPM | WEIGHT: 220.44 LBS | HEIGHT: 61 IN | DIASTOLIC BLOOD PRESSURE: 84 MMHG | SYSTOLIC BLOOD PRESSURE: 164 MMHG | BODY MASS INDEX: 41.62 KG/M2

## 2022-01-27 DIAGNOSIS — G89.29 CHRONIC BILATERAL LOW BACK PAIN WITH RIGHT-SIDED SCIATICA: ICD-10-CM

## 2022-01-27 DIAGNOSIS — M54.41 CHRONIC BILATERAL LOW BACK PAIN WITH RIGHT-SIDED SCIATICA: ICD-10-CM

## 2022-01-27 DIAGNOSIS — M48.07 SPINAL STENOSIS, LUMBOSACRAL REGION: Primary | ICD-10-CM

## 2022-01-27 PROCEDURE — 3288F FALL RISK ASSESSMENT DOCD: CPT | Mod: HCNC,CPTII,S$GLB, | Performed by: PHYSICIAN ASSISTANT

## 2022-01-27 PROCEDURE — 1101F PT FALLS ASSESS-DOCD LE1/YR: CPT | Mod: HCNC,CPTII,S$GLB, | Performed by: PHYSICIAN ASSISTANT

## 2022-01-27 PROCEDURE — 1159F MED LIST DOCD IN RCRD: CPT | Mod: HCNC,CPTII,S$GLB, | Performed by: PHYSICIAN ASSISTANT

## 2022-01-27 PROCEDURE — 1159F PR MEDICATION LIST DOCUMENTED IN MEDICAL RECORD: ICD-10-PCS | Mod: HCNC,CPTII,S$GLB, | Performed by: PHYSICIAN ASSISTANT

## 2022-01-27 PROCEDURE — 99214 OFFICE O/P EST MOD 30 MIN: CPT | Mod: HCNC,S$GLB,, | Performed by: PHYSICIAN ASSISTANT

## 2022-01-27 PROCEDURE — 3008F PR BODY MASS INDEX (BMI) DOCUMENTED: ICD-10-PCS | Mod: HCNC,CPTII,S$GLB, | Performed by: PHYSICIAN ASSISTANT

## 2022-01-27 PROCEDURE — 1101F PR PT FALLS ASSESS DOC 0-1 FALLS W/OUT INJ PAST YR: ICD-10-PCS | Mod: HCNC,CPTII,S$GLB, | Performed by: PHYSICIAN ASSISTANT

## 2022-01-27 PROCEDURE — 1125F PR PAIN SEVERITY QUANTIFIED, PAIN PRESENT: ICD-10-PCS | Mod: HCNC,CPTII,S$GLB, | Performed by: PHYSICIAN ASSISTANT

## 2022-01-27 PROCEDURE — 99214 PR OFFICE/OUTPT VISIT, EST, LEVL IV, 30-39 MIN: ICD-10-PCS | Mod: HCNC,S$GLB,, | Performed by: PHYSICIAN ASSISTANT

## 2022-01-27 PROCEDURE — 3077F PR MOST RECENT SYSTOLIC BLOOD PRESSURE >= 140 MM HG: ICD-10-PCS | Mod: HCNC,CPTII,S$GLB, | Performed by: PHYSICIAN ASSISTANT

## 2022-01-27 PROCEDURE — 1125F AMNT PAIN NOTED PAIN PRSNT: CPT | Mod: HCNC,CPTII,S$GLB, | Performed by: PHYSICIAN ASSISTANT

## 2022-01-27 PROCEDURE — 3077F SYST BP >= 140 MM HG: CPT | Mod: HCNC,CPTII,S$GLB, | Performed by: PHYSICIAN ASSISTANT

## 2022-01-27 PROCEDURE — 3288F PR FALLS RISK ASSESSMENT DOCUMENTED: ICD-10-PCS | Mod: HCNC,CPTII,S$GLB, | Performed by: PHYSICIAN ASSISTANT

## 2022-01-27 PROCEDURE — 99999 PR PBB SHADOW E&M-EST. PATIENT-LVL II: CPT | Mod: PBBFAC,HCNC,, | Performed by: PHYSICIAN ASSISTANT

## 2022-01-27 PROCEDURE — 3079F DIAST BP 80-89 MM HG: CPT | Mod: HCNC,CPTII,S$GLB, | Performed by: PHYSICIAN ASSISTANT

## 2022-01-27 PROCEDURE — 99999 PR PBB SHADOW E&M-EST. PATIENT-LVL II: ICD-10-PCS | Mod: PBBFAC,HCNC,, | Performed by: PHYSICIAN ASSISTANT

## 2022-01-27 PROCEDURE — 3008F BODY MASS INDEX DOCD: CPT | Mod: HCNC,CPTII,S$GLB, | Performed by: PHYSICIAN ASSISTANT

## 2022-01-27 PROCEDURE — 3079F PR MOST RECENT DIASTOLIC BLOOD PRESSURE 80-89 MM HG: ICD-10-PCS | Mod: HCNC,CPTII,S$GLB, | Performed by: PHYSICIAN ASSISTANT

## 2022-01-27 NOTE — H&P (VIEW-ONLY)
Back and Spine Follow Up    Patient ID: Jessica Rojas is a 66 y.o. female.    Chief Complaint   Patient presents with    Follow-up     X-ray & MRI results for low back pain.       Review of Systems   Constitutional: Negative for activity change, appetite change, chills, fever and unexpected weight change.   HENT: Negative for tinnitus, trouble swallowing and voice change.    Respiratory: Negative for apnea, cough, chest tightness and shortness of breath.    Cardiovascular: Negative for chest pain and palpitations.   Gastrointestinal: Negative for constipation, diarrhea, nausea and vomiting.   Genitourinary: Negative for difficulty urinating, dysuria, frequency and urgency.   Musculoskeletal: Positive for back pain and myalgias. Negative for arthralgias, gait problem, neck pain and neck stiffness.   Skin: Negative for wound.   Neurological: Negative for dizziness, tremors, seizures, facial asymmetry, speech difficulty, weakness, light-headedness, numbness and headaches.   Psychiatric/Behavioral: Negative for confusion and decreased concentration.       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    Malignant carcinoid tumor of the bronchus and lung 5/2014    Mediastinal lymphadenopathy 8/26/2015    Obesity, morbid 5/6/2014    Parkinsons 10/2017    Pituitary adenoma 1980's    took parladel for 3 yrs    Pneumonia     Postoperative hypothyroidism 7/25/2017    - s/p total thyroidectomy in 2016 (parathyroids intact) with post op hypothyroidism; on synthroid    Pulmonary nodules  9/11/2018    Thyroid disease     Wheeze 6/23/2014     Social History     Socioeconomic History    Marital status:    Occupational History    Occupation: housewife   Tobacco Use    Smoking status: Never Smoker    Smokeless tobacco: Never Used   Substance and Sexual Activity    Alcohol use: Not Currently     Alcohol/week: 0.0 standard drinks     Comment: I rarely drink    Drug use: No    Sexual activity: Yes     Partners: Male     Birth control/protection: None     Family History   Problem Relation Age of Onset    Cancer Maternal Aunt         breast    Cancer Maternal Grandmother         unknown    Cancer Maternal Aunt         unknown    Diabetes Mother     Glaucoma Mother     Heart disease Mother     Hypertension Mother     Diabetes Father     Heart disease Father     Hypertension Father     Diabetes Sister     Macular degeneration Sister     Alcohol abuse Sister     Breast cancer Paternal Aunt     Breast cancer Paternal Aunt     Breast cancer Cousin     Amblyopia Neg Hx     Blindness Neg Hx     Cataracts Neg Hx     Retinal detachment Neg Hx     Stroke Neg Hx     Strabismus Neg Hx     Thyroid disease Neg Hx      Review of patient's allergies indicates:   Allergen Reactions    Propranolol Nausea And Vomiting     Drops pressure and raised sugar    Lipitor [atorvastatin] Other (See Comments)     Myalgia, muscle pain       Current Outpatient Medications:     albuterol (PROVENTIL/VENTOLIN HFA) 90 mcg/actuation inhaler, Inhale 2 puffs into the lungs every 6 (six) hours as needed for Wheezing or Shortness of Breath., Disp: 18 g, Rfl: 0    alcohol swabs (ALCOHOL WIPES) PadM, Uses 5 daily, Disp: 400 each, Rfl: 4    amantadine HCL (SYMMETREL) 100 mg capsule, TAKE ONE CAPSULE BY MOUTH twice daily, Disp: 60 capsule, Rfl: 11    blood sugar diagnostic (TRUE METRIX GLUCOSE TEST STRIP) Strp, Check bg 7-8 times/day, Disp: 250 each, Rfl: 12    blood-glucose transmitter (DEXCOM G6  "TRANSMITTER) Lizbeth, Dispense 1 transmitter, Disp: 1 Device, Rfl: 3    carbidopa-levodopa  mg (SINEMET)  mg per tablet, TAKE ONE & ONE-HALF TABLET BY MOUTH THREE TIMES DAILY, Disp: 135 tablet, Rfl: 6    cholecalciferol, vitamin D3, 10 mcg (400 unit) Cap, Take 1,200 Units by mouth once daily., Disp: , Rfl:     cyanocobalamin (VITAMIN B-12) 1000 MCG tablet, Take 100 mcg by mouth once daily., Disp: , Rfl:     fish oil-omega-3 fatty acids 300-1,000 mg capsule, Take 4 capsules by mouth once daily., Disp: , Rfl:     fluticasone-salmeterol diskus inhaler 250-50 mcg, Inhale 1 puff into the lungs 2 (two) times daily., Disp: 60 each, Rfl: 11    gabapentin (NEURONTIN) 100 MG capsule, Take 1 capsule (100 mg total) by mouth 3 (three) times daily., Disp: 90 capsule, Rfl: 11    ibuprofen (ADVIL,MOTRIN) 200 MG tablet, Take 200 mg by mouth 2 (two) times a day., Disp: , Rfl:     insulin degludec (TRESIBA FLEXTOUCH U-100) 100 unit/mL (3 mL) insulin pen, INJECT 35 UNITS EVERY MORNING THEN 30 UNITS EVERY NIGHT AT BEDTIME, Disp: 4 pen, Rfl: 11    lancets 33 gauge Misc, Checks bg 7-8 times a dayTrue metrix, Disp: 250 each, Rfl: 11    lancing device Misc, Checks bg 7-8 times a day, Disp: 1 each, Rfl: 0    levothyroxine (SYNTHROID) 137 MCG Tab tablet, Take 1 tablet (137 mcg total) by mouth once daily., Disp: 30 tablet, Rfl: 11    melatonin 3 mg Tab, Take 6 mg by mouth. , Disp: , Rfl:     meloxicam (MOBIC) 15 MG tablet, Take 15 mg by mouth once daily., Disp: , Rfl:     montelukast (SINGULAIR) 10 mg tablet, take ONE tablet BY MOUTH once DAILY EVERY EVENING, Disp: 30 tablet, Rfl: 11    MULTIVITAMIN W-MINERALS/LUTEIN (CENTRUM SILVER ORAL), Take 1 tablet by mouth once daily. , Disp: , Rfl:     NOVOLOG FLEXPEN U-100 INSULIN 100 unit/mL (3 mL) InPn pen, Inject 30 Units into the skin. Per sliding scale, Disp: , Rfl:     pen needle, diabetic (BD ULTRA-FINE MINI PEN NEEDLE) 31 gauge x 3/16" Ndle, Uses 5 daily, Disp: 200 " "each, Rfl: 12    pramipexole (MIRAPEX) 0.25 MG tablet, TAKE ONE-HALF to ONE TABLET BY MOUTH THREE TIMES DAILY as directed, Disp: 90 tablet, Rfl: 11    selegiline (ELDEPRYL) 5 mg Cap, take ONE capsule BY MOUTH twice daily, Disp: 60 capsule, Rfl: 6    simvastatin (ZOCOR) 10 MG tablet, Take 10 mg by mouth., Disp: , Rfl:     tiZANidine (ZANAFLEX) 4 MG tablet, Take 1 tablet (4 mg total) by mouth nightly as needed (muscle spasms/ pain)., Disp: 40 tablet, Rfl: 0    UNABLE TO FIND, Place 0.5 mLs under the tongue 2 (two) times a day. medication name:CBD Oil, Disp: , Rfl:     valsartan-hydrochlorothiazide (DIOVAN-HCT) 320-25 mg per tablet, TAKE ONE TABLET BY MOUTH ONCE DAILY, Disp: 90 tablet, Rfl: 3    Vitals:    01/27/22 0828   BP: (!) 164/84   BP Location: Left arm   Patient Position: Sitting   BP Method: Large (Automatic)   Pulse: 88   Weight: 100 kg (220 lb 7.4 oz)   Height: 5' 1" (1.549 m)       Physical Exam  Vitals and nursing note reviewed.   Constitutional:       Appearance: She is well-developed.   HENT:      Head: Normocephalic and atraumatic.   Eyes:      Pupils: Pupils are equal, round, and reactive to light.   Cardiovascular:      Rate and Rhythm: Normal rate.   Pulmonary:      Effort: Pulmonary effort is normal.   Musculoskeletal:         General: Normal range of motion.      Cervical back: Normal range of motion and neck supple.   Skin:     General: Skin is warm and dry.   Neurological:      Mental Status: She is alert and oriented to person, place, and time.      Coordination: Finger-Nose-Finger Test, Heel to Shin Test and Romberg Test normal.      Gait: Gait is intact. Tandem walk normal.      Deep Tendon Reflexes:      Reflex Scores:       Tricep reflexes are 1+ on the right side and 1+ on the left side.       Bicep reflexes are 1+ on the right side and 1+ on the left side.       Brachioradialis reflexes are 1+ on the right side and 1+ on the left side.       Patellar reflexes are 1+ on the right side " and 1+ on the left side.       Achilles reflexes are 1+ on the right side and 1+ on the left side.  Psychiatric:         Speech: Speech normal.         Behavior: Behavior normal.         Thought Content: Thought content normal.         Judgment: Judgment normal.         Neurologic Exam     Mental Status   Oriented to person, place, and time.   Oriented to person.   Oriented to place.   Oriented to time.   Follows 3 step commands.   Attention: normal. Concentration: normal.   Speech: speech is normal   Level of consciousness: alert  Knowledge: consistent with education.   Able to name object. Able to read. Able to repeat. Able to write. Normal comprehension.     Cranial Nerves     CN II   Visual acuity: normal  Right visual field deficit: none  Left visual field deficit: none     CN III, IV, VI   Pupils are equal, round, and reactive to light.  Right pupil: Size: 3 mm. Shape: regular. Reactivity: brisk. Consensual response: intact.   Left pupil: Size: 3 mm. Shape: regular. Reactivity: brisk. Consensual response: intact.   CN III: no CN III palsy  CN VI: no CN VI palsy  Nystagmus: none   Diplopia: none  Ophthalmoparesis: none  Conjugate gaze: present    CN V   Right facial sensation deficit: none  Left facial sensation deficit: none    CN VII   Right facial weakness: none  Left facial weakness: none    CN VIII   Hearing: intact    CN IX, X   CN IX normal.   CN X normal.     CN XI   Right sternocleidomastoid strength: normal  Left sternocleidomastoid strength: normal  Right trapezius strength: normal  Left trapezius strength: normal    CN XII   Fasciculations: absent  Tongue deviation: none    Motor Exam   Muscle bulk: normal  Overall muscle tone: normal  Right arm pronator drift: absent  Left arm pronator drift: absent    Strength   Right neck flexion: 5/5  Left neck flexion: 5/5  Right neck extension: 5/5  Left neck extension: 5/5  Right deltoid: 5/5  Left deltoid: 5/5  Right biceps: 5/5  Left biceps: 5/5  Right  triceps: 5/5  Left triceps: 5/5  Right wrist flexion: 5/5  Left wrist flexion: 5/5  Right wrist extension: 5/5  Left wrist extension: 5/5  Right interossei: 5/5  Left interossei: 5/  Right abdominals: 5/5  Left abdominals: 5/5  Right iliopsoas: 5/  Left iliopsoas: 5/  Right quadriceps: 5  Left quadriceps:   Right hamstrin/5  Left hamstrin/5  Right glutei:   Left glutei:   Right anterior tibial: 5  Left anterior tibial: 5  Right posterior tibial:   Left posterior tibial:   Right peroneal:   Left peroneal:   Right gastroc:   Left gastroc:     Sensory Exam   Right arm light touch: normal  Left arm light touch: normal  Right leg light touch: normal  Left leg light touch: normal  Right arm vibration: normal  Left arm vibration: normal  Right arm pinprick: normal  Left arm pinprick: normal    Gait, Coordination, and Reflexes     Gait  Gait: normal    Coordination   Romberg: negative  Finger to nose coordination: normal  Heel to shin coordination: normal  Tandem walking coordination: normal    Tremor   Resting tremor: absent  Intention tremor: absent  Action tremor: absent    Reflexes   Right brachioradialis: 1+  Left brachioradialis: 1+  Right biceps: 1+  Left biceps: 1+  Right triceps: 1+  Left triceps: 1+  Right patellar: 1+  Left patellar: 1+  Right achilles: 1+  Left achilles: 1+  Right Chase: absent  Left Chase: absent  Right ankle clonus: absent  Left ankle clonus: absent      Provider dictation:    66 year old female with Parkinsons, history of lung cancer, hypertension, obesity, diabetes, kidney disease  Presents for follow up of back pain after undergoing imaging.  Recall, she has experienced back pain since 2014 after thoracotomy.  In the past, pain has been felt across the lower back region and radiating up to the upper thoracic spine area without any radicular pain, numbness, tingling.  She also felt numbness in the right leg just below the right knee extending  "up through the right lateral thigh to the groin area in the afternoons to evenings.  " she was seen by Dr. Herrera (neurosurgery) in 2017 - no surgery recommended; referred to pain management, however did not go as Medicaid did not cover Pain Management Services at the time.  Was seen by Dr. Sahu (neurosurgery) in 2018 -  she was referred for injection and received L3-4, L4-5 facet injection in September 2018."    She has tried gabapentin at night, Advil morning and night, Tylenol mid day, zanaflex., and CBD oil for pain control. Currently takes gabapentin and ibuprofen.   Underwent physical therapy 5 years ago and healthy Back PT from 6/30/21 - 10/11/21; 22 visits.   She underwent 09/18/2018 bilateral L3-4 and L4-5 facet injection without improvement.    Pain continues and is currently felt in the lower back with radiation to the right posterior/lateral leg to the calf with standing.  Pain eases with sitting.  She feels numbness in the right leg in a similar distribution.  She has cramps in the legs.  Pain improves with sitting and leaning forward.  Overall no major changes since last assessment.    On exam, there is 5/5 strength with 1+ DTR and no sensory deficits.  Gait and station fluid.  Denies bowel/ bladder dysfunction.  Full range of motion of the upper and lower extremities. No pain with axial facet loading.  No SI joint pain on palpation.  No pain with lumbar flexion/ extension.    MRI lumbar spine from 09/18/2018 independently reviewed.  There is L4 anterolisthesis on L5 with broad-based disc, facet hypertrophy, ligamentous hypertrophy at L4-5; this results in moderate central canal narrowing and right greater than left foraminal stenosis.    Xray with flex/ extension views and MRI lumbar spine from 1/26/22 reviewed.  There is L3/4 facet and ligamentous hypertrophy with moderate central canal narrowing.  L4/5 anterolisthesis (progressed from 2018) with L4 forward on L5, broad based disk, and facet " arthropathy producing moderate-severe central canal narrowing and bilateral facet effusions.  No instability on flexion/ extension.    In conclusion, Ms. Lopez has lower back and right leg pain/ neurogenic claudication likely due to L4/5 lumbar spondylosis and stenosis.  She is not improving with extensive PT and medications.  At this time, I recommend L4/5 DEYA and she wouuld like to proceed.  We will arrange.  If no improvement we will refer back to neurosurgery for further assessment.  Follow up after DEYA.      Visit Diagnosis:  Spinal stenosis, lumbosacral region  -     Procedure Order to Pain Management; Future; Expected date: 01/27/2022    Chronic bilateral low back pain with right-sided sciatica  -     Procedure Order to Pain Management; Future; Expected date: 01/27/2022        Total time spent counseling greater than fifty percent of total visit time.  Counseling included discussion regarding imaging findings, diagnosis possibilities, treatment options, risks and benefits.   The patient had many questions regarding the options and long-term effects.

## 2022-01-28 ENCOUNTER — TELEPHONE (OUTPATIENT)
Dept: PAIN MEDICINE | Facility: CLINIC | Age: 67
End: 2022-01-28
Payer: MEDICARE

## 2022-01-28 DIAGNOSIS — Z01.818 PRE-OP EVALUATION: ICD-10-CM

## 2022-01-28 DIAGNOSIS — M54.16 LUMBAR RADICULOPATHY: Primary | ICD-10-CM

## 2022-01-28 DIAGNOSIS — Z11.9 SCREENING EXAMINATION FOR INFECTIOUS DISEASE: Primary | ICD-10-CM

## 2022-01-28 RX ORDER — ALPRAZOLAM 0.5 MG/1
1 TABLET, ORALLY DISINTEGRATING ORAL ONCE AS NEEDED
Status: CANCELLED | OUTPATIENT
Start: 2022-01-28 | End: 2033-06-26

## 2022-01-28 NOTE — TELEPHONE ENCOUNTER
----- Message from Melissa Russell sent at 1/28/2022  4:32 PM CST -----  Contact: Patient  Type:  Needs Medical Advice    Who Called:  Patient    Would the patient rather a call back or a response via [facility]?  Call    Best Call Back Number:  [phone] (home)     Additional Information:  Patient needs to speak to anabella about the appt she just scheduled     I advised patient I could change the appt for her but she said no she would rather speak to Anabella

## 2022-01-28 NOTE — TELEPHONE ENCOUNTER
----- Message from Bebeto Eldridge MD sent at 1/27/2022  3:17 PM CST -----  Ok to schedule for L5/S1 DEYA to the right.    Please get updated CBC

## 2022-01-28 NOTE — TELEPHONE ENCOUNTER
----- Message from Kimmy Espino sent at 1/28/2022 12:06 PM CST -----  Type:  Patient Returning Call    Who Called:  Jessica  Who Left Message for Patient:  Anabella  Does the patient know what this is regarding?:  scheduling    Best Call Back Number: [phone]  Additional Information:  thank you

## 2022-01-30 ENCOUNTER — LAB VISIT (OUTPATIENT)
Dept: FAMILY MEDICINE | Facility: CLINIC | Age: 67
End: 2022-01-30
Payer: MEDICARE

## 2022-01-30 DIAGNOSIS — Z11.9 SCREENING EXAMINATION FOR INFECTIOUS DISEASE: ICD-10-CM

## 2022-01-30 PROCEDURE — U0005 INFEC AGEN DETEC AMPLI PROBE: HCPCS | Performed by: ANESTHESIOLOGY

## 2022-01-30 PROCEDURE — U0003 INFECTIOUS AGENT DETECTION BY NUCLEIC ACID (DNA OR RNA); SEVERE ACUTE RESPIRATORY SYNDROME CORONAVIRUS 2 (SARS-COV-2) (CORONAVIRUS DISEASE [COVID-19]), AMPLIFIED PROBE TECHNIQUE, MAKING USE OF HIGH THROUGHPUT TECHNOLOGIES AS DESCRIBED BY CMS-2020-01-R: HCPCS | Mod: HCNC | Performed by: ANESTHESIOLOGY

## 2022-01-31 ENCOUNTER — LAB VISIT (OUTPATIENT)
Dept: LAB | Facility: HOSPITAL | Age: 67
End: 2022-01-31
Attending: ANESTHESIOLOGY
Payer: MEDICARE

## 2022-01-31 DIAGNOSIS — Z01.818 PRE-OP EVALUATION: ICD-10-CM

## 2022-01-31 LAB
BASOPHILS # BLD AUTO: 0.08 K/UL (ref 0–0.2)
BASOPHILS NFR BLD: 1.1 % (ref 0–1.9)
DIFFERENTIAL METHOD: ABNORMAL
EOSINOPHIL # BLD AUTO: 0.1 K/UL (ref 0–0.5)
EOSINOPHIL NFR BLD: 1.8 % (ref 0–8)
ERYTHROCYTE [DISTWIDTH] IN BLOOD BY AUTOMATED COUNT: 12.9 % (ref 11.5–14.5)
HCT VFR BLD AUTO: 46.4 % (ref 37–48.5)
HGB BLD-MCNC: 14.5 G/DL (ref 12–16)
IMM GRANULOCYTES # BLD AUTO: 0.04 K/UL (ref 0–0.04)
IMM GRANULOCYTES NFR BLD AUTO: 0.5 % (ref 0–0.5)
LYMPHOCYTES # BLD AUTO: 2.3 K/UL (ref 1–4.8)
LYMPHOCYTES NFR BLD: 30.8 % (ref 18–48)
MCH RBC QN AUTO: 31.2 PG (ref 27–31)
MCHC RBC AUTO-ENTMCNC: 31.3 G/DL (ref 32–36)
MCV RBC AUTO: 100 FL (ref 82–98)
MONOCYTES # BLD AUTO: 0.7 K/UL (ref 0.3–1)
MONOCYTES NFR BLD: 8.7 % (ref 4–15)
NEUTROPHILS # BLD AUTO: 4.3 K/UL (ref 1.8–7.7)
NEUTROPHILS NFR BLD: 57.1 % (ref 38–73)
NRBC BLD-RTO: 0 /100 WBC
PLATELET # BLD AUTO: 226 K/UL (ref 150–450)
PMV BLD AUTO: 10.6 FL (ref 9.2–12.9)
RBC # BLD AUTO: 4.65 M/UL (ref 4–5.4)
SARS-COV-2 RNA RESP QL NAA+PROBE: NOT DETECTED
SARS-COV-2- CYCLE NUMBER: NORMAL
WBC # BLD AUTO: 7.6 K/UL (ref 3.9–12.7)

## 2022-01-31 PROCEDURE — 36415 COLL VENOUS BLD VENIPUNCTURE: CPT | Mod: HCNC,PO | Performed by: ANESTHESIOLOGY

## 2022-01-31 PROCEDURE — 85025 COMPLETE CBC W/AUTO DIFF WBC: CPT | Mod: HCNC | Performed by: ANESTHESIOLOGY

## 2022-02-02 ENCOUNTER — HOSPITAL ENCOUNTER (OUTPATIENT)
Facility: HOSPITAL | Age: 67
Discharge: HOME | End: 2022-02-02
Attending: ANESTHESIOLOGY | Admitting: ANESTHESIOLOGY
Payer: MEDICARE

## 2022-02-02 ENCOUNTER — HOSPITAL ENCOUNTER (OUTPATIENT)
Dept: RADIOLOGY | Facility: HOSPITAL | Age: 67
Discharge: HOME | End: 2022-02-02
Attending: ANESTHESIOLOGY
Payer: MEDICARE

## 2022-02-02 VITALS
HEART RATE: 83 BPM | TEMPERATURE: 98 F | RESPIRATION RATE: 12 BRPM | DIASTOLIC BLOOD PRESSURE: 89 MMHG | OXYGEN SATURATION: 99 % | SYSTOLIC BLOOD PRESSURE: 141 MMHG

## 2022-02-02 DIAGNOSIS — M54.50 LOWER BACK PAIN: ICD-10-CM

## 2022-02-02 DIAGNOSIS — M54.16 LUMBAR RADICULOPATHY: Primary | ICD-10-CM

## 2022-02-02 LAB — GLUCOSE SERPL-MCNC: 98 MG/DL (ref 70–110)

## 2022-02-02 PROCEDURE — 62323 NJX INTERLAMINAR LMBR/SAC: CPT | Mod: HCNC,PO | Performed by: ANESTHESIOLOGY

## 2022-02-02 PROCEDURE — 62323 NJX INTERLAMINAR LMBR/SAC: CPT | Mod: HCNC,,, | Performed by: ANESTHESIOLOGY

## 2022-02-02 PROCEDURE — 76000 FLUOROSCOPY <1 HR PHYS/QHP: CPT | Mod: TC,HCNC,PO

## 2022-02-02 PROCEDURE — 62323 PR INJ LUMBAR/SACRAL, W/IMAGING GUIDANCE: ICD-10-PCS | Mod: HCNC,,, | Performed by: ANESTHESIOLOGY

## 2022-02-02 PROCEDURE — 63600175 PHARM REV CODE 636 W HCPCS: Mod: HCNC,PO | Performed by: ANESTHESIOLOGY

## 2022-02-02 PROCEDURE — A4216 STERILE WATER/SALINE, 10 ML: HCPCS | Mod: HCNC,PO | Performed by: ANESTHESIOLOGY

## 2022-02-02 PROCEDURE — 25500020 PHARM REV CODE 255: Mod: HCNC,PO | Performed by: ANESTHESIOLOGY

## 2022-02-02 PROCEDURE — 25000003 PHARM REV CODE 250: Mod: HCNC,PO | Performed by: ANESTHESIOLOGY

## 2022-02-02 RX ORDER — MIDAZOLAM HYDROCHLORIDE 2 MG/2ML
INJECTION, SOLUTION INTRAMUSCULAR; INTRAVENOUS
Status: DISCONTINUED | OUTPATIENT
Start: 2022-02-02 | End: 2022-02-02 | Stop reason: HOSPADM

## 2022-02-02 RX ORDER — LIDOCAINE HYDROCHLORIDE 10 MG/ML
INJECTION, SOLUTION EPIDURAL; INFILTRATION; INTRACAUDAL; PERINEURAL
Status: DISCONTINUED | OUTPATIENT
Start: 2022-02-02 | End: 2022-02-02 | Stop reason: HOSPADM

## 2022-02-02 RX ORDER — METHYLPREDNISOLONE ACETATE 80 MG/ML
INJECTION, SUSPENSION INTRA-ARTICULAR; INTRALESIONAL; INTRAMUSCULAR; SOFT TISSUE
Status: DISCONTINUED | OUTPATIENT
Start: 2022-02-02 | End: 2022-02-02 | Stop reason: HOSPADM

## 2022-02-02 RX ORDER — SODIUM CHLORIDE, SODIUM LACTATE, POTASSIUM CHLORIDE, CALCIUM CHLORIDE 600; 310; 30; 20 MG/100ML; MG/100ML; MG/100ML; MG/100ML
INJECTION, SOLUTION INTRAVENOUS CONTINUOUS
Status: DISCONTINUED | OUTPATIENT
Start: 2022-02-02 | End: 2022-02-02 | Stop reason: HOSPADM

## 2022-02-02 RX ORDER — SODIUM CHLORIDE 9 MG/ML
INJECTION, SOLUTION INTRAMUSCULAR; INTRAVENOUS; SUBCUTANEOUS
Status: DISCONTINUED | OUTPATIENT
Start: 2022-02-02 | End: 2022-02-02 | Stop reason: HOSPADM

## 2022-02-02 RX ORDER — ALPRAZOLAM 0.5 MG/1
1 TABLET, ORALLY DISINTEGRATING ORAL ONCE AS NEEDED
Status: DISCONTINUED | OUTPATIENT
Start: 2022-02-02 | End: 2022-02-02

## 2022-02-02 RX ADMIN — SODIUM CHLORIDE, SODIUM LACTATE, POTASSIUM CHLORIDE, AND CALCIUM CHLORIDE: .6; .31; .03; .02 INJECTION, SOLUTION INTRAVENOUS at 01:02

## 2022-02-02 NOTE — DISCHARGE SUMMARY
[facility]  Discharge Note  Short Stay    Admit Date: 2/2/2022    Discharge Date: 2/2/2022    Attending Physician: Bebeto Eldridge     Discharge Provider: Bebeto Eldridge    Diagnoses:  There are no hospital problems to display for this patient.      Discharged Condition: Good    Final Diagnoses: Lumbar radiculopathy [M54.16]    Disposition: Home or Self Care    Hospital Course: No complications, uneventful    Outcome of Hospitalization, Treatment, Procedure, or Surgery:  Patient was admitted for outpatient interventional pain management procedure. The patient tolerated the procedure well with no complications.    Follow up/Patient Instructions:  Follow up as scheduled in Pain Management office in 2-3 weeks.  Patient has received instructions and follow up date and time.    Medications:  Continue previous medications    Discharge Procedure Orders   Notify your health care provider if you experience any of the following:  temperature >100.4     Notify your health care provider if you experience any of the following:  persistent nausea and vomiting or diarrhea     Notify your health care provider if you experience any of the following:  severe uncontrolled pain     Notify your health care provider if you experience any of the following:  redness, tenderness, or signs of infection (pain, swelling, redness, odor or green/yellow discharge around incision site)     Notify your health care provider if you experience any of the following:  difficulty breathing or increased cough     Notify your health care provider if you experience any of the following:  severe persistent headache     Notify your health care provider if you experience any of the following:  worsening rash     Notify your health care provider if you experience any of the following:  persistent dizziness, light-headedness, or visual disturbances     Notify your health care provider if you experience any of the following:  increased confusion or  weakness     Activity as tolerated

## 2022-02-02 NOTE — INTERVAL H&P NOTE
The patient has been examined and the H&P has been reviewed:    I concur with the findings and no changes have occurred since H&P was written.  L5/S1 DEYA. The risks and benefits of this intervention, and alternative therapies were discussed with the patient.  The discussion of risks included infection, bleeding, need for additional procedures or surgery, nerve damage.  Questions regarding the procedure, risks, expected outcome, and possible side effects were solicited and answered to the patient's satisfaction.  Jessica Rojas wishes to proceed with the injection or procedure.  Written consent was obtained.    There are no hospital problems to display for this patient.

## 2022-02-02 NOTE — OP NOTE
"Procedure Note    Procedure Date: 2/2/2022    Procedure Performed:  L5/S1 lumbar interlaminar epidural steroid injection under fluoroscopy.    Indications: Patient has failed conservative therapy.      Pre-op diagnosis: Lumbar Radiculopathy    Post-op diagnosis: same    Physician: Bebeto Eldridge MD    IV anxiolysis medications: versed 2mg    Medications injected: depomedrol 80mg, 1% Lidocaine 1ml, 2 mL sterile, preservative-free normal saline.    Local anesthetic used: 1% Lidocaine, 1 ml, 8.4% sodium bicarbonate 0.25ml    Estimated Blood Loss: none    Complications:  none    Technique:  The patient was interviewed in the holding area and Risks/Benefits were discussed, including, but not limited to, the possibility of new or different pain, bleeding or infection.   All questions were answered.  The patient understood and accepted risks.  Consent was verfied.  A time-out was taken to identify patient and procedure prior to starting the procedure. The patient was placed in the prone position on the fluoroscopy table. The area of the lumbar spine was prepped with Chloraprep and draped in a sterile manner. The L5/S1 interspace was identified and marked under AP fluoroscopy. The skin and subcutaneous tissues overlying the targeted interspace were anesthetized with 3-5 mL of 1% lidocaine using a 25G, 1.5" needle.  A 20G, 3.5" Tuohy epidural needle was directed toward the interspace under fluoroscopic guidance until the ligamentum flavum was engaged. From this point, a loss of resistance technique with a glass syringe and saline was used to identify entrance of the needle into the epidural space. Once loss of resistance was observed 1 mL of contrast solution was injected. An appropriate epidurogram was noted.  A 4 mL mixture consisting of saline, 1 mL 1% Lidocaine and 80 mg of depomedrol was injected slowly and without resistance.  The needle was flushed with normal saline and removed. The contrast was seen to be displaced " after injection. Patient was awake/responsive during all injections.  The patient tolerated the procedure well and was transferred to the .AC.. in stable condition.  The patient was monitored after the procedure and was given post-procedure and discharge instructions to follow at home. The patient was discharged in a stable condition.

## 2022-02-11 ENCOUNTER — PATIENT MESSAGE (OUTPATIENT)
Dept: PAIN MEDICINE | Facility: CLINIC | Age: 67
End: 2022-02-11
Payer: MEDICARE

## 2022-02-11 NOTE — TELEPHONE ENCOUNTER
Dizziness 9 days after epidural is not likely from the epidural.  Stay hydrated, checked blood pressure, if persist could consider going to urgent care.      Important things look out for are any worsening leg pain, leg numbness, bowel bladder changes, fever.  
[address] Comprehensive

## 2022-02-14 ENCOUNTER — OFFICE VISIT (OUTPATIENT)
Dept: NEUROLOGY | Facility: CLINIC | Age: 67
End: 2022-02-14
Payer: MEDICARE

## 2022-02-14 VITALS
SYSTOLIC BLOOD PRESSURE: 131 MMHG | DIASTOLIC BLOOD PRESSURE: 81 MMHG | HEIGHT: 61 IN | WEIGHT: 221.56 LBS | HEART RATE: 90 BPM | BODY MASS INDEX: 41.83 KG/M2

## 2022-02-14 DIAGNOSIS — T42.8X5A LEVODOPA-INDUCED DYSKINESIA: ICD-10-CM

## 2022-02-14 DIAGNOSIS — R42 VERTIGO: ICD-10-CM

## 2022-02-14 DIAGNOSIS — M43.16 SPONDYLOLISTHESIS OF LUMBAR REGION: ICD-10-CM

## 2022-02-14 DIAGNOSIS — K59.00 CONSTIPATION, UNSPECIFIED CONSTIPATION TYPE: ICD-10-CM

## 2022-02-14 DIAGNOSIS — R41.89 SUBJECTIVE MEMORY COMPLAINTS: ICD-10-CM

## 2022-02-14 DIAGNOSIS — G20.A1 PARKINSON'S DISEASE: Primary | ICD-10-CM

## 2022-02-14 DIAGNOSIS — R13.10 DYSPHAGIA, UNSPECIFIED TYPE: ICD-10-CM

## 2022-02-14 DIAGNOSIS — G24.01 LEVODOPA-INDUCED DYSKINESIA: ICD-10-CM

## 2022-02-14 PROCEDURE — 99999 PR PBB SHADOW E&M-EST. PATIENT-LVL III: ICD-10-PCS | Mod: PBBFAC,HCNC,, | Performed by: NURSE PRACTITIONER

## 2022-02-14 PROCEDURE — 3288F FALL RISK ASSESSMENT DOCD: CPT | Mod: HCNC,CPTII,S$GLB, | Performed by: NURSE PRACTITIONER

## 2022-02-14 PROCEDURE — 99215 OFFICE O/P EST HI 40 MIN: CPT | Mod: HCNC,S$GLB,, | Performed by: NURSE PRACTITIONER

## 2022-02-14 PROCEDURE — 1125F AMNT PAIN NOTED PAIN PRSNT: CPT | Mod: HCNC,CPTII,S$GLB, | Performed by: NURSE PRACTITIONER

## 2022-02-14 PROCEDURE — 3079F DIAST BP 80-89 MM HG: CPT | Mod: HCNC,CPTII,S$GLB, | Performed by: NURSE PRACTITIONER

## 2022-02-14 PROCEDURE — 3075F PR MOST RECENT SYSTOLIC BLOOD PRESS GE 130-139MM HG: ICD-10-PCS | Mod: HCNC,CPTII,S$GLB, | Performed by: NURSE PRACTITIONER

## 2022-02-14 PROCEDURE — 99499 UNLISTED E&M SERVICE: CPT | Mod: S$GLB,,, | Performed by: NURSE PRACTITIONER

## 2022-02-14 PROCEDURE — 1101F PT FALLS ASSESS-DOCD LE1/YR: CPT | Mod: HCNC,CPTII,S$GLB, | Performed by: NURSE PRACTITIONER

## 2022-02-14 PROCEDURE — 99215 PR OFFICE/OUTPT VISIT, EST, LEVL V, 40-54 MIN: ICD-10-PCS | Mod: HCNC,S$GLB,, | Performed by: NURSE PRACTITIONER

## 2022-02-14 PROCEDURE — 1160F RVW MEDS BY RX/DR IN RCRD: CPT | Mod: HCNC,CPTII,S$GLB, | Performed by: NURSE PRACTITIONER

## 2022-02-14 PROCEDURE — 3288F PR FALLS RISK ASSESSMENT DOCUMENTED: ICD-10-PCS | Mod: HCNC,CPTII,S$GLB, | Performed by: NURSE PRACTITIONER

## 2022-02-14 PROCEDURE — 99999 PR PBB SHADOW E&M-EST. PATIENT-LVL III: CPT | Mod: PBBFAC,HCNC,, | Performed by: NURSE PRACTITIONER

## 2022-02-14 PROCEDURE — 3075F SYST BP GE 130 - 139MM HG: CPT | Mod: HCNC,CPTII,S$GLB, | Performed by: NURSE PRACTITIONER

## 2022-02-14 PROCEDURE — 1159F PR MEDICATION LIST DOCUMENTED IN MEDICAL RECORD: ICD-10-PCS | Mod: HCNC,CPTII,S$GLB, | Performed by: NURSE PRACTITIONER

## 2022-02-14 PROCEDURE — 1160F PR REVIEW ALL MEDS BY PRESCRIBER/CLIN PHARMACIST DOCUMENTED: ICD-10-PCS | Mod: HCNC,CPTII,S$GLB, | Performed by: NURSE PRACTITIONER

## 2022-02-14 PROCEDURE — 3079F PR MOST RECENT DIASTOLIC BLOOD PRESSURE 80-89 MM HG: ICD-10-PCS | Mod: HCNC,CPTII,S$GLB, | Performed by: NURSE PRACTITIONER

## 2022-02-14 PROCEDURE — 1159F MED LIST DOCD IN RCRD: CPT | Mod: HCNC,CPTII,S$GLB, | Performed by: NURSE PRACTITIONER

## 2022-02-14 PROCEDURE — 3008F BODY MASS INDEX DOCD: CPT | Mod: HCNC,CPTII,S$GLB, | Performed by: NURSE PRACTITIONER

## 2022-02-14 PROCEDURE — 3008F PR BODY MASS INDEX (BMI) DOCUMENTED: ICD-10-PCS | Mod: HCNC,CPTII,S$GLB, | Performed by: NURSE PRACTITIONER

## 2022-02-14 PROCEDURE — 1125F PR PAIN SEVERITY QUANTIFIED, PAIN PRESENT: ICD-10-PCS | Mod: HCNC,CPTII,S$GLB, | Performed by: NURSE PRACTITIONER

## 2022-02-14 PROCEDURE — 1101F PR PT FALLS ASSESS DOC 0-1 FALLS W/OUT INJ PAST YR: ICD-10-PCS | Mod: HCNC,CPTII,S$GLB, | Performed by: NURSE PRACTITIONER

## 2022-02-14 PROCEDURE — 99499 RISK ADDL DX/OHS AUDIT: ICD-10-PCS | Mod: S$GLB,,, | Performed by: NURSE PRACTITIONER

## 2022-02-14 NOTE — PATIENT INSTRUCTIONS
Continue medications without change.     Keep an eye on your memory. Amantadine may be encouraging difficulty with word find. If you are interested, we can always slowly pull you off to see if memory improves.   Another option is to do cognitive evaluation.       The dizziness you describe today sounds like vertigo. If dizziness continues, I recommend seeing ENT. If you need a referral, let me know.     Watch swallowing. If you feel this is becoming more problematic and you are ready to proceed with swallow therapy, let me know. I am happy to order.     For constipation, take half to one capful Miralax at bedtime.  You could try this recipe to see if this helps constipation...  1 1/2 cups pitted prunes  1 cup unsweetened applesauce   1/2 cup All-Bran  3/4 cup prune juice    Put in . Blend well. Refrigerate. Drink 1/4 cup daily, followed by glass of tap water.      Follow up 6 months virtual and one year in person.

## 2022-02-14 NOTE — PROGRESS NOTES
Name: Jessica Rojas  MRN: [id number]   CSN: [id number]      Date: 2-      Referring physician:  No referring provider defined for this encounter.    Subjective:      Chief Complaint: PD    History of Present Illness (HPI):    Jessica Rojas is a 66 y.o. right-handed female who presents today for a follow-up evaluation of Parkinson's Disease (L tremor 2018, dyskinesia 2019)  and is accompanied by . Last seen in office 2-22-21. B12 was on the low side of normal, rec taking 2000mcg daily.     More trouble with remembering worse at times  Dizzy when laying down and suddenly move and this also happens when she sits up. This does not happen any other time. There is a spinning quality at times. She had vertigo a couple of years ago. Does feel queasy when this happens. Does not pass out. Goes away in 2-3 minutes.     Diff sleeping. Better if she takes gabapentin and 2 aleve, she does better. Then if she has to get up to urinate, she has trouble going back to sleep.     No trouble walking. No falls.     No problems urinating, other than will have to get up once during night.     Still issues swallowing. Started out with pills only. Now having trouble with hamburger. Sometimes other foods will trigger. She does feel like the swallowing is getting worse. Pills are harder to swallow. Neither of them have noted issues with coughing when eats or drinks.    She does find that she has more internal tremor and mouth movements in the evenings.     She feels her Parkinson's disease is a little worse than when first diagnosed. However, she does not feel she has the severity that others have when she reads articles of groups she belongs. She feels her meds still do a good job.   She does feel her meds last between doses.       0600 AM  Carbidopa/levodopa 1 tab     0800  Selegiline   Mirapex   Amantadine     Lunch (usually around 12-1)  Carbidopa/levodopa-1   Mirapex      3-4PM  amantadine     1900  Carbidopa/levodopa-  1  Selegiline  Mirapex              Review of Systems   Constitutional: Positive for appetite change (things don't taste good anymore) and unexpected weight change (gone up but don't know why ).   HENT: Positive for trouble swallowing. Negative for drooling.    Eyes: Negative for visual disturbance.   Respiratory: Positive for shortness of breath (when walking with mask on). Negative for cough and choking.    Gastrointestinal: Positive for constipation and nausea (sometimes when dizzy).   Genitourinary: Negative.    Musculoskeletal: Positive for back pain. Negative for gait problem.        Always in some pain- spinal stenosis, just had epidural   Neurological: Positive for dizziness. Negative for syncope.   Psychiatric/Behavioral: Positive for sleep disturbance. Negative for dysphoric mood. The patient is nervous/anxious.        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    Social History: The patient  reports that she has never smoked. She has never used smokeless tobacco. She reports previous alcohol use. She reports that she does not use drugs.    Family History: Their family history includes Alcohol abuse in her sister; Breast cancer in her cousin, paternal aunt, and paternal  aunt; Cancer in her maternal aunt, maternal aunt, and maternal grandmother; Diabetes in her father, mother, and sister; Glaucoma in her mother; Heart disease in her father and mother; Hypertension in her father and mother; Macular degeneration in her sister.    Allergies: Propranolol and Lipitor [atorvastatin]     Meds:   Current Outpatient Medications on File Prior to Visit   Medication Sig Dispense Refill    albuterol (PROVENTIL/VENTOLIN HFA) 90 mcg/actuation inhaler Inhale 2 puffs into the lungs every 6 (six) hours as needed for Wheezing or Shortness of Breath. 18 g 0    alcohol swabs (ALCOHOL WIPES) PadM Uses 5 daily 400 each 4    amantadine HCL (SYMMETREL) 100 mg capsule TAKE ONE CAPSULE BY MOUTH twice daily 60 capsule 11    blood sugar diagnostic (TRUE METRIX GLUCOSE TEST STRIP) Strp Check bg 7-8 times/day 250 each 12    blood-glucose transmitter (DEXCOM G6 TRANSMITTER) Lizbeth Dispense 1 transmitter 1 Device 3    carbidopa-levodopa  mg (SINEMET)  mg per tablet TAKE ONE & ONE-HALF TABLET BY MOUTH THREE TIMES DAILY 135 tablet 6    cholecalciferol, vitamin D3, 10 mcg (400 unit) Cap Take 1,200 Units by mouth once daily.      cyanocobalamin (VITAMIN B-12) 1000 MCG tablet Take 100 mcg by mouth once daily.      fish oil-omega-3 fatty acids 300-1,000 mg capsule Take 4 capsules by mouth once daily.      fluticasone-salmeterol diskus inhaler 250-50 mcg Inhale 1 puff into the lungs 2 (two) times daily. 60 each 11    gabapentin (NEURONTIN) 100 MG capsule Take 1 capsule (100 mg total) by mouth 3 (three) times daily. 90 capsule 11    ibuprofen (ADVIL,MOTRIN) 200 MG tablet Take 200 mg by mouth 2 (two) times a day.      insulin degludec (TRESIBA FLEXTOUCH U-100) 100 unit/mL (3 mL) insulin pen INJECT 35 UNITS EVERY MORNING THEN 30 UNITS EVERY NIGHT AT BEDTIME 4 pen 11    lancets 33 gauge Misc Checks bg 7-8 times a dayTrue metrix 250 each 11    lancing device Misc Checks bg 7-8 times a day 1 each 0     "levothyroxine (SYNTHROID) 137 MCG Tab tablet Take 1 tablet (137 mcg total) by mouth once daily. 30 tablet 11    melatonin 3 mg Tab Take 6 mg by mouth.       meloxicam (MOBIC) 15 MG tablet Take 15 mg by mouth once daily.      montelukast (SINGULAIR) 10 mg tablet take ONE tablet BY MOUTH once DAILY EVERY EVENING 30 tablet 11    MULTIVITAMIN W-MINERALS/LUTEIN (CENTRUM SILVER ORAL) Take 1 tablet by mouth once daily.       NOVOLOG FLEXPEN U-100 INSULIN 100 unit/mL (3 mL) InPn pen Inject 30 Units into the skin. Per sliding scale      pen needle, diabetic (BD ULTRA-FINE MINI PEN NEEDLE) 31 gauge x 3/16" Ndle Uses 5 daily 200 each 12    pramipexole (MIRAPEX) 0.25 MG tablet TAKE ONE-HALF to ONE TABLET BY MOUTH THREE TIMES DAILY as directed 90 tablet 11    selegiline (ELDEPRYL) 5 mg Cap take ONE capsule BY MOUTH twice daily 60 capsule 0    simvastatin (ZOCOR) 10 MG tablet Take 10 mg by mouth.      tiZANidine (ZANAFLEX) 4 MG tablet Take 1 tablet (4 mg total) by mouth nightly as needed (muscle spasms/ pain). 40 tablet 0    UNABLE TO FIND Place 0.5 mLs under the tongue 2 (two) times a day. medication name:CBD Oil      valsartan-hydrochlorothiazide (DIOVAN-HCT) 320-25 mg per tablet TAKE ONE TABLET BY MOUTH ONCE DAILY 90 tablet 3     No current facility-administered medications on file prior to visit.       Objective:     Physical Exam:    Vitals:    02/14/22 0857   BP: 131/81   BP Location: Right arm   Patient Position: Sitting   Pulse: 90   Weight: 100.5 kg (221 lb 9 oz)   Height: 5' 1" (1.549 m)     Body mass index is 41.86 kg/m².    Constitutional  Well-developed, well-nourished, appears stated age   Cardiovascular  Radial pulses 2+ and symmetric, no LE edema bilaterally     ..III.  MOTOR EXAMINATION - last dose 0700- exam time 0930       Speech  1 - Slight loss of expression, dictation, and/or volumn.   Facial Expression  2 - Slight but definitely abnormal diminution of facial expression.    Tremor at Rest:    "   Face, lips, chin 0 - Absent.    Hands:      right 0 - Absent.    left 0 - Absent.    Feet:      right 0 - Absent.    left 0 - Absent.    Action or Postural Tremor of Hands      right 0 - Absent.    left 0 - Absent.    Rigidity      Neck 1 - Slight or detectable only when activated by mirror or other movements.   Upper Extremity: Right 1 - Slight or detectable only when activated by mirror or other movements.   Upper Extremity: Left 1 - Slight or detectable only when activated by mirror or other movements.   Lower Extremity: Right 0 - Absent.   Lower Extremity: Left 1 - Slight or detectable only when activated by mirror or other movements.   Finger Taps      right 0 - Normal.   left 1 - Mild slowing and/or reduction in amplitude.   Hand Movements      right 0 - Normal.   left 1 - Mild slowing and/or reduction in amplitude.   Rapid Alternating Movements of Hands      right 0 - Normal.   left 1 - Mild slowing and/or reduction in amplitude.   Leg Agility      right 0 - Normal.   left 1 - Mild slowing and/or reduction in amplitude.   Arising from Chair  1 - Slow; or may need more than one attempt.   Posture  1 - Not quite erect, slightly stooped posture; could be normal for older person.   Gait  1 - Walks slowly, may shuffle with short steps, but no festination (hastening steps) or propulsion. Reduced L arm swing   Postural Stability (Response to sudden, strong posterior displacement produced by pull on shoulders while patient erect with eyes open and feet slightly apart. Patient is prepared, and can have had some practice runs.)  deferred   Body Bradykinesia and Hypokinesia (Combining slowness, hesitancy, decreased armswing, small amplitude, and poverty of movement in general)  2 - Mild degree of slowness and poverty of movement which is definitely abnormal.         Laboratory Results:  Admission on 02/02/2022, Discharged on 02/02/2022   Component Date Value Ref Range Status    POC Glucose 02/02/2022 98  70 - 110  MG/DL Final   Lab Visit on 01/31/2022   Component Date Value Ref Range Status    WBC 01/31/2022 7.60  3.90 - 12.70 K/uL Final    RBC 01/31/2022 4.65  4.00 - 5.40 M/uL Final    Hemoglobin 01/31/2022 14.5  12.0 - 16.0 g/dL Final    Hematocrit 01/31/2022 46.4  37.0 - 48.5 % Final    MCV 01/31/2022 100* 82 - 98 fL Final    MCH 01/31/2022 31.2* 27.0 - 31.0 pg Final    MCHC 01/31/2022 31.3* 32.0 - 36.0 g/dL Final    RDW 01/31/2022 12.9  11.5 - 14.5 % Final    Platelets 01/31/2022 226  150 - 450 K/uL Final    MPV 01/31/2022 10.6  9.2 - 12.9 fL Final    Immature Granulocytes 01/31/2022 0.5  0.0 - 0.5 % Final    Gran # (ANC) 01/31/2022 4.3  1.8 - 7.7 K/uL Final    Immature Grans (Abs) 01/31/2022 0.04  0.00 - 0.04 K/uL Final    Lymph # 01/31/2022 2.3  1.0 - 4.8 K/uL Final    Mono # 01/31/2022 0.7  0.3 - 1.0 K/uL Final    Eos # 01/31/2022 0.1  0.0 - 0.5 K/uL Final    Baso # 01/31/2022 0.08  0.00 - 0.20 K/uL Final    nRBC 01/31/2022 0  0 /100 WBC Final    Gran % 01/31/2022 57.1  38.0 - 73.0 % Final    Lymph % 01/31/2022 30.8  18.0 - 48.0 % Final    Mono % 01/31/2022 8.7  4.0 - 15.0 % Final    Eosinophil % 01/31/2022 1.8  0.0 - 8.0 % Final    Basophil % 01/31/2022 1.1  0.0 - 1.9 % Final    Differential Method 01/31/2022 Automated   Final   Lab Visit on 01/30/2022   Component Date Value Ref Range Status    SARS-CoV2 (COVID-19) Qualitative P* 01/30/2022 Not Detected  Not Detected Final    SARS-COV-2- Cycle Number 01/30/2022 N/A   Final   Lab Visit on 12/20/2021   Component Date Value Ref Range Status    TSH 12/20/2021 0.562  0.400 - 4.000 uIU/mL Final   Lab Visit on 12/03/2021   Component Date Value Ref Range Status    Hemoglobin A1C 12/03/2021 6.7* 4.0 - 5.6 % Final    Estimated Avg Glucose 12/03/2021 146* 68 - 131 mg/dL Final    Sodium 12/03/2021 139  136 - 145 mmol/L Final    Potassium 12/03/2021 4.2  3.5 - 5.1 mmol/L Final    Chloride 12/03/2021 99  95 - 110 mmol/L Final    CO2 12/03/2021 25   23 - 29 mmol/L Final    Glucose 12/03/2021 169* 70 - 110 mg/dL Final    BUN 12/03/2021 29* 8 - 23 mg/dL Final    Creatinine 12/03/2021 1.4  0.5 - 1.4 mg/dL Final    Calcium 12/03/2021 10.4  8.7 - 10.5 mg/dL Final    Total Protein 12/03/2021 7.7  6.0 - 8.4 g/dL Final    Albumin 12/03/2021 3.8  3.5 - 5.2 g/dL Final    Total Bilirubin 12/03/2021 0.5  0.1 - 1.0 mg/dL Final    Alkaline Phosphatase 12/03/2021 95  55 - 135 U/L Final    AST 12/03/2021 16  10 - 40 U/L Final    ALT 12/03/2021 14  10 - 44 U/L Final    Anion Gap 12/03/2021 15  8 - 16 mmol/L Final    eGFR if  12/03/2021 44.9* >60 mL/min/1.73 m^2 Final    eGFR if non  12/03/2021 38.9* >60 mL/min/1.73 m^2 Final    TSH 12/03/2021 0.200* 0.400 - 4.000 uIU/mL Final    Vit D, 25-Hydroxy 12/03/2021 47  30 - 96 ng/mL Final    Cholesterol 12/03/2021 264* 120 - 199 mg/dL Final    Triglycerides 12/03/2021 147  30 - 150 mg/dL Final    HDL 12/03/2021 64  40 - 75 mg/dL Final    LDL Cholesterol 12/03/2021 170.6* 63.0 - 159.0 mg/dL Final    HDL/Cholesterol Ratio 12/03/2021 24.2  20.0 - 50.0 % Final    Total Cholesterol/HDL Ratio 12/03/2021 4.1  2.0 - 5.0 Final    Non-HDL Cholesterol 12/03/2021 200  mg/dL Final    Free T4 12/03/2021 1.31  0.71 - 1.51 ng/dL Final   Lab Visit on 12/03/2021   Component Date Value Ref Range Status    Microalbumin, Urine 12/03/2021 13.0  ug/mL Final    Creatinine, Urine 12/03/2021 78.0  15.0 - 325.0 mg/dL Final    Microalb/Creat Ratio 12/03/2021 16.7  0.0 - 30.0 ug/mg Final   Admission on 12/01/2021, Discharged on 12/01/2021   Component Date Value Ref Range Status    Final Pathologic Diagnosis 12/01/2021    Final                    Value:Colon, right, polypectomy:  - 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   Final     "Gross 12/01/2021    Final                    Value:Pathology ID/Patient ID:  [id number]  The specimen is received in formalin labeled "right colon polyp".  The  specimen consists of multiple tan-yellow polyps ranging in size from 1.0 x  0.8 x 1.0 cm to 2.3 x 1.9 x 1.5 cm and multiple tan-yellow fragments of soft  tissue measuring 1.1 x 0.8 x 0.3 cm in aggregate.  The fragments of soft  tissue are submitted entirely in cassette [id number].  Each polyp is  inked blue at the base, bisected and submitted entirely in cassettes  [id number]  [id number]  [id number]  [id number]      Hailey Wong      Disclaimer 12/01/2021    Final                    Value:Unless the case is a 'gross only' or additional testing only, the final  diagnosis for each specimen is based on a microscopic examination of  appropriate tissue sections.             Imaging:   Results for orders placed or performed during the hospital encounter of 08/04/17   MRI Brain W WO Contrast    Narrative    Exam: MRI of the brain without and with contrast    Comparison: None.    Technique: Multiplanar MR imaging of the brain was performed both before and following the intravenous administration of 9 cc of Gadavist contrast material.    Findings:     The brain is normally formed. No evidence of acute/recent major vascular distribution cerebral infarction, intraparenchymal hemorrhage, or intra-axial space occupying lesion.     There are multiple foci of t2/FLAIR signal hyperintensity present within the periventricular white matter of the corona radiata and centrum semiovale in a pattern which suggests chronic microvascular ischemic change.  There is age-appropriate cerebral volume more with associated compensatory enlargement of the ventricular system and widening of the CSF spaces over both cerebral convexities.    The ventricular system is normal in appearance for age. No extra-axial fluid collections or blood products. No abnormal " dural enhancement or enhancing masses. No enhancing vascular malformations.     The skull base flow-voids are unremarkable. The sella and parasellar regions show no significant abnormality. No Chiari malformation.  There are prominent arachnoid granulations within the bony calvarium near the vertex as an anatomic variant.      There is mucoperiosteal thickening observed within the ethmoid air cells.  The mastoid air cells are unremarkable.  There is leftward bony nasal septal deviation..    Impression         1. No acute intracranial abnormality.    2.  Age-appropriate cerebral white loss and chronic microvascular ischemic changes within the periventricular white matter.    3.  Minimal ethmoid sinus disease.    4.  Leftward bony nasal septal deviation.      Electronically signed by: Brady Martinez MD  Date:     08/04/17  Time:    10:59            Assessment and Plan     Parkinson's disease    Levodopa-induced dyskinesia    Constipation, unspecified constipation type    Spondylolisthesis of lumbar region  Comments:  currently getting epidurals    Vertigo  Comments:  If persists, rec ENT, happy to refer if needed    Dysphagia, unspecified type    Subjective memory complaints        Medical Decision Making:  Stable.   Continue meds without change.  Watch memory. Could be SE amantadine. DW them. Could consider tapering off to see if improvement.   Could also consider Neuropsychological evaluation on NS.     If vertigo persists, rec seeing ENT. She has had this in the past.     Watch swallowing.   Discussed swallow therapy as rec when she had swallow study in . She is still not interested in doing this due to pandemic.     Discussed strategies for constipation.    Follow up VV 6 mos and in person in one year. Call sooner if needed.     ..Total time: 40 minutes spent on the encounter, which includes face to face time and non-face to face time preparing to see the patient (eg, review of tests), Obtaining and/or  reviewing separately obtained history, Documenting clinical information in the electronic or other health record, Independently interpreting results (not separately reported) and communicating results to the patient/family/caregiver, or Care coordination (not separately reported).       Virgie Solis DNP, NP-C  Division of Movement and Memory Disorders  [facility]  [phone]

## 2022-02-16 ENCOUNTER — OFFICE VISIT (OUTPATIENT)
Dept: PAIN MEDICINE | Facility: CLINIC | Age: 67
End: 2022-02-16
Payer: MEDICARE

## 2022-02-16 VITALS
BODY MASS INDEX: 41.97 KG/M2 | HEART RATE: 91 BPM | HEIGHT: 61 IN | SYSTOLIC BLOOD PRESSURE: 134 MMHG | WEIGHT: 222.31 LBS | DIASTOLIC BLOOD PRESSURE: 61 MMHG

## 2022-02-16 DIAGNOSIS — M54.16 LUMBAR RADICULOPATHY: Primary | ICD-10-CM

## 2022-02-16 DIAGNOSIS — M48.061 SPINAL STENOSIS OF LUMBAR REGION, UNSPECIFIED WHETHER NEUROGENIC CLAUDICATION PRESENT: ICD-10-CM

## 2022-02-16 PROCEDURE — 99499 RISK ADDL DX/OHS AUDIT: ICD-10-PCS | Mod: HCNC,S$GLB,, | Performed by: ANESTHESIOLOGY

## 2022-02-16 PROCEDURE — 1101F PR PT FALLS ASSESS DOC 0-1 FALLS W/OUT INJ PAST YR: ICD-10-PCS | Mod: HCNC,CPTII,S$GLB, | Performed by: ANESTHESIOLOGY

## 2022-02-16 PROCEDURE — 3078F PR MOST RECENT DIASTOLIC BLOOD PRESSURE < 80 MM HG: ICD-10-PCS | Mod: HCNC,CPTII,S$GLB, | Performed by: ANESTHESIOLOGY

## 2022-02-16 PROCEDURE — 3075F SYST BP GE 130 - 139MM HG: CPT | Mod: HCNC,CPTII,S$GLB, | Performed by: ANESTHESIOLOGY

## 2022-02-16 PROCEDURE — 1159F PR MEDICATION LIST DOCUMENTED IN MEDICAL RECORD: ICD-10-PCS | Mod: HCNC,CPTII,S$GLB, | Performed by: ANESTHESIOLOGY

## 2022-02-16 PROCEDURE — 1125F AMNT PAIN NOTED PAIN PRSNT: CPT | Mod: HCNC,CPTII,S$GLB, | Performed by: ANESTHESIOLOGY

## 2022-02-16 PROCEDURE — 1125F PR PAIN SEVERITY QUANTIFIED, PAIN PRESENT: ICD-10-PCS | Mod: HCNC,CPTII,S$GLB, | Performed by: ANESTHESIOLOGY

## 2022-02-16 PROCEDURE — 3288F FALL RISK ASSESSMENT DOCD: CPT | Mod: HCNC,CPTII,S$GLB, | Performed by: ANESTHESIOLOGY

## 2022-02-16 PROCEDURE — 99999 PR PBB SHADOW E&M-EST. PATIENT-LVL III: CPT | Mod: PBBFAC,HCNC,, | Performed by: ANESTHESIOLOGY

## 2022-02-16 PROCEDURE — 99499 UNLISTED E&M SERVICE: CPT | Mod: HCNC,S$GLB,, | Performed by: ANESTHESIOLOGY

## 2022-02-16 PROCEDURE — 3075F PR MOST RECENT SYSTOLIC BLOOD PRESS GE 130-139MM HG: ICD-10-PCS | Mod: HCNC,CPTII,S$GLB, | Performed by: ANESTHESIOLOGY

## 2022-02-16 PROCEDURE — 3008F BODY MASS INDEX DOCD: CPT | Mod: HCNC,CPTII,S$GLB, | Performed by: ANESTHESIOLOGY

## 2022-02-16 PROCEDURE — 3078F DIAST BP <80 MM HG: CPT | Mod: HCNC,CPTII,S$GLB, | Performed by: ANESTHESIOLOGY

## 2022-02-16 PROCEDURE — 99204 OFFICE O/P NEW MOD 45 MIN: CPT | Mod: HCNC,S$GLB,, | Performed by: ANESTHESIOLOGY

## 2022-02-16 PROCEDURE — 1160F RVW MEDS BY RX/DR IN RCRD: CPT | Mod: HCNC,CPTII,S$GLB, | Performed by: ANESTHESIOLOGY

## 2022-02-16 PROCEDURE — 3288F PR FALLS RISK ASSESSMENT DOCUMENTED: ICD-10-PCS | Mod: HCNC,CPTII,S$GLB, | Performed by: ANESTHESIOLOGY

## 2022-02-16 PROCEDURE — 99204 PR OFFICE/OUTPT VISIT, NEW, LEVL IV, 45-59 MIN: ICD-10-PCS | Mod: HCNC,S$GLB,, | Performed by: ANESTHESIOLOGY

## 2022-02-16 PROCEDURE — 1160F PR REVIEW ALL MEDS BY PRESCRIBER/CLIN PHARMACIST DOCUMENTED: ICD-10-PCS | Mod: HCNC,CPTII,S$GLB, | Performed by: ANESTHESIOLOGY

## 2022-02-16 PROCEDURE — 1159F MED LIST DOCD IN RCRD: CPT | Mod: HCNC,CPTII,S$GLB, | Performed by: ANESTHESIOLOGY

## 2022-02-16 PROCEDURE — 3008F PR BODY MASS INDEX (BMI) DOCUMENTED: ICD-10-PCS | Mod: HCNC,CPTII,S$GLB, | Performed by: ANESTHESIOLOGY

## 2022-02-16 PROCEDURE — 1101F PT FALLS ASSESS-DOCD LE1/YR: CPT | Mod: HCNC,CPTII,S$GLB, | Performed by: ANESTHESIOLOGY

## 2022-02-16 PROCEDURE — 99999 PR PBB SHADOW E&M-EST. PATIENT-LVL III: ICD-10-PCS | Mod: PBBFAC,HCNC,, | Performed by: ANESTHESIOLOGY

## 2022-02-16 RX ORDER — HYDROCODONE BITARTRATE AND ACETAMINOPHEN 5; 325 MG/1; MG/1
1 TABLET ORAL EVERY 8 HOURS PRN
Qty: 21 TABLET | Refills: 0 | Status: ON HOLD | OUTPATIENT
Start: 2022-02-16 | End: 2022-03-18 | Stop reason: HOSPADM

## 2022-02-16 NOTE — PROGRESS NOTES
[facility] New Patient Evaluation    Referred by: Nirmala Sawyer PA-C  Reason for referral: back pain    CC:   Chief Complaint   Patient presents with    Follow-up     Injection follow not much improvment      No flowsheet data found.    HPI:   Jessica Rojas is a 66 y.o. female who complains of back pain.  She is status post L5-S1 DEYA on 02/02/2021 without any significant relief.  Today Jeff reports she continues to have pain in her back radiating down the outside of her left leg, constant, 7/10, sharp, shooting.  She reports numbness into the right leg.  Denies any weakness.  Pain is worse with standing, bending, walking, flexing, lifting.  Pain is relieved with rest, sitting, medications.  She has been taking Aleve with some mild-to-moderate relief.  She also takes gabapentin with some mild relief.    History:    Current Outpatient Medications:     albuterol (PROVENTIL/VENTOLIN HFA) 90 mcg/actuation inhaler, Inhale 2 puffs into the lungs every 6 (six) hours as needed for Wheezing or Shortness of Breath., Disp: 18 g, Rfl: 0    alcohol swabs (ALCOHOL WIPES) PadM, Uses 5 daily, Disp: 400 each, Rfl: 4    amantadine HCL (SYMMETREL) 100 mg capsule, TAKE ONE CAPSULE BY MOUTH twice daily, Disp: 60 capsule, Rfl: 11    blood sugar diagnostic (TRUE METRIX GLUCOSE TEST STRIP) Strp, Check bg 7-8 times/day, Disp: 250 each, Rfl: 12    blood-glucose transmitter (DEXCOM G6 TRANSMITTER) Lizbeth, Dispense 1 transmitter, Disp: 1 Device, Rfl: 3    carbidopa-levodopa  mg (SINEMET)  mg per tablet, TAKE ONE & ONE-HALF TABLET BY MOUTH THREE TIMES DAILY, Disp: 135 tablet, Rfl: 6    cholecalciferol, vitamin D3, 10 mcg (400 unit) Cap, Take 1,200 Units by mouth once daily., Disp: , Rfl:     cyanocobalamin (VITAMIN B-12) 1000 MCG tablet, Take 100 mcg by mouth once daily., Disp: , Rfl:     fish oil-omega-3 fatty acids 300-1,000 mg capsule, Take 4 capsules by mouth once daily., Disp: , Rfl:      "fluticasone-salmeterol diskus inhaler 250-50 mcg, Inhale 1 puff into the lungs 2 (two) times daily., Disp: 60 each, Rfl: 11    gabapentin (NEURONTIN) 100 MG capsule, Take 1 capsule (100 mg total) by mouth 3 (three) times daily., Disp: 90 capsule, Rfl: 11    ibuprofen (ADVIL,MOTRIN) 200 MG tablet, Take 200 mg by mouth 2 (two) times a day., Disp: , Rfl:     insulin degludec (TRESIBA FLEXTOUCH U-100) 100 unit/mL (3 mL) insulin pen, INJECT 35 UNITS EVERY MORNING THEN 30 UNITS EVERY NIGHT AT BEDTIME, Disp: 4 pen, Rfl: 11    lancets 33 gauge Misc, Checks bg 7-8 times a dayTrue metrix, Disp: 250 each, Rfl: 11    lancing device Misc, Checks bg 7-8 times a day, Disp: 1 each, Rfl: 0    levothyroxine (SYNTHROID) 137 MCG Tab tablet, Take 1 tablet (137 mcg total) by mouth once daily., Disp: 30 tablet, Rfl: 11    melatonin 3 mg Tab, Take 6 mg by mouth. , Disp: , Rfl:     meloxicam (MOBIC) 15 MG tablet, Take 15 mg by mouth once daily., Disp: , Rfl:     montelukast (SINGULAIR) 10 mg tablet, take ONE tablet BY MOUTH once DAILY EVERY EVENING, Disp: 30 tablet, Rfl: 11    MULTIVITAMIN W-MINERALS/LUTEIN (CENTRUM SILVER ORAL), Take 1 tablet by mouth once daily. , Disp: , Rfl:     NOVOLOG FLEXPEN U-100 INSULIN 100 unit/mL (3 mL) InPn pen, Inject 30 Units into the skin. Per sliding scale, Disp: , Rfl:     pen needle, diabetic (BD ULTRA-FINE MINI PEN NEEDLE) 31 gauge x 3/16" Ndle, Uses 5 daily, Disp: 200 each, Rfl: 12    pramipexole (MIRAPEX) 0.25 MG tablet, TAKE ONE-HALF to ONE TABLET BY MOUTH THREE TIMES DAILY as directed, Disp: 90 tablet, Rfl: 11    selegiline (ELDEPRYL) 5 mg Cap, take ONE capsule BY MOUTH twice daily, Disp: 60 capsule, Rfl: 0    simvastatin (ZOCOR) 10 MG tablet, Take 10 mg by mouth., Disp: , Rfl:     tiZANidine (ZANAFLEX) 4 MG tablet, Take 1 tablet (4 mg total) by mouth nightly as needed (muscle spasms/ pain)., Disp: 40 tablet, Rfl: 0    UNABLE TO FIND, Place 0.5 mLs under the tongue 2 (two) times a " day. medication name:CBD Oil, Disp: , Rfl:     valsartan-hydrochlorothiazide (DIOVAN-HCT) 320-25 mg per tablet, TAKE ONE TABLET BY MOUTH ONCE DAILY, Disp: 90 tablet, Rfl: 3    HYDROcodone-acetaminophen (NORCO) 5-325 mg per tablet, Take 1 tablet by mouth every 8 (eight) hours as needed for Pain., Disp: 21 tablet, Rfl: 0    Past Medical History:   Diagnosis Date    Abdominal pain 2015    Arthritis     Diabetes mellitus, type 2     DM (diabetes mellitus)     on insulin    Elevated transaminase level 2016    Encounter for blood transfusion     History of malignant carcinoid tumor of bronchus and lung 10/25/2017    History of neuroendocrine cancer     HTN (hypertension) 2014    Hypercalcemia 2016    Lung cancer, hilus 2014    Malignant carcinoid tumor of bronchus and lung 2014    Malignant carcinoid tumor of the bronchus and lung 2014    Mediastinal lymphadenopathy 2015    Obesity, morbid 2014    Parkinsons 10/2017    Pituitary adenoma     took parladel for 3 yrs    Pneumonia     Postoperative hypothyroidism 2017    - s/p total thyroidectomy in  (parathyroids intact) with post op hypothyroidism; on synthroid    Pulmonary nodules 2018    Thyroid disease     Wheeze 2014       Past Surgical History:   Procedure Laterality Date    APPENDECTOMY      BREAST CYST ASPIRATION      BRONCHOSCOPY  2014, 2014     SECTION  , 1986    x2    COLONOSCOPY N/A 2021    Procedure: COLONOSCOPY;  Surgeon: Khadar Bernardo MD;  Location: [facility];  Service: Endoscopy;  Laterality: N/A;    COLONOSCOPY N/A 2021    Procedure: COLONOSCOPY;  Surgeon: Frank Lamb MD;  Location: [facility] ([address] [place] [address]);  Service: Endoscopy;  Laterality: N/A;  MD Joselin Feliciano MA  Caller: Unspecified (Yesterday,  2:54 PM)  Need colonoscopy with EMR for large (30 mm) ascending colon polyp. 90 minutes. Main. Clear liquid  diet for 24 hour patient with inadequate prep last time.   Thanks!   Ed Campa MD     EPIDURAL STEROID INJECTION INTO LUMBAR SPINE N/A 2/2/2022    Procedure: Injection-steroid-epidural-lumbar L5/S1;  Surgeon: Bebeto Eldridge MD;  Location: [facility] OR;  Service: Pain Management;  Laterality: N/A;    ESOPHAGOGASTRODUODENOSCOPY N/A 9/16/2021    Procedure: EGD (ESOPHAGOGASTRODUODENOSCOPY);  Surgeon: Khadar Bernardo MD;  Location: [facility] ENDO;  Service: Endoscopy;  Laterality: N/A;    EYE SURGERY      LUNG REMOVAL, PARTIAL Right 2014    with 17 lymph nodes; right middle and lower    THYROIDECTOMY  05/24/2016    TONSILLECTOMY  1969    TUBAL LIGATION  1986 1986       Family History   Problem Relation Age of Onset    Cancer Maternal Aunt         breast    Cancer Maternal Grandmother         unknown    Cancer Maternal Aunt         unknown    Diabetes Mother     Glaucoma Mother     Heart disease Mother     Hypertension Mother     Diabetes Father     Heart disease Father     Hypertension Father     Diabetes Sister     Macular degeneration Sister     Alcohol abuse Sister     Breast cancer Paternal Aunt     Breast cancer Paternal Aunt     Breast cancer Cousin     Amblyopia Neg Hx     Blindness Neg Hx     Cataracts Neg Hx     Retinal detachment Neg Hx     Stroke Neg Hx     Strabismus Neg Hx     Thyroid disease Neg Hx        Social History     Socioeconomic History    Marital status:    Occupational History    Occupation: housewife   Tobacco Use    Smoking status: Never Smoker    Smokeless tobacco: Never Used   Substance and Sexual Activity    Alcohol use: Not Currently     Alcohol/week: 0.0 standard drinks     Comment: I rarely drink    Drug use: No    Sexual activity: Yes     Partners: Male     Birth control/protection: None       Review of patient's allergies indicates:   Allergen Reactions    Propranolol Nausea And Vomiting     Drops pressure and raised sugar    Lipitor  "[atorvastatin] Other (See Comments)     Myalgia, muscle pain       Review of Systems:  General ROS: negative for - fever  Psychological ROS: negative for - hostility  Hematological and Lymphatic ROS: negative for - bleeding problems  Endocrine ROS: negative for - unexpected weight changes  Respiratory ROS: no cough, shortness of breath, or wheezing  Cardiovascular ROS: no chest pain or dyspnea on exertion  Gastrointestinal ROS: no abdominal pain, change in bowel habits, or black or bloody stools  Musculoskeletal ROS: negative for - muscular weakness  Neurological ROS: positive for - numbness/tingling  Dermatological ROS: negative for rash    Physical Exam:  Vitals:    02/16/22 1007   BP: 134/61   Pulse: 91   Weight: 100.8 kg (222 lb 5.3 oz)   Height: 5' 1.2" (1.554 m)   PainSc:   7     Body mass index is 41.74 kg/m².     Gen: NAD  Gait: gait intact  Psych:  Mood appropriate for given condition  HEENT: eyes anicteric   GI: Abd soft  CV: RRR  Lungs: breathing unlabored   ROM: limited AROM of the L spine in all planes, full ROM at ankles, knees and hips  Lumbar flexion 90 degrees, extension 50 degrees, side bending 30 degrees.    Sensation: intact to light touch in all dermatomes tested from L2-S1 bilaterally, except decreased over the lateral right foot and lateral right shin  Tone: normal in the b/l knees and hips   Skin: intact  Extremities: No edema in b/l ankles or hands  Provacative tests:        Right Left   L2/3 Iliacus Hip flexion  5  5   L3/4 Qudratus Femoris Knee Extension  5  5   L4/5 Tib Anterior Ankle Dorsiflexion   5  5   L5/S1 Extensor Hallicus Longus Great toe extension  5  5                 S1/S2 Gastroc/Soleus Plantar Flexion  5  5       Imaging:  MRI lumbar spine 1/6/22  FINDINGS:  Vertebral column: Redemonstrated is grade 1 spondylolisthesis at the L4-5 level which measures approximately 5-6 mm on the current study, minimally increased compared to the prior MRI where it measures approximately 4 mm. "  Otherwise, vertebral body alignment is normal.  There is no acute fracture.  There is mild disc space narrowing at the L3-4 and L4-5 levels.  There is chronic degenerative change in the included lower thoracic spine.  Baseline marrow signal is normal.     Spinal canal, conus, epidural space: The spinal canal is at least borderline small on a developmental basis.  The conus terminates at the level of T12-L1 and is normal in contour and signal intensity.  There is minimal flattening of the ventral cord surface at the T11-12 level where there is a shallow disc bulge.  This was present previously.  Cord signal remains normal.     Findings by level:     T11-12: There is a minimal disc bulge which narrows the ventral subarachnoid space.  There is minimal chronic flattening of the ventral cord surface.  There is mild spinal stenosis.  These findings were present previously.  T12-L1: There is a minimal disc bulge.  There is no spinal canal or significant foraminal stenosis.  There is no change.  L1-2: There is a mild disc bulge.  There is no spinal canal or significant foraminal stenosis.  There is no change.  L2-3: There is a mild diffuse disc bulge.  There is mild facet joint arthropathy.  There is no spinal canal or significant foraminal stenosis.  There is no change.  L3-4: There is mild disc space narrowing.  There is a diffuse disc bulge with osteophytic ridging eccentric to the left.  There is facet joint arthropathy with ligamentum flavum thickening.  There is flattening of the ventral dural sac.  There is mild spinal stenosis with mild-to-moderate bilateral, left greater than right foraminal stenosis.  Findings have minimally progressed.  L4-5: There is grade 1 spondylolisthesis at the L4-5 level.  There is mild disc space narrowing.  There is unroofing of a moderate diffuse disc bulge.  Also, there is moderate to marked bilateral facet joint arthropathy.  There is severe spinal stenosis and right foraminal  stenosis with moderate to severe left foraminal stenosis.  Findings may have minimally progressed.  L5-S1: There is facet joint arthropathy with a minimal disc bulge but there is no spinal canal or significant foraminal stenosis.     Soft tissues, other: The prevertebral soft tissues are normal.  The aorta is normal in caliber.    Labs:  BMP  Lab Results   Component Value Date     12/03/2021    K 4.2 12/03/2021    CL 99 12/03/2021    CO2 25 12/03/2021    BUN 29 (H) 12/03/2021    CREATININE 1.4 12/03/2021    CALCIUM 10.4 12/03/2021    ANIONGAP 15 12/03/2021    ESTGFRAFRICA 44.9 (A) 12/03/2021    EGFRNONAA 38.9 (A) 12/03/2021     Lab Results   Component Value Date    ALT 14 12/03/2021    AST 16 12/03/2021    ALKPHOS 95 12/03/2021    BILITOT 0.5 12/03/2021       Assessment:   Problem List Items Addressed This Visit        Neuro    Lumbar radiculopathy - Primary    Relevant Orders    Ambulatory referral/consult to Neurosurgery      Other Visit Diagnoses     Spinal stenosis of lumbar region, unspecified whether neurogenic claudication present              66 y.o. year old female who complains of back pain.  She is status post L5-S1 DEYA on 02/02/2021 without any significant relief.  Today Jeff reports she continues to have pain in her back radiating down the outside of her left leg, constant, 7/10, sharp, shooting.  She reports numbness into the right leg.  Denies any weakness.  Pain is worse with standing, bending, walking, flexing, lifting.  Pain is relieved with rest, sitting, medications.  She has been taking Aleve with some mild-to-moderate relief.  She also takes gabapentin with some mild relief.    - on exam she has full strength.  She has decreased sensation over the lateral right foot and lateral right distal leg.  - I independently reviewed her lumbar MRI is consistent with L4-5 severe central canal stenosis and severe right foraminal stenosis.  She has pretty severe facet arthropathy at this level as  well.  - she failed to get relief with lumbar DEYA.  I discussed repeat DEYA but she isn't interested in a 2nd injection at this time.  - she can not tolerate higher doses of gabapentin that she is already on as it makes her drowsy.  She will maintain her current dose for the neuropathic portion of her pain.  - a place a referral for her to see 1 of our neurosurgeons for evaluation.  - I have given her some hydrocodone to use p.r.n. for severe pain.  - she will follow-up as needed after she has seen neurosurgery.    : Reviewed and consistent with medication use as prescribed.    Bebeto Eldridge M.D.  Interventional Pain Medicine / Anesthesiology    This note was completed with dictation software and grammatical errors may exist.

## 2022-02-22 ENCOUNTER — OFFICE VISIT (OUTPATIENT)
Dept: NEUROSURGERY | Facility: CLINIC | Age: 67
End: 2022-02-22
Payer: MEDICARE

## 2022-02-22 VITALS
RESPIRATION RATE: 18 BRPM | SYSTOLIC BLOOD PRESSURE: 151 MMHG | BODY MASS INDEX: 41.96 KG/M2 | HEART RATE: 88 BPM | HEIGHT: 61 IN | DIASTOLIC BLOOD PRESSURE: 80 MMHG | WEIGHT: 222.25 LBS

## 2022-02-22 DIAGNOSIS — M43.16 SPONDYLOLISTHESIS OF LUMBAR REGION: Primary | ICD-10-CM

## 2022-02-22 DIAGNOSIS — M48.062 SPINAL STENOSIS OF LUMBAR REGION WITH NEUROGENIC CLAUDICATION: ICD-10-CM

## 2022-02-22 DIAGNOSIS — M54.16 LUMBAR RADICULOPATHY: ICD-10-CM

## 2022-02-22 PROCEDURE — 3044F PR MOST RECENT HEMOGLOBIN A1C LEVEL <7.0%: ICD-10-PCS | Mod: CPTII,S$GLB,, | Performed by: NEUROLOGICAL SURGERY

## 2022-02-22 PROCEDURE — 1125F AMNT PAIN NOTED PAIN PRSNT: CPT | Mod: CPTII,S$GLB,, | Performed by: NEUROLOGICAL SURGERY

## 2022-02-22 PROCEDURE — 3288F PR FALLS RISK ASSESSMENT DOCUMENTED: ICD-10-PCS | Mod: CPTII,S$GLB,, | Performed by: NEUROLOGICAL SURGERY

## 2022-02-22 PROCEDURE — 99214 OFFICE O/P EST MOD 30 MIN: CPT | Mod: S$GLB,,, | Performed by: NEUROLOGICAL SURGERY

## 2022-02-22 PROCEDURE — 3079F DIAST BP 80-89 MM HG: CPT | Mod: CPTII,S$GLB,, | Performed by: NEUROLOGICAL SURGERY

## 2022-02-22 PROCEDURE — 1101F PR PT FALLS ASSESS DOC 0-1 FALLS W/OUT INJ PAST YR: ICD-10-PCS | Mod: CPTII,S$GLB,, | Performed by: NEUROLOGICAL SURGERY

## 2022-02-22 PROCEDURE — 99214 PR OFFICE/OUTPT VISIT, EST, LEVL IV, 30-39 MIN: ICD-10-PCS | Mod: S$GLB,,, | Performed by: NEUROLOGICAL SURGERY

## 2022-02-22 PROCEDURE — 1125F PR PAIN SEVERITY QUANTIFIED, PAIN PRESENT: ICD-10-PCS | Mod: CPTII,S$GLB,, | Performed by: NEUROLOGICAL SURGERY

## 2022-02-22 PROCEDURE — 3077F PR MOST RECENT SYSTOLIC BLOOD PRESSURE >= 140 MM HG: ICD-10-PCS | Mod: CPTII,S$GLB,, | Performed by: NEUROLOGICAL SURGERY

## 2022-02-22 PROCEDURE — 1101F PT FALLS ASSESS-DOCD LE1/YR: CPT | Mod: CPTII,S$GLB,, | Performed by: NEUROLOGICAL SURGERY

## 2022-02-22 PROCEDURE — 99499 RISK ADDL DX/OHS AUDIT: ICD-10-PCS | Mod: HCNC,S$GLB,, | Performed by: NEUROLOGICAL SURGERY

## 2022-02-22 PROCEDURE — 3077F SYST BP >= 140 MM HG: CPT | Mod: CPTII,S$GLB,, | Performed by: NEUROLOGICAL SURGERY

## 2022-02-22 PROCEDURE — 3008F PR BODY MASS INDEX (BMI) DOCUMENTED: ICD-10-PCS | Mod: CPTII,S$GLB,, | Performed by: NEUROLOGICAL SURGERY

## 2022-02-22 PROCEDURE — 99499 UNLISTED E&M SERVICE: CPT | Mod: HCNC,S$GLB,, | Performed by: NEUROLOGICAL SURGERY

## 2022-02-22 PROCEDURE — 3044F HG A1C LEVEL LT 7.0%: CPT | Mod: CPTII,S$GLB,, | Performed by: NEUROLOGICAL SURGERY

## 2022-02-22 PROCEDURE — 3008F BODY MASS INDEX DOCD: CPT | Mod: CPTII,S$GLB,, | Performed by: NEUROLOGICAL SURGERY

## 2022-02-22 PROCEDURE — 3288F FALL RISK ASSESSMENT DOCD: CPT | Mod: CPTII,S$GLB,, | Performed by: NEUROLOGICAL SURGERY

## 2022-02-22 PROCEDURE — 3079F PR MOST RECENT DIASTOLIC BLOOD PRESSURE 80-89 MM HG: ICD-10-PCS | Mod: CPTII,S$GLB,, | Performed by: NEUROLOGICAL SURGERY

## 2022-02-22 NOTE — PROGRESS NOTES
Neurosurgery History & Physical    Patient ID: Jessica Rojas is a 66 y.o. female.    No chief complaint on file.      Review of Systems   Constitutional: Negative for chills, diaphoresis, fatigue and fever.   HENT: Negative for congestion, ear pain, rhinorrhea, sneezing, sore throat and tinnitus.    Eyes: Negative for photophobia, pain, redness and visual disturbance.   Respiratory: Negative for cough, chest tightness, shortness of breath and wheezing.    Cardiovascular: Negative for chest pain, palpitations and leg swelling.   Gastrointestinal: Negative for abdominal distention, abdominal pain, constipation, diarrhea, nausea and vomiting.   Genitourinary: Negative for difficulty urinating, dysuria, frequency and urgency.   Musculoskeletal: Positive for back pain and gait problem. Negative for myalgias and neck pain.   Skin: Negative for pallor and rash.   Neurological: Positive for weakness and numbness. Negative for dizziness, seizures, speech difficulty and headaches.   Psychiatric/Behavioral: Negative for confusion and hallucinations.       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    Malignant carcinoid tumor of the bronchus and lung 5/2014    Mediastinal lymphadenopathy 8/26/2015    Obesity, morbid 5/6/2014    Parkinsons 10/2017    Pituitary adenoma 1980's    took parladel for 3 yrs    Pneumonia     Postoperative hypothyroidism 7/25/2017    - s/p total thyroidectomy in 2016 (parathyroids intact) with post op hypothyroidism; on synthroid    Pulmonary  nodules 9/11/2018    Thyroid disease     Wheeze 6/23/2014     Social History     Socioeconomic History    Marital status:    Occupational History    Occupation: housewife   Tobacco Use    Smoking status: Never Smoker    Smokeless tobacco: Never Used   Substance and Sexual Activity    Alcohol use: Not Currently     Alcohol/week: 0.0 standard drinks     Comment: I rarely drink    Drug use: No    Sexual activity: Yes     Partners: Male     Birth control/protection: None     Family History   Problem Relation Age of Onset    Cancer Maternal Aunt         breast    Cancer Maternal Grandmother         unknown    Cancer Maternal Aunt         unknown    Diabetes Mother     Glaucoma Mother     Heart disease Mother     Hypertension Mother     Diabetes Father     Heart disease Father     Hypertension Father     Diabetes Sister     Macular degeneration Sister     Alcohol abuse Sister     Breast cancer Paternal Aunt     Breast cancer Paternal Aunt     Breast cancer Cousin     Amblyopia Neg Hx     Blindness Neg Hx     Cataracts Neg Hx     Retinal detachment Neg Hx     Stroke Neg Hx     Strabismus Neg Hx     Thyroid disease Neg Hx      Review of patient's allergies indicates:   Allergen Reactions    Propranolol Nausea And Vomiting     Drops pressure and raised sugar    Lipitor [atorvastatin] Other (See Comments)     Myalgia, muscle pain       Current Outpatient Medications:     albuterol (PROVENTIL/VENTOLIN HFA) 90 mcg/actuation inhaler, Inhale 2 puffs into the lungs every 6 (six) hours as needed for Wheezing or Shortness of Breath., Disp: 18 g, Rfl: 0    alcohol swabs (ALCOHOL WIPES) PadM, Uses 5 daily, Disp: 400 each, Rfl: 4    amantadine HCL (SYMMETREL) 100 mg capsule, TAKE ONE CAPSULE BY MOUTH twice daily, Disp: 60 capsule, Rfl: 11    blood sugar diagnostic (TRUE METRIX GLUCOSE TEST STRIP) Strp, Check bg 7-8 times/day, Disp: 250 each, Rfl: 12    blood-glucose transmitter (DEXCOM G6  TRANSMITTER) Lizbeth, Dispense 1 transmitter, Disp: 1 Device, Rfl: 3    carbidopa-levodopa  mg (SINEMET)  mg per tablet, TAKE ONE & ONE-HALF TABLET BY MOUTH THREE TIMES DAILY, Disp: 135 tablet, Rfl: 6    cholecalciferol, vitamin D3, 10 mcg (400 unit) Cap, Take 1,200 Units by mouth once daily., Disp: , Rfl:     cyanocobalamin (VITAMIN B-12) 1000 MCG tablet, Take 100 mcg by mouth once daily., Disp: , Rfl:     fish oil-omega-3 fatty acids 300-1,000 mg capsule, Take 4 capsules by mouth once daily., Disp: , Rfl:     fluticasone-salmeterol diskus inhaler 250-50 mcg, Inhale 1 puff into the lungs 2 (two) times daily., Disp: 60 each, Rfl: 11    gabapentin (NEURONTIN) 100 MG capsule, Take 1 capsule (100 mg total) by mouth 3 (three) times daily., Disp: 90 capsule, Rfl: 11    HYDROcodone-acetaminophen (NORCO) 5-325 mg per tablet, Take 1 tablet by mouth every 8 (eight) hours as needed for Pain., Disp: 21 tablet, Rfl: 0    ibuprofen (ADVIL,MOTRIN) 200 MG tablet, Take 200 mg by mouth 2 (two) times a day., Disp: , Rfl:     insulin degludec (TRESIBA FLEXTOUCH U-100) 100 unit/mL (3 mL) insulin pen, INJECT 35 UNITS EVERY MORNING THEN 30 UNITS EVERY NIGHT AT BEDTIME, Disp: 4 pen, Rfl: 11    lancets 33 gauge Misc, Checks bg 7-8 times a dayTrue metrix, Disp: 250 each, Rfl: 11    lancing device Misc, Checks bg 7-8 times a day, Disp: 1 each, Rfl: 0    levothyroxine (SYNTHROID) 137 MCG Tab tablet, Take 1 tablet (137 mcg total) by mouth once daily., Disp: 30 tablet, Rfl: 11    melatonin 3 mg Tab, Take 6 mg by mouth. , Disp: , Rfl:     meloxicam (MOBIC) 15 MG tablet, Take 15 mg by mouth once daily., Disp: , Rfl:     montelukast (SINGULAIR) 10 mg tablet, take ONE tablet BY MOUTH once DAILY EVERY EVENING, Disp: 30 tablet, Rfl: 11    MULTIVITAMIN W-MINERALS/LUTEIN (CENTRUM SILVER ORAL), Take 1 tablet by mouth once daily. , Disp: , Rfl:     NOVOLOG FLEXPEN U-100 INSULIN 100 unit/mL (3 mL) InPn pen, Inject 30 Units into  "the skin. Per sliding scale, Disp: , Rfl:     pen needle, diabetic (BD ULTRA-FINE MINI PEN NEEDLE) 31 gauge x 3/16" Ndle, Uses 5 daily, Disp: 200 each, Rfl: 12    pramipexole (MIRAPEX) 0.25 MG tablet, TAKE ONE-HALF to ONE TABLET BY MOUTH THREE TIMES DAILY as directed, Disp: 90 tablet, Rfl: 11    selegiline (ELDEPRYL) 5 mg Cap, take ONE capsule BY MOUTH twice daily, Disp: 60 capsule, Rfl: 0    simvastatin (ZOCOR) 10 MG tablet, Take 10 mg by mouth., Disp: , Rfl:     tiZANidine (ZANAFLEX) 4 MG tablet, Take 1 tablet (4 mg total) by mouth nightly as needed (muscle spasms/ pain)., Disp: 40 tablet, Rfl: 0    UNABLE TO FIND, Place 0.5 mLs under the tongue 2 (two) times a day. medication name:CBD Oil, Disp: , Rfl:     valsartan-hydrochlorothiazide (DIOVAN-HCT) 320-25 mg per tablet, TAKE ONE TABLET BY MOUTH ONCE DAILY, Disp: 90 tablet, Rfl: 3  Blood pressure (!) 151/80, pulse 88, resp. rate 18, height 5' 1.2" (1.554 m), weight 100.8 kg (222 lb 3.6 oz).      Neurologic Exam     Mental Status   Oriented to person, place, and time.   Oriented to person.   Oriented to place.   Oriented to time.   Follows 3 step commands.   Attention: normal. Concentration: normal.   Speech: speech is normal   Level of consciousness: alert  Knowledge: consistent with education.   Able to name object. Able to read. Able to repeat. Able to write. Normal comprehension.      Cranial Nerves      CN II   Visual acuity: normal  Right visual field deficit: none  Left visual field deficit: none      CN III, IV, VI   Pupils are equal, round, and reactive to light.  Right pupil: Size: 3 mm. Shape: regular. Reactivity: brisk. Consensual response: intact.   Left pupil: Size: 3 mm. Shape: regular. Reactivity: brisk. Consensual response: intact.   CN III: no CN III palsy  CN VI: no CN VI palsy  Nystagmus: none   Diplopia: none  Ophthalmoparesis: none  Conjugate gaze: present     CN V   Right facial sensation deficit: none  Left facial sensation deficit: " none     CN VII   Right facial weakness: none  Left facial weakness: none     CN VIII   Hearing: intact     CN IX, X   CN IX normal.   CN X normal.      CN XI   Right sternocleidomastoid strength: normal  Left sternocleidomastoid strength: normal  Right trapezius strength: normal  Left trapezius strength: normal     CN XII   Fasciculations: absent  Tongue deviation: none     Motor Exam   Muscle bulk: normal  Overall muscle tone: normal  Right arm pronator drift: absent  Left arm pronator drift: absent     Strength   Right deltoid: 5/5  Left deltoid: 5/5  Right biceps: 5/5  Left biceps: 5/5  Right triceps: 5/5  Left triceps: 5/5  Right wrist flexion: 5/5  Left wrist flexion: 5/5  Right wrist extension: 5/5  Left wrist extension: 5/5  Right interossei: 5/5  Left interossei: 5/5  Right iliopsoas: 4/5-give away weakness  Left iliopsoas: 5/5  Right quadriceps: 4/5  Left quadriceps: 5/5  Right hamstrin/5  Left hamstrin/5  Right anterior tibial: 5/5  Left anterior tibial: 5/5  Right posterior tibial: 5/5  Left posterior tibial: 5/5  Right peroneal: 4/5  Left peroneal: 5/5  Right gastroc: 5/5  Left gastroc: 5/5  Right EHL: 4+/5  Left EHL: 5/5     Sensory Exam   Right arm light touch: normal  Left arm light touch: normal  Right leg light touch: numbness stocking distribution below the knee  Left leg light touch: normal     Gait, Coordination, and Reflexes      Gait  Gait: normal      Coordination   Romberg: negative  Finger to nose coordination: normal  Heel to shin coordination: normal  Tandem walking coordination: normal     Tremor   Resting tremor: absent  Intention tremor: absent  Action tremor: absent     Reflexes   Right brachioradialis: 1+  Left brachioradialis: 1+  Right biceps: 1+  Left biceps: 1+  Right triceps: 1+  Left triceps: 1+  Right patellar: 1+  Left patellar: 1+  Right achilles: 0  Left achilles: 0  Right Chase: absent  Left Chase: absent  Right ankle clonus: absent  Left ankle clonus:  "absent  Right plantar: normal  Left plantar: normal    Physical Exam  Constitutional: Oriented to person, place, and time. Appears well-developed and well-nourished.   HENT:   Head: Normocephalic and atraumatic.   Eyes: Pupils are equal, round, and reactive to light.   Neck: Normal range of motion. Neck supple.   Cardiovascular: Normal rate.    Pulmonary/Chest: Effort normal.   Musculoskeletal: Normal range of motion. Exhibits no edema.   Neurological: Alert and oriented to person, place, and time. Normal Finger-Nose-Finger Test, a normal Heel to Shin Test, a normal Romberg Test and a normal Tandem Gait Test. Gait normal.   Reflex Scores:       Tricep reflexes are 1+ on the right side and 1+ on the left side.       Bicep reflexes are 1+ on the right side and 1+ on the left side.       Brachioradialis reflexes are 1+ on the right side and 1+ on the left side.       Patellar reflexes are 1+ on the right side and 1+ on the left side.       Achilles reflexes are 0 on the right side and 0 on the left side.  Skin: Skin is warm, dry and intact.   Psychiatric: Normal mood and affect. Speech is normal and behavior is normal. Judgment and thought content normal.   Nursing note and vitals reviewed.    Provider dictation:  I reviewed the imaging.   "MRI of the lumbar spine shows L4-5 spondylolisthesis with severe bilateral facet hypertrophy, right greater than left resulting in severe bilateral lateral recess and foraminal stenosis at that level.  Standing x-rays show a high grade 1, almost grade to L4-5 spondylolisthesis on flexion."     The patient is a 66 year old female who presents for neurosurgical consultation referred by Dr. Eldridge. She reports chronic axial lower back pain, but her symptoms became worse about 3 months ago. The pain will radiate mainly into the right hip and posterior lower extremity when standing. She is unable to stand long periods of time or bend over to pick anything up. She states she can only stand " for about 15 minutes and needs to hold on to something. The patient reports some numbness in the right shin and calf with sitting. She has tried PT about 2 months ago with no relief. She also underwent DEYA and denies any improvement. Back pain > leg pain.     On exam patient has right KE, DF and EHL weakness. There is pain limited right hip flexion weakness.     The patient has failed conservative treatment and has weakness associated with L4-5 spondylolisthesis and severe stenosis. Patient was offered a right minimally invasive L4-5 transforaminal lumbar interbody fusion with bilateral L4-5 laminectomy for decompression and L4-5 posterior instrumented fusion. She will need PCP, Cardiology, and Pulmonology clearance.     1. Spondylolisthesis of lumbar region     2. Lumbar radiculopathy  Ambulatory referral/consult to Neurosurgery   3. Spinal stenosis of lumbar region with neurogenic claudication

## 2022-02-22 NOTE — PROGRESS NOTES
I have seen the patient, reviewed the Advanced Practice Provider's history and physical, assessment and plan. I have personally interviewed and examined the patient at bedside and interpreted the relevant imaging and lab work and I agree with the findings. I personally performed the documented services. See below for any additional comments.    Longstanding axial low back pain which progressed to severe neurogenic claudication involving primarily the right lower extremity for many months.  Patient can only stand approximately 15 minutes without having to sit down.  The pain extends from the lower back in to the right hip and posterior leg.  She has numbness in the entire right foot.  The pain and numbness are exacerbated by standing and alleviated by sitting down but prolonged sitting will also trigger numbness in her right leg.  Patient underwent physical therapy and epidural steroid injections with no significant improvement.  She is very limited in her ability to ambulate secondary to her symptoms.    MRI of the lumbar spine shows L4-5 spondylolisthesis with severe bilateral facet hypertrophy, right greater than left resulting in severe bilateral lateral recess and foraminal stenosis at that level.  Standing x-rays show a high grade 1, almost grade to L4-5 spondylolisthesis on flexion.    The patient has right lower extremity weakness involving knee extension and ankle dorsiflexion as well as mild right EHL weakness.  She also has right hip flexion weakness which I suspect is pain limited.  She is morbidly obese.      The patient failed multimodality conservative treatment and she has weakness referable to the L4-5 spondylolisthesis and associated severe stenosis.  I offered her a right minimally invasive L4-5 transforaminal lumbar interbody fusion with bilateral L4-5 laminectomy for decompression and L4-5 posterior instrumented fusion to decompress her neural elements and stabilize her spondylolisthesis for  fusion.  I explained that the patient's obesity and other medical issues put her at high risk of complication or poor outcome from surgery.  Nevertheless, the patient requested surgery. I have discussed the risks, benefits and alternatives to the proposed operation in detail. All questions were answered. Risks discussed include but are not limited to: bleeding, infection, pain, scarring, spinal fluid leak, injury to the spinal cord or nerves, injury to the blood vessels, stroke, heart attack, blood clots, malpositioning or failure of hardware, failure of fusion, pseudoarthrosis, adjacent level disease, no improvement or worsening of symptoms, need for more surgery, death. The patient wishes to proceed.    She will need clearance by her primary care physician, cardiologist and pulmonologist given her multiple medical risk factors.

## 2022-02-25 ENCOUNTER — PATIENT MESSAGE (OUTPATIENT)
Dept: FAMILY MEDICINE | Facility: CLINIC | Age: 67
End: 2022-02-25
Payer: MEDICARE

## 2022-02-25 DIAGNOSIS — R91.8 PULMONARY NODULES: Primary | ICD-10-CM

## 2022-02-25 DIAGNOSIS — Z01.818 PRE-OP EXAMINATION: ICD-10-CM

## 2022-02-28 ENCOUNTER — PATIENT MESSAGE (OUTPATIENT)
Dept: NEUROLOGY | Facility: HOSPITAL | Age: 67
End: 2022-02-28
Payer: MEDICARE

## 2022-02-28 ENCOUNTER — PATIENT MESSAGE (OUTPATIENT)
Dept: FAMILY MEDICINE | Facility: CLINIC | Age: 67
End: 2022-02-28
Payer: MEDICARE

## 2022-03-02 ENCOUNTER — TELEPHONE (OUTPATIENT)
Dept: FAMILY MEDICINE | Facility: CLINIC | Age: 67
End: 2022-03-02
Payer: MEDICARE

## 2022-03-03 ENCOUNTER — PATIENT MESSAGE (OUTPATIENT)
Dept: NEUROSURGERY | Facility: CLINIC | Age: 67
End: 2022-03-03
Payer: MEDICARE

## 2022-03-04 DIAGNOSIS — R79.1 ABNORMAL COAGULATION PROFILE: ICD-10-CM

## 2022-03-04 DIAGNOSIS — M54.16 LUMBAR RADICULOPATHY: Primary | ICD-10-CM

## 2022-03-04 RX ORDER — SODIUM CHLORIDE, SODIUM LACTATE, POTASSIUM CHLORIDE, CALCIUM CHLORIDE 600; 310; 30; 20 MG/100ML; MG/100ML; MG/100ML; MG/100ML
INJECTION, SOLUTION INTRAVENOUS CONTINUOUS
Status: CANCELLED | OUTPATIENT
Start: 2022-03-04

## 2022-03-04 RX ORDER — LIDOCAINE HYDROCHLORIDE 10 MG/ML
1 INJECTION, SOLUTION EPIDURAL; INFILTRATION; INTRACAUDAL; PERINEURAL ONCE
Status: CANCELLED | OUTPATIENT
Start: 2022-03-04 | End: 2022-03-04

## 2022-03-08 ENCOUNTER — OFFICE VISIT (OUTPATIENT)
Dept: CARDIOLOGY | Facility: CLINIC | Age: 67
End: 2022-03-08
Payer: MEDICARE

## 2022-03-08 VITALS
DIASTOLIC BLOOD PRESSURE: 66 MMHG | WEIGHT: 218.5 LBS | BODY MASS INDEX: 41.25 KG/M2 | HEART RATE: 90 BPM | SYSTOLIC BLOOD PRESSURE: 116 MMHG | HEIGHT: 61 IN

## 2022-03-08 DIAGNOSIS — I10 PRIMARY HYPERTENSION: ICD-10-CM

## 2022-03-08 DIAGNOSIS — E66.01 OBESITY, MORBID: ICD-10-CM

## 2022-03-08 DIAGNOSIS — G20.A1 PARKINSON'S DISEASE: ICD-10-CM

## 2022-03-08 DIAGNOSIS — Z01.818 PRE-OP EXAMINATION: ICD-10-CM

## 2022-03-08 DIAGNOSIS — I10 HYPERTENSION, UNSPECIFIED TYPE: Primary | ICD-10-CM

## 2022-03-08 DIAGNOSIS — Z85.110 HISTORY OF MALIGNANT CARCINOID TUMOR OF BRONCHUS AND LUNG: ICD-10-CM

## 2022-03-08 DIAGNOSIS — Z01.810 PREOP CARDIOVASCULAR EXAM: ICD-10-CM

## 2022-03-08 DIAGNOSIS — I70.0 ATHEROSCLEROSIS OF AORTA: ICD-10-CM

## 2022-03-08 PROCEDURE — 1160F PR REVIEW ALL MEDS BY PRESCRIBER/CLIN PHARMACIST DOCUMENTED: ICD-10-PCS | Mod: CPTII,S$GLB,, | Performed by: INTERNAL MEDICINE

## 2022-03-08 PROCEDURE — 1126F AMNT PAIN NOTED NONE PRSNT: CPT | Mod: CPTII,S$GLB,, | Performed by: INTERNAL MEDICINE

## 2022-03-08 PROCEDURE — 1159F PR MEDICATION LIST DOCUMENTED IN MEDICAL RECORD: ICD-10-PCS | Mod: CPTII,S$GLB,, | Performed by: INTERNAL MEDICINE

## 2022-03-08 PROCEDURE — 3078F PR MOST RECENT DIASTOLIC BLOOD PRESSURE < 80 MM HG: ICD-10-PCS | Mod: CPTII,S$GLB,, | Performed by: INTERNAL MEDICINE

## 2022-03-08 PROCEDURE — 3008F PR BODY MASS INDEX (BMI) DOCUMENTED: ICD-10-PCS | Mod: CPTII,S$GLB,, | Performed by: INTERNAL MEDICINE

## 2022-03-08 PROCEDURE — 1126F PR PAIN SEVERITY QUANTIFIED, NO PAIN PRESENT: ICD-10-PCS | Mod: CPTII,S$GLB,, | Performed by: INTERNAL MEDICINE

## 2022-03-08 PROCEDURE — 1160F RVW MEDS BY RX/DR IN RCRD: CPT | Mod: CPTII,S$GLB,, | Performed by: INTERNAL MEDICINE

## 2022-03-08 PROCEDURE — 99204 PR OFFICE/OUTPT VISIT, NEW, LEVL IV, 45-59 MIN: ICD-10-PCS | Mod: 25,S$GLB,, | Performed by: INTERNAL MEDICINE

## 2022-03-08 PROCEDURE — 99499 RISK ADDL DX/OHS AUDIT: ICD-10-PCS | Mod: HCNC,S$GLB,, | Performed by: INTERNAL MEDICINE

## 2022-03-08 PROCEDURE — 99999 PR PBB SHADOW E&M-EST. PATIENT-LVL III: ICD-10-PCS | Mod: PBBFAC,,, | Performed by: INTERNAL MEDICINE

## 2022-03-08 PROCEDURE — 1101F PT FALLS ASSESS-DOCD LE1/YR: CPT | Mod: CPTII,S$GLB,, | Performed by: INTERNAL MEDICINE

## 2022-03-08 PROCEDURE — 93010 ELECTROCARDIOGRAM REPORT: CPT | Mod: S$GLB,,, | Performed by: INTERNAL MEDICINE

## 2022-03-08 PROCEDURE — 1101F PR PT FALLS ASSESS DOC 0-1 FALLS W/OUT INJ PAST YR: ICD-10-PCS | Mod: CPTII,S$GLB,, | Performed by: INTERNAL MEDICINE

## 2022-03-08 PROCEDURE — 3074F PR MOST RECENT SYSTOLIC BLOOD PRESSURE < 130 MM HG: ICD-10-PCS | Mod: CPTII,S$GLB,, | Performed by: INTERNAL MEDICINE

## 2022-03-08 PROCEDURE — 3288F PR FALLS RISK ASSESSMENT DOCUMENTED: ICD-10-PCS | Mod: CPTII,S$GLB,, | Performed by: INTERNAL MEDICINE

## 2022-03-08 PROCEDURE — 99999 PR PBB SHADOW E&M-EST. PATIENT-LVL III: CPT | Mod: PBBFAC,,, | Performed by: INTERNAL MEDICINE

## 2022-03-08 PROCEDURE — 3078F DIAST BP <80 MM HG: CPT | Mod: CPTII,S$GLB,, | Performed by: INTERNAL MEDICINE

## 2022-03-08 PROCEDURE — 93005 ELECTROCARDIOGRAM TRACING: CPT | Mod: PO

## 2022-03-08 PROCEDURE — 3288F FALL RISK ASSESSMENT DOCD: CPT | Mod: CPTII,S$GLB,, | Performed by: INTERNAL MEDICINE

## 2022-03-08 PROCEDURE — 99204 OFFICE O/P NEW MOD 45 MIN: CPT | Mod: 25,S$GLB,, | Performed by: INTERNAL MEDICINE

## 2022-03-08 PROCEDURE — 93010 EKG 12-LEAD: ICD-10-PCS | Mod: S$GLB,,, | Performed by: INTERNAL MEDICINE

## 2022-03-08 PROCEDURE — 99499 UNLISTED E&M SERVICE: CPT | Mod: HCNC,S$GLB,, | Performed by: INTERNAL MEDICINE

## 2022-03-08 PROCEDURE — 1159F MED LIST DOCD IN RCRD: CPT | Mod: CPTII,S$GLB,, | Performed by: INTERNAL MEDICINE

## 2022-03-08 PROCEDURE — 3008F BODY MASS INDEX DOCD: CPT | Mod: CPTII,S$GLB,, | Performed by: INTERNAL MEDICINE

## 2022-03-08 PROCEDURE — 3074F SYST BP LT 130 MM HG: CPT | Mod: CPTII,S$GLB,, | Performed by: INTERNAL MEDICINE

## 2022-03-08 NOTE — PROGRESS NOTES
"Subjective:    Patient ID:  Jessica Rojas is a 66 y.o. female who presents for evaluation of Establish Care and Cardiac Clearance      HPI66 yo WF with obesity, hx of carcinoid tumor with lobectomy and parkinson's schedule for neuro surgery. Patient has no cardiac history. She denies CP and insist she can walk up a flight of stairs without CP or SOB.     Review of Systems   Cardiovascular: Negative for chest pain, claudication, cyanosis, dyspnea on exertion, irregular heartbeat, leg swelling, orthopnea, paroxysmal nocturnal dyspnea and syncope.   Musculoskeletal: Positive for arthritis, back pain and neck pain.        Objective:    Physical Exam  Vitals and nursing note reviewed.   Constitutional:       Appearance: She is well-developed.      Comments: /66 (BP Location: Left arm, Patient Position: Sitting, BP Method: Large (Automatic))   Pulse 90   Ht 5' 1.2" (1.554 m)   Wt 99.1 kg (218 lb 7.6 oz)   BMI 41.01 kg/m²      HENT:      Head: Normocephalic and atraumatic.      Right Ear: External ear normal.      Left Ear: External ear normal.      Nose: Nose normal.   Eyes:      General: Lids are normal. No scleral icterus.        Right eye: No discharge.         Left eye: No discharge.      Conjunctiva/sclera: Conjunctivae normal.      Right eye: No hemorrhage.     Pupils: Pupils are equal, round, and reactive to light.   Neck:      Thyroid: No thyromegaly.      Vascular: No JVD.      Trachea: No tracheal deviation.   Cardiovascular:      Rate and Rhythm: Normal rate and regular rhythm.      Pulses: Intact distal pulses.      Heart sounds: Normal heart sounds. No murmur heard.    No friction rub. No gallop.   Pulmonary:      Effort: Pulmonary effort is normal. No respiratory distress.      Breath sounds: Normal breath sounds. No wheezing or rales.   Chest:      Chest wall: No tenderness.   Breasts: Breasts are symmetrical.       Abdominal:      General: Bowel sounds are normal. There is no distension.      " Palpations: Abdomen is soft. There is no hepatomegaly or mass.      Tenderness: There is no abdominal tenderness. There is no guarding or rebound.   Musculoskeletal:         General: No tenderness. Normal range of motion.      Cervical back: Normal range of motion and neck supple.   Lymphadenopathy:      Cervical: No cervical adenopathy.   Skin:     General: Skin is warm and dry.      Coloration: Skin is not pale.      Findings: No erythema or rash.   Neurological:      Mental Status: She is alert and oriented to person, place, and time.      Cranial Nerves: No cranial nerve deficit.      Coordination: Coordination normal.      Deep Tendon Reflexes: Reflexes normal.   Psychiatric:         Behavior: Behavior normal.         Thought Content: Thought content normal.         Judgment: Judgment normal.           Assessment:       1. Hypertension, unspecified type    2. Pre-op examination    3. Obesity, morbid    4. History of malignant carcinoid tumor of bronchus and lung    5. Parkinson's disease    6. Atherosclerosis of aorta    7. Primary hypertension    8. Preop cardiovascular exam         Plan:     There are no absolute contraindications to surgery from cardiac standpoint and would use routine precautions. No further cardiac testing required prior to surgery.     Patient understands all surgeries carry risk and unpredictable cardiac events may still occur     Patient states has appt with pulmonologist.       Orders Placed This Encounter   Procedures    IN OFFICE EKG 12-LEAD (to Muse)     Follow up if symptoms worsen or fail to improve.

## 2022-03-09 ENCOUNTER — TELEPHONE (OUTPATIENT)
Dept: NEUROSURGERY | Facility: CLINIC | Age: 67
End: 2022-03-09
Payer: MEDICARE

## 2022-03-09 NOTE — TELEPHONE ENCOUNTER
Jessica Rojas will be having surgery on 3/17/22 with Dr. Bragg, Neurosurgeon, at [facility]. We are reaching out to obtain a surgical clearance from Dr. Grace to ensure the safety of our patient. The surgery is right MIS L4-5 TLIF, bilateral L4-5 laminectomy and posterior instrumented fusion and will be under general anesthesia. This procedure typically lasts approximately 3 hours. We request that the patient hold all anticoagulant/antiinflammatory medications for 5-7 days prior to surgery. Please let us know if our office can be of further assistance.

## 2022-03-10 ENCOUNTER — PATIENT MESSAGE (OUTPATIENT)
Dept: ENDOCRINOLOGY | Facility: CLINIC | Age: 67
End: 2022-03-10
Payer: MEDICARE

## 2022-03-11 ENCOUNTER — OFFICE VISIT (OUTPATIENT)
Dept: FAMILY MEDICINE | Facility: CLINIC | Age: 67
End: 2022-03-11
Payer: MEDICARE

## 2022-03-11 VITALS
BODY MASS INDEX: 42.97 KG/M2 | DIASTOLIC BLOOD PRESSURE: 64 MMHG | SYSTOLIC BLOOD PRESSURE: 105 MMHG | OXYGEN SATURATION: 95 % | HEART RATE: 84 BPM | RESPIRATION RATE: 18 BRPM | WEIGHT: 218.88 LBS | TEMPERATURE: 98 F | HEIGHT: 60 IN

## 2022-03-11 DIAGNOSIS — Z01.818 PREOPERATIVE CLEARANCE: Primary | ICD-10-CM

## 2022-03-11 DIAGNOSIS — I10 PRIMARY HYPERTENSION: ICD-10-CM

## 2022-03-11 DIAGNOSIS — Z79.4 TYPE 2 DIABETES MELLITUS WITH STAGE 3A CHRONIC KIDNEY DISEASE, WITH LONG-TERM CURRENT USE OF INSULIN: ICD-10-CM

## 2022-03-11 DIAGNOSIS — E11.22 TYPE 2 DIABETES MELLITUS WITH STAGE 3A CHRONIC KIDNEY DISEASE, WITH LONG-TERM CURRENT USE OF INSULIN: ICD-10-CM

## 2022-03-11 DIAGNOSIS — Z86.012 HISTORY OF BENIGN CARCINOID TUMOR: ICD-10-CM

## 2022-03-11 DIAGNOSIS — N18.31 TYPE 2 DIABETES MELLITUS WITH STAGE 3A CHRONIC KIDNEY DISEASE, WITH LONG-TERM CURRENT USE OF INSULIN: ICD-10-CM

## 2022-03-11 PROCEDURE — 3008F PR BODY MASS INDEX (BMI) DOCUMENTED: ICD-10-PCS | Mod: CPTII,S$GLB,, | Performed by: NURSE PRACTITIONER

## 2022-03-11 PROCEDURE — 1125F AMNT PAIN NOTED PAIN PRSNT: CPT | Mod: CPTII,S$GLB,, | Performed by: NURSE PRACTITIONER

## 2022-03-11 PROCEDURE — 1160F PR REVIEW ALL MEDS BY PRESCRIBER/CLIN PHARMACIST DOCUMENTED: ICD-10-PCS | Mod: CPTII,S$GLB,, | Performed by: NURSE PRACTITIONER

## 2022-03-11 PROCEDURE — 99499 UNLISTED E&M SERVICE: CPT | Mod: HCNC,S$GLB,, | Performed by: NURSE PRACTITIONER

## 2022-03-11 PROCEDURE — 1160F RVW MEDS BY RX/DR IN RCRD: CPT | Mod: CPTII,S$GLB,, | Performed by: NURSE PRACTITIONER

## 2022-03-11 PROCEDURE — 99214 OFFICE O/P EST MOD 30 MIN: CPT | Mod: S$GLB,,, | Performed by: NURSE PRACTITIONER

## 2022-03-11 PROCEDURE — 99999 PR PBB SHADOW E&M-EST. PATIENT-LVL V: CPT | Mod: PBBFAC,,, | Performed by: NURSE PRACTITIONER

## 2022-03-11 PROCEDURE — 3288F FALL RISK ASSESSMENT DOCD: CPT | Mod: CPTII,S$GLB,, | Performed by: NURSE PRACTITIONER

## 2022-03-11 PROCEDURE — 3074F PR MOST RECENT SYSTOLIC BLOOD PRESSURE < 130 MM HG: ICD-10-PCS | Mod: CPTII,S$GLB,, | Performed by: NURSE PRACTITIONER

## 2022-03-11 PROCEDURE — 1159F PR MEDICATION LIST DOCUMENTED IN MEDICAL RECORD: ICD-10-PCS | Mod: CPTII,S$GLB,, | Performed by: NURSE PRACTITIONER

## 2022-03-11 PROCEDURE — 3044F PR MOST RECENT HEMOGLOBIN A1C LEVEL <7.0%: ICD-10-PCS | Mod: CPTII,S$GLB,, | Performed by: NURSE PRACTITIONER

## 2022-03-11 PROCEDURE — 1125F PR PAIN SEVERITY QUANTIFIED, PAIN PRESENT: ICD-10-PCS | Mod: CPTII,S$GLB,, | Performed by: NURSE PRACTITIONER

## 2022-03-11 PROCEDURE — 3078F PR MOST RECENT DIASTOLIC BLOOD PRESSURE < 80 MM HG: ICD-10-PCS | Mod: CPTII,S$GLB,, | Performed by: NURSE PRACTITIONER

## 2022-03-11 PROCEDURE — 1159F MED LIST DOCD IN RCRD: CPT | Mod: CPTII,S$GLB,, | Performed by: NURSE PRACTITIONER

## 2022-03-11 PROCEDURE — 3288F PR FALLS RISK ASSESSMENT DOCUMENTED: ICD-10-PCS | Mod: CPTII,S$GLB,, | Performed by: NURSE PRACTITIONER

## 2022-03-11 PROCEDURE — 3074F SYST BP LT 130 MM HG: CPT | Mod: CPTII,S$GLB,, | Performed by: NURSE PRACTITIONER

## 2022-03-11 PROCEDURE — 1101F PR PT FALLS ASSESS DOC 0-1 FALLS W/OUT INJ PAST YR: ICD-10-PCS | Mod: CPTII,S$GLB,, | Performed by: NURSE PRACTITIONER

## 2022-03-11 PROCEDURE — 99999 PR PBB SHADOW E&M-EST. PATIENT-LVL V: ICD-10-PCS | Mod: PBBFAC,,, | Performed by: NURSE PRACTITIONER

## 2022-03-11 PROCEDURE — 99499 RISK ADDL DX/OHS AUDIT: ICD-10-PCS | Mod: HCNC,S$GLB,, | Performed by: NURSE PRACTITIONER

## 2022-03-11 PROCEDURE — 99214 PR OFFICE/OUTPT VISIT, EST, LEVL IV, 30-39 MIN: ICD-10-PCS | Mod: S$GLB,,, | Performed by: NURSE PRACTITIONER

## 2022-03-11 PROCEDURE — 3044F HG A1C LEVEL LT 7.0%: CPT | Mod: CPTII,S$GLB,, | Performed by: NURSE PRACTITIONER

## 2022-03-11 PROCEDURE — 1101F PT FALLS ASSESS-DOCD LE1/YR: CPT | Mod: CPTII,S$GLB,, | Performed by: NURSE PRACTITIONER

## 2022-03-11 PROCEDURE — 3078F DIAST BP <80 MM HG: CPT | Mod: CPTII,S$GLB,, | Performed by: NURSE PRACTITIONER

## 2022-03-11 PROCEDURE — 3008F BODY MASS INDEX DOCD: CPT | Mod: CPTII,S$GLB,, | Performed by: NURSE PRACTITIONER

## 2022-03-11 NOTE — ASSESSMENT & PLAN NOTE
-condition is currently controlled   -current meds: Tresiba 35 units every morning and 30 units every evening; Novolog 30 units AC + correction  -see diabetic health maintenance listed below  -on statin: Yes  -on ACE-I/ARB: Yes  -counseling provided on importance of diabetic diet and medication compliance in order to treat diabetes  -discussed diabetes disease course and potential complications  Lab Results   Component Value Date    HGBA1C 6.8 (H) 03/10/2022

## 2022-03-11 NOTE — PROGRESS NOTES
Assessment/Plan:  Problem List Items Addressed This Visit        Cardiac/Vascular    Primary hypertension    Overview     -at goal today  -currently on Valsartan-HCTZ 320-25 mg daily   -continue lifestyle modification with low sodium diet and exercise   -discussed hypertension disease course and importance of treating high blood pressure  -patient understood and advised of risk of untreated blood pressure.  ER precautions were given for symptoms of hypertensive urgency and emergency.              Endocrine    Type 2 diabetes mellitus with stage 3 chronic kidney disease, with long-term current use of insulin    Overview     -condition is currently controlled  -current meds: lantus 35 u qam, 30 u qhs; Humalog 30 u AC  + correction   -followed by endocrinology  -see diabetic health maintenance listed below  -on statin: No-intolerant  -on ACE-I/ARB: Yes  -counseling provided on importance of diabetic diet and medication compliance in order to treat diabetes  -discussed diabetes disease course and potential complications           Current Assessment & Plan     -condition is currently controlled   -current meds: Tresiba 35 units every morning and 30 units every evening; Novolog 30 units AC + correction  -see diabetic health maintenance listed below  -on statin: Yes  -on ACE-I/ARB: Yes  -counseling provided on importance of diabetic diet and medication compliance in order to treat diabetes  -discussed diabetes disease course and potential complications  Lab Results   Component Value Date    HGBA1C 6.8 (H) 03/10/2022                Other Visit Diagnoses     Preoperative clearance    -  Primary    History of benign carcinoid tumor            3/16/22- addendum- patient is cleared for upcoming procedure. She has been cleared by cardiology and pulmonology - see separate encounters. Recommend close follow up with PCP.      Follow up in about 6 weeks (around 4/22/2022), or if symptoms worsen or fail to improve, for Follow up with  PCP.    Aydee Arreaga NP  _____________________________________________________________________________________________________________________________________________________    CC: preop clearance     HPI: Patient is in clinic today as an established patient here for preop clearance.   The patient is here for a preop clearance to have surgery to have a lumbar spinal fusion  The physician that is performing the surgery is Dr. Bragg   The surgery is being planned for 3/17/2022  The patient states that there has not been previous problems with sedation. She has had prior surgeries in the past with no adverse effects from anesthesia.   She is not currently taking a blood thinner.    She has no history of blood clots or bleeding disorders.   Patient is a nonsmoker. No history of HAY.  No history of MI, PE, DVT, arrhythmia, CHF, and/or COPD.   She is established with pulmonology and cardiology. Had recent visit with Lottie Horn NP and Dr. Escobedo. Recent preop testing and encounters reviewed, see below:      Per pulmonology:  HPI  66-year-old female presents to the clinic as a request for consultation by her orthopedic surgeon requesting preoperative pulmonary clearance for upcoming lumbar surgery with orthopedic surgery, Dr. Bragg (at [facility]). She was seen in the past by Dr. Chan 8 years ago given a history of right lower lobe carcinoid, endobronchial,discovered after recurrent pneumonia. She is s/p right lobectomy (2014)--middle and lower lobe.    Patient states since that time she has been followed closely by hematology/oncology, Dr. Quintero until his transfer/[facility] an is now followed by neuroendocrine, Dr. Alcala. She has also followed with pulmonology, Dr. Koroma, in the past, but has not been seen recently.     States biopsy confirmed evidence of typical carcinoid, considered benign. She has been followed with serial CT chest with notation of multiple pulmonary nodules  (~20-23) with largest measuring 7 mm. She has a repeat CT chest for annual surveillance scheduled for next week at [facility] ordered by Dr. Alcala.     She reports initiation of inhalers with her previous recurrent episodes of pneumonia back in 2014 and has remained on them been on them routinely with Symbicort 160/4.5 at 2 puffs twice daily as well as albuterol HFA. No nebulizer therapy. She denies any formal diagnosis of asthma. She is a lifelong non-smoker. Some secondhand smoke exposure growing up until age 21.    As far as pulmonary complaints she denies any significant shortness of breath. She notices when she becomes rushed she become slightly winded and when she slows her pace she recovers pretty quickly. Has mild intermittent cough predominantly in the morning time. No other pulmonary complaints-no chest congestion, chest tightness, wheezing. No fever, chills, hemoptysis, night sweats, unexplained weight loss.     Per cardiology:  Plan:     There are no absolute contraindications to surgery from cardiac standpoint and would use routine precautions. No further cardiac testing required prior to surgery.     Patient understands all surgeries carry risk and unpredictable cardiac events may still occur     Patient states has appt with pulmonologist.    EKG  Normal sinus rhythm   Baseline Artifact   When compared with ECG of 08-AUG-2014 20:39,   No significant change was found     CBC  Lab Results   Component Value Date    WBC 8.73 03/10/2022    HGB 15.7 03/10/2022    HCT 46.9 03/10/2022    MCV 93 03/10/2022     03/10/2022       CMP  Sodium   Date Value Ref Range Status   03/10/2022 139 136 - 145 mmol/L Final     Potassium   Date Value Ref Range Status   03/10/2022 4.2 3.5 - 5.1 mmol/L Final     Chloride   Date Value Ref Range Status   03/10/2022 97 95 - 110 mmol/L Final     CO2   Date Value Ref Range Status   03/10/2022 32 (H) 22 - 31 mmol/L Final     Glucose   Date Value Ref Range Status   03/10/2022 127  (H) 70 - 110 mg/dL Final     Comment:     The ADA recommends the following guidelines for fasting glucose:    Normal:       less than 100 mg/dL    Prediabetes:  100 mg/dL to 125 mg/dL    Diabetes:     126 mg/dL or higher       BUN   Date Value Ref Range Status   03/10/2022 27 (H) 7 - 18 mg/dL Final     Creatinine   Date Value Ref Range Status   03/14/2022 1.1 0.5 - 1.4 mg/dL Final     Calcium   Date Value Ref Range Status   03/10/2022 9.9 8.4 - 10.2 mg/dL Final     Total Protein   Date Value Ref Range Status   03/10/2022 8.0 6.0 - 8.4 g/dL Final     Albumin   Date Value Ref Range Status   03/10/2022 4.9 3.5 - 5.2 g/dL Final     Total Bilirubin   Date Value Ref Range Status   03/10/2022 0.4 0.2 - 1.3 mg/dL Final     Alkaline Phosphatase   Date Value Ref Range Status   03/10/2022 109 38 - 145 U/L Final     AST   Date Value Ref Range Status   03/10/2022 24 14 - 36 U/L Final     ALT   Date Value Ref Range Status   03/10/2022 9 0 - 35 U/L Final     Anion Gap   Date Value Ref Range Status   03/10/2022 10 8 - 16 mmol/L Final     eGFR if    Date Value Ref Range Status   03/14/2022 60 >60 mL/min/1.73 m^2 Final     eGFR if non    Date Value Ref Range Status   03/14/2022 52 (A) >60 mL/min/1.73 m^2 Final     Comment:     Calculation used to obtain the estimated glomerular filtration  rate (eGFR) is the CKD-EPI equation.        A1C  Lab Results   Component Value Date    HGBA1C 6.8 (H) 03/10/2022     PT/INR/PTT   Latest Reference Range & Units 03/10/22 08:50   PT 11.8 - 14.7 sec 12.3 [1]   INR  0.9   APTT 24.6 - 36.7 sec 24.4 (L) [2]     X-Ray Chest PA And Lateral   Next appt: 03/14/2022 at 09:15 AM in Radiology (Cat Scan)     Dx: Lumbar radiculopathy    Narrative & Impression  EXAMINATION:  XR CHEST PA AND LATERAL     CLINICAL HISTORY:  PRE OP TESTING; radiculopathy, lumbar region. PRE OP TESTING, Lumbar radiculopathy; Hx malignant carcinoid tumor of bronchus and lung 2017, Diabetes, htn,  Parkinson's.     TECHNIQUE:  PA and lateral views of the chest were performed.     COMPARISON:  PA and lateral chest radiograph, 2017.     FINDINGS:  Small right pleural effusion.  Minor atelectasis in the right lung base.  No lobar consolidation.  No pneumothorax.     Cardiomediastinal silhouette is within normal limits for size.  Aortic calcifications are present.     Multiple old displaced right rib fractures are unchanged.     Impression:     Small right pleural effusion.     Electronically signed by: Rohan Kim MD  Date: 03/10/2022  Time: 09:47     Past Medical History:  Past Medical History:   Diagnosis Date    Abdominal pain 2015    Arthritis     Diabetes mellitus, type 2     DM (diabetes mellitus)     on insulin    Elevated transaminase level 2016    Encounter for blood transfusion     History of malignant carcinoid tumor of bronchus and lung 10/25/2017    History of neuroendocrine cancer     HTN (hypertension) 2014    Hypercalcemia 2016    Lung cancer, hilus 2014    Malignant carcinoid tumor of bronchus and lung 2014    Malignant carcinoid tumor of the bronchus and lung 2014    Mediastinal lymphadenopathy 2015    Obesity, morbid 2014    Parkinsons 10/2017    Pituitary adenoma 's    took parladel for 3 yrs    Pneumonia     Postoperative hypothyroidism 2017    - s/p total thyroidectomy in  (parathyroids intact) with post op hypothyroidism; on synthroid    Pulmonary nodules 2018    Thyroid disease     Wheeze 2014     Past Surgical History:   Procedure Laterality Date    APPENDECTOMY      BREAST CYST ASPIRATION      BRONCHOSCOPY  2014, 2014     SECTION  , 1986    x2    COLONOSCOPY N/A 2021    Procedure: COLONOSCOPY;  Surgeon: Khadar Bernardo MD;  [facility];  Service: Endoscopy;  Laterality: N/A;    COLONOSCOPY N/A 2021    Procedure: COLONOSCOPY;  Surgeon: Frank Rosado  MD Adonis;  Location: [facility] ENDO (2ND FLR);  Service: Endoscopy;  Laterality: N/A;  MD Joselin Feliciano MA  Caller: Unspecified (Yesterday,  2:54 PM)  Need colonoscopy with EMR for large (30 mm) ascending colon polyp. 90 minutes. Main. Clear liquid diet for 24 hour patient with inadequate prep last time.   Thanks!   Ed Campa MD     EPIDURAL STEROID INJECTION INTO LUMBAR SPINE N/A 2/2/2022    Procedure: Injection-steroid-epidural-lumbar L5/S1;  Surgeon: Bebeto Eldridge MD;  [facility] OR;  Service: Pain Management;  Laterality: N/A;    ESOPHAGOGASTRODUODENOSCOPY N/A 9/16/2021    Procedure: EGD (ESOPHAGOGASTRODUODENOSCOPY);  Surgeon: Khadar Bernardo MD;  Location: [facility];  Service: Endoscopy;  Laterality: N/A;    EYE SURGERY      cataract bilat    LUNG REMOVAL, PARTIAL Right 2014    with 17 lymph nodes; right middle and lower    THYROIDECTOMY  05/24/2016    TONSILLECTOMY  1969    TUBAL LIGATION  1986 1986     Review of patient's allergies indicates:   Allergen Reactions    Propranolol Nausea And Vomiting     Drops pressure and raised sugar    Lipitor [atorvastatin] Other (See Comments)     Myalgia, muscle pain     Social History     Tobacco Use    Smoking status: Never Smoker    Smokeless tobacco: Never Used   Substance Use Topics    Alcohol use: Not Currently     Alcohol/week: 0.0 standard drinks     Comment: I rarely drink    Drug use: No     Family History   Problem Relation Age of Onset    Cancer Maternal Aunt         breast    Cancer Maternal Grandmother         unknown    Cancer Maternal Aunt         unknown    Diabetes Mother     Glaucoma Mother     Heart disease Mother     Hypertension Mother     Diabetes Father     Heart disease Father     Hypertension Father     Diabetes Sister     Macular degeneration Sister     Alcohol abuse Sister     Breast cancer Paternal Aunt     Breast cancer Paternal Aunt     Breast cancer Cousin     Amblyopia Neg Hx      Blindness Neg Hx     Cataracts Neg Hx     Retinal detachment Neg Hx     Stroke Neg Hx     Strabismus Neg Hx     Thyroid disease Neg Hx      Current Outpatient Medications on File Prior to Visit   Medication Sig Dispense Refill    albuterol (PROVENTIL/VENTOLIN HFA) 90 mcg/actuation inhaler Inhale 2 puffs into the lungs every 6 (six) hours as needed for Wheezing or Shortness of Breath. 18 g 0    alcohol swabs (ALCOHOL WIPES) PadM Uses 5 daily 400 each 4    amantadine HCL (SYMMETREL) 100 mg capsule TAKE ONE CAPSULE BY MOUTH twice daily 60 capsule 11    blood sugar diagnostic (TRUE METRIX GLUCOSE TEST STRIP) Strp Check bg 7-8 times/day 250 each 12    blood-glucose transmitter (DEXCOM G6 TRANSMITTER) Lizbeth Dispense 1 transmitter 1 Device 3    carbidopa-levodopa  mg (SINEMET)  mg per tablet TAKE ONE & ONE-HALF TABLET BY MOUTH THREE TIMES DAILY 135 tablet 6    chlorhexidine (PERIDEX) 0.12 % solution Use as directed 15 mLs in the mouth or throat 2 (two) times daily.      cholecalciferol, vitamin D3, 10 mcg (400 unit) Cap Take 1,200 Units by mouth once daily.      cyanocobalamin (VITAMIN B-12) 1000 MCG tablet Take 100 mcg by mouth once daily.      fish oil-omega-3 fatty acids 300-1,000 mg capsule Take 4 capsules by mouth once daily.      fluticasone-salmeterol diskus inhaler 250-50 mcg Inhale 1 puff into the lungs 2 (two) times daily. (Patient taking differently: Inhale 1 puff into the lungs 2 (two) times daily. prn) 60 each 11    gabapentin (NEURONTIN) 100 MG capsule Take 1 capsule (100 mg total) by mouth 3 (three) times daily. (Patient taking differently: Take 100 mg by mouth 3 (three) times daily. Takes at night) 90 capsule 11    HYDROcodone-acetaminophen (NORCO) 5-325 mg per tablet Take 1 tablet by mouth every 8 (eight) hours as needed for Pain. 21 tablet 0    ibuprofen (ADVIL,MOTRIN) 200 MG tablet Take 200 mg by mouth 2 (two) times a day.      insulin degludec (TRESIBA FLEXTOUCH  "U-100) 100 unit/mL (3 mL) insulin pen INJECT 35 UNITS EVERY MORNING THEN 30 UNITS EVERY NIGHT AT BEDTIME 4 pen 11    lancets 33 gauge Misc Checks bg 7-8 times a dayTrue metrix 250 each 11    lancing device Misc Checks bg 7-8 times a day 1 each 0    levothyroxine (SYNTHROID) 137 MCG Tab tablet Take 1 tablet (137 mcg total) by mouth once daily. 30 tablet 11    melatonin 3 mg Tab Take 6 mg by mouth nightly.      meloxicam (MOBIC) 15 MG tablet Take 15 mg by mouth once daily.      montelukast (SINGULAIR) 10 mg tablet take ONE tablet BY MOUTH once DAILY EVERY EVENING 30 tablet 11    MULTIVITAMIN W-MINERALS/LUTEIN (CENTRUM SILVER ORAL) Take 1 tablet by mouth once daily.       mupirocin calcium 2% nasl oint (BACTROBAN) 2 % Oint by Nasal route 2 (two) times daily.      NOVOLOG FLEXPEN U-100 INSULIN 100 unit/mL (3 mL) InPn pen INJECT 30 units SUBCUTANEOUSLY before meals plus correction scale. max 120 units DAILY 120 mL 4    pen needle, diabetic (BD ULTRA-FINE MINI PEN NEEDLE) 31 gauge x 3/16" Ndle Uses 5 daily 200 each 12    pramipexole (MIRAPEX) 0.25 MG tablet TAKE ONE-HALF to ONE TABLET BY MOUTH THREE TIMES DAILY as directed 90 tablet 11    selegiline (ELDEPRYL) 5 mg Cap take ONE capsule BY MOUTH twice daily 60 capsule 0    simvastatin (ZOCOR) 10 MG tablet Take 10 mg by mouth once daily at 6am.      tiZANidine (ZANAFLEX) 4 MG tablet Take 1 tablet (4 mg total) by mouth nightly as needed (muscle spasms/ pain). 40 tablet 0    UNABLE TO FIND Place 0.5 mLs under the tongue 2 (two) times a day. medication name:CBD Oil     Last dose 3 months ago      valsartan-hydrochlorothiazide (DIOVAN-HCT) 320-25 mg per tablet TAKE ONE TABLET BY MOUTH ONCE DAILY 90 tablet 3     No current facility-administered medications on file prior to visit.     Review of Systems   Constitutional: Negative for appetite change, chills, fatigue and fever.   HENT: Negative for congestion, rhinorrhea and sore throat.    Eyes: Negative for visual " disturbance.   Respiratory: Negative for cough and shortness of breath.    Cardiovascular: Negative for chest pain, palpitations and leg swelling.   Gastrointestinal: Negative for abdominal pain, diarrhea and vomiting.   Genitourinary: Negative for difficulty urinating, dysuria and hematuria.   Musculoskeletal: Positive for arthralgias and back pain. Negative for myalgias.   Skin: Negative for rash and wound.   Neurological: Negative for dizziness and headaches.   Psychiatric/Behavioral: Negative for behavioral problems. The patient is not nervous/anxious.      Vitals:    03/11/22 0848 03/11/22 0914   BP: (!) 144/86 105/64   BP Location:  Right arm   Patient Position:  Sitting   BP Method:  Large (Automatic)   Pulse: 86 84   Resp: 18    Temp: 97.7 °F (36.5 °C)    TempSrc: Temporal    SpO2: 95%    Weight: 99.3 kg (218 lb 14.4 oz)    Height: 5' (1.524 m)      Wt Readings from Last 3 Encounters:   03/11/22 99.3 kg (218 lb 14.4 oz)   03/10/22 99.2 kg (218 lb 11.1 oz)   03/08/22 99.1 kg (218 lb 7.6 oz)     Physical Exam  Vitals reviewed.   Constitutional:       General: She is not in acute distress.     Appearance: Normal appearance. She is not ill-appearing.   HENT:      Head: Normocephalic and atraumatic.      Right Ear: External ear normal.      Left Ear: External ear normal.   Eyes:      Extraocular Movements: Extraocular movements intact.      Conjunctiva/sclera: Conjunctivae normal.   Cardiovascular:      Rate and Rhythm: Normal rate and regular rhythm.      Pulses: Normal pulses.      Heart sounds: Normal heart sounds.   Pulmonary:      Effort: Pulmonary effort is normal. No tachypnea, accessory muscle usage or respiratory distress.   Abdominal:      General: Bowel sounds are normal.      Palpations: Abdomen is soft.   Musculoskeletal:         General: Normal range of motion.      Cervical back: Normal range of motion.   Skin:     General: Skin is warm and dry.      Capillary Refill: Capillary refill takes less  than 2 seconds.      Coloration: Skin is not pale.      Findings: No rash.   Neurological:      Mental Status: She is alert and oriented to person, place, and time. Mental status is at baseline.   Psychiatric:         Mood and Affect: Mood normal.         Speech: Speech normal.         Behavior: Behavior normal. Behavior is cooperative.       Health Maintenance   Topic Date Due    High Dose Statin  Never done    Foot Exam  06/07/2022    Eye Exam  07/09/2022    Hemoglobin A1c  09/10/2022    Mammogram  10/28/2022    Lipid Panel  12/03/2022    DEXA Scan  03/11/2023    TETANUS VACCINE  02/16/2026    Hepatitis C Screening  Completed

## 2022-03-13 NOTE — PROGRESS NOTES
[facility]:  [facility]      PATIENT: Jessica Rojas  MRN: [id number]  DATE: 3/13/2022      Diagnosis:   Typical bronchial carcinoid    Chief Complaint:  Typical grade 2 bronchial carcinoid, in active surveillance, symptomatic    The patient location is:  home  The chief complaint leading to consultation is:  Typical bronchial carcinoid    Visit type: audiovisual    Face to Face time with patient: 25  40 minutes of total time spent on the encounter, which includes face to face time and non-face to face time preparing to see the patient (eg, review of tests), Obtaining and/or reviewing separately obtained history, Documenting clinical information in the electronic or other health record, Independently interpreting results (not separately reported) and communicating results to the patient/family/caregiver, or Care coordination (not separately reported).       Each patient to whom he or she provides medical services by telemedicine is:  (1) informed of the relationship between the physician and patient and the respective role of any other health care provider with respect to management of the patient; and (2) notified that he or she may decline to receive medical services by telemedicine and may withdraw from such care at any time.    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 Rojas is a 66 y.o. female seen in follow up for a bronchial carcinoid.  She states that she generally has been feeling well.  Tremors have improved and she is following up with Neurology.  She did have some wheezing during the colder months but this has improved as the weather warms up.  She has no other new complaints.    Her history dates to 2009 when she was diagnosed with pneumonia.   She had yearly bouts of this until last year when she had a CT scan done which showed a right hilar mass, 3.4 cm.  A bronchoscopy was done showing an obstructive tumor in the right bronchus intermedius.  Pathology was initially read as possible small cell.  Re-review here shows and atypical carcinoid, intermediate grade, well differentiated with Ki-67 of 4%.   She underwent a right middle and right lower bilobectomy on 14.  She was found to have a T2a, N0, M0 typical carcinoid.      Past Medical History:   Past Medical History:   Diagnosis Date    Abdominal pain 2015    Arthritis     Diabetes mellitus, type 2     DM (diabetes mellitus)     on insulin    Elevated transaminase level 2016    Encounter for blood transfusion     History of malignant carcinoid tumor of bronchus and lung 10/25/2017    History of neuroendocrine cancer     HTN (hypertension) 2014    Hypercalcemia 2016    Lung cancer, hilus 2014    Malignant carcinoid tumor of bronchus and lung 2014    Malignant carcinoid tumor of the bronchus and lung 2014    Mediastinal lymphadenopathy 2015    Obesity, morbid 2014    Parkinsons 10/2017    Pituitary adenoma 's    took parladel for 3 yrs    Pneumonia     Postoperative hypothyroidism 2017    - s/p total thyroidectomy in  (parathyroids intact) with post op hypothyroidism; on synthroid    Pulmonary nodules 2018    Thyroid disease     Wheeze 2014       Past Surgical HIstory:   Past Surgical History:   Procedure Laterality Date    APPENDECTOMY      BREAST CYST ASPIRATION      BRONCHOSCOPY  2014, 2014     SECTION  , 1986    x2    COLONOSCOPY N/A 2021    Procedure: COLONOSCOPY;  Surgeon: Khadar Bernardo MD;  Location: [facility];  Service: Endoscopy;  Laterality: N/A;    COLONOSCOPY N/A 2021    Procedure: COLONOSCOPY;  Surgeon: Frank Lamb MD;  Location: [facility] (2ND FLR);   Service: Endoscopy;  Laterality: N/A;  MD Joselin Feliciano MA  Caller: Unspecified (Yesterday,  2:54 PM)  Need colonoscopy with EMR for large (30 mm) ascending colon polyp. 90 minutes. Main. Clear liquid diet for 24 hour patient with inadequate prep last time.   Thanks!   Ed Campa MD     EPIDURAL STEROID INJECTION INTO LUMBAR SPINE N/A 2/2/2022    Procedure: Injection-steroid-epidural-lumbar L5/S1;  Surgeon: Bebeto Eldridge MD;  Location: [facility] OR;  Service: Pain Management;  Laterality: N/A;    ESOPHAGOGASTRODUODENOSCOPY N/A 9/16/2021    Procedure: EGD (ESOPHAGOGASTRODUODENOSCOPY);  Surgeon: Khadar Bernardo MD;  Location: [facility] END[facility];  Service: Endoscopy;  Laterality: N/A;    EYE SURGERY      cataract bilat    LUNG REMOVAL, PARTIAL Right 2014    with 17 lymph nodes; right middle and lower    THYROIDECTOMY  05/24/2016    TONSILLECTOMY  1969    TUBAL LIGATION  1986 1986       Family History:   Family History   Problem Relation Age of Onset    Cancer Maternal Aunt         breast    Cancer Maternal Grandmother         unknown    Cancer Maternal Aunt         unknown    Diabetes Mother     Glaucoma Mother     Heart disease Mother     Hypertension Mother     Diabetes Father     Heart disease Father     Hypertension Father     Diabetes Sister     Macular degeneration Sister     Alcohol abuse Sister     Breast cancer Paternal Aunt     Breast cancer Paternal Aunt     Breast cancer Cousin     Amblyopia Neg Hx     Blindness Neg Hx     Cataracts Neg Hx     Retinal detachment Neg Hx     Stroke Neg Hx     Strabismus Neg Hx     Thyroid disease Neg Hx        Social History:  reports that she has never smoked. She has never used smokeless tobacco. She reports previous alcohol use. She reports that she does not use drugs.    Allergies:  Allergies   Allergen Reactions    Propranolol Nausea And Vomiting     Drops pressure and raised sugar    Lipitor [Atorvastatin] Other (See  Comments)     Myalgia, muscle pain       Medications:  Current Outpatient Medications   Medication Sig Dispense Refill    albuterol (PROVENTIL/VENTOLIN HFA) 90 mcg/actuation inhaler Inhale 2 puffs into the lungs every 6 (six) hours as needed for Wheezing or Shortness of Breath. 18 g 0    alcohol swabs (ALCOHOL WIPES) PadM Uses 5 daily 400 each 4    amantadine HCL (SYMMETREL) 100 mg capsule TAKE ONE CAPSULE BY MOUTH twice daily 60 capsule 11    blood sugar diagnostic (TRUE METRIX GLUCOSE TEST STRIP) Strp Check bg 7-8 times/day 250 each 12    blood-glucose transmitter (DEXCOM G6 TRANSMITTER) Lizbeth Dispense 1 transmitter 1 Device 3    carbidopa-levodopa  mg (SINEMET)  mg per tablet TAKE ONE & ONE-HALF TABLET BY MOUTH THREE TIMES DAILY 135 tablet 6    [START ON 3/15/2022] chlorhexidine (PERIDEX) 0.12 % solution Use as directed 15 mLs in the mouth or throat 2 (two) times daily.      cholecalciferol, vitamin D3, 10 mcg (400 unit) Cap Take 1,200 Units by mouth once daily.      cyanocobalamin (VITAMIN B-12) 1000 MCG tablet Take 100 mcg by mouth once daily.      fish oil-omega-3 fatty acids 300-1,000 mg capsule Take 4 capsules by mouth once daily.      fluticasone-salmeterol diskus inhaler 250-50 mcg Inhale 1 puff into the lungs 2 (two) times daily. (Patient taking differently: Inhale 1 puff into the lungs 2 (two) times daily. prn) 60 each 11    gabapentin (NEURONTIN) 100 MG capsule Take 1 capsule (100 mg total) by mouth 3 (three) times daily. (Patient taking differently: Take 100 mg by mouth 3 (three) times daily. Takes at night) 90 capsule 11    HYDROcodone-acetaminophen (NORCO) 5-325 mg per tablet Take 1 tablet by mouth every 8 (eight) hours as needed for Pain. 21 tablet 0    ibuprofen (ADVIL,MOTRIN) 200 MG tablet Take 200 mg by mouth 2 (two) times a day.      insulin degludec (TRESIBA FLEXTOUCH U-100) 100 unit/mL (3 mL) insulin pen INJECT 35 UNITS EVERY MORNING THEN 30 UNITS EVERY NIGHT AT  "BEDTIME 4 pen 11    lancets 33 gauge Misc Checks bg 7-8 times a dayTrue metrix 250 each 11    lancing device Misc Checks bg 7-8 times a day 1 each 0    levothyroxine (SYNTHROID) 137 MCG Tab tablet Take 1 tablet (137 mcg total) by mouth once daily. 30 tablet 11    melatonin 3 mg Tab Take 6 mg by mouth nightly.      meloxicam (MOBIC) 15 MG tablet Take 15 mg by mouth once daily.      montelukast (SINGULAIR) 10 mg tablet take ONE tablet BY MOUTH once DAILY EVERY EVENING 30 tablet 11    MULTIVITAMIN W-MINERALS/LUTEIN (CENTRUM SILVER ORAL) Take 1 tablet by mouth once daily.       [START ON 3/15/2022] mupirocin calcium 2% nasl oint (BACTROBAN) 2 % Oint by Nasal route 2 (two) times daily.      NOVOLOG FLEXPEN U-100 INSULIN 100 unit/mL (3 mL) InPn pen INJECT 30 units SUBCUTANEOUSLY before meals plus correction scale. max 120 units DAILY 120 mL 4    pen needle, diabetic (BD ULTRA-FINE MINI PEN NEEDLE) 31 gauge x 3/16" Ndle Uses 5 daily 200 each 12    pramipexole (MIRAPEX) 0.25 MG tablet TAKE ONE-HALF to ONE TABLET BY MOUTH THREE TIMES DAILY as directed 90 tablet 11    selegiline (ELDEPRYL) 5 mg Cap take ONE capsule BY MOUTH twice daily 60 capsule 0    simvastatin (ZOCOR) 10 MG tablet Take 10 mg by mouth once daily at 6am.      tiZANidine (ZANAFLEX) 4 MG tablet Take 1 tablet (4 mg total) by mouth nightly as needed (muscle spasms/ pain). 40 tablet 0    UNABLE TO FIND Place 0.5 mLs under the tongue 2 (two) times a day. medication name:CBD Oil     Last dose 3 months ago      valsartan-hydrochlorothiazide (DIOVAN-HCT) 320-25 mg per tablet TAKE ONE TABLET BY MOUTH ONCE DAILY 90 tablet 3     No current facility-administered medications for this visit.       Review of Systems   Constitutional: Negative for appetite change, chills, fatigue, fever and unexpected weight change.        No flushing   HENT: Negative for congestion, hearing loss, nosebleeds and postnasal drip.    Eyes: Negative for visual disturbance. "   Respiratory: Positive for wheezing. Negative for cough and shortness of breath.    Cardiovascular: Negative for chest pain and palpitations.   Gastrointestinal: Negative for abdominal pain, blood in stool, constipation, diarrhea, nausea and vomiting.   Genitourinary: Negative for dysuria and frequency.   Musculoskeletal: Negative for back pain and gait problem.        Right-sided chest wall pain   Skin: Negative for color change and rash.   Allergic/Immunologic: Positive for environmental allergies.   Neurological: Negative for dizziness, tremors, weakness and headaches.   Hematological: Negative for adenopathy. Does not bruise/bleed easily.   Psychiatric/Behavioral: Negative for confusion. The patient is not nervous/anxious.        ECOG Performance Status: 0     Objective:      Vitals:   There were no vitals filed for this visit.  BMI: There is no height or weight on file to calculate BMI.    Physical Exam  Constitutional:       General: She is not in acute distress.     Appearance: She is well-developed. She is not ill-appearing, toxic-appearing or diaphoretic.   HENT:      Head: Normocephalic.   Neck:      Thyroid: No thyromegaly.      Trachea: No tracheal deviation.   Pulmonary:      Effort: Pulmonary effort is normal. No respiratory distress.      Breath sounds: No wheezing.   Skin:     Coloration: Skin is not jaundiced.   Neurological:      Mental Status: She is alert and oriented to person, place, and time.   Psychiatric:         Mood and Affect: Mood normal.         Behavior: Behavior normal.         Thought Content: Thought content normal.         Judgment: Judgment normal.         Laboratory Data:     Hospital Outpatient Visit on 03/10/2022   Component Date Value Ref Range Status    Sodium 03/10/2022 139  136 - 145 mmol/L Final    Potassium 03/10/2022 4.2  3.5 - 5.1 mmol/L Final    Chloride 03/10/2022 97  95 - 110 mmol/L Final    CO2 03/10/2022 32 (A) 22 - 31 mmol/L Final    Glucose 03/10/2022 127  (A) 70 - 110 mg/dL Final    Comment: The ADA recommends the following guidelines for fasting glucose:    Normal:       less than 100 mg/dL    Prediabetes:  100 mg/dL to 125 mg/dL    Diabetes:     126 mg/dL or higher      BUN 03/10/2022 27 (A) 7 - 18 mg/dL Final    Creatinine 03/10/2022 0.96  0.50 - 1.40 mg/dL Final    Calcium 03/10/2022 9.9  8.4 - 10.2 mg/dL Final    Total Protein 03/10/2022 8.0  6.0 - 8.4 g/dL Final    Albumin 03/10/2022 4.9  3.5 - 5.2 g/dL Final    Total Bilirubin 03/10/2022 0.4  0.2 - 1.3 mg/dL Final    Alkaline Phosphatase 03/10/2022 109  38 - 145 U/L Final    AST 03/10/2022 24  14 - 36 U/L Final    ALT 03/10/2022 9  0 - 35 U/L Final    Anion Gap 03/10/2022 10  8 - 16 mmol/L Final    eGFR if African American 03/10/2022 >60  >60 mL/min/1.73 m^2 Final    eGFR if non African American 03/10/2022 >60  >60 mL/min/1.73 m^2 Final    Comment: Calculation used to obtain the estimated glomerular filtration  rate (eGFR) is the CKD-EPI equation.       WBC 03/10/2022 8.73  3.90 - 12.70 K/uL Final    RBC 03/10/2022 5.07  4.00 - 5.40 M/uL Final    Hemoglobin 03/10/2022 15.7  12.0 - 16.0 g/dL Final    Hematocrit 03/10/2022 46.9  37.0 - 48.5 % Final    MCV 03/10/2022 93  82 - 98 fL Final    MCH 03/10/2022 31.0  27.0 - 31.0 pg Final    MCHC 03/10/2022 33.5  32.0 - 36.0 g/dL Final    RDW 03/10/2022 12.3  11.5 - 14.5 % Final    Platelets 03/10/2022 208  150 - 450 K/uL Final    MPV 03/10/2022 10.3  9.2 - 12.9 fL Final    Immature Granulocytes 03/10/2022 0.3  0.0 - 0.5 % Final    Gran # (ANC) 03/10/2022 5.6  1.8 - 7.7 K/uL Final    Immature Grans (Abs) 03/10/2022 0.03  0.00 - 0.04 K/uL Final    Comment: Mild elevation in immature granulocytes is non specific and   can be seen in a variety of conditions including stress response,   acute inflammation, trauma and pregnancy. Correlation with other   laboratory and clinical findings is essential.      Lymph # 03/10/2022 2.3  1.0 - 4.8 K/uL Final     Mono # 03/10/2022 0.7  0.3 - 1.0 K/uL Final    Eos # 03/10/2022 0.1  0.0 - 0.5 K/uL Final    Baso # 03/10/2022 0.07  0.00 - 0.20 K/uL Final    nRBC 03/10/2022 0  0 /100 WBC Final    Gran % 03/10/2022 63.7  38.0 - 73.0 % Final    Lymph % 03/10/2022 26.1  18.0 - 48.0 % Final    Mono % 03/10/2022 7.8  4.0 - 15.0 % Final    Eosinophil % 03/10/2022 1.3  0.0 - 8.0 % Final    Basophil % 03/10/2022 0.8  0.0 - 1.9 % Final    Differential Method 03/10/2022 Automated   Final    PT 03/10/2022 12.3  11.8 - 14.7 sec Final    PT normal range is not established for pediatrics.    INR 03/10/2022 0.9   Final    aPTT 03/10/2022 24.4 (A) 24.6 - 36.7 sec Final    PTT normal range is not established for pediatrics.    Specimen UA 03/10/2022 Urine, Clean Catch   Final    Color, UA 03/10/2022 Yellow  Yellow, Straw, Minerva Final    Appearance, UA 03/10/2022 Clear  Clear Final    pH, UA 03/10/2022 7.0  5.0 - 8.0 Final    Specific Gravity, UA 03/10/2022 1.020  1.005 - 1.030 Final    Protein, UA 03/10/2022 Negative  Negative Final    Comment: Recommend a 24 hour urine protein or a urine   protein/creatinine ratio if globulin induced proteinuria is  clinically suspected.      Glucose, UA 03/10/2022 Negative  Negative Final    Ketones, UA 03/10/2022 Negative  Negative Final    Bilirubin (UA) 03/10/2022 Negative  Negative Final    Occult Blood UA 03/10/2022 Negative  Negative Final    Nitrite, UA 03/10/2022 Negative  Negative Final    Urobilinogen, UA 03/10/2022 0.2  <2.0 EU/dL Final    Leukocytes, UA 03/10/2022 Trace (A) Negative Final    Hemoglobin A1C 03/10/2022 6.8 (A) 0.0 - 5.6 % Final    Comment: Reference Interval:  5.0 - 5.6 Normal   5.7 - 6.4 High Risk   > 6.5 Diabetic       Hgb A1c results are standardized based on the (NGSP) National   Glycohemoglobin Standardization Program.      Hemoglobin A1C levels are related to mean serum/plasma glucose   during the preceding 2-3 months.          Estimated Avg  Glucose 03/10/2022 148 (A) 68 - 131 mg/dL Final    RBC, UA 03/10/2022 2  0 - 4 /hpf Final    WBC, UA 03/10/2022 2  0 - 5 /hpf Final    Squam Epithel, UA 03/10/2022 13  /hpf Final    Bacteria 03/10/2022 Few (A) Negative /hpf Final    Hyaline Casts, UA 03/10/2022 3 (A) 0 - 1 /lpf Final    RBC, UA 03/10/2022 2  0 - 4 /hpf Final    WBC, UA 03/10/2022 2  0 - 5 /hpf Final    Bacteria 03/10/2022 Few (A) Negative /hpf Final    Squam Epithel, UA 03/10/2022 13  /hpf Final    Hyaline Casts, UA 03/10/2022 3 (A) 0 - 1 /lpf Final    Microscopic Comment 03/10/2022 SEE COMMENT   Final    Comment: Other formed elements not mentioned in the report are not   present in the microscopic examination.        Neuroendocrine Labs Latest Ref Rng 7/29/2015   EXT 5 HIAA BLOOD 0 - 22 ng/ml 14   EXT SEROTONIN 56 - 244 ng/ml    EXT CHROMOGRANIN A 0 - 15 ng/ml 14           FINAL PATHOLOGIC DIAGNOSIS 8/7/14  1. Right fifth rib (demineralized), biopsy:  Bone with trilineage bone marrow.  Negative for neoplasm.    2. Right pleura, partial pleurectomy:  Pleural tissue with congested vasculature and no evidence of neoplasm.    3. Right lung, right middle and lower lobes, lobectomies:  Typical carcinoid tumor, multifocal with 2 lesions, measuring 4.5 cm and 0.5 cm in greatest dimension  respectively.  Mitotic index: 1 mitosis seen in 10 high powered fields.  No evidence of necrosis or significant nuclear pleomorphism are seen.  Ki-67 proliferation index: 4% of tumor cells staining.  Bronchial and vascular margins are negative for malignancy.  17 benign lymph nodes (0/17).  Separate lesonal area consists of lung with necrotizing graulomatous inflammation and calcified granulomata.  Special stains for mycobacterial and fungi are completed on the granulomatous lesion and are negative for fungal  and mycobacterial organisms with satisfactory positive control.  See synoptic report in comment section for further details.     4. Right lung lymph  node, station 4, biopsy:  Fibroadipose tissue, negative for lymph nodes.  No evidence of neoplasm.    5. Right lung lymph nodes, station for alpha, biopsy:  Two (2) lymph nodes, negative for neoplasm (0/2).  Comment: Synoptic report  Specimen: Lung, right middle and lower lobes  Specimen integrity: Intact  Specimen laterality: Right  Tumor site: Right bronchus intermedius  Tumor size:  Lesion#1: 4.5 x 4 x 2.5 cm  Lesion#2: 0.5 cm in greatest dimension.  Tumor focality: Multifocal  Histologic Type:  Typical carcinoid tumor  Visceral Pleural Invasion: Not identified  Tumor Extension: Tumor involves the bronchus intermedius  Margins: Bronchial & Vasular margins are uninvolved by tumor  Distance of tumor from closest bronchial margin: 0.2 cm.  Treatment effect: Unknown  Lymphovascular invasion: Not identified.  Pathologic Staging:  Primary tumor:  pT2a: Tumor greater than 3 cm but 5 cm or less in greatest dimension without evidence of invasion into the main  bronchus  Regional lymph nodes:  pN0: No regional lymph node metastasis  Number examined: 16  Number involved: 0  Distant metastasis:  pMX: Cannot be assessed.  Note: Immunohistochemical stain results:  AE1/AE3: Negative in tumor cells.  Chromogranin staining:  Intensity Positive cells (0%)  0: 0%  1+: 10%  2+: 85%  3+: 5%  Synaptophysin staining:  Intensity Positive cells (0%)  0: 0%  1+: 0%  2+: 0%  3+: 100%  CD31: 44 vessels per 1 HPF  Factor VIII: 69 vessels per 1 HPF  Ki-67: 4% cells staining  Note: This tumor has only 1 mitotic figure in 10 high power fields, no evidence of necrosis and fairly bland  appearing nulei with salt and pepper chromatin pattern and no significant nuclear pleomorphism. This is best  categorized as a typical carcinoid tumor.  All immunostains have satisfactory positive and negative controls.    IMAGING:  CT Chest With Contrast  Narrative: EXAMINATION:  CT CHEST WITH CONTRAST    CLINICAL HISTORY:  Lung nodule, 6-8mm; Solitary pulmonary  nodule    TECHNIQUE:  Following the intravenous administration of 75 cc of Omnipaque 350 contrast material, 1.25 mm low dose contiguous axial images were acquired through the chest.  Subsequently, 2 dimensional coronal and sagittal reconstructed images were generated from the source data.  Axial MIP images of the lungs were also generated.    COMPARISON:  Chest CT-02/09/2021    FINDINGS:  The soft tissue structures at the base of the neck are unremarkable.  There is atherosclerotic calcification present within the thoracic aortic arch.  There are aortic annulus calcifications.  There are coronary artery calcifications.  No pericardial fluid.  There are postoperative changes of right middle lobe sleeve lobectomy.  The trachea and mainstem bronchi are otherwise unremarkable.  The visualized esophagus shows no significant abnormalities.    There is linear fibrotic scarring present in the right lower lobe on series 4, image 303.  There are multiple scattered solid pulmonary nodules measuring up to 7 mm in size which have remained stable in size and number when compared to the previous study.  Previously measured index nodules are detailed below:    Posterior left lung apex-7 x 7 mm on series 4, image 130, unchanged in size.    Right lung apex-4 mm on series 4 image 163, unchanged in size.    Subpleural left lower lobe laterally-7 mm on series 4, image 296, unchanged in size.  This nodule shows a slightly more ground-glass appearance on today's exam.    Left lower lobe-6 mm on series 4, image 360, unchanged in size.    No new pulmonary nodules.  No bronchiectasis or emphysematous lung architecture.  No pleural fluid or pneumothorax.    There is a mildly hyper dense sludge present within the dependent gallbladder fundus.  No calcified gallstones.  There are calcified granulomas present in the spleen.  There is probable hepatic steatosis.  The remaining visualized upper abdominal soft tissues are unremarkable.    There is  multilevel degenerative change and/or DISH of the thoracic spine.  Impression: 1. No interval detrimental change when compared to the prior study.  Status post right middle lobectomy with multiple stable noncalcified solid pulmonary nodules scattered throughout the chest, unchanged in size or number when compared to the previous study.  The current examination is unchanged since 03/22/2019 (2 full years of stability).  2. Probable hepatic steatosis.  3. Possible gallbladder sludge.  4. Evidence of prior granulomatous disease in the upper abdomen.    Electronically signed by: Velasquez Martinez MD  Date:    03/14/2022  Time:    10:18    X-Ray Chest PA And Lateral  Narrative: EXAMINATION:  XR CHEST PA AND LATERAL    CLINICAL HISTORY:  PRE OP TESTING; radiculopathy, lumbar region. PRE OP TESTING, Lumbar radiculopathy; Hx malignant carcinoid tumor of bronchus and lung 2017, Diabetes, htn, Parkinson's.    TECHNIQUE:  PA and lateral views of the chest were performed.    COMPARISON:  PA and lateral chest radiograph, 06/13/2017.    FINDINGS:  Small right pleural effusion.  Minor atelectasis in the right lung base.  No lobar consolidation.  No pneumothorax.    Cardiomediastinal silhouette is within normal limits for size.  Aortic calcifications are present.    Multiple old displaced right rib fractures are unchanged.  Impression: Small right pleural effusion.    Electronically signed by: Rohan Kim MD  Date:    03/10/2022  Time:    09:47    EXAMINATION:  MRI LUMBAR SPINE WITHOUT CONTRAST     CLINICAL HISTORY:  Spinal stenosis, lumbar; Spinal stenosis, lumbosacral region     TECHNIQUE:  Multiplanar, multisequence MR images were acquired from the thoracolumbar junction to the sacrum without the administration of contrast.     COMPARISON:  Plain films of the lumbar spine obtained concurrently, lumbar spine MRI dated 09/18/2018     FINDINGS:  Vertebral column: Redemonstrated is grade 1 spondylolisthesis at the L4-5 level which  measures approximately 5-6 mm on the current study, minimally increased compared to the prior MRI where it measures approximately 4 mm.  Otherwise, vertebral body alignment is normal.  There is no acute fracture.  There is mild disc space narrowing at the L3-4 and L4-5 levels.  There is chronic degenerative change in the included lower thoracic spine.  Baseline marrow signal is normal.     Spinal canal, conus, epidural space: The spinal canal is at least borderline small on a developmental basis.  The conus terminates at the level of T12-L1 and is normal in contour and signal intensity.  There is minimal flattening of the ventral cord surface at the T11-12 level where there is a shallow disc bulge.  This was present previously.  Cord signal remains normal.     Findings by level:     T11-12: There is a minimal disc bulge which narrows the ventral subarachnoid space.  There is minimal chronic flattening of the ventral cord surface.  There is mild spinal stenosis.  These findings were present previously.     T12-L1: There is a minimal disc bulge.  There is no spinal canal or significant foraminal stenosis.  There is no change.     L1-2: There is a mild disc bulge.  There is no spinal canal or significant foraminal stenosis.  There is no change.     L2-3: There is a mild diffuse disc bulge.  There is mild facet joint arthropathy.  There is no spinal canal or significant foraminal stenosis.  There is no change.     L3-4: There is mild disc space narrowing.  There is a diffuse disc bulge with osteophytic ridging eccentric to the left.  There is facet joint arthropathy with ligamentum flavum thickening.  There is flattening of the ventral dural sac.  There is mild spinal stenosis with mild-to-moderate bilateral, left greater than right foraminal stenosis.  Findings have minimally progressed.     L4-5: There is grade 1 spondylolisthesis at the L4-5 level.  There is mild disc space narrowing.  There is unroofing of a  moderate diffuse disc bulge.  Also, there is moderate to marked bilateral facet joint arthropathy.  There is severe spinal stenosis and right foraminal stenosis with moderate to severe left foraminal stenosis.  Findings may have minimally progressed.     L5-S1: There is facet joint arthropathy with a minimal disc bulge but there is no spinal canal or significant foraminal stenosis.     Soft tissues, other: The prevertebral soft tissues are normal.  The aorta is normal in caliber.     Impression:     1. There is multilevel degenerative change discussed in detail by level above.  The findings are present in the setting of a spinal canal which is at least borderline small on a developmental basis.  There is no for acute fracture.  There is grade 1 anterolisthesis at the L4-5 level.  There is mild disc space narrowing at this level as well as at the L3-4 level.  There is some degree of disc bulge and facet joint arthropathy at multiple levels.  The pertinent findings are summarized below.  2. At the L4-5 level there is multifactorial severe spinal canal and right foraminal stenosis with moderate to severe left foraminal stenosis with minimal interval progression.  3. At the L3-4 level there is mild spinal stenosis with mild-to-moderate bilateral foraminal stenosis with minimal interval progression.  There is mild degenerative change at several of the lumbar levels but there is no spinal canal or significant foraminal stenosis.        Electronically signed by: Jorge Aguilar MD  Date:                                            01/26/2022  Time:                                           12:22             Exam Ended: 01/26/22 11:26 Last Resulted: 01/26/22 12:22        Order Details      View Encounter      Lab and Collection Details      Routing      Result History             Result Care Coordination        Patient Communication     Add Comments   Seen Back to Top               MRI Lumbar Spine Without Contrast:  Patient Communication     Add Comments   Seen       External Result Report    External Result Report     Narrative & Impression    EXAMINATION:  MRI LUMBAR SPINE WITHOUT CONTRAST     CLINICAL HISTORY:  Spinal stenosis, lumbar; Spinal stenosis, lumbosacral region     TECHNIQUE:  Multiplanar, multisequence MR images were acquired from the thoracolumbar junction to the sacrum without the administration of contrast.     COMPARISON:  Plain films of the lumbar spine obtained concurrently, lumbar spine MRI dated 09/18/2018     FINDINGS:  Vertebral column: Redemonstrated is grade 1 spondylolisthesis at the L4-5 level which measures approximately 5-6 mm on the current study, minimally increased compared to the prior MRI where it measures approximately 4 mm.  Otherwise, vertebral body alignment is normal.  There is no acute fracture.  There is mild disc space narrowing at the L3-4 and L4-5 levels.  There is chronic degenerative change in the included lower thoracic spine.  Baseline marrow signal is normal.     Spinal canal, conus, epidural space: The spinal canal is at least borderline small on a developmental basis.  The conus terminates at the level of T12-L1 and is normal in contour and signal intensity.  There is minimal flattening of the ventral cord surface at the T11-12 level where there is a shallow disc bulge.  This was present previously.  Cord signal remains normal.     Findings by level:     T11-12: There is a minimal disc bulge which narrows the ventral subarachnoid space.  There is minimal chronic flattening of the ventral cord surface.  There is mild spinal stenosis.  These findings were present previously.     T12-L1: There is a minimal disc bulge.  There is no spinal canal or significant foraminal stenosis.  There is no change.     L1-2: There is a mild disc bulge.  There is no spinal canal or significant foraminal stenosis.  There is no change.     L2-3: There is a mild diffuse disc bulge.  There is mild  facet joint arthropathy.  There is no spinal canal or significant foraminal stenosis.  There is no change.     L3-4: There is mild disc space narrowing.  There is a diffuse disc bulge with osteophytic ridging eccentric to the left.  There is facet joint arthropathy with ligamentum flavum thickening.  There is flattening of the ventral dural sac.  There is mild spinal stenosis with mild-to-moderate bilateral, left greater than right foraminal stenosis.  Findings have minimally progressed.     L4-5: There is grade 1 spondylolisthesis at the L4-5 level.  There is mild disc space narrowing.  There is unroofing of a moderate diffuse disc bulge.  Also, there is moderate to marked bilateral facet joint arthropathy.  There is severe spinal stenosis and right foraminal stenosis with moderate to severe left foraminal stenosis.  Findings may have minimally progressed.     L5-S1: There is facet joint arthropathy with a minimal disc bulge but there is no spinal canal or significant foraminal stenosis.     Soft tissues, other: The prevertebral soft tissues are normal.  The aorta is normal in caliber.     Impression:     1. There is multilevel degenerative change discussed in detail by level above.  The findings are present in the setting of a spinal canal which is at least borderline small on a developmental basis.  There is no for acute fracture.  There is grade 1 anterolisthesis at the L4-5 level.  There is mild disc space narrowing at this level as well as at the L3-4 level.  There is some degree of disc bulge and facet joint arthropathy at multiple levels.  The pertinent findings are summarized below.  2. At the L4-5 level there is multifactorial severe spinal canal and right foraminal stenosis with moderate to severe left foraminal stenosis with minimal interval progression.  3. At the L3-4 level there is mild spinal stenosis with mild-to-moderate bilateral foraminal stenosis with minimal interval progression.  There is mild  degenerative change at several of the lumbar levels but there is no spinal canal or significant foraminal stenosis.        Electronically signed by: Jorge Aguilar MD  Date:                                            01/26/2022              CT 03/09/2020    COMPARISON:  03/22/2019    FINDINGS:  There are numerous small pulmonary nodules less than 7 mm in diameter which remain unchanged in size and appearance compared to the previous study.  In the right upper lobe, there are approximately 3 nodules measuring 3 mm in diameter each.  In the left lung, there are approximately 13 nodules ranging in size from 3-6 mm.  The nodules are best visualized on series 4.  Some of the larger nodules include a 5 mm nodule in the left upper lobe on image 124, a 5 mm nodule in the left lower lobe on image 372, a 6 mm nodule on image 295, and a 6 mm nodule on image 366.  No new nodules have developed.  No lymphadenopathy, pleural effusion, or pericardial effusion are present.  Chronic fibrotic changes are present at the right lung base.  The thoracic inlet, axillary regions, and upper abdomen are unremarkable.      Impression       1. Multiple small pulmonary nodules less than 7 mm in diameter which remain unchanged in size and appearance compared to CT studies dating back to 09/10/2018.  2. Chronic fibrotic changes at the right lung base.                Assessment:       No diagnosis found.       Plan:       I had a long conversation with the patient about the features of her case that support the treatment and surveillance recommendations outlined below:  1.  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  We have been following subcentimeter pulmonary nodules that have remained stable over several scans.  She and I discussed the natural history of typical pulmonary carcinoids in their association with other hypersensitivity reactions within the lungs.  Therefore  subcentimeter pulmonary nodules are not uncommon in patients like her.  Given the relationship between respiratory hypersensitivity, stimulation of the neuroendocrine system within the lungs, and the development of neuroendocrine tumors, surveillance up to 10 years is justified.  2.  She is not having  significant change in upper respiratory symptoms.  I would move her next follow-up imaging to 1 year.  If she develops any changes in her respiratory signs or symptoms, she knows to contact our office and we can move up her evaluation accordingly.      Plan:  Follow-up CT scan of the chest with contrast in 1 year      Sabrina Alcala MD, MPH, FACS  Professor of Surgery, Surgical Oncology, and Hepatobiliary Oncology  Medical Director, [place] [address]  [place] [address]  Phone: [phone]  Phone: [phone]  Email: [email or web address]

## 2022-03-14 ENCOUNTER — OFFICE VISIT (OUTPATIENT)
Dept: NEUROLOGY | Facility: HOSPITAL | Age: 67
End: 2022-03-14
Attending: SURGERY
Payer: MEDICARE

## 2022-03-14 ENCOUNTER — HOSPITAL ENCOUNTER (OUTPATIENT)
Dept: RADIOLOGY | Facility: HOSPITAL | Age: 67
Discharge: HOME | End: 2022-03-14
Attending: SURGERY
Payer: MEDICARE

## 2022-03-14 ENCOUNTER — TELEPHONE (OUTPATIENT)
Dept: NEUROLOGY | Facility: HOSPITAL | Age: 67
End: 2022-03-14
Payer: MEDICARE

## 2022-03-14 DIAGNOSIS — C7A.090 MALIGNANT CARCINOID TUMOR OF BRONCHUS AND LUNG: ICD-10-CM

## 2022-03-14 DIAGNOSIS — N18.31 STAGE 3A CHRONIC KIDNEY DISEASE: Primary | ICD-10-CM

## 2022-03-14 PROCEDURE — 71260 CT THORAX DX C+: CPT | Mod: 26,,, | Performed by: RADIOLOGY

## 2022-03-14 PROCEDURE — 25500020 PHARM REV CODE 255: Mod: PO | Performed by: SURGERY

## 2022-03-14 PROCEDURE — 71260 CT THORAX DX C+: CPT | Mod: TC,PO

## 2022-03-14 PROCEDURE — 71260 CT CHEST WITH CONTRAST: ICD-10-PCS | Mod: 26,,, | Performed by: RADIOLOGY

## 2022-03-14 RX ADMIN — IOHEXOL 75 ML: 350 INJECTION, SOLUTION INTRAVENOUS at 09:03

## 2022-03-14 NOTE — TELEPHONE ENCOUNTER
----- Message from Ayde Nye sent at 3/14/2022 11:38 AM CDT -----  Contact: [phone]/ Self  MM    Patient is requesting today's virtual appt be changed to AUDIO. Pt is having phone trouble. Please advise.

## 2022-03-14 NOTE — TELEPHONE ENCOUNTER
Spoke with patient, advised patient that Dr. Alcala will call her and do an audio visit only on today. Patient verbalized her understanding and has no further questions at this time.

## 2022-03-15 NOTE — TELEPHONE ENCOUNTER
Hi! Patient was seen by provider on 3/11 for surgical clearance for upcoming surgery with Dr. Bragg on 3/17. Is patient medically optimized from a PCP standpoint to proceed with surgery? Thanks!

## 2022-03-16 ENCOUNTER — TELEPHONE (OUTPATIENT)
Dept: NEUROSURGERY | Facility: CLINIC | Age: 67
End: 2022-03-16
Payer: MEDICARE

## 2022-03-16 NOTE — TELEPHONE ENCOUNTER
Yes, patient is cleared. She has been cleared by pulmonology and cardiology as instructed. Please refer to note for details.

## 2022-03-16 NOTE — TELEPHONE ENCOUNTER
Patient is currently in her pulmonology appointment for clearance. As soon as I receive clearance I will scan it into media for PCP to clear. Patient is scheduled tomorrow at 0800 for surgery with Dr. Bragg. PCP clearance will need to be in the chart before 5pm today so surgery will not be canceled. Thanks!

## 2022-03-16 NOTE — TELEPHONE ENCOUNTER
Patient has pulmonology clearance in chart now. Is patient cleared from PCP standpoint? Thank you.

## 2022-03-18 DIAGNOSIS — M54.16 LUMBAR RADICULOPATHY: Primary | ICD-10-CM

## 2022-03-20 PROBLEM — C7A.090 MALIGNANT CARCINOID TUMOR OF BRONCHUS AND LUNG: Status: ACTIVE | Noted: 2022-03-20

## 2022-03-22 ENCOUNTER — PATIENT MESSAGE (OUTPATIENT)
Dept: ENDOCRINOLOGY | Facility: CLINIC | Age: 67
End: 2022-03-22
Payer: MEDICARE

## 2022-03-24 ENCOUNTER — PATIENT MESSAGE (OUTPATIENT)
Dept: NEUROSURGERY | Facility: CLINIC | Age: 67
End: 2022-03-24
Payer: MEDICARE

## 2022-03-29 ENCOUNTER — CLINICAL SUPPORT (OUTPATIENT)
Dept: NEUROSURGERY | Facility: CLINIC | Age: 67
End: 2022-03-29
Payer: MEDICARE

## 2022-03-29 VITALS — TEMPERATURE: 97 F

## 2022-03-29 RX ORDER — SULFAMETHOXAZOLE AND TRIMETHOPRIM 800; 160 MG/1; MG/1
1 TABLET ORAL 2 TIMES DAILY
Qty: 14 TABLET | Refills: 0 | Status: SHIPPED | OUTPATIENT
Start: 2022-03-29 | End: 2022-04-05

## 2022-03-29 RX ORDER — OXYCODONE AND ACETAMINOPHEN 7.5; 325 MG/1; MG/1
1 TABLET ORAL EVERY 6 HOURS PRN
Qty: 28 TABLET | Refills: 0 | Status: ON HOLD | OUTPATIENT
Start: 2022-03-29 | End: 2022-05-27

## 2022-03-29 NOTE — PROGRESS NOTES
Patient is 13 days s/p lumbar fusion with Dr. Bragg. Incision is healing appropriately without redness, swelling. Patient reports scant amount of light yellow drainage on dressing yesterday. Advised patient to leave incisions open to air and no creams, lotion or antibiotic ointment. Denies fever at home. Afebrile at visit. Incision assessed by Enriqueta Gan PA-C.  Patient denies tenderness at the site. Telfa pad removed without complication. Oral antibiotic called in for patient. Patient reports good improvement in pain since the surgery. She is requesting pain medication refill today. Reviewed narcotics policy with patient. Re-educated patient on post-operative restrictions and proper incision care. Instructed patient to keep incision clean, dry, and open-to-air. Patient aware of scheduled post-operative diagnostics and follow up appointment. Instructed patient to contact our office with any additional questions or concerns.

## 2022-03-31 ENCOUNTER — PATIENT MESSAGE (OUTPATIENT)
Dept: FAMILY MEDICINE | Facility: CLINIC | Age: 67
End: 2022-03-31
Payer: MEDICARE

## 2022-04-01 DIAGNOSIS — I10 BENIGN ESSENTIAL HTN: ICD-10-CM

## 2022-04-01 NOTE — TELEPHONE ENCOUNTER
This Rx Request does not qualify for assessment with the [facility]   Please review protocol details and the Care Due Message for extra clinical information    Reasons Rx Request may be deferred:  Patient has been seen in the ED/Hospital since the last PCP visit    Note composed:6:23 PM 04/01/2022

## 2022-04-01 NOTE — TELEPHONE ENCOUNTER
No new care gaps identified.  Powered by [employer]. Reference number: [id number].   4/01/2022 1:19:40 PM CDT

## 2022-04-04 RX ORDER — VALSARTAN AND HYDROCHLOROTHIAZIDE 320; 25 MG/1; MG/1
TABLET, FILM COATED ORAL
Qty: 90 TABLET | Refills: 1 | Status: SHIPPED | OUTPATIENT
Start: 2022-04-04 | End: 2022-09-27

## 2022-04-06 ENCOUNTER — PATIENT MESSAGE (OUTPATIENT)
Dept: NEUROLOGY | Facility: CLINIC | Age: 67
End: 2022-04-06
Payer: MEDICARE

## 2022-04-11 ENCOUNTER — OFFICE VISIT (OUTPATIENT)
Dept: ENDOCRINOLOGY | Facility: CLINIC | Age: 67
End: 2022-04-11
Payer: MEDICARE

## 2022-04-11 VITALS
WEIGHT: 218.06 LBS | BODY MASS INDEX: 42.81 KG/M2 | SYSTOLIC BLOOD PRESSURE: 116 MMHG | HEIGHT: 60 IN | HEART RATE: 85 BPM | DIASTOLIC BLOOD PRESSURE: 68 MMHG

## 2022-04-11 DIAGNOSIS — E11.22 TYPE 2 DIABETES MELLITUS WITH STAGE 3A CHRONIC KIDNEY DISEASE, WITH LONG-TERM CURRENT USE OF INSULIN: ICD-10-CM

## 2022-04-11 DIAGNOSIS — I10 BENIGN ESSENTIAL HTN: ICD-10-CM

## 2022-04-11 DIAGNOSIS — E78.2 MIXED HYPERLIPIDEMIA: ICD-10-CM

## 2022-04-11 DIAGNOSIS — Z79.4 TYPE 2 DIABETES MELLITUS WITH STAGE 3A CHRONIC KIDNEY DISEASE, WITH LONG-TERM CURRENT USE OF INSULIN: ICD-10-CM

## 2022-04-11 DIAGNOSIS — E11.21 TYPE 2 DIABETES MELLITUS WITH DIABETIC NEPHROPATHY, WITH LONG-TERM CURRENT USE OF INSULIN: Primary | ICD-10-CM

## 2022-04-11 DIAGNOSIS — Z79.4 TYPE 2 DIABETES MELLITUS WITH DIABETIC NEPHROPATHY, WITH LONG-TERM CURRENT USE OF INSULIN: Primary | ICD-10-CM

## 2022-04-11 DIAGNOSIS — E55.9 VITAMIN D DEFICIENCY: ICD-10-CM

## 2022-04-11 DIAGNOSIS — N18.31 TYPE 2 DIABETES MELLITUS WITH STAGE 3A CHRONIC KIDNEY DISEASE, WITH LONG-TERM CURRENT USE OF INSULIN: ICD-10-CM

## 2022-04-11 DIAGNOSIS — E66.01 OBESITY, MORBID: ICD-10-CM

## 2022-04-11 DIAGNOSIS — E89.0 POST-OPERATIVE HYPOTHYROIDISM: ICD-10-CM

## 2022-04-11 DIAGNOSIS — Z78.9 STATIN INTOLERANCE: ICD-10-CM

## 2022-04-11 PROCEDURE — 1100F PTFALLS ASSESS-DOCD GE2>/YR: CPT | Mod: CPTII,S$GLB,, | Performed by: NURSE PRACTITIONER

## 2022-04-11 PROCEDURE — 95251 PR GLUCOSE MONITOR, 72 HOUR, PHYS INTERP: ICD-10-PCS | Mod: S$GLB,,, | Performed by: NURSE PRACTITIONER

## 2022-04-11 PROCEDURE — 99214 OFFICE O/P EST MOD 30 MIN: CPT | Mod: S$GLB,,, | Performed by: NURSE PRACTITIONER

## 2022-04-11 PROCEDURE — 99999 PR PBB SHADOW E&M-EST. PATIENT-LVL V: ICD-10-PCS | Mod: PBBFAC,,, | Performed by: NURSE PRACTITIONER

## 2022-04-11 PROCEDURE — 3078F DIAST BP <80 MM HG: CPT | Mod: CPTII,S$GLB,, | Performed by: NURSE PRACTITIONER

## 2022-04-11 PROCEDURE — 95251 CONT GLUC MNTR ANALYSIS I&R: CPT | Mod: S$GLB,,, | Performed by: NURSE PRACTITIONER

## 2022-04-11 PROCEDURE — 3074F PR MOST RECENT SYSTOLIC BLOOD PRESSURE < 130 MM HG: ICD-10-PCS | Mod: CPTII,S$GLB,, | Performed by: NURSE PRACTITIONER

## 2022-04-11 PROCEDURE — 99215 OFFICE O/P EST HI 40 MIN: CPT | Mod: PBBFAC,PO | Performed by: NURSE PRACTITIONER

## 2022-04-11 PROCEDURE — 1125F AMNT PAIN NOTED PAIN PRSNT: CPT | Mod: CPTII,S$GLB,, | Performed by: NURSE PRACTITIONER

## 2022-04-11 PROCEDURE — 1125F PR PAIN SEVERITY QUANTIFIED, PAIN PRESENT: ICD-10-PCS | Mod: CPTII,S$GLB,, | Performed by: NURSE PRACTITIONER

## 2022-04-11 PROCEDURE — 3044F HG A1C LEVEL LT 7.0%: CPT | Mod: CPTII,S$GLB,, | Performed by: NURSE PRACTITIONER

## 2022-04-11 PROCEDURE — 3074F SYST BP LT 130 MM HG: CPT | Mod: CPTII,S$GLB,, | Performed by: NURSE PRACTITIONER

## 2022-04-11 PROCEDURE — 3044F PR MOST RECENT HEMOGLOBIN A1C LEVEL <7.0%: ICD-10-PCS | Mod: CPTII,S$GLB,, | Performed by: NURSE PRACTITIONER

## 2022-04-11 PROCEDURE — 1159F PR MEDICATION LIST DOCUMENTED IN MEDICAL RECORD: ICD-10-PCS | Mod: CPTII,S$GLB,, | Performed by: NURSE PRACTITIONER

## 2022-04-11 PROCEDURE — 99214 PR OFFICE/OUTPT VISIT, EST, LEVL IV, 30-39 MIN: ICD-10-PCS | Mod: S$GLB,,, | Performed by: NURSE PRACTITIONER

## 2022-04-11 PROCEDURE — 3288F PR FALLS RISK ASSESSMENT DOCUMENTED: ICD-10-PCS | Mod: CPTII,S$GLB,, | Performed by: NURSE PRACTITIONER

## 2022-04-11 PROCEDURE — 3008F PR BODY MASS INDEX (BMI) DOCUMENTED: ICD-10-PCS | Mod: CPTII,S$GLB,, | Performed by: NURSE PRACTITIONER

## 2022-04-11 PROCEDURE — 3078F PR MOST RECENT DIASTOLIC BLOOD PRESSURE < 80 MM HG: ICD-10-PCS | Mod: CPTII,S$GLB,, | Performed by: NURSE PRACTITIONER

## 2022-04-11 PROCEDURE — 99999 PR PBB SHADOW E&M-EST. PATIENT-LVL V: CPT | Mod: PBBFAC,,, | Performed by: NURSE PRACTITIONER

## 2022-04-11 PROCEDURE — 3288F FALL RISK ASSESSMENT DOCD: CPT | Mod: CPTII,S$GLB,, | Performed by: NURSE PRACTITIONER

## 2022-04-11 PROCEDURE — 1100F PR PT FALLS ASSESS DOC 2+ FALLS/FALL W/INJURY/YR: ICD-10-PCS | Mod: CPTII,S$GLB,, | Performed by: NURSE PRACTITIONER

## 2022-04-11 PROCEDURE — 1159F MED LIST DOCD IN RCRD: CPT | Mod: CPTII,S$GLB,, | Performed by: NURSE PRACTITIONER

## 2022-04-11 PROCEDURE — 3008F BODY MASS INDEX DOCD: CPT | Mod: CPTII,S$GLB,, | Performed by: NURSE PRACTITIONER

## 2022-04-11 RX ORDER — INSULIN ASPART 100 [IU]/ML
INJECTION, SOLUTION INTRAVENOUS; SUBCUTANEOUS
Qty: 120 ML | Refills: 4
Start: 2022-04-11 | End: 2023-03-06

## 2022-04-11 NOTE — PROGRESS NOTES
"  CC: This 66 y.o. female presents for management of diabetes and chronic conditions pending review including HTN, HLP, morbid obesity, hypothyroidism, vitamin d deficiency     HPI: She was diagnosed with T2DM in ~ 2013. She was hospitalized r/t DM in 2016 for DKA.   Family hx of DM: mom, dad, and sisters    She had a FUSION, SPINE, LUMBAR, TLIF, MINIMALLY INVASIVE RIGHT L4-5 TLIF, BILATERAL L4-5 LAMINECTOMY AND POSTERIOR INSTRUMENTED FUSION performed on 03/17 by Dr. Brgag for lumbar radiculopathy  Wearing Dexcom G6- see download in Media tab  Time in range: 73%  Hypoglycemia: <2%   pp excursions noted, typically small if present and resolve on their own  She is taking her insulin upwards of 30 mins after a meal if her bg is lower prior to the meal  Some hypos noted late afternoon related to this  Diet: Eats 3  Meals a day, snacks  As  below  Breakfast- oatmeal w blueberries or scramble egg w latee daily  Lunch- salad and soup  Afternoon snack- yogurt or tomatoes  Dinner: protein and vrg     Exercise: none    CURRENT DM MEDS:  lantus 35 u qam, 30 u qhs; Humalog 30 u AC  + correction 150/25;   Timing prandial insulin 5-15 minutes before meals: before or 30 mins after if bg "lower"  Vial/pen:  Uses pens  Glucometer type:  Relion 2020    Standards of Care:  Eye exam: + mild DM retinopathy (Dr Ramirez)- 6/2021     Nodules was found on on a PET scan. FNA 11/25/14 shows adenomatous nodule with cystic changes. S/p total thyroidectomy 5/24/16.       On synthroid 137 mcg once daily. Takes all alone with water 30-60 mins prior to first meal     Has history of myalgias with statin higher doses and intensity    Being treated for parkinsons. No falls     ROS:  Gen: Appetite good,  Weight stable  Eyes: Denies visual disturbances  Resp: no SOB or ESPINAL, no cough  Cardiac: No palpitations, chest pain, trace BLE edema   GI: No nausea or vomiting, diarrhea, constipation, or abdominal pain.  /GYN: 1+ nocturia, no burning or " pain.   MS/Neuro: walking w a walker,  speech clear,   Psych: Denies drug/ETOH abuse, no hx of depression.  Other systems: negative.    PE:  GENERAL: Well developed, well nourished.  PSYCH: AAOx3, appropriate mood and affect, pleasant expression, conversant, appears relaxed, well groomed.   EYES: Conjunctiva, corneas clear  NECK: Supple, trachea midline   ABDOMEN: Soft, non-tender, non-distended   SKIN:  no acanthosis nigracans.  FOOT EXAMINATION: 6/7/2021    No foot deformity, +Onychomycosis,  no interspace maceration or ulceration noted.  Decreased hair growth present over toes/feet.    Protective sensation intact with 10 gram monofilament.  +2 dorsalis pedis and posterior pulses noted.      Lab Results   Component Value Date    MICALBCREAT 16.7 12/03/2021       Hemoglobin A1C   Date Value Ref Range Status   03/10/2022 6.8 (H) 0.0 - 5.6 % Final     Comment:     Reference Interval:  5.0 - 5.6 Normal   5.7 - 6.4 High Risk   > 6.5 Diabetic      Hgb A1c results are standardized based on the (NGSP) National   Glycohemoglobin Standardization Program.      Hemoglobin A1C levels are related to mean serum/plasma glucose   during the preceding 2-3 months.        12/03/2021 6.7 (H) 4.0 - 5.6 % Final     Comment:     ADA Screening Guidelines:  5.7-6.4%  Consistent with prediabetes  >or=6.5%  Consistent with diabetes    High levels of fetal hemoglobin interfere with the HbA1C  assay. Heterozygous hemoglobin variants (HbS, HgC, etc)do  not significantly interfere with this assay.   However, presence of multiple variants may affect accuracy.     05/31/2021 6.7 (H) 4.0 - 5.6 % Final     Comment:     ADA Screening Guidelines:  5.7-6.4%  Consistent with prediabetes  >or=6.5%  Consistent with diabetes    High levels of fetal hemoglobin interfere with the HbA1C  assay. Heterozygous hemoglobin variants (HbS, HgC, etc)do  not significantly interfere with this assay.   However, presence of multiple variants may affect accuracy.           ASSESSMENT and PLAN:    1. T2DM with nephropathy-   Continues current doses   BG normallywell controlled ; if she is having a lower reading prior to a meal- eat and then take insulin no more than 15 mons after the meal  Continue Dexcom G6    2. HTN - controlled today, continue meds as previously prescribed and monitor.     3. HLP -  Myalgia with statin, fish oil  2 tabs bid, lab w RTC    4. Post Surgical Hypothyroidism for MNG. S/p total thyroidectomy w pathology benign.   Continue current dose of synthroid     5. Vitamin d deficiency - continue OTC replacement,  Check lab w RTC    6. Morbid obesity - Resume weight loss; exercise as pre surgery Body mass index is 42.58 kg/m².    Follow-up: in 6 months with lab prior

## 2022-04-12 ENCOUNTER — TELEPHONE (OUTPATIENT)
Dept: ENDOSCOPY | Facility: HOSPITAL | Age: 67
End: 2022-04-12
Payer: MEDICARE

## 2022-04-12 NOTE — TELEPHONE ENCOUNTER
Telephoned patient to schedule colonoscopy with no answer. Left voicemail message with my direct contact number for patient to return call.

## 2022-04-13 ENCOUNTER — TELEPHONE (OUTPATIENT)
Dept: ENDOSCOPY | Facility: HOSPITAL | Age: 67
End: 2022-04-13
Payer: MEDICARE

## 2022-04-13 DIAGNOSIS — Z12.11 SPECIAL SCREENING FOR MALIGNANT NEOPLASMS, COLON: Primary | ICD-10-CM

## 2022-04-13 RX ORDER — SODIUM, POTASSIUM,MAG SULFATES 17.5-3.13G
1 SOLUTION, RECONSTITUTED, ORAL ORAL DAILY
Qty: 2 KIT | Refills: 0 | Status: SHIPPED | OUTPATIENT
Start: 2022-04-13 | End: 2022-04-17

## 2022-04-13 NOTE — TELEPHONE ENCOUNTER
Received request to schedule patient for colonoscopy. Spoke with Patient. Reviewed medical history and medications. Instructed on procedure and prep. Scheduled for 5/27/22 at 10:30 am. 2 day prep needed. Patient verbalized understanding of instructions. Copy of instructions to patient via [place].

## 2022-04-18 ENCOUNTER — OFFICE VISIT (OUTPATIENT)
Dept: NEUROSURGERY | Facility: CLINIC | Age: 67
End: 2022-04-18
Payer: MEDICARE

## 2022-04-18 ENCOUNTER — HOSPITAL ENCOUNTER (OUTPATIENT)
Dept: RADIOLOGY | Facility: HOSPITAL | Age: 67
Discharge: HOME | End: 2022-04-18
Attending: NEUROLOGICAL SURGERY
Payer: MEDICARE

## 2022-04-18 VITALS
SYSTOLIC BLOOD PRESSURE: 123 MMHG | BODY MASS INDEX: 42.81 KG/M2 | HEIGHT: 60 IN | RESPIRATION RATE: 18 BRPM | HEART RATE: 87 BPM | DIASTOLIC BLOOD PRESSURE: 75 MMHG | WEIGHT: 218.06 LBS

## 2022-04-18 DIAGNOSIS — M54.16 LUMBAR RADICULOPATHY: ICD-10-CM

## 2022-04-18 DIAGNOSIS — Z98.1 S/P LUMBAR FUSION: Primary | ICD-10-CM

## 2022-04-18 PROCEDURE — 99024 PR POST-OP FOLLOW-UP VISIT: ICD-10-PCS | Mod: S$GLB,,, | Performed by: PHYSICIAN ASSISTANT

## 2022-04-18 PROCEDURE — 3008F BODY MASS INDEX DOCD: CPT | Mod: CPTII,S$GLB,, | Performed by: PHYSICIAN ASSISTANT

## 2022-04-18 PROCEDURE — 3078F DIAST BP <80 MM HG: CPT | Mod: CPTII,S$GLB,, | Performed by: PHYSICIAN ASSISTANT

## 2022-04-18 PROCEDURE — 3008F PR BODY MASS INDEX (BMI) DOCUMENTED: ICD-10-PCS | Mod: CPTII,S$GLB,, | Performed by: PHYSICIAN ASSISTANT

## 2022-04-18 PROCEDURE — 1125F AMNT PAIN NOTED PAIN PRSNT: CPT | Mod: CPTII,S$GLB,, | Performed by: PHYSICIAN ASSISTANT

## 2022-04-18 PROCEDURE — 3044F HG A1C LEVEL LT 7.0%: CPT | Mod: CPTII,S$GLB,, | Performed by: PHYSICIAN ASSISTANT

## 2022-04-18 PROCEDURE — 3288F PR FALLS RISK ASSESSMENT DOCUMENTED: ICD-10-PCS | Mod: CPTII,S$GLB,, | Performed by: PHYSICIAN ASSISTANT

## 2022-04-18 PROCEDURE — 1125F PR PAIN SEVERITY QUANTIFIED, PAIN PRESENT: ICD-10-PCS | Mod: CPTII,S$GLB,, | Performed by: PHYSICIAN ASSISTANT

## 2022-04-18 PROCEDURE — 3044F PR MOST RECENT HEMOGLOBIN A1C LEVEL <7.0%: ICD-10-PCS | Mod: CPTII,S$GLB,, | Performed by: PHYSICIAN ASSISTANT

## 2022-04-18 PROCEDURE — 3078F PR MOST RECENT DIASTOLIC BLOOD PRESSURE < 80 MM HG: ICD-10-PCS | Mod: CPTII,S$GLB,, | Performed by: PHYSICIAN ASSISTANT

## 2022-04-18 PROCEDURE — 1101F PR PT FALLS ASSESS DOC 0-1 FALLS W/OUT INJ PAST YR: ICD-10-PCS | Mod: CPTII,S$GLB,, | Performed by: PHYSICIAN ASSISTANT

## 2022-04-18 PROCEDURE — 3288F FALL RISK ASSESSMENT DOCD: CPT | Mod: CPTII,S$GLB,, | Performed by: PHYSICIAN ASSISTANT

## 2022-04-18 PROCEDURE — 1101F PT FALLS ASSESS-DOCD LE1/YR: CPT | Mod: CPTII,S$GLB,, | Performed by: PHYSICIAN ASSISTANT

## 2022-04-18 PROCEDURE — 3074F SYST BP LT 130 MM HG: CPT | Mod: CPTII,S$GLB,, | Performed by: PHYSICIAN ASSISTANT

## 2022-04-18 PROCEDURE — 1159F MED LIST DOCD IN RCRD: CPT | Mod: CPTII,S$GLB,, | Performed by: PHYSICIAN ASSISTANT

## 2022-04-18 PROCEDURE — 72100 X-RAY EXAM L-S SPINE 2/3 VWS: CPT | Mod: TC,FY,PO

## 2022-04-18 PROCEDURE — 1159F PR MEDICATION LIST DOCUMENTED IN MEDICAL RECORD: ICD-10-PCS | Mod: CPTII,S$GLB,, | Performed by: PHYSICIAN ASSISTANT

## 2022-04-18 PROCEDURE — 3074F PR MOST RECENT SYSTOLIC BLOOD PRESSURE < 130 MM HG: ICD-10-PCS | Mod: CPTII,S$GLB,, | Performed by: PHYSICIAN ASSISTANT

## 2022-04-18 PROCEDURE — 99024 POSTOP FOLLOW-UP VISIT: CPT | Mod: S$GLB,,, | Performed by: PHYSICIAN ASSISTANT

## 2022-04-18 PROCEDURE — 72100 XR LUMBAR SPINE AP AND LATERAL: ICD-10-PCS | Mod: 26,,, | Performed by: RADIOLOGY

## 2022-04-18 PROCEDURE — 72100 X-RAY EXAM L-S SPINE 2/3 VWS: CPT | Mod: 26,,, | Performed by: RADIOLOGY

## 2022-04-22 NOTE — PROGRESS NOTES
Neurosurgery History & Physical    Patient ID: Jessica Rojas is a 66 y.o. female.    Chief Complaint   Patient presents with    Post-op Evaluation     4 week post-op evaluation with imaging; L4-5 TLIF. Cramps on right side down leg; minimal pain across hips.       Review of Systems   Constitutional: Negative for chills, diaphoresis, fatigue and fever.   HENT: Negative for congestion, ear pain, rhinorrhea, sneezing, sore throat and tinnitus.    Eyes: Negative for photophobia, pain, redness and visual disturbance.   Respiratory: Negative for cough, chest tightness, shortness of breath and wheezing.    Cardiovascular: Negative for chest pain, palpitations and leg swelling.   Gastrointestinal: Negative for abdominal distention, abdominal pain, constipation, diarrhea, nausea and vomiting.   Genitourinary: Negative for difficulty urinating, dysuria, frequency and urgency.   Musculoskeletal: Positive for back pain. Negative for gait problem, myalgias and neck pain.   Skin: Negative for pallor and rash.   Neurological: Negative for dizziness, seizures, speech difficulty, weakness, numbness and headaches.   Psychiatric/Behavioral: Negative for confusion and hallucinations.       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    Malignant carcinoid tumor of the bronchus and lung 5/2014    Mediastinal lymphadenopathy 8/26/2015    Obesity, morbid 5/6/2014    Parkinsons 10/2017    Pituitary adenoma 1980's    took parladel for 3 yrs    Pneumonia     Postoperative  hypothyroidism 7/25/2017    - s/p total thyroidectomy in 2016 (parathyroids intact) with post op hypothyroidism; on synthroid    Pulmonary nodules 9/11/2018    Thyroid disease     Wheeze 6/23/2014     Social History     Socioeconomic History    Marital status:    Occupational History    Occupation: housewife   Tobacco Use    Smoking status: Never Smoker    Smokeless tobacco: Never Used   Substance and Sexual Activity    Alcohol use: Not Currently     Alcohol/week: 0.0 standard drinks     Comment: I rarely drink    Drug use: No    Sexual activity: Yes     Partners: Male     Birth control/protection: None     Social Determinants of Health     Financial Resource Strain: Low Risk     Difficulty of Paying Living Expenses: Not hard at all   Food Insecurity: No Food Insecurity    Worried About Running Out of Food in the Last Year: Never true    Ran Out of Food in the Last Year: Never true   Transportation Needs: Unmet Transportation Needs    Lack of Transportation (Medical): Yes    Lack of Transportation (Non-Medical): No   Physical Activity: Insufficiently Active    Days of Exercise per Week: 2 days    Minutes of Exercise per Session: 20 min   Stress: No Stress Concern Present    Feeling of Stress : Not at all   Social Connections: Unknown    Frequency of Communication with Friends and Family: More than three times a week    Frequency of Social Gatherings with Friends and Family: Twice a week    Active Member of Clubs or Organizations: No    Attends Club or Organization Meetings: Never    Marital Status:    Housing Stability: Low Risk     Unable to Pay for Housing in the Last Year: No    Number of Places Lived in the Last Year: 1    Unstable Housing in the Last Year: No     Family History   Problem Relation Age of Onset    Cancer Maternal Aunt         breast    Cancer Maternal Grandmother         unknown    Cancer Maternal Aunt         unknown    Diabetes Mother     Glaucoma  Mother     Heart disease Mother     Hypertension Mother     Diabetes Father     Heart disease Father     Hypertension Father     Diabetes Sister     Macular degeneration Sister     Alcohol abuse Sister     Breast cancer Paternal Aunt     Breast cancer Paternal Aunt     Breast cancer Cousin     Amblyopia Neg Hx     Blindness Neg Hx     Cataracts Neg Hx     Retinal detachment Neg Hx     Stroke Neg Hx     Strabismus Neg Hx     Thyroid disease Neg Hx      Review of patient's allergies indicates:   Allergen Reactions    Propranolol Nausea And Vomiting     Drops pressure and raised sugar    Lipitor [atorvastatin] Other (See Comments)     Myalgia, muscle pain    Robaxin [methocarbamol]      Skin inside mouth started peeling.       Current Outpatient Medications:     albuterol (PROVENTIL/VENTOLIN HFA) 90 mcg/actuation inhaler, Inhale 2 puffs into the lungs every 6 (six) hours as needed for Wheezing or Shortness of Breath., Disp: 18 g, Rfl: 0    alcohol swabs (ALCOHOL WIPES) PadM, Uses 5 daily, Disp: 400 each, Rfl: 4    amantadine HCL (SYMMETREL) 100 mg capsule, TAKE ONE CAPSULE BY MOUTH twice daily, Disp: 60 capsule, Rfl: 11    blood sugar diagnostic (TRUE METRIX GLUCOSE TEST STRIP) Strp, Check bg 7-8 times/day, Disp: 250 each, Rfl: 12    blood-glucose transmitter (DEXCOM G6 TRANSMITTER) Lizbeth, Dispense 1 transmitter, Disp: 1 Device, Rfl: 3    carbidopa-levodopa  mg (SINEMET)  mg per tablet, TAKE one and one-half TABLET BY MOUTH THREE TIMES DAILY, Disp: 135 tablet, Rfl: 6    chlorhexidine (PERIDEX) 0.12 % solution, Use as directed 15 mLs in the mouth or throat 2 (two) times daily., Disp: , Rfl:     cholecalciferol, vitamin D3, 10 mcg (400 unit) Cap, Take 1,200 Units by mouth once daily., Disp: , Rfl:     cyanocobalamin (VITAMIN B-12) 1000 MCG tablet, Take 100 mcg by mouth once daily., Disp: , Rfl:     fish oil-omega-3 fatty acids 300-1,000 mg capsule, Take 4 capsules by mouth once  "daily., Disp: , Rfl:     fluticasone-salmeterol diskus inhaler 250-50 mcg, Inhale 1 puff into the lungs 2 (two) times daily. (Patient taking differently: Inhale 1 puff into the lungs 2 (two) times daily. prn), Disp: 60 each, Rfl: 11    gabapentin (NEURONTIN) 100 MG capsule, Take 1 capsule (100 mg total) by mouth 3 (three) times daily. (Patient taking differently: Take 100 mg by mouth 3 (three) times daily. Takes at night), Disp: 90 capsule, Rfl: 11    insulin aspart U-100 (NOVOLOG FLEXPEN U-100 INSULIN) 100 unit/mL (3 mL) InPn pen, 30u AC + correction Max  u, Disp: 120 mL, Rfl: 4    insulin degludec (TRESIBA FLEXTOUCH U-100) 100 unit/mL (3 mL) insulin pen, INJECT 20 UNITS EVERY MORNING THEN 20 UNITS EVERY NIGHT AT BEDTIME, Disp: 4 pen, Rfl: 11    lancets 33 gauge Misc, Checks bg 7-8 times a dayTrue metrix, Disp: 250 each, Rfl: 11    lancing device Misc, Checks bg 7-8 times a day, Disp: 1 each, Rfl: 0    levothyroxine (SYNTHROID) 137 MCG Tab tablet, Take 1 tablet (137 mcg total) by mouth once daily., Disp: 30 tablet, Rfl: 11    melatonin 3 mg Tab, Take 6 mg by mouth nightly., Disp: , Rfl:     montelukast (SINGULAIR) 10 mg tablet, take ONE tablet BY MOUTH once DAILY EVERY EVENING, Disp: 30 tablet, Rfl: 11    MULTIVITAMIN W-MINERALS/LUTEIN (CENTRUM SILVER ORAL), Take 1 tablet by mouth once daily. , Disp: , Rfl:     mupirocin calcium 2% nasl oint (BACTROBAN) 2 % Oint, by Nasal route 2 (two) times daily., Disp: , Rfl:     oxyCODONE-acetaminophen (PERCOCET) 7.5-325 mg per tablet, Take 1 tablet by mouth every 6 (six) hours as needed for Pain., Disp: 28 tablet, Rfl: 0    pen needle, diabetic (BD ULTRA-FINE MINI PEN NEEDLE) 31 gauge x 3/16" Ndle, Uses 5 daily, Disp: 200 each, Rfl: 12    pramipexole (MIRAPEX) 0.25 MG tablet, TAKE ONE-HALF to ONE TABLET BY MOUTH THREE TIMES DAILY as directed, Disp: 90 tablet, Rfl: 11    selegiline (ELDEPRYL) 5 mg Cap, Take 1 capsule (5 mg total) by mouth 2 (two) times " daily., Disp: 60 capsule, Rfl: 10    simvastatin (ZOCOR) 10 MG tablet, TAKE ONE TABLET BY MOUTH ONCE DAILY IN THE EVENING, Disp: 30 tablet, Rfl: 12    UNABLE TO FIND, Place 0.5 mLs under the tongue 2 (two) times a day. medication name:CBD Oil   Last dose 3 months ago, Disp: , Rfl:     valsartan-hydrochlorothiazide (DIOVAN-HCT) 320-25 mg per tablet, TAKE ONE TABLET BY MOUTH ONCE DAILY, Disp: 90 tablet, Rfl: 1  Blood pressure 123/75, pulse 87, resp. rate 18, height 5' (1.524 m), weight 98.9 kg (218 lb 0.6 oz).      Neurologic Exam     Mental Status   Oriented to person, place, and time.   Oriented to person.   Oriented to place.   Oriented to time.   Follows 3 step commands.   Attention: normal. Concentration: normal.   Speech: speech is normal   Level of consciousness: alert  Knowledge: consistent with education.   Able to name object. Able to read. Able to repeat. Able to write. Normal comprehension.      Cranial Nerves      CN II   Visual acuity: normal  Right visual field deficit: none  Left visual field deficit: none      CN III, IV, VI   Pupils are equal, round, and reactive to light.  Right pupil: Size: 3 mm. Shape: regular. Reactivity: brisk. Consensual response: intact.   Left pupil: Size: 3 mm. Shape: regular. Reactivity: brisk. Consensual response: intact.   CN III: no CN III palsy  CN VI: no CN VI palsy  Nystagmus: none   Diplopia: none  Ophthalmoparesis: none  Conjugate gaze: present     CN V   Right facial sensation deficit: none  Left facial sensation deficit: none     CN VII   Right facial weakness: none  Left facial weakness: none     CN VIII   Hearing: intact     CN IX, X   CN IX normal.   CN X normal.      CN XI   Right sternocleidomastoid strength: normal  Left sternocleidomastoid strength: normal  Right trapezius strength: normal  Left trapezius strength: normal     CN XII   Fasciculations: absent  Tongue deviation: none     Motor Exam   Muscle bulk: normal  Overall muscle tone: normal  Right  arm pronator drift: absent  Left arm pronator drift: absent     Strength   Right deltoid: 5/5  Left deltoid: 5/5  Right biceps: 5/5  Left biceps: 5/5  Right triceps: 5/5  Left triceps: 5/5  Right wrist flexion: 5/5  Left wrist flexion: 5/5  Right wrist extension: 5/5  Left wrist extension: 5/5  Right interossei: 5/5  Left interossei: 5/5  Right iliopsoas: 5/5  Left iliopsoas: 5/5  Right quadriceps: 5/5  Left quadriceps: 5/5  Right hamstrin/5  Left hamstrin/5  Right anterior tibial: 5/5  Left anterior tibial: 5/5  Right posterior tibial: 5/5  Left posterior tibial: 5/5  Right peroneal: 4+/5  Left peroneal: 5/5  Right gastroc: 5/5  Left gastroc: 5/5  Right EHL: 5/5  Left EHL: 5/5     Sensory Exam   Right arm light touch: normal  Left arm light touch: normal  Right leg light touch: normal  Left leg light touch: normal     Gait, Coordination, and Reflexes      Gait  Gait: normal      Coordination   Romberg: negative  Finger to nose coordination: normal  Heel to shin coordination: normal  Tandem walking coordination: normal     Tremor   Resting tremor: absent  Intention tremor: absent  Action tremor: absent     Reflexes   Right brachioradialis: 1+  Left brachioradialis: 1+  Right biceps: 1+  Left biceps: 1+  Right triceps: 1+  Left triceps: 1+  Right patellar: 1+  Left patellar: 1+  Right achilles: 0  Left achilles: 0  Right Chase: absent  Left Chase: absent  Right ankle clonus: absent  Left ankle clonus: absent  Right plantar: normal  Left plantar: normal    Physical Exam  Constitutional: Oriented to person, place, and time. Appears well-developed and well-nourished.   HENT:   Head: Normocephalic and atraumatic.   Eyes: Pupils are equal, round, and reactive to light.   Neck: Normal range of motion. Neck supple.   Cardiovascular: Normal rate.    Pulmonary/Chest: Effort normal.   Musculoskeletal: Normal range of motion. Exhibits no edema.   Neurological: Alert and oriented to person, place, and time. Normal  Finger-Nose-Finger Test, a normal Heel to Shin Test, a normal Romberg Test and a normal Tandem Gait Test. Gait normal.   Reflex Scores:       Tricep reflexes are 1+ on the right side and 1+ on the left side.       Bicep reflexes are 1+ on the right side and 1+ on the left side.       Brachioradialis reflexes are 1+ on the right side and 1+ on the left side.       Patellar reflexes are 1+ on the right side and 1+ on the left side.       Achilles reflexes are 0 on the right side and 0 on the left side.  Skin: Skin is warm, dry and intact.   Psychiatric: Normal mood and affect. Speech is normal and behavior is normal. Judgment and thought content normal.   Nursing note and vitals reviewed.    Provider dictation:  I reviewed the imaging.   X-ray lumbar spine shows stable hardware and alignment.      The patient is a 66 year old female who presents for 4 week post-op appointment s/p right MIS L4-5 TLIF and posterior instrumented fusion. The patient reports significant improvement in lower extremity symptoms since surgery. She did start with cramping pain in her right lower extremity about 1 week following surgery. The pain starts in the right hip, lateral leg down to her foot. This usually occurs at night and when first getting out of bed in the morning. She applies a heating pad with good relief. This pain is gradually improving. Her lower back pain is improving since surgery. Denies weakness or numbness. Overall, she is happy with her recovery from surgery so far.      On exam patient has overall improvement in right lower extremity weakness. Incisions are well healed.     Ms. Rojas is recovering well from surgery. The patient was informed to remove the brace at 6 weeks post-op. I am sending a referral for outpatient physical therapy. She will follow-up as scheduled for 3 month post-op appointment with Dr. Bragg with repeat x-rays.     1. S/P lumbar fusion  X-Ray Lumbar Spine Ap And Lateral    Ambulatory  referral/consult to Physical/Occupational Therapy

## 2022-05-02 ENCOUNTER — CLINICAL SUPPORT (OUTPATIENT)
Dept: REHABILITATION | Facility: HOSPITAL | Age: 67
End: 2022-05-02
Payer: MEDICARE

## 2022-05-02 DIAGNOSIS — Z74.09 IMPAIRED FUNCTIONAL MOBILITY, BALANCE, GAIT, AND ENDURANCE: ICD-10-CM

## 2022-05-02 DIAGNOSIS — Z98.1 S/P LUMBAR FUSION: ICD-10-CM

## 2022-05-02 DIAGNOSIS — R29.898 WEAKNESS OF BOTH LOWER EXTREMITIES: ICD-10-CM

## 2022-05-02 PROCEDURE — 97110 THERAPEUTIC EXERCISES: CPT | Mod: PO

## 2022-05-02 PROCEDURE — 97161 PT EVAL LOW COMPLEX 20 MIN: CPT | Mod: PO

## 2022-05-02 NOTE — PLAN OF CARE
[facility]  Physical Therapy Initial Evaluation    Name: Jessica Rojas  Clinic Number: [id number]    Therapy Diagnosis:   Encounter Diagnoses   Name Primary?    S/P lumbar fusion     Weakness of both lower extremities     Impaired functional mobility, balance, gait, and endurance      Physician: Enriqueta Gan PA*    Physician Orders: PT Eval and Treat  Medical Diagnosis from Referral: Z98.1 (ICD-10-CM) - S/P lumbar fusion   Evaluation Date: 5/2/2022  Authorization Period Expiration: 04/18/2023   Plan of Care Expiration: 7/8/22  Visit # / Visits authorized: 1/ 1    Time In: 9:00  Time Out: 10:00  Total Billable Time: 10 minutes    Precautions: Diabetes, Fall, cancer and L4/L5 Fusion 3/17/22    Subjective   Date of onset: L4/L5 fusion on 3/17/22  History of current condition - Jessica reports: Pt reports she had surgery on March 17th and pretty much had immediate relief of low back pain, hip pain, and leg numbness. Since having surgery, she has been some leg cramps. It started in both legs, but now just the right leg. If she wakes up at night, the muscle cramps with keep her awake. She takes muscle relaxers to help it, but it does not do much. She was not using any AD before surgery, but she is now using a rollator. She uses it because she feels her balance is a little off, but mainly because of her leg cramps. She feels when she has the rollator for support, she can stand for longer without leg cramps and the cramps aren't as bad. She does not use it in the house.   She just stopped wearing her brace, still following lifting precautions.   She would like to get back to walking with her . Her goal is at least 0.5 to 1 mile without needing to sit.      Medical History:   Past Medical History:   Diagnosis Date    Abdominal pain 2/27/2015    Arthritis     Diabetes mellitus, type 2     DM (diabetes mellitus)     on insulin    Elevated transaminase level 4/18/2016     Encounter for blood transfusion     History of malignant carcinoid tumor of bronchus and lung 10/25/2017    History of neuroendocrine cancer     HTN (hypertension) 2014    Hypercalcemia 2016    Lung cancer, hilus 2014    Malignant carcinoid tumor of bronchus and lung 2014    Malignant carcinoid tumor of the bronchus and lung 2014    Mediastinal lymphadenopathy 2015    Obesity, morbid 2014    Parkinsons 10/2017    Pituitary adenoma 's    took parladel for 3 yrs    Pneumonia     Postoperative hypothyroidism 2017    - s/p total thyroidectomy in  (parathyroids intact) with post op hypothyroidism; on synthroid    Pulmonary nodules 2018    Thyroid disease     Wheeze 2014       Surgical History:   Jessica Rojas  has a past surgical history that includes  section (, ); Bronchoscopy (2014, 2014); Tonsillectomy (); Appendectomy (); Lung removal, partial (Right, ); Thyroidectomy (2016); Breast cyst aspiration; Tubal ligation (); Esophagogastroduodenoscopy (N/A, 2021); Colonoscopy (N/A, 2021); Colonoscopy (N/A, 2021); Epidural steroid injection into lumbar spine (N/A, 2022); Eye surgery; and Minimally invasive transforaminal lumbar interbody fusion (TLIF) (N/A, 3/17/2022).    Medications:   Jessica has a current medication list which includes the following prescription(s): albuterol, alcohol swabs, amantadine hcl, blood sugar diagnostic, dexcom g6 transmitter, carbidopa-levodopa  mg, chlorhexidine, cholecalciferol (vitamin d3), cyanocobalamin, fish oil-omega-3 fatty acids, fluticasone-salmeterol 250-50 mcg/dose, gabapentin, insulin aspart u-100, tresiba flextouch u-100, lancets, lancing device, levothyroxine, melatonin, montelukast, folic acid/multivit-min/lutein, mupirocin calcium 2% nasl oint, oxycodone-acetaminophen, pen needle, diabetic, pramipexole, selegiline, simvastatin, UNABLE TO FIND,  and valsartan-hydrochlorothiazide.    Allergies:   Review of patient's allergies indicates:   Allergen Reactions    Propranolol Nausea And Vomiting     Drops pressure and raised sugar    Lipitor [atorvastatin] Other (See Comments)     Myalgia, muscle pain    Robaxin [methocarbamol]      Skin inside mouth started peeling.        Imaging, CT scan films: 04/02/2022   FINDINGS:  No acute fracture or traumatic subluxation.  Vertebral bodies are normal in height.  Mild anterolisthesis L4 on L5 is stable.  Interbody and posterior fusion and decompression changes at L4-5 are stable.  No evidence of hardware loosening.  Paravertebral soft tissues are normal.    Prior Therapy: Yes, healthy back  Social History: Pt lives with their spouse  Occupation: None  Prior Level of Function: Independent in all ADLs  Current Level of Function: Remains independent with rollator    Pain:  Current 3/10, worst 9/10, best 0/10   Location: right leg   Description: Aching and cramping  Aggravating Factors: Standing and walking  Easing Factors: sitting down    Pts goals: Return to walking with , 0.5 to 1.0 mile    Objective     Observation: Good ROM of lumbar spine. Decreased isaac and step length with ambulation, using rollator. Decreased RLE strength as compared to LLE. Poor SLS balance.     Lumbar Range of Motion:    % Limitation Pain   Flexion 0   No        Extension 0   No        Left Side Bending 0 No        Right Side Bending 0 No        Left rotation   0 No        Right Rotation   0 No             Lower Extremity Strength  Right LE  Left LE    Knee extension: 5/5 Knee extension: 5/5   Knee flexion: 4+/5 Knee flexion: 4+/5   Hip flexion: 3+/5 Hip flexion: 4/5   Hip extension:  NT Hip extension: NT   Hip abduction: NT Hip abduction: NT   Ankle dorsiflexion: 3+/5 Ankle dorsiflexion: 5/5   Ankle plantarflexion: 4/5 Ankle plantarflexion: 5/5       Neuro Dynamic Testing:    Sciatic nerve:      SLR: R = (-)     L =  (-)    Palpation: No TTP    Sensation: Intact to light touch at L2, L3, L4, L5, and S1 of BLE    Function:  Tus  SLS R: 2s  SLS L: 2s  30s STS: 10 reps      CMS Impairment/Limitation/Restriction for FOTO Lumbar Spine Survey    Therapist reviewed FOTO scores for Jessica Rojas on 2022.   FOTO documents entered into EP[employer] - see Media section.    Limitation Score: 74%  Category: Mobility         TREATMENT   Treatment Time In: 8:35  Treatment Time Out: 8:45  Total Treatment time separate from Evaluation: 10 minutes    Jessica received therapeutic exercises to develop strength, ROM and flexibility for 10 minutes including:  Seated hamstring stretch, 1x 30s  Supine hip flexor stretch, 1x 1 min  Supine piriformis stretch, 1x 30s  SLR, 1x10  Supine clams w/ green band, 1x10  Standing hip ext, 1x10    Home Exercises and Patient Education Provided    Education provided:   - Role of PT, PT POC, PT diagnosis, PT prognosis, HEP, precautions    Written Home Exercises Provided: yes.  Exercises were reviewed and Jessica was able to demonstrate them prior to the end of the session.  Jessica demonstrated good  understanding of the education provided.     See EMR under Patient Instructions for exercises provided 2022.    Assessment   Jessica is a 66 y.o. female referred to outpatient Physical Therapy with a medical diagnosis of Z98.1 (ICD-10-CM) - S/P lumbar fusion. Physical exam is consistent with pain, decreased strength, balance, and functional mobility secondary to lumbar fusion. Primary impairments include postural dysfunction, decreased core and LE strength, decreased lumbar and LE flexibility and mobility, impaired neuromuscular control of core and hip musculature and pain with functional activities. This pt is an good candidate for skilled PT tx and stands to benefit from a combination of manual therapy including joint mobilizations with trigger point/myofacscial release, therapeutic exercise to establish core/joint  stability, neuromuscular re-education, dry needling, and modalities Prn. The pt has been educated on their dx/POC and consents to further PT tx.     Pt prognosis is Good.   Pt will benefit from skilled outpatient Physical Therapy to address the deficits stated above and in the chart below, provide pt/family education, and to maximize pt's level of independence.     Plan of care discussed with patient: Yes  Pt's spiritual, cultural and educational needs considered and patient is agreeable to the plan of care and goals as stated below:     Anticipated Barriers for therapy: None    Medical Necessity is demonstrated by the following  History  Co-morbidities and personal factors that may impact the plan of care Co-morbidities:   CKD stage 3, diabetes, difficulty sleeping, high BMI, history of cancer, HTN, prior lumbar surgery and other co-morbidities as noted above    Personal Factors:   no deficits     high   Examination  Body Structures and Functions, activity limitations and participation restrictions that may impact the plan of care Body Regions:   back  lower extremities    Body Systems:    strength  balance  gait  transfers  transitions  motor control    Participation Restrictions:   No bending, lifting, twisting    Activity limitations:   Learning and applying knowledge  no deficits    General Tasks and Commands  no deficits    Communication  no deficits    Mobility  lifting and carrying objects  walking    Self care  no deficits    Domestic Life  shopping  cooking  doing house work (cleaning house, washing dishes, laundry)  assisting others    Interactions/Relationships  no deficits    Life Areas  no deficits    Community and Social Life  recreation and leisure         high   Clinical Presentation stable and uncomplicated low   Decision Making/ Complexity Score: low     Goals:   Short Term Goals (4 Weeks):   1) Pt will demonstrate compliance with initial home exercise program as prescribed by physical therapist  to improve independence with management of condition.  2) Pt to increase strength to at least 4/5 of muscles tested to allow for improvement in functional activities such as performing chores.  3) Pt to report right leg pain pain of <4/10 at worst during prolonged walking to improve tolerance to ADLs.  4) Pt to improve SLS to 15s in BLE.    Long Term Goals (8 Weeks):   1) Pt to achieve <55% limitation as measured by the FOTO to demonstrate decreased low back pain disability.  2) Pt to increase strength to at least 5/5 of muscles tested to allow for improvement in functional activities such as performing chores.  3) Pt to improve SLS to 30s in BLE.  4) Pt to improve TUG to 13s to demonstrate improved functional mobility and decreased fall risk.   5) Pt to tolerate standing and walking for community distances with <2/10 pain in low back to improve mobility with IADL's.    Plan   Plan of care Certification: 5/2/2022 to 7/8/22.    Outpatient Physical Therapy 2 times weekly for 8 weeks to include the following interventions: Aquatic Therapy, Electrical Stimulation  , Manual Therapy, Moist Heat/ Ice, Neuromuscular Re-ed, Patient Education, Therapeutic Activities and Therapeutic Exercise.     Collin Chandra, PT

## 2022-05-05 ENCOUNTER — PATIENT MESSAGE (OUTPATIENT)
Dept: RESEARCH | Facility: HOSPITAL | Age: 67
End: 2022-05-05
Payer: MEDICARE

## 2022-05-06 ENCOUNTER — CLINICAL SUPPORT (OUTPATIENT)
Dept: REHABILITATION | Facility: HOSPITAL | Age: 67
End: 2022-05-06
Payer: MEDICARE

## 2022-05-06 DIAGNOSIS — R29.898 WEAKNESS OF BOTH LOWER EXTREMITIES: ICD-10-CM

## 2022-05-06 DIAGNOSIS — Z74.09 IMPAIRED FUNCTIONAL MOBILITY, BALANCE, GAIT, AND ENDURANCE: ICD-10-CM

## 2022-05-06 DIAGNOSIS — Z98.1 S/P LUMBAR FUSION: Primary | ICD-10-CM

## 2022-05-06 PROCEDURE — 97110 THERAPEUTIC EXERCISES: CPT | Mod: PO

## 2022-05-06 PROCEDURE — 97112 NEUROMUSCULAR REEDUCATION: CPT | Mod: PO

## 2022-05-10 ENCOUNTER — CLINICAL SUPPORT (OUTPATIENT)
Dept: REHABILITATION | Facility: HOSPITAL | Age: 67
End: 2022-05-10
Payer: MEDICARE

## 2022-05-10 DIAGNOSIS — Z98.1 S/P LUMBAR FUSION: Primary | ICD-10-CM

## 2022-05-10 DIAGNOSIS — Z74.09 IMPAIRED FUNCTIONAL MOBILITY, BALANCE, GAIT, AND ENDURANCE: ICD-10-CM

## 2022-05-10 DIAGNOSIS — R29.898 WEAKNESS OF BOTH LOWER EXTREMITIES: ICD-10-CM

## 2022-05-10 PROCEDURE — 97110 THERAPEUTIC EXERCISES: CPT | Mod: PO

## 2022-05-10 PROCEDURE — 97112 NEUROMUSCULAR REEDUCATION: CPT | Mod: PO

## 2022-05-10 NOTE — PROGRESS NOTES
Physical Therapy Daily Treatment Note     Name: Jessica Rojas  Clinic Number: [id number]    Therapy Diagnosis:   Encounter Diagnoses   Name Primary?    S/P lumbar fusion Yes    Weakness of both lower extremities     Impaired functional mobility, balance, gait, and endurance      Physician: Enriqueta Gan PA*    Visit Date: 5/10/2022  Physician Orders: PT Eval and Treat  Medical Diagnosis from Referral: Z98.1 (ICD-10-CM) - S/P lumbar fusion   Evaluation Date: 5/2/2022  Authorization Period Expiration: 12/31/2022  Plan of Care Expiration: 7/8/22  Visit # / Visits authorized: 3 / 25    Time In: 10:50  Time Out: 11:45  Total Billable Time: 40 minutes    Precautions: Diabetes, Fall, cancer and L4/L5 Fusion 3/17/22    Subjective     Pt reports: Pt reports she is still getting pain in her low back while she is standing and walking. She reports that this is her biggest complaint and that if this would go away, she would be fine. She reports that she thinks the figure 4 stretch has aggravated her knee, so she does not want to do it anymore.   She was compliant with home exercise program.  Response to previous treatment: No adverse effect  Functional change: Too soon    Pain: 5/10  Location: bilateral low back      Objective     Jessica received therapeutic exercises to develop strength, endurance, ROM and flexibility for 45 minutes including:  Recumbent bike, 5 mins  Calf stretch ramp, 3x 30s  Seated hamstring stretch, 3x 30s  Supine hip flexor stretch, 1x 1 min  Supine piriformis stretch, 3x 30s  Supine figure 4 stretch, 3x 30s - NP  DKTC w/ orange ball, 2 mins  LTR w/ orange ball, 2 mins  SLR, 2x10  Standing hip ext, 2x10  Standing hip abd, 2x10  Standing marches, 2x10    Jessica participated in neuromuscular re-education activities to improve: Coordination, Proprioception and Posture for 10 minutes. The following activities were included:  TA activation, 10x 10s hold  Iso crunch w/ orange ball, 2 mins w/ 3s  hold  Supine clams w/ green band, 3x10  Tandem stance, 3x max time    Jessica participated in gait training to improve functional mobility and safety for 00 minutes, including:    Home Exercises Provided and Patient Education Provided     Education provided:   - Continue HEP    Written Home Exercises Provided: yes.  Exercises were reviewed and Jessica was able to demonstrate them prior to the end of the session.  Jessica demonstrated good  understanding of the education provided.     See EMR under Patient Instructions for exercises provided 5/2/22.    Assessment     Pt tolerated tx well. No complaints throughout session. Continues to have poor balance strategies. Will progress as tolerated.   Jessica Is progressing well towards her goals.   Pt prognosis is Good.     Pt will continue to benefit from skilled outpatient physical therapy to address the deficits listed in the problem list box on initial evaluation, provide pt/family education and to maximize pt's level of independence in the home and community environment.     Pt's spiritual, cultural and educational needs considered and pt agreeable to plan of care and goals.    Anticipated barriers to physical therapy: None    Goals:   Short Term Goals (4 Weeks):   1) Pt will demonstrate compliance with initial home exercise program as prescribed by physical therapist to improve independence with management of condition.  2) Pt to increase strength to at least 4/5 of muscles tested to allow for improvement in functional activities such as performing chores.  3) Pt to report right leg pain pain of <4/10 at worst during prolonged walking to improve tolerance to ADLs.  4) Pt to improve SLS to 15s in BLE.     Long Term Goals (8 Weeks):   1) Pt to achieve <55% limitation as measured by the FOTO to demonstrate decreased low back pain disability.  2) Pt to increase strength to at least 5/5 of muscles tested to allow for improvement in functional activities such as performing  chores.  3) Pt to improve SLS to 30s in BLE.  4) Pt to improve TUG to 13s to demonstrate improved functional mobility and decreased fall risk.   5) Pt to tolerate standing and walking for community distances with <2/10 pain in low back to improve mobility with IADL's.    Plan   Plan of care Certification: 5/2/2022 to 7/8/22.     Outpatient Physical Therapy 2 times weekly for 8 weeks to include the following interventions: Aquatic Therapy, Electrical Stimulation  , Manual Therapy, Moist Heat/ Ice, Neuromuscular Re-ed, Patient Education, Therapeutic Activities and Therapeutic Exercise.     Collin Chandra, PT

## 2022-05-12 ENCOUNTER — CLINICAL SUPPORT (OUTPATIENT)
Dept: REHABILITATION | Facility: HOSPITAL | Age: 67
End: 2022-05-12
Payer: MEDICARE

## 2022-05-12 DIAGNOSIS — R29.898 WEAKNESS OF BOTH LOWER EXTREMITIES: ICD-10-CM

## 2022-05-12 DIAGNOSIS — Z98.1 S/P LUMBAR FUSION: Primary | ICD-10-CM

## 2022-05-12 DIAGNOSIS — Z74.09 IMPAIRED FUNCTIONAL MOBILITY, BALANCE, GAIT, AND ENDURANCE: ICD-10-CM

## 2022-05-12 PROCEDURE — 97112 NEUROMUSCULAR REEDUCATION: CPT | Mod: PO,CQ

## 2022-05-12 PROCEDURE — 97110 THERAPEUTIC EXERCISES: CPT | Mod: PO,CQ

## 2022-05-12 NOTE — PROGRESS NOTES
Physical Therapy Daily Treatment Note     Name: Jessica Rojas  Clinic Number: [id number]    Therapy Diagnosis:   Encounter Diagnoses   Name Primary?    S/P lumbar fusion Yes    Weakness of both lower extremities     Impaired functional mobility, balance, gait, and endurance      Physician: Enriqueta Gan PA*    Visit Date: 5/12/2022  Physician Orders: PT Eval and Treat  Medical Diagnosis from Referral: Z98.1 (ICD-10-CM) - S/P lumbar fusion   Evaluation Date: 5/2/2022  Authorization Period Expiration: 12/31/2022  Plan of Care Expiration: 7/8/22  Visit # / Visits authorized: 4 / 25    Time In: 915 am  Time Out: 1005 am  Total Billable Time: 50 minutes    Precautions: Diabetes, Fall, cancer and L4/L5 Fusion 3/17/22    Subjective     Pt reports: Pt reports she still has increased pain in the back when she is standing and is up on her feet but if she sits for about 10 minutes, she is able to relive the pain.  Pt reports her pain is not down the legs or similar to what she had before the surgery.  Pt reports the pain stays in the back and will wrap around the front.   She was compliant with home exercise program.  Response to previous treatment: increased soreness  Functional change: Too soon    Pain: 4/10  Location: bilateral low back      Objective     Jessica received therapeutic exercises to develop strength, endurance, ROM and flexibility for 45 minutes including:    Recumbent bike, 5 mins  Calf stretch ramp, 3x 30s  Seated hamstring stretch, 3x 30s  Supine hip flexor stretch, 1x 1 min  Supine piriformis stretch, 1x 1 min L only  Supine figure 4 stretch, 3x 30s - NP  DKTC w/ orange ball, 2 mins  LTR w/ orange ball, 2 mins  SLR, 2x10  Standing hip ext, 2x10  Standing hip abd, 2x10  Standing marches, 2x10- not performed    Jessica participated in neuromuscular re-education activities to improve: Coordination, Proprioception and Posture for 10 minutes. The following activities were included:  TA activation, 10x  10s hold  Iso crunch w/ orange ball, 3 mins w/ 3s hold  Supine clams w/ green band, 3x10  Tandem stance, 2x max time    Jessica participated in gait training to improve functional mobility and safety for 00 minutes, including:    Home Exercises Provided and Patient Education Provided     Education provided:   - Continue HEP    Written Home Exercises Provided: Patient instructed to cont prior HEP.  Exercises were reviewed and Jessica was able to demonstrate them prior to the end of the session.  Jessica demonstrated good  understanding of the education provided.     See EMR under Patient Instructions for exercises provided 5/2/22.    Assessment     Pt tolerated tx well. Pt reported back soreness during the standing exercises.  Pt is 8 weeks post op so education was provided how she is continuing to heal so the increased soreness with activity is a normal response.  Pt verbalized understanding.  Will progress as tolerated.   Jessica Is progressing well towards her goals.   Pt prognosis is Good.     Pt will continue to benefit from skilled outpatient physical therapy to address the deficits listed in the problem list box on initial evaluation, provide pt/family education and to maximize pt's level of independence in the home and community environment.     Pt's spiritual, cultural and educational needs considered and pt agreeable to plan of care and goals.    Anticipated barriers to physical therapy: None    Goals:   Short Term Goals (4 Weeks):   1) Pt will demonstrate compliance with initial home exercise program as prescribed by physical therapist to improve independence with management of condition.  2) Pt to increase strength to at least 4/5 of muscles tested to allow for improvement in functional activities such as performing chores.  3) Pt to report right leg pain pain of <4/10 at worst during prolonged walking to improve tolerance to ADLs.  4) Pt to improve SLS to 15s in BLE.     Long Term Goals (8 Weeks):   1) Pt to  achieve <55% limitation as measured by the FOTO to demonstrate decreased low back pain disability.  2) Pt to increase strength to at least 5/5 of muscles tested to allow for improvement in functional activities such as performing chores.  3) Pt to improve SLS to 30s in BLE.  4) Pt to improve TUG to 13s to demonstrate improved functional mobility and decreased fall risk.   5) Pt to tolerate standing and walking for community distances with <2/10 pain in low back to improve mobility with IADL's.    Plan     Plan of care Certification: 5/2/2022 to 7/8/22.     Outpatient Physical Therapy 2 times weekly for 8 weeks to include the following interventions: Aquatic Therapy, Electrical Stimulation  , Manual Therapy, Moist Heat/ Ice, Neuromuscular Re-ed, Patient Education, Therapeutic Activities and Therapeutic Exercise.     Krupa Hoang, PTA

## 2022-05-17 ENCOUNTER — CLINICAL SUPPORT (OUTPATIENT)
Dept: REHABILITATION | Facility: HOSPITAL | Age: 67
End: 2022-05-17
Payer: MEDICARE

## 2022-05-17 DIAGNOSIS — R29.898 WEAKNESS OF BOTH LOWER EXTREMITIES: ICD-10-CM

## 2022-05-17 DIAGNOSIS — Z98.1 S/P LUMBAR FUSION: Primary | ICD-10-CM

## 2022-05-17 DIAGNOSIS — Z74.09 IMPAIRED FUNCTIONAL MOBILITY, BALANCE, GAIT, AND ENDURANCE: ICD-10-CM

## 2022-05-17 PROCEDURE — 97110 THERAPEUTIC EXERCISES: CPT | Mod: PO

## 2022-05-17 PROCEDURE — 97112 NEUROMUSCULAR REEDUCATION: CPT | Mod: PO

## 2022-05-17 NOTE — PROGRESS NOTES
Physical Therapy Daily Treatment Note     Name: Jessica Rojas  Clinic Number: [id number]    Therapy Diagnosis:   Encounter Diagnoses   Name Primary?    S/P lumbar fusion Yes    Weakness of both lower extremities     Impaired functional mobility, balance, gait, and endurance      Physician: Enriqueta Gan PA*    Visit Date: 5/17/2022  Physician Orders: PT Eval and Treat  Medical Diagnosis from Referral: Z98.1 (ICD-10-CM) - S/P lumbar fusion   Evaluation Date: 5/2/2022  Authorization Period Expiration: 12/31/2022  Plan of Care Expiration: 7/8/22  Visit # / Visits authorized: 5 / 25    Time In: 11:00  Time Out: 11:45  Total Billable Time: 25 minutes    Precautions: Diabetes, Fall, cancer and L4/L5 Fusion 3/17/22    Subjective     Pt reports: Pt reports she is sore today from riding her stationary bike at home. She has been riding it for 10-15 minutes a day. She would like to transition to a cane, but isn't sure about which one to get, a straight cane or quad cane.   She was compliant with home exercise program.  Response to previous treatment: increased soreness  Functional change: Too soon    Pain: 4/10  Location: bilateral low back      Objective     Jessica received therapeutic exercises to develop strength, endurance, ROM and flexibility for 45 minutes including:  Recumbent bike, 5 mins  Calf stretch ramp, 3x 30s  Seated hamstring stretch, 3x 30s  Supine hip flexor stretch, 1x 1 min  Supine piriformis stretch, 1x 1 min L only  Supine figure 4 stretch, 3x 30s - NP  DKTC w/ orange ball, 2 mins  LTR w/ orange ball, 2 mins  SLR, 2x10  Standing hip ext, 2x10  Standing hip abd, 2x10  Standing marches, 2x10- not performed    Jessica participated in neuromuscular re-education activities to improve: Coordination, Proprioception and Posture for 10 minutes. The following activities were included:  TA activation, 10x 10s hold  Iso crunch w/ orange ball, 3 mins w/ 3s hold  Supine clams w/ green band, 3x10  Tandem  stance, 2x max time    Jessica participated in gait training to improve functional mobility and safety for 00 minutes, including:  --BEGIN NEXT SESSION    Home Exercises Provided and Patient Education Provided     Education provided:   - Continue HEP    Written Home Exercises Provided: Patient instructed to cont prior HEP.  Exercises were reviewed and Jessica was able to demonstrate them prior to the end of the session.  Jessica demonstrated good  understanding of the education provided.     See EMR under Patient Instructions for exercises provided 5/2/22.    Assessment     Pt tolerated tx well. She did well with back pain throughout the session, mostly just had complaints of right knee pain when bearing weight through it. Will begin gait training with a cane next session.   Jessica Is progressing well towards her goals.   Pt prognosis is Good.     Pt will continue to benefit from skilled outpatient physical therapy to address the deficits listed in the problem list box on initial evaluation, provide pt/family education and to maximize pt's level of independence in the home and community environment.     Pt's spiritual, cultural and educational needs considered and pt agreeable to plan of care and goals.    Anticipated barriers to physical therapy: None    Goals:   Short Term Goals (4 Weeks):   1) Pt will demonstrate compliance with initial home exercise program as prescribed by physical therapist to improve independence with management of condition.  2) Pt to increase strength to at least 4/5 of muscles tested to allow for improvement in functional activities such as performing chores.  3) Pt to report right leg pain pain of <4/10 at worst during prolonged walking to improve tolerance to ADLs.  4) Pt to improve SLS to 15s in BLE.     Long Term Goals (8 Weeks):   1) Pt to achieve <55% limitation as measured by the FOTO to demonstrate decreased low back pain disability.  2) Pt to increase strength to at least 5/5 of  muscles tested to allow for improvement in functional activities such as performing chores.  3) Pt to improve SLS to 30s in BLE.  4) Pt to improve TUG to 13s to demonstrate improved functional mobility and decreased fall risk.   5) Pt to tolerate standing and walking for community distances with <2/10 pain in low back to improve mobility with IADL's.    Plan     Plan of care Certification: 5/2/2022 to 7/8/22.     Outpatient Physical Therapy 2 times weekly for 8 weeks to include the following interventions: Aquatic Therapy, Electrical Stimulation  , Manual Therapy, Moist Heat/ Ice, Neuromuscular Re-ed, Patient Education, Therapeutic Activities and Therapeutic Exercise.     Collin Chandra, PT

## 2022-05-19 ENCOUNTER — PATIENT MESSAGE (OUTPATIENT)
Dept: FAMILY MEDICINE | Facility: CLINIC | Age: 67
End: 2022-05-19

## 2022-05-19 ENCOUNTER — OFFICE VISIT (OUTPATIENT)
Dept: FAMILY MEDICINE | Facility: CLINIC | Age: 67
End: 2022-05-19
Payer: MEDICARE

## 2022-05-19 VITALS
HEIGHT: 60 IN | WEIGHT: 218.63 LBS | DIASTOLIC BLOOD PRESSURE: 59 MMHG | TEMPERATURE: 98 F | HEART RATE: 81 BPM | BODY MASS INDEX: 42.92 KG/M2 | SYSTOLIC BLOOD PRESSURE: 112 MMHG

## 2022-05-19 DIAGNOSIS — M25.561 CHRONIC PAIN OF RIGHT KNEE: Primary | ICD-10-CM

## 2022-05-19 DIAGNOSIS — G89.29 CHRONIC PAIN OF RIGHT KNEE: Primary | ICD-10-CM

## 2022-05-19 DIAGNOSIS — G89.29 CHRONIC PAIN OF RIGHT KNEE: ICD-10-CM

## 2022-05-19 DIAGNOSIS — M54.16 LUMBAR RADICULOPATHY: ICD-10-CM

## 2022-05-19 DIAGNOSIS — M25.561 CHRONIC PAIN OF RIGHT KNEE: ICD-10-CM

## 2022-05-19 PROCEDURE — 99999 PR PBB SHADOW E&M-EST. PATIENT-LVL V: CPT | Mod: PBBFAC,,, | Performed by: PHYSICIAN ASSISTANT

## 2022-05-19 PROCEDURE — 1159F MED LIST DOCD IN RCRD: CPT | Mod: CPTII,S$GLB,, | Performed by: PHYSICIAN ASSISTANT

## 2022-05-19 PROCEDURE — 3008F PR BODY MASS INDEX (BMI) DOCUMENTED: ICD-10-PCS | Mod: CPTII,S$GLB,, | Performed by: PHYSICIAN ASSISTANT

## 2022-05-19 PROCEDURE — 3288F FALL RISK ASSESSMENT DOCD: CPT | Mod: CPTII,S$GLB,, | Performed by: PHYSICIAN ASSISTANT

## 2022-05-19 PROCEDURE — 3008F BODY MASS INDEX DOCD: CPT | Mod: CPTII,S$GLB,, | Performed by: PHYSICIAN ASSISTANT

## 2022-05-19 PROCEDURE — 99214 PR OFFICE/OUTPT VISIT, EST, LEVL IV, 30-39 MIN: ICD-10-PCS | Mod: 25,S$GLB,, | Performed by: PHYSICIAN ASSISTANT

## 2022-05-19 PROCEDURE — 3044F PR MOST RECENT HEMOGLOBIN A1C LEVEL <7.0%: ICD-10-PCS | Mod: CPTII,S$GLB,, | Performed by: PHYSICIAN ASSISTANT

## 2022-05-19 PROCEDURE — 3044F HG A1C LEVEL LT 7.0%: CPT | Mod: CPTII,S$GLB,, | Performed by: PHYSICIAN ASSISTANT

## 2022-05-19 PROCEDURE — 1125F AMNT PAIN NOTED PAIN PRSNT: CPT | Mod: CPTII,S$GLB,, | Performed by: PHYSICIAN ASSISTANT

## 2022-05-19 PROCEDURE — 1101F PR PT FALLS ASSESS DOC 0-1 FALLS W/OUT INJ PAST YR: ICD-10-PCS | Mod: CPTII,S$GLB,, | Performed by: PHYSICIAN ASSISTANT

## 2022-05-19 PROCEDURE — 96372 THER/PROPH/DIAG INJ SC/IM: CPT | Mod: S$GLB,,, | Performed by: PHYSICIAN ASSISTANT

## 2022-05-19 PROCEDURE — 3074F PR MOST RECENT SYSTOLIC BLOOD PRESSURE < 130 MM HG: ICD-10-PCS | Mod: CPTII,S$GLB,, | Performed by: PHYSICIAN ASSISTANT

## 2022-05-19 PROCEDURE — 99214 OFFICE O/P EST MOD 30 MIN: CPT | Mod: 25,S$GLB,, | Performed by: PHYSICIAN ASSISTANT

## 2022-05-19 PROCEDURE — 3288F PR FALLS RISK ASSESSMENT DOCUMENTED: ICD-10-PCS | Mod: CPTII,S$GLB,, | Performed by: PHYSICIAN ASSISTANT

## 2022-05-19 PROCEDURE — 1125F PR PAIN SEVERITY QUANTIFIED, PAIN PRESENT: ICD-10-PCS | Mod: CPTII,S$GLB,, | Performed by: PHYSICIAN ASSISTANT

## 2022-05-19 PROCEDURE — 3078F PR MOST RECENT DIASTOLIC BLOOD PRESSURE < 80 MM HG: ICD-10-PCS | Mod: CPTII,S$GLB,, | Performed by: PHYSICIAN ASSISTANT

## 2022-05-19 PROCEDURE — 3078F DIAST BP <80 MM HG: CPT | Mod: CPTII,S$GLB,, | Performed by: PHYSICIAN ASSISTANT

## 2022-05-19 PROCEDURE — 99999 PR PBB SHADOW E&M-EST. PATIENT-LVL V: ICD-10-PCS | Mod: PBBFAC,,, | Performed by: PHYSICIAN ASSISTANT

## 2022-05-19 PROCEDURE — 1101F PT FALLS ASSESS-DOCD LE1/YR: CPT | Mod: CPTII,S$GLB,, | Performed by: PHYSICIAN ASSISTANT

## 2022-05-19 PROCEDURE — 3074F SYST BP LT 130 MM HG: CPT | Mod: CPTII,S$GLB,, | Performed by: PHYSICIAN ASSISTANT

## 2022-05-19 PROCEDURE — 1160F PR REVIEW ALL MEDS BY PRESCRIBER/CLIN PHARMACIST DOCUMENTED: ICD-10-PCS | Mod: CPTII,S$GLB,, | Performed by: PHYSICIAN ASSISTANT

## 2022-05-19 PROCEDURE — 1160F RVW MEDS BY RX/DR IN RCRD: CPT | Mod: CPTII,S$GLB,, | Performed by: PHYSICIAN ASSISTANT

## 2022-05-19 PROCEDURE — 96372 PR INJECTION,THERAP/PROPH/DIAG2ST, IM OR SUBCUT: ICD-10-PCS | Mod: S$GLB,,, | Performed by: PHYSICIAN ASSISTANT

## 2022-05-19 PROCEDURE — 1159F PR MEDICATION LIST DOCUMENTED IN MEDICAL RECORD: ICD-10-PCS | Mod: CPTII,S$GLB,, | Performed by: PHYSICIAN ASSISTANT

## 2022-05-19 RX ORDER — DEXAMETHASONE SODIUM PHOSPHATE 4 MG/ML
8 INJECTION, SOLUTION INTRA-ARTICULAR; INTRALESIONAL; INTRAMUSCULAR; INTRAVENOUS; SOFT TISSUE ONCE
Status: COMPLETED | OUTPATIENT
Start: 2022-05-19 | End: 2022-05-19

## 2022-05-19 RX ORDER — DICLOFENAC SODIUM 10 MG/G
2 GEL TOPICAL DAILY
Qty: 100 G | Refills: 0 | Status: SHIPPED | OUTPATIENT
Start: 2022-05-19 | End: 2022-08-04 | Stop reason: SDUPTHER

## 2022-05-19 RX ORDER — INSULIN GLARGINE 100 [IU]/ML
INJECTION, SOLUTION SUBCUTANEOUS
COMMUNITY
Start: 2022-02-18 | End: 2023-02-23

## 2022-05-19 RX ADMIN — DEXAMETHASONE SODIUM PHOSPHATE 8 MG: 4 INJECTION, SOLUTION INTRA-ARTICULAR; INTRALESIONAL; INTRAMUSCULAR; INTRAVENOUS; SOFT TISSUE at 10:05

## 2022-05-19 NOTE — TELEPHONE ENCOUNTER
I have signed for the following orders AND/OR meds.  Please call the patient and ask the patient to schedule the testing AND/OR inform about any medications that were sent. Medications have been sent to pharmacy listed below      No orders of the defined types were placed in this encounter.      Medications Ordered This Encounter   Medications    lamotrigine2.5% meloxicam 0.09% LIDOcaine2% prilocaine2% topical cream     Sig: Apply topically 4 (four) times daily.     Dispense:  200 g     Refill:  0         [facility] [place] [facility] [address] [facility] [address]  [address] [facility] [address]  [place] [address]  Phone: [phone] Fax: [phone]

## 2022-05-19 NOTE — PROGRESS NOTES
Assessment/Plan:    Problem List Items Addressed This Visit        Neuro    Lumbar radiculopathy    Overview     -chronic lower back pain s/p lumbar spinal fusion on 3/17/22 performed by Dr. Bragg   -currently taking tylenol prn and gabapentin  -continue PT  -follow up with NSGY               Orthopedic    Chronic pain of right knee - Primary    Overview     -acute on chronic right knee pain  -continue conservative treatment  -decadron IM and PRN anti-inflammatory medication  -follow up with orthopedics           Relevant Medications    diclofenac sodium (VOLTAREN) 1 % Gel    dexamethasone injection 8 mg (Completed)    Other Relevant Orders    Ambulatory referral/consult to Orthopedics          Follow up if symptoms worsen or fail to improve.    Magali Cardozo PA-C  _____________________________________________________________________________________________________________________________________________________    CC: right knee pain    HPI: Patient is in clinic today as an established patient here for right knee pain. Patient has a history of chronic low back pain radiating to her right hip and knee. She underwent lumbar spinal fusion on 3/17/22 performed by neurosurgery, Dr. Bragg and had improvement in her low back and knee pain. She reports starting physical therapy about 2 weeks ago in which she started having worsening of pain in her right knee. Denies trauma or injuries. Denies recent falls. She stated that the pain is worse with weight bearing activities. She denies stiffness or swelling. She is currently taking tylenol and gabapentin for pain with minimal relief. She was instructed to hold off on NSAIDs until she is cleared by NSGY. She was previously followed by orthopedics, Dr. Lai for knee pain. She plans to follow up soon. No other complaints today.     Past Medical History:   Diagnosis Date    Abdominal pain 2/27/2015    Arthritis     Diabetes mellitus, type 2     DM (diabetes  mellitus)     on insulin    Elevated transaminase level 2016    Encounter for blood transfusion     History of malignant carcinoid tumor of bronchus and lung 10/25/2017    History of neuroendocrine cancer     HTN (hypertension) 2014    Hypercalcemia 2016    Lung cancer, hilus 2014    Malignant carcinoid tumor of bronchus and lung 2014    Malignant carcinoid tumor of the bronchus and lung 2014    Mediastinal lymphadenopathy 2015    Obesity, morbid 2014    Parkinsons 10/2017    Pituitary adenoma 's    took parladel for 3 yrs    Pneumonia     Postoperative hypothyroidism 2017    - s/p total thyroidectomy in  (parathyroids intact) with post op hypothyroidism; on synthroid    Pulmonary nodules 2018    Thyroid disease     Wheeze 2014     Past Surgical History:   Procedure Laterality Date    APPENDECTOMY      BREAST CYST ASPIRATION      BRONCHOSCOPY  2014, 2014     SECTION  , 1986    x2    COLONOSCOPY N/A 2021    Procedure: COLONOSCOPY;  Surgeon: Khadar Bernardo MD;  Location: [facility];  Service: Endoscopy;  Laterality: N/A;    COLONOSCOPY N/A 2021    Procedure: COLONOSCOPY;  Surgeon: Frank Lamb MD;  Location: [facility] ([address] [place] [address]);  Service: Endoscopy;  Laterality: N/A;  MD Joselin Feliciano MA  Caller: Unspecified (Yesterday,  2:54 PM)  Need colonoscopy with EMR for large (30 mm) ascending colon polyp. 90 minutes. Main. Clear liquid diet for 24 hour patient with inadequate prep last time.   Thanks!   Ed Campa MD     EPIDURAL STEROID INJECTION INTO LUMBAR SPINE N/A 2022    Procedure: Injection-steroid-epidural-lumbar L5/S1;  Surgeon: Bebeto Eldridge MD;  Location: [facility] OR;  Service: Pain Management;  Laterality: N/A;    ESOPHAGOGASTRODUODENOSCOPY N/A 2021    Procedure: EGD (ESOPHAGOGASTRODUODENOSCOPY);  Surgeon: Khadar Bernardo MD;  Location: [facility] ENDO;  Service:  Endoscopy;  Laterality: N/A;    EYE SURGERY      cataract bilat    LUNG REMOVAL, PARTIAL Right 2014    with 17 lymph nodes; right middle and lower    MINIMALLY INVASIVE TRANSFORAMINAL LUMBAR INTERBODY FUSION (TLIF) N/A 3/17/2022    Procedure: FUSION, SPINE, LUMBAR, TLIF, MINIMALLY INVASIVE RIGHT L4-5 TLIF, BILATERAL L4-5 LAMINECTOMY AND POSTERIOR INSTRUMENTED FUSION;  Surgeon: Ranjeet Bragg MD;  Location: [facility] OR;  Service: Neurosurgery;  Laterality: N/A;    THYROIDECTOMY  05/24/2016    TONSILLECTOMY  1969    TUBAL LIGATION  1986 1986     Review of patient's allergies indicates:   Allergen Reactions    Propranolol Nausea And Vomiting     Drops pressure and raised sugar    Lipitor [atorvastatin] Other (See Comments)     Myalgia, muscle pain    Robaxin [methocarbamol]      Skin inside mouth started peeling.     Social History     Tobacco Use    Smoking status: Never Smoker    Smokeless tobacco: Never Used   Substance Use Topics    Alcohol use: Not Currently     Alcohol/week: 0.0 standard drinks     Comment: I rarely drink    Drug use: No     Family History   Problem Relation Age of Onset    Cancer Maternal Aunt         breast    Cancer Maternal Grandmother         unknown    Cancer Maternal Aunt         unknown    Diabetes Mother     Glaucoma Mother     Heart disease Mother     Hypertension Mother     Diabetes Father     Heart disease Father     Hypertension Father     Diabetes Sister     Macular degeneration Sister     Alcohol abuse Sister     Breast cancer Paternal Aunt     Breast cancer Paternal Aunt     Breast cancer Cousin     Amblyopia Neg Hx     Blindness Neg Hx     Cataracts Neg Hx     Retinal detachment Neg Hx     Stroke Neg Hx     Strabismus Neg Hx     Thyroid disease Neg Hx      Current Outpatient Medications on File Prior to Visit   Medication Sig Dispense Refill    alcohol swabs (ALCOHOL WIPES) PadM Uses 5 daily 400 each 4    amantadine HCL (SYMMETREL) 100  mg capsule TAKE ONE CAPSULE BY MOUTH twice daily 60 capsule 11    blood-glucose transmitter (DEXCOM G6 TRANSMITTER) Lizbeth Dispense 1 transmitter 1 Device 3    carbidopa-levodopa  mg (SINEMET)  mg per tablet TAKE one and one-half TABLET BY MOUTH THREE TIMES DAILY 135 tablet 6    cholecalciferol, vitamin D3, 10 mcg (400 unit) Cap Take 1,200 Units by mouth once daily.      cyanocobalamin (VITAMIN B-12) 1000 MCG tablet Take 100 mcg by mouth once daily.      gabapentin (NEURONTIN) 100 MG capsule Take 1 capsule (100 mg total) by mouth 3 (three) times daily. (Patient taking differently: Take 100 mg by mouth 3 (three) times daily. Takes at night) 90 capsule 11    insulin aspart U-100 (NOVOLOG FLEXPEN U-100 INSULIN) 100 unit/mL (3 mL) InPn pen 30u AC + correction Max  u 120 mL 4    lancing device Misc Checks bg 7-8 times a day 1 each 0    LANTUS SOLOSTAR U-100 INSULIN glargine 100 units/mL (3mL) SubQ pen INJECT 35 UNITS EVERY MORNING THEN 30 UNITS EVERY NIGHT AT BEDTIME      levothyroxine (SYNTHROID) 137 MCG Tab tablet Take 1 tablet (137 mcg total) by mouth once daily. 30 tablet 11    melatonin 3 mg Tab Take 6 mg by mouth nightly.      montelukast (SINGULAIR) 10 mg tablet take ONE tablet BY MOUTH once DAILY EVERY EVENING 30 tablet 11    pramipexole (MIRAPEX) 0.25 MG tablet TAKE ONE-HALF to ONE TABLET BY MOUTH THREE TIMES DAILY as directed 90 tablet 11    simvastatin (ZOCOR) 10 MG tablet TAKE ONE TABLET BY MOUTH ONCE DAILY IN THE EVENING 30 tablet 12    valsartan-hydrochlorothiazide (DIOVAN-HCT) 320-25 mg per tablet TAKE ONE TABLET BY MOUTH ONCE DAILY 90 tablet 1    albuterol (PROVENTIL/VENTOLIN HFA) 90 mcg/actuation inhaler Inhale 2 puffs into the lungs every 6 (six) hours as needed for Wheezing or Shortness of Breath. (Patient not taking: Reported on 5/19/2022) 18 g 0    blood sugar diagnostic (TRUE METRIX GLUCOSE TEST STRIP) Strp Check bg 7-8 times/day (Patient not taking: Reported on  "5/19/2022) 250 each 12    chlorhexidine (PERIDEX) 0.12 % solution Use as directed 15 mLs in the mouth or throat 2 (two) times daily.      fish oil-omega-3 fatty acids 300-1,000 mg capsule Take 4 capsules by mouth once daily.      fluticasone-salmeterol diskus inhaler 250-50 mcg Inhale 1 puff into the lungs 2 (two) times daily. (Patient not taking: Reported on 5/19/2022) 60 each 11    insulin degludec (TRESIBA FLEXTOUCH U-100) 100 unit/mL (3 mL) insulin pen INJECT 20 UNITS EVERY MORNING THEN 20 UNITS EVERY NIGHT AT BEDTIME (Patient not taking: Reported on 5/19/2022) 4 pen 11    lancets 33 gauge Misc Checks bg 7-8 times a dayTrue metrix (Patient not taking: Reported on 5/19/2022) 250 each 11    MULTIVITAMIN W-MINERALS/LUTEIN (CENTRUM SILVER ORAL) Take 1 tablet by mouth once daily.       mupirocin calcium 2% nasl oint (BACTROBAN) 2 % Oint by Nasal route 2 (two) times daily.      oxyCODONE-acetaminophen (PERCOCET) 7.5-325 mg per tablet Take 1 tablet by mouth every 6 (six) hours as needed for Pain. (Patient not taking: Reported on 5/19/2022) 28 tablet 0    pen needle, diabetic (BD ULTRA-FINE MINI PEN NEEDLE) 31 gauge x 3/16" Ndle Uses 5 daily (Patient not taking: Reported on 5/19/2022) 200 each 12    selegiline (ELDEPRYL) 5 mg Cap Take 1 capsule (5 mg total) by mouth 2 (two) times daily. (Patient not taking: Reported on 5/19/2022) 60 capsule 10    UNABLE TO FIND Place 0.5 mLs under the tongue 2 (two) times a day. medication name:CBD Oil     Last dose 3 months ago       No current facility-administered medications on file prior to visit.       Review of Systems   Constitutional: Negative for chills, diaphoresis, fatigue and fever.   HENT: Negative for congestion, ear pain, postnasal drip, sinus pain and sore throat.    Eyes: Negative for pain and redness.   Respiratory: Negative for cough, chest tightness and shortness of breath.    Cardiovascular: Negative for chest pain and leg swelling.   Gastrointestinal: " Negative for abdominal pain, constipation, diarrhea, nausea and vomiting.   Genitourinary: Negative for dysuria and hematuria.   Musculoskeletal: Positive for arthralgias. Negative for joint swelling.   Skin: Negative for rash.   Neurological: Negative for dizziness, syncope and headaches.       Vitals:    05/19/22 0955   BP: (!) 112/59   Pulse: 81   Temp: 98.2 °F (36.8 °C)   TempSrc: Oral   Weight: 99.2 kg (218 lb 9.6 oz)   Height: 5' (1.524 m)       Wt Readings from Last 3 Encounters:   05/19/22 99.2 kg (218 lb 9.6 oz)   04/18/22 98.9 kg (218 lb 0.6 oz)   04/11/22 98.9 kg (218 lb 0.6 oz)       Physical Exam  Constitutional:       General: She is not in acute distress.     Appearance: Normal appearance. She is well-developed.   HENT:      Head: Normocephalic and atraumatic.   Eyes:      Conjunctiva/sclera: Conjunctivae normal.   Cardiovascular:      Rate and Rhythm: Normal rate and regular rhythm.      Pulses: Normal pulses.      Heart sounds: Normal heart sounds. No murmur heard.  Pulmonary:      Effort: Pulmonary effort is normal. No respiratory distress.      Breath sounds: Normal breath sounds.   Abdominal:      General: Bowel sounds are normal. There is no distension.      Palpations: Abdomen is soft.      Tenderness: There is no abdominal tenderness.   Musculoskeletal:         General: Normal range of motion.      Cervical back: Normal range of motion and neck supple.      Right knee: No swelling or deformity. Normal range of motion. Tenderness present.      Left knee: Normal.   Skin:     General: Skin is warm and dry.      Findings: No rash.   Neurological:      General: No focal deficit present.      Mental Status: She is alert and oriented to person, place, and time.         Health Maintenance   Topic Date Due    High Dose Statin  Never done    Foot Exam  06/07/2022    Eye Exam  07/09/2022    Hemoglobin A1c  09/16/2022    Mammogram  10/28/2022    Lipid Panel  12/03/2022    DEXA Scan  03/11/2023     TETANUS VACCINE  02/16/2026    Hepatitis C Screening  Completed

## 2022-05-20 ENCOUNTER — PATIENT MESSAGE (OUTPATIENT)
Dept: FAMILY MEDICINE | Facility: CLINIC | Age: 67
End: 2022-05-20
Payer: MEDICARE

## 2022-05-20 DIAGNOSIS — M25.561 CHRONIC PAIN OF RIGHT KNEE: ICD-10-CM

## 2022-05-20 DIAGNOSIS — G89.29 CHRONIC PAIN OF RIGHT KNEE: ICD-10-CM

## 2022-05-20 NOTE — TELEPHONE ENCOUNTER
I have signed for the following orders AND/OR meds.  Please call the patient and ask the patient to schedule the testing AND/OR inform about any medications that were sent. Medications have been sent to pharmacy listed below      Can you please call in this prescription to [facility] for the patient? Thank you.    Medications Ordered This Encounter   Medications    lamotrigine2.5% meloxicam 0.09% LIDOcaine2% prilocaine2% topical cream     Sig: Apply topically 4 (four) times daily as needed (pain).     Dispense:  200 g     Refill:  3         [facility] [place] - [address]  [address]  [place] [address]  Phone: [phone] Fax: [phone]    [facility] - [address]  [address]  [place] [address]  Phone: [phone] Fax: [phone]

## 2022-05-25 ENCOUNTER — TELEPHONE (OUTPATIENT)
Dept: ENDOSCOPY | Facility: HOSPITAL | Age: 67
End: 2022-05-25
Payer: MEDICARE

## 2022-05-25 NOTE — TELEPHONE ENCOUNTER
Left voice and text messages instructing patient to call dept @ [phone] or [phone] between 8am-3pm.    Arrival time to be given @ 0930  Colon/2 day prep w/Suprep; inst in portal  Covid - vacc/boosted  (Message sent via [facility] portal)

## 2022-05-25 NOTE — TELEPHONE ENCOUNTER
Spoke with patient about arrival time @ 0930.   Covid test = vacc/boosted    Prep instructions reviewed: the day before the procedure, follow a clear liquid diet all day, then start the first 1/2 of prep at 5pm and take 2nd 1/2 of prep as directed.  Pt must be completely NPO when prep completed @ 0630.              Medications: Do not take Insulin or oral diabetic medications the day of the procedure.  Take as prescribed: heart, seizure and blood pressure medication in the morning with a sip of water (less than an ounce).  Take any breathing medications and bring inhalers to hospital with you Leave all valuables and jewelry at home.     Wear comfortable clothes to procedure to change into hospital gown You cannot drive for 24 hours after your procedure because you will receive sedation for your procedure to make you comfortable.  A ride must be provided at discharge.

## 2022-05-27 ENCOUNTER — ANESTHESIA (OUTPATIENT)
Dept: ENDOSCOPY | Facility: HOSPITAL | Age: 67
End: 2022-05-27
Payer: MEDICARE

## 2022-05-27 ENCOUNTER — PATIENT MESSAGE (OUTPATIENT)
Dept: FAMILY MEDICINE | Facility: CLINIC | Age: 67
End: 2022-05-27
Payer: MEDICARE

## 2022-05-27 ENCOUNTER — HOSPITAL ENCOUNTER (OUTPATIENT)
Facility: HOSPITAL | Age: 67
Discharge: HOME | End: 2022-05-27
Attending: INTERNAL MEDICINE | Admitting: INTERNAL MEDICINE
Payer: MEDICARE

## 2022-05-27 ENCOUNTER — ANESTHESIA EVENT (OUTPATIENT)
Dept: ENDOSCOPY | Facility: HOSPITAL | Age: 67
End: 2022-05-27
Payer: MEDICARE

## 2022-05-27 VITALS
WEIGHT: 218 LBS | DIASTOLIC BLOOD PRESSURE: 65 MMHG | SYSTOLIC BLOOD PRESSURE: 128 MMHG | TEMPERATURE: 98 F | BODY MASS INDEX: 41.16 KG/M2 | HEIGHT: 61 IN | HEART RATE: 80 BPM | RESPIRATION RATE: 21 BRPM | OXYGEN SATURATION: 99 %

## 2022-05-27 DIAGNOSIS — K63.5 COLON POLYP: ICD-10-CM

## 2022-05-27 LAB — POCT GLUCOSE: 77 MG/DL (ref 70–110)

## 2022-05-27 PROCEDURE — 45385 COLONOSCOPY W/LESION REMOVAL: CPT | Mod: ,,, | Performed by: INTERNAL MEDICINE

## 2022-05-27 PROCEDURE — 45381 COLONOSCOPY SUBMUCOUS NJX: CPT | Performed by: INTERNAL MEDICINE

## 2022-05-27 PROCEDURE — 63600175 PHARM REV CODE 636 W HCPCS: Performed by: NURSE ANESTHETIST, CERTIFIED REGISTERED

## 2022-05-27 PROCEDURE — 27201012 HC FORCEPS, HOT/COLD, DISP: Performed by: INTERNAL MEDICINE

## 2022-05-27 PROCEDURE — 45381 COLONOSCOPY SUBMUCOUS NJX: CPT | Mod: 51,,, | Performed by: INTERNAL MEDICINE

## 2022-05-27 PROCEDURE — 37000008 HC ANESTHESIA 1ST 15 MINUTES: Performed by: INTERNAL MEDICINE

## 2022-05-27 PROCEDURE — 45385 PR COLONOSCOPY,REMV LESN,SNARE: ICD-10-PCS | Mod: ,,, | Performed by: INTERNAL MEDICINE

## 2022-05-27 PROCEDURE — 27201089 HC SNARE, DISP (ANY): Performed by: INTERNAL MEDICINE

## 2022-05-27 PROCEDURE — 88305 TISSUE EXAM BY PATHOLOGIST: ICD-10-PCS | Mod: 26,,, | Performed by: PATHOLOGY

## 2022-05-27 PROCEDURE — 45380 COLONOSCOPY AND BIOPSY: CPT | Mod: 59 | Performed by: INTERNAL MEDICINE

## 2022-05-27 PROCEDURE — 45385 COLONOSCOPY W/LESION REMOVAL: CPT | Performed by: INTERNAL MEDICINE

## 2022-05-27 PROCEDURE — 45381 PR COLONOSCPY,FLEX,W/DIR SUBMUC INJECT: ICD-10-PCS | Mod: 51,,, | Performed by: INTERNAL MEDICINE

## 2022-05-27 PROCEDURE — 88305 TISSUE EXAM BY PATHOLOGIST: CPT | Mod: 26,,, | Performed by: PATHOLOGY

## 2022-05-27 PROCEDURE — 25000003 PHARM REV CODE 250: Performed by: INTERNAL MEDICINE

## 2022-05-27 PROCEDURE — 45380 COLONOSCOPY AND BIOPSY: CPT | Mod: 59,,, | Performed by: INTERNAL MEDICINE

## 2022-05-27 PROCEDURE — 88305 TISSUE EXAM BY PATHOLOGIST: CPT | Performed by: PATHOLOGY

## 2022-05-27 PROCEDURE — 45380 PR COLONOSCOPY,BIOPSY: ICD-10-PCS | Mod: 59,,, | Performed by: INTERNAL MEDICINE

## 2022-05-27 PROCEDURE — 27200997: Performed by: INTERNAL MEDICINE

## 2022-05-27 PROCEDURE — 27201028 HC NEEDLE, SCLERO: Performed by: INTERNAL MEDICINE

## 2022-05-27 PROCEDURE — 27202363 HC INJECTION AGENT, SUBMUCOSAL, ANY: Performed by: INTERNAL MEDICINE

## 2022-05-27 PROCEDURE — 37000009 HC ANESTHESIA EA ADD 15 MINS: Performed by: INTERNAL MEDICINE

## 2022-05-27 PROCEDURE — 25000003 PHARM REV CODE 250: Performed by: NURSE ANESTHETIST, CERTIFIED REGISTERED

## 2022-05-27 RX ORDER — LIDOCAINE HYDROCHLORIDE 20 MG/ML
INJECTION INTRAVENOUS
Status: DISCONTINUED | OUTPATIENT
Start: 2022-05-27 | End: 2022-05-27

## 2022-05-27 RX ORDER — SODIUM CHLORIDE 0.9 % (FLUSH) 0.9 %
10 SYRINGE (ML) INJECTION
Status: DISCONTINUED | OUTPATIENT
Start: 2022-05-27 | End: 2022-05-27 | Stop reason: HOSPADM

## 2022-05-27 RX ORDER — SODIUM CHLORIDE 9 MG/ML
INJECTION, SOLUTION INTRAVENOUS CONTINUOUS
Status: DISCONTINUED | OUTPATIENT
Start: 2022-05-27 | End: 2022-05-27 | Stop reason: HOSPADM

## 2022-05-27 RX ORDER — PHENYLEPHRINE HYDROCHLORIDE 10 MG/ML
INJECTION INTRAVENOUS
Status: DISCONTINUED | OUTPATIENT
Start: 2022-05-27 | End: 2022-05-27

## 2022-05-27 RX ORDER — PROPOFOL 10 MG/ML
VIAL (ML) INTRAVENOUS
Status: DISCONTINUED | OUTPATIENT
Start: 2022-05-27 | End: 2022-05-27

## 2022-05-27 RX ORDER — PROPOFOL 10 MG/ML
VIAL (ML) INTRAVENOUS CONTINUOUS PRN
Status: DISCONTINUED | OUTPATIENT
Start: 2022-05-27 | End: 2022-05-27

## 2022-05-27 RX ADMIN — LIDOCAINE HYDROCHLORIDE 60 MG: 20 INJECTION, SOLUTION INTRAVENOUS at 10:05

## 2022-05-27 RX ADMIN — SODIUM CHLORIDE: 0.9 INJECTION, SOLUTION INTRAVENOUS at 09:05

## 2022-05-27 RX ADMIN — PROPOFOL 70 MG: 10 INJECTION, EMULSION INTRAVENOUS at 10:05

## 2022-05-27 RX ADMIN — PROPOFOL 150 MCG/KG/MIN: 10 INJECTION, EMULSION INTRAVENOUS at 10:05

## 2022-05-27 RX ADMIN — PHENYLEPHRINE HYDROCHLORIDE 100 MCG: 10 INJECTION INTRAVENOUS at 10:05

## 2022-05-27 RX ADMIN — PHENYLEPHRINE HYDROCHLORIDE 200 MCG: 10 INJECTION INTRAVENOUS at 10:05

## 2022-05-27 NOTE — [employer] PATIENT INSTRUCTIONS
Discharge Summary/Instructions after an Endoscopic Procedure  Patient Name: Jessica Rojas  Patient MRN: [id number]  Patient YOB: 1955  Friday, May 27, 2022  Frank Lamb MD  Dear patient,  As a result of recent federal legislation (The Federal Cures Act), you may   receive lab or pathology results from your procedure in your [facility]   account before your physician is able to contact you. Your physician or   their representative will relay the results to you with their   recommendations at their soonest availability.  Thank you,  Your health is very important to us during the Covid Crisis. Following your   procedure today, you will receive a daily text for 2 weeks asking about   signs or symptoms of Covid 19.  Please respond to this text when you   receive it so we can follow up and keep you as safe as possible.   RESTRICTIONS:  During your procedure today, you received medications for sedation.  These   medications may affect your judgment, balance and coordination.  Therefore,   for 24 hours, you have the following restrictions:   - DO NOT drive a car, operate machinery, make legal/financial decisions,   sign important papers or drink alcohol.    ACTIVITY:  Today: no heavy lifting, straining or running due to procedural   sedation/anesthesia.  The following day: return to full activity including work.  DIET:  Eat and drink normally unless instructed otherwise.     TREATMENT FOR COMMON SIDE EFFECTS:  - Mild abdominal pain, nausea, belching, bloating or excessive gas:  rest,   eat lightly and use a heating pad.  - Sore Throat: treat with throat lozenges and/or gargle with warm salt   water.  - Because air was used during the procedure, expelling large amounts of air   from your rectum or belching is normal.  - If a bowel prep was taken, you may not have a bowel movement for 1-3 days.    This is normal.  SYMPTOMS TO WATCH FOR AND REPORT TO YOUR PHYSICIAN:  1. Abdominal pain or bloating, other  than gas cramps.  2. Chest pain.  3. Back pain.  4. Signs of infection such as: chills or fever occurring within 24 hours   after the procedure.  5. Rectal bleeding, which would show as bright red, maroon, or black stools.   (A tablespoon of blood from the rectum is not serious, especially if   hemorrhoids are present.)  6. Vomiting.  7. Weakness or dizziness.  GO DIRECTLY TO THE NEAREST EMERGENCY ROOM IF YOU HAVE ANY OF THE FOLLOWING:      Difficulty breathing              Chills and/or fever over 101 F   Persistent vomiting and/or vomiting blood   Severe abdominal pain   Severe chest pain   Black, tarry stools   Bleeding- more than one tablespoon   Any other symptom or condition that you feel may need urgent attention  Your doctor recommends these additional instructions:  If any biopsies were taken, your doctors clinic will contact you in 1 to 2   weeks with any results.  - Discharge patient to home.   - Resume previous diet.   - Continue present medications.   - Await pathology results.   - Repeat colonoscopy in 3 years for surveillance based on pathology results.     - Patient has a contact number available for emergencies.  The signs and   symptoms of potential delayed complications were discussed with the   patient.  Return to normal activities tomorrow.  Written discharge   instructions were provided to the patient.  For questions, problems or results please call your physician - Frank Lamb MD.  EMERGENCY PHONE NUMBER: [phone],  LAB RESULTS: [phone]  IF A COMPLICATION OR EMERGENCY SITUATION ARISES AND YOU ARE UNABLE TO REACH   YOUR PHYSICIAN - GO DIRECTLY TO THE EMERGENCY ROOM.  Frank Lamb MD  5/27/2022 11:43:54 AM  This report has been verified and signed electronically.  Dear patient,  As a result of recent federal legislation (The Federal Cures Act), you may   receive lab or pathology results from your procedure in your [facility]   account before your  physician is able to contact you. Your physician or   their representative will relay the results to you with their   recommendations at their soonest availability.  Thank you,  PROVATION

## 2022-05-27 NOTE — TRANSFER OF CARE
"Anesthesia Transfer of Care Note    Patient: Jessica Rojas    Procedure(s) Performed: Procedure(s) (LRB):  COLONOSCOPY (N/A)    Patient location: GI    Anesthesia Type: MAC    Transport from OR: Transported from OR on room air with adequate spontaneous ventilation    Post pain: adequate analgesia    Post assessment: no apparent anesthetic complications and tolerated procedure well    Post vital signs: stable    Level of consciousness: awake, alert and oriented    Nausea/Vomiting: no nausea/vomiting    Complications: none    Transfer of care protocol was followed      Last vitals:   Visit Vitals  /73   Pulse 70   Temp 36.6 °C (97.9 °F)   Resp 12   Ht 5' 1" (1.549 m)   Wt 98.9 kg (218 lb)   SpO2 97%   Breastfeeding No   BMI 41.19 kg/m²     "

## 2022-05-27 NOTE — ANESTHESIA POSTPROCEDURE EVALUATION
Anesthesia Post Evaluation    Patient: Jessica Rojas    Procedure(s) Performed: Procedure(s) (LRB):  COLONOSCOPY (N/A)    Final Anesthesia Type: MAC      Patient location during evaluation: GI PACU  Patient participation: Yes- Able to Participate  Level of consciousness: awake and alert and oriented  Post-procedure vital signs: reviewed and stable  Pain management: adequate  Airway patency: patent    PONV status at discharge: No PONV  Anesthetic complications: no      Cardiovascular status: blood pressure returned to baseline and hemodynamically stable  Respiratory status: unassisted, spontaneous ventilation and room air  Hydration status: euvolemic  Follow-up not needed.          Vitals Value Taken Time   /73 05/27/22 1118   Temp 36.6 °C (97.9 °F) 05/27/22 1118   Pulse 70 05/27/22 1118   Resp 12 05/27/22 1118   SpO2 97 % 05/27/22 1118         No case tracking events are documented in the log.      Pain/Maki Score: Maki Score: 9 (5/27/2022 11:16 AM)

## 2022-05-27 NOTE — H&P
History & Physical - Short Stay  Gastroenterology      SUBJECTIVE:     Procedure: Colonoscopy    Chief Complaint/Indication for Procedure: colon polyp    History of Present Illness:  Patient is a 66 y.o. female with colon polyp s/p emr coming for surveillance.     PTA Medications   Medication Sig    albuterol (PROVENTIL/VENTOLIN HFA) 90 mcg/actuation inhaler Inhale 2 puffs into the lungs every 6 (six) hours as needed for Wheezing or Shortness of Breath. (Patient not taking: Reported on 5/19/2022)    alcohol swabs (ALCOHOL WIPES) PadM Uses 5 daily    amantadine HCL (SYMMETREL) 100 mg capsule TAKE ONE CAPSULE BY MOUTH twice daily    blood sugar diagnostic (TRUE METRIX GLUCOSE TEST STRIP) Strp Check bg 7-8 times/day (Patient not taking: Reported on 5/19/2022)    blood-glucose transmitter (DEXCOM G6 TRANSMITTER) Lizbeth Dispense 1 transmitter    carbidopa-levodopa  mg (SINEMET)  mg per tablet TAKE one and one-half TABLET BY MOUTH THREE TIMES DAILY    cholecalciferol, vitamin D3, 10 mcg (400 unit) Cap Take 1,200 Units by mouth once daily.    cyanocobalamin (VITAMIN B-12) 1000 MCG tablet Take 100 mcg by mouth once daily.    diclofenac sodium (VOLTAREN) 1 % Gel Apply 2 g topically once daily.    fish oil-omega-3 fatty acids 300-1,000 mg capsule Take 4 capsules by mouth once daily.    fluticasone-salmeterol diskus inhaler 250-50 mcg Inhale 1 puff into the lungs 2 (two) times daily. (Patient not taking: Reported on 5/19/2022)    gabapentin (NEURONTIN) 100 MG capsule Take 1 capsule (100 mg total) by mouth 3 (three) times daily. (Patient taking differently: Take 100 mg by mouth 3 (three) times daily. Takes at night)    insulin aspart U-100 (NOVOLOG FLEXPEN U-100 INSULIN) 100 unit/mL (3 mL) InPn pen 30u AC + correction Max  u    insulin degludec (TRESIBA FLEXTOUCH U-100) 100 unit/mL (3 mL) insulin pen INJECT 20 UNITS EVERY MORNING THEN 20 UNITS EVERY NIGHT AT BEDTIME (Patient not taking: Reported  "on 5/19/2022)    lamotrigine2.5% meloxicam 0.09% LIDOcaine2% prilocaine2% topical cream Apply topically 4 (four) times daily as needed (pain).    lancets 33 gauge Misc Checks bg 7-8 times a dayTrue metrix (Patient not taking: Reported on 5/19/2022)    lancing device Misc Checks bg 7-8 times a day    LANTUS SOLOSTAR U-100 INSULIN glargine 100 units/mL (3mL) SubQ pen INJECT 35 UNITS EVERY MORNING THEN 30 UNITS EVERY NIGHT AT BEDTIME    levothyroxine (SYNTHROID) 137 MCG Tab tablet Take 1 tablet (137 mcg total) by mouth once daily.    melatonin 3 mg Tab Take 6 mg by mouth nightly.    montelukast (SINGULAIR) 10 mg tablet take ONE tablet BY MOUTH once DAILY EVERY EVENING    MULTIVITAMIN W-MINERALS/LUTEIN (CENTRUM SILVER ORAL) Take 1 tablet by mouth once daily.     mupirocin calcium 2% nasl oint (BACTROBAN) 2 % Oint by Nasal route 2 (two) times daily.    oxyCODONE-acetaminophen (PERCOCET) 7.5-325 mg per tablet Take 1 tablet by mouth every 6 (six) hours as needed for Pain. (Patient not taking: Reported on 5/19/2022)    pen needle, diabetic (BD ULTRA-FINE MINI PEN NEEDLE) 31 gauge x 3/16" Ndle Uses 5 daily (Patient not taking: Reported on 5/19/2022)    pramipexole (MIRAPEX) 0.25 MG tablet TAKE ONE-HALF to ONE TABLET BY MOUTH THREE TIMES DAILY as directed    selegiline (ELDEPRYL) 5 mg Cap Take 1 capsule (5 mg total) by mouth 2 (two) times daily. (Patient not taking: Reported on 5/19/2022)    simvastatin (ZOCOR) 10 MG tablet TAKE ONE TABLET BY MOUTH ONCE DAILY IN THE EVENING    UNABLE TO FIND Place 0.5 mLs under the tongue 2 (two) times a day. medication name:CBD Oil     Last dose 3 months ago    valsartan-hydrochlorothiazide (DIOVAN-HCT) 320-25 mg per tablet TAKE ONE TABLET BY MOUTH ONCE DAILY       Review of patient's allergies indicates:   Allergen Reactions    Propranolol Nausea And Vomiting     Drops pressure and raised sugar    Lipitor [atorvastatin] Other (See Comments)     Myalgia, muscle pain    " Robaxin [methocarbamol]      Skin inside mouth started peeling.        Past Medical History:   Diagnosis Date    Abdominal pain 2015    Arthritis     Diabetes mellitus, type 2     DM (diabetes mellitus)     on insulin    Elevated transaminase level 2016    Encounter for blood transfusion     History of malignant carcinoid tumor of bronchus and lung 10/25/2017    History of neuroendocrine cancer     HTN (hypertension) 2014    Hypercalcemia 2016    Lung cancer, hilus 2014    Malignant carcinoid tumor of bronchus and lung 2014    Malignant carcinoid tumor of the bronchus and lung 2014    Mediastinal lymphadenopathy 2015    Obesity, morbid 2014    Parkinsons 10/2017    Pituitary adenoma 's    took parladel for 3 yrs    Pneumonia     Postoperative hypothyroidism 2017    - s/p total thyroidectomy in  (parathyroids intact) with post op hypothyroidism; on synthroid    Pulmonary nodules 2018    Thyroid disease     Wheeze 2014     Past Surgical History:   Procedure Laterality Date    APPENDECTOMY      BREAST CYST ASPIRATION      BRONCHOSCOPY  2014, 2014     SECTION  1984, 1986    x2    COLONOSCOPY N/A 2021    Procedure: COLONOSCOPY;  Surgeon: Khadar Bernardo MD;  [facility];  Service: Endoscopy;  Laterality: N/A;    COLONOSCOPY N/A 2021    Procedure: COLONOSCOPY;  Surgeon: Frank Lamb MD;  Location: [facility] ([address] [place] [address]);  Service: Endoscopy;  Laterality: N/A;  MD Joselin Feliciano MA  Caller: Unspecified (Yesterday,  2:54 PM)  Need colonoscopy with EMR for large (30 mm) ascending colon polyp. 90 minutes. Main. Clear liquid diet for 24 hour patient with inadequate prep last time.   Thanks!   Ed Campa MD     EPIDURAL STEROID INJECTION INTO LUMBAR SPINE N/A 2022    Procedure: Injection-steroid-epidural-lumbar L5/S1;  Surgeon: Bebeto Eldridge MD;  Location: [facility] OR;   Service: Pain Management;  Laterality: N/A;    ESOPHAGOGASTRODUODENOSCOPY N/A 9/16/2021    Procedure: EGD (ESOPHAGOGASTRODUODENOSCOPY);  Surgeon: Khadar Bernardo MD;  Location: [facility];  Service: Endoscopy;  Laterality: N/A;    EYE SURGERY      cataract bilat    LUNG REMOVAL, PARTIAL Right 2014    with 17 lymph nodes; right middle and lower    MINIMALLY INVASIVE TRANSFORAMINAL LUMBAR INTERBODY FUSION (TLIF) N/A 3/17/2022    Procedure: FUSION, SPINE, LUMBAR, TLIF, MINIMALLY INVASIVE RIGHT L4-5 TLIF, BILATERAL L4-5 LAMINECTOMY AND POSTERIOR INSTRUMENTED FUSION;  Surgeon: Ranjeet Bragg MD;  [facility];  Service: Neurosurgery;  Laterality: N/A;    THYROIDECTOMY  05/24/2016    TONSILLECTOMY  1969    TUBAL LIGATION  1986 1986     Family History   Problem Relation Age of Onset    Cancer Maternal Aunt         breast    Cancer Maternal Grandmother         unknown    Cancer Maternal Aunt         unknown    Diabetes Mother     Glaucoma Mother     Heart disease Mother     Hypertension Mother     Diabetes Father     Heart disease Father     Hypertension Father     Diabetes Sister     Macular degeneration Sister     Alcohol abuse Sister     Breast cancer Paternal Aunt     Breast cancer Paternal Aunt     Breast cancer Cousin     Amblyopia Neg Hx     Blindness Neg Hx     Cataracts Neg Hx     Retinal detachment Neg Hx     Stroke Neg Hx     Strabismus Neg Hx     Thyroid disease Neg Hx      Social History     Tobacco Use    Smoking status: Never Smoker    Smokeless tobacco: Never Used   Substance Use Topics    Alcohol use: Not Currently     Alcohol/week: 0.0 standard drinks     Comment: I rarely drink    Drug use: No            OBJECTIVE:     Vital Signs (Most Recent)         ASSESSMENT/PLAN:     Patient is a 66 y.o. female with colon polyp s/p emr coming for surveillance.     Plan: Colonoscopy    Anesthesia Plan: Moderate Sedation    ASA Grade: ASA 2 - Patient with mild  systemic disease with no functional limitations

## 2022-05-27 NOTE — PLAN OF CARE
Pt procedure completed with no complications. No distress noted at this time. Will continue to monitor.

## 2022-05-27 NOTE — PLAN OF CARE
Admission process complete.  Patient ready for procedure.  Plan of care reviewed with patient.  VSS.  NPO status maintained.  Call light in reach.  Bed locked and in lowest position.   at bedside.

## 2022-05-27 NOTE — ANESTHESIA PREPROCEDURE EVALUATION
2022  Jessica Rojas is a 66 y.o., female for colonoscopy under MAC    Past Medical History:   Diagnosis Date    Abdominal pain 2015    Arthritis     Diabetes mellitus, type 2     DM (diabetes mellitus)     on insulin    Elevated transaminase level 2016    Encounter for blood transfusion     History of malignant carcinoid tumor of bronchus and lung 10/25/2017    History of neuroendocrine cancer     HTN (hypertension) 2014    Hypercalcemia 2016    Lung cancer, hilus 2014    Malignant carcinoid tumor of bronchus and lung 2014    Malignant carcinoid tumor of the bronchus and lung 2014    Mediastinal lymphadenopathy 2015    Obesity, morbid 2014    Parkinsons 10/2017    Pituitary adenoma 's    took parladel for 3 yrs    Pneumonia     Postoperative hypothyroidism 2017    - s/p total thyroidectomy in  (parathyroids intact) with post op hypothyroidism; on synthroid    Pulmonary nodules 2018    Thyroid disease     Wheeze 2014     Past Surgical History:   Procedure Laterality Date    APPENDECTOMY      BREAST CYST ASPIRATION      BRONCHOSCOPY  2014, 2014     SECTION  , 1986    x2    COLONOSCOPY N/A 2021    Procedure: COLONOSCOPY;  Surgeon: Khadar Bernardo MD;  Location: [facility];  Service: Endoscopy;  Laterality: N/A;    COLONOSCOPY N/A 2021    Procedure: COLONOSCOPY;  Surgeon: Frank Lamb MD;  Location: [facility] ([address] [place] [address]);  Service: Endoscopy;  Laterality: N/A;  MD Joselin Feliciano MA  Caller: Unspecified (Yesterday,  2:54 PM)  Need colonoscopy with EMR for large (30 mm) ascending colon polyp. 90 minutes. Main. Clear liquid diet for 24 hour patient with inadequate prep last time.   Thanks!   Ed Campa MD     EPIDURAL STEROID INJECTION INTO LUMBAR SPINE N/A  2/2/2022    Procedure: Injection-steroid-epidural-lumbar L5/S1;  Surgeon: Bebeto Eldridge MD;  Location: [facility] OR;  Service: Pain Management;  Laterality: N/A;    ESOPHAGOGASTRODUODENOSCOPY N/A 9/16/2021    Procedure: EGD (ESOPHAGOGASTRODUODENOSCOPY);  Surgeon: Khadar Bernardo MD;  Location: [facility] ENDO;  Service: Endoscopy;  Laterality: N/A;    EYE SURGERY      cataract bilat    LUNG REMOVAL, PARTIAL Right 2014    with 17 lymph nodes; right middle and lower    MINIMALLY INVASIVE TRANSFORAMINAL LUMBAR INTERBODY FUSION (TLIF) N/A 3/17/2022    Procedure: FUSION, SPINE, LUMBAR, TLIF, MINIMALLY INVASIVE RIGHT L4-5 TLIF, BILATERAL L4-5 LAMINECTOMY AND POSTERIOR INSTRUMENTED FUSION;  Surgeon: Ranjeet Bragg MD;  Location: [facility] OR;  Service: Neurosurgery;  Laterality: N/A;    THYROIDECTOMY  05/24/2016    TONSILLECTOMY  1969    TUBAL LIGATION  1986 1986           Pre-op Assessment    I have reviewed the Patient Summary Reports.    I have reviewed the NPO Status.   I have reviewed the Medications.     Review of Systems  Anesthesia Hx:  No problems with previous Anesthesia    Social:  Non-Smoker    Hematology/Oncology:         -- Cancer in past history (Carcinoid tumor of lung s/p R lobectomy 2014):   Cardiovascular:   Hypertension hyperlipidemia     ECHO 2014: EF 60%   Pulmonary:   COPD, mild    Renal/:   CKD Stage III   Musculoskeletal:   Lumbar radiculopathy   Neurological:   Parkinson's disease (last dose of meds this am)   Endocrine:   Diabetes, using insulin Hypothyroidism  Morbid Obesity / BMI > 40      Physical Exam  General: Well nourished    Airway:  Mallampati: II   Mouth Opening: Normal  TM Distance: Normal  Tongue: Normal  Neck ROM: Normal ROM    Dental:  Intact        Anesthesia Plan  Type of Anesthesia, risks & benefits discussed:    Anesthesia Type: MAC  Intra-op Monitoring Plan: Standard ASA Monitors  Informed Consent: Informed consent signed with the Patient and all parties understand the  risks and agree with anesthesia plan.  All questions answered.   ASA Score: 3    Ready For Surgery From Anesthesia Perspective.     .

## 2022-05-29 ENCOUNTER — PATIENT MESSAGE (OUTPATIENT)
Dept: ADMINISTRATIVE | Facility: OTHER | Age: 67
End: 2022-05-29
Payer: MEDICARE

## 2022-05-30 ENCOUNTER — TELEPHONE (OUTPATIENT)
Dept: ENDOSCOPY | Facility: HOSPITAL | Age: 67
End: 2022-05-30
Payer: MEDICARE

## 2022-05-30 ENCOUNTER — PATIENT MESSAGE (OUTPATIENT)
Dept: FAMILY MEDICINE | Facility: CLINIC | Age: 67
End: 2022-05-30
Payer: MEDICARE

## 2022-05-30 DIAGNOSIS — U07.1 COVID-19: Primary | ICD-10-CM

## 2022-05-30 DIAGNOSIS — U07.1 COVID: ICD-10-CM

## 2022-05-30 NOTE — TELEPHONE ENCOUNTER
I have signed for the following orders AND/OR meds.  Please call the patient and ask the patient to schedule the testing AND/OR inform about any medications that were sent. Medications have been sent to pharmacy listed below      Orders Placed This Encounter   Procedures    Ambulatory referral/consult to [facility]     Standing Status:   Future     Standing Expiration Date:   6/9/2022     Referral Priority:   Routine     Referral Type:   Consultation     Referral Reason:   Specialty Services Required     Number of Visits Requested:   1    COVID-19 Home Symptom Monitoring  - Duration (days): 14     Order Specific Question:   Duration (days)     Answer:   14              [facility] [place] - [id number]  [address]  [place] [address]  Phone: [phone] Fax: [phone]    [facility] [place] - [address]  [address]  [place] [address]  Phone: [phone] Fax: [phone]

## 2022-05-30 NOTE — TELEPHONE ENCOUNTER
Patient called after procedure, states doing fine, tested positive for covid post procedure. Has followed up with PCP waiting on okay to receive infusion

## 2022-05-31 ENCOUNTER — INFUSION (OUTPATIENT)
Dept: INFECTIOUS DISEASES | Facility: HOSPITAL | Age: 67
End: 2022-05-31
Attending: INTERNAL MEDICINE
Payer: MEDICARE

## 2022-05-31 ENCOUNTER — PATIENT MESSAGE (OUTPATIENT)
Dept: INFECTIOUS DISEASES | Facility: HOSPITAL | Age: 67
End: 2022-05-31

## 2022-05-31 VITALS
SYSTOLIC BLOOD PRESSURE: 142 MMHG | RESPIRATION RATE: 20 BRPM | TEMPERATURE: 98 F | HEART RATE: 84 BPM | OXYGEN SATURATION: 97 % | DIASTOLIC BLOOD PRESSURE: 78 MMHG

## 2022-05-31 DIAGNOSIS — U07.1 COVID-19: ICD-10-CM

## 2022-05-31 PROCEDURE — M0222 HC IV INJECTION, BEBTELOVIMAB, INCL POST ADMIN MONIT: HCPCS | Performed by: INTERNAL MEDICINE

## 2022-05-31 PROCEDURE — 63600175 PHARM REV CODE 636 W HCPCS: Performed by: INTERNAL MEDICINE

## 2022-05-31 RX ORDER — BEBTELOVIMAB 87.5 MG/ML
175 INJECTION, SOLUTION INTRAVENOUS
Status: COMPLETED | OUTPATIENT
Start: 2022-05-31 | End: 2022-05-31

## 2022-05-31 RX ORDER — ACETAMINOPHEN 325 MG/1
650 TABLET ORAL
Status: ACTIVE | OUTPATIENT
Start: 2022-05-31 | End: 2022-06-01

## 2022-05-31 RX ORDER — ONDANSETRON 4 MG/1
4 TABLET, ORALLY DISINTEGRATING ORAL
Status: ACTIVE | OUTPATIENT
Start: 2022-05-31 | End: 2022-06-01

## 2022-05-31 RX ORDER — ALBUTEROL SULFATE 90 UG/1
2 AEROSOL, METERED RESPIRATORY (INHALATION)
Status: ACTIVE | OUTPATIENT
Start: 2022-05-31 | End: 2022-06-03

## 2022-05-31 RX ORDER — EPINEPHRINE 0.3 MG/.3ML
0.3 INJECTION SUBCUTANEOUS
Status: ACTIVE | OUTPATIENT
Start: 2022-05-31 | End: 2022-06-03

## 2022-05-31 RX ORDER — DIPHENHYDRAMINE HYDROCHLORIDE 50 MG/ML
25 INJECTION INTRAMUSCULAR; INTRAVENOUS
Status: ACTIVE | OUTPATIENT
Start: 2022-05-31 | End: 2022-06-01

## 2022-05-31 RX ADMIN — BEBTELOVIMAB 175 MG: 87.5 INJECTION, SOLUTION INTRAVENOUS at 12:05

## 2022-06-01 ENCOUNTER — E-VISIT (OUTPATIENT)
Dept: FAMILY MEDICINE | Facility: CLINIC | Age: 67
End: 2022-06-01
Payer: MEDICARE

## 2022-06-01 ENCOUNTER — PATIENT MESSAGE (OUTPATIENT)
Dept: FAMILY MEDICINE | Facility: CLINIC | Age: 67
End: 2022-06-01

## 2022-06-01 DIAGNOSIS — U07.1 COVID-19: Primary | ICD-10-CM

## 2022-06-01 PROCEDURE — 99421 PR E&M, ONLINE DIGIT, EST, < 7 DAYS, 5-10 MINS: ICD-10-PCS | Mod: ,,, | Performed by: INTERNAL MEDICINE

## 2022-06-01 PROCEDURE — 99421 OL DIG E/M SVC 5-10 MIN: CPT | Mod: ,,, | Performed by: INTERNAL MEDICINE

## 2022-06-01 NOTE — PROGRESS NOTES
Patient ID: Jessica Rojas is a 66 y.o. female.    Chief Complaint: Cough    The patient initiated a request through [employer] on 6/1/2022 for evaluation and management with a chief complaint of Cough     I evaluated the questionnaire submission on 06/01/2022  .    Ohs Peq Evisit Upper Respitatory/Cough Questionnaire    6/1/2022 11:11 AM CDT - Filed by Patient   Do you agree to participate in an E-Visit? Yes   What is the main issue that you would like for your doctor to address today? Cough muscle pain   Are you able to take your vital signs? No   What symptoms do you currently have?  Cough;  Fatigue;  Headache;  Nasal Congestion;  Muscle or body aches;  Nausea;  Runny nose;  Sore throat   Have you had a fever? Yes   What has been the range of your fever? Less than 100.4   When did your symptoms first appear? 5/28/2022   In the last two weeks, have you been in close contact with someone who has COVID-19? No   In the last two weeks, have you worked or volunteered in a healthcare facility or as a ? Healthcare facilities include a hospital, medical or dental clinic, long-term care facility, or nursing home No   Do you live in a long-term care facility, nursing home, or homeless shelter? No   List what you have done or taken to help your symptoms. Tylenol   How severe are your symptoms? Moderate   Have you taken an at home Covid test? Yes   What were the results? Positive   Have you been fully vaccinated for COVID? (2 Pfizer, 2 Moderna or 1 [place] [employer] [place] vaccine injections) Yes   Have you received a booster? Yes   Do you have transportation to get tested for COVID if it is indicated and ordered for you at an [facility] location? Yes   Provide any information you feel is important to your history not asked above    Please attach any relevant images or files         Active Problem List with Overview Notes    Diagnosis Date Noted    COVID 05/30/2022    Chronic pain of right knee 05/19/2022     -acute on  chronic right knee pain  -continue conservative treatment  -decadron IM and PRN anti-inflammatory medication  -follow up with orthopedics      S/P lumbar fusion 05/02/2022    Weakness of both lower extremities 05/02/2022    Impaired functional mobility, balance, gait, and endurance 05/02/2022    Malignant carcinoid tumor of bronchus and lung 03/20/2022    Primary hypertension 03/08/2022     -at goal today  -currently on Valsartan-HCTZ 320-25 mg daily   -continue lifestyle modification with low sodium diet and exercise   -discussed hypertension disease course and importance of treating high blood pressure  -patient understood and advised of risk of untreated blood pressure.  ER precautions were given for symptoms of hypertensive urgency and emergency.      Preop cardiovascular exam 03/08/2022    Dysphagia 09/16/2021    Statin intolerance 06/01/2021    Stage 3a chronic kidney disease 06/01/2021     -renal function has been stable but due for repeat labs  -not currently followed by nephrology      Type 2 diabetes mellitus with stage 3 chronic kidney disease, with long-term current use of insulin 06/05/2020     -condition is currently controlled  -current meds: lantus 35 u qam, 30 u qhs; Humalog 30 u AC  + correction   -followed by endocrinology  -see diabetic health maintenance listed below  -on statin: No-intolerant  -on ACE-I/ARB: Yes  -counseling provided on importance of diabetic diet and medication compliance in order to treat diabetes  -discussed diabetes disease course and potential complications      Mixed hyperlipidemia 12/03/2019     -chronic condition. Currently stable.    -reports difficulty with myalgias with statin use  -plan to follow up next lipid levels and will discuss if other treatment options are needed  -recent labs listed below:  Lab Results   Component Value Date    CHOL 293 (H) 11/30/2020     Lab Results   Component Value Date    HDL 72 11/30/2020     Lab Results   Component Value  Date    LDLCALC 178.4 (H) 11/30/2020     Lab Results   Component Value Date    TRIG 213 (H) 11/30/2020     Lab Results   Component Value Date    ALT 8 (L) 11/30/2020    AST 16 11/30/2020    ALKPHOS 76 11/30/2020    BILITOT 0.5 11/30/2020           Levodopa-induced dyskinesia 08/22/2019     Right hand, foot.      Constipation 03/31/2019     Likely non motor sx of PD      Spondylolisthesis of lumbar region 09/18/2018    Pulmonary nodules 09/11/2018    Abnormal MRI of head 09/11/2018     MRI brain done after onset of parkinsonian symptoms in 8/2017      REM sleep behavior disorder 09/11/2018    Type 2 diabetes mellitus with diabetic nephropathy, with long-term current use of insulin 08/07/2018     Last A1C   Lab Results   Component Value Date    HGBA1C 6.4 (H) 11/26/2019     Current meds: Lantus 35 u am; 30u pm; Humalog 30 AC but uses more with sliding scale  Foot exam completed- No- Followed by podiatry. Need f/u appt  Eye exam completed- No- Plan to follow up soon  Microalbumin completed- Yes  On statin therapy- yes  On ACEI/ARB- yes    Followed by endocrinology as well. Intolerant of metformin. Endocrinology attempted to get patient a Dexcom meter but she could not obtain  Reports glucose ranging from 130-160. She has noticed several occasional swings since switching from basaglar to lantus. Not keeping log now as she usually does this the month leading up to endo appt. Does not have another appt until June. Discussed starting a log and returning back to see me if swings are still occurring.      Benign essential HTN 08/07/2018     -at goal today  -currently on valsartan-HCTZ 320-12.5 mg daily  -continue lifestyle modification with low sodium diet and exercise   -discussed hypertension disease course and importance of treating high blood pressure  -patient understood and advised of risk of untreated blood pressure.  ER precautions were given   for symptoms of hypertensive urgency and emergency.     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 Bragg   -currently taking tylenol prn and gabapentin  -continue PT  -follow up with NSGY       Atherosclerosis of aorta 06/17/2016    Nodular thyroid disease 05/24/2016    Mediastinal lymphadenopathy 08/26/2015    Obesity, morbid 05/06/2014      Recent Labs Obtained:  Admission on 05/27/2022, Discharged on 05/27/2022   Component Date Value Ref Range Status    POCT Glucose 05/27/2022 77  70 - 110 mg/dL Final       Encounter Diagnosis   Name Primary?    COVID-19 Yes        Orders Placed This Encounter   Procedures    Ambulatory referral/consult to [facility]     Standing Status:   Future     Standing Expiration Date:   6/11/2022     Referral Priority:   Routine     Referral Type:   Consultation     Referral Reason:   Specialty Services Required     Number of Visits Requested:   1    COVID-19 Home Symptom Monitoring  - Duration (days): 14     Order Specific Question:   Duration (days)     Answer:   14            E-Visit Time Tracking:

## 2022-06-03 LAB
FINAL PATHOLOGIC DIAGNOSIS: NORMAL
GROSS: NORMAL
Lab: NORMAL

## 2022-06-08 ENCOUNTER — TELEPHONE (OUTPATIENT)
Dept: ENDOSCOPY | Facility: HOSPITAL | Age: 67
End: 2022-06-08
Payer: MEDICARE

## 2022-06-08 NOTE — TELEPHONE ENCOUNTER
----- Message from Frank Lamb MD sent at 6/7/2022  5:16 PM CDT -----  Repeat colonoscopy in 3 years for surveillance

## 2022-06-11 ENCOUNTER — OFFICE VISIT (OUTPATIENT)
Dept: FAMILY MEDICINE | Facility: CLINIC | Age: 67
End: 2022-06-11
Payer: MEDICARE

## 2022-06-11 ENCOUNTER — NURSE TRIAGE (OUTPATIENT)
Dept: ADMINISTRATIVE | Facility: CLINIC | Age: 67
End: 2022-06-11
Payer: MEDICARE

## 2022-06-11 DIAGNOSIS — U07.1 COVID-19: Primary | ICD-10-CM

## 2022-06-11 DIAGNOSIS — Z79.899 ENCOUNTER FOR LONG-TERM (CURRENT) USE OF MEDICATIONS: ICD-10-CM

## 2022-06-11 DIAGNOSIS — R50.9 FEVER, UNSPECIFIED FEVER CAUSE: ICD-10-CM

## 2022-06-11 PROBLEM — C80.1 CANCER: Status: ACTIVE | Noted: 2022-06-11

## 2022-06-11 PROBLEM — Z90.2 S/P LOBECTOMY OF LUNG: Status: ACTIVE | Noted: 2022-03-16

## 2022-06-11 PROCEDURE — 3044F PR MOST RECENT HEMOGLOBIN A1C LEVEL <7.0%: ICD-10-PCS | Mod: CPTII,95,, | Performed by: FAMILY MEDICINE

## 2022-06-11 PROCEDURE — 1160F PR REVIEW ALL MEDS BY PRESCRIBER/CLIN PHARMACIST DOCUMENTED: ICD-10-PCS | Mod: CPTII,95,, | Performed by: FAMILY MEDICINE

## 2022-06-11 PROCEDURE — 99213 PR OFFICE/OUTPT VISIT, EST, LEVL III, 20-29 MIN: ICD-10-PCS | Mod: 95,,, | Performed by: FAMILY MEDICINE

## 2022-06-11 PROCEDURE — 1160F RVW MEDS BY RX/DR IN RCRD: CPT | Mod: CPTII,95,, | Performed by: FAMILY MEDICINE

## 2022-06-11 PROCEDURE — 99213 OFFICE O/P EST LOW 20 MIN: CPT | Mod: 95,,, | Performed by: FAMILY MEDICINE

## 2022-06-11 PROCEDURE — 1159F MED LIST DOCD IN RCRD: CPT | Mod: CPTII,95,, | Performed by: FAMILY MEDICINE

## 2022-06-11 PROCEDURE — 3044F HG A1C LEVEL LT 7.0%: CPT | Mod: CPTII,95,, | Performed by: FAMILY MEDICINE

## 2022-06-11 PROCEDURE — 1159F PR MEDICATION LIST DOCUMENTED IN MEDICAL RECORD: ICD-10-PCS | Mod: CPTII,95,, | Performed by: FAMILY MEDICINE

## 2022-06-11 RX ORDER — ACETAMINOPHEN 500 MG
500 TABLET ORAL EVERY 6 HOURS PRN
Refills: 0
Start: 2022-06-11 | End: 2024-01-26

## 2022-06-11 RX ORDER — CHOLECALCIFEROL (VITAMIN D3) 125 MCG
220 CAPSULE ORAL 2 TIMES DAILY PRN
Start: 2022-06-11 | End: 2024-03-25

## 2022-06-11 RX ORDER — ACETAMINOPHEN 500 MG
1000 TABLET ORAL EVERY 6 HOURS PRN
Refills: 0
Start: 2022-06-11 | End: 2022-06-11

## 2022-06-11 NOTE — PATIENT INSTRUCTIONS
Follow up if symptoms worsen or fail to improve.     Dear patient,   As a result of recent federal legislation (The Federal Cures Act), you may receive lab or pathology results from your visit in your [facility] account before your physician is able to contact you. Your physician or their representative will relay the results to you with their recommendations at their soonest availability.     If no improvement in symptoms or symptoms worsen, please be advised to call MD, follow-up at clinic and/or go to ER if becomes severe.    Jong Contreras M.D.        We Offer TELEHEALTH & Same Day Appointments!   Book your Telehealth appointment with me through my nurse or   Clinic appointments on [employer]!    [address] [place] [address]    Office: [phone]   FAX: [phone]    Check out my Facebook Page and Follow Me at: [email or web address]    Check out my website at [employer] by clicking on: [email or web address]    To Schedule appointments online, go to [email or web address]: [email or web address]

## 2022-06-11 NOTE — PROGRESS NOTES
Primary Care Telemedicine Note  The patient location is:  Patient's Home - [place]  The chief complaint leading to consultation is:   Chief Complaint   Patient presents with    Fever      Total time:  see MDM below. The total time spent on the encounter, which includes face to face time and non-face to face time preparing to see the patient (eg, review of previous medical records, tests), Obtaining and/or reviewing separately obtained history, documenting clinical information in the electronic or other health record, independently interpreting results (not separately reported)/communicating results to the patient/family/caregiver, and/or care coordination (not separately reported).    Visit type: Virtual visit with synchronous audio only and video  Each patient to whom he or she provides medical services by telemedicine is:  (1) informed of the relationship between the physician and patient and the respective role of any other health care provider with respect to management of the patient; and (2) notified that he or she may decline to receive medical services by telemedicine and may withdraw from such care at any time.  =================================================================  PLAN:      Please follow-up sooner for blood work if fever does not improve by next week.  Follow-up with PCP.    Problem List Items Addressed This Visit     COVID-19 - Primary     Change Tylenol to 500 milligrams every 4 to 6 hours.  Take Aleve over-the-counter once per day as needed for breakthrough.  Patient reassured that fever can be seen several weeks after COVID infection.  She should follow-up sooner if having any other symptoms or concerns.    Discussed condition course and signs and symptoms to expect.  Patient advised take anti-inflammatories and or Tylenol for pain or fever.  ER precautions.  Call MD or follow-up to clinic if not improving or worsening symptoms.             Relevant Medications    naproxen sodium  (ALEVE) 220 mg Cap    acetaminophen (TYLENOL) 500 MG tablet    Other Relevant Orders    CBC Auto Differential    Comprehensive Metabolic Panel    Encounter for long-term (current) use of medications     Complete history and physical was completed today.  Complete and thorough medication reconciliation was performed.  Discussed risks and benefits of medications.  Advised patient on orders and health maintenance.  We discussed old records and old labs if available.  Will request any records not available through epic.  Continue current medications listed on your summary sheet.             Fever    Relevant Orders    CBC Auto Differential    Comprehensive Metabolic Panel        Future Appointments     Date Provider Specialty Appt Notes    6/20/2022  Radiology .    6/20/2022 Ranjeet Bragg MD Neurosurgery 3 month post op L4-5 TLIF    10/3/2022  Lab     10/3/2022  Lab     10/10/2022 Daniela Sauer DNP, NP Endocrinology 6 mth f/u with labs prior          Medication Management for assessment above:   Medication List with Changes/Refills   New Medications    ACETAMINOPHEN (TYLENOL) 500 MG TABLET    Take 1 tablet (500 mg total) by mouth every 6 (six) hours as needed for Pain (do not exceed 3000 milligrams per 24 h).    NAPROXEN SODIUM (ALEVE) 220 MG CAP    Take 220 mg by mouth 2 (two) times daily as needed.   Current Medications    ALCOHOL SWABS (ALCOHOL WIPES) PADM    Uses 5 daily    AMANTADINE HCL (SYMMETREL) 100 MG CAPSULE    TAKE ONE CAPSULE BY MOUTH twice daily    BLOOD-GLUCOSE TRANSMITTER (DEXCOM G6 TRANSMITTER) BROCK    Dispense 1 transmitter    CARBIDOPA-LEVODOPA  MG (SINEMET)  MG PER TABLET    TAKE one and one-half TABLET BY MOUTH THREE TIMES DAILY    CHOLECALCIFEROL, VITAMIN D3, 10 MCG (400 UNIT) CAP    Take 1,200 Units by mouth once daily.    CYANOCOBALAMIN (VITAMIN B-12) 1000 MCG TABLET    Take 100 mcg by mouth once daily.    DICLOFENAC SODIUM (VOLTAREN) 1 % GEL    Apply 2 g topically once  daily.    FISH OIL-OMEGA-3 FATTY ACIDS 300-1,000 MG CAPSULE    Take 4 capsules by mouth once daily.    GABAPENTIN (NEURONTIN) 100 MG CAPSULE    Take 1 capsule (100 mg total) by mouth 3 (three) times daily.    INSULIN ASPART U-100 (NOVOLOG FLEXPEN U-100 INSULIN) 100 UNIT/ML (3 ML) INPN PEN    30u AC + correction Max  u    LAMOTRIGINE2.5% MELOXICAM 0.09% LIDOCAINE2% PRILOCAINE2% TOPICAL CREAM    Apply topically 4 (four) times daily as needed (pain).    LANCING DEVICE MISC    Checks bg 7-8 times a day    LANTUS SOLOSTAR U-100 INSULIN GLARGINE 100 UNITS/ML (3ML) SUBQ PEN    INJECT 35 UNITS EVERY MORNING THEN 30 UNITS EVERY NIGHT AT BEDTIME    LEVOTHYROXINE (SYNTHROID) 137 MCG TAB TABLET    Take 1 tablet (137 mcg total) by mouth once daily.    MELATONIN 3 MG TAB    Take 6 mg by mouth nightly.    MONTELUKAST (SINGULAIR) 10 MG TABLET    take ONE tablet BY MOUTH once DAILY EVERY EVENING    MULTIVITAMIN W-MINERALS/LUTEIN (CENTRUM SILVER ORAL)    Take 1 tablet by mouth once daily.     MUPIROCIN CALCIUM 2% NASL OINT (BACTROBAN) 2 % OINT    by Nasal route 2 (two) times daily.    PRAMIPEXOLE (MIRAPEX) 0.25 MG TABLET    TAKE ONE-HALF to ONE TABLET BY MOUTH THREE TIMES DAILY as directed    SELEGILINE (ELDEPRYL) 5 MG CAP    Take 1 capsule (5 mg total) by mouth 2 (two) times daily.    SIMVASTATIN (ZOCOR) 10 MG TABLET    TAKE ONE TABLET BY MOUTH ONCE DAILY IN THE EVENING    UNABLE TO FIND    Place 0.5 mLs under the tongue 2 (two) times a day. medication name:CBD Oil     Last dose 3 months ago    VALSARTAN-HYDROCHLOROTHIAZIDE (DIOVAN-HCT) 320-25 MG PER TABLET    TAKE ONE TABLET BY MOUTH ONCE DAILY       Jong Contreras M.D.  ==========================================================================  Subjective:   Patient ID: Jessica Rojas is a 66 y.o. female.  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   Chief Complaint: Fever    Answers for HPI/ROS submitted by the patient on 6/11/2022  Onset: in the past 7 days  Max temp prior to arrival: 101 to 101.9 F  Temperature source: a tympanic thermometer  muscle aches: Yes  sleepiness: Yes  Treatments tried: acetaminophen, fluids  Improvement on treatment: moderate      Problem List Items Addressed This Visit     COVID-19 - Primary    Overview     Patient reports COVID for approximately two weeks.  She had the infusion and has been doing better with her symptoms.  However she still has a low-grade fever that is not controlled with Tylenol.  She is currently taking 1000 milligrams every 8 hours.  No other symptoms.           Current Assessment & Plan     Change Tylenol to 500 milligrams every 4 to 6 hours.  Take Aleve over-the-counter once per day as needed for breakthrough.  Patient reassured that fever can be seen several weeks after COVID infection.  She should follow-up sooner if having any other symptoms or concerns.    Discussed condition course and signs and symptoms to expect.  Patient advised take anti-inflammatories and or Tylenol for pain or fever.  ER precautions.  Call MD or follow-up to clinic if not improving or worsening symptoms.             Encounter for long-term (current) use of medications    Overview     CHRONIC. Stable. Compliant with medications for managed  conditions. See medication list. No SE reported.   Routine lab analysis is being monitored. Refills were addressed.  Lab Results   Component Value Date    WBC 12.91 (H) 04/02/2022    HGB 13.6 04/02/2022    HCT 39.8 04/02/2022    MCV 92 04/02/2022     04/02/2022         Chemistry        Component Value Date/Time     04/02/2022 1606    K 4.6 04/02/2022 1606     04/02/2022 1606    CO2 25 04/02/2022 1606    BUN 20 (H) 04/02/2022 1606    CREATININE 1.13 04/02/2022 1606     (H) 04/02/2022 1606        Component Value Date/Time    CALCIUM 9.4 04/02/2022 1606    ALKPHOS 109 03/10/2022 0850    AST 24 03/10/2022 0850    ALT 9 03/10/2022 0850    BILITOT 0.4 03/10/2022 0850    ESTGFRAFRICA 58 (A) 04/02/2022 1606    EGFRNONAA 50 (A) 04/02/2022 1606          Lab Results   Component Value Date    TSH 0.562 12/20/2021    A4ICTHM 11.6 (H) 10/17/2017    FREET4 1.31 12/03/2021                Current Assessment & Plan     Complete history and physical was completed today.  Complete and thorough medication reconciliation was performed.  Discussed risks and benefits of medications.  Advised patient on orders and health maintenance.  We discussed old records and old labs if available.  Will request any records not available through epic.  Continue current medications listed on your summary sheet.             Fever           Review of patient's allergies indicates:   Allergen Reactions    Propranolol Nausea And Vomiting     Drops pressure and raised sugar    Lipitor [atorvastatin] Other (See Comments)     Myalgia, muscle pain    Robaxin [methocarbamol]      Skin inside mouth started peeling.     Current Outpatient Medications   Medication Instructions    acetaminophen (TYLENOL) 500 mg, Oral, Every 6 hours PRN    alcohol swabs (ALCOHOL WIPES) PadM Uses 5 daily    amantadine HCL (SYMMETREL) 100 mg capsule TAKE ONE CAPSULE BY MOUTH twice daily    blood-glucose transmitter (DEXCOM G6 TRANSMITTER) Lizbeth Dispense 1  transmitter    carbidopa-levodopa  mg (SINEMET)  mg per tablet TAKE one and one-half TABLET BY MOUTH THREE TIMES DAILY    cholecalciferol (vitamin D3) 1,200 Units, Oral, Daily    cyanocobalamin (VITAMIN B-12) 100 mcg, Oral, Daily    diclofenac sodium (VOLTAREN) 2 g, Topical (Top), Daily    fish oil-omega-3 fatty acids 300-1,000 mg capsule 4 capsules, Oral, Daily    gabapentin (NEURONTIN) 100 mg, Oral, 3 times daily    insulin aspart U-100 (NOVOLOG FLEXPEN U-100 INSULIN) 100 unit/mL (3 mL) InPn pen 30u AC + correction Max  u    lamotrigine2.5% meloxicam 0.09% LIDOcaine2% prilocaine2% topical cream Topical (Top), 4 times daily PRN    lancing device Misc Checks bg 7-8 times a day    LANTUS SOLOSTAR U-100 INSULIN glargine 100 units/mL (3mL) SubQ pen INJECT 35 UNITS EVERY MORNING THEN 30 UNITS EVERY NIGHT AT BEDTIME    levothyroxine (SYNTHROID) 137 mcg, Oral, Daily    melatonin (MELATIN) 6 mg, Oral, Nightly    montelukast (SINGULAIR) 10 mg tablet take ONE tablet BY MOUTH once DAILY EVERY EVENING    MULTIVITAMIN W-MINERALS/LUTEIN (CENTRUM SILVER ORAL) 1 tablet, Oral, Daily    mupirocin calcium 2% nasl oint (BACTROBAN) 2 % Oint Nasal, 2 times daily    naproxen sodium (ALEVE) 220 mg, Oral, 2 times daily PRN    pramipexole (MIRAPEX) 0.25 MG tablet TAKE ONE-HALF to ONE TABLET BY MOUTH THREE TIMES DAILY as directed    selegiline (ELDEPRYL) 5 mg, Oral, 2 times daily    simvastatin (ZOCOR) 10 MG tablet TAKE ONE TABLET BY MOUTH ONCE DAILY IN THE EVENING    UNABLE TO FIND 0.5 mLs, Sublingual, 2 times daily, medication name:CBD Oil Last dose 3 months ago    valsartan-hydrochlorothiazide (DIOVAN-HCT) 320-25 mg per tablet TAKE ONE TABLET BY MOUTH ONCE DAILY      I have reviewed the PMH, social history, FamilyHx, surgical history, allergies and medications documented / confirmed by the patient at the time of this visit.  Review of Systems   Constitutional: Positive for fever.   HENT: Positive  for ear pain. Negative for congestion and sore throat.    Cardiovascular: Negative for chest pain.   Gastrointestinal: Positive for nausea. Negative for diarrhea and vomiting.   Genitourinary: Negative for dysuria.   Skin: Negative for rash.   Neurological: Negative for headaches.     Objective:   There were no vitals taken for this visit.  Physical Exam  Constitutional:       General: She is not in acute distress.     Appearance: She is well-developed. She is not ill-appearing, toxic-appearing or diaphoretic.   HENT:      Head: Normocephalic and atraumatic.      Right Ear: Hearing and external ear normal.      Left Ear: Hearing and external ear normal.   Eyes:      General: Lids are normal.      Conjunctiva/sclera: Conjunctivae normal.   Pulmonary:      Effort: Pulmonary effort is normal. No respiratory distress.   Musculoskeletal:         General: Normal range of motion.      Cervical back: Normal range of motion.   Skin:     Coloration: Skin is not pale.   Neurological:      Mental Status: She is alert. She is not disoriented.   Psychiatric:         Attention and Perception: She is attentive.         Mood and Affect: Mood is not anxious or depressed.         Speech: Speech is not rapid and pressured or slurred.         Behavior: Behavior normal. Behavior is not agitated, aggressive or hyperactive. Behavior is cooperative.         Thought Content: Thought content normal. Thought content is not paranoid or delusional. Thought content does not include homicidal or suicidal ideation. Thought content does not include homicidal or suicidal plan.         Cognition and Memory: Memory is not impaired.         Judgment: Judgment normal.         Assessment:     1. COVID-19    2. Encounter for long-term (current) use of medications    3. Fever, unspecified fever cause      MDM:     Total time:21 minutes.  This includes total time spent on the encounter, which includes face to face time and non-face to face time preparing to  see the patient (eg, review of previous medical records, tests), Obtaining and/or reviewing separately obtained history, documenting clinical information in the electronic or other health record, independently interpreting results (not separately reported)/communicating results to the patient/family/caregiver, and/or care coordination (not separately reported).    I have Reviewed and summarized old records.  I have performed thorough medication reconciliation today and discussed risk and benefits of medications.  I have reviewed labs and discussed with patient.  All questions were answered.      I have signed for the following orders AND/OR meds.  Orders Placed This Encounter   Procedures    CBC Auto Differential     Standing Status:   Future     Standing Expiration Date:   8/10/2023    Comprehensive Metabolic Panel     Standing Status:   Future     Standing Expiration Date:   8/10/2023     Medications Ordered This Encounter   Medications    acetaminophen (TYLENOL) 500 MG tablet     Sig: Take 1 tablet (500 mg total) by mouth every 6 (six) hours as needed for Pain (do not exceed 3000 milligrams per 24 h).     Refill:  0    naproxen sodium (ALEVE) 220 mg Cap     Sig: Take 220 mg by mouth 2 (two) times daily as needed.        Follow up if symptoms worsen or fail to improve.  Future Appointments     Date Provider Specialty Appt Notes    6/20/2022  Radiology .    6/20/2022 Ranjeet Bragg MD Neurosurgery 3 month post op L4-5 TLIF    10/3/2022  Lab     10/3/2022  Lab     10/10/2022 Daniela Sauer DNP, NP Endocrinology 6 mth f/u with labs prior         If no improvement in symptoms or symptoms worsen, advised to call/follow-up at clinic or go to ER. Patient voiced understanding and all questions/concerns were addressed.   DISCLAIMER: This note was compiled by using a speech recognition dictation system and therefore please be aware that typographical / speech recognition errors can and do occur.  Please contact  me if you see any errors specifically.    Jong Contreras M.D.       Office: [phone] [address] [place] [address]  FAX: [phone]

## 2022-06-11 NOTE — TELEPHONE ENCOUNTER
Pt. Responded to [facility] text message. Pt reports testing postive with COVID-19 10 days ago with home test. States she did receive EAU infusion . States woke up this morning and as time and temp began to go up. It went 101. States she took tylenol around 0500. States she has body aches. And some sense of taste.    Dr. Contreras office paged [phone]  Spoke with Mago( nurse) with Dr. Avilez's office states will give pt. Call to discuss with pt.    Reason for Disposition   HIGH RISK for severe COVID complications (e.g., weak immune system, age > 64 years, obesity with BMI > 25, pregnant, chronic lung disease or other chronic medical condition)  (Exception: Already seen by PCP and no new or worsening symptoms.)    Additional Information   Negative: SEVERE difficulty breathing (e.g., struggling for each breath, speaks in single words)   Negative: Difficult to awaken or acting confused (e.g., disoriented, slurred speech)   Negative: Bluish (or gray) lips or face now   Negative: Shock suspected (e.g., cold/pale/clammy skin, too weak to stand, low BP, rapid pulse)   Negative: Sounds like a life-threatening emergency to the triager   Negative: SEVERE or constant chest pain or pressure  (Exception: Mild central chest pain, present only when coughing.)   Negative: MODERATE difficulty breathing (e.g., speaks in phrases, SOB even at rest, pulse 100-120)   Negative: [1] Headache AND [2] stiff neck (can't touch chin to chest)   Negative: Oxygen level (e.g., pulse oximetry) 90 percent or lower   Negative: Chest pain or pressure   Negative: Patient sounds very sick or weak to the triager   Negative: MILD difficulty breathing (e.g., minimal/no SOB at rest, SOB with walking, pulse <100)   Negative: Fever > 103 F (39.4 C)   Negative: [1] Fever > 100.0 F (37.8 C) AND [2] bedridden (e.g., nursing home patient, CVA, chronic illness, recovering from surgery)   Negative: [1] Fever > 101 F (38.3 C) AND [2] age > 60  years    Protocols used: CORONAVIRUS (COVID-19) DIAGNOSED OR [id number]

## 2022-06-11 NOTE — ASSESSMENT & PLAN NOTE
Change Tylenol to 500 milligrams every 4 to 6 hours.  Take Aleve over-the-counter once per day as needed for breakthrough.  Patient reassured that fever can be seen several weeks after COVID infection.  She should follow-up sooner if having any other symptoms or concerns.    Discussed condition course and signs and symptoms to expect.  Patient advised take anti-inflammatories and or Tylenol for pain or fever.  ER precautions.  Call MD or follow-up to clinic if not improving or worsening symptoms.

## 2022-06-13 ENCOUNTER — PATIENT MESSAGE (OUTPATIENT)
Dept: NEUROSURGERY | Facility: CLINIC | Age: 67
End: 2022-06-13
Payer: MEDICARE

## 2022-06-15 ENCOUNTER — PATIENT MESSAGE (OUTPATIENT)
Dept: NEUROSURGERY | Facility: CLINIC | Age: 67
End: 2022-06-15
Payer: MEDICARE

## 2022-06-30 DIAGNOSIS — H66.90 ACUTE OTITIS MEDIA, UNSPECIFIED OTITIS MEDIA TYPE: ICD-10-CM

## 2022-06-30 DIAGNOSIS — G20.A1 PARKINSON'S DISEASE: ICD-10-CM

## 2022-06-30 DIAGNOSIS — M25.561 CHRONIC PAIN OF RIGHT KNEE: ICD-10-CM

## 2022-06-30 DIAGNOSIS — M54.16 LUMBAR RADICULOPATHY: ICD-10-CM

## 2022-06-30 DIAGNOSIS — G89.29 CHRONIC PAIN OF RIGHT KNEE: ICD-10-CM

## 2022-06-30 RX ORDER — GABAPENTIN 100 MG/1
100 CAPSULE ORAL 3 TIMES DAILY
Qty: 90 CAPSULE | Refills: 11 | Status: SHIPPED | OUTPATIENT
Start: 2022-06-30 | End: 2023-12-24 | Stop reason: SDUPTHER

## 2022-06-30 NOTE — TELEPHONE ENCOUNTER
No new care gaps identified.  [facility] Embedded Care Gaps. Reference number: [id number]. 6/30/2022   12:33:25 PM CDT

## 2022-06-30 NOTE — TELEPHONE ENCOUNTER
No new care gaps identified.  [facility] Embedded Care Gaps. Reference number: [id number]. 6/30/2022   12:28:41 PM CDT

## 2022-07-01 RX ORDER — FLUTICASONE PROPIONATE AND SALMETEROL 250; 50 UG/1; UG/1
POWDER RESPIRATORY (INHALATION)
Refills: 11 | OUTPATIENT
Start: 2022-07-01

## 2022-07-05 ENCOUNTER — OFFICE VISIT (OUTPATIENT)
Dept: NEUROSURGERY | Facility: CLINIC | Age: 67
End: 2022-07-05
Payer: MEDICARE

## 2022-07-05 ENCOUNTER — HOSPITAL ENCOUNTER (OUTPATIENT)
Dept: RADIOLOGY | Facility: HOSPITAL | Age: 67
Discharge: HOME | End: 2022-07-05
Attending: PHYSICIAN ASSISTANT
Payer: MEDICARE

## 2022-07-05 VITALS
DIASTOLIC BLOOD PRESSURE: 67 MMHG | HEART RATE: 90 BPM | HEIGHT: 61 IN | WEIGHT: 218 LBS | BODY MASS INDEX: 41.16 KG/M2 | SYSTOLIC BLOOD PRESSURE: 120 MMHG | RESPIRATION RATE: 18 BRPM

## 2022-07-05 DIAGNOSIS — Z98.1 S/P LUMBAR FUSION: Primary | ICD-10-CM

## 2022-07-05 DIAGNOSIS — Z98.1 S/P LUMBAR FUSION: ICD-10-CM

## 2022-07-05 PROCEDURE — 3078F DIAST BP <80 MM HG: CPT | Mod: CPTII,S$GLB,, | Performed by: NEUROLOGICAL SURGERY

## 2022-07-05 PROCEDURE — 99214 OFFICE O/P EST MOD 30 MIN: CPT | Mod: S$GLB,,, | Performed by: NEUROLOGICAL SURGERY

## 2022-07-05 PROCEDURE — 3078F PR MOST RECENT DIASTOLIC BLOOD PRESSURE < 80 MM HG: ICD-10-PCS | Mod: CPTII,S$GLB,, | Performed by: NEUROLOGICAL SURGERY

## 2022-07-05 PROCEDURE — 3288F PR FALLS RISK ASSESSMENT DOCUMENTED: ICD-10-PCS | Mod: CPTII,S$GLB,, | Performed by: NEUROLOGICAL SURGERY

## 2022-07-05 PROCEDURE — 1100F PR PT FALLS ASSESS DOC 2+ FALLS/FALL W/INJURY/YR: ICD-10-PCS | Mod: CPTII,S$GLB,, | Performed by: NEUROLOGICAL SURGERY

## 2022-07-05 PROCEDURE — 3008F BODY MASS INDEX DOCD: CPT | Mod: CPTII,S$GLB,, | Performed by: NEUROLOGICAL SURGERY

## 2022-07-05 PROCEDURE — 72100 X-RAY EXAM L-S SPINE 2/3 VWS: CPT | Mod: TC,FY,PO

## 2022-07-05 PROCEDURE — 3074F SYST BP LT 130 MM HG: CPT | Mod: CPTII,S$GLB,, | Performed by: NEUROLOGICAL SURGERY

## 2022-07-05 PROCEDURE — 72100 XR LUMBAR SPINE AP AND LATERAL: ICD-10-PCS | Mod: 26,,, | Performed by: RADIOLOGY

## 2022-07-05 PROCEDURE — 3288F FALL RISK ASSESSMENT DOCD: CPT | Mod: CPTII,S$GLB,, | Performed by: NEUROLOGICAL SURGERY

## 2022-07-05 PROCEDURE — 1100F PTFALLS ASSESS-DOCD GE2>/YR: CPT | Mod: CPTII,S$GLB,, | Performed by: NEUROLOGICAL SURGERY

## 2022-07-05 PROCEDURE — 1125F AMNT PAIN NOTED PAIN PRSNT: CPT | Mod: CPTII,S$GLB,, | Performed by: NEUROLOGICAL SURGERY

## 2022-07-05 PROCEDURE — 3008F PR BODY MASS INDEX (BMI) DOCUMENTED: ICD-10-PCS | Mod: CPTII,S$GLB,, | Performed by: NEUROLOGICAL SURGERY

## 2022-07-05 PROCEDURE — 1125F PR PAIN SEVERITY QUANTIFIED, PAIN PRESENT: ICD-10-PCS | Mod: CPTII,S$GLB,, | Performed by: NEUROLOGICAL SURGERY

## 2022-07-05 PROCEDURE — 99214 PR OFFICE/OUTPT VISIT, EST, LEVL IV, 30-39 MIN: ICD-10-PCS | Mod: S$GLB,,, | Performed by: NEUROLOGICAL SURGERY

## 2022-07-05 PROCEDURE — 72100 X-RAY EXAM L-S SPINE 2/3 VWS: CPT | Mod: 26,,, | Performed by: RADIOLOGY

## 2022-07-05 PROCEDURE — 3074F PR MOST RECENT SYSTOLIC BLOOD PRESSURE < 130 MM HG: ICD-10-PCS | Mod: CPTII,S$GLB,, | Performed by: NEUROLOGICAL SURGERY

## 2022-07-05 PROCEDURE — 3044F HG A1C LEVEL LT 7.0%: CPT | Mod: CPTII,S$GLB,, | Performed by: NEUROLOGICAL SURGERY

## 2022-07-05 PROCEDURE — 3044F PR MOST RECENT HEMOGLOBIN A1C LEVEL <7.0%: ICD-10-PCS | Mod: CPTII,S$GLB,, | Performed by: NEUROLOGICAL SURGERY

## 2022-07-05 NOTE — PROGRESS NOTES
I have seen the patient, reviewed the Advanced Practice Provider's history and physical, assessment and plan. I have personally interviewed and examined the patient at bedside and interpreted the relevant imaging and lab work and I agree with the findings. I personally performed the documented services. See below for any additional comments.    Doing well.  She did unfortunately have COVID with what sounds like prolonged symptoms or the past month but in the last week she has been feeling much better.  She has not been able to go to physical therapy because of her illness.  She does endorse some axial low back pain and some musculoskeletal pain in her hips when she walks.  She continues to endorse complete resolution of her radiating right lower extremity pain and numbness.  She continues to use a walker outside the house but does not regard at home.  She states that her Parkinson's is also impairing her gait.     X-rays show stable hardware and spinal alignment.    Neurological exam is stable.    Doing well but with ongoing axial pain which is most likely secondary to her lack of physical activity and deconditioning from her bout with COVID.  Will make sure that she has ongoing physical therapy orders and I have advised her to gradually increase her physical activity and participate in physical therapy.  Follow-up in 3 months with CT scan of the lumbar spine without contrast to assess the fusion.

## 2022-07-05 NOTE — PROGRESS NOTES
Neurosurgery History & Physical    Patient ID: Jessica Rojas is a 66 y.o. female.    Chief Complaint   Patient presents with    Follow-up Clinical Reassessment     3 month POV w/imaging;L4-5 TLIF(3/17/22 surgery) , Patient had COVID, no sx in 1 week, Fatigue in the evening. Muscle cramps at night . Left hip hurts. Pt has Parkinson's       Review of Systems   Constitutional: Negative for chills, diaphoresis, fatigue and fever.   HENT: Negative for congestion, ear pain, rhinorrhea, sneezing, sore throat and tinnitus.    Eyes: Negative for photophobia, pain, redness and visual disturbance.   Respiratory: Negative for cough, chest tightness, shortness of breath and wheezing.    Cardiovascular: Negative for chest pain, palpitations and leg swelling.   Gastrointestinal: Negative for abdominal distention, abdominal pain, constipation, diarrhea, nausea and vomiting.   Genitourinary: Negative for difficulty urinating, dysuria, frequency and urgency.   Musculoskeletal: Positive for back pain. Negative for gait problem, myalgias and neck pain.   Skin: Negative for pallor and rash.   Neurological: Negative for dizziness, seizures, speech difficulty, weakness, numbness and headaches.   Psychiatric/Behavioral: Negative for confusion and hallucinations.       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    Malignant carcinoid tumor of the bronchus and lung 5/2014    Mediastinal lymphadenopathy 8/26/2015    Obesity, morbid 5/6/2014    Parkinsons 10/2017    Pituitary adenoma  1980's    took parladel for 3 yrs    Pneumonia     Postoperative hypothyroidism 7/25/2017    - s/p total thyroidectomy in 2016 (parathyroids intact) with post op hypothyroidism; on synthroid    Pulmonary nodules 9/11/2018    Thyroid disease     Wheeze 6/23/2014     Social History     Socioeconomic History    Marital status:    Occupational History    Occupation: housewife   Tobacco Use    Smoking status: Never Smoker    Smokeless tobacco: Never Used   Substance and Sexual Activity    Alcohol use: Not Currently     Alcohol/week: 0.0 standard drinks     Comment: I rarely drink    Drug use: No    Sexual activity: Yes     Partners: Male     Birth control/protection: None     Social Determinants of Health     Financial Resource Strain: Low Risk     Difficulty of Paying Living Expenses: Not hard at all   Food Insecurity: No Food Insecurity    Worried About Running Out of Food in the Last Year: Never true    Ran Out of Food in the Last Year: Never true   Transportation Needs: No Transportation Needs    Lack of Transportation (Medical): No    Lack of Transportation (Non-Medical): No   Physical Activity: Insufficiently Active    Days of Exercise per Week: 4 days    Minutes of Exercise per Session: 30 min   Stress: No Stress Concern Present    Feeling of Stress : Not at all   Social Connections: Unknown    Frequency of Communication with Friends and Family: More than three times a week    Frequency of Social Gatherings with Friends and Family: Once a week    Active Member of Clubs or Organizations: No    Attends Club or Organization Meetings: Never    Marital Status:    Housing Stability: Low Risk     Unable to Pay for Housing in the Last Year: No    Number of Places Lived in the Last Year: 1    Unstable Housing in the Last Year: No     Family History   Problem Relation Age of Onset    Cancer Maternal Aunt         breast    Cancer Maternal Grandmother         unknown    Cancer  Maternal Aunt         unknown    Diabetes Mother     Glaucoma Mother     Heart disease Mother     Hypertension Mother     Diabetes Father     Heart disease Father     Hypertension Father     Diabetes Sister     Macular degeneration Sister     Alcohol abuse Sister     Breast cancer Paternal Aunt     Breast cancer Paternal Aunt     Breast cancer Cousin     Amblyopia Neg Hx     Blindness Neg Hx     Cataracts Neg Hx     Retinal detachment Neg Hx     Stroke Neg Hx     Strabismus Neg Hx     Thyroid disease Neg Hx      Review of patient's allergies indicates:   Allergen Reactions    Propranolol Nausea And Vomiting     Drops pressure and raised sugar    Lipitor [atorvastatin] Other (See Comments)     Myalgia, muscle pain    Robaxin [methocarbamol]      Skin inside mouth started peeling.       Current Outpatient Medications:     acetaminophen (TYLENOL) 500 MG tablet, Take 1 tablet (500 mg total) by mouth every 6 (six) hours as needed for Pain (do not exceed 3000 milligrams per 24 h)., Disp: , Rfl: 0    alcohol swabs (ALCOHOL WIPES) PadM, Uses 5 daily, Disp: 400 each, Rfl: 4    amantadine HCL (SYMMETREL) 100 mg capsule, Take 1 capsule (100 mg total) by mouth 2 (two) times daily., Disp: 60 capsule, Rfl: 11    blood-glucose transmitter (DEXCOM G6 TRANSMITTER) Lizbeth, Dispense 1 transmitter, Disp: 1 Device, Rfl: 3    carbidopa-levodopa  mg (SINEMET)  mg per tablet, TAKE one and one-half TABLET BY MOUTH THREE TIMES DAILY, Disp: 135 tablet, Rfl: 6    cholecalciferol, vitamin D3, 10 mcg (400 unit) Cap, Take 1,200 Units by mouth once daily., Disp: , Rfl:     cyanocobalamin (VITAMIN B-12) 1000 MCG tablet, Take 100 mcg by mouth once daily., Disp: , Rfl:     fish oil-omega-3 fatty acids 300-1,000 mg capsule, Take 4 capsules by mouth once daily., Disp: , Rfl:     gabapentin (NEURONTIN) 100 MG capsule, Take 1 capsule (100 mg total) by mouth 3 (three) times daily. Takes at night, Disp: 90 capsule,  Rfl: 11    insulin aspart U-100 (NOVOLOG FLEXPEN U-100 INSULIN) 100 unit/mL (3 mL) InPn pen, 30u AC + correction Max  u, Disp: 120 mL, Rfl: 4    lamotrigine2.5% meloxicam 0.09% LIDOcaine2% prilocaine2% topical cream, Apply topically 4 (four) times daily as needed (pain)., Disp: 200 g, Rfl: 3    lancing device Misc, Checks bg 7-8 times a day, Disp: 1 each, Rfl: 0    LANTUS SOLOSTAR U-100 INSULIN glargine 100 units/mL (3mL) SubQ pen, INJECT 35 UNITS EVERY MORNING THEN 30 UNITS EVERY NIGHT AT BEDTIME, Disp: , Rfl:     levothyroxine (SYNTHROID) 137 MCG Tab tablet, Take 1 tablet (137 mcg total) by mouth once daily., Disp: 30 tablet, Rfl: 11    melatonin 3 mg Tab, Take 6 mg by mouth nightly., Disp: , Rfl:     montelukast (SINGULAIR) 10 mg tablet, take ONE tablet BY MOUTH once DAILY EVERY EVENING, Disp: 30 tablet, Rfl: 11    MULTIVITAMIN W-MINERALS/LUTEIN (CENTRUM SILVER ORAL), Take 1 tablet by mouth once daily. , Disp: , Rfl:     diclofenac sodium (VOLTAREN) 1 % Gel, Apply 2 g topically once daily., Disp: 100 g, Rfl: 0    mupirocin calcium 2% nasl oint (BACTROBAN) 2 % Oint, by Nasal route 2 (two) times daily., Disp: , Rfl:     naproxen sodium (ALEVE) 220 mg Cap, Take 220 mg by mouth 2 (two) times daily as needed., Disp: , Rfl:     pramipexole (MIRAPEX) 0.25 MG tablet, TAKE ONE-HALF to ONE TABLET BY MOUTH THREE TIMES DAILY as directed, Disp: 90 tablet, Rfl: 11    selegiline (ELDEPRYL) 5 mg Cap, Take 1 capsule (5 mg total) by mouth 2 (two) times daily. (Patient not taking: Reported on 5/19/2022), Disp: 60 capsule, Rfl: 10    simvastatin (ZOCOR) 10 MG tablet, TAKE ONE TABLET BY MOUTH ONCE DAILY IN THE EVENING, Disp: 30 tablet, Rfl: 12    UNABLE TO FIND, Place 0.5 mLs under the tongue 2 (two) times a day. medication name:CBD Oil   Last dose 3 months ago, Disp: , Rfl:     valsartan-hydrochlorothiazide (DIOVAN-HCT) 320-25 mg per tablet, TAKE ONE TABLET BY MOUTH ONCE DAILY, Disp: 90 tablet, Rfl: 1  Blood  "pressure 120/67, pulse 90, resp. rate 18, height 5' 1" (1.549 m), weight 98.9 kg (218 lb).      Neurologic Exam     Mental Status   Oriented to person, place, and time.   Oriented to person.   Oriented to place.   Oriented to time.   Follows 3 step commands.   Attention: normal. Concentration: normal.   Speech: speech is normal   Level of consciousness: alert  Knowledge: consistent with education.   Able to name object. Able to read. Able to repeat. Able to write. Normal comprehension.      Cranial Nerves      CN II   Visual acuity: normal  Right visual field deficit: none  Left visual field deficit: none      CN III, IV, VI   Pupils are equal, round, and reactive to light.  Right pupil: Size: 3 mm. Shape: regular. Reactivity: brisk. Consensual response: intact.   Left pupil: Size: 3 mm. Shape: regular. Reactivity: brisk. Consensual response: intact.   CN III: no CN III palsy  CN VI: no CN VI palsy  Nystagmus: none   Diplopia: none  Ophthalmoparesis: none  Conjugate gaze: present     CN V   Right facial sensation deficit: none  Left facial sensation deficit: none     CN VII   Right facial weakness: none  Left facial weakness: none     CN VIII   Hearing: intact     CN IX, X   CN IX normal.   CN X normal.      CN XI   Right sternocleidomastoid strength: normal  Left sternocleidomastoid strength: normal  Right trapezius strength: normal  Left trapezius strength: normal     CN XII   Fasciculations: absent  Tongue deviation: none     Motor Exam   Muscle bulk: normal  Overall muscle tone: normal  Right arm pronator drift: absent  Left arm pronator drift: absent     Strength   Right deltoid: 5/5  Left deltoid: 5/5  Right biceps: 5/5  Left biceps: 5/5  Right triceps: 5/5  Left triceps: 5/5  Right wrist flexion: 5/5  Left wrist flexion: 5/5  Right wrist extension: 5/5  Left wrist extension: 5/5  Right interossei: 5/5  Left interossei: 5/5  Right iliopsoas: 5/5  Left iliopsoas: 5/5  Right quadriceps: 5/5  Left quadriceps: " 5/5  Right hamstrin/5  Left hamstrin/5  Right anterior tibial: 5/5  Left anterior tibial: 5/5  Right posterior tibial: 5/5  Left posterior tibial: 5/5  Right peroneal: 4+/5  Left peroneal: 5/5  Right gastroc: 5/5  Left gastroc: 5/5  Right EHL: 5/5  Left EHL: 5/5     Sensory Exam   Right arm light touch: normal  Left arm light touch: normal  Right leg light touch: normal  Left leg light touch: normal     Gait, Coordination, and Reflexes      Gait  Gait: normal      Coordination   Romberg: negative  Finger to nose coordination: normal  Heel to shin coordination: normal  Tandem walking coordination: normal     Tremor   Resting tremor: absent  Intention tremor: absent  Action tremor: absent     Reflexes   Right brachioradialis: 1+  Left brachioradialis: 1+  Right biceps: 1+  Left biceps: 1+  Right triceps: 1+  Left triceps: 1+  Right patellar: 1+  Left patellar: 1+  Right achilles: 0  Left achilles: 0  Right Chase: absent  Left Chase: absent  Right ankle clonus: absent  Left ankle clonus: absent  Right plantar: normal  Left plantar: normal    Physical Exam  Constitutional: Oriented to person, place, and time. Appears well-developed and well-nourished.   HENT:   Head: Normocephalic and atraumatic.   Eyes: Pupils are equal, round, and reactive to light.   Neck: Normal range of motion. Neck supple.   Cardiovascular: Normal rate.    Pulmonary/Chest: Effort normal.   Musculoskeletal: Normal range of motion. Exhibits no edema.   Neurological: Alert and oriented to person, place, and time. Normal Finger-Nose-Finger Test, a normal Heel to Shin Test, a normal Romberg Test and a normal Tandem Gait Test. Gait normal.   Reflex Scores:       Tricep reflexes are 1+ on the right side and 1+ on the left side.       Bicep reflexes are 1+ on the right side and 1+ on the left side.       Brachioradialis reflexes are 1+ on the right side and 1+ on the left side.       Patellar reflexes are 1+ on the right side and 1+ on the  left side.       Achilles reflexes are 0 on the right side and 0 on the left side.  Skin: Skin is warm, dry and intact.   Psychiatric: Normal mood and affect. Speech is normal and behavior is normal. Judgment and thought content normal.   Nursing note and vitals reviewed.    Provider dictation:  I reviewed the imaging.   X-ray lumbar spine shows stable hardware and alignment.      The patient is a 66 year old female who presents for 3 month post-op appointment s/p right MIS L4-5 TLIF and posterior instrumented fusion. Patient was diagnosed with Covid in May and states she was having symptoms until about 1 week ago. She reports cramping in bilateral lower extremities at night. She started physical therapy, but has not been able to participate recently due to COVID. Since surgery she reports resolution of right lower extremity numbness. She has some lower back pain, but different pain than prior to surgery. She was noticing some improvement when she was participating in physical therapy.      Exam is stable. Incisions are well healed.     Ms. Rojas is recovering well from surgery. We will have her resume physical therapy. She will follow-up in 3 months with a CT Lumbar spine to assess the fusion.     1. S/P lumbar fusion  Ambulatory referral/consult to Physical/Occupational Therapy    CT Lumbar Spine Without Contrast

## 2022-07-22 ENCOUNTER — CLINICAL SUPPORT (OUTPATIENT)
Dept: REHABILITATION | Facility: HOSPITAL | Age: 67
End: 2022-07-22
Payer: MEDICARE

## 2022-07-22 DIAGNOSIS — Z74.09 IMPAIRED FUNCTIONAL MOBILITY, BALANCE, GAIT, AND ENDURANCE: ICD-10-CM

## 2022-07-22 DIAGNOSIS — Z98.1 S/P LUMBAR FUSION: ICD-10-CM

## 2022-07-22 DIAGNOSIS — M54.50 CHRONIC BILATERAL LOW BACK PAIN WITHOUT SCIATICA: Primary | ICD-10-CM

## 2022-07-22 DIAGNOSIS — G89.29 CHRONIC BILATERAL LOW BACK PAIN WITHOUT SCIATICA: Primary | ICD-10-CM

## 2022-07-22 PROCEDURE — 97161 PT EVAL LOW COMPLEX 20 MIN: CPT | Mod: PO

## 2022-07-22 NOTE — PLAN OF CARE
[facility]  Physical Therapy Initial Evaluation    Name: Jessica Rojas  Clinic Number: [id number]    Therapy Diagnosis:   Encounter Diagnoses   Name Primary?    S/P lumbar fusion     Chronic bilateral low back pain without sciatica Yes    Impaired functional mobility, balance, gait, and endurance      Physician: Enriqueta Gan PA*    Physician Orders: PT Eval and Treat   Medical Diagnosis from Referral: Z98.1 (ICD-10-CM) - S/P lumbar fusion   Evaluation Date: 7/22/2022  Authorization Period Expiration: 07/05/2023   Plan of Care Expiration: 9/30/22  Visit # / Visits authorized: 1/ 1    Time In: 1:30  Time Out: 2:15  Total Billable Time: 10 minutes    Precautions: Standard, Fall, cancer and L4/L5 Fusion 3/17/22    Subjective   Date of onset: L4/L5 Fusion on 3/17/22  History of current condition - Jessica reports: She got covid in May and was dealing with symptoms for a long time. Since the previous session, prior to onset of covid, she is no longer having numbness or tingling into her legs. She is no longer having dizziness when she is standing. She has just started back helping to wash the dishes. She can cook, but has to sit on her rollator. She is started to do the laundry, but cannot put the clothes in the machines or take them out. She does not sweep the house.   She is still using her rollator when she leaves the house, but no longer using it in the house unless she uses it to sit when cooking.   She would like to be able to get up and walk around without hurting, and she wants to get rid of the rollator.      Medical History:   Past Medical History:   Diagnosis Date    Abdominal pain 2/27/2015    Arthritis     Diabetes mellitus, type 2     DM (diabetes mellitus)     on insulin    Elevated transaminase level 4/18/2016    Encounter for blood transfusion     History of malignant carcinoid tumor of bronchus and lung 10/25/2017    History of neuroendocrine cancer     HTN  (hypertension) 2014    Hypercalcemia 2016    Lung cancer, hilus 2014    Malignant carcinoid tumor of bronchus and lung 2014    Malignant carcinoid tumor of the bronchus and lung 2014    Mediastinal lymphadenopathy 2015    Obesity, morbid 2014    Parkinsons 10/2017    Pituitary adenoma s    took parladel for 3 yrs    Pneumonia     Postoperative hypothyroidism 2017    - s/p total thyroidectomy in  (parathyroids intact) with post op hypothyroidism; on synthroid    Pulmonary nodules 2018    Thyroid disease     Wheeze 2014       Surgical History:   Jessica Rojas  has a past surgical history that includes  section (, ); Bronchoscopy (2014, 2014); Tonsillectomy (); Appendectomy (); Lung removal, partial (Right, ); Thyroidectomy (2016); Breast cyst aspiration; Tubal ligation (); Esophagogastroduodenoscopy (N/A, 2021); Colonoscopy (N/A, 2021); Colonoscopy (N/A, 2021); Epidural steroid injection into lumbar spine (N/A, 2022); Eye surgery; Minimally invasive transforaminal lumbar interbody fusion (TLIF) (N/A, 3/17/2022); and Colonoscopy (N/A, 2022).    Medications:   Jessica has a current medication list which includes the following prescription(s): acetaminophen, alcohol swabs, amantadine hcl, dexcom g6 transmitter, carbidopa-levodopa  mg, cholecalciferol (vitamin d3), cyanocobalamin, diclofenac sodium, fish oil-omega-3 fatty acids, gabapentin, insulin aspart u-100, lamotrigine2.5% meloxicam 0.09% LIDOcaine2% prilocaine2% topical cream, lancing device, lantus solostar u-100 insulin, levothyroxine, melatonin, montelukast, folic acid/multivit-min/lutein, mupirocin calcium 2% nasl oint, naproxen sodium, pramipexole, selegiline, simvastatin, UNABLE TO FIND, and valsartan-hydrochlorothiazide.    Allergies:   Review of patient's allergies indicates:   Allergen Reactions    Propranolol Nausea  And Vomiting     Drops pressure and raised sugar    Lipitor [atorvastatin] Other (See Comments)     Myalgia, muscle pain    Robaxin [methocarbamol]      Skin inside mouth started peeling.        Imaging,   CT scan films: 04/02/2022   FINDINGS: No acute fracture or traumatic subluxation.  Vertebral bodies are normal in height.  Mild anterolisthesis L4 on L5 is stable.  Interbody and posterior fusion and decompression changes at L4-5 are stable.  No evidence of hardware loosening. Paravertebral soft tissues are normal.    Xray: 07/05/2022   FINDINGS: There has been posterior netta and pedicle screw fusion at L4-5 accompanied by interbody device placement.  Mild L4-5 anterolisthesis is unchanged.  The hardware components appear intact and engaged.  Mild degenerative disc changes are present at all non operative lumbar levels.  There is abundant calcification of the abdominal aorta.    Prior Therapy: Yes, for same condition  Social History: Pt lives with their spouse  Occupation: Retired  Prior Level of Function: Moderately limited due to recent L4/L5 fusion  Current Level of Function: Continues to use rollator, performing easy house chores, independent in ADLs    Pain:  Current 0/10, worst 9/10, best 0/10   Location: bilateral low back   Description: Throbbing  Aggravating Factors: walking and standing for a long time  Easing Factors: sitting and laying down    Pts goals: Get rid of rollator, be able to walk more    Red Flag Screening:   Cough  Sneeze  Strain: (--)  Bladder/ bowel: (--)  Falls: (--)  Night pain: (--)  Unexplained weight loss: (--)  General health: No signs of distress    Objective     Observation: Good strength, poor endurance. Able to ambulate for TUG without AD, no LOB, no buckling, slow isaac.     Lumbar Range of Motion:    % Limitation Pain   Flexion 0   No        Extension 25   No        Left Side Bending 0 No        Right Side Bending 0 No        Left rotation   0 No        Right Rotation   0  No             Lower Extremity Strength  Right LE  Left LE    Knee extension: 5/5 Knee extension: 5/5   Knee flexion: 5/5 Knee flexion: 5/5   Hip flexion: 5/5 Hip flexion: 5/5   Hip extension:  NT Hip extension: NT   Hip abduction: 5/5 Hip abduction: 5/5   Ankle dorsiflexion: 4-/5 Ankle dorsiflexion: 5/5   Ankle plantarflexion: 5/5 Ankle plantarflexion: 5/5       Funational Tests:  30s Sit to Stand: 7 reps  TUs  SLS R: 2s  SLS L: 2s    Neuro Dynamic Testing:    Sciatic nerve:      SLR: R = (-)     L = (-)    Palpation: No TTP    Sensation: Intact to light touch from L2-S1 of BLE    Flexibility: Decreased hamstring flexibility      CMS Impairment/Limitation/Restriction for FOTO LUMBAR SPINE Survey    Therapist reviewed FOTO scores for Jessica Rojas on 2022.   FOTO documents entered into [employer] - see Media section.    Limitation Score: 64%  Category: Mobility         TREATMENT   Treatment Time In: NA  Treatment Time Out: NA  Total Treatment time separate from Evaluation: NA minutes      Home Exercises and Patient Education Provided    Education provided:   - Role of PT, PT POC, PT diagnosis, PT prognosis, HEP    Written Home Exercises Provided: Patient instructed to cont prior HEP.  Exercises were reviewed and Jessica was able to demonstrate them prior to the end of the session.  Jessica demonstrated good  understanding of the education provided.     See EMR under Patient Instructions for exercises provided 22.    Assessment   Jessica is a 66 y.o. female referred to outpatient Physical Therapy with a medical diagnosis of Z98.1 (ICD-10-CM) - S/P lumbar fusion. Physical exam is consistent with decreased strength, endurance, and functional mobility secondary to lumbar fusion and subsequent Covid-19 infection. Primary impairments include postural dysfunction, decreased core and LE strength, decreased lumbar and LE flexibility and mobility, impaired neuromuscular control of core and hip musculature and pain  with functional activities. This pt is an good candidate for skilled PT tx and stands to benefit from a combination of manual therapy including joint mobilizations with trigger point/myofacscial release, therapeutic exercise to establish core/joint stability, neuromuscular re-education, dry needling, and modalities Prn. The pt has been educated on their dx/POC and consents to further PT tx.     Pt prognosis is Good.   Pt will benefit from skilled outpatient Physical Therapy to address the deficits stated above and in the chart below, provide pt/family education, and to maximize pt's level of independence.     Plan of care discussed with patient: Yes  Pt's spiritual, cultural and educational needs considered and patient is agreeable to the plan of care and goals as stated below:     Anticipated Barriers for therapy: None    Medical Necessity is demonstrated by the following  History  Co-morbidities and personal factors that may impact the plan of care Co-morbidities:   CKD stage 3, COPD/asthma, diabetes, high BMI, history of cancer, HTN, prior lumbar surgery and as noted above    Personal Factors:   age     high   Examination  Body Structures and Functions, activity limitations and participation restrictions that may impact the plan of care Body Regions:   back  lower extremities    Body Systems:    ROM  strength  balance  gait  transfers  motor control    Participation Restrictions:   Ambulating w/ rollator    Activity limitations:   Learning and applying knowledge  no deficits    General Tasks and Commands  no deficits    Communication  no deficits    Mobility  lifting and carrying objects  walking  moving around using equipment (WC)    Self care  no deficits    Domestic Life  cooking  doing house work (cleaning house, washing dishes, laundry)    Interactions/Relationships  no deficits    Life Areas  no deficits    Community and Social Life  recreation and leisure         high   Clinical Presentation stable and  uncomplicated low   Decision Making/ Complexity Score: low     Goals:   Short Term Goals (4 Weeks):   1) Pt will demonstrate compliance with initial home exercise program as prescribed by physical therapist to improve independence with management of condition.  2) Pt to improve active range of motion in lumbar spine to 0% limitations to allow for improved functional mobility.  3) Pt to report low back pain of <4/10 at worst during prolonged standing to improve tolerance to ADLs.  4) Pt to improve SLS to 10s in BLE.      Long Term Goals (8 Weeks):   1) Pt to achieve <50% limitation as measured by the FOTO to demonstrate decreased low back pain disability.  2) Pt to increase strength to at least 5/5 of muscles tested to allow for improvement in functional activities such as performing chores.  3) Pt to improve SLS to 30s in BLE.  4) Pt to improve TUG to 13s to demonstrate improved functional mobility and decreased fall risk.   5) Pt to tolerate standing and walking for community distances with <2/10 pain in low back to improve mobility with IADL's.    Plan   Plan of care Certification: 7/22/2022 to 9/30/22.    Outpatient Physical Therapy 2 times weekly for 8 weeks to include the following interventions: Aquatic Therapy, Gait Training, Manual Therapy, Moist Heat/ Ice, Neuromuscular Re-ed, Patient Education, Therapeutic Activities and Therapeutic Exercise.     Collin Chandra, PT

## 2022-07-25 ENCOUNTER — CLINICAL SUPPORT (OUTPATIENT)
Dept: REHABILITATION | Facility: HOSPITAL | Age: 67
End: 2022-07-25
Payer: MEDICARE

## 2022-07-25 DIAGNOSIS — G89.29 CHRONIC BILATERAL LOW BACK PAIN WITHOUT SCIATICA: Primary | ICD-10-CM

## 2022-07-25 DIAGNOSIS — Z74.09 IMPAIRED FUNCTIONAL MOBILITY, BALANCE, GAIT, AND ENDURANCE: ICD-10-CM

## 2022-07-25 DIAGNOSIS — M54.50 CHRONIC BILATERAL LOW BACK PAIN WITHOUT SCIATICA: Primary | ICD-10-CM

## 2022-07-25 PROCEDURE — 97112 NEUROMUSCULAR REEDUCATION: CPT | Mod: PO

## 2022-07-25 PROCEDURE — 97110 THERAPEUTIC EXERCISES: CPT | Mod: PO

## 2022-07-25 NOTE — PROGRESS NOTES
Physical Therapy Daily Treatment Note     Name: Jessica Rojas  Clinic Number: [id number]    Therapy Diagnosis:   Encounter Diagnoses   Name Primary?    Chronic bilateral low back pain without sciatica Yes    Impaired functional mobility, balance, gait, and endurance      Physician: Enriqueta Gan PA*    Visit Date: 7/25/2022  Physician Orders: PT Eval and Treat  Medical Diagnosis from Referral: Z98.1 (ICD-10-CM) - S/P lumbar fusion   Evaluation Date: 5/2/2022   Authorization Period Expiration: 12/31/2022  Plan of Care Expiration: 9/30/22  Visit # / Visits authorized: 6 / 25    Time In: 11:20  Time Out: 12:00  Total Billable Time: 40 minutes    Precautions: Diabetes, Fall, cancer and L4/L5 Fusion 3/17/22    Subjective     Pt reports: Pt is doing ok today. No back pain unless she is standing for a long time, but she feels she is starting to be able to stand longer before onset of pain.   She was compliant with home exercise program.  Response to previous treatment: increased soreness  Functional change: Too soon    Pain: 0/10  Location: bilateral low back      Objective     Jessica received therapeutic exercises to develop strength, endurance, ROM and flexibility for 30 minutes including:  Recumbent bike, 5 mins  Calf stretch ramp, 3x 30s  Seated hamstring stretch, 3x 30s  Supine hip flexor stretch, 1x 1 min  DKTC w/ orange ball, 2 mins - w/ heel digs  LTR w/ orange ball, 2 mins  SLR, 2x10 B  Standing hip ext, 2x10  Standing hip abd, 2x10  Sit to stands, 4x5    Jessica participated in neuromuscular re-education activities to improve: Coordination, Proprioception and Posture for 10 minutes. The following activities were included:  TA activation, 10x 10s hold  Iso crunch w/ orange ball, 2 mins w/ 3s hold  Supine clams w/ green band, 3x10 B    Jessica participated in gait training to improve functional mobility and safety for 00 minutes, including:      Home Exercises Provided and Patient Education Provided      Education provided:   - Continue HEP    Written Home Exercises Provided: Patient instructed to cont prior HEP.  Exercises were reviewed and Jessica was able to demonstrate them prior to the end of the session.  Jessica demonstrated good  understanding of the education provided.     See EMR under Patient Instructions for exercises provided 5/2/22.    Assessment     Pt tolerated tx well. No exacerbations of pain throughout session, approriately fatigued by end of session. Will progress as tolerated.   Jessica Is progressing well towards her goals.   Pt prognosis is Good.     Pt will continue to benefit from skilled outpatient physical therapy to address the deficits listed in the problem list box on initial evaluation, provide pt/family education and to maximize pt's level of independence in the home and community environment.     Pt's spiritual, cultural and educational needs considered and pt agreeable to plan of care and goals.    Anticipated barriers to physical therapy: None    Goals:   Short Term Goals (4 Weeks):   1) Pt will demonstrate compliance with initial home exercise program as prescribed by physical therapist to improve independence with management of condition.  2) Pt to improve active range of motion in lumbar spine to 0% limitations to allow for improved functional mobility.  3) Pt to report low back pain of <4/10 at worst during prolonged standing to improve tolerance to ADLs.  4) Pt to improve SLS to 10s in BLE.      Long Term Goals (8 Weeks):   1) Pt to achieve <50% limitation as measured by the FOTO to demonstrate decreased low back pain disability.  2) Pt to increase strength to at least 5/5 of muscles tested to allow for improvement in functional activities such as performing chores.  3) Pt to improve SLS to 30s in BLE.  4) Pt to improve TUG to 13s to demonstrate improved functional mobility and decreased fall risk.   5) Pt to tolerate standing and walking for community distances with <2/10  pain in low back to improve mobility with IADL's.    Plan     Plan of care Certification: 5/2/2022 to 9/30/22.     Outpatient Physical Therapy 2 times weekly for 8 weeks to include the following interventions: Aquatic Therapy, Electrical Stimulation  , Manual Therapy, Moist Heat/ Ice, Neuromuscular Re-ed, Patient Education, Therapeutic Activities and Therapeutic Exercise.     Collin Chandra, PT

## 2022-07-28 ENCOUNTER — CLINICAL SUPPORT (OUTPATIENT)
Dept: REHABILITATION | Facility: HOSPITAL | Age: 67
End: 2022-07-28
Payer: MEDICARE

## 2022-07-28 DIAGNOSIS — Z74.09 IMPAIRED FUNCTIONAL MOBILITY, BALANCE, GAIT, AND ENDURANCE: ICD-10-CM

## 2022-07-28 DIAGNOSIS — M54.50 CHRONIC BILATERAL LOW BACK PAIN WITHOUT SCIATICA: Primary | ICD-10-CM

## 2022-07-28 DIAGNOSIS — G89.29 CHRONIC BILATERAL LOW BACK PAIN WITHOUT SCIATICA: Primary | ICD-10-CM

## 2022-07-28 PROCEDURE — 97112 NEUROMUSCULAR REEDUCATION: CPT | Mod: PO

## 2022-07-28 PROCEDURE — 97110 THERAPEUTIC EXERCISES: CPT | Mod: PO

## 2022-07-28 NOTE — PROGRESS NOTES
Physical Therapy Daily Treatment Note     Name: Jessica Rojas  Clinic Number: [id number]    Therapy Diagnosis:   Encounter Diagnoses   Name Primary?    Chronic bilateral low back pain without sciatica Yes    Impaired functional mobility, balance, gait, and endurance      Physician: Enriqueta Gan PA*    Visit Date: 7/28/2022  Physician Orders: PT Eval and Treat  Medical Diagnosis from Referral: Z98.1 (ICD-10-CM) - S/P lumbar fusion   Evaluation Date: 5/2/2022   Authorization Period Expiration: 12/31/2022  Plan of Care Expiration: 9/30/22  Visit # / Visits authorized: 7 / 25    Time In: 10:00  Time Out: 10:45  Total Billable Time: 45 minutes    Precautions: Diabetes, Fall, cancer and L4/L5 Fusion 3/17/22    Subjective     Pt reports: No pain to start the session today. Was a bit sore in her upper legs after last session, but overall felt well. Has noticed that she no longer has pain with scooting in the bed.   She was compliant with home exercise program.  Response to previous treatment: increased soreness  Functional change: Too soon    Pain: 0/10  Location: bilateral low back      Objective     Jessica received therapeutic exercises to develop strength, endurance, ROM and flexibility for 35 minutes including:  Recumbent bike, 5 mins  Calf stretch ramp, 3x 30s  Seated hamstring stretch, 3x 30s  Supine hip flexor stretch, 1x 1 min  DKTC w/ orange ball, 2 mins - w/ heel digs  LTR w/ orange ball, 2 mins  SLR, 2x10 B  Standing hip ext, 2x10 w/ yellow band  Standing hip abd, 2x10 w/ yellow band  Sit to stands, 4x5 2# DB    Jessica participated in neuromuscular re-education activities to improve: Coordination, Proprioception and Posture for 10 minutes. The following activities were included:  TA activation, 10x 10s hold  Iso crunch w/ orange ball, 2 mins w/ 3s hold  Supine clams w/ green band, 3x10 B    Jessica participated in gait training to improve functional mobility and safety for 00 minutes,  including:      Home Exercises Provided and Patient Education Provided     Education provided:   - Continue HEP    Written Home Exercises Provided: Patient instructed to cont prior HEP.  Exercises were reviewed and Jesscia was able to demonstrate them prior to the end of the session.  Jessica demonstrated good  understanding of the education provided.     See EMR under Patient Instructions for exercises provided 5/2/22.    Assessment     Pt tolerated progressions well. No exacerbations of pain throughout session, approriately fatigued by end of session. Will progress as tolerated.   Jessica Is progressing well towards her goals.   Pt prognosis is Good.     Pt will continue to benefit from skilled outpatient physical therapy to address the deficits listed in the problem list box on initial evaluation, provide pt/family education and to maximize pt's level of independence in the home and community environment.     Pt's spiritual, cultural and educational needs considered and pt agreeable to plan of care and goals.    Anticipated barriers to physical therapy: None    Goals:   Short Term Goals (4 Weeks):   1) Pt will demonstrate compliance with initial home exercise program as prescribed by physical therapist to improve independence with management of condition.  2) Pt to improve active range of motion in lumbar spine to 0% limitations to allow for improved functional mobility.  3) Pt to report low back pain of <4/10 at worst during prolonged standing to improve tolerance to ADLs.  4) Pt to improve SLS to 10s in BLE.      Long Term Goals (8 Weeks):   1) Pt to achieve <50% limitation as measured by the FOTO to demonstrate decreased low back pain disability.  2) Pt to increase strength to at least 5/5 of muscles tested to allow for improvement in functional activities such as performing chores.  3) Pt to improve SLS to 30s in BLE.  4) Pt to improve TUG to 13s to demonstrate improved functional mobility and decreased fall  risk.   5) Pt to tolerate standing and walking for community distances with <2/10 pain in low back to improve mobility with IADL's.    Plan     Plan of care Certification: 5/2/2022 to 9/30/22.     Outpatient Physical Therapy 2 times weekly for 8 weeks to include the following interventions: Aquatic Therapy, Electrical Stimulation  , Manual Therapy, Moist Heat/ Ice, Neuromuscular Re-ed, Patient Education, Therapeutic Activities and Therapeutic Exercise.     Collin Chandra, PT

## 2022-08-01 ENCOUNTER — CLINICAL SUPPORT (OUTPATIENT)
Dept: REHABILITATION | Facility: HOSPITAL | Age: 67
End: 2022-08-01
Payer: MEDICARE

## 2022-08-01 DIAGNOSIS — Z74.09 IMPAIRED FUNCTIONAL MOBILITY, BALANCE, GAIT, AND ENDURANCE: ICD-10-CM

## 2022-08-01 DIAGNOSIS — G89.29 CHRONIC BILATERAL LOW BACK PAIN WITHOUT SCIATICA: Primary | ICD-10-CM

## 2022-08-01 DIAGNOSIS — M54.50 CHRONIC BILATERAL LOW BACK PAIN WITHOUT SCIATICA: Primary | ICD-10-CM

## 2022-08-01 PROCEDURE — 97112 NEUROMUSCULAR REEDUCATION: CPT | Mod: PO

## 2022-08-01 PROCEDURE — 97110 THERAPEUTIC EXERCISES: CPT | Mod: PO

## 2022-08-01 NOTE — PROGRESS NOTES
Physical Therapy Daily Treatment Note     Name: Jessica Rojas  Clinic Number: [id number]    Therapy Diagnosis:   Encounter Diagnoses   Name Primary?    Chronic bilateral low back pain without sciatica Yes    Impaired functional mobility, balance, gait, and endurance      Physician: Enriqueta Gan PA*    Visit Date: 8/1/2022  Physician Orders: PT Eval and Treat  Medical Diagnosis from Referral: Z98.1 (ICD-10-CM) - S/P lumbar fusion   Evaluation Date: 5/2/2022   Authorization Period Expiration: 12/31/2022  Plan of Care Expiration: 9/30/22  Visit # / Visits authorized: 8 / 25    Time In: 10:00  Time Out: 10:45  Total Billable Time: 45 minutes    Precautions: Diabetes, Fall, cancer and L4/L5 Fusion 3/17/22    Subjective     Pt reports: No pain to start the session today. She did sweep her house yesterday, did not hurt her back, but she does have muscle soreness in her hips today.   She was compliant with home exercise program.  Response to previous treatment: increased soreness  Functional change: Too soon    Pain: 0/10  Location: bilateral low back      Objective     Jessica received therapeutic exercises to develop strength, endurance, ROM and flexibility for 30 minutes including:  Recumbent bike, 5 mins  Calf stretch ramp, 3x 30s  Seated hamstring stretch, 3x 30s  Supine hip flexor stretch, 1x 1 min  DKTC w/ orange ball, 2 mins - w/ heel digs  LTR w/ orange ball, 2 mins  SLR, 2x10 B  Standing hip ext, 2x10 w/ yellow band  Standing hip abd, 2x10 w/ yellow band  Sit to stands, 4x5 4# DB    Jessica participated in neuromuscular re-education activities to improve: Coordination, Proprioception and Posture for 08 minutes. The following activities were included:  TA activation, 10x 10s hold - not performed  Iso crunch w/ orange ball, 2 mins w/ 3s hold  Supine clams w/ green band, 3x10 B    Jessica participated in gait training to improve functional mobility and safety for 05 minutes, including:  Stepping over small  hurdles - step to, with cane and gait belt    Home Exercises Provided and Patient Education Provided     Education provided:   - Continue HEP    Written Home Exercises Provided: Patient instructed to cont prior HEP.  Exercises were reviewed and Jessica was able to demonstrate them prior to the end of the session.  Jessica demonstrated good  understanding of the education provided.     See EMR under Patient Instructions for exercises provided 5/2/22.    Assessment     Pt tolerated progressions well. No exacerbations of pain throughout session, approriately fatigued by end of session. Initiated gait training. Pt requested single point cane to step over hurdles. Performed well, no LOB, uses slow isaac. Will progress as tolerated.   Jessica Is progressing well towards her goals.   Pt prognosis is Good.     Pt will continue to benefit from skilled outpatient physical therapy to address the deficits listed in the problem list box on initial evaluation, provide pt/family education and to maximize pt's level of independence in the home and community environment.     Pt's spiritual, cultural and educational needs considered and pt agreeable to plan of care and goals.    Anticipated barriers to physical therapy: None    Goals:   Short Term Goals (4 Weeks):   1) Pt will demonstrate compliance with initial home exercise program as prescribed by physical therapist to improve independence with management of condition.  2) Pt to improve active range of motion in lumbar spine to 0% limitations to allow for improved functional mobility.  3) Pt to report low back pain of <4/10 at worst during prolonged standing to improve tolerance to ADLs.  4) Pt to improve SLS to 10s in BLE.      Long Term Goals (8 Weeks):   1) Pt to achieve <50% limitation as measured by the FOTO to demonstrate decreased low back pain disability.  2) Pt to increase strength to at least 5/5 of muscles tested to allow for improvement in functional activities such as  performing chores.  3) Pt to improve SLS to 30s in BLE.  4) Pt to improve TUG to 13s to demonstrate improved functional mobility and decreased fall risk.   5) Pt to tolerate standing and walking for community distances with <2/10 pain in low back to improve mobility with IADL's.    Plan     Plan of care Certification: 5/2/2022 to 9/30/22.     Outpatient Physical Therapy 2 times weekly for 8 weeks to include the following interventions: Aquatic Therapy, Electrical Stimulation  , Manual Therapy, Moist Heat/ Ice, Neuromuscular Re-ed, Patient Education, Therapeutic Activities and Therapeutic Exercise.     Collin Chandra, PT

## 2022-08-08 ENCOUNTER — CLINICAL SUPPORT (OUTPATIENT)
Dept: REHABILITATION | Facility: HOSPITAL | Age: 67
End: 2022-08-08
Payer: MEDICARE

## 2022-08-08 DIAGNOSIS — M54.50 CHRONIC BILATERAL LOW BACK PAIN WITHOUT SCIATICA: Primary | ICD-10-CM

## 2022-08-08 DIAGNOSIS — G89.29 CHRONIC BILATERAL LOW BACK PAIN WITHOUT SCIATICA: Primary | ICD-10-CM

## 2022-08-08 DIAGNOSIS — Z74.09 IMPAIRED FUNCTIONAL MOBILITY, BALANCE, GAIT, AND ENDURANCE: ICD-10-CM

## 2022-08-08 PROCEDURE — 97112 NEUROMUSCULAR REEDUCATION: CPT | Mod: PO

## 2022-08-08 PROCEDURE — 97110 THERAPEUTIC EXERCISES: CPT | Mod: PO

## 2022-08-08 NOTE — PROGRESS NOTES
Physical Therapy Daily Treatment Note     Name: Jessica Rojas  Clinic Number: [id number]    Therapy Diagnosis:   Encounter Diagnoses   Name Primary?    Chronic bilateral low back pain without sciatica Yes    Impaired functional mobility, balance, gait, and endurance      Physician: Enriqueta Gan PA*    Visit Date: 8/8/2022  Physician Orders: PT Eval and Treat  Medical Diagnosis from Referral: Z98.1 (ICD-10-CM) - S/P lumbar fusion   Evaluation Date: 5/2/2022   Authorization Period Expiration: 12/31/2022  Plan of Care Expiration: 9/30/22  Visit # / Visits authorized: 9 / 25    Time In: 7:00  Time Out: 7:45  Total Billable Time: 45 minutes    Precautions: Diabetes, Fall, cancer and L4/L5 Fusion 3/17/22    Subjective     Pt reports: She had to cancel last week because she had a sinus infection. Her back is feeling fine this morning, no pain. She has been feeling a bit sore the last couple of days, she feels it is related to her Parkinson's.    She was compliant with home exercise program.  Response to previous treatment: increased soreness  Functional change: Too soon    Pain: 0/10  Location: bilateral low back      Objective     Jessica received therapeutic exercises to develop strength, endurance, ROM and flexibility for 35 minutes including:  Recumbent bike, 7 mins  Calf stretch ramp, 3x 30s  Seated hamstring stretch, 3x 30s  Supine hip flexor stretch, 1x 1 min  DKTC w/ orange ball, 2 mins - w/ heel digs  LTR w/ orange ball, 2 mins  SLR, 3x10 B 1#    Sit to stands, 4x5 5# DB    Standing hip ext, 2x10 w/ yellow band - progress next session  Standing hip abd, 2x10 w/ yellow band  Precor leg press, 3x5 20#    Jessica participated in neuromuscular re-education activities to improve: Coordination, Proprioception and Posture for 08 minutes. The following activities were included:  TA activation, 10x 10s hold - not performed  Iso crunch w/ orange ball, 2 mins w/ 3s hold  Supine clams w/ blue band, 3x10 B    Jessica  participated in gait training to improve functional mobility and safety for 00 minutes, including:  Stepping over small hurdles - step to, with cane and gait belt    Home Exercises Provided and Patient Education Provided     Education provided:   - Continue HEP    Written Home Exercises Provided: Patient instructed to cont prior HEP.  Exercises were reviewed and Jessica was able to demonstrate them prior to the end of the session.  Jessica demonstrated good  understanding of the education provided.     See EMR under Patient Instructions for exercises provided 5/2/22.    Assessment     Pt tolerated tx well, no exacerbation of symptoms. Progressed several exercises without complaint. Will progress as tolerated.   Jessica Is progressing well towards her goals.   Pt prognosis is Good.     Pt will continue to benefit from skilled outpatient physical therapy to address the deficits listed in the problem list box on initial evaluation, provide pt/family education and to maximize pt's level of independence in the home and community environment.     Pt's spiritual, cultural and educational needs considered and pt agreeable to plan of care and goals.    Anticipated barriers to physical therapy: None    Goals:   Short Term Goals (4 Weeks):   1) Pt will demonstrate compliance with initial home exercise program as prescribed by physical therapist to improve independence with management of condition.  2) Pt to improve active range of motion in lumbar spine to 0% limitations to allow for improved functional mobility.  3) Pt to report low back pain of <4/10 at worst during prolonged standing to improve tolerance to ADLs.  4) Pt to improve SLS to 10s in BLE.      Long Term Goals (8 Weeks):   1) Pt to achieve <50% limitation as measured by the FOTO to demonstrate decreased low back pain disability.  2) Pt to increase strength to at least 5/5 of muscles tested to allow for improvement in functional activities such as performing  chores.  3) Pt to improve SLS to 30s in BLE.  4) Pt to improve TUG to 13s to demonstrate improved functional mobility and decreased fall risk.   5) Pt to tolerate standing and walking for community distances with <2/10 pain in low back to improve mobility with IADL's.    Plan     Plan of care Certification: 5/2/2022 to 9/30/22.     Outpatient Physical Therapy 2 times weekly for 8 weeks to include the following interventions: Aquatic Therapy, Electrical Stimulation  , Manual Therapy, Moist Heat/ Ice, Neuromuscular Re-ed, Patient Education, Therapeutic Activities and Therapeutic Exercise.     Collin Chandra, PT

## 2022-08-11 ENCOUNTER — CLINICAL SUPPORT (OUTPATIENT)
Dept: REHABILITATION | Facility: HOSPITAL | Age: 67
End: 2022-08-11
Payer: MEDICARE

## 2022-08-11 DIAGNOSIS — G89.29 CHRONIC BILATERAL LOW BACK PAIN WITHOUT SCIATICA: Primary | ICD-10-CM

## 2022-08-11 DIAGNOSIS — M54.50 CHRONIC BILATERAL LOW BACK PAIN WITHOUT SCIATICA: Primary | ICD-10-CM

## 2022-08-11 DIAGNOSIS — Z74.09 IMPAIRED FUNCTIONAL MOBILITY, BALANCE, GAIT, AND ENDURANCE: ICD-10-CM

## 2022-08-11 PROCEDURE — 97110 THERAPEUTIC EXERCISES: CPT | Mod: PO,CQ

## 2022-08-11 PROCEDURE — 97112 NEUROMUSCULAR REEDUCATION: CPT | Mod: PO,CQ

## 2022-08-11 NOTE — PROGRESS NOTES
Physical Therapy Daily Treatment Note     Name: Jessica Rojas  Clinic Number: [id number]    Therapy Diagnosis:   Encounter Diagnoses   Name Primary?    Chronic bilateral low back pain without sciatica Yes    Impaired functional mobility, balance, gait, and endurance      Physician: Enriqueta Gan PA*    Visit Date: 8/11/2022  Physician Orders: PT Eval and Treat  Medical Diagnosis from Referral: Z98.1 (ICD-10-CM) - S/P lumbar fusion   Evaluation Date: 5/2/2022   Authorization Period Expiration: 12/31/2022  Plan of Care Expiration: 9/30/22  Visit # / Visits authorized: 10 / 25    Time In: 7:00 am  Time Out: 800 am  Total Billable Time: 60 minutes    Precautions: Diabetes, Fall, cancer and L4/L5 Fusion 3/17/22    Subjective     Pt reports: Still having a lot of fatigue which she contributes to having COVID.  Pt sees improvements everyday but it is slow. Pt feels like she has to be very cautious when she is walking.   She was compliant with home exercise program.  Response to previous treatment: increased soreness and fatigue  Functional change: Improved bed mobility  Pain: 0/10  Location: bilateral low back      Objective     Jessica received therapeutic exercises to develop strength, endurance, ROM and flexibility for 52 minutes including:    Recumbent bike, 7 mins  Calf stretch ramp, 3x 30s  Seated hamstring stretch, 3x 30s  Supine hip flexor stretch, 2x 1 min  DKTC w/ orange ball, 2 mins - w/ heel digs  LTR w/ orange ball, 2 mins  SLR, 3x10 B 1#    Sit to stands, 4x5 5# DB    Standing hip ext, 2x10 w/ yellow band - progress next session  Standing hip abd, 2x10 w/ yellow band  Precor leg press, 3x5 20#    Jessica participated in neuromuscular re-education activities to improve: Coordination, Proprioception and Posture for 08 minutes. The following activities were included:  TA activation, 10x 10s hold - not performed  Iso crunch w/ orange ball, 2 mins w/ 3s hold  Supine clams w/ blue band, 3x10 B    Jessica  participated in gait training to improve functional mobility and safety for 00 minutes, including:  Stepping over small hurdles - step to, with cane and gait belt    Home Exercises Provided and Patient Education Provided     Education provided:   - Continue HEP    Written Home Exercises Provided: Patient instructed to cont prior HEP.  Exercises were reviewed and Jessica was able to demonstrate them prior to the end of the session.  Jessica demonstrated good  understanding of the education provided.     See EMR under Patient Instructions for exercises provided 5/2/22.    Assessment     Pt tolerated tx well with no complaints of pain.  No major progressions due to increased fatigue with previous treatment.  Will progress as tolerated.   Jessica Is progressing well towards her goals.   Pt prognosis is Good.     Pt will continue to benefit from skilled outpatient physical therapy to address the deficits listed in the problem list box on initial evaluation, provide pt/family education and to maximize pt's level of independence in the home and community environment.     Pt's spiritual, cultural and educational needs considered and pt agreeable to plan of care and goals.    Anticipated barriers to physical therapy: None    Goals:   Short Term Goals (4 Weeks):   1) Pt will demonstrate compliance with initial home exercise program as prescribed by physical therapist to improve independence with management of condition.  2) Pt to improve active range of motion in lumbar spine to 0% limitations to allow for improved functional mobility.  3) Pt to report low back pain of <4/10 at worst during prolonged standing to improve tolerance to ADLs.  4) Pt to improve SLS to 10s in BLE.      Long Term Goals (8 Weeks):   1) Pt to achieve <50% limitation as measured by the FOTO to demonstrate decreased low back pain disability.  2) Pt to increase strength to at least 5/5 of muscles tested to allow for improvement in functional activities such  as performing chores.  3) Pt to improve SLS to 30s in BLE.  4) Pt to improve TUG to 13s to demonstrate improved functional mobility and decreased fall risk.   5) Pt to tolerate standing and walking for community distances with <2/10 pain in low back to improve mobility with IADL's.    Plan     Plan of care Certification: 5/2/2022 to 9/30/22.     Outpatient Physical Therapy 2 times weekly for 8 weeks to include the following interventions: Aquatic Therapy, Electrical Stimulation  , Manual Therapy, Moist Heat/ Ice, Neuromuscular Re-ed, Patient Education, Therapeutic Activities and Therapeutic Exercise.     Krupa Hoang, PTA

## 2022-08-16 ENCOUNTER — CLINICAL SUPPORT (OUTPATIENT)
Dept: REHABILITATION | Facility: HOSPITAL | Age: 67
End: 2022-08-16
Payer: MEDICARE

## 2022-08-16 DIAGNOSIS — G89.29 CHRONIC BILATERAL LOW BACK PAIN WITHOUT SCIATICA: Primary | ICD-10-CM

## 2022-08-16 DIAGNOSIS — M54.50 CHRONIC BILATERAL LOW BACK PAIN WITHOUT SCIATICA: Primary | ICD-10-CM

## 2022-08-16 DIAGNOSIS — Z74.09 IMPAIRED FUNCTIONAL MOBILITY, BALANCE, GAIT, AND ENDURANCE: ICD-10-CM

## 2022-08-16 PROCEDURE — 97112 NEUROMUSCULAR REEDUCATION: CPT | Mod: PO,CQ

## 2022-08-16 PROCEDURE — 97110 THERAPEUTIC EXERCISES: CPT | Mod: PO,CQ

## 2022-08-16 NOTE — PROGRESS NOTES
"  Physical Therapy Daily Treatment Note     Name: Jessica Rojas  Clinic Number: [id number]    Therapy Diagnosis:   Encounter Diagnoses   Name Primary?    Chronic bilateral low back pain without sciatica Yes    Impaired functional mobility, balance, gait, and endurance      Physician: Enriqueta Gan PA*    Visit Date: 8/16/2022  Physician Orders: PT Eval and Treat  Medical Diagnosis from Referral: Z98.1 (ICD-10-CM) - S/P lumbar fusion   Evaluation Date: 5/2/2022   Authorization Period Expiration: 12/31/2022  Plan of Care Expiration: 9/30/22  Visit # / Visits authorized: 11 / 25    Time In: 7:00 am  Time Out: 750 am  Total Billable Time: 45 minutes    Precautions: Diabetes, Fall, cancer and L4/L5 Fusion 3/17/22    Subjective     Pt reports: The R knee pain has been intermittent over the last month.  Pt reports she woke up with the pain Saturday.  Pt reports she was able to go to a restaurant Friday without the walker.  Pt does not go places where she is going to have to walk long distances.  Pt reports the pain in the knee comes with certain motions when she is walking.  If she keeps the knee stiff when she walks, it happens less.  She is not trying to favor the left knee.  She has noticed the R leg has been " jumping lately" and it had done this before the surgery.  She was compliant with home exercise program.  Response to previous treatment: increased soreness and fatigue  Functional change:  None new reported  Pain: 8/10  Location: right knee    Objective     Jessica received therapeutic exercises to develop strength, endurance, ROM and flexibility for 40 minutes including:    Recumbent bike, 5 mins, seat 6  Calf stretch ramp, 3x 30s  Seated hamstring stretch, 3x 30s- not performed  Supine hip flexor stretch, 2x 1 min  DKTC w/ orange ball, 2 mins - w/ heel digs  LTR w/ orange ball, 2 mins  SLR, 3x10 B 1#    Sit to stands, 2x10 5# DB    Standing hip ext, 2x10 w/ yellow band - not performed  Standing hip abd, " 2x10 w/ yellow band- not performed  Precor leg press, 3x5 20#- not performed    Jessica participated in neuromuscular re-education activities to improve: Coordination, Proprioception and Posture for 10 minutes. The following activities were included:  TA activation, 10x 10s hold - not performed  Iso crunch w/ orange ball, 2 mins w/ 3s hold  Supine clams w/ blue band, 3x10 B  Step ups, L side only, 1x10    Jessica participated in gait training to improve functional mobility and safety for 00 minutes, including:  Stepping over small hurdles - step to, with cane and gait belt    Home Exercises Provided and Patient Education Provided     Education provided:   - Continue HEP    Written Home Exercises Provided: Patient instructed to cont prior HEP.  Exercises were reviewed and Jessica was able to demonstrate them prior to the end of the session.  Jessica demonstrated good  understanding of the education provided.     See EMR under Patient Instructions for exercises provided 5/2/22.    Assessment     Pt reported sharp pain in the R knee after attempting step up exercise.  Avoided weight bearing activities today due to patient's irritable symptoms.  Pt otherwise tolerated treatment well.   Will progress as tolerated.   Jessica Is progressing well towards her goals.   Pt prognosis is Good.     Pt will continue to benefit from skilled outpatient physical therapy to address the deficits listed in the problem list box on initial evaluation, provide pt/family education and to maximize pt's level of independence in the home and community environment.     Pt's spiritual, cultural and educational needs considered and pt agreeable to plan of care and goals.    Anticipated barriers to physical therapy: None    Goals:   Short Term Goals (4 Weeks):   1) Pt will demonstrate compliance with initial home exercise program as prescribed by physical therapist to improve independence with management of condition.  2) Pt to improve active range of  motion in lumbar spine to 0% limitations to allow for improved functional mobility.  3) Pt to report low back pain of <4/10 at worst during prolonged standing to improve tolerance to ADLs.  4) Pt to improve SLS to 10s in BLE.      Long Term Goals (8 Weeks):   1) Pt to achieve <50% limitation as measured by the FOTO to demonstrate decreased low back pain disability.  2) Pt to increase strength to at least 5/5 of muscles tested to allow for improvement in functional activities such as performing chores.  3) Pt to improve SLS to 30s in BLE.  4) Pt to improve TUG to 13s to demonstrate improved functional mobility and decreased fall risk.   5) Pt to tolerate standing and walking for community distances with <2/10 pain in low back to improve mobility with IADL's.    Plan     Plan of care Certification: 5/2/2022 to 9/30/22.     Outpatient Physical Therapy 2 times weekly for 8 weeks to include the following interventions: Aquatic Therapy, Electrical Stimulation  , Manual Therapy, Moist Heat/ Ice, Neuromuscular Re-ed, Patient Education, Therapeutic Activities and Therapeutic Exercise.     Krupa Hoang, PTA

## 2022-08-19 ENCOUNTER — PATIENT MESSAGE (OUTPATIENT)
Dept: FAMILY MEDICINE | Facility: CLINIC | Age: 67
End: 2022-08-19
Payer: MEDICARE

## 2022-08-19 DIAGNOSIS — U07.1 COVID-19 VIRUS INFECTION: Primary | ICD-10-CM

## 2022-08-19 NOTE — TELEPHONE ENCOUNTER
Paxlovid sent to pharmacy. She needs to hold simvastatin while taking.    I have signed for the following orders AND/OR meds.  Please call the patient and ask the patient to schedule the testing AND/OR inform about any medications that were sent. Medications have been sent to pharmacy listed below      No orders of the defined types were placed in this encounter.      Medications Ordered This Encounter   Medications    nirmatrelvir-ritonavir 300 mg (150 mg x 2)-100 mg copackaged tablets (EUA)     Sig: Take 3 tablets by mouth 2 (two) times daily for 5 days. Each dose contains 2 nirmatrelvir (pink tablets) and 1 ritonavir (white tablet). Take all 3 tablets together     Dispense:  30 tablet     Refill:  0     Order Specific Question:   Per EUA criteria, patient has a positive COVID test AND symptom onset </= 5 days     Answer:   Yes         [facility] [place] - [address] [address]  [address]  [place] [address]  Phone: [phone] Fax: [phone]    [facility] - [address]  [address] [facility] [address]  [place] [address]  Phone: [phone] Fax: [phone]

## 2022-08-29 ENCOUNTER — CLINICAL SUPPORT (OUTPATIENT)
Dept: REHABILITATION | Facility: HOSPITAL | Age: 67
End: 2022-08-29
Payer: MEDICARE

## 2022-08-29 DIAGNOSIS — Z74.09 IMPAIRED FUNCTIONAL MOBILITY, BALANCE, GAIT, AND ENDURANCE: ICD-10-CM

## 2022-08-29 DIAGNOSIS — G89.29 CHRONIC BILATERAL LOW BACK PAIN WITHOUT SCIATICA: Primary | ICD-10-CM

## 2022-08-29 DIAGNOSIS — M54.50 CHRONIC BILATERAL LOW BACK PAIN WITHOUT SCIATICA: Primary | ICD-10-CM

## 2022-08-29 PROCEDURE — 97110 THERAPEUTIC EXERCISES: CPT | Mod: PO

## 2022-08-29 PROCEDURE — 97112 NEUROMUSCULAR REEDUCATION: CPT | Mod: PO

## 2022-08-29 NOTE — PROGRESS NOTES
Physical Therapy Daily Treatment Note     Name: Jessica Rojas  Clinic Number: [id number]    Therapy Diagnosis:   Encounter Diagnoses   Name Primary?    Chronic bilateral low back pain without sciatica Yes    Impaired functional mobility, balance, gait, and endurance      Physician: Enriqueta Gan PA*    Visit Date: 8/29/2022  Physician Orders: PT Eval and Treat  Medical Diagnosis from Referral: Z98.1 (ICD-10-CM) - S/P lumbar fusion   Evaluation Date: 5/2/2022   Authorization Period Expiration: 12/31/2022  Plan of Care Expiration: 9/30/22  Visit # / Visits authorized: 12 / 25    Time In: 7:45  Time Out: 8:30  Total Billable Time: 40 minutes    Precautions: Diabetes, Fall, cancer and L4/L5 Fusion 3/17/22    Subjective     Pt reports: She got covid again about 2 weeks ago and has been struggling since then. She feels very fatigued and gets tired easily. Prior to getting covid, she had stopped using the rollator inside the home, but she has since return to using it again because she feels too off balance now. Her back is doing ok, but she still gets pain when she tries to lift things or is standing for a long time, such as when trying to cook or put away dishes.    She was compliant with home exercise program.  Response to previous treatment: increased soreness and fatigue  Functional change:  None new reported    Pain: 0/10  Location: low back     Objective     Jessica received therapeutic exercises to develop strength, endurance, ROM and flexibility for 30 minutes including:  Recumbent bike, 5 mins, seat 6  Calf stretch ramp, 3x 30s  Seated hamstring stretch, 3x 30s- not performed  Supine hip flexor stretch, 2x 1 min  DKTC w/ orange ball, 2 mins - w/ heel digs  LTR w/ orange ball, 2 mins - unable to finish due to fatigue  SLR, 2x10 B  Sit to stands, 3x5  Standing hip ext, 2x10 w/ yellow band - not performed  Standing hip abd, 2x10 w/ yellow band- not performed  Precor leg press, 3x5 20#- not performed    Jessica  participated in neuromuscular re-education activities to improve: Coordination, Proprioception and Posture for 10 minutes. The following activities were included:  TA activation, 10x 10s hold - not performed  Iso crunch w/ orange ball, 2 mins w/ 3s hold  Supine clams w/ blue band, 3x10 B  Step ups, L side only, 1x10    Jessica participated in gait training to improve functional mobility and safety for 00 minutes, including:  Stepping over small hurdles - step to, with cane and gait belt    Home Exercises Provided and Patient Education Provided     Education provided:   - Continue HEP    Written Home Exercises Provided: Patient instructed to cont prior HEP.  Exercises were reviewed and Jessica was able to demonstrate them prior to the end of the session.  Jessica demonstrated good  understanding of the education provided.     See EMR under Patient Instructions for exercises provided  5/2/22 .    Assessment     Pt tolerated tx well. Does demonstrate increased fatigue as compared to prior to chago covid. She did require more rest breaks and was unable to finish a few exercises due to fatigue. Reduced the intensity of the session due to fatigue. Will progress as tolerated.   Jessica Is progressing well towards her goals.   Pt prognosis is Good.     Pt will continue to benefit from skilled outpatient physical therapy to address the deficits listed in the problem list box on initial evaluation, provide pt/family education and to maximize pt's level of independence in the home and community environment.     Pt's spiritual, cultural and educational needs considered and pt agreeable to plan of care and goals.    Anticipated barriers to physical therapy: None    Goals:   Short Term Goals (4 Weeks):   1) Pt will demonstrate compliance with initial home exercise program as prescribed by physical therapist to improve independence with management of condition. - MET  2) Pt to improve active range of motion in lumbar spine to 0%  limitations to allow for improved functional mobility. - MET  3) Pt to report low back pain of <4/10 at worst during prolonged standing to improve tolerance to ADLs. - NOT MET  4) Pt to improve SLS to 10s in BLE. - NOT MET     Long Term Goals (8 Weeks):   1) Pt to achieve <50% limitation as measured by the FOTO to demonstrate decreased low back pain disability. - NOT MET  2) Pt to increase strength to at least 5/5 of muscles tested to allow for improvement in functional activities such as performing chores. - NOT MET  3) Pt to improve SLS to 30s in BLE. - NOT MET  4) Pt to improve TUG to 13s to demonstrate improved functional mobility and decreased fall risk. - NOT MET  5) Pt to tolerate standing and walking for community distances with <2/10 pain in low back to improve mobility with IADL's. - NOT MET    Plan     Plan of care Certification: 5/2/2022 to 9/30/22.     Outpatient Physical Therapy 2 times weekly for 8 weeks to include the following interventions: Aquatic Therapy, Electrical Stimulation  , Manual Therapy, Moist Heat/ Ice, Neuromuscular Re-ed, Patient Education, Therapeutic Activities and Therapeutic Exercise.     Collin Chandra, PT

## 2022-08-29 NOTE — PLAN OF CARE
[facility]  Physical Therapy Re-Assessment    Name: Jessica Rojas  Clinic Number: [id number]    Therapy Diagnosis:   Encounter Diagnoses   Name Primary?    Chronic bilateral low back pain without sciatica Yes    Impaired functional mobility, balance, gait, and endurance      Physician: Enriqueta Gan PA*    Physician Orders: PT Eval and Treat   Medical Diagnosis from Referral: Z98.1 (ICD-10-CM) - S/P lumbar fusion   Evaluation Date: 7/22/2022  Authorization Period Expiration: 12/31/2022  Plan of Care Expiration: 9/30/22  Visit # / Visits authorized: 12 / 25    Time In: 7:45  Time Out: 8:30  Total Billable Time: 40 minutes    Precautions: Standard, Fall, cancer and L4/L5 Fusion 3/17/22    Subjective   Date of onset: L4/L5 Fusion on 3/17/22  History of current condition - Jessica reports: She got covid again about 2 weeks ago and has been struggling since then. She feels very fatigued and gets tired easily. Prior to getting covid, she had stopped using the rollator inside the home, but she has since return to using it again because she feels too off balance now. Her back is doing ok, but she still gets pain when she tries to lift things or is standing for a long time, such as when trying to cook or put away dishes.      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    Malignant carcinoid tumor of the bronchus and lung 5/2014    Mediastinal lymphadenopathy 8/26/2015    Obesity, morbid 5/6/2014    Parkinsons 10/2017    Pituitary adenoma  s    took parladel for 3 yrs    Pneumonia     Postoperative hypothyroidism 2017    - s/p total thyroidectomy in 2016 (parathyroids intact) with post op hypothyroidism; on synthroid    Pulmonary nodules 2018    Thyroid disease     Wheeze 2014       Surgical History:   Jessica Rojas  has a past surgical history that includes  section (, ); Bronchoscopy (2014, 2014); Tonsillectomy (); Appendectomy (); Lung removal, partial (Right, ); Thyroidectomy (2016); Breast cyst aspiration; Tubal ligation (); Esophagogastroduodenoscopy (N/A, 2021); Colonoscopy (N/A, 2021); Colonoscopy (N/A, 2021); Epidural steroid injection into lumbar spine (N/A, 2022); Eye surgery; Minimally invasive transforaminal lumbar interbody fusion (TLIF) (N/A, 3/17/2022); and Colonoscopy (N/A, 2022).    Medications:   Jessica has a current medication list which includes the following prescription(s): acetaminophen, alcohol swabs, amantadine hcl, dexcom g6 transmitter, carbidopa-levodopa  mg, cholecalciferol (vitamin d3), cyanocobalamin, diclofenac sodium, fish oil-omega-3 fatty acids, gabapentin, insulin aspart u-100, lamotrigine2.5% meloxicam 0.09% LIDOcaine2% prilocaine2% topical cream, lancing device, lantus solostar u-100 insulin, levothyroxine, melatonin, montelukast, folic acid/multivit-min/lutein, mupirocin calcium 2% nasl oint, naproxen sodium, pramipexole, selegiline, simvastatin, UNABLE TO FIND, and valsartan-hydrochlorothiazide.    Allergies:   Review of patient's allergies indicates:   Allergen Reactions    Propranolol Nausea And Vomiting     Drops pressure and raised sugar    Lipitor [atorvastatin] Other (See Comments)     Myalgia, muscle pain    Robaxin [methocarbamol]      Skin inside mouth started peeling.        Imaging,   CT scan films: 2022   FINDINGS: No acute fracture or traumatic subluxation.  Vertebral bodies are normal in height.   Mild anterolisthesis L4 on L5 is stable.  Interbody and posterior fusion and decompression changes at L4-5 are stable.  No evidence of hardware loosening. Paravertebral soft tissues are normal.    Xray: 07/05/2022   FINDINGS: There has been posterior netta and pedicle screw fusion at L4-5 accompanied by interbody device placement.  Mild L4-5 anterolisthesis is unchanged.  The hardware components appear intact and engaged.  Mild degenerative disc changes are present at all non operative lumbar levels.  There is abundant calcification of the abdominal aorta.    Prior Therapy: Yes, for same condition  Social History: Pt lives with their spouse  Occupation: Retired  Prior Level of Function: Moderately limited due to recent L4/L5 fusion  Current Level of Function: Continues to use rollator, performing easy house chores, independent in ADLs    Pain:  Current 0/10, worst 8/10, best 0/10   Location: bilateral low back   Description: Throbbing  Aggravating Factors: walking and standing for a long time  Easing Factors:  sitting and laying down    Pts goals: Get rid of rollator, be able to walk more    Red Flag Screening:   Cough  Sneeze  Strain: (--)  Bladder/ bowel: (--)  Falls: (--)  Night pain: (--)  Unexplained weight loss: (--)  General health: No signs of distress    Objective     Observation: Good strength, poor endurance. Able to ambulate for TUG without AD, no LOB, no buckling, slow isaac.     Lumbar Range of Motion:    % Limitation Pain   Flexion 0   No        Extension 0   No        Left Side Bending 0 No        Right Side Bending 0 No        Left rotation   0 No        Right Rotation   0 No             Lower Extremity Strength  Right LE  Left LE    Knee extension: 5/5 Knee extension: 5/5   Knee flexion: 5/5 Knee flexion: 5/5   Hip flexion: 5/5 Hip flexion: 5/5   Hip extension:  NT Hip extension: NT   Hip abduction: 5/5 Hip abduction: 5/5   Ankle dorsiflexion: 4+/5 Ankle dorsiflexion: 5/5   Ankle plantarflexion:  5/5 Ankle plantarflexion: 5/5       Funational Tests:  30s Sit to Stand: 11 reps  TUs  SLS R: 3s  SLS L: 3s    Neuro Dynamic Testing:    Sciatic nerve:      SLR: R = (-)     L = (-)    Palpation: No TTP    Sensation: Intact to light touch from L2-S1 of BLE    Flexibility: Decreased hamstring flexibility      CMS Impairment/Limitation/Restriction for FOTO LUMBAR SPINE Survey    Therapist reviewed FOTO scores for Jessica Rojas on 2022.   FOTO documents entered into [employer] - see Media section.    Limitation Score: 70%  Category: Mobility         TREATMENT   Treatment Time In: 7:50  Treatment Time Out: 8:30  Total Treatment time separate from Evaluation: 40 minutes    Jessica received therapeutic exercises to develop strength, endurance, ROM and flexibility for 30 minutes including:  Recumbent bike, 5 mins, seat 6  Calf stretch ramp, 3x 30s  Seated hamstring stretch, 3x 30s- not performed  Supine hip flexor stretch, 2x 1 min  DKTC w/ orange ball, 2 mins - w/ heel digs  LTR w/ orange ball, 2 mins - unable to finish due to fatigue  SLR, 2x10 B  Sit to stands, 3x5   Standing hip ext, 2x10 w/ yellow band - not performed  Standing hip abd, 2x10 w/ yellow band- not performed  Precor leg press, 3x5 20#- not performed    Jessica participated in neuromuscular re-education activities to improve: Coordination, Proprioception and Posture for 10 minutes. The following activities were included:  TA activation, 10x 10s hold - not performed  Iso crunch w/ orange ball, 2 mins w/ 3s hold  Supine clams w/ blue band, 3x10 B  Step ups, L side only, 1x10    Jessica participated in gait training to improve functional mobility and safety for 00 minutes, including:  Stepping over small hurdles - step to, with cane and gait belt       Home Exercises and Patient Education Provided    Education provided:   - Continue HEP    Written Home Exercises Provided: Patient instructed to cont prior HEP.  Exercises were reviewed and Jessica was able to  demonstrate them prior to the end of the session.  Jessica demonstrated good  understanding of the education provided.     See EMR under Patient Instructions for exercises provided  5/02/22 .    Assessment   Jessica is a 66 y.o. female referred to outpatient Physical Therapy with a medical diagnosis of Z98.1 (ICD-10-CM) - S/P lumbar fusion. Pt has made a little progress since starting PT. Unfortunately she contracted covid in the last 2 weeks and has had a set back since then. She likely would have shown further progress had she not contracted covid. However, she still demonstrates better endurance as shown by increased reps with the 30s sit to stand test. She retains the ability to perform the tug without use of the rollator, good stability, slow isaac, shortened step length. Will continue to benefit from skilled PT to address remaining deficits in strength, endurance, and functional mobility.     Pt prognosis is Good.   Pt will benefit from skilled outpatient Physical Therapy to address the deficits stated above and in the chart below, provide pt/family education, and to maximize pt's level of independence.     Plan of care discussed with patient: Yes  Pt's spiritual, cultural and educational needs considered and patient is agreeable to the plan of care and goals as stated below:     Anticipated Barriers for therapy: None    Medical Necessity is demonstrated by the following  History  Co-morbidities and personal factors that may impact the plan of care Co-morbidities:   CKD stage 3, COPD/asthma, diabetes, high BMI, history of cancer, HTN, prior lumbar surgery and as noted above    Personal Factors:   age     high   Examination  Body Structures and Functions, activity limitations and participation restrictions that may impact the plan of care Body Regions:   back  lower extremities    Body Systems:    ROM  strength  balance  gait  transfers  motor control    Participation Restrictions:   Ambulating w/  rollator    Activity limitations:   Learning and applying knowledge  no deficits    General Tasks and Commands  no deficits    Communication  no deficits    Mobility  lifting and carrying objects  walking  moving around using equipment (WC)    Self care  no deficits    Domestic Life  cooking  doing house work (cleaning house, washing dishes, laundry)    Interactions/Relationships  no deficits    Life Areas  no deficits    Community and Social Life  recreation and leisure         high   Clinical Presentation stable and uncomplicated low   Decision Making/ Complexity Score: low     Goals:   Short Term Goals (4 Weeks):   1) Pt will demonstrate compliance with initial home exercise program as prescribed by physical therapist to improve independence with management of condition. - MET  2) Pt to improve active range of motion in lumbar spine to 0% limitations to allow for improved functional mobility. - MET  3) Pt to report low back pain of <4/10 at worst during prolonged standing to improve tolerance to ADLs. - NOT MET  4) Pt to improve SLS to 10s in BLE. - NOT MET     Long Term Goals (8 Weeks):   1) Pt to achieve <50% limitation as measured by the FOTO to demonstrate decreased low back pain disability. - NOT MET  2) Pt to increase strength to at least 5/5 of muscles tested to allow for improvement in functional activities such as performing chores. - NOT MET  3) Pt to improve SLS to 30s in BLE. - NOT MET  4) Pt to improve TUG to 13s to demonstrate improved functional mobility and decreased fall risk. - NOT MET  5) Pt to tolerate standing and walking for community distances with <2/10 pain in low back to improve mobility with IADL's. - NOT MET    Plan   Plan of care Certification: 7/22/2022 to 9/30/22.    Outpatient Physical Therapy 2 times weekly for 8 weeks to include the following interventions: Aquatic Therapy, Gait Training, Manual Therapy, Moist Heat/ Ice, Neuromuscular Re-ed, Patient Education, Therapeutic  Activities and Therapeutic Exercise.     Collin Chandra, PT

## 2022-08-31 ENCOUNTER — CLINICAL SUPPORT (OUTPATIENT)
Dept: REHABILITATION | Facility: HOSPITAL | Age: 67
End: 2022-08-31
Payer: MEDICARE

## 2022-08-31 DIAGNOSIS — Z74.09 IMPAIRED FUNCTIONAL MOBILITY, BALANCE, GAIT, AND ENDURANCE: ICD-10-CM

## 2022-08-31 DIAGNOSIS — M54.50 CHRONIC BILATERAL LOW BACK PAIN WITHOUT SCIATICA: Primary | ICD-10-CM

## 2022-08-31 DIAGNOSIS — G89.29 CHRONIC BILATERAL LOW BACK PAIN WITHOUT SCIATICA: Primary | ICD-10-CM

## 2022-08-31 PROCEDURE — 97112 NEUROMUSCULAR REEDUCATION: CPT | Mod: PO

## 2022-08-31 PROCEDURE — 97110 THERAPEUTIC EXERCISES: CPT | Mod: PO

## 2022-08-31 NOTE — PROGRESS NOTES
Physical Therapy Daily Treatment Note     Name: Jessica Rojas  Clinic Number: [id number]    Therapy Diagnosis:   Encounter Diagnoses   Name Primary?    Chronic bilateral low back pain without sciatica Yes    Impaired functional mobility, balance, gait, and endurance      Physician: Enriqueta Gan PA*    Visit Date: 8/31/2022  Physician Orders: PT Eval and Treat  Medical Diagnosis from Referral: Z98.1 (ICD-10-CM) - S/P lumbar fusion   Evaluation Date: 5/2/2022   Authorization Period Expiration: 12/31/2022  Plan of Care Expiration: 9/30/22  Visit # / Visits authorized: 13 / 25    Time In: 7:40  Time Out: 8:20  Total Billable Time: 40 minutes    Precautions: Diabetes, Fall, cancer and L4/L5 Fusion 3/17/22    Subjective     Pt reports: She is feeling very tired this morning, muscles are very tired. Been tired since yesterday. No pain in her low back, but is still having pain in the right knee. She plans to ask for a cortisone injection in the knee.   She was compliant with home exercise program.  Response to previous treatment: increased soreness and fatigue  Functional change:  None new reported    Pain: 0/10  Location: low back     Objective     Jessica received therapeutic exercises to develop strength, endurance, ROM and flexibility for 30 minutes including:   Recumbent bike, 5 mins, seat 6  Calf stretch ramp, 3x 30s  Seated hamstring stretch, 3x 30s- not performed  Supine hip flexor stretch, 2x 1 min  DKTC w/ orange ball, 2 mins - w/ heel digs  LTR w/ orange ball, 2 mins - unable to finish due to fatigue  SLR, 2x10 B 1#  Sit to stands, 4x5 3# DB    Standing hip ext, 2x10   Standing hip abd, 2x10   Precor leg press, 3x5 20#- not performed    Jessica participated in neuromuscular re-education activities to improve: Coordination, Proprioception and Posture for 10 minutes. The following activities were included:  Iso crunch w/ orange ball, 2 mins w/ 3s hold  Supine clams w/ blue band, 3x10 B  Step ups, 1x10  JON Lopez participated in gait training to improve functional mobility and safety for 00 minutes, including:  Stepping over small hurdles - step to, with cane and gait belt    Home Exercises Provided and Patient Education Provided     Education provided:   - Continue HEP    Written Home Exercises Provided: Patient instructed to cont prior HEP.  Exercises were reviewed and Jessica was able to demonstrate them prior to the end of the session.  Jessica demonstrated good  understanding of the education provided.     See EMR under Patient Instructions for exercises provided  5/2/22 .    Assessment     Pt tolerated tx well. Better endurance today, required less rest breaks in between exercises. Able to progress intensity of session without issue. Will continue to progress as tolerated.     Jessica Is progressing well towards her goals.   Pt prognosis is Good.     Pt will continue to benefit from skilled outpatient physical therapy to address the deficits listed in the problem list box on initial evaluation, provide pt/family education and to maximize pt's level of independence in the home and community environment.     Pt's spiritual, cultural and educational needs considered and pt agreeable to plan of care and goals.    Anticipated barriers to physical therapy: None    Goals:   Short Term Goals (4 Weeks):   1) Pt will demonstrate compliance with initial home exercise program as prescribed by physical therapist to improve independence with management of condition. - MET  2) Pt to improve active range of motion in lumbar spine to 0% limitations to allow for improved functional mobility. - MET  3) Pt to report low back pain of <4/10 at worst during prolonged standing to improve tolerance to ADLs. - NOT MET  4) Pt to improve SLS to 10s in BLE. - NOT MET     Long Term Goals (8 Weeks):   1) Pt to achieve <50% limitation as measured by the FOTO to demonstrate decreased low back pain disability. - NOT MET  2) Pt to increase  strength to at least 5/5 of muscles tested to allow for improvement in functional activities such as performing chores. - NOT MET  3) Pt to improve SLS to 30s in BLE. - NOT MET  4) Pt to improve TUG to 13s to demonstrate improved functional mobility and decreased fall risk. - NOT MET  5) Pt to tolerate standing and walking for community distances with <2/10 pain in low back to improve mobility with IADL's. - NOT MET    Plan     Plan of care Certification: 5/2/2022 to 9/30/22.     Outpatient Physical Therapy 2 times weekly for 8 weeks to include the following interventions: Aquatic Therapy, Electrical Stimulation  , Manual Therapy, Moist Heat/ Ice, Neuromuscular Re-ed, Patient Education, Therapeutic Activities and Therapeutic Exercise.     Collin Chandra, PT

## 2022-09-01 ENCOUNTER — PATIENT MESSAGE (OUTPATIENT)
Dept: FAMILY MEDICINE | Facility: CLINIC | Age: 67
End: 2022-09-01
Payer: MEDICARE

## 2022-09-06 ENCOUNTER — CLINICAL SUPPORT (OUTPATIENT)
Dept: REHABILITATION | Facility: HOSPITAL | Age: 67
End: 2022-09-06
Payer: MEDICARE

## 2022-09-06 DIAGNOSIS — Z74.09 IMPAIRED FUNCTIONAL MOBILITY, BALANCE, GAIT, AND ENDURANCE: ICD-10-CM

## 2022-09-06 DIAGNOSIS — M54.50 CHRONIC BILATERAL LOW BACK PAIN WITHOUT SCIATICA: Primary | ICD-10-CM

## 2022-09-06 DIAGNOSIS — G89.29 CHRONIC BILATERAL LOW BACK PAIN WITHOUT SCIATICA: Primary | ICD-10-CM

## 2022-09-06 PROCEDURE — 97112 NEUROMUSCULAR REEDUCATION: CPT | Mod: PO,CQ

## 2022-09-06 PROCEDURE — 97110 THERAPEUTIC EXERCISES: CPT | Mod: PO,CQ

## 2022-09-06 NOTE — PROGRESS NOTES
Physical Therapy Daily Treatment Note     Name: Jessica Rojas  Clinic Number: [id number]    Therapy Diagnosis:   Encounter Diagnoses   Name Primary?    Chronic bilateral low back pain without sciatica Yes    Impaired functional mobility, balance, gait, and endurance      Physician: Enriqueta Gan PA*    Visit Date: 9/6/2022  Physician Orders: PT Eval and Treat  Medical Diagnosis from Referral: Z98.1 (ICD-10-CM) - S/P lumbar fusion   Evaluation Date: 5/2/2022   Authorization Period Expiration: 12/31/2022  Plan of Care Expiration: 9/30/22  Visit # / Visits authorized: 14/ 25    Time In: 7:00 am  Time Out: 750 am  Total Billable Time: 50 minutes    Precautions: Diabetes, Fall, cancer and L4/L5 Fusion 3/17/22    Subjective     Pt reports: She is still feeling the affects from her second bout of COVID. Pt reports her muscles still fatigue quickly.  Pt reports she didn't have any more soreness than she normally has after last visit.  Pt is riding her bike 10 minutes at a time twice a day at home.  Pt has noticed scooting in bed has gotten easier.  Pt is not using the walker at home.  Pt goes back to neurosurgeon on the 12th.  She was compliant with home exercise program.  Response to previous treatment: increased fatigue  Functional change:  None new reported    Pain: 0/10  Location: low back     Objective     Jessica received therapeutic exercises to develop strength, endurance, ROM and flexibility for 42 minutes including:   Recumbent bike, 5 mins, seat 6  Calf stretch ramp, 3x 30s  Seated hamstring stretch, 3x 30s- not performed  Supine hip flexor stretch, 2x 1 min  DKTC w/ orange ball, 2 mins - w/ heel digs  LTR w/ orange ball, 2 mins - not performed  SLR, 3x10 B 1#  Sit to stands, 4x5 4# DB    Standing hip ext, 2x10   Standing hip abd, 2x10   Precor leg press, 3x5 20#    Jessica participated in neuromuscular re-education activities to improve: Coordination, Proprioception and Posture for 8 minutes. The following  activities were included:  Iso crunch w/ orange ball, 2 mins w/ 3s hold  Supine clams w/ blue band, 3x10 B  Step ups, 1x10 L- not performed    Jessica participated in gait training to improve functional mobility and safety for 00 minutes, including:  Stepping over small hurdles - step to, with cane and gait belt    Home Exercises Provided and Patient Education Provided     Education provided:   - Continue HEP    Written Home Exercises Provided: Patient instructed to cont prior HEP.  Exercises were reviewed and Jessica was able to demonstrate them prior to the end of the session.  Jessica demonstrated good  understanding of the education provided.     See EMR under Patient Instructions for exercises provided  5/2/22 .    Assessment     Pt tolerated tx well. Pt able to progress reps in a few exercises and also increase time she was able to participate in therapy.  Pt is performing exercises at home throughout the day working on her endurance.  Will continue to progress as tolerated.     Jessica Is progressing well towards her goals.   Pt prognosis is Good.     Pt will continue to benefit from skilled outpatient physical therapy to address the deficits listed in the problem list box on initial evaluation, provide pt/family education and to maximize pt's level of independence in the home and community environment.     Pt's spiritual, cultural and educational needs considered and pt agreeable to plan of care and goals.    Anticipated barriers to physical therapy: None    Goals:   Short Term Goals (4 Weeks):   1) Pt will demonstrate compliance with initial home exercise program as prescribed by physical therapist to improve independence with management of condition. - MET  2) Pt to improve active range of motion in lumbar spine to 0% limitations to allow for improved functional mobility. - MET  3) Pt to report low back pain of <4/10 at worst during prolonged standing to improve tolerance to ADLs. - NOT MET  4) Pt to improve  SLS to 10s in BLE. - NOT MET     Long Term Goals (8 Weeks):   1) Pt to achieve <50% limitation as measured by the FOTO to demonstrate decreased low back pain disability. - NOT MET  2) Pt to increase strength to at least 5/5 of muscles tested to allow for improvement in functional activities such as performing chores. - NOT MET  3) Pt to improve SLS to 30s in BLE. - NOT MET  4) Pt to improve TUG to 13s to demonstrate improved functional mobility and decreased fall risk. - NOT MET  5) Pt to tolerate standing and walking for community distances with <2/10 pain in low back to improve mobility with IADL's. - NOT MET    Plan     Plan of care Certification: 5/2/2022 to 9/30/22.     Outpatient Physical Therapy 2 times weekly for 8 weeks to include the following interventions: Aquatic Therapy, Electrical Stimulation  , Manual Therapy, Moist Heat/ Ice, Neuromuscular Re-ed, Patient Education, Therapeutic Activities and Therapeutic Exercise.     Krupa Hoang, PTA

## 2022-09-08 ENCOUNTER — CLINICAL SUPPORT (OUTPATIENT)
Dept: REHABILITATION | Facility: HOSPITAL | Age: 67
End: 2022-09-08
Payer: MEDICARE

## 2022-09-08 DIAGNOSIS — Z74.09 IMPAIRED FUNCTIONAL MOBILITY, BALANCE, GAIT, AND ENDURANCE: ICD-10-CM

## 2022-09-08 DIAGNOSIS — G89.29 CHRONIC BILATERAL LOW BACK PAIN WITHOUT SCIATICA: Primary | ICD-10-CM

## 2022-09-08 DIAGNOSIS — M54.50 CHRONIC BILATERAL LOW BACK PAIN WITHOUT SCIATICA: Primary | ICD-10-CM

## 2022-09-08 PROCEDURE — 97110 THERAPEUTIC EXERCISES: CPT | Mod: PO

## 2022-09-08 PROCEDURE — 97112 NEUROMUSCULAR REEDUCATION: CPT | Mod: PO

## 2022-09-08 NOTE — PROGRESS NOTES
Physical Therapy Daily Treatment Note     Name: Jessica Rojas  Clinic Number: [id number]    Therapy Diagnosis:   Encounter Diagnoses   Name Primary?    Chronic bilateral low back pain without sciatica Yes    Impaired functional mobility, balance, gait, and endurance      Physician: Enriqueta Gan PA*    Visit Date: 9/8/2022  Physician Orders: PT Eval and Treat  Medical Diagnosis from Referral: Z98.1 (ICD-10-CM) - S/P lumbar fusion   Evaluation Date: 5/2/2022   Authorization Period Expiration: 12/31/2022  Plan of Care Expiration: 9/30/22  Visit # / Visits authorized: 14/ 25    Time In: 9:18 am  Time Out: 10:00 am  Total Billable Time: 42 minutes    Precautions: Diabetes, Fall, cancer and L4/L5 Fusion 3/17/22    Subjective     Pt reports: her back is feeling good, but she is still having knee pain. She is starting to be able to do more in the house before getting tired, but feels done by 6pm. Is modifying doing some house chores by sitting on a stool while performing them. Is riding her bike for 10 mins 2x per day, usually taking a break around 5 mins.   She was compliant with home exercise program.  Response to previous treatment: increased fatigue  Functional change:  None new reported    Pain: 0/10  Location: low back     Objective     Jessica received therapeutic exercises to develop strength, endurance, ROM and flexibility for 34 minutes including:   Recumbent bike, 10 mins lvl 2  Calf stretch ramp, 3x 30s  Seated hamstring stretch, 3x 30s- not performed  Supine hip flexor stretch, 2x 1 min  DKTC w/ orange ball, 2 mins - w/ heel digs  LTR w/ orange ball, 2 mins  SLR, 3x10 B 1#  Sit to stands, 5x5 5# DB    Standing hip ext, 2x10   Standing hip abd, 2x10   Precor leg press, 3x5 20#    Jessica participated in neuromuscular re-education activities to improve: Coordination, Proprioception and Posture for 8 minutes. The following activities were included:  Iso crunch w/ orange ball, 2 mins w/ 3s hold  Supine clams  w/ blue band, 3x10 B  Step ups, 1x10 L- not performed    Jessica participated in gait training to improve functional mobility and safety for 00 minutes, including:  Stepping over small hurdles - step to, with cane and gait belt    Home Exercises Provided and Patient Education Provided     Education provided:   - Continue HEP    Written Home Exercises Provided: Patient instructed to cont prior HEP.  Exercises were reviewed and Jessica was able to demonstrate them prior to the end of the session.  Jessica demonstrated good  understanding of the education provided.     See EMR under Patient Instructions for exercises provided  5/2/22 .    Assessment     Pt tolerated tx well. Able to perform 10 minutes on the bike, with resistance, without any rest breaks. Increased intensity of some exercises without issue. Will continue to progress as tolerated.     Jessica Is progressing well towards her goals.   Pt prognosis is Good.     Pt will continue to benefit from skilled outpatient physical therapy to address the deficits listed in the problem list box on initial evaluation, provide pt/family education and to maximize pt's level of independence in the home and community environment.     Pt's spiritual, cultural and educational needs considered and pt agreeable to plan of care and goals.    Anticipated barriers to physical therapy: None    Goals:   Short Term Goals (4 Weeks):   1) Pt will demonstrate compliance with initial home exercise program as prescribed by physical therapist to improve independence with management of condition. - MET  2) Pt to improve active range of motion in lumbar spine to 0% limitations to allow for improved functional mobility. - MET  3) Pt to report low back pain of <4/10 at worst during prolonged standing to improve tolerance to ADLs. - NOT MET  4) Pt to improve SLS to 10s in BLE. - NOT MET     Long Term Goals (8 Weeks):   1) Pt to achieve <50% limitation as measured by the FOTO to demonstrate  decreased low back pain disability. - NOT MET  2) Pt to increase strength to at least 5/5 of muscles tested to allow for improvement in functional activities such as performing chores. - NOT MET  3) Pt to improve SLS to 30s in BLE. - NOT MET  4) Pt to improve TUG to 13s to demonstrate improved functional mobility and decreased fall risk. - NOT MET  5) Pt to tolerate standing and walking for community distances with <2/10 pain in low back to improve mobility with IADL's. - NOT MET    Plan     Plan of care Certification: 5/2/2022 to 9/30/22.     Outpatient Physical Therapy 2 times weekly for 8 weeks to include the following interventions: Aquatic Therapy, Electrical Stimulation  , Manual Therapy, Moist Heat/ Ice, Neuromuscular Re-ed, Patient Education, Therapeutic Activities and Therapeutic Exercise.     Coliln Chandra, PT

## 2022-09-12 ENCOUNTER — HOSPITAL ENCOUNTER (OUTPATIENT)
Dept: RADIOLOGY | Facility: HOSPITAL | Age: 67
Discharge: HOME | End: 2022-09-12
Attending: PHYSICIAN ASSISTANT
Payer: MEDICARE

## 2022-09-12 ENCOUNTER — OFFICE VISIT (OUTPATIENT)
Dept: NEUROSURGERY | Facility: CLINIC | Age: 67
End: 2022-09-12
Payer: MEDICARE

## 2022-09-12 VITALS
DIASTOLIC BLOOD PRESSURE: 83 MMHG | HEIGHT: 61 IN | HEART RATE: 96 BPM | WEIGHT: 218.06 LBS | SYSTOLIC BLOOD PRESSURE: 140 MMHG | BODY MASS INDEX: 41.17 KG/M2

## 2022-09-12 DIAGNOSIS — Z98.1 S/P LUMBAR FUSION: ICD-10-CM

## 2022-09-12 DIAGNOSIS — Z98.1 S/P LUMBAR FUSION: Primary | ICD-10-CM

## 2022-09-12 PROCEDURE — 1159F MED LIST DOCD IN RCRD: CPT | Mod: CPTII,S$GLB,, | Performed by: NEUROLOGICAL SURGERY

## 2022-09-12 PROCEDURE — 72131 CT LUMBAR SPINE W/O DYE: CPT | Mod: TC,PO

## 2022-09-12 PROCEDURE — 1126F PR PAIN SEVERITY QUANTIFIED, NO PAIN PRESENT: ICD-10-PCS | Mod: CPTII,S$GLB,, | Performed by: NEUROLOGICAL SURGERY

## 2022-09-12 PROCEDURE — 99214 OFFICE O/P EST MOD 30 MIN: CPT | Mod: S$GLB,,, | Performed by: NEUROLOGICAL SURGERY

## 2022-09-12 PROCEDURE — 72131 CT LUMBAR SPINE WITHOUT CONTRAST: ICD-10-PCS | Mod: 26,,, | Performed by: RADIOLOGY

## 2022-09-12 PROCEDURE — 3077F PR MOST RECENT SYSTOLIC BLOOD PRESSURE >= 140 MM HG: ICD-10-PCS | Mod: CPTII,S$GLB,, | Performed by: NEUROLOGICAL SURGERY

## 2022-09-12 PROCEDURE — 72131 CT LUMBAR SPINE W/O DYE: CPT | Mod: 26,,, | Performed by: RADIOLOGY

## 2022-09-12 PROCEDURE — 3288F FALL RISK ASSESSMENT DOCD: CPT | Mod: CPTII,S$GLB,, | Performed by: NEUROLOGICAL SURGERY

## 2022-09-12 PROCEDURE — 1159F PR MEDICATION LIST DOCUMENTED IN MEDICAL RECORD: ICD-10-PCS | Mod: CPTII,S$GLB,, | Performed by: NEUROLOGICAL SURGERY

## 2022-09-12 PROCEDURE — 3044F HG A1C LEVEL LT 7.0%: CPT | Mod: CPTII,S$GLB,, | Performed by: NEUROLOGICAL SURGERY

## 2022-09-12 PROCEDURE — 3008F BODY MASS INDEX DOCD: CPT | Mod: CPTII,S$GLB,, | Performed by: NEUROLOGICAL SURGERY

## 2022-09-12 PROCEDURE — 1101F PT FALLS ASSESS-DOCD LE1/YR: CPT | Mod: CPTII,S$GLB,, | Performed by: NEUROLOGICAL SURGERY

## 2022-09-12 PROCEDURE — 3288F PR FALLS RISK ASSESSMENT DOCUMENTED: ICD-10-PCS | Mod: CPTII,S$GLB,, | Performed by: NEUROLOGICAL SURGERY

## 2022-09-12 PROCEDURE — 3079F DIAST BP 80-89 MM HG: CPT | Mod: CPTII,S$GLB,, | Performed by: NEUROLOGICAL SURGERY

## 2022-09-12 PROCEDURE — 99214 PR OFFICE/OUTPT VISIT, EST, LEVL IV, 30-39 MIN: ICD-10-PCS | Mod: S$GLB,,, | Performed by: NEUROLOGICAL SURGERY

## 2022-09-12 PROCEDURE — 3044F PR MOST RECENT HEMOGLOBIN A1C LEVEL <7.0%: ICD-10-PCS | Mod: CPTII,S$GLB,, | Performed by: NEUROLOGICAL SURGERY

## 2022-09-12 PROCEDURE — 1101F PR PT FALLS ASSESS DOC 0-1 FALLS W/OUT INJ PAST YR: ICD-10-PCS | Mod: CPTII,S$GLB,, | Performed by: NEUROLOGICAL SURGERY

## 2022-09-12 PROCEDURE — 3079F PR MOST RECENT DIASTOLIC BLOOD PRESSURE 80-89 MM HG: ICD-10-PCS | Mod: CPTII,S$GLB,, | Performed by: NEUROLOGICAL SURGERY

## 2022-09-12 PROCEDURE — 3008F PR BODY MASS INDEX (BMI) DOCUMENTED: ICD-10-PCS | Mod: CPTII,S$GLB,, | Performed by: NEUROLOGICAL SURGERY

## 2022-09-12 PROCEDURE — 3077F SYST BP >= 140 MM HG: CPT | Mod: CPTII,S$GLB,, | Performed by: NEUROLOGICAL SURGERY

## 2022-09-12 PROCEDURE — 1126F AMNT PAIN NOTED NONE PRSNT: CPT | Mod: CPTII,S$GLB,, | Performed by: NEUROLOGICAL SURGERY

## 2022-09-12 NOTE — PROGRESS NOTES
Neurosurgery History & Physical    Patient ID: Jessica Rojas is a 66 y.o. female.    Chief Complaint   Patient presents with    Follow-up     Ollow up with ct scan; patient reports doing better with back pain; has been having pain in knee; pain 0/10       Review of Systems   Constitutional:  Negative for chills, diaphoresis, fatigue and fever.   HENT:  Negative for congestion, ear pain, rhinorrhea, sneezing, sore throat and tinnitus.    Eyes:  Negative for photophobia, pain, redness and visual disturbance.   Respiratory:  Negative for cough, chest tightness, shortness of breath and wheezing.    Cardiovascular:  Negative for chest pain, palpitations and leg swelling.   Gastrointestinal:  Negative for abdominal distention, abdominal pain, constipation, diarrhea, nausea and vomiting.   Genitourinary:  Negative for difficulty urinating, dysuria, frequency and urgency.   Musculoskeletal:  Positive for back pain. Negative for gait problem, myalgias and neck pain.   Skin:  Negative for pallor and rash.   Neurological:  Negative for dizziness, seizures, speech difficulty, weakness, numbness and headaches.   Psychiatric/Behavioral:  Negative for confusion and hallucinations.      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    Malignant carcinoid tumor of the bronchus and lung 5/2014    Mediastinal lymphadenopathy 8/26/2015    Obesity, morbid 5/6/2014    Parkinsons 10/2017    Pituitary adenoma 1980's    took parladel for 3 yrs    Pneumonia     Postoperative hypothyroidism 7/25/2017    -  s/p total thyroidectomy in 2016 (parathyroids intact) with post op hypothyroidism; on synthroid    Pulmonary nodules 9/11/2018    Thyroid disease     Wheeze 6/23/2014     Social History     Socioeconomic History    Marital status:    Occupational History    Occupation: housewife   Tobacco Use    Smoking status: Never    Smokeless tobacco: Never   Substance and Sexual Activity    Alcohol use: Not Currently     Alcohol/week: 0.0 standard drinks     Comment: I rarely drink    Drug use: No    Sexual activity: Yes     Partners: Male     Birth control/protection: None     Social Determinants of Health     Financial Resource Strain: Low Risk     Difficulty of Paying Living Expenses: Not hard at all   Food Insecurity: No Food Insecurity    Worried About Running Out of Food in the Last Year: Never true    Ran Out of Food in the Last Year: Never true   Transportation Needs: No Transportation Needs    Lack of Transportation (Medical): No    Lack of Transportation (Non-Medical): No   Physical Activity: Insufficiently Active    Days of Exercise per Week: 4 days    Minutes of Exercise per Session: 30 min   Stress: No Stress Concern Present    Feeling of Stress : Not at all   Social Connections: Unknown    Frequency of Communication with Friends and Family: More than three times a week    Frequency of Social Gatherings with Friends and Family: Once a week    Active Member of Clubs or Organizations: No    Attends Club or Organization Meetings: Never    Marital Status:    Housing Stability: Low Risk     Unable to Pay for Housing in the Last Year: No    Number of Places Lived in the Last Year: 1    Unstable Housing in the Last Year: No     Family History   Problem Relation Age of Onset    Cancer Maternal Aunt         breast    Cancer Maternal Grandmother         unknown    Cancer Maternal Aunt         unknown    Diabetes Mother     Glaucoma Mother     Heart disease Mother     Hypertension Mother     Diabetes Father      Heart disease Father     Hypertension Father     Diabetes Sister     Macular degeneration Sister     Alcohol abuse Sister     Breast cancer Paternal Aunt     Breast cancer Paternal Aunt     Breast cancer Cousin     Amblyopia Neg Hx     Blindness Neg Hx     Cataracts Neg Hx     Retinal detachment Neg Hx     Stroke Neg Hx     Strabismus Neg Hx     Thyroid disease Neg Hx      Review of patient's allergies indicates:   Allergen Reactions    Propranolol Nausea And Vomiting     Drops pressure and raised sugar    Lipitor [atorvastatin] Other (See Comments)     Myalgia, muscle pain    Robaxin [methocarbamol]      Skin inside mouth started peeling.       Current Outpatient Medications:     acetaminophen (TYLENOL) 500 MG tablet, Take 1 tablet (500 mg total) by mouth every 6 (six) hours as needed for Pain (do not exceed 3000 milligrams per 24 h)., Disp: , Rfl: 0    alcohol swabs (ALCOHOL WIPES) PadM, Uses 5 daily, Disp: 400 each, Rfl: 4    amantadine HCL (SYMMETREL) 100 mg capsule, Take 1 capsule (100 mg total) by mouth 2 (two) times daily., Disp: 60 capsule, Rfl: 11    blood-glucose transmitter (DEXCOM G6 TRANSMITTER) Lizbeth, Dispense 1 transmitter, Disp: 1 Device, Rfl: 3    carbidopa-levodopa  mg (SINEMET)  mg per tablet, TAKE one and one-half TABLET BY MOUTH THREE TIMES DAILY, Disp: 135 tablet, Rfl: 6    cholecalciferol, vitamin D3, 10 mcg (400 unit) Cap, Take 1,200 Units by mouth once daily., Disp: , Rfl:     cyanocobalamin (VITAMIN B-12) 1000 MCG tablet, Take 100 mcg by mouth once daily., Disp: , Rfl:     fish oil-omega-3 fatty acids 300-1,000 mg capsule, Take 4 capsules by mouth once daily., Disp: , Rfl:     gabapentin (NEURONTIN) 100 MG capsule, Take 1 capsule (100 mg total) by mouth 3 (three) times daily. Takes at night, Disp: 90 capsule, Rfl: 11    insulin aspart U-100 (NOVOLOG FLEXPEN U-100 INSULIN) 100 unit/mL (3 mL) InPn pen, 30u AC + correction Max  u, Disp: 120 mL, Rfl: 4    lamotrigine2.5%  "meloxicam 0.09% LIDOcaine2% prilocaine2% topical cream, Apply topically 4 (four) times daily as needed (pain)., Disp: 200 g, Rfl: 3    lancing device Misc, Checks bg 7-8 times a day, Disp: 1 each, Rfl: 0    LANTUS SOLOSTAR U-100 INSULIN glargine 100 units/mL (3mL) SubQ pen, INJECT 35 UNITS EVERY MORNING THEN 30 UNITS EVERY NIGHT AT BEDTIME, Disp: , Rfl:     levothyroxine (SYNTHROID) 137 MCG Tab tablet, Take 1 tablet (137 mcg total) by mouth once daily., Disp: 30 tablet, Rfl: 11    melatonin 3 mg Tab, Take 6 mg by mouth nightly., Disp: , Rfl:     montelukast (SINGULAIR) 10 mg tablet, take ONE tablet BY MOUTH once DAILY EVERY EVENING, Disp: 30 tablet, Rfl: 11    MULTIVITAMIN W-MINERALS/LUTEIN (CENTRUM SILVER ORAL), Take 1 tablet by mouth once daily. , Disp: , Rfl:     mupirocin calcium 2% nasl oint (BACTROBAN) 2 % Oint, by Nasal route 2 (two) times daily., Disp: , Rfl:     naproxen sodium (ALEVE) 220 mg Cap, Take 220 mg by mouth 2 (two) times daily as needed., Disp: , Rfl:     pramipexole (MIRAPEX) 0.25 MG tablet, TAKE ONE-HALF to ONE TABLET BY MOUTH THREE TIMES DAILY as directed, Disp: 90 tablet, Rfl: 11    selegiline (ELDEPRYL) 5 mg Cap, Take 1 capsule (5 mg total) by mouth 2 (two) times daily., Disp: 60 capsule, Rfl: 10    simvastatin (ZOCOR) 10 MG tablet, TAKE ONE TABLET BY MOUTH ONCE DAILY IN THE EVENING, Disp: 30 tablet, Rfl: 12    UNABLE TO FIND, Place 0.5 mLs under the tongue 2 (two) times a day. medication name:CBD Oil   Last dose 3 months ago, Disp: , Rfl:     valsartan-hydrochlorothiazide (DIOVAN-HCT) 320-25 mg per tablet, TAKE ONE TABLET BY MOUTH ONCE DAILY, Disp: 90 tablet, Rfl: 1    diclofenac sodium (VOLTAREN) 1 % Gel, Apply 2 g topically once daily., Disp: 100 g, Rfl: 0  Blood pressure (!) 140/83, pulse 96, height 5' 1" (1.549 m), weight 98.9 kg (218 lb 0.6 oz).      Neurologic Exam     Mental Status   Oriented to person, place, and time.   Oriented to person.   Oriented to place.   Oriented to time. "   Follows 3 step commands.   Attention: normal. Concentration: normal.   Speech: speech is normal   Level of consciousness: alert  Knowledge: consistent with education.   Able to name object. Able to read. Able to repeat. Able to write. Normal comprehension.      Cranial Nerves      CN II   Visual acuity: normal  Right visual field deficit: none  Left visual field deficit: none      CN III, IV, VI   Pupils are equal, round, and reactive to light.  Right pupil: Size: 3 mm. Shape: regular. Reactivity: brisk. Consensual response: intact.   Left pupil: Size: 3 mm. Shape: regular. Reactivity: brisk. Consensual response: intact.   CN III: no CN III palsy  CN VI: no CN VI palsy  Nystagmus: none   Diplopia: none  Ophthalmoparesis: none  Conjugate gaze: present     CN V   Right facial sensation deficit: none  Left facial sensation deficit: none     CN VII   Right facial weakness: none  Left facial weakness: none     CN VIII   Hearing: intact     CN IX, X   CN IX normal.   CN X normal.      CN XI   Right sternocleidomastoid strength: normal  Left sternocleidomastoid strength: normal  Right trapezius strength: normal  Left trapezius strength: normal     CN XII   Fasciculations: absent  Tongue deviation: none     Motor Exam   Muscle bulk: normal  Overall muscle tone: normal  Right arm pronator drift: absent  Left arm pronator drift: absent     Strength   Right deltoid: 5/5  Left deltoid: 5/5  Right biceps: 5/5  Left biceps: 5/5  Right triceps: 5/5  Left triceps: 5/5  Right wrist flexion: 5/5  Left wrist flexion: 5/5  Right wrist extension: 5/5  Left wrist extension: 5/5  Right interossei: 5/5  Left interossei: 5/5  Right iliopsoas: 5/5  Left iliopsoas: 5/5  Right quadriceps: 5/5  Left quadriceps: 5/5  Right hamstrin/5  Left hamstrin/5  Right anterior tibial: 5/5  Left anterior tibial: 5/5  Right posterior tibial: 5/5  Left posterior tibial: 5/5  Right peroneal: 5/5  Left peroneal: 5/5  Right gastroc: 5/5  Left gastroc:  "5/5  Right EHL: 5/5  Left EHL: 5/5     Sensory Exam   Right arm light touch: normal  Left arm light touch: normal  Right leg light touch: normal  Left leg light touch: normal     Gait, Coordination, and Reflexes      Gait  Gait: normal      Coordination   Romberg: negative  Finger to nose coordination: normal  Heel to shin coordination: normal  Tandem walking coordination: normal     Tremor   Resting tremor: absent  Intention tremor: absent  Action tremor: absent     Reflexes   Right brachioradialis: 2+  Left brachioradialis: 2+  Right biceps: 2+  Left biceps: 2+  Right triceps: 2+  Left triceps: 2+  Right patellar: 2+  Left patellar: 2+  Right achilles: 0  Left achilles: 0  Right Chase: absent  Left Chase: absent  Right ankle clonus: absent  Left ankle clonus: absent  Right plantar: normal  Left plantar: normal    Physical Exam  Constitutional: Oriented to person, place, and time. Appears well-developed and well-nourished.   HENT:   Head: Normocephalic and atraumatic.   Eyes: Pupils are equal, round, and reactive to light.   Neck: Normal range of motion. Neck supple.   Cardiovascular: Normal rate.    Pulmonary/Chest: Effort normal.   Musculoskeletal: Normal range of motion. Exhibits no edema.   Neurological: Alert and oriented to person, place, and time. Normal Finger-Nose-Finger Test, a normal Heel to Shin Test, a normal Romberg Test and a normal Tandem Gait Test. Gait normal.   Skin: Skin is warm, dry and intact.   Psychiatric: Normal mood and affect. Speech is normal and behavior is normal. Judgment and thought content normal.   Nursing note and vitals reviewed.    Provider dictation:  I reviewed the imaging.   "CT shows fusion and no complications."     The patient is a 66 year old female who presents for 6 month post-op appointment s/p right MIS L4-5 TLIF and posterior instrumented fusion. The patient reports resolution of lower extremity cramping and improvement in back pain. She continues to participate " in physical therapy. She reports ongoing left knee pain when ambulating and is scheduled to see orthopedics.     Exam is stable.     The patient has recovered well from surgery. She can follow-up as needed.     1. S/P lumbar fusion

## 2022-09-12 NOTE — PROGRESS NOTES
I have seen the patient, reviewed the Advanced Practice Provider's history and physical, assessment and plan. I have personally interviewed and examined the patient at bedside and interpreted the relevant imaging and lab work and I agree with the findings. I personally performed the documented services. See below for any additional comments.    Significant improvement in low back pain, ongoing resolution of numbness, radicular pain,  improvement in gait. Has focal left knee pain with use and is scheduled to see orthopedics.    CT shows fusion and no complications.     Exam with no weakness.    Doing well and pleased with outcome of surgery. F/U PRN.

## 2022-09-13 ENCOUNTER — CLINICAL SUPPORT (OUTPATIENT)
Dept: REHABILITATION | Facility: HOSPITAL | Age: 67
End: 2022-09-13
Payer: MEDICARE

## 2022-09-13 DIAGNOSIS — Z74.09 IMPAIRED FUNCTIONAL MOBILITY, BALANCE, GAIT, AND ENDURANCE: ICD-10-CM

## 2022-09-13 DIAGNOSIS — M54.50 CHRONIC BILATERAL LOW BACK PAIN WITHOUT SCIATICA: Primary | ICD-10-CM

## 2022-09-13 DIAGNOSIS — G89.29 CHRONIC BILATERAL LOW BACK PAIN WITHOUT SCIATICA: Primary | ICD-10-CM

## 2022-09-13 PROCEDURE — 97110 THERAPEUTIC EXERCISES: CPT | Mod: PO,CQ

## 2022-09-13 NOTE — PROGRESS NOTES
Physical Therapy Daily Treatment Note     Name: Jessica Rojas  Clinic Number: [id number]    Therapy Diagnosis:   Encounter Diagnoses   Name Primary?    Chronic bilateral low back pain without sciatica Yes    Impaired functional mobility, balance, gait, and endurance      Physician: Enriqueta Gan PA*    Visit Date: 9/13/2022  Physician Orders: PT Eval and Treat  Medical Diagnosis from Referral: Z98.1 (ICD-10-CM) - S/P lumbar fusion   Evaluation Date: 5/2/2022   Authorization Period Expiration: 12/31/2022  Plan of Care Expiration: 9/30/22  Visit # / Visits authorized: 15/ 25    Time In: 700 am  Time Out: 745 am  Total Billable Time: 45 minutes    Precautions: Diabetes, Fall, cancer and L4/L5 Fusion 3/17/22    Subjective     Pt reports: She saw the surgeon who discharged her.  Surgeon said the pins have not moved, she is showing good bone growth, and she is not having cramping or numbness in the R leg. Pt reports she is still having pain in the R knee so she is going to schedule an appointment with Ortho.  She was compliant with home exercise program.  Response to previous treatment: increased fatigue  Functional change:  None new reported    Pain: 0/10  Location: low back     Objective     Jessica received therapeutic exercises to develop strength, endurance, ROM and flexibility for 40 minutes including:     Recumbent bike, 7 min  Calf stretch ramp, 3x 30s  Seated hamstring stretch, 3x 30s- not performed  Supine hip flexor stretch, 2x 1 min  DKTC w/ orange ball, 2 mins - w/ heel digs  LTR w/ orange ball, 2 mins  SLR, 3x10 B 2#  Sit to stands, 5x5 5# DB    Standing hip ext, 2x10 - not performed  Standing hip abd, 2x10- not performed  Precor leg press, 3x10 20#    Jessica participated in neuromuscular re-education activities to improve: Coordination, Proprioception and Posture for 5 minutes. The following activities were included:  Iso crunch w/ orange ball, 2 mins w/ 3s hold  Supine clams w/ blue band, 3x10  B  Step ups, 1x10 L- not performed    Jessica participated in gait training to improve functional mobility and safety for 00 minutes, including:  Stepping over small hurdles - step to, with cane and gait belt    Home Exercises Provided and Patient Education Provided     Education provided:   - Continue HEP    Written Home Exercises Provided: Patient instructed to cont prior HEP.  Exercises were reviewed and Jessica was able to demonstrate them prior to the end of the session.  Jessica demonstrated good  understanding of the education provided.     See EMR under Patient Instructions for exercises provided  5/2/22 .    Assessment     Pt able to tolerate increased reps on the leg press today with visible fatigue.  Pt continues to slowly progress in therapy and is making good progress at home. Will continue to progress as tolerated.     Jessica Is progressing well towards her goals.   Pt prognosis is Good.     Pt will continue to benefit from skilled outpatient physical therapy to address the deficits listed in the problem list box on initial evaluation, provide pt/family education and to maximize pt's level of independence in the home and community environment.     Pt's spiritual, cultural and educational needs considered and pt agreeable to plan of care and goals.    Anticipated barriers to physical therapy: None    Goals:   Short Term Goals (4 Weeks):   1) Pt will demonstrate compliance with initial home exercise program as prescribed by physical therapist to improve independence with management of condition. - MET  2) Pt to improve active range of motion in lumbar spine to 0% limitations to allow for improved functional mobility. - MET  3) Pt to report low back pain of <4/10 at worst during prolonged standing to improve tolerance to ADLs. - NOT MET  4) Pt to improve SLS to 10s in BLE. - NOT MET     Long Term Goals (8 Weeks):   1) Pt to achieve <50% limitation as measured by the FOTO to demonstrate decreased low back  pain disability. - NOT MET  2) Pt to increase strength to at least 5/5 of muscles tested to allow for improvement in functional activities such as performing chores. - NOT MET  3) Pt to improve SLS to 30s in BLE. - NOT MET  4) Pt to improve TUG to 13s to demonstrate improved functional mobility and decreased fall risk. - NOT MET  5) Pt to tolerate standing and walking for community distances with <2/10 pain in low back to improve mobility with IADL's. - NOT MET    Plan     Plan of care Certification: 5/2/2022 to 9/30/22.     Outpatient Physical Therapy 2 times weekly for 8 weeks to include the following interventions: Aquatic Therapy, Electrical Stimulation  , Manual Therapy, Moist Heat/ Ice, Neuromuscular Re-ed, Patient Education, Therapeutic Activities and Therapeutic Exercise.     Krupa Hoang, PTA

## 2022-09-15 ENCOUNTER — CLINICAL SUPPORT (OUTPATIENT)
Dept: REHABILITATION | Facility: HOSPITAL | Age: 67
End: 2022-09-15
Payer: MEDICARE

## 2022-09-15 DIAGNOSIS — M54.50 CHRONIC BILATERAL LOW BACK PAIN WITHOUT SCIATICA: Primary | ICD-10-CM

## 2022-09-15 DIAGNOSIS — G89.29 CHRONIC BILATERAL LOW BACK PAIN WITHOUT SCIATICA: Primary | ICD-10-CM

## 2022-09-15 DIAGNOSIS — Z74.09 IMPAIRED FUNCTIONAL MOBILITY, BALANCE, GAIT, AND ENDURANCE: ICD-10-CM

## 2022-09-15 PROCEDURE — 97110 THERAPEUTIC EXERCISES: CPT | Mod: PO,CQ

## 2022-09-15 NOTE — PROGRESS NOTES
Physical Therapy Daily Treatment Note     Name: Jessica Rojas  Clinic Number: [id number]    Therapy Diagnosis:   Encounter Diagnoses   Name Primary?    Chronic bilateral low back pain without sciatica Yes    Impaired functional mobility, balance, gait, and endurance      Physician: Enriqueta Gan PA*    Visit Date: 9/15/2022  Physician Orders: PT Eval and Treat  Medical Diagnosis from Referral: Z98.1 (ICD-10-CM) - S/P lumbar fusion   Evaluation Date: 5/2/2022   Authorization Period Expiration: 12/31/2022  Plan of Care Expiration: 9/30/22  Visit # / Visits authorized: 17/ 25    Time In: 702 am  Time Out: 745 am  Total Billable Time: 43 minutes    Precautions: Diabetes, Fall, cancer and L4/L5 Fusion 3/17/22    Subjective     Pt reports: She is still doing well.  Pt is scheduled to get an injection in early October.  She was compliant with home exercise program.  Response to previous treatment: no adverse affects  Functional change:  able to get up and down easier    Pain: 0/10  Location: low back     Objective     Jessica received therapeutic exercises to develop strength, endurance, ROM and flexibility for 40 minutes including:     Recumbent bike, 8 min  Calf stretch ramp, 3x 30s  Seated hamstring stretch, 3x 30s- not performed  Supine hip flexor stretch, 2x 1 min  DKTC w/ orange ball, 2 mins - w/ heel digs  LTR w/ orange ball, 2 mins  SLR, 3x10 B 2#  Sit to stands, 2x10 5# DB    Standing hip ext, 2x10 2#  Standing hip abd, 2x10 2#  Precor leg press, 3x10 20#- not performed    Jessica participated in neuromuscular re-education activities to improve: Coordination, Proprioception and Posture for 3 minutes. The following activities were included:  Iso crunch w/ orange ball, 2 mins w/ 3s hold  Supine clams w/ blue band, 3x10 B- not performed  Step ups, 1x10 L- not performed    Jessica participated in gait training to improve functional mobility and safety for 00 minutes, including:  Stepping over small hurdles - step  to, with cane and gait belt    Home Exercises Provided and Patient Education Provided     Education provided:   - Continue HEP    Written Home Exercises Provided: Patient instructed to cont prior HEP.  Exercises were reviewed and Jessica was able to demonstrate them prior to the end of the session.  Jessica demonstrated good  understanding of the education provided.     See EMR under Patient Instructions for exercises provided  5/2/22 .    Assessment     Pt continues to show steady progress with each session displaying improved endurance and tolerance for exercises. Will continue to progress as tolerated.     Jessica Is progressing well towards her goals.   Pt prognosis is Good.     Pt will continue to benefit from skilled outpatient physical therapy to address the deficits listed in the problem list box on initial evaluation, provide pt/family education and to maximize pt's level of independence in the home and community environment.     Pt's spiritual, cultural and educational needs considered and pt agreeable to plan of care and goals.    Anticipated barriers to physical therapy: None    Goals:   Short Term Goals (4 Weeks):   1) Pt will demonstrate compliance with initial home exercise program as prescribed by physical therapist to improve independence with management of condition. - MET  2) Pt to improve active range of motion in lumbar spine to 0% limitations to allow for improved functional mobility. - MET  3) Pt to report low back pain of <4/10 at worst during prolonged standing to improve tolerance to ADLs. - NOT MET  4) Pt to improve SLS to 10s in BLE. - NOT MET     Long Term Goals (8 Weeks):   1) Pt to achieve <50% limitation as measured by the FOTO to demonstrate decreased low back pain disability. - NOT MET  2) Pt to increase strength to at least 5/5 of muscles tested to allow for improvement in functional activities such as performing chores. - NOT MET  3) Pt to improve SLS to 30s in BLE. - NOT MET  4) Pt  to improve TUG to 13s to demonstrate improved functional mobility and decreased fall risk. - NOT MET  5) Pt to tolerate standing and walking for community distances with <2/10 pain in low back to improve mobility with IADL's. - NOT MET    Plan     Plan of care Certification: 5/2/2022 to 9/30/22.     Outpatient Physical Therapy 2 times weekly for 8 weeks to include the following interventions: Aquatic Therapy, Electrical Stimulation  , Manual Therapy, Moist Heat/ Ice, Neuromuscular Re-ed, Patient Education, Therapeutic Activities and Therapeutic Exercise.     Krupa Hoang, PTA

## 2022-09-19 ENCOUNTER — CLINICAL SUPPORT (OUTPATIENT)
Dept: REHABILITATION | Facility: HOSPITAL | Age: 67
End: 2022-09-19
Payer: MEDICARE

## 2022-09-19 DIAGNOSIS — M54.50 CHRONIC BILATERAL LOW BACK PAIN WITHOUT SCIATICA: Primary | ICD-10-CM

## 2022-09-19 DIAGNOSIS — Z74.09 IMPAIRED FUNCTIONAL MOBILITY, BALANCE, GAIT, AND ENDURANCE: ICD-10-CM

## 2022-09-19 DIAGNOSIS — G89.29 CHRONIC BILATERAL LOW BACK PAIN WITHOUT SCIATICA: Primary | ICD-10-CM

## 2022-09-19 PROCEDURE — 97110 THERAPEUTIC EXERCISES: CPT | Mod: PO

## 2022-09-19 NOTE — PROGRESS NOTES
Physical Therapy Daily Treatment Note     Name: Jessica Rojas  Clinic Number: [id number]    Therapy Diagnosis:   Encounter Diagnoses   Name Primary?    Chronic bilateral low back pain without sciatica Yes    Impaired functional mobility, balance, gait, and endurance      Physician: Enriqueta Gan PA*    Visit Date: 9/19/2022  Physician Orders: PT Eval and Treat  Medical Diagnosis from Referral: Z98.1 (ICD-10-CM) - S/P lumbar fusion   Evaluation Date: 5/2/2022   Authorization Period Expiration: 12/31/2022  Plan of Care Expiration: 9/30/22  Visit # / Visits authorized: 18 / 25    Time In: 7:00  Time Out: 7:45  Total Billable Time: 45 minutes    Precautions: Diabetes, Fall, cancer and L4/L5 Fusion 3/17/22    Subjective     Pt reports: Her back is still doing well, only hurts when she is standing for a long time. Still having a lot of knee pain, having an injection soon. She is continuing to get better since having covid, feels she has more energy.   She was compliant with home exercise program.  Response to previous treatment: no adverse affects  Functional change:  able to get up and down easier    Pain: 0/10  Location: low back     Objective     Jessica received therapeutic exercises to develop strength, endurance, ROM and flexibility for 45 minutes including:   Recumbent bike, 10 min  Calf stretch ramp, 3x 30s  Seated hamstring stretch, 3x 30s- not performed  Supine hip flexor stretch, 2x 1 min  DKTC w/ orange ball, 2 mins - w/ heel digs  LTR w/ orange ball, 2 mins  SLR, 3x10 B 2#  Supine clams w/ blue band, 3x10 B   Sit to stands, 2x10 5# DB    Standing hip ext, 2x10 2# - not performed  Standing hip abd, 2x10 2# - not performed    Precor leg press, 3x10 20#  Matrix hip ext, 1x15 15# B  Matrix hip abd, 1x15 15# B   Step ups, 1x10 B - not performed    Jessica participated in neuromuscular re-education activities to improve: Coordination, Proprioception and Posture for 00 minutes. The following activities were  included:  Iso crunch w/ orange ball, 2 mins w/ 3s hold - not performed    Jessica participated in gait training to improve functional mobility and safety for 00 minutes, including:  Stepping over small hurdles - step to, with cane and gait belt    Home Exercises Provided and Patient Education Provided     Education provided:   - Continue HEP    Written Home Exercises Provided: Patient instructed to cont prior HEP.  Exercises were reviewed and Jessica was able to demonstrate them prior to the end of the session.  Jessica demonstrated good  understanding of the education provided.     See EMR under Patient Instructions for exercises provided  5/2/22 .    Assessment     Pt tolerated well. Progressed treatment without issue. Will begin to add more activities in standing to help her be functionally mobile at home.     Jessica Is progressing well towards her goals.   Pt prognosis is Good.     Pt will continue to benefit from skilled outpatient physical therapy to address the deficits listed in the problem list box on initial evaluation, provide pt/family education and to maximize pt's level of independence in the home and community environment.     Pt's spiritual, cultural and educational needs considered and pt agreeable to plan of care and goals.    Anticipated barriers to physical therapy: None    Goals:   Short Term Goals (4 Weeks):   1) Pt will demonstrate compliance with initial home exercise program as prescribed by physical therapist to improve independence with management of condition. - MET  2) Pt to improve active range of motion in lumbar spine to 0% limitations to allow for improved functional mobility. - MET  3) Pt to report low back pain of <4/10 at worst during prolonged standing to improve tolerance to ADLs. - NOT MET  4) Pt to improve SLS to 10s in BLE. - NOT MET     Long Term Goals (8 Weeks):   1) Pt to achieve <50% limitation as measured by the FOTO to demonstrate decreased low back pain disability. - NOT  MET  2) Pt to increase strength to at least 5/5 of muscles tested to allow for improvement in functional activities such as performing chores. - NOT MET  3) Pt to improve SLS to 30s in BLE. - NOT MET  4) Pt to improve TUG to 13s to demonstrate improved functional mobility and decreased fall risk. - NOT MET  5) Pt to tolerate standing and walking for community distances with <2/10 pain in low back to improve mobility with IADL's. - NOT MET    Plan     Plan of care Certification: 5/2/2022 to 9/30/22.     Outpatient Physical Therapy 2 times weekly for 8 weeks to include the following interventions: Aquatic Therapy, Electrical Stimulation  , Manual Therapy, Moist Heat/ Ice, Neuromuscular Re-ed, Patient Education, Therapeutic Activities and Therapeutic Exercise.     Collin Chandra, PT

## 2022-09-26 ENCOUNTER — PATIENT MESSAGE (OUTPATIENT)
Dept: ORTHOPEDICS | Facility: CLINIC | Age: 67
End: 2022-09-26
Payer: MEDICARE

## 2022-09-26 ENCOUNTER — CLINICAL SUPPORT (OUTPATIENT)
Dept: REHABILITATION | Facility: HOSPITAL | Age: 67
End: 2022-09-26
Payer: MEDICARE

## 2022-09-26 DIAGNOSIS — Z74.09 IMPAIRED FUNCTIONAL MOBILITY, BALANCE, GAIT, AND ENDURANCE: ICD-10-CM

## 2022-09-26 DIAGNOSIS — G89.29 CHRONIC BILATERAL LOW BACK PAIN WITHOUT SCIATICA: Primary | ICD-10-CM

## 2022-09-26 DIAGNOSIS — M54.50 CHRONIC BILATERAL LOW BACK PAIN WITHOUT SCIATICA: Primary | ICD-10-CM

## 2022-09-26 PROCEDURE — 97110 THERAPEUTIC EXERCISES: CPT | Mod: KX,PO

## 2022-09-26 NOTE — PROGRESS NOTES
Physical Therapy Daily Treatment Note     Name: Jessica Rojas  Clinic Number: [id number]    Therapy Diagnosis:   Encounter Diagnoses   Name Primary?    Chronic bilateral low back pain without sciatica Yes    Impaired functional mobility, balance, gait, and endurance      Physician: Enriqueta Gan PA*    Visit Date: 9/26/2022  Physician Orders: PT Eval and Treat  Medical Diagnosis from Referral: Z98.1 (ICD-10-CM) - S/P lumbar fusion   Evaluation Date: 5/2/2022   Authorization Period Expiration: 12/31/2022  Plan of Care Expiration: 9/30/22  Visit # / Visits authorized: 19 / 25    Time In: 7:03  Time Out: 7:45  Total Billable Time: 42 minutes    Precautions: Diabetes, Fall, cancer and L4/L5 Fusion 3/17/22    Subjective     Pt reports: Back is still not in any pain, but she is having a lot of right knee pain today. Supposed to go in on the 3rd for an injection, but she is going to try to get an earlier appt. Was unable to walk without the rollator this weekend because of the knee pain.   She was compliant with home exercise program.  Response to previous treatment: no adverse affects  Functional change:  able to get up and down easier    Pain: 0/10  Location: low back     Objective     Jessica received therapeutic exercises to develop strength, endurance, ROM and flexibility for 42 minutes including:   Recumbent bike, 10 min  Calf stretch ramp, 3x 30s  Seated hamstring stretch, 3x 30s- not performed  Supine hip flexor stretch, 2x 1 min  DKTC w/ orange ball, 2 mins - w/ heel digs  LTR w/ orange ball, 2 mins  SLR, 3x10 B 2#  Supine clams w/ blue band, 3x10 B   Sit to stands, 2x10 5# DB    Standing hip ext, 2x10 2# - not performed  Standing hip abd, 2x10 2# - not performed    Precor leg press, 2x10 20#  Matrix hip ext, 1x15 25# B   Matrix hip abd, 1x15 25# B   Step ups, 1x10 B - not performed    Jessica participated in neuromuscular re-education activities to improve: Coordination, Proprioception and Posture for 00  minutes. The following activities were included:  Iso crunch w/ orange ball, 2 mins w/ 3s hold - not performed    Jessica participated in gait training to improve functional mobility and safety for 00 minutes, including:  Stepping over small hurdles - step to, with cane and gait belt    Home Exercises Provided and Patient Education Provided     Education provided:   - Continue HEP    Written Home Exercises Provided: Patient instructed to cont prior HEP.  Exercises were reviewed and Jessica was able to demonstrate them prior to the end of the session.  Jessica demonstrated good  understanding of the education provided.     See EMR under Patient Instructions for exercises provided  5/2/22 .    Assessment     Pt with increased knee pain throughout session. Had to take rest breaks on the bike due to pain and demonstrated decreased isaac during ambulation due to knee pain.   Jessica Is progressing well towards her goals.   Pt prognosis is Good.     Pt will continue to benefit from skilled outpatient physical therapy to address the deficits listed in the problem list box on initial evaluation, provide pt/family education and to maximize pt's level of independence in the home and community environment.     Pt's spiritual, cultural and educational needs considered and pt agreeable to plan of care and goals.    Anticipated barriers to physical therapy: None    Goals:   Short Term Goals (4 Weeks):   1) Pt will demonstrate compliance with initial home exercise program as prescribed by physical therapist to improve independence with management of condition. - MET  2) Pt to improve active range of motion in lumbar spine to 0% limitations to allow for improved functional mobility. - MET  3) Pt to report low back pain of <4/10 at worst during prolonged standing to improve tolerance to ADLs. - NOT MET  4) Pt to improve SLS to 10s in BLE. - NOT MET     Long Term Goals (8 Weeks):   1) Pt to achieve <50% limitation as measured by the  FOTO to demonstrate decreased low back pain disability. - NOT MET  2) Pt to increase strength to at least 5/5 of muscles tested to allow for improvement in functional activities such as performing chores. - NOT MET  3) Pt to improve SLS to 30s in BLE. - NOT MET  4) Pt to improve TUG to 13s to demonstrate improved functional mobility and decreased fall risk. - NOT MET  5) Pt to tolerate standing and walking for community distances with <2/10 pain in low back to improve mobility with IADL's. - NOT MET    Plan     Plan of care Certification: 5/2/2022 to 9/30/22.     Outpatient Physical Therapy 2 times weekly for 8 weeks to include the following interventions: Aquatic Therapy, Electrical Stimulation  , Manual Therapy, Moist Heat/ Ice, Neuromuscular Re-ed, Patient Education, Therapeutic Activities and Therapeutic Exercise.     Collin Chandra, PT

## 2022-09-27 ENCOUNTER — PATIENT MESSAGE (OUTPATIENT)
Dept: FAMILY MEDICINE | Facility: CLINIC | Age: 67
End: 2022-09-27
Payer: MEDICARE

## 2022-09-27 VITALS — SYSTOLIC BLOOD PRESSURE: 127 MMHG | DIASTOLIC BLOOD PRESSURE: 67 MMHG

## 2022-09-27 DIAGNOSIS — H66.90 ACUTE OTITIS MEDIA, UNSPECIFIED OTITIS MEDIA TYPE: ICD-10-CM

## 2022-09-27 DIAGNOSIS — I10 BENIGN ESSENTIAL HTN: ICD-10-CM

## 2022-09-27 RX ORDER — FLUTICASONE PROPIONATE AND SALMETEROL 250; 50 UG/1; UG/1
POWDER RESPIRATORY (INHALATION)
Qty: 30 EACH | Refills: 11 | Status: SHIPPED | OUTPATIENT
Start: 2022-09-27 | End: 2024-01-26

## 2022-09-27 RX ORDER — VALSARTAN AND HYDROCHLOROTHIAZIDE 320; 25 MG/1; MG/1
TABLET, FILM COATED ORAL
Qty: 90 TABLET | Refills: 1 | Status: SHIPPED | OUTPATIENT
Start: 2022-09-27 | End: 2023-06-14

## 2022-09-27 NOTE — TELEPHONE ENCOUNTER
The patient's blood pressure was recently high when last checked.    BP Readings from Last 3 Encounters:   09/12/22 (!) 140/83   07/05/22 120/67   05/31/22 (!) 142/78       Please call patient and document a normal home BP or get the patient rechecked in clinic.

## 2022-09-27 NOTE — TELEPHONE ENCOUNTER
No new care gaps identified.  [facility] [place] Embedded Care Gaps. Reference number: [id number]. 9/27/2022   10:04:42 AM SINDHUT

## 2022-09-27 NOTE — TELEPHONE ENCOUNTER
I spoke with the patient about this. Pt will check blood pressure this afternoon and send update through portal.

## 2022-09-27 NOTE — TELEPHONE ENCOUNTER
I have attempted to contact this patient by phone with the following results: no answer, left message to return my call on answering machine.

## 2022-09-29 DIAGNOSIS — M17.11 OSTEOARTHRITIS OF RIGHT KNEE, UNSPECIFIED OSTEOARTHRITIS TYPE: Primary | ICD-10-CM

## 2022-10-03 ENCOUNTER — LAB VISIT (OUTPATIENT)
Dept: LAB | Facility: HOSPITAL | Age: 67
End: 2022-10-03
Payer: MEDICARE

## 2022-10-03 ENCOUNTER — OFFICE VISIT (OUTPATIENT)
Dept: ORTHOPEDICS | Facility: CLINIC | Age: 67
End: 2022-10-03
Payer: MEDICARE

## 2022-10-03 ENCOUNTER — PATIENT MESSAGE (OUTPATIENT)
Dept: ORTHOPEDICS | Facility: CLINIC | Age: 67
End: 2022-10-03

## 2022-10-03 ENCOUNTER — HOSPITAL ENCOUNTER (OUTPATIENT)
Dept: RADIOLOGY | Facility: HOSPITAL | Age: 67
Discharge: HOME | End: 2022-10-03
Attending: ORTHOPAEDIC SURGERY
Payer: MEDICARE

## 2022-10-03 ENCOUNTER — PATIENT MESSAGE (OUTPATIENT)
Dept: REHABILITATION | Facility: HOSPITAL | Age: 67
End: 2022-10-03
Payer: MEDICARE

## 2022-10-03 VITALS — WEIGHT: 218 LBS | HEIGHT: 61 IN | BODY MASS INDEX: 41.16 KG/M2

## 2022-10-03 DIAGNOSIS — E11.21 TYPE 2 DIABETES MELLITUS WITH DIABETIC NEPHROPATHY, WITH LONG-TERM CURRENT USE OF INSULIN: ICD-10-CM

## 2022-10-03 DIAGNOSIS — M17.11 OSTEOARTHRITIS OF RIGHT KNEE, UNSPECIFIED OSTEOARTHRITIS TYPE: Primary | ICD-10-CM

## 2022-10-03 DIAGNOSIS — Z79.4 TYPE 2 DIABETES MELLITUS WITH DIABETIC NEPHROPATHY, WITH LONG-TERM CURRENT USE OF INSULIN: ICD-10-CM

## 2022-10-03 DIAGNOSIS — M17.11 OSTEOARTHRITIS OF RIGHT KNEE, UNSPECIFIED OSTEOARTHRITIS TYPE: ICD-10-CM

## 2022-10-03 PROBLEM — Z79.01 LONG TERM (CURRENT) USE OF ANTICOAGULANTS: Status: ACTIVE | Noted: 2022-10-03

## 2022-10-03 LAB
ALBUMIN/CREAT UR: 13.5 UG/MG (ref 0–30)
CREAT UR-MCNC: 89 MG/DL (ref 15–325)
MICROALBUMIN UR DL<=1MG/L-MCNC: 12 UG/ML

## 2022-10-03 PROCEDURE — 99214 OFFICE O/P EST MOD 30 MIN: CPT | Mod: 57,S$GLB,, | Performed by: ORTHOPAEDIC SURGERY

## 2022-10-03 PROCEDURE — 1160F RVW MEDS BY RX/DR IN RCRD: CPT | Mod: CPTII,S$GLB,, | Performed by: ORTHOPAEDIC SURGERY

## 2022-10-03 PROCEDURE — 73560 X-RAY EXAM OF KNEE 1 OR 2: CPT | Mod: 26,LT,, | Performed by: RADIOLOGY

## 2022-10-03 PROCEDURE — 3008F BODY MASS INDEX DOCD: CPT | Mod: CPTII,S$GLB,, | Performed by: ORTHOPAEDIC SURGERY

## 2022-10-03 PROCEDURE — 73560 X-RAY EXAM OF KNEE 1 OR 2: CPT | Mod: TC,59,PO,LT

## 2022-10-03 PROCEDURE — 3044F PR MOST RECENT HEMOGLOBIN A1C LEVEL <7.0%: ICD-10-PCS | Mod: CPTII,S$GLB,, | Performed by: ORTHOPAEDIC SURGERY

## 2022-10-03 PROCEDURE — 1159F PR MEDICATION LIST DOCUMENTED IN MEDICAL RECORD: ICD-10-PCS | Mod: CPTII,S$GLB,, | Performed by: ORTHOPAEDIC SURGERY

## 2022-10-03 PROCEDURE — 99999 PR PBB SHADOW E&M-EST. PATIENT-LVL IV: ICD-10-PCS | Mod: PBBFAC,,, | Performed by: ORTHOPAEDIC SURGERY

## 2022-10-03 PROCEDURE — 73560 XR KNEE ORTHO RIGHT: ICD-10-PCS | Mod: 26,LT,, | Performed by: RADIOLOGY

## 2022-10-03 PROCEDURE — 99214 PR OFFICE/OUTPT VISIT, EST, LEVL IV, 30-39 MIN: ICD-10-PCS | Mod: 57,S$GLB,, | Performed by: ORTHOPAEDIC SURGERY

## 2022-10-03 PROCEDURE — 99999 PR PBB SHADOW E&M-EST. PATIENT-LVL IV: CPT | Mod: PBBFAC,,, | Performed by: ORTHOPAEDIC SURGERY

## 2022-10-03 PROCEDURE — 1101F PR PT FALLS ASSESS DOC 0-1 FALLS W/OUT INJ PAST YR: ICD-10-PCS | Mod: CPTII,S$GLB,, | Performed by: ORTHOPAEDIC SURGERY

## 2022-10-03 PROCEDURE — 73562 XR KNEE ORTHO RIGHT: ICD-10-PCS | Mod: 26,RT,, | Performed by: RADIOLOGY

## 2022-10-03 PROCEDURE — 1101F PT FALLS ASSESS-DOCD LE1/YR: CPT | Mod: CPTII,S$GLB,, | Performed by: ORTHOPAEDIC SURGERY

## 2022-10-03 PROCEDURE — 73562 X-RAY EXAM OF KNEE 3: CPT | Mod: 26,RT,, | Performed by: RADIOLOGY

## 2022-10-03 PROCEDURE — 1160F PR REVIEW ALL MEDS BY PRESCRIBER/CLIN PHARMACIST DOCUMENTED: ICD-10-PCS | Mod: CPTII,S$GLB,, | Performed by: ORTHOPAEDIC SURGERY

## 2022-10-03 PROCEDURE — 82570 ASSAY OF URINE CREATININE: CPT | Performed by: NURSE PRACTITIONER

## 2022-10-03 PROCEDURE — 3288F PR FALLS RISK ASSESSMENT DOCUMENTED: ICD-10-PCS | Mod: CPTII,S$GLB,, | Performed by: ORTHOPAEDIC SURGERY

## 2022-10-03 PROCEDURE — 3288F FALL RISK ASSESSMENT DOCD: CPT | Mod: CPTII,S$GLB,, | Performed by: ORTHOPAEDIC SURGERY

## 2022-10-03 PROCEDURE — 3008F PR BODY MASS INDEX (BMI) DOCUMENTED: ICD-10-PCS | Mod: CPTII,S$GLB,, | Performed by: ORTHOPAEDIC SURGERY

## 2022-10-03 PROCEDURE — 1159F MED LIST DOCD IN RCRD: CPT | Mod: CPTII,S$GLB,, | Performed by: ORTHOPAEDIC SURGERY

## 2022-10-03 PROCEDURE — 3044F HG A1C LEVEL LT 7.0%: CPT | Mod: CPTII,S$GLB,, | Performed by: ORTHOPAEDIC SURGERY

## 2022-10-03 NOTE — PROGRESS NOTES
66 years old debilitating pain in the right knee.  She has responded favorably to Kenalog injections in the past comes today with severe pain on medial side of the right knee requesting to have more definitive treatment.  She has failed a longstanding nonoperative course and pain is interfering with her daily activities    Exam shows tenderness to medial joint line with a passively correctable varus deformity no signs infection     X-rays show medial joint space narrowing    Assessment: Right knee arthrosis    Plan:  We will schedule the patient for partial knee replacement, she is aware of the risks and limitations of surgery and still wants to proceed     Imaging studies ordered and reviewed by me    Further History  Aching pain  Worse with activity  Relieved with rest  No other associated symptoms  No other radiation    Further Exam  Alert and oriented  Pleasant  Contralateral limb has appropriate range of motion for age and condition  Contralateral limb has appropriate strength for age and condition  Contralateral limb has appropriate stability  for age and condition  No adenopathy  Pulses are appropriate for current condition  Skin is intact        Chief Complaint    Chief Complaint   Patient presents with    Right Knee - Pain       HPI  Jessica Rojas is a 66 y.o.  female who presents with       Past Medical History  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    Malignant carcinoid tumor of the bronchus and lung 5/2014    Mediastinal lymphadenopathy 8/26/2015    Obesity,  morbid 2014    Parkinsons 10/2017    Pituitary adenoma 's    took parladel for 3 yrs    Pneumonia     Postoperative hypothyroidism 2017    - s/p total thyroidectomy in 2016 (parathyroids intact) with post op hypothyroidism; on synthroid    Pulmonary nodules 2018    Thyroid disease     Wheeze 2014       Past Surgical History  Past Surgical History:   Procedure Laterality Date    APPENDECTOMY      BREAST CYST ASPIRATION      BRONCHOSCOPY  2014, 2014     SECTION  , 1986    x2    COLONOSCOPY N/A 2021    Procedure: COLONOSCOPY;  Surgeon: Khadar Bernardo MD;  Location: [facility];  Service: Endoscopy;  Laterality: N/A;    COLONOSCOPY N/A 2021    Procedure: COLONOSCOPY;  Surgeon: Frank Lamb MD;  Location: [facility] ([address] [place] [address]);  Service: Endoscopy;  Laterality: N/A;  MD Joselin Feliciano MA  Caller: Unspecified (Yesterday,  2:54 PM)  Need colonoscopy with EMR for large (30 mm) ascending colon polyp. 90 minutes. Main. Clear liquid diet for 24 hour patient with inadequate prep last time.   Thanks!   Ed Campa MD     COLONOSCOPY N/A 2022    Procedure: COLONOSCOPY;  Surgeon: Frank Lamb MD;  Location: [facility];  Service: Endoscopy;  Laterality: N/A;  2 day prep w/suprep  instructions via portal-SC    EPIDURAL STEROID INJECTION INTO LUMBAR SPINE N/A 2022    Procedure: Injection-steroid-epidural-lumbar L5/S1;  Surgeon: Bebeto Eldridge MD;  Location: [facility];  Service: Pain Management;  Laterality: N/A;    ESOPHAGOGASTRODUODENOSCOPY N/A 2021    Procedure: EGD (ESOPHAGOGASTRODUODENOSCOPY);  Surgeon: Khadar Bernardo MD;  Location: [facility];  Service: Endoscopy;  Laterality: N/A;    EYE SURGERY      cataract bilat    LUNG REMOVAL, PARTIAL Right 2014    with 17 lymph nodes; right middle and lower    MINIMALLY INVASIVE TRANSFORAMINAL LUMBAR INTERBODY FUSION (TLIF) N/A 3/17/2022    Procedure: FUSION, SPINE, LUMBAR, TLIF,  MINIMALLY INVASIVE RIGHT L4-5 TLIF, BILATERAL L4-5 LAMINECTOMY AND POSTERIOR INSTRUMENTED FUSION;  Surgeon: Ranjeet Bragg MD;  Location: [facility] OR;  Service: Neurosurgery;  Laterality: N/A;    THYROIDECTOMY  05/24/2016    TONSILLECTOMY  1969    TUBAL LIGATION  1986 1986       Medications  Current Outpatient Medications   Medication Sig    acetaminophen (TYLENOL) 500 MG tablet Take 1 tablet (500 mg total) by mouth every 6 (six) hours as needed for Pain (do not exceed 3000 milligrams per 24 h).    alcohol swabs (ALCOHOL WIPES) PadM Uses 5 daily    amantadine HCL (SYMMETREL) 100 mg capsule Take 1 capsule (100 mg total) by mouth 2 (two) times daily.    blood-glucose transmitter (DEXCOM G6 TRANSMITTER) Lizbeth Dispense 1 transmitter    carbidopa-levodopa  mg (SINEMET)  mg per tablet TAKE one and one-half TABLET BY MOUTH THREE TIMES DAILY    cholecalciferol, vitamin D3, 10 mcg (400 unit) Cap Take 1,200 Units by mouth once daily.    cyanocobalamin (VITAMIN B-12) 1000 MCG tablet Take 100 mcg by mouth once daily.    fish oil-omega-3 fatty acids 300-1,000 mg capsule Take 4 capsules by mouth once daily.    fluticasone-salmeterol diskus inhaler 250-50 mcg Inhale 1 puff into the lungs 2 times daily.    gabapentin (NEURONTIN) 100 MG capsule Take 1 capsule (100 mg total) by mouth 3 (three) times daily. Takes at night    insulin aspart U-100 (NOVOLOG FLEXPEN U-100 INSULIN) 100 unit/mL (3 mL) InPn pen 30u AC + correction Max  u    lamotrigine2.5% meloxicam 0.09% LIDOcaine2% prilocaine2% topical cream Apply topically 4 (four) times daily as needed (pain).    lancing device Misc Checks bg 7-8 times a day    LANTUS SOLOSTAR U-100 INSULIN glargine 100 units/mL (3mL) SubQ pen INJECT 35 UNITS EVERY MORNING THEN 30 UNITS EVERY NIGHT AT BEDTIME    levothyroxine (SYNTHROID) 137 MCG Tab tablet Take 1 tablet (137 mcg total) by mouth once daily.    melatonin 3 mg Tab Take 6 mg by mouth nightly.    montelukast  (SINGULAIR) 10 mg tablet take ONE tablet BY MOUTH once DAILY EVERY EVENING    MULTIVITAMIN W-MINERALS/LUTEIN (CENTRUM SILVER ORAL) Take 1 tablet by mouth once daily.     mupirocin calcium 2% nasl oint (BACTROBAN) 2 % Oint by Nasal route 2 (two) times daily.    naproxen sodium (ALEVE) 220 mg Cap Take 220 mg by mouth 2 (two) times daily as needed.    pramipexole (MIRAPEX) 0.25 MG tablet TAKE ONE-HALF to ONE TABLET BY MOUTH THREE TIMES DAILY as directed    selegiline (ELDEPRYL) 5 mg Cap take ONE capsule BY MOUTH twice daily    simvastatin (ZOCOR) 10 MG tablet TAKE ONE TABLET BY MOUTH ONCE DAILY IN THE EVENING    UNABLE TO FIND Place 0.5 mLs under the tongue 2 (two) times a day. medication name:CBD Oil     Last dose 3 months ago    valsartan-hydrochlorothiazide (DIOVAN-HCT) 320-25 mg per tablet TAKE ONE TABLET BY MOUTH ONCE DAILY    diclofenac sodium (VOLTAREN) 1 % Gel Apply 2 g topically once daily.     No current facility-administered medications for this visit.       Allergies  Review of patient's allergies indicates:   Allergen Reactions    Propranolol Nausea And Vomiting     Drops pressure and raised sugar    Lipitor [atorvastatin] Other (See Comments)     Myalgia, muscle pain    Robaxin [methocarbamol]      Skin inside mouth started peeling.       Family History  Family History   Problem Relation Age of Onset    Cancer Maternal Aunt         breast    Cancer Maternal Grandmother         unknown    Cancer Maternal Aunt         unknown    Diabetes Mother     Glaucoma Mother     Heart disease Mother     Hypertension Mother     Diabetes Father     Heart disease Father     Hypertension Father     Diabetes Sister     Macular degeneration Sister     Alcohol abuse Sister     Breast cancer Paternal Aunt     Breast cancer Paternal Aunt     Breast cancer Cousin     Amblyopia Neg Hx     Blindness Neg Hx     Cataracts Neg Hx     Retinal detachment Neg Hx     Stroke Neg Hx     Strabismus Neg Hx     Thyroid disease Neg Hx         Social History  Social History     Socioeconomic History    Marital status:    Occupational History    Occupation: housewife   Tobacco Use    Smoking status: Never    Smokeless tobacco: Never   Substance and Sexual Activity    Alcohol use: Not Currently     Alcohol/week: 0.0 standard drinks     Comment: I rarely drink    Drug use: No    Sexual activity: Yes     Partners: Male     Birth control/protection: None     Social Determinants of Health     Financial Resource Strain: Low Risk     Difficulty of Paying Living Expenses: Not hard at all   Food Insecurity: No Food Insecurity    Worried About Running Out of Food in the Last Year: Never true    Ran Out of Food in the Last Year: Never true   Transportation Needs: No Transportation Needs    Lack of Transportation (Medical): No    Lack of Transportation (Non-Medical): No   Physical Activity: Insufficiently Active    Days of Exercise per Week: 4 days    Minutes of Exercise per Session: 30 min   Stress: No Stress Concern Present    Feeling of Stress : Not at all   Social Connections: Unknown    Frequency of Communication with Friends and Family: More than three times a week    Frequency of Social Gatherings with Friends and Family: Once a week    Active Member of Clubs or Organizations: No    Attends Club or Organization Meetings: Never    Marital Status:    Housing Stability: Low Risk     Unable to Pay for Housing in the Last Year: No    Number of Places Lived in the Last Year: 1    Unstable Housing in the Last Year: No               Review of Systems     Constitutional: Negative    HENT: Negative  Eyes: Negative  Respiratory: Negative  Cardiovascular: Negative  Musculoskeletal: HPI  Skin: Negative  Neurological: Negative  Hematological: Negative  Endocrine: Negative                 Physical Exam    There were no vitals filed for this visit.  Body mass index is 41.19 kg/m².  Physical Examination:     General appearance -  well appearing, and in no  distress  Mental status - awake  Neck - supple  Chest -  symmetric air entry  Heart - normal rate   Abdomen - soft      Assessment     1. Osteoarthritis of right knee, unspecified osteoarthritis type          Plan

## 2022-10-10 ENCOUNTER — OFFICE VISIT (OUTPATIENT)
Dept: ENDOCRINOLOGY | Facility: CLINIC | Age: 67
End: 2022-10-10
Payer: MEDICARE

## 2022-10-10 ENCOUNTER — SPECIALTY PHARMACY (OUTPATIENT)
Dept: PHARMACY | Facility: CLINIC | Age: 67
End: 2022-10-10
Payer: MEDICARE

## 2022-10-10 VITALS
BODY MASS INDEX: 41.17 KG/M2 | SYSTOLIC BLOOD PRESSURE: 122 MMHG | WEIGHT: 218.06 LBS | HEART RATE: 80 BPM | DIASTOLIC BLOOD PRESSURE: 70 MMHG | RESPIRATION RATE: 18 BRPM | HEIGHT: 61 IN

## 2022-10-10 DIAGNOSIS — Z79.4 TYPE 2 DIABETES MELLITUS WITH STAGE 3A CHRONIC KIDNEY DISEASE, WITH LONG-TERM CURRENT USE OF INSULIN: ICD-10-CM

## 2022-10-10 DIAGNOSIS — E11.22 TYPE 2 DIABETES MELLITUS WITH STAGE 3A CHRONIC KIDNEY DISEASE, WITH LONG-TERM CURRENT USE OF INSULIN: ICD-10-CM

## 2022-10-10 DIAGNOSIS — E78.2 MIXED HYPERLIPIDEMIA: ICD-10-CM

## 2022-10-10 DIAGNOSIS — E89.0 POST-OPERATIVE HYPOTHYROIDISM: ICD-10-CM

## 2022-10-10 DIAGNOSIS — Z78.9 STATIN INTOLERANCE: ICD-10-CM

## 2022-10-10 DIAGNOSIS — E11.21 TYPE 2 DIABETES MELLITUS WITH DIABETIC NEPHROPATHY, WITH LONG-TERM CURRENT USE OF INSULIN: Primary | ICD-10-CM

## 2022-10-10 DIAGNOSIS — N18.31 TYPE 2 DIABETES MELLITUS WITH STAGE 3A CHRONIC KIDNEY DISEASE, WITH LONG-TERM CURRENT USE OF INSULIN: ICD-10-CM

## 2022-10-10 DIAGNOSIS — E66.01 MORBID OBESITY DUE TO EXCESS CALORIES: ICD-10-CM

## 2022-10-10 DIAGNOSIS — I10 BENIGN ESSENTIAL HTN: ICD-10-CM

## 2022-10-10 DIAGNOSIS — Z79.4 TYPE 2 DIABETES MELLITUS WITH DIABETIC NEPHROPATHY, WITH LONG-TERM CURRENT USE OF INSULIN: Primary | ICD-10-CM

## 2022-10-10 PROCEDURE — 3008F PR BODY MASS INDEX (BMI) DOCUMENTED: ICD-10-PCS | Mod: CPTII,S$GLB,, | Performed by: NURSE PRACTITIONER

## 2022-10-10 PROCEDURE — 95251 CONT GLUC MNTR ANALYSIS I&R: CPT | Mod: S$GLB,,, | Performed by: NURSE PRACTITIONER

## 2022-10-10 PROCEDURE — 3061F PR NEG MICROALBUMINURIA RESULT DOCUMENTED/REVIEW: ICD-10-PCS | Mod: CPTII,S$GLB,, | Performed by: NURSE PRACTITIONER

## 2022-10-10 PROCEDURE — 95251 PR GLUCOSE MONITOR, 72 HOUR, PHYS INTERP: ICD-10-PCS | Mod: S$GLB,,, | Performed by: NURSE PRACTITIONER

## 2022-10-10 PROCEDURE — 3078F PR MOST RECENT DIASTOLIC BLOOD PRESSURE < 80 MM HG: ICD-10-PCS | Mod: CPTII,S$GLB,, | Performed by: NURSE PRACTITIONER

## 2022-10-10 PROCEDURE — 99999 PR PBB SHADOW E&M-EST. PATIENT-LVL IV: ICD-10-PCS | Mod: PBBFAC,,, | Performed by: NURSE PRACTITIONER

## 2022-10-10 PROCEDURE — 3044F PR MOST RECENT HEMOGLOBIN A1C LEVEL <7.0%: ICD-10-PCS | Mod: CPTII,S$GLB,, | Performed by: NURSE PRACTITIONER

## 2022-10-10 PROCEDURE — 1159F MED LIST DOCD IN RCRD: CPT | Mod: CPTII,S$GLB,, | Performed by: NURSE PRACTITIONER

## 2022-10-10 PROCEDURE — 3061F NEG MICROALBUMINURIA REV: CPT | Mod: CPTII,S$GLB,, | Performed by: NURSE PRACTITIONER

## 2022-10-10 PROCEDURE — 3074F SYST BP LT 130 MM HG: CPT | Mod: CPTII,S$GLB,, | Performed by: NURSE PRACTITIONER

## 2022-10-10 PROCEDURE — 1101F PT FALLS ASSESS-DOCD LE1/YR: CPT | Mod: CPTII,S$GLB,, | Performed by: NURSE PRACTITIONER

## 2022-10-10 PROCEDURE — 3074F PR MOST RECENT SYSTOLIC BLOOD PRESSURE < 130 MM HG: ICD-10-PCS | Mod: CPTII,S$GLB,, | Performed by: NURSE PRACTITIONER

## 2022-10-10 PROCEDURE — 99214 PR OFFICE/OUTPT VISIT, EST, LEVL IV, 30-39 MIN: ICD-10-PCS | Mod: S$GLB,,, | Performed by: NURSE PRACTITIONER

## 2022-10-10 PROCEDURE — 1125F AMNT PAIN NOTED PAIN PRSNT: CPT | Mod: CPTII,S$GLB,, | Performed by: NURSE PRACTITIONER

## 2022-10-10 PROCEDURE — 3066F NEPHROPATHY DOC TX: CPT | Mod: CPTII,S$GLB,, | Performed by: NURSE PRACTITIONER

## 2022-10-10 PROCEDURE — 99999 PR PBB SHADOW E&M-EST. PATIENT-LVL IV: CPT | Mod: PBBFAC,,, | Performed by: NURSE PRACTITIONER

## 2022-10-10 PROCEDURE — 3044F HG A1C LEVEL LT 7.0%: CPT | Mod: CPTII,S$GLB,, | Performed by: NURSE PRACTITIONER

## 2022-10-10 PROCEDURE — 3288F FALL RISK ASSESSMENT DOCD: CPT | Mod: CPTII,S$GLB,, | Performed by: NURSE PRACTITIONER

## 2022-10-10 PROCEDURE — 3066F PR DOCUMENTATION OF TREATMENT FOR NEPHROPATHY: ICD-10-PCS | Mod: CPTII,S$GLB,, | Performed by: NURSE PRACTITIONER

## 2022-10-10 PROCEDURE — 1159F PR MEDICATION LIST DOCUMENTED IN MEDICAL RECORD: ICD-10-PCS | Mod: CPTII,S$GLB,, | Performed by: NURSE PRACTITIONER

## 2022-10-10 PROCEDURE — 1125F PR PAIN SEVERITY QUANTIFIED, PAIN PRESENT: ICD-10-PCS | Mod: CPTII,S$GLB,, | Performed by: NURSE PRACTITIONER

## 2022-10-10 PROCEDURE — 1101F PR PT FALLS ASSESS DOC 0-1 FALLS W/OUT INJ PAST YR: ICD-10-PCS | Mod: CPTII,S$GLB,, | Performed by: NURSE PRACTITIONER

## 2022-10-10 PROCEDURE — 3288F PR FALLS RISK ASSESSMENT DOCUMENTED: ICD-10-PCS | Mod: CPTII,S$GLB,, | Performed by: NURSE PRACTITIONER

## 2022-10-10 PROCEDURE — 3008F BODY MASS INDEX DOCD: CPT | Mod: CPTII,S$GLB,, | Performed by: NURSE PRACTITIONER

## 2022-10-10 PROCEDURE — 3078F DIAST BP <80 MM HG: CPT | Mod: CPTII,S$GLB,, | Performed by: NURSE PRACTITIONER

## 2022-10-10 PROCEDURE — 99214 OFFICE O/P EST MOD 30 MIN: CPT | Mod: S$GLB,,, | Performed by: NURSE PRACTITIONER

## 2022-10-10 RX ORDER — LEVOTHYROXINE SODIUM 150 UG/1
150 TABLET ORAL
Qty: 30 TABLET | Refills: 11 | Status: SHIPPED | OUTPATIENT
Start: 2022-10-10 | End: 2023-07-17

## 2022-10-10 NOTE — PROGRESS NOTES
"CC: This 66 y.o. female presents for management of diabetes and chronic conditions pending review including HTN, HLP, morbid obesity, hypothyroidism, vitamin d deficiency     HPI: She was diagnosed with T2DM in ~ 2013. She was hospitalized r/t DM in 2016 for DKA.   Family hx of DM: mom, dad, and sisters  She had a FUSION, SPINE, LUMBAR, TLIF, MINIMALLY INVASIVE RIGHT L4-5 TLIF, BILATERAL L4-5 LAMINECTOMY AND POSTERIOR INSTRUMENTED FUSION performed on 03/17 by Dr. Bragg for lumbar radiculopathy    Since last visit she is scheduled for a partial knee replacement right- 11/8/22 Dr Garcia  Wearing Dexcom G6- see download in Media tab  Time in range: 68%  Hypoglycemia: <4%   pp excursions noted, giving her insulin late when she sees bg elevation,   Low alarm set at 60  Diet: Eats 3  Meals a day, snacks  As  below  Breakfast- oatmeal w blueberries or scramble egg w latee daily  Lunch- salad and soup  Afternoon snack- yogurt or tomatoes  Dinner:  veg     Exercise: none currently, was riding her bike    CURRENT DM MEDS:  lantus 35 u qam, 30 u qhs; Humalog 30 u AC  + correction 150/25;   Timing prandial insulin 5-15 minutes before meals: before or 30 mins after if bg "lower"  Vial/pen:  Uses pens  Glucometer type:  Relion 2020    Standards of Care:  Eye exam: + mild DM retinopathy (Dr Macdonald)- has appt before the end of the year     Nodules was found on on a PET scan. FNA 11/25/14 shows adenomatous nodule with cystic changes. S/p total thyroidectomy 5/24/16.       On synthroid 137 mcg once daily. Takes all alone with water 30-60 mins prior to first meal     Has history of myalgias with statin higher doses and intensity    Being treated for parkinsons. No falls     ROS:  Gen: Appetite good,  Weight stable  Eyes: Denies visual disturbances  Resp: no SOB or ESPINAL, no cough  Cardiac: No palpitations, chest pain, trace BLE edema   GI: No nausea or vomiting, diarrhea, constipation, or abdominal pain.  /GYN: 1+ nocturia, " no burning or pain.   MS/Neuro: walking w a walker,  speech clear,   Psych: Denies drug/ETOH abuse, no hx of depression.  Other systems: negative.    PE:  GENERAL: Well developed, well nourished.  PSYCH: AAOx3, appropriate mood and affect, pleasant expression, conversant, appears relaxed, well groomed.   EYES: Conjunctiva, corneas clear  NECK: Supple, trachea midline   ABDOMEN: Soft, non-tender, non-distended   SKIN:  no acanthosis nigracans.  FOOT EXAMINATION: 10/10/2022    No foot deformity, +Onychomycosis,  no interspace maceration or ulceration noted.  Decreased hair growth present over toes/feet.    Protective sensation intact with 10 gram monofilament.  +2 dorsalis pedis and posterior pulses noted.      Lab Results   Component Value Date    MICALBCREAT 13.5 10/03/2022       Hemoglobin A1C   Date Value Ref Range Status   10/03/2022 6.7 (H) 4.0 - 5.6 % Final     Comment:     ADA Screening Guidelines:  5.7-6.4%  Consistent with prediabetes  >or=6.5%  Consistent with diabetes    High levels of fetal hemoglobin interfere with the HbA1C  assay. Heterozygous hemoglobin variants (HbS, HgC, etc)do  not significantly interfere with this assay.   However, presence of multiple variants may affect accuracy.     03/10/2022 6.8 (H) 0.0 - 5.6 % Final     Comment:     Reference Interval:  5.0 - 5.6 Normal   5.7 - 6.4 High Risk   > 6.5 Diabetic      Hgb A1c results are standardized based on the ([facility]SP) National   Glycohemoglobin Standardization Program.      Hemoglobin A1C levels are related to mean serum/plasma glucose   during the preceding 2-3 months.        12/03/2021 6.7 (H) 4.0 - 5.6 % Final     Comment:     ADA Screening Guidelines:  5.7-6.4%  Consistent with prediabetes  >or=6.5%  Consistent with diabetes    High levels of fetal hemoglobin interfere with the HbA1C  assay. Heterozygous hemoglobin variants (HbS, HgC, etc)do  not significantly interfere with this assay.   However, presence of multiple variants may affect  accuracy.          ASSESSMENT and PLAN:    1. T2DM with nephropathy-     Continues current doses   BG normallywell controlled ; if she is having a lower reading prior to a meal- take 1/2 the insulin dose, she's taking late causing her own hypos   eat and then take insulin no more than 15 mins after the meal  Continue Dexcom G6    2. HTN - controlled today, continue meds as previously prescribed and monitor.     3. HLP -  Myalgia with statin, fish oil  2 tabs bid, LDL> 200;  Rx for repatha to [facility]     4. Post Surgical Hypothyroidism for MNG. S/p total thyroidectomy w pathology benign. TSH >7  Increase levothyroxine 150 mcg daily, repeat tsh in 6 weeks    5. Vitamin d deficiency - continue OTC replacement    6. Morbid obesity - Resume weight loss;   Body mass index is 41.2 kg/m².    Follow-up: in 6 months with lab prior

## 2022-10-10 NOTE — TELEPHONE ENCOUNTER
Rahul, this is Deidre Frey with [facility].  We are working on your prescription that your doctor has sent us. We will be working with your insurance to get this approved for you. We will be calling you along the way with updates on your medication.  If you have any questions, you can reach us at [phone].    Welcome call outcome: Patient/caregiver reached

## 2022-10-11 ENCOUNTER — SPECIALTY PHARMACY (OUTPATIENT)
Dept: PHARMACY | Facility: CLINIC | Age: 67
End: 2022-10-11
Payer: MEDICARE

## 2022-10-11 DIAGNOSIS — E78.2 MIXED HYPERLIPIDEMIA: Primary | ICD-10-CM

## 2022-10-11 NOTE — TELEPHONE ENCOUNTER
Beulah CHAMBERLAIN approved from 10/11/22 - 4/9/23. PA Case: [id number]    [facility] Medicare  Copay $0  Catastrophic coverage  LIS 1  In network    Pending initial consult

## 2022-10-11 NOTE — TELEPHONE ENCOUNTER
Specialty Pharmacy - Initial Clinical Assessment    Specialty Medication Orders Linked to Encounter      Flowsheet Row Most Recent Value   Medication #1 evolocumab (REPATHA SURECLICK) 140 mg/mL PnIj (Order#[id number], Rx#[id number])          Patient Diagnosis   E78.2 - Mixed hyperlipidemia    Subjective    Jessica Rojas is a 66 y.o. female, who is followed by the specialty pharmacy service for management and education.    Recent Encounters       Date Type Provider Description    10/11/2022 Specialty Pharmacy Deidre Frey PharmD Initial Clinical Assessment    10/10/2022 Specialty Pharmacy Deidre Frey PharmD Referral Authorization          Clinical call attempts since last clinical assessment   No call attempts found.     Current Outpatient Medications   Medication Sig    acetaminophen (TYLENOL) 500 MG tablet Take 1 tablet (500 mg total) by mouth every 6 (six) hours as needed for Pain (do not exceed 3000 milligrams per 24 h).    alcohol swabs (ALCOHOL WIPES) PadM Uses 5 daily    amantadine HCL (SYMMETREL) 100 mg capsule Take 1 capsule (100 mg total) by mouth 2 (two) times daily.    blood-glucose transmitter (DEXCOM G6 TRANSMITTER) Lizbeth Dispense 1 transmitter    carbidopa-levodopa  mg (SINEMET)  mg per tablet TAKE one and one-half TABLET BY MOUTH THREE TIMES DAILY    cholecalciferol, vitamin D3, 10 mcg (400 unit) Cap Take 1,200 Units by mouth once daily.    cyanocobalamin (VITAMIN B-12) 1000 MCG tablet Take 100 mcg by mouth once daily.    diclofenac sodium (VOLTAREN) 1 % Gel Apply 2 g topically once daily.    evolocumab (REPATHA SURECLICK) 140 mg/mL PnIj Inject 1 mL (140 mg total) into the skin every 14 (fourteen) days.    fish oil-omega-3 fatty acids 300-1,000 mg capsule Take 4 capsules by mouth once daily.    fluticasone-salmeterol diskus inhaler 250-50 mcg Inhale 1 puff into the lungs 2 times daily.    gabapentin (NEURONTIN) 100 MG capsule Take 1 capsule (100 mg total) by mouth 3 (three) times  daily. Takes at night    insulin aspart U-100 (NOVOLOG FLEXPEN U-100 INSULIN) 100 unit/mL (3 mL) InPn pen 30u AC + correction Max  u    lamotrigine2.5% meloxicam 0.09% LIDOcaine2% prilocaine2% topical cream Apply topically 4 (four) times daily as needed (pain).    lancing device Misc Checks bg 7-8 times a day    LANTUS SOLOSTAR U-100 INSULIN glargine 100 units/mL (3mL) SubQ pen INJECT 35 UNITS EVERY MORNING THEN 30 UNITS EVERY NIGHT AT BEDTIME    levothyroxine (SYNTHROID) 150 MCG tablet Take 1 tablet (150 mcg total) by mouth before breakfast.    melatonin 3 mg Tab Take 6 mg by mouth nightly.    montelukast (SINGULAIR) 10 mg tablet take ONE tablet BY MOUTH once DAILY EVERY EVENING    MULTIVITAMIN W-MINERALS/LUTEIN (CENTRUM SILVER ORAL) Take 1 tablet by mouth once daily.     mupirocin calcium 2% nasl oint (BACTROBAN) 2 % Oint by Nasal route 2 (two) times daily.    naproxen sodium (ALEVE) 220 mg Cap Take 220 mg by mouth 2 (two) times daily as needed.    pramipexole (MIRAPEX) 0.25 MG tablet TAKE ONE-HALF to ONE TABLET BY MOUTH THREE TIMES DAILY as directed    selegiline (ELDEPRYL) 5 mg Cap take ONE capsule BY MOUTH twice daily    simvastatin (ZOCOR) 10 MG tablet TAKE ONE TABLET BY MOUTH ONCE DAILY IN THE EVENING    UNABLE TO FIND Place 0.5 mLs under the tongue 2 (two) times a day. medication name:CBD Oil     Last dose 3 months ago    valsartan-hydrochlorothiazide (DIOVAN-HCT) 320-25 mg per tablet TAKE ONE TABLET BY MOUTH ONCE DAILY   Last reviewed on 10/10/2022 10:50 AM by Dirk Trimble    Review of patient's allergies indicates:   Allergen Reactions    Propranolol Nausea And Vomiting     Drops pressure and raised sugar    Lipitor [atorvastatin] Other (See Comments)     Myalgia, muscle pain    Robaxin [methocarbamol]      Skin inside mouth started peeling.   Last reviewed on  10/10/2022 10:49 AM by Dirk Trimble    Drug Interactions    Drug interactions evaluated: no  Clinically relevant drug interactions  identified: no  Provided the patient with educational material regarding drug interactions: not applicable         Adverse Effects    *All other systems reviewed and are negative       Assessment Questions - Documented Responses      Flowsheet Row Most Recent Value   Assessment    Medication Reconciliation completed for patient No   During the past 4 weeks, has patient missed any activities due to condition or medication? No   During the past 4 weeks, did patient have any of the following urgent care visits? None   Goals of Therapy Status Discussed (new start)   Status of the patients ability to self-administer: Is Able   All education points have been covered with patient? No, patient declined- printed education provided   Welcome packet contents reviewed and discussed with patient? Yes   Assesment completed? Yes   Plan Therapy being initiated   Do you need to open a clinical intervention (i-vent)? No   Do you want to schedule first shipment? Yes   Medication #1 Assessment Info    Patient status New medication, New to [facility]   Is this medication appropriate for the patient? Yes   Is this medication effective? Not yet started          Refill Questions - Documented Responses      Flowsheet Row Most Recent Value   Refill Screening Questions    When does the patient need to receive the medication? 10/12/22   Refill Delivery Questions    How will the patient receive the medication? [facility]   When does the patient need to receive the medication? 10/12/22   Shipping Address Home   Address in [facility] confirmed and updated if neccessary? Yes   Expected Copay ($) 0   Is the patient able to afford the medication copay? Yes   Payment Method zero copay   Days supply of Refill 28   Supplies needed? No supplies needed   Refill activity completed? Yes   Refill activity plan Refill scheduled   Shipment/Pickup Date: 10/11/22            Objective    She has a past medical history of Abdominal pain (2/27/2015), Arthritis,  "Diabetes mellitus, type 2, DM (diabetes mellitus), Elevated transaminase level (4/18/2016), Encounter for blood transfusion, History of malignant carcinoid tumor of bronchus and lung (10/25/2017), History of neuroendocrine cancer, HTN (hypertension) (5/6/2014), Hypercalcemia (4/18/2016), Lung cancer, hilus (5/6/2014), Malignant carcinoid tumor of bronchus and lung (6/23/2014), Malignant carcinoid tumor of the bronchus and lung (5/2014), Mediastinal lymphadenopathy (8/26/2015), Obesity, morbid (5/6/2014), Parkinsons (10/2017), Pituitary adenoma (1980's), Pneumonia, Postoperative hypothyroidism (7/25/2017), Pulmonary nodules (9/11/2018), Thyroid disease, and Wheeze (6/23/2014).    Tried/failed medications:     BP Readings from Last 4 Encounters:   10/10/22 122/70   09/27/22 127/67   09/12/22 (!) 140/83   07/05/22 120/67     Ht Readings from Last 4 Encounters:   10/10/22 5' 1" (1.549 m)   10/03/22 5' 1" (1.549 m)   09/12/22 5' 1" (1.549 m)   07/05/22 5' 1" (1.549 m)     Wt Readings from Last 4 Encounters:   10/10/22 98.9 kg (218 lb 0.6 oz)   10/03/22 98.9 kg (218 lb)   09/12/22 98.9 kg (218 lb 0.6 oz)   07/05/22 98.9 kg (218 lb)       The goals of prescribed drug therapy management include:  Supporting patient to meet the prescriber's medical treatment objectives  Improving or maintaining quality of life  Maintaining optimal therapy adherence  Minimizing and managing side effects      Goals of Therapy Status: Discussed (new start)    Assessment/Plan  Patient plans to start therapy on 10/12/22      Indication, dosage, appropriateness, effectiveness, safety and convenience of her specialty medication(s) were reviewed today.     Patient Education   Pharmacist offer to  patient was declined. Printed educational materials will be provided with medication.  Patient did accept verbal education on the following topics:  · Expectations and possible outcomes of therapy  · Proper use, timely administration, and missed dose " management  · Duration of therapy  · Side effects, including prevention, minimization, and management  · Storage, safe handling, and disposal        Tasks added this encounter   No tasks added.   Tasks due within next 3 months   10/11/2022 - Clinical - Initial Assessment     Deidre Frey, PharmD  [facility]  [address] [place], [place] [address]erson josh  [facility] [address]  Phone: [phone]  Fax: [phone]

## 2022-10-18 ENCOUNTER — PATIENT MESSAGE (OUTPATIENT)
Dept: ADMINISTRATIVE | Facility: HOSPITAL | Age: 67
End: 2022-10-18
Payer: MEDICARE

## 2022-10-29 ENCOUNTER — DOCUMENTATION ONLY (OUTPATIENT)
Dept: REHABILITATION | Facility: HOSPITAL | Age: 67
End: 2022-10-29
Payer: MEDICARE

## 2022-10-29 NOTE — PROGRESS NOTES
Outpatient Therapy Discharge Summary     Name: Jessica Rojas  Clinic Number: [id number]    Therapy Diagnosis:    Chronic bilateral low back pain without sciatica Yes    Impaired functional mobility, balance, gait, and endurance      Physician: Enriqueta Gan PA*     Physician Orders: PT Eval and Treat  Medical Diagnosis from Referral: Z98.1 (ICD-10-CM) - S/P lumbar fusion   Evaluation Date: 5/2/2022       Date of Last visit: 9/26/2022   Total Visits Received: 19  Cancelled Visits: 1  No Show Visits: 0    Assessment    Goals:   Short Term Goals (4 Weeks):   1) Pt will demonstrate compliance with initial home exercise program as prescribed by physical therapist to improve independence with management of condition. - MET  2) Pt to improve active range of motion in lumbar spine to 0% limitations to allow for improved functional mobility. - MET  3) Pt to report low back pain of <4/10 at worst during prolonged standing to improve tolerance to ADLs. - NOT MET  4) Pt to improve SLS to 10s in BLE. - NOT MET     Long Term Goals (8 Weeks):   1) Pt to achieve <50% limitation as measured by the FOTO to demonstrate decreased low back pain disability. - NOT MET  2) Pt to increase strength to at least 5/5 of muscles tested to allow for improvement in functional activities such as performing chores. - NOT MET  3) Pt to improve SLS to 30s in BLE. - NOT MET  4) Pt to improve TUG to 13s to demonstrate improved functional mobility and decreased fall risk. - NOT MET  5) Pt to tolerate standing and walking for community distances with <2/10 pain in low back to improve mobility with IADL's. - NOT MET    Discharge reason: Patient has not attended therapy since 9/26/2022     Plan   This patient is discharged from Physical Therapy      Collin Chandra, PT, DPT  10/29/2022

## 2022-10-31 ENCOUNTER — PATIENT MESSAGE (OUTPATIENT)
Dept: NEUROLOGY | Facility: CLINIC | Age: 67
End: 2022-10-31
Payer: MEDICARE

## 2022-10-31 ENCOUNTER — PATIENT OUTREACH (OUTPATIENT)
Dept: ADMINISTRATIVE | Facility: HOSPITAL | Age: 67
End: 2022-10-31
Payer: MEDICARE

## 2022-10-31 ENCOUNTER — PATIENT MESSAGE (OUTPATIENT)
Dept: ADMINISTRATIVE | Facility: HOSPITAL | Age: 67
End: 2022-10-31
Payer: MEDICARE

## 2022-10-31 DIAGNOSIS — Z12.31 ENCOUNTER FOR SCREENING MAMMOGRAM FOR MALIGNANT NEOPLASM OF BREAST: Primary | ICD-10-CM

## 2022-11-01 ENCOUNTER — TELEPHONE (OUTPATIENT)
Dept: ORTHOPEDICS | Facility: CLINIC | Age: 67
End: 2022-11-01
Payer: MEDICARE

## 2022-11-01 ENCOUNTER — PATIENT MESSAGE (OUTPATIENT)
Dept: NEUROSURGERY | Facility: CLINIC | Age: 67
End: 2022-11-01
Payer: MEDICARE

## 2022-11-01 NOTE — TELEPHONE ENCOUNTER
----- Message from Rohan Chavez sent at 11/1/2022  9:15 AM CDT -----  Regarding: [facility] needs to speak to staff about pt, Marilou , needs pulmonologist note ,fax to   Contact: Marilou Hughes   [facility] needs to speak to staff about pt, Marilou , needs pulmonologist note ,fax to

## 2022-11-01 NOTE — TELEPHONE ENCOUNTER
----- Message from Brett Hartley MA sent at 11/1/2022  3:22 PM CDT -----  Contact: Marilou/[facility]  Marilou called in and stated she just faxed over lab work on patient & patients while blood cell count is 14.1 & also some other abnormal labs.  May want to review.   Pulmonology notes needs to be on chart.    Marilou's dennis back number is [phone].

## 2022-11-01 NOTE — TELEPHONE ENCOUNTER
Contacted patient. Informed Mrs. Lopez she will need to fax pulmonologist note to  Attn: Marilou for sx clearance.

## 2022-11-01 NOTE — TELEPHONE ENCOUNTER
Contacted Marilou. Informed her I will contact patient to get pulmonologist note faxed to . Attn Marilou. I will forward the lab paperwork to Dr. Lai for review. Marilou verabilzed understanding.

## 2022-11-02 ENCOUNTER — SPECIALTY PHARMACY (OUTPATIENT)
Dept: PHARMACY | Facility: CLINIC | Age: 67
End: 2022-11-02
Payer: MEDICARE

## 2022-11-02 NOTE — TELEPHONE ENCOUNTER
Specialty Pharmacy - Refill Coordination    Specialty Medication Orders Linked to Encounter      Flowsheet Row Most Recent Value   Medication #1 evolocumab (REPATHA SURECLICK) 140 mg/mL PnIj (Order#[id number], Rx#[id number])            Refill Questions - Documented Responses      Flowsheet Row Most Recent Value   Patient Availability and HIPAA Verification    Does patient want to proceed with activity? Yes   HIPAA/medical authority confirmed? Yes   Relationship to patient of person spoken to? Self   Refill Screening Questions    Would patient like to speak to a pharmacist? No   When does the patient need to receive the medication? 11/09/22   Refill Delivery Questions    How will the patient receive the medication? MEDRx   When does the patient need to receive the medication? 11/09/22   Shipping Address Home   Address in [facility] confirmed and updated if neccessary? Yes   Expected Copay ($) 0   Is the patient able to afford the medication copay? Yes   Payment Method zero copay   Days supply of Refill 28   Supplies needed? No supplies needed   Refill activity completed? Yes   Refill activity plan Refill scheduled   Shipment/Pickup Date: 11/04/22            Current Outpatient Medications   Medication Sig    acetaminophen (TYLENOL) 500 MG tablet Take 1 tablet (500 mg total) by mouth every 6 (six) hours as needed for Pain (do not exceed 3000 milligrams per 24 h).    alcohol swabs (ALCOHOL WIPES) PadM Uses 5 daily    amantadine HCL (SYMMETREL) 100 mg capsule Take 1 capsule (100 mg total) by mouth 2 (two) times daily.    blood-glucose transmitter (DEXCOM G6 TRANSMITTER) Lizbeth Dispense 1 transmitter    carbidopa-levodopa  mg (SINEMET)  mg per tablet TAKE one and one-half TABLET BY MOUTH THREE TIMES DAILY    cholecalciferol, vitamin D3, 10 mcg (400 unit) Cap Take 1,200 Units by mouth once daily.    cyanocobalamin (VITAMIN B-12) 1000 MCG tablet Take 100 mcg by mouth once daily.    diclofenac sodium  (VOLTAREN) 1 % Gel Apply 2 g topically once daily.    evolocumab (REPATHA SURECLICK) 140 mg/mL PnIj Inject 1 mL (140 mg total) into the skin every 14 (fourteen) days.    fish oil-omega-3 fatty acids 300-1,000 mg capsule Take 4 capsules by mouth once daily.    fluticasone-salmeterol diskus inhaler 250-50 mcg Inhale 1 puff into the lungs 2 times daily.    gabapentin (NEURONTIN) 100 MG capsule Take 1 capsule (100 mg total) by mouth 3 (three) times daily. Takes at night    insulin aspart U-100 (NOVOLOG FLEXPEN U-100 INSULIN) 100 unit/mL (3 mL) InPn pen 30u AC + correction Max  u    lamotrigine2.5% meloxicam 0.09% LIDOcaine2% prilocaine2% topical cream Apply topically 4 (four) times daily as needed (pain).    lancing device Misc Checks bg 7-8 times a day    LANTUS SOLOSTAR U-100 INSULIN glargine 100 units/mL (3mL) SubQ pen INJECT 35 UNITS EVERY MORNING THEN 30 UNITS EVERY NIGHT AT BEDTIME    levothyroxine (SYNTHROID) 150 MCG tablet Take 1 tablet (150 mcg total) by mouth before breakfast.    melatonin 3 mg Tab Take 6 mg by mouth nightly.    montelukast (SINGULAIR) 10 mg tablet take ONE tablet BY MOUTH once DAILY EVERY EVENING    MULTIVITAMIN W-MINERALS/LUTEIN (CENTRUM SILVER ORAL) Take 1 tablet by mouth once daily.     mupirocin calcium 2% nasl oint (BACTROBAN) 2 % Oint by Nasal route 2 (two) times daily.    naproxen sodium (ALEVE) 220 mg Cap Take 220 mg by mouth 2 (two) times daily as needed.    pramipexole (MIRAPEX) 0.25 MG tablet TAKE ONE-HALF to ONE TABLET BY MOUTH THREE TIMES DAILY as directed    selegiline (ELDEPRYL) 5 mg Cap take ONE capsule BY MOUTH twice daily    simvastatin (ZOCOR) 10 MG tablet TAKE ONE TABLET BY MOUTH ONCE DAILY IN THE EVENING    UNABLE TO FIND Place 0.5 mLs under the tongue 2 (two) times a day. medication name:CBD Oil     Last dose 3 months ago    valsartan-hydrochlorothiazide (DIOVAN-HCT) 320-25 mg per tablet TAKE ONE TABLET BY MOUTH ONCE DAILY   Last reviewed on 10/10/2022 10:50 AM by  Dirk Trimble    Review of patient's allergies indicates:   Allergen Reactions    Propranolol Nausea And Vomiting     Drops pressure and raised sugar    Lipitor [atorvastatin] Other (See Comments)     Myalgia, muscle pain    Robaxin [methocarbamol]      Skin inside mouth started peeling.    Last reviewed on  10/10/2022 10:49 AM by Dirk Trimble      Tasks added this encounter   11/30/2022 - Refill Call (Auto Added)   Tasks due within next 3 months   No tasks due.     [facility]  [address] [facility] [address]  Phone: [phone]  Fax: [phone]

## 2022-11-03 ENCOUNTER — HOSPITAL ENCOUNTER (OUTPATIENT)
Dept: RADIOLOGY | Facility: HOSPITAL | Age: 67
Discharge: HOME | End: 2022-11-03
Attending: INTERNAL MEDICINE
Payer: MEDICARE

## 2022-11-03 VITALS — HEIGHT: 61 IN | WEIGHT: 218.06 LBS | BODY MASS INDEX: 41.17 KG/M2

## 2022-11-03 DIAGNOSIS — N63.0 LUMP OR MASS IN BREAST: ICD-10-CM

## 2022-11-03 DIAGNOSIS — Z12.31 ENCOUNTER FOR SCREENING MAMMOGRAM FOR MALIGNANT NEOPLASM OF BREAST: ICD-10-CM

## 2022-11-03 DIAGNOSIS — N63.0 LUMP IN FEMALE BREAST: ICD-10-CM

## 2022-11-03 PROCEDURE — 77066 DX MAMMO INCL CAD BI: CPT | Mod: TC,PO

## 2022-11-03 PROCEDURE — 77062 BREAST TOMOSYNTHESIS BI: CPT | Mod: 26,,, | Performed by: RADIOLOGY

## 2022-11-03 PROCEDURE — 76642 US BREAST LEFT LIMITED: ICD-10-PCS | Mod: 26,LT,, | Performed by: RADIOLOGY

## 2022-11-03 PROCEDURE — 77066 DX MAMMO INCL CAD BI: CPT | Mod: 26,,, | Performed by: RADIOLOGY

## 2022-11-03 PROCEDURE — 77066 MAMMO DIGITAL DIAGNOSTIC BILAT WITH TOMO: ICD-10-PCS | Mod: 26,,, | Performed by: RADIOLOGY

## 2022-11-03 PROCEDURE — 76642 ULTRASOUND BREAST LIMITED: CPT | Mod: 26,LT,, | Performed by: RADIOLOGY

## 2022-11-03 PROCEDURE — 77062 MAMMO DIGITAL DIAGNOSTIC BILAT WITH TOMO: ICD-10-PCS | Mod: 26,,, | Performed by: RADIOLOGY

## 2022-11-03 PROCEDURE — 76642 ULTRASOUND BREAST LIMITED: CPT | Mod: TC,PO,LT

## 2022-11-04 ENCOUNTER — PATIENT MESSAGE (OUTPATIENT)
Dept: FAMILY MEDICINE | Facility: CLINIC | Age: 67
End: 2022-11-04
Payer: MEDICARE

## 2022-11-07 ENCOUNTER — TELEPHONE (OUTPATIENT)
Dept: RADIOLOGY | Facility: HOSPITAL | Age: 67
End: 2022-11-07

## 2022-11-07 ENCOUNTER — OFFICE VISIT (OUTPATIENT)
Dept: FAMILY MEDICINE | Facility: CLINIC | Age: 67
End: 2022-11-07
Payer: MEDICARE

## 2022-11-07 ENCOUNTER — TELEPHONE (OUTPATIENT)
Dept: ORTHOPEDICS | Facility: CLINIC | Age: 67
End: 2022-11-07
Payer: MEDICARE

## 2022-11-07 VITALS
DIASTOLIC BLOOD PRESSURE: 80 MMHG | BODY MASS INDEX: 39.12 KG/M2 | HEART RATE: 95 BPM | WEIGHT: 207.19 LBS | TEMPERATURE: 98 F | SYSTOLIC BLOOD PRESSURE: 116 MMHG | OXYGEN SATURATION: 100 % | HEIGHT: 61 IN

## 2022-11-07 DIAGNOSIS — R92.8 ABNORMAL MAMMOGRAM: ICD-10-CM

## 2022-11-07 DIAGNOSIS — N63.42 SUBAREOLAR MASS OF LEFT BREAST: Primary | ICD-10-CM

## 2022-11-07 DIAGNOSIS — M17.11 OSTEOARTHRITIS OF RIGHT KNEE, UNSPECIFIED OSTEOARTHRITIS TYPE: Primary | ICD-10-CM

## 2022-11-07 PROCEDURE — 3288F PR FALLS RISK ASSESSMENT DOCUMENTED: ICD-10-PCS | Mod: CPTII,S$GLB,, | Performed by: NURSE PRACTITIONER

## 2022-11-07 PROCEDURE — 3044F HG A1C LEVEL LT 7.0%: CPT | Mod: CPTII,S$GLB,, | Performed by: NURSE PRACTITIONER

## 2022-11-07 PROCEDURE — 1126F PR PAIN SEVERITY QUANTIFIED, NO PAIN PRESENT: ICD-10-PCS | Mod: CPTII,S$GLB,, | Performed by: NURSE PRACTITIONER

## 2022-11-07 PROCEDURE — 3061F PR NEG MICROALBUMINURIA RESULT DOCUMENTED/REVIEW: ICD-10-PCS | Mod: CPTII,S$GLB,, | Performed by: NURSE PRACTITIONER

## 2022-11-07 PROCEDURE — 3008F BODY MASS INDEX DOCD: CPT | Mod: CPTII,S$GLB,, | Performed by: NURSE PRACTITIONER

## 2022-11-07 PROCEDURE — 1126F AMNT PAIN NOTED NONE PRSNT: CPT | Mod: CPTII,S$GLB,, | Performed by: NURSE PRACTITIONER

## 2022-11-07 PROCEDURE — 3066F PR DOCUMENTATION OF TREATMENT FOR NEPHROPATHY: ICD-10-PCS | Mod: CPTII,S$GLB,, | Performed by: NURSE PRACTITIONER

## 2022-11-07 PROCEDURE — 1159F PR MEDICATION LIST DOCUMENTED IN MEDICAL RECORD: ICD-10-PCS | Mod: CPTII,S$GLB,, | Performed by: NURSE PRACTITIONER

## 2022-11-07 PROCEDURE — 1160F PR REVIEW ALL MEDS BY PRESCRIBER/CLIN PHARMACIST DOCUMENTED: ICD-10-PCS | Mod: CPTII,S$GLB,, | Performed by: NURSE PRACTITIONER

## 2022-11-07 PROCEDURE — 3079F PR MOST RECENT DIASTOLIC BLOOD PRESSURE 80-89 MM HG: ICD-10-PCS | Mod: CPTII,S$GLB,, | Performed by: NURSE PRACTITIONER

## 2022-11-07 PROCEDURE — 1101F PR PT FALLS ASSESS DOC 0-1 FALLS W/OUT INJ PAST YR: ICD-10-PCS | Mod: CPTII,S$GLB,, | Performed by: NURSE PRACTITIONER

## 2022-11-07 PROCEDURE — 99999 PR PBB SHADOW E&M-EST. PATIENT-LVL V: CPT | Mod: PBBFAC,,, | Performed by: NURSE PRACTITIONER

## 2022-11-07 PROCEDURE — 3074F PR MOST RECENT SYSTOLIC BLOOD PRESSURE < 130 MM HG: ICD-10-PCS | Mod: CPTII,S$GLB,, | Performed by: NURSE PRACTITIONER

## 2022-11-07 PROCEDURE — 1160F RVW MEDS BY RX/DR IN RCRD: CPT | Mod: CPTII,S$GLB,, | Performed by: NURSE PRACTITIONER

## 2022-11-07 PROCEDURE — 3288F FALL RISK ASSESSMENT DOCD: CPT | Mod: CPTII,S$GLB,, | Performed by: NURSE PRACTITIONER

## 2022-11-07 PROCEDURE — 3008F PR BODY MASS INDEX (BMI) DOCUMENTED: ICD-10-PCS | Mod: CPTII,S$GLB,, | Performed by: NURSE PRACTITIONER

## 2022-11-07 PROCEDURE — 1159F MED LIST DOCD IN RCRD: CPT | Mod: CPTII,S$GLB,, | Performed by: NURSE PRACTITIONER

## 2022-11-07 PROCEDURE — 99999 PR PBB SHADOW E&M-EST. PATIENT-LVL V: ICD-10-PCS | Mod: PBBFAC,,, | Performed by: NURSE PRACTITIONER

## 2022-11-07 PROCEDURE — 1101F PT FALLS ASSESS-DOCD LE1/YR: CPT | Mod: CPTII,S$GLB,, | Performed by: NURSE PRACTITIONER

## 2022-11-07 PROCEDURE — 3066F NEPHROPATHY DOC TX: CPT | Mod: CPTII,S$GLB,, | Performed by: NURSE PRACTITIONER

## 2022-11-07 PROCEDURE — 3044F PR MOST RECENT HEMOGLOBIN A1C LEVEL <7.0%: ICD-10-PCS | Mod: CPTII,S$GLB,, | Performed by: NURSE PRACTITIONER

## 2022-11-07 PROCEDURE — 3079F DIAST BP 80-89 MM HG: CPT | Mod: CPTII,S$GLB,, | Performed by: NURSE PRACTITIONER

## 2022-11-07 PROCEDURE — 99214 PR OFFICE/OUTPT VISIT, EST, LEVL IV, 30-39 MIN: ICD-10-PCS | Mod: S$GLB,,, | Performed by: NURSE PRACTITIONER

## 2022-11-07 PROCEDURE — 99214 OFFICE O/P EST MOD 30 MIN: CPT | Mod: S$GLB,,, | Performed by: NURSE PRACTITIONER

## 2022-11-07 PROCEDURE — 3074F SYST BP LT 130 MM HG: CPT | Mod: CPTII,S$GLB,, | Performed by: NURSE PRACTITIONER

## 2022-11-07 PROCEDURE — 3061F NEG MICROALBUMINURIA REV: CPT | Mod: CPTII,S$GLB,, | Performed by: NURSE PRACTITIONER

## 2022-11-07 RX ORDER — OXYCODONE AND ACETAMINOPHEN 5; 325 MG/1; MG/1
1 TABLET ORAL
Qty: 42 TABLET | Refills: 0 | Status: SHIPPED | OUTPATIENT
Start: 2022-11-07 | End: 2022-11-17 | Stop reason: SDUPTHER

## 2022-11-07 RX ORDER — CEPHALEXIN 500 MG/1
500 CAPSULE ORAL EVERY 6 HOURS
Qty: 20 CAPSULE | Refills: 0 | Status: SHIPPED | OUTPATIENT
Start: 2022-11-07 | End: 2022-12-12

## 2022-11-07 RX ORDER — WARFARIN SODIUM 5 MG/1
5 TABLET ORAL DAILY
Qty: 30 TABLET | Refills: 0 | Status: SHIPPED | OUTPATIENT
Start: 2022-11-07 | End: 2023-06-16

## 2022-11-07 NOTE — TELEPHONE ENCOUNTER
----- Message from Brett Hartley MA sent at 11/7/2022  2:03 PM CST -----  Contact: patient  Patient stated she is having surgery tomorrow and has not yet received her Rx for her Coumadin yet?      [facility] [place] - [id number]  [id number]  [place] [address]  Phone: [phone] Fax: [phone]    Patient call back number is [phone]

## 2022-11-07 NOTE — TELEPHONE ENCOUNTER
Patient needs to have an ultrasound guided biopsy of left breast, patient wants to have biopsy done in [place]. Patient is scheduled to have a partial knee replacement tomorrow, 11/8, please call patient in a couple of days with date and time.

## 2022-11-08 ENCOUNTER — OUTSIDE PLACE OF SERVICE (OUTPATIENT)
Dept: ORTHOPEDICS | Facility: CLINIC | Age: 67
End: 2022-11-08
Payer: MEDICARE

## 2022-11-08 PROCEDURE — 27446 PR PLASTY KNEE,MED OR LAT COMPARTMT: ICD-10-PCS | Mod: RT,,, | Performed by: ORTHOPAEDIC SURGERY

## 2022-11-08 PROCEDURE — 27446 REVISION OF KNEE JOINT: CPT | Mod: RT,,, | Performed by: ORTHOPAEDIC SURGERY

## 2022-11-08 NOTE — PROGRESS NOTES
Subjective:       Patient ID: Jessica Rojas is a 66 y.o. female.    Chief Complaint: Follow-up (On diagnostic mammo on tuesday)    HPI      She comes in for follow-up of abnormal mammogram.  She was seen last week with complaints of a painless mass to the left breast.  She has completed mammogram and left breast ultrasound which shows a 28 mm subareolar mass of left breast.  There is concern that this could be a complex cyst versus abscess.  She denies any fever.  No redness or swelling to the breast.  No drainage from the nipple.  No skin changes.      Review of Systems   Constitutional:  Negative for fatigue, fever and unexpected weight change.   HENT:  Negative for ear pain and sore throat.    Eyes:  Negative for pain and visual disturbance.   Respiratory:  Negative for cough and shortness of breath.    Cardiovascular:  Negative for chest pain and palpitations.   Gastrointestinal:  Negative for abdominal pain, diarrhea and vomiting.   Musculoskeletal:  Negative for arthralgias and myalgias.   Skin:  Negative for color change and rash.   Neurological:  Negative for dizziness and headaches.   Psychiatric/Behavioral:  Negative for dysphoric mood and sleep disturbance. The patient is not nervous/anxious.      Vitals:    11/07/22 1028   BP: 116/80   Pulse: 95   Temp: 97.5 °F (36.4 °C)       Objective:     Current Outpatient Medications   Medication Sig Dispense Refill    acetaminophen (TYLENOL) 500 MG tablet Take 1 tablet (500 mg total) by mouth every 6 (six) hours as needed for Pain (do not exceed 3000 milligrams per 24 h).  0    alcohol swabs (ALCOHOL WIPES) PadM Uses 5 daily 400 each 4    amantadine HCL (SYMMETREL) 100 mg capsule Take 1 capsule (100 mg total) by mouth 2 (two) times daily. 60 capsule 11    blood-glucose transmitter (DEXCOM G6 TRANSMITTER) Lizbeth Dispense 1 transmitter 1 Device 3    carbidopa-levodopa  mg (SINEMET)  mg per tablet TAKE one and one-half TABLET BY MOUTH THREE TIMES DAILY 135  tablet 6    cholecalciferol, vitamin D3, 10 mcg (400 unit) Cap Take 1,200 Units by mouth once daily.      cyanocobalamin (VITAMIN B-12) 1000 MCG tablet Take 100 mcg by mouth once daily.      evolocumab (REPATHA SURECLICK) 140 mg/mL PnIj Inject 1 mL (140 mg total) into the skin every 14 (fourteen) days. 2 mL 12    fish oil-omega-3 fatty acids 300-1,000 mg capsule Take 4 capsules by mouth once daily.      fluticasone-salmeterol diskus inhaler 250-50 mcg Inhale 1 puff into the lungs 2 times daily. 30 each 11    gabapentin (NEURONTIN) 100 MG capsule Take 1 capsule (100 mg total) by mouth 3 (three) times daily. Takes at night 90 capsule 11    insulin aspart U-100 (NOVOLOG FLEXPEN U-100 INSULIN) 100 unit/mL (3 mL) InPn pen 30u AC + correction Max  u 120 mL 4    lamotrigine2.5% meloxicam 0.09% LIDOcaine2% prilocaine2% topical cream Apply topically 4 (four) times daily as needed (pain). 200 g 3    lancing device Misc Checks bg 7-8 times a day 1 each 0    LANTUS SOLOSTAR U-100 INSULIN glargine 100 units/mL (3mL) SubQ pen INJECT 35 UNITS EVERY MORNING THEN 30 UNITS EVERY NIGHT AT BEDTIME      levothyroxine (SYNTHROID) 150 MCG tablet Take 1 tablet (150 mcg total) by mouth before breakfast. 30 tablet 11    melatonin 3 mg Tab Take 6 mg by mouth nightly.      montelukast (SINGULAIR) 10 mg tablet take ONE tablet BY MOUTH once DAILY EVERY EVENING 30 tablet 11    MULTIVITAMIN W-MINERALS/LUTEIN (CENTRUM SILVER ORAL) Take 1 tablet by mouth once daily.       mupirocin calcium 2% nasl oint (BACTROBAN) 2 % Oint by Nasal route 2 (two) times daily.      naproxen sodium (ALEVE) 220 mg Cap Take 220 mg by mouth 2 (two) times daily as needed.      pramipexole (MIRAPEX) 0.25 MG tablet TAKE ONE-HALF to ONE TABLET BY MOUTH THREE TIMES DAILY as directed 90 tablet 11    selegiline (ELDEPRYL) 5 mg Cap take ONE capsule BY MOUTH twice daily 60 capsule 10    simvastatin (ZOCOR) 10 MG tablet TAKE ONE TABLET BY MOUTH ONCE DAILY IN THE EVENING 30  tablet 12    UNABLE TO FIND Place 0.5 mLs under the tongue 2 (two) times a day. medication name:CBD Oil     Last dose 3 months ago      valsartan-hydrochlorothiazide (DIOVAN-HCT) 320-25 mg per tablet TAKE ONE TABLET BY MOUTH ONCE DAILY 90 tablet 1    cephALEXin (KEFLEX) 500 MG capsule Take 1 capsule (500 mg total) by mouth every 6 (six) hours. 20 capsule 0    diclofenac sodium (VOLTAREN) 1 % Gel Apply 2 g topically once daily. 100 g 0    oxyCODONE-acetaminophen (PERCOCET) 5-325 mg per tablet Take 1 tablet by mouth every 4 to 6 hours as needed for Pain. 42 tablet 0    warfarin (COUMADIN) 5 MG tablet Take 1 tablet (5 mg total) by mouth Daily. 30 tablet 0     No current facility-administered medications for this visit.       Physical Exam  Vitals and nursing note reviewed.   Constitutional:       General: She is not in acute distress.     Appearance: She is well-developed.   HENT:      Head: Normocephalic and atraumatic.   Eyes:      Pupils: Pupils are equal, round, and reactive to light.   Cardiovascular:      Rate and Rhythm: Normal rate and regular rhythm.   Pulmonary:      Effort: Pulmonary effort is normal.      Breath sounds: Normal breath sounds.   Chest:   Breasts:     Left: Mass and tenderness present. No swelling, bleeding, inverted nipple, nipple discharge or skin change.       Musculoskeletal:         General: Normal range of motion.      Cervical back: Normal range of motion and neck supple.   Skin:     General: Skin is warm and dry.      Findings: No rash.   Neurological:      Mental Status: She is alert and oriented to person, place, and time.   Psychiatric:         Judgment: Judgment normal.       Assessment:       1. Subareolar mass of left breast    2. Abnormal mammogram        Plan:   Subareolar mass of left breast    Abnormal mammogram  Depressed does not appear to be abscessed, she will proceed with biopsy  She will be contacted to set up breast biopsy      No follow-ups on file.    There are no  Patient Instructions on file for this visit.

## 2022-11-09 PROCEDURE — G0180 MD CERTIFICATION HHA PATIENT: HCPCS | Mod: ,,, | Performed by: ORTHOPAEDIC SURGERY

## 2022-11-09 PROCEDURE — G0180 PR HOME HEALTH MD CERTIFICATION: ICD-10-PCS | Mod: ,,, | Performed by: ORTHOPAEDIC SURGERY

## 2022-11-16 ENCOUNTER — PATIENT MESSAGE (OUTPATIENT)
Dept: FAMILY MEDICINE | Facility: CLINIC | Age: 67
End: 2022-11-16
Payer: MEDICARE

## 2022-11-17 ENCOUNTER — PATIENT MESSAGE (OUTPATIENT)
Dept: ORTHOPEDICS | Facility: CLINIC | Age: 67
End: 2022-11-17
Payer: MEDICARE

## 2022-11-17 DIAGNOSIS — M17.11 OSTEOARTHRITIS OF RIGHT KNEE, UNSPECIFIED OSTEOARTHRITIS TYPE: ICD-10-CM

## 2022-11-17 RX ORDER — OXYCODONE AND ACETAMINOPHEN 5; 325 MG/1; MG/1
1 TABLET ORAL
Qty: 42 TABLET | Refills: 0 | Status: SHIPPED | OUTPATIENT
Start: 2022-11-17 | End: 2023-07-17

## 2022-11-21 ENCOUNTER — TELEPHONE (OUTPATIENT)
Dept: RADIOLOGY | Facility: HOSPITAL | Age: 67
End: 2022-11-21
Payer: MEDICARE

## 2022-11-21 NOTE — TELEPHONE ENCOUNTER
Spoke with patient who had knee replacement surgery with Dr. Monterroso 11/5/22 and is currently on Coumadin. She has Home Health and is having coumadin check as protocol. She has a follow up with Dr. Monterroso on 11/23/22 and will call me with his input, but I did explain to her to not be surprised that we may need to delay her biopsy for several weeks as it may not be safe to hold the Coumadin for the biopsy procedure. Patient voiced understanding and appreciation and will keep me updated.     Discussed with Safia Slade LPN as well.    Dr. Estela Grace, Helen Aguilar, GLYNN  and Dr. Monterroso all routed this note.

## 2022-11-23 ENCOUNTER — LAB VISIT (OUTPATIENT)
Dept: LAB | Facility: HOSPITAL | Age: 67
End: 2022-11-23
Attending: INTERNAL MEDICINE
Payer: MEDICARE

## 2022-11-23 ENCOUNTER — OFFICE VISIT (OUTPATIENT)
Dept: ORTHOPEDICS | Facility: CLINIC | Age: 67
End: 2022-11-23
Payer: MEDICARE

## 2022-11-23 DIAGNOSIS — M25.561 CHRONIC PAIN OF RIGHT KNEE: Primary | ICD-10-CM

## 2022-11-23 DIAGNOSIS — G89.29 CHRONIC PAIN OF RIGHT KNEE: Primary | ICD-10-CM

## 2022-11-23 DIAGNOSIS — Z96.651 S/P RIGHT UNICOMPARTMENTAL KNEE REPLACEMENT: ICD-10-CM

## 2022-11-23 DIAGNOSIS — E89.0 POST-OPERATIVE HYPOTHYROIDISM: ICD-10-CM

## 2022-11-23 DIAGNOSIS — M17.11 OSTEOARTHRITIS OF RIGHT KNEE, UNSPECIFIED OSTEOARTHRITIS TYPE: Primary | ICD-10-CM

## 2022-11-23 PROCEDURE — 99024 POSTOP FOLLOW-UP VISIT: CPT | Mod: S$GLB,,, | Performed by: ORTHOPAEDIC SURGERY

## 2022-11-23 PROCEDURE — 1159F MED LIST DOCD IN RCRD: CPT | Mod: CPTII,S$GLB,, | Performed by: ORTHOPAEDIC SURGERY

## 2022-11-23 PROCEDURE — 3061F NEG MICROALBUMINURIA REV: CPT | Mod: CPTII,S$GLB,, | Performed by: ORTHOPAEDIC SURGERY

## 2022-11-23 PROCEDURE — 3066F PR DOCUMENTATION OF TREATMENT FOR NEPHROPATHY: ICD-10-PCS | Mod: CPTII,S$GLB,, | Performed by: ORTHOPAEDIC SURGERY

## 2022-11-23 PROCEDURE — 1101F PR PT FALLS ASSESS DOC 0-1 FALLS W/OUT INJ PAST YR: ICD-10-PCS | Mod: CPTII,S$GLB,, | Performed by: ORTHOPAEDIC SURGERY

## 2022-11-23 PROCEDURE — 3288F PR FALLS RISK ASSESSMENT DOCUMENTED: ICD-10-PCS | Mod: CPTII,S$GLB,, | Performed by: ORTHOPAEDIC SURGERY

## 2022-11-23 PROCEDURE — 84443 ASSAY THYROID STIM HORMONE: CPT | Performed by: NURSE PRACTITIONER

## 2022-11-23 PROCEDURE — 3061F PR NEG MICROALBUMINURIA RESULT DOCUMENTED/REVIEW: ICD-10-PCS | Mod: CPTII,S$GLB,, | Performed by: ORTHOPAEDIC SURGERY

## 2022-11-23 PROCEDURE — 1125F AMNT PAIN NOTED PAIN PRSNT: CPT | Mod: CPTII,S$GLB,, | Performed by: ORTHOPAEDIC SURGERY

## 2022-11-23 PROCEDURE — 3044F HG A1C LEVEL LT 7.0%: CPT | Mod: CPTII,S$GLB,, | Performed by: ORTHOPAEDIC SURGERY

## 2022-11-23 PROCEDURE — 1101F PT FALLS ASSESS-DOCD LE1/YR: CPT | Mod: CPTII,S$GLB,, | Performed by: ORTHOPAEDIC SURGERY

## 2022-11-23 PROCEDURE — 99999 PR PBB SHADOW E&M-EST. PATIENT-LVL III: ICD-10-PCS | Mod: PBBFAC,,, | Performed by: ORTHOPAEDIC SURGERY

## 2022-11-23 PROCEDURE — 36415 COLL VENOUS BLD VENIPUNCTURE: CPT | Mod: PO | Performed by: NURSE PRACTITIONER

## 2022-11-23 PROCEDURE — 99024 PR POST-OP FOLLOW-UP VISIT: ICD-10-PCS | Mod: S$GLB,,, | Performed by: ORTHOPAEDIC SURGERY

## 2022-11-23 PROCEDURE — 1159F PR MEDICATION LIST DOCUMENTED IN MEDICAL RECORD: ICD-10-PCS | Mod: CPTII,S$GLB,, | Performed by: ORTHOPAEDIC SURGERY

## 2022-11-23 PROCEDURE — 3044F PR MOST RECENT HEMOGLOBIN A1C LEVEL <7.0%: ICD-10-PCS | Mod: CPTII,S$GLB,, | Performed by: ORTHOPAEDIC SURGERY

## 2022-11-23 PROCEDURE — 1125F PR PAIN SEVERITY QUANTIFIED, PAIN PRESENT: ICD-10-PCS | Mod: CPTII,S$GLB,, | Performed by: ORTHOPAEDIC SURGERY

## 2022-11-23 PROCEDURE — 3066F NEPHROPATHY DOC TX: CPT | Mod: CPTII,S$GLB,, | Performed by: ORTHOPAEDIC SURGERY

## 2022-11-23 PROCEDURE — 3288F FALL RISK ASSESSMENT DOCD: CPT | Mod: CPTII,S$GLB,, | Performed by: ORTHOPAEDIC SURGERY

## 2022-11-23 PROCEDURE — 99999 PR PBB SHADOW E&M-EST. PATIENT-LVL III: CPT | Mod: PBBFAC,,, | Performed by: ORTHOPAEDIC SURGERY

## 2022-11-23 NOTE — PROGRESS NOTES
2 weeks post arthroplasty.  Doing well.  Wound without signs of infection.  Skin closure device  removed.  Continue with DVT prophylaxis and physical therapy.  Follow up in 4 weeks with x-rays.

## 2022-11-24 LAB — TSH SERPL DL<=0.005 MIU/L-ACNC: 1.19 UIU/ML (ref 0.4–4)

## 2022-11-25 ENCOUNTER — LAB VISIT (OUTPATIENT)
Dept: LAB | Facility: HOSPITAL | Age: 67
End: 2022-11-25
Payer: MEDICARE

## 2022-11-25 DIAGNOSIS — Z79.01 LONG TERM (CURRENT) USE OF ANTICOAGULANTS: ICD-10-CM

## 2022-11-25 LAB
INR PPP: 3.2 (ref 0.8–1.2)
PROTHROMBIN TIME: 31.9 SEC (ref 9–12.5)

## 2022-11-25 PROCEDURE — 36415 COLL VENOUS BLD VENIPUNCTURE: CPT | Mod: PO | Performed by: ORTHOPAEDIC SURGERY

## 2022-11-25 PROCEDURE — 85610 PROTHROMBIN TIME: CPT | Mod: PO | Performed by: ORTHOPAEDIC SURGERY

## 2022-11-28 ENCOUNTER — PATIENT MESSAGE (OUTPATIENT)
Dept: FAMILY MEDICINE | Facility: CLINIC | Age: 67
End: 2022-11-28
Payer: MEDICARE

## 2022-11-28 ENCOUNTER — TELEPHONE (OUTPATIENT)
Dept: RADIOLOGY | Facility: HOSPITAL | Age: 67
End: 2022-11-28
Payer: MEDICARE

## 2022-11-28 NOTE — TELEPHONE ENCOUNTER
Spoke with patient, patient states today is her last day to take Coumadin (per Dr. Lai post knee surgery).   Reviewed PT/INR 11/25/22=3.2  Discussed with patient that PT/INR needs to be closer to 2.0 for biopsy. Patient has HH coming out to redraw PT/INR this week.

## 2022-11-30 ENCOUNTER — SPECIALTY PHARMACY (OUTPATIENT)
Dept: PHARMACY | Facility: CLINIC | Age: 67
End: 2022-11-30
Payer: MEDICARE

## 2022-11-30 NOTE — TELEPHONE ENCOUNTER
Specialty Pharmacy - Refill Coordination    Specialty Medication Orders Linked to Encounter      Flowsheet Row Most Recent Value   Medication #1 evolocumab (REPATHA SURECLICK) 140 mg/mL PnIj (Order#[id number], Rx#[id number])            Refill Questions - Documented Responses      Flowsheet Row Most Recent Value   Patient Availability and HIPAA Verification    Does patient want to proceed with activity? Yes   HIPAA/medical authority confirmed? Yes   Relationship to patient of person spoken to? Self   Refill Screening Questions    Would patient like to speak to a pharmacist? No   When does the patient need to receive the medication? 12/08/22   Refill Delivery Questions    How will the patient receive the medication? MEDRx   When does the patient need to receive the medication? 12/08/22   Shipping Address Home   Address in [facility] confirmed and updated if neccessary? Yes   Expected Copay ($) 0   Is the patient able to afford the medication copay? Yes   Payment Method zero copay   Days supply of Refill 28   Supplies needed? No supplies needed   Refill activity completed? Yes   Refill activity plan Refill scheduled   Shipment/Pickup Date: 12/06/22            Current Outpatient Medications   Medication Sig    acetaminophen (TYLENOL) 500 MG tablet Take 1 tablet (500 mg total) by mouth every 6 (six) hours as needed for Pain (do not exceed 3000 milligrams per 24 h).    alcohol swabs (ALCOHOL WIPES) PadM Uses 5 daily    amantadine HCL (SYMMETREL) 100 mg capsule Take 1 capsule (100 mg total) by mouth 2 (two) times daily.    blood-glucose transmitter (DEXCOM G6 TRANSMITTER) Lizbeth Dispense 1 transmitter    carbidopa-levodopa  mg (SINEMET)  mg per tablet TAKE one and one-half TABLET BY MOUTH THREE TIMES DAILY    cephALEXin (KEFLEX) 500 MG capsule Take 1 capsule (500 mg total) by mouth every 6 (six) hours.    cholecalciferol, vitamin D3, 10 mcg (400 unit) Cap Take 1,200 Units by mouth once daily.     cyanocobalamin (VITAMIN B-12) 1000 MCG tablet Take 100 mcg by mouth once daily.    diclofenac sodium (VOLTAREN) 1 % Gel Apply 2 g topically once daily.    evolocumab (REPATHA SURECLICK) 140 mg/mL PnIj Inject 1 mL (140 mg total) into the skin every 14 (fourteen) days.    fish oil-omega-3 fatty acids 300-1,000 mg capsule Take 4 capsules by mouth once daily.    fluticasone-salmeterol diskus inhaler 250-50 mcg Inhale 1 puff into the lungs 2 times daily.    gabapentin (NEURONTIN) 100 MG capsule Take 1 capsule (100 mg total) by mouth 3 (three) times daily. Takes at night    insulin aspart U-100 (NOVOLOG FLEXPEN U-100 INSULIN) 100 unit/mL (3 mL) InPn pen 30u AC + correction Max  u    lamotrigine2.5% meloxicam 0.09% LIDOcaine2% prilocaine2% topical cream Apply topically 4 (four) times daily as needed (pain).    lancing device Misc Checks bg 7-8 times a day    LANTUS SOLOSTAR U-100 INSULIN glargine 100 units/mL (3mL) SubQ pen INJECT 35 UNITS EVERY MORNING THEN 30 UNITS EVERY NIGHT AT BEDTIME    levothyroxine (SYNTHROID) 150 MCG tablet Take 1 tablet (150 mcg total) by mouth before breakfast.    melatonin 3 mg Tab Take 6 mg by mouth nightly.    montelukast (SINGULAIR) 10 mg tablet take ONE tablet BY MOUTH once DAILY EVERY EVENING    MULTIVITAMIN W-MINERALS/LUTEIN (CENTRUM SILVER ORAL) Take 1 tablet by mouth once daily.     mupirocin calcium 2% nasl oint (BACTROBAN) 2 % Oint by Nasal route 2 (two) times daily.    naproxen sodium (ALEVE) 220 mg Cap Take 220 mg by mouth 2 (two) times daily as needed.    oxyCODONE-acetaminophen (PERCOCET) 5-325 mg per tablet Take 1 tablet by mouth every 4 to 6 hours as needed for Pain.    pramipexole (MIRAPEX) 0.25 MG tablet TAKE ONE-HALF to ONE TABLET BY MOUTH THREE TIMES DAILY as directed    selegiline (ELDEPRYL) 5 mg Cap take ONE capsule BY MOUTH twice daily    simvastatin (ZOCOR) 10 MG tablet TAKE ONE TABLET BY MOUTH ONCE DAILY IN THE EVENING    UNABLE TO FIND Place 0.5 mLs under the  tongue 2 (two) times a day. medication name:CBD Oil     Last dose 3 months ago    valsartan-hydrochlorothiazide (DIOVAN-HCT) 320-25 mg per tablet TAKE ONE TABLET BY MOUTH ONCE DAILY    warfarin (COUMADIN) 5 MG tablet Take 1 tablet (5 mg total) by mouth Daily.   Last reviewed on 11/23/2022  9:37 AM by Alexander Herr    Review of patient's allergies indicates:   Allergen Reactions    Propranolol Nausea And Vomiting     Drops pressure and raised sugar    Lipitor [atorvastatin] Other (See Comments)     Myalgia, muscle pain    Robaxin [methocarbamol]      Skin inside mouth started peeling.    Last reviewed on  11/23/2022 9:37 AM by Alexander Herr      Tasks added this encounter   12/29/2022 - Refill Call (Auto Added)   Tasks due within next 3 months   No tasks due.     [facility]  [address]  [facility] [address]  Phone: [phone]  Fax: [phone]

## 2022-12-02 ENCOUNTER — CLINICAL SUPPORT (OUTPATIENT)
Dept: REHABILITATION | Facility: HOSPITAL | Age: 67
End: 2022-12-02
Attending: ORTHOPAEDIC SURGERY
Payer: MEDICARE

## 2022-12-02 ENCOUNTER — LAB VISIT (OUTPATIENT)
Dept: LAB | Facility: HOSPITAL | Age: 67
End: 2022-12-02
Attending: ORTHOPAEDIC SURGERY
Payer: MEDICARE

## 2022-12-02 DIAGNOSIS — M25.561 CHRONIC PAIN OF RIGHT KNEE: Primary | ICD-10-CM

## 2022-12-02 DIAGNOSIS — M17.11 OSTEOARTHRITIS OF RIGHT KNEE, UNSPECIFIED OSTEOARTHRITIS TYPE: ICD-10-CM

## 2022-12-02 DIAGNOSIS — Z96.651 S/P RIGHT UNICOMPARTMENTAL KNEE REPLACEMENT: ICD-10-CM

## 2022-12-02 DIAGNOSIS — Z79.01 LONG TERM (CURRENT) USE OF ANTICOAGULANTS: ICD-10-CM

## 2022-12-02 DIAGNOSIS — Z74.09 IMPAIRED FUNCTIONAL MOBILITY, BALANCE, GAIT, AND ENDURANCE: ICD-10-CM

## 2022-12-02 DIAGNOSIS — R29.898 WEAKNESS OF BOTH LOWER EXTREMITIES: ICD-10-CM

## 2022-12-02 DIAGNOSIS — G89.29 CHRONIC PAIN OF RIGHT KNEE: Primary | ICD-10-CM

## 2022-12-02 LAB
INR PPP: 1.1 (ref 0.8–1.2)
PROTHROMBIN TIME: 11.1 SEC (ref 9–12.5)

## 2022-12-02 PROCEDURE — 97110 THERAPEUTIC EXERCISES: CPT | Mod: PO

## 2022-12-02 PROCEDURE — 85610 PROTHROMBIN TIME: CPT | Mod: PO | Performed by: ORTHOPAEDIC SURGERY

## 2022-12-02 PROCEDURE — 36415 COLL VENOUS BLD VENIPUNCTURE: CPT | Mod: PO | Performed by: ORTHOPAEDIC SURGERY

## 2022-12-02 PROCEDURE — 97161 PT EVAL LOW COMPLEX 20 MIN: CPT | Mod: PO

## 2022-12-02 NOTE — PLAN OF CARE
[facility]  Physical Therapy Initial Evaluation    Name: Jessica Rojas  Clinic Number: [id number]    Therapy Diagnosis:   Encounter Diagnoses   Name Primary?    Osteoarthritis of right knee, unspecified osteoarthritis type     S/P right unicompartmental knee replacement     Chronic pain of right knee Yes    Weakness of both lower extremities     Impaired functional mobility, balance, gait, and endurance      Physician: Jerry Lai MD    Physician Orders: PT Eval and Treat   Medical Diagnosis from Referral: M17.11 (ICD-10-CM) - Osteoarthritis of right knee, unspecified osteoarthritis type Z96.651 (ICD-10-CM) - S/P right unicompartmental knee replacement   Evaluation Date: 12/2/2022  Authorization Period Expiration: 11/23/2023   Plan of Care Expiration: 2/10/2023  Visit # / Visits authorized: 1/ 1    Time In: 8:30  Time Out: 9:15  Total Billable Time: 15 minutes    Precautions: Diabetes and cancer and R unicompartmental knee replacement on 11/09/22    Subjective   Date of onset: R UKR 11/9/2022  History of current condition - Jessica reports: Pt reports that she had knee surgery about 3 weeks ago now in November, but she does not remember the date. She was having a lot of pain in the first two weeks, but the pain has improved this week. She has been walking in her house with any AD, but continues to use her rollator when she is out of the house. She has not driven since surgery, and had not driven prior either due to pain. She is unable to put on her socks without help, but can slip on her shoes. Otherwise independent in self care. Her goal is to get off of the rollator in all settings and be able to drive herself around when she wants to.      Medical History:   Past Medical History:   Diagnosis Date    Abdominal pain 2/27/2015    Arthritis     Diabetes mellitus, type 2     DM (diabetes mellitus)     on insulin    Elevated transaminase level 4/18/2016    Encounter for blood  transfusion     History of malignant carcinoid tumor of bronchus and lung 10/25/2017    History of neuroendocrine cancer     HTN (hypertension) 2014    Hypercalcemia 2016    Lung cancer, hilus 2014    Malignant carcinoid tumor of bronchus and lung 2014    Malignant carcinoid tumor of the bronchus and lung 2014    Mediastinal lymphadenopathy 2015    Obesity, morbid 2014    Parkinsons 10/2017    Pituitary adenoma 's    took parladel for 3 yrs    Pneumonia     Postoperative hypothyroidism 2017    - s/p total thyroidectomy in  (parathyroids intact) with post op hypothyroidism; on synthroid    Pulmonary nodules 2018    Thyroid disease     Wheeze 2014       Surgical History:   Jessica Rojas  has a past surgical history that includes  section (, ); Bronchoscopy (2014, 2014); Tonsillectomy (); Appendectomy (); Lung removal, partial (Right, ); Thyroidectomy (2016); Breast cyst aspiration; Tubal ligation (); Esophagogastroduodenoscopy (N/A, 2021); Colonoscopy (N/A, 2021); Colonoscopy (N/A, 2021); Epidural steroid injection into lumbar spine (N/A, 2022); Eye surgery; Minimally invasive transforaminal lumbar interbody fusion (TLIF) (N/A, 3/17/2022); and Colonoscopy (N/A, 2022).    Medications:   Jessica has a current medication list which includes the following prescription(s): acetaminophen, alcohol swabs, amantadine hcl, dexcom g6 transmitter, carbidopa-levodopa  mg, cephalexin, cholecalciferol (vitamin d3), cyanocobalamin, diclofenac sodium, evolocumab, fish oil-omega-3 fatty acids, fluticasone-salmeterol 250-50 mcg/dose, gabapentin, insulin aspart u-100, lamotrigine2.5% meloxicam 0.09% LIDOcaine2% prilocaine2% topical cream, lancing device, lantus solostar u-100 insulin, levothyroxine, melatonin, montelukast, folic acid/multivit-min/lutein, mupirocin calcium 2% nasl oint, naproxen sodium,  oxycodone-acetaminophen, pramipexole, selegiline, simvastatin, UNABLE TO FIND, valsartan-hydrochlorothiazide, and warfarin.    Allergies:   Review of patient's allergies indicates:   Allergen Reactions    Propranolol Nausea And Vomiting     Drops pressure and raised sugar    Lipitor [atorvastatin] Other (See Comments)     Myalgia, muscle pain    Robaxin [methocarbamol]      Skin inside mouth started peeling.        Imaging, no post op imaging available     Prior Therapy: Yes  Social History: Pt lives with their spouse  Occupation: Retired  Prior Level of Function: Independent w/ rollator  Current Level of Function: Independent w/ rollator    Pain:  Current 3/10, worst 10/10, best 0/10   Location: right knee  Description: stinging  Aggravating Factors: Walking  Easing Factors: ice and rest    Pts goals: Get rid of the rollator     Objective     Observation: Ambulating with rollator, slow isaac, short stride length. Able to walk without AD, again with slow isaac and short stride length, good stability. Mild quad lag noted with SLR.     Range of Motion:   Knee Left active Left Passive Right Active Right passive   Flexion 110 NT 91 93   Extension 0 NT 0 NT       Lower Extremity Strength  Left LE  Right LE    Hip flexion: 4/5 Hip flexion: 4/5   Knee extension: 5/5 Knee extension: 5/5   Knee flexion: 5/5 Knee flexion: 4+/5   Hip abduction:  NT/5 Hip abduction: NT/5   Hip extension: NT/5 Hip extension: NT/5   Ankle dorsiflexion: 4+/5 Ankle dorsiflexion: 4+/5   Ankle plantarflexion: 5/5 Ankle plantarflexion: 5/5     Function:  - SLS R: 6s  - SLS L: 13s  - Sit <--> Stand: Independent    - Bed Mobility: MinAx1 for supine to sit transfer  - 5x STS: 24s, no hands  - TUs, no AD    Joint Mobility: Mild hypomobility of the R patella    Palpation: TTP broadly about the R patella      CMS Impairment/Limitation/Restriction for FOTO KNEE Survey    Therapist reviewed FOTO scores for Jessica Rojas on 2022.   FOTO  documents entered into EPIC - see Media section.    Limitation Score: 65%  Category: Mobility         TREATMENT   Treatment Time In: 9:00  Treatment Time Out: 9:15  Total Treatment time separate from Evaluation: 15 minutes    Jessica received therapeutic exercises to develop strength, endurance, ROM, and flexibility for 15 minutes including:  Seated hamstring stretch, 1x 30s   Knee ext stretch w/ heel prop, 1 min  SLR, 1x10  Heel slides, 1x10  Bridges, 1x10  Mini squats, 1x10  Heel raises, 1x10      Home Exercises and Patient Education Provided    Education provided:   - Role of PT, PT POC, PT diagnosis, PT prognosis, HEP    Written Home Exercises Provided: yes.  Exercises were reviewed and Jessica was able to demonstrate them prior to the end of the session.  Jessica demonstrated good  understanding of the education provided.     See EMR under Patient Instructions for exercises provided 12/2/2022.    Assessment   Jessica is a 66 y.o. female referred to outpatient Physical Therapy with a medical diagnosis of M17.11 (ICD-10-CM) - Osteoarthritis of right knee, unspecified osteoarthritis type Z96.651 (ICD-10-CM) - S/P right unicompartmental knee replacement. Physical exam is consistent with decreased strength, ROM, and functional mobility secondary to R unicompartmental knee replacement. Primary impairments include AROM, PROM, joint mobility, strength, balance, soft tissue restrictions, and pain which limits functional mobility. This pt is a good candidate for skilled PT tx and stands to benefit from a combination of manual therapy including joint mobilizations with trigger point/myofacscial release, therapeutic exercise to establish core/joint stability, neuromuscular re-education, dry needling and modalities Prn. The pt has been educated on their dx/POC and consents to further PT tx.      Pt prognosis is Good.   Pt will benefit from skilled outpatient Physical Therapy to address the deficits stated above and in the chart  below, provide pt/family education, and to maximize pt's level of independence.     Plan of care discussed with patient: Yes  Pt's spiritual, cultural and educational needs considered and patient is agreeable to the plan of care and goals as stated below:     Anticipated Barriers for therapy: None    Medical Necessity is demonstrated by the following  History  Co-morbidities and personal factors that may impact the plan of care Co-morbidities:   As noted above    Personal Factors:   no deficits     high   Examination  Body Structures and Functions, activity limitations and participation restrictions that may impact the plan of care Body Regions:   lower extremities    Body Systems:    ROM  strength  balance  gait  transfers  motor control    Participation Restrictions:   Ambulating with rollator, unable to drive    Activity limitations:   Learning and applying knowledge  no deficits    General Tasks and Commands  no deficits    Communication  no deficits    Mobility  lifting and carrying objects  walking  moving around using equipment (WC)  driving (bike, car, motorcycle)    Self care  dressing    Domestic Life  doing house work (cleaning house, washing dishes, laundry)    Interactions/Relationships  no deficits    Life Areas  no deficits    Community and Social Life  community life  recreation and leisure         high   Clinical Presentation stable and uncomplicated low   Decision Making/ Complexity Score: low     Goals:  Short-Term Goals: 4 weeks   - The patient will be independent with initial home exercise program.  - The patient will increase strength to at least 4+/5 to perform functional mobility.   - The patient will increase PROM of the R knee to 0-105 degrees to perform ambulation with pain < 3/10.    Long-Term Goals: 8 weeks  - Pt to achieve <48% limitation as measured by the FOTO to demonstrate decreased disability.  - The patient will be independent with home exercise program and symptom management.  -  The patient will increase strength to at least 5/5 to perform functional mobility.   - The patient will increase AROM of the R knee to 0-110 to perform ambulation with pain < 2/10.  - The patient will be independent amb with no assistive device on all surfaces for community distances.    Plan   Plan of care Certification: 12/2/2022 to 2/10/2023.    Outpatient Physical Therapy 3 times weekly for 8 weeks to include the following interventions: Gait Training, Manual Therapy, Moist Heat/ Ice, Neuromuscular Re-ed, Patient Education, Therapeutic Activities, and Therapeutic Exercise.     Collin Chandra, PT

## 2022-12-05 ENCOUNTER — TELEPHONE (OUTPATIENT)
Dept: RADIOLOGY | Facility: HOSPITAL | Age: 67
End: 2022-12-05
Payer: MEDICARE

## 2022-12-05 ENCOUNTER — EXTERNAL HOME HEALTH (OUTPATIENT)
Dept: HOME HEALTH SERVICES | Facility: HOSPITAL | Age: 67
End: 2022-12-05
Payer: MEDICARE

## 2022-12-05 NOTE — TELEPHONE ENCOUNTER
Spoke with patient. Reviewed breast biopsy procedure and reviewed instructions for breast biopsy. Patient voiced understanding and appreciation and all questions were answered. Provided patient with my phone number to call for any further concerns or questions.  Patient scheduled for breast biopsy at the [facility] on  12/7/22 at 915 am.    Patient had knee surgery on 11/28 and was on coumadin, both delaying biopsy slightly.    No PT/INR needed just prior to bx per Dr. Persaud, see 12/2/22 PT/INR.    Dr. Grace and Brooklyn Aguilar NP notified via this note.

## 2022-12-06 ENCOUNTER — CLINICAL SUPPORT (OUTPATIENT)
Dept: REHABILITATION | Facility: HOSPITAL | Age: 67
End: 2022-12-06
Attending: ORTHOPAEDIC SURGERY
Payer: MEDICARE

## 2022-12-06 ENCOUNTER — DOCUMENT SCAN (OUTPATIENT)
Dept: HOME HEALTH SERVICES | Facility: HOSPITAL | Age: 67
End: 2022-12-06
Payer: MEDICARE

## 2022-12-06 DIAGNOSIS — R29.898 WEAKNESS OF BOTH LOWER EXTREMITIES: ICD-10-CM

## 2022-12-06 DIAGNOSIS — M25.561 CHRONIC PAIN OF RIGHT KNEE: Primary | ICD-10-CM

## 2022-12-06 DIAGNOSIS — G89.29 CHRONIC PAIN OF RIGHT KNEE: Primary | ICD-10-CM

## 2022-12-06 DIAGNOSIS — Z74.09 IMPAIRED FUNCTIONAL MOBILITY, BALANCE, GAIT, AND ENDURANCE: ICD-10-CM

## 2022-12-06 PROCEDURE — 97110 THERAPEUTIC EXERCISES: CPT | Mod: PO,CQ

## 2022-12-06 NOTE — PROGRESS NOTES
[facility]   Physical Therapy Treatment Note     Name: Jessica Rojas  Clinic Number: [id number]    Therapy Diagnosis:   Encounter Diagnoses   Name Primary?    Chronic pain of right knee Yes    Weakness of both lower extremities     Impaired functional mobility, balance, gait, and endurance      Physician: Jerry Lai MD    Visit Date: 12/6/2022  Physician Orders: PT Eval and Treat   Medical Diagnosis from Referral: M17.11 (ICD-10-CM) - Osteoarthritis of right knee, unspecified osteoarthritis type Z96.651 (ICD-10-CM) - S/P right unicompartmental knee replacement   Evaluation Date: 12/2/2022  Authorization Period Expiration: 02/4/2023  Plan of Care Expiration: 2/10/2023  Visit # / Visits authorized: 1/ 12     Time In: 8:30  Time Out: 9:15  Total Billable Time: 15 minutes     Precautions: Diabetes and cancer and R unicompartmental knee replacement on 11/09/22    PTA Visit #: 1/5     Precautions: Diabetes and cancer and R unicompartmental knee replacement on 11/09/22    Date of onset: R UKR 11/9/2022      SUBJECTIVE     Pt reports: She has a cane at home but she doesn't feel comfortable using it.  Pt reports her pain used to be a stabbing pain in the knee when she walked whereas now, it is an ache.  Pt was able to sleep in her bed all night last night for the first time since surgery.  Pt is icing her knee 2-3 times a day for 20 minutes.  Pt will get charley horses in her R calf at night that wake her up.  Pt reports she does not have these in the day.  Pt denies warmth or redness in calf.  Pt reports today is the first day she has walked without her cane.  She was compliant with home exercise program.    Response to previous treatment: increased soreness  Functional change: too soon to tell    Pain: 3/10  Location: right knee      OBJECTIVE     Objective Measures updated at progress report unless specified.     Treatment     Jessica received the treatments listed below:      Range of  Motion:   Knee Right Active assist Right passive   Flexion 103 NT   Extension 0 NT          Jessica received therapeutic exercises to develop strength, endurance, ROM, and flexibility for 45 minutes including:    Gastroc stretch on incline, 3x30 sec  Seated hamstring stretch, 1x 30s   Knee ext stretch w/ heel prop, 1 min  Quad set with heel prop, 15x5 sec hold  SLR, 2x10  Heel slides with strap, 2x10  Bridges, 2x10  LAQ 2#, 1x15  Mini squats, 1x15  Heel raises, 1x15  Gait training with SC  Recumbent bike x 5 minutes with full revolutions     manual therapy techniques: Joint mobilizations were applied to the: R patella for 5 minutes, including:  Patella mobs in all directions      Patient Education and Home Exercises     Home Exercises Provided and Patient Education Provided     Education provided:   - HEP    Written Home Exercises Provided: Patient instructed to cont prior HEP. Exercises were reviewed and Jessica was able to demonstrate them prior to the end of the session.  Jessica demonstrated good  understanding of the education provided. See EMR under Patient Instructions for exercises provided during therapy sessions    ASSESSMENT     Pt with excellent tolerance of treatment today.  Pt with stiffness in patella mobs but this improved with manual therapy.  Pt tends to keep hip in externally rotated position but, patient with increased lower leg rotation in L leg as well.  Pt able to complete full revolutions on the recumbent bike today.  Pt with improved active ROM in flexion.  Pt slightly tighter in extension that previous session but able to passively get to zero.  Pt has a hurrycane at home but she will bring this next session to work on getting more comfortable with a cane.  Pt reported feeling comfortable amb with SC.  Pt was using R hand when using the cane which may have been why she felt uncomfortable.    Jessica Is progressing well towards her goals.   Pt prognosis is Good.     Pt will continue to benefit  from skilled outpatient physical therapy to address the deficits listed in the problem list box on initial evaluation, provide pt/family education and to maximize pt's level of independence in the home and community environment.     Pt's spiritual, cultural and educational needs considered and pt agreeable to plan of care and goals.     Anticipated barriers to physical therapy: None     Goals:     Short-Term Goals: 4 weeks   - The patient will be independent with initial home exercise program.  - The patient will increase strength to at least 4+/5 to perform functional mobility.   - The patient will increase PROM of the R knee to 0-105 degrees to perform ambulation with pain < 3/10.     Long-Term Goals: 8 weeks  - Pt to achieve <48% limitation as measured by the FOTO to demonstrate decreased disability.  - The patient will be independent with home exercise program and symptom management.  - The patient will increase strength to at least 5/5 to perform functional mobility.   - The patient will increase AROM of the R knee to 0-110 to perform ambulation with pain < 2/10.  - The patient will be independent amb with no assistive device on all surfaces for community distances    PLAN     Plan of care Certification: 12/2/2022 to 2/10/2023.       Krupa Hoang PTA

## 2022-12-07 ENCOUNTER — HOSPITAL ENCOUNTER (OUTPATIENT)
Dept: RADIOLOGY | Facility: HOSPITAL | Age: 67
Discharge: HOME | End: 2022-12-07
Attending: INTERNAL MEDICINE
Payer: MEDICARE

## 2022-12-07 DIAGNOSIS — R92.8 ABNORMAL MAMMOGRAM OF LEFT BREAST: ICD-10-CM

## 2022-12-07 PROCEDURE — 88305 TISSUE EXAM BY PATHOLOGIST: CPT | Performed by: PATHOLOGY

## 2022-12-07 PROCEDURE — 88305 TISSUE EXAM BY PATHOLOGIST: ICD-10-PCS | Mod: 26,,, | Performed by: PATHOLOGY

## 2022-12-07 PROCEDURE — 87077 CULTURE AEROBIC IDENTIFY: CPT | Performed by: INTERNAL MEDICINE

## 2022-12-07 PROCEDURE — 19083 BX BREAST 1ST LESION US IMAG: CPT | Mod: LT,,, | Performed by: RADIOLOGY

## 2022-12-07 PROCEDURE — 19083 BX BREAST 1ST LESION US IMAG: CPT | Mod: PO,LT

## 2022-12-07 PROCEDURE — 88341 PR IHC OR ICC EACH ADD'L SINGLE ANTIBODY  STAINPR: ICD-10-PCS | Mod: 26,,, | Performed by: PATHOLOGY

## 2022-12-07 PROCEDURE — 88341 IMHCHEM/IMCYTCHM EA ADD ANTB: CPT | Mod: 59 | Performed by: PATHOLOGY

## 2022-12-07 PROCEDURE — 77065 DX MAMMO INCL CAD UNI: CPT | Mod: TC,PO,LT

## 2022-12-07 PROCEDURE — 88342 IMHCHEM/IMCYTCHM 1ST ANTB: CPT | Performed by: PATHOLOGY

## 2022-12-07 PROCEDURE — 88342 IMHCHEM/IMCYTCHM 1ST ANTB: CPT | Mod: 26,,, | Performed by: PATHOLOGY

## 2022-12-07 PROCEDURE — 88341 IMHCHEM/IMCYTCHM EA ADD ANTB: CPT | Mod: 26,,, | Performed by: PATHOLOGY

## 2022-12-07 PROCEDURE — 88305 TISSUE EXAM BY PATHOLOGIST: CPT | Mod: 26,,, | Performed by: PATHOLOGY

## 2022-12-07 PROCEDURE — 25000003 PHARM REV CODE 250: Mod: PO | Performed by: INTERNAL MEDICINE

## 2022-12-07 PROCEDURE — 77065 MAMMO DIGITAL DIAGNOSTIC LEFT: ICD-10-PCS | Mod: 26,LT,, | Performed by: RADIOLOGY

## 2022-12-07 PROCEDURE — 87070 CULTURE OTHR SPECIMN AEROBIC: CPT | Performed by: INTERNAL MEDICINE

## 2022-12-07 PROCEDURE — 77065 DX MAMMO INCL CAD UNI: CPT | Mod: 26,LT,, | Performed by: RADIOLOGY

## 2022-12-07 PROCEDURE — 87186 SC STD MICRODIL/AGAR DIL: CPT | Performed by: INTERNAL MEDICINE

## 2022-12-07 PROCEDURE — 88342 CHG IMMUNOCYTOCHEMISTRY: ICD-10-PCS | Mod: 26,,, | Performed by: PATHOLOGY

## 2022-12-07 PROCEDURE — 19083 US BREAST BIOPSY WITH IMAGING 1ST SITE LEFT: ICD-10-PCS | Mod: LT,,, | Performed by: RADIOLOGY

## 2022-12-07 PROCEDURE — 27202663 US BREAST BIOPSY WITH IMAGING 1ST SITE LEFT: Mod: PO

## 2022-12-07 PROCEDURE — 87075 CULTR BACTERIA EXCEPT BLOOD: CPT | Performed by: INTERNAL MEDICINE

## 2022-12-07 RX ORDER — LIDOCAINE HYDROCHLORIDE 10 MG/ML
5 INJECTION INFILTRATION; PERINEURAL ONCE
Status: COMPLETED | OUTPATIENT
Start: 2022-12-07 | End: 2022-12-07

## 2022-12-07 RX ADMIN — LIDOCAINE HYDROCHLORIDE ANHYDROUS 5 ML: 10 INJECTION, SOLUTION INFILTRATION at 10:12

## 2022-12-08 ENCOUNTER — CLINICAL SUPPORT (OUTPATIENT)
Dept: REHABILITATION | Facility: HOSPITAL | Age: 67
End: 2022-12-08
Attending: ORTHOPAEDIC SURGERY
Payer: MEDICARE

## 2022-12-08 DIAGNOSIS — M25.561 CHRONIC PAIN OF RIGHT KNEE: Primary | ICD-10-CM

## 2022-12-08 DIAGNOSIS — Z74.09 IMPAIRED FUNCTIONAL MOBILITY, BALANCE, GAIT, AND ENDURANCE: ICD-10-CM

## 2022-12-08 DIAGNOSIS — G89.29 CHRONIC PAIN OF RIGHT KNEE: Primary | ICD-10-CM

## 2022-12-08 DIAGNOSIS — R29.898 WEAKNESS OF BOTH LOWER EXTREMITIES: ICD-10-CM

## 2022-12-08 PROCEDURE — 97140 MANUAL THERAPY 1/> REGIONS: CPT | Mod: PO

## 2022-12-08 PROCEDURE — 97110 THERAPEUTIC EXERCISES: CPT | Mod: PO

## 2022-12-08 NOTE — PROGRESS NOTES
[facility]   Physical Therapy Treatment Note     Name: Jessica Rojas  Clinic Number: [id number]    Therapy Diagnosis:   Encounter Diagnoses   Name Primary?    Chronic pain of right knee Yes    Weakness of both lower extremities     Impaired functional mobility, balance, gait, and endurance      Physician: Jerry Lai MD    Visit Date: 12/8/2022  Physician Orders: PT Eval and Treat   Medical Diagnosis from Referral: M17.11 (ICD-10-CM) - Osteoarthritis of right knee, unspecified osteoarthritis type Z96.651 (ICD-10-CM) - S/P right unicompartmental knee replacement   Evaluation Date: 12/2/2022  Authorization Period Expiration: 02/4/2023  Plan of Care Expiration: 2/10/2023  Visit # / Visits authorized: 2 / 12    PTA Visit #: 0/5       Time In: 11:25  Time Out: 12:05  Total Billable Time: 40 minutes     Precautions: Diabetes and cancer and R unicompartmental knee replacement on 11/09/2    SUBJECTIVE     Pt reports: She had a biopsy yesterday and they told her to use her rollator for a few days to make sure she feels ok. Her knee started to hurt a lot the night after last session and remained painful the day after as well. She is feeling a little better today. She is still using her bike at home as well.   She was compliant with home exercise program.    Response to previous treatment: increased soreness  Functional change: too soon to tell    Pain: 3/10  Location: right knee      OBJECTIVE     Objective Measures updated at progress report unless specified.     Treatment     Jessica received the treatments listed below:      Range of Motion:   Knee Right Active assist Right passive   Flexion 103 NT   Extension 0 NT          Jessica received therapeutic exercises to develop strength, endurance, ROM, and flexibility for 32 minutes including:  Recumbent bike, 5 mins  Gastroc stretch on incline, 3x30 sec  Seated hamstring stretch, 3x 30s   Knee ext stretch w/ heel prop, 1 min  Quad set with  heel prop, 1x10 w/ 5s hold   SLR, 2x10  Heel slides with strap, 2x10  Bridges, 2x10  LAQ 2#, 2x10  Mini squats, 1x15  Heel raises, 1x15     manual therapy techniques: Joint mobilizations were applied to the: R patella for 08 minutes, including:  Patella mobs in all directions  Tibiofemoral mobs      Patient Education and Home Exercises     Home Exercises Provided and Patient Education Provided     Education provided:   - HEP    Written Home Exercises Provided: Patient instructed to cont prior HEP. Exercises were reviewed and Jessica was able to demonstrate them prior to the end of the session.  Jessica demonstrated good  understanding of the education provided. See EMR under Patient Instructions for exercises provided during therapy sessions    ASSESSMENT     Pt tolerated tx well. Mild hypomobility of the patella at beginning of session, but improved with mobilization. Pt again demonstrated 0-103 degrees ROM. Did not progress treatment due to response to last treatment.     Jessica Is progressing well towards her goals.   Pt prognosis is Good.     Pt will continue to benefit from skilled outpatient physical therapy to address the deficits listed in the problem list box on initial evaluation, provide pt/family education and to maximize pt's level of independence in the home and community environment.     Pt's spiritual, cultural and educational needs considered and pt agreeable to plan of care and goals.     Anticipated barriers to physical therapy: None     Goals:   Short-Term Goals: 4 weeks   - The patient will be independent with initial home exercise program.  - The patient will increase strength to at least 4+/5 to perform functional mobility.   - The patient will increase PROM of the R knee to 0-105 degrees to perform ambulation with pain < 3/10.     Long-Term Goals: 8 weeks  - Pt to achieve <48% limitation as measured by the FOTO to demonstrate decreased disability.  - The patient will be independent with home  exercise program and symptom management.  - The patient will increase strength to at least 5/5 to perform functional mobility.   - The patient will increase AROM of the R knee to 0-110 to perform ambulation with pain < 2/10.  - The patient will be independent amb with no assistive device on all surfaces for community distances    PLAN     Plan of care Certification: 12/2/2022 to 2/10/2023.     Collin Chandra, PT

## 2022-12-10 LAB — BACTERIA SPEC AEROBE CULT: ABNORMAL

## 2022-12-12 ENCOUNTER — OFFICE VISIT (OUTPATIENT)
Dept: FAMILY MEDICINE | Facility: CLINIC | Age: 67
End: 2022-12-12
Payer: MEDICARE

## 2022-12-12 VITALS
DIASTOLIC BLOOD PRESSURE: 87 MMHG | WEIGHT: 214.63 LBS | BODY MASS INDEX: 40.52 KG/M2 | TEMPERATURE: 99 F | HEART RATE: 96 BPM | SYSTOLIC BLOOD PRESSURE: 139 MMHG | HEIGHT: 61 IN

## 2022-12-12 DIAGNOSIS — N61.1 BREAST ABSCESS: Primary | ICD-10-CM

## 2022-12-12 LAB — BACTERIA SPEC ANAEROBE CULT: NORMAL

## 2022-12-12 PROCEDURE — 3079F PR MOST RECENT DIASTOLIC BLOOD PRESSURE 80-89 MM HG: ICD-10-PCS | Mod: HCNC,CPTII,S$GLB, | Performed by: INTERNAL MEDICINE

## 2022-12-12 PROCEDURE — 3008F PR BODY MASS INDEX (BMI) DOCUMENTED: ICD-10-PCS | Mod: HCNC,CPTII,S$GLB, | Performed by: INTERNAL MEDICINE

## 2022-12-12 PROCEDURE — 3075F PR MOST RECENT SYSTOLIC BLOOD PRESS GE 130-139MM HG: ICD-10-PCS | Mod: HCNC,CPTII,S$GLB, | Performed by: INTERNAL MEDICINE

## 2022-12-12 PROCEDURE — 1126F PR PAIN SEVERITY QUANTIFIED, NO PAIN PRESENT: ICD-10-PCS | Mod: HCNC,CPTII,S$GLB, | Performed by: INTERNAL MEDICINE

## 2022-12-12 PROCEDURE — 99213 OFFICE O/P EST LOW 20 MIN: CPT | Mod: HCNC,S$GLB,, | Performed by: INTERNAL MEDICINE

## 2022-12-12 PROCEDURE — 1101F PR PT FALLS ASSESS DOC 0-1 FALLS W/OUT INJ PAST YR: ICD-10-PCS | Mod: HCNC,CPTII,S$GLB, | Performed by: INTERNAL MEDICINE

## 2022-12-12 PROCEDURE — 3288F FALL RISK ASSESSMENT DOCD: CPT | Mod: HCNC,CPTII,S$GLB, | Performed by: INTERNAL MEDICINE

## 2022-12-12 PROCEDURE — 3008F BODY MASS INDEX DOCD: CPT | Mod: HCNC,CPTII,S$GLB, | Performed by: INTERNAL MEDICINE

## 2022-12-12 PROCEDURE — 99213 PR OFFICE/OUTPT VISIT, EST, LEVL III, 20-29 MIN: ICD-10-PCS | Mod: HCNC,S$GLB,, | Performed by: INTERNAL MEDICINE

## 2022-12-12 PROCEDURE — 3079F DIAST BP 80-89 MM HG: CPT | Mod: HCNC,CPTII,S$GLB, | Performed by: INTERNAL MEDICINE

## 2022-12-12 PROCEDURE — 99999 PR PBB SHADOW E&M-EST. PATIENT-LVL III: CPT | Mod: PBBFAC,HCNC,, | Performed by: INTERNAL MEDICINE

## 2022-12-12 PROCEDURE — 1126F AMNT PAIN NOTED NONE PRSNT: CPT | Mod: HCNC,CPTII,S$GLB, | Performed by: INTERNAL MEDICINE

## 2022-12-12 PROCEDURE — 3288F PR FALLS RISK ASSESSMENT DOCUMENTED: ICD-10-PCS | Mod: HCNC,CPTII,S$GLB, | Performed by: INTERNAL MEDICINE

## 2022-12-12 PROCEDURE — 1101F PT FALLS ASSESS-DOCD LE1/YR: CPT | Mod: HCNC,CPTII,S$GLB, | Performed by: INTERNAL MEDICINE

## 2022-12-12 PROCEDURE — 99999 PR PBB SHADOW E&M-EST. PATIENT-LVL III: ICD-10-PCS | Mod: PBBFAC,HCNC,, | Performed by: INTERNAL MEDICINE

## 2022-12-12 PROCEDURE — 3075F SYST BP GE 130 - 139MM HG: CPT | Mod: HCNC,CPTII,S$GLB, | Performed by: INTERNAL MEDICINE

## 2022-12-12 RX ORDER — SULFAMETHOXAZOLE AND TRIMETHOPRIM 800; 160 MG/1; MG/1
1 TABLET ORAL 2 TIMES DAILY
Qty: 20 TABLET | Refills: 0 | Status: SHIPPED | OUTPATIENT
Start: 2022-12-12 | End: 2022-12-22

## 2022-12-12 NOTE — PROGRESS NOTES
Assessment/Plan:    Problem List Items Addressed This Visit    None  Visit Diagnoses       Breast abscess    -  Primary            Follow up if symptoms worsen or fail to improve.    Estela Grace MD  _____________________________________________________________________________________________________________________________________________________    CC: discuss biopsy results    HPI:    Patient is in clinic today as an established patient.    Patient recently had abnormal diagnostic mmg/US. Results showing Left breast 28 mm mass at the retroareolar position. Recommendation for biopsy. Biopsy performed. Pathology results:   - Benign breast tissue with abscess and features suggestive of cyst rupture,   see comment.   - Microcalcifications: Not identified.   - Negative for atypia or malignancy.    Patient remains asymptomatic. Discussed starting on PO antibiotic to make sure infection is completely treated. Follow up if any symptom occur.     No other new complaints today.  Remaining chronic conditions have been reviewed and remain stable. Further detail as stated above.     HM reviewed.     No recent changes to medical/surgical history.    Current Outpatient Medications on File Prior to Visit   Medication Sig Dispense Refill    acetaminophen (TYLENOL) 500 MG tablet Take 1 tablet (500 mg total) by mouth every 6 (six) hours as needed for Pain (do not exceed 3000 milligrams per 24 h).  0    alcohol swabs (ALCOHOL WIPES) PadM Uses 5 daily 400 each 4    amantadine HCL (SYMMETREL) 100 mg capsule Take 1 capsule (100 mg total) by mouth 2 (two) times daily. 60 capsule 11    blood-glucose transmitter (DEXCOM G6 TRANSMITTER) Lizbeth Dispense 1 transmitter 1 Device 3    carbidopa-levodopa  mg (SINEMET)  mg per tablet TAKE one and one-half TABLET BY MOUTH THREE TIMES DAILY 135 tablet 6    cholecalciferol, vitamin D3, 10 mcg (400 unit) Cap Take 1,200 Units by mouth once daily.      cyanocobalamin (VITAMIN B-12)  1000 MCG tablet Take 100 mcg by mouth once daily.      diclofenac sodium (VOLTAREN) 1 % Gel Apply 2 g topically once daily. 100 g 0    evolocumab (REPATHA SURECLICK) 140 mg/mL PnIj Inject 1 mL (140 mg total) into the skin every 14 (fourteen) days. 2 mL 12    fish oil-omega-3 fatty acids 300-1,000 mg capsule Take 4 capsules by mouth once daily.      fluticasone-salmeterol diskus inhaler 250-50 mcg Inhale 1 puff into the lungs 2 times daily. 30 each 11    gabapentin (NEURONTIN) 100 MG capsule Take 1 capsule (100 mg total) by mouth 3 (three) times daily. Takes at night 90 capsule 11    insulin aspart U-100 (NOVOLOG FLEXPEN U-100 INSULIN) 100 unit/mL (3 mL) InPn pen 30u AC + correction Max  u 120 mL 4    lamotrigine2.5% meloxicam 0.09% LIDOcaine2% prilocaine2% topical cream Apply topically 4 (four) times daily as needed (pain). 200 g 3    lancing device Misc Checks bg 7-8 times a day 1 each 0    LANTUS SOLOSTAR U-100 INSULIN glargine 100 units/mL (3mL) SubQ pen INJECT 35 UNITS EVERY MORNING THEN 30 UNITS EVERY NIGHT AT BEDTIME      levothyroxine (SYNTHROID) 150 MCG tablet Take 1 tablet (150 mcg total) by mouth before breakfast. 30 tablet 11    melatonin 3 mg Tab Take 6 mg by mouth nightly.      montelukast (SINGULAIR) 10 mg tablet take ONE tablet BY MOUTH once DAILY EVERY EVENING 30 tablet 11    MULTIVITAMIN W-MINERALS/LUTEIN (CENTRUM SILVER ORAL) Take 1 tablet by mouth once daily.       mupirocin calcium 2% nasl oint (BACTROBAN) 2 % Oint by Nasal route 2 (two) times daily.      naproxen sodium (ALEVE) 220 mg Cap Take 220 mg by mouth 2 (two) times daily as needed.      oxyCODONE-acetaminophen (PERCOCET) 5-325 mg per tablet Take 1 tablet by mouth every 4 to 6 hours as needed for Pain. 42 tablet 0    pramipexole (MIRAPEX) 0.25 MG tablet TAKE ONE-HALF to ONE TABLET BY MOUTH THREE TIMES DAILY as directed 90 tablet 11    selegiline (ELDEPRYL) 5 mg Cap take ONE capsule BY MOUTH twice daily 60 capsule  "10    UNABLE TO FIND Place 0.5 mLs under the tongue 2 (two) times a day. medication name:CBD Oil     Last dose 3 months ago      valsartan-hydrochlorothiazide (DIOVAN-HCT) 320-25 mg per tablet TAKE ONE TABLET BY MOUTH ONCE DAILY 90 tablet 1    warfarin (COUMADIN) 5 MG tablet Take 1 tablet (5 mg total) by mouth Daily. 30 tablet 0     No current facility-administered medications on file prior to visit.       Review of Systems   Constitutional:  Negative for chills, diaphoresis, fatigue and fever.   HENT:  Negative for congestion, ear pain, postnasal drip, sinus pain and sore throat.    Eyes:  Negative for pain and redness.   Respiratory:  Negative for cough, chest tightness and shortness of breath.    Cardiovascular:  Negative for chest pain and leg swelling.   Gastrointestinal:  Negative for abdominal pain, constipation, diarrhea, nausea and vomiting.   Genitourinary:  Negative for dysuria and hematuria.   Musculoskeletal:  Negative for arthralgias and joint swelling.   Skin:  Negative for rash.   Neurological:  Negative for dizziness, syncope and headaches.   Psychiatric/Behavioral:  Negative for dysphoric mood. The patient is not nervous/anxious.      Vitals:    12/12/22 1358   BP: 139/87   Pulse: 96   Temp: 98.6 °F (37 °C)   Weight: 97.3 kg (214 lb 9.9 oz)   Height: 5' 1" (1.549 m)       Wt Readings from Last 3 Encounters:   12/21/22 97.1 kg (214 lb)   12/12/22 97.3 kg (214 lb 9.9 oz)   11/07/22 94 kg (207 lb 3.2 oz)       Physical Exam  Constitutional:       General: She is not in acute distress.     Appearance: Normal appearance. She is well-developed. She is obese.   HENT:      Head: Normocephalic and atraumatic.   Eyes:      Conjunctiva/sclera: Conjunctivae normal.   Cardiovascular:      Rate and Rhythm: Normal rate and regular rhythm.      Pulses: Normal pulses.      Heart sounds: Normal heart sounds. No murmur heard.  Pulmonary:      Effort: Pulmonary effort is normal. No respiratory distress.      Breath " sounds: Normal breath sounds.   Abdominal:      General: Bowel sounds are normal. There is no distension.      Palpations: Abdomen is soft.      Tenderness: There is no abdominal tenderness.   Musculoskeletal:         General: Normal range of motion.      Cervical back: Normal range of motion and neck supple.   Skin:     General: Skin is warm and dry.      Findings: No rash.   Neurological:      General: No focal deficit present.      Mental Status: She is alert and oriented to person, place, and time.   Psychiatric:         Mood and Affect: Mood normal.         Behavior: Behavior normal.     Health Maintenance   Topic Date Due    High Dose Statin  Never done    Eye Exam  07/09/2022    DEXA Scan  03/11/2023    Hemoglobin A1c  04/03/2023    Lipid Panel  10/03/2023    Foot Exam  10/10/2023    Mammogram  12/07/2023    TETANUS VACCINE  02/16/2026    Hepatitis C Screening  Completed

## 2022-12-13 ENCOUNTER — TELEPHONE (OUTPATIENT)
Dept: RADIOLOGY | Facility: HOSPITAL | Age: 67
End: 2022-12-13
Payer: MEDICARE

## 2022-12-13 LAB
COMMENT: NORMAL
FINAL PATHOLOGIC DIAGNOSIS: NORMAL
GROSS: NORMAL
Lab: NORMAL

## 2022-12-13 NOTE — TELEPHONE ENCOUNTER
I wonder if Physical medicine might offer something since she said pain clinic not covered by her plan    Add High Risk Medication Management Associated Diagnosis?: Yes

## 2022-12-13 NOTE — TELEPHONE ENCOUNTER
Called patient, discussed NEGative for atypia or malignancy breast biopsy, abscess for which she is being treated with antibiotics.        Final Pathologic Diagnosis BREAST, LEFT, RETROAREOLAR, BIOPSY:   - Benign breast tissue with abscess and features suggestive of cyst rupture,   see comment.   - Microcalcifications: Not identified.   - Negative for atypia or malignancy.    Comment: Interp By Evelyn Hutton MD, Signed on 12/13/2022 at 14:02     Being treated for abscess

## 2022-12-14 ENCOUNTER — CLINICAL SUPPORT (OUTPATIENT)
Dept: REHABILITATION | Facility: HOSPITAL | Age: 67
End: 2022-12-14
Attending: ORTHOPAEDIC SURGERY
Payer: MEDICARE

## 2022-12-14 ENCOUNTER — PATIENT MESSAGE (OUTPATIENT)
Dept: REHABILITATION | Facility: HOSPITAL | Age: 67
End: 2022-12-14

## 2022-12-14 DIAGNOSIS — G89.29 CHRONIC PAIN OF RIGHT KNEE: Primary | ICD-10-CM

## 2022-12-14 DIAGNOSIS — M25.561 CHRONIC PAIN OF RIGHT KNEE: Primary | ICD-10-CM

## 2022-12-14 DIAGNOSIS — R29.898 WEAKNESS OF BOTH LOWER EXTREMITIES: ICD-10-CM

## 2022-12-14 DIAGNOSIS — Z74.09 IMPAIRED FUNCTIONAL MOBILITY, BALANCE, GAIT, AND ENDURANCE: ICD-10-CM

## 2022-12-14 PROCEDURE — 97110 THERAPEUTIC EXERCISES: CPT | Mod: PO,CQ

## 2022-12-14 NOTE — PROGRESS NOTES
[facility]   Physical Therapy Treatment Note     Name: Jessica Rojas  Clinic Number: [id number]    Therapy Diagnosis:   Encounter Diagnoses   Name Primary?    Chronic pain of right knee Yes    Weakness of both lower extremities     Impaired functional mobility, balance, gait, and endurance      Physician: Jerry Lai MD    Visit Date: 12/14/2022  Physician Orders: PT Eval and Treat   Medical Diagnosis from Referral: M17.11 (ICD-10-CM) - Osteoarthritis of right knee, unspecified osteoarthritis type Z96.651 (ICD-10-CM) - S/P right unicompartmental knee replacement   Evaluation Date: 12/2/2022  Authorization Period Expiration: 02/4/2023  Plan of Care Expiration: 2/10/2023  Visit # / Visits authorized: 3 / 12    PTA Visit #: 1/5       Time In: 916 am  Time Out: 1000 am  Total Billable Time: 44 minutes     Precautions: Diabetes and cancer and R unicompartmental knee replacement on 11/09/2    SUBJECTIVE     Pt reports: She slept very well last night.  Pt reports no constant pain but pain on the inside of the knee when she is walking.  Pt reports it is more soreness than pain.  Pt reports she will normally wake up with stinging/burning on the inside of the knee but she did not have this last night.    Pt doesn't really notice any swelling now.  Pt is riding her bike at home for 10 minutes when she isn't attending therapy    She was compliant with home exercise program.    Response to previous treatment: increased soreness  Functional change: too soon to tell    Pain: 1/10  Location: right knee      OBJECTIVE     Objective Measures updated at progress report unless specified.     Treatment     Jessica received the treatments listed below:      Range of Motion: 12/14/2022    Knee Right Active assist Right passive   Flexion 108 NT   Extension 0 NT        Jessica received therapeutic exercises to develop strength, endurance, ROM, and flexibility for 44 minutes including:  Recumbent bike, 6  mins  Gastroc stretch on incline, 3x30 sec  Seated hamstring stretch, 3x 30s   Knee ext stretch w/ heel prop, 1 min  Quad set with heel prop, 1x10 w/ 5s hold   SLR, 2x10  Heel slides with strap, 2x10  Bridges, 2x10  LAQ 2#, 2x10  Mini squats, 1x15  Heel raises, 1x15- not performed     manual therapy techniques: Joint mobilizations were applied to the: R patella for 00 minutes, including:  Patella mobs in all directions  Tibiofemoral mobs      Patient Education and Home Exercises     Home Exercises Provided and Patient Education Provided     Education provided:   - HEP    Written Home Exercises Provided: Patient instructed to cont prior HEP. Exercises were reviewed and Jessica was able to demonstrate them prior to the end of the session.  Jessica demonstrated good  understanding of the education provided. See EMR under Patient Instructions for exercises provided during therapy sessions    ASSESSMENT     Pt tolerated tx very well.  Pt reported less tightness in the knee and had improved flexion ROM.  Pt still challenged with SLR demonstrating an extension lag the last 5 reps.  Pt is amb without an AD but she is cautious when stepping the first few steps.  Will continue working on ROM and progressing as tolerated.    Jessica Is progressing well towards her goals.   Pt prognosis is Good.     Pt will continue to benefit from skilled outpatient physical therapy to address the deficits listed in the problem list box on initial evaluation, provide pt/family education and to maximize pt's level of independence in the home and community environment.     Pt's spiritual, cultural and educational needs considered and pt agreeable to plan of care and goals.     Anticipated barriers to physical therapy: None     Goals:   Short-Term Goals: 4 weeks   - The patient will be independent with initial home exercise program.  - The patient will increase strength to at least 4+/5 to perform functional mobility.   - The patient will increase  PROM of the R knee to 0-105 degrees to perform ambulation with pain < 3/10.     Long-Term Goals: 8 weeks  - Pt to achieve <48% limitation as measured by the FOTO to demonstrate decreased disability.  - The patient will be independent with home exercise program and symptom management.  - The patient will increase strength to at least 5/5 to perform functional mobility.   - The patient will increase AROM of the R knee to 0-110 to perform ambulation with pain < 2/10.  - The patient will be independent amb with no assistive device on all surfaces for community distances    PLAN     Plan of care Certification: 12/2/2022 to 2/10/2023.     Krupa Hoang, PTA

## 2022-12-15 DIAGNOSIS — M17.11 OSTEOARTHRITIS OF RIGHT KNEE, UNSPECIFIED OSTEOARTHRITIS TYPE: Primary | ICD-10-CM

## 2022-12-15 DIAGNOSIS — Z96.651 S/P RIGHT UNICOMPARTMENTAL KNEE REPLACEMENT: ICD-10-CM

## 2022-12-16 ENCOUNTER — CLINICAL SUPPORT (OUTPATIENT)
Dept: REHABILITATION | Facility: HOSPITAL | Age: 67
End: 2022-12-16
Attending: ORTHOPAEDIC SURGERY
Payer: MEDICARE

## 2022-12-16 DIAGNOSIS — Z74.09 IMPAIRED FUNCTIONAL MOBILITY, BALANCE, GAIT, AND ENDURANCE: ICD-10-CM

## 2022-12-16 DIAGNOSIS — R29.898 WEAKNESS OF BOTH LOWER EXTREMITIES: ICD-10-CM

## 2022-12-16 DIAGNOSIS — M25.561 CHRONIC PAIN OF RIGHT KNEE: Primary | ICD-10-CM

## 2022-12-16 DIAGNOSIS — G89.29 CHRONIC PAIN OF RIGHT KNEE: Primary | ICD-10-CM

## 2022-12-16 PROCEDURE — 97110 THERAPEUTIC EXERCISES: CPT | Mod: PO

## 2022-12-16 PROCEDURE — 97140 MANUAL THERAPY 1/> REGIONS: CPT | Mod: PO

## 2022-12-16 NOTE — PROGRESS NOTES
Results have been released via [employer]. Please verify that these have been viewed by patient. If not, please call patient with results.    I have sent a message to them with the following interpretation (see below).    I have reviewed your recent results.    Breast pathology returned and is negative for any abnormal cells. Findings consistent with abscess.    Please do not hesitate to call or message with any additional questions or concerns.    Estela Grace MD

## 2022-12-16 NOTE — PROGRESS NOTES
[facility]   Physical Therapy Treatment Note     Name: Jessica Rojas  Clinic Number: [id number]    Therapy Diagnosis:   Encounter Diagnoses   Name Primary?    Chronic pain of right knee Yes    Weakness of both lower extremities     Impaired functional mobility, balance, gait, and endurance      Physician: Jerry Lai MD    Visit Date: 12/16/2022  Physician Orders: PT Eval and Treat   Medical Diagnosis from Referral: M17.11 (ICD-10-CM) - Osteoarthritis of right knee, unspecified osteoarthritis type Z96.651 (ICD-10-CM) - S/P right unicompartmental knee replacement   Evaluation Date: 12/2/2022  Authorization Period Expiration: 02/4/2023  Plan of Care Expiration: 2/10/2023  Visit # / Visits authorized: 4 / 12    PTA Visit #: 1/5       Time In: 7:42   Time Out: 8:27   Total Billable Time: 45 minutes     Precautions: Diabetes and cancer and R unicompartmental knee replacement on 11/09/2    SUBJECTIVE     Pt reports: She reports no pain this morning or to start the session. Was woken up a lot last night from her  hitting on knee while they slept. Overall doing well today.     She was compliant with home exercise program.    Response to previous treatment: increased soreness  Functional change: too soon to tell    Pain: 0/10  Location: right knee      OBJECTIVE     Objective Measures updated at progress report unless specified.     Treatment     Jessica received the treatments listed below:      Range of Motion: 12/14/2022    Knee Right Active assist Right passive   Flexion 108 NT   Extension 0 NT        Jessica received therapeutic exercises to develop strength, endurance, ROM, and flexibility for 37 minutes including:  Recumbent bike, 6 mins  Gastroc stretch on incline, 3x30 sec  Seated hamstring stretch, 3x 30s   Knee ext stretch w/ heel prop, 3 mins 3#  Quad set with heel prop, 1x10 w/ 5s hold   SLR, 2x10  Heel slides with strap, 2x10  Bridges, 2x10  LAQ 3#, 3x10  Mini squats,  2x10  Heel raises, 2x10     manual therapy techniques: Joint mobilizations were applied to the: R patella for 08 minutes, including:  Patella mobs in all directions  Tibiofemoral mobs      Patient Education and Home Exercises     Home Exercises Provided and Patient Education Provided     Education provided:   - HEP    Written Home Exercises Provided: Patient instructed to cont prior HEP. Exercises were reviewed and Jessica was able to demonstrate them prior to the end of the session.  Jessica demonstrated good  understanding of the education provided. See EMR under Patient Instructions for exercises provided during therapy sessions    ASSESSMENT     Pt tolerated tx very well.  Ambulated throughout the clinic without and AD, good stability. Continues to have some difficulty with SLR, but no quad lag. Will continue to progress as tolerated.     Jessica Is progressing well towards her goals.   Pt prognosis is Good.     Pt will continue to benefit from skilled outpatient physical therapy to address the deficits listed in the problem list box on initial evaluation, provide pt/family education and to maximize pt's level of independence in the home and community environment.     Pt's spiritual, cultural and educational needs considered and pt agreeable to plan of care and goals.     Anticipated barriers to physical therapy: None     Goals:   Short-Term Goals: 4 weeks   - The patient will be independent with initial home exercise program.  - The patient will increase strength to at least 4+/5 to perform functional mobility.   - The patient will increase PROM of the R knee to 0-105 degrees to perform ambulation with pain < 3/10.     Long-Term Goals: 8 weeks  - Pt to achieve <48% limitation as measured by the FOTO to demonstrate decreased disability.  - The patient will be independent with home exercise program and symptom management.  - The patient will increase strength to at least 5/5 to perform functional mobility.   - The  patient will increase AROM of the R knee to 0-110 to perform ambulation with pain < 2/10.  - The patient will be independent amb with no assistive device on all surfaces for community distances    PLAN     Plan of care Certification: 12/2/2022 to 2/10/2023.     Collin Chandra, PT

## 2022-12-19 ENCOUNTER — CLINICAL SUPPORT (OUTPATIENT)
Dept: REHABILITATION | Facility: HOSPITAL | Age: 67
End: 2022-12-19
Attending: ORTHOPAEDIC SURGERY
Payer: MEDICARE

## 2022-12-19 DIAGNOSIS — M25.561 CHRONIC PAIN OF RIGHT KNEE: Primary | ICD-10-CM

## 2022-12-19 DIAGNOSIS — Z74.09 IMPAIRED FUNCTIONAL MOBILITY, BALANCE, GAIT, AND ENDURANCE: ICD-10-CM

## 2022-12-19 DIAGNOSIS — R29.898 WEAKNESS OF BOTH LOWER EXTREMITIES: ICD-10-CM

## 2022-12-19 DIAGNOSIS — G89.29 CHRONIC PAIN OF RIGHT KNEE: Primary | ICD-10-CM

## 2022-12-19 PROCEDURE — 97110 THERAPEUTIC EXERCISES: CPT | Mod: HCNC,PO

## 2022-12-19 PROCEDURE — 97140 MANUAL THERAPY 1/> REGIONS: CPT | Mod: HCNC,PO

## 2022-12-19 NOTE — PROGRESS NOTES
[facility]   Physical Therapy Treatment Note     Name: Jessica Rojas  Clinic Number: [id number]    Therapy Diagnosis:   Encounter Diagnoses   Name Primary?    Chronic pain of right knee Yes    Weakness of both lower extremities     Impaired functional mobility, balance, gait, and endurance      Physician: Jerry Lai MD    Visit Date: 12/19/2022  Physician Orders: PT Eval and Treat   Medical Diagnosis from Referral: M17.11 (ICD-10-CM) - Osteoarthritis of right knee, unspecified osteoarthritis type Z96.651 (ICD-10-CM) - S/P right unicompartmental knee replacement   Evaluation Date: 12/2/2022  Authorization Period Expiration: 02/4/2023  Plan of Care Expiration: 2/10/2023  Visit # / Visits authorized: 5 / 12    PTA Visit #: 0/5       Time In: 10:25  Time Out: 11:20  Total Billable Time: 55 minutes     Precautions: Diabetes and cancer and R unicompartmental knee replacement on 11/09/2    SUBJECTIVE     Pt reports: She reports no pain this morning or to start the session. Was woken up a lot last night from her  hitting on knee while they slept. Overall doing well today.     She was compliant with home exercise program.    Response to previous treatment: increased soreness  Functional change: too soon to tell    Pain: 0/10  Location: right knee      OBJECTIVE     Objective Measures updated at progress report unless specified.     Treatment     Jessica received the treatments listed below:      Range of Motion: 12/14/2022    Knee Right Active assist Right passive   Flexion 108 NT   Extension 0 NT        Jessica received therapeutic exercises to develop strength, endurance, ROM, and flexibility for 47 minutes including:  Recumbent bike, 6 mins  Gastroc stretch on incline, 3x30 sec  Seated hamstring stretch, 3x 30s   Knee ext stretch w/ heel prop, 3 mins 3#  Quad set with heel prop, 1x10 w/ 5s hold   SLR, 2x10  Heel slides with strap, 3 mins  Bridges, 3x10  LAQ 4#, 3x10  Mini squats,  3x10  Heel raises, 3x10     manual therapy techniques: Joint mobilizations were applied to the: R patella for 08 minutes, including:  Patella mobs in all directions  Tibiofemoral mobs      Patient Education and Home Exercises     Home Exercises Provided and Patient Education Provided     Education provided:   - HEP    Written Home Exercises Provided: Patient instructed to cont prior HEP. Exercises were reviewed and Jessica was able to demonstrate them prior to the end of the session.  Jessica demonstrated good  understanding of the education provided. See EMR under Patient Instructions for exercises provided during therapy sessions    ASSESSMENT     Pt tolerated tx very well.  Demonstrated improved ambulation speed today. Tolerated increased reps well. Will continue to progress as tolerated.     Jessica Is progressing well towards her goals.   Pt prognosis is Good.     Pt will continue to benefit from skilled outpatient physical therapy to address the deficits listed in the problem list box on initial evaluation, provide pt/family education and to maximize pt's level of independence in the home and community environment.     Pt's spiritual, cultural and educational needs considered and pt agreeable to plan of care and goals.     Anticipated barriers to physical therapy: None     Goals:   Short-Term Goals: 4 weeks   - The patient will be independent with initial home exercise program.  - The patient will increase strength to at least 4+/5 to perform functional mobility.   - The patient will increase PROM of the R knee to 0-105 degrees to perform ambulation with pain < 3/10.     Long-Term Goals: 8 weeks  - Pt to achieve <48% limitation as measured by the FOTO to demonstrate decreased disability.  - The patient will be independent with home exercise program and symptom management.  - The patient will increase strength to at least 5/5 to perform functional mobility.   - The patient will increase AROM of the R knee to 0-110  to perform ambulation with pain < 2/10.  - The patient will be independent amb with no assistive device on all surfaces for community distances    PLAN     Plan of care Certification: 12/2/2022 to 2/10/2023.     Collin Chandra, PT

## 2022-12-21 ENCOUNTER — OFFICE VISIT (OUTPATIENT)
Dept: ORTHOPEDICS | Facility: CLINIC | Age: 67
End: 2022-12-21
Payer: MEDICARE

## 2022-12-21 ENCOUNTER — HOSPITAL ENCOUNTER (OUTPATIENT)
Dept: RADIOLOGY | Facility: HOSPITAL | Age: 67
Discharge: HOME | End: 2022-12-21
Attending: ORTHOPAEDIC SURGERY
Payer: MEDICARE

## 2022-12-21 ENCOUNTER — CLINICAL SUPPORT (OUTPATIENT)
Dept: REHABILITATION | Facility: HOSPITAL | Age: 67
End: 2022-12-21
Attending: ORTHOPAEDIC SURGERY
Payer: MEDICARE

## 2022-12-21 VITALS — BODY MASS INDEX: 40.4 KG/M2 | WEIGHT: 214 LBS | HEIGHT: 61 IN

## 2022-12-21 DIAGNOSIS — Z74.09 IMPAIRED FUNCTIONAL MOBILITY, BALANCE, GAIT, AND ENDURANCE: ICD-10-CM

## 2022-12-21 DIAGNOSIS — G89.29 CHRONIC PAIN OF RIGHT KNEE: Primary | ICD-10-CM

## 2022-12-21 DIAGNOSIS — M25.561 CHRONIC PAIN OF RIGHT KNEE: Primary | ICD-10-CM

## 2022-12-21 DIAGNOSIS — R29.898 WEAKNESS OF BOTH LOWER EXTREMITIES: ICD-10-CM

## 2022-12-21 DIAGNOSIS — Z96.651 S/P RIGHT UNICOMPARTMENTAL KNEE REPLACEMENT: ICD-10-CM

## 2022-12-21 DIAGNOSIS — Z96.651 S/P RIGHT UNICOMPARTMENTAL KNEE REPLACEMENT: Primary | ICD-10-CM

## 2022-12-21 PROCEDURE — 99024 PR POST-OP FOLLOW-UP VISIT: ICD-10-PCS | Mod: HCNC,S$GLB,, | Performed by: ORTHOPAEDIC SURGERY

## 2022-12-21 PROCEDURE — 3044F HG A1C LEVEL LT 7.0%: CPT | Mod: HCNC,CPTII,S$GLB, | Performed by: ORTHOPAEDIC SURGERY

## 2022-12-21 PROCEDURE — 3044F PR MOST RECENT HEMOGLOBIN A1C LEVEL <7.0%: ICD-10-PCS | Mod: HCNC,CPTII,S$GLB, | Performed by: ORTHOPAEDIC SURGERY

## 2022-12-21 PROCEDURE — 97110 THERAPEUTIC EXERCISES: CPT | Mod: HCNC,PO

## 2022-12-21 PROCEDURE — 3288F PR FALLS RISK ASSESSMENT DOCUMENTED: ICD-10-PCS | Mod: HCNC,CPTII,S$GLB, | Performed by: ORTHOPAEDIC SURGERY

## 2022-12-21 PROCEDURE — 3066F NEPHROPATHY DOC TX: CPT | Mod: HCNC,CPTII,S$GLB, | Performed by: ORTHOPAEDIC SURGERY

## 2022-12-21 PROCEDURE — 1126F AMNT PAIN NOTED NONE PRSNT: CPT | Mod: HCNC,CPTII,S$GLB, | Performed by: ORTHOPAEDIC SURGERY

## 2022-12-21 PROCEDURE — 1101F PT FALLS ASSESS-DOCD LE1/YR: CPT | Mod: HCNC,CPTII,S$GLB, | Performed by: ORTHOPAEDIC SURGERY

## 2022-12-21 PROCEDURE — 3008F BODY MASS INDEX DOCD: CPT | Mod: HCNC,CPTII,S$GLB, | Performed by: ORTHOPAEDIC SURGERY

## 2022-12-21 PROCEDURE — 1159F PR MEDICATION LIST DOCUMENTED IN MEDICAL RECORD: ICD-10-PCS | Mod: HCNC,CPTII,S$GLB, | Performed by: ORTHOPAEDIC SURGERY

## 2022-12-21 PROCEDURE — 99024 POSTOP FOLLOW-UP VISIT: CPT | Mod: HCNC,S$GLB,, | Performed by: ORTHOPAEDIC SURGERY

## 2022-12-21 PROCEDURE — 99999 PR PBB SHADOW E&M-EST. PATIENT-LVL III: ICD-10-PCS | Mod: PBBFAC,HCNC,, | Performed by: ORTHOPAEDIC SURGERY

## 2022-12-21 PROCEDURE — 1126F PR PAIN SEVERITY QUANTIFIED, NO PAIN PRESENT: ICD-10-PCS | Mod: HCNC,CPTII,S$GLB, | Performed by: ORTHOPAEDIC SURGERY

## 2022-12-21 PROCEDURE — 3061F PR NEG MICROALBUMINURIA RESULT DOCUMENTED/REVIEW: ICD-10-PCS | Mod: HCNC,CPTII,S$GLB, | Performed by: ORTHOPAEDIC SURGERY

## 2022-12-21 PROCEDURE — 3061F NEG MICROALBUMINURIA REV: CPT | Mod: HCNC,CPTII,S$GLB, | Performed by: ORTHOPAEDIC SURGERY

## 2022-12-21 PROCEDURE — 3008F PR BODY MASS INDEX (BMI) DOCUMENTED: ICD-10-PCS | Mod: HCNC,CPTII,S$GLB, | Performed by: ORTHOPAEDIC SURGERY

## 2022-12-21 PROCEDURE — 73562 X-RAY EXAM OF KNEE 3: CPT | Mod: 26,HCNC,RT, | Performed by: RADIOLOGY

## 2022-12-21 PROCEDURE — 1101F PR PT FALLS ASSESS DOC 0-1 FALLS W/OUT INJ PAST YR: ICD-10-PCS | Mod: HCNC,CPTII,S$GLB, | Performed by: ORTHOPAEDIC SURGERY

## 2022-12-21 PROCEDURE — 1159F MED LIST DOCD IN RCRD: CPT | Mod: HCNC,CPTII,S$GLB, | Performed by: ORTHOPAEDIC SURGERY

## 2022-12-21 PROCEDURE — 99999 PR PBB SHADOW E&M-EST. PATIENT-LVL III: CPT | Mod: PBBFAC,HCNC,, | Performed by: ORTHOPAEDIC SURGERY

## 2022-12-21 PROCEDURE — 3066F PR DOCUMENTATION OF TREATMENT FOR NEPHROPATHY: ICD-10-PCS | Mod: HCNC,CPTII,S$GLB, | Performed by: ORTHOPAEDIC SURGERY

## 2022-12-21 PROCEDURE — 73560 XR KNEE ORTHO RIGHT: ICD-10-PCS | Mod: 26,HCNC,LT, | Performed by: RADIOLOGY

## 2022-12-21 PROCEDURE — 73562 XR KNEE ORTHO RIGHT: ICD-10-PCS | Mod: 26,HCNC,RT, | Performed by: RADIOLOGY

## 2022-12-21 PROCEDURE — 73560 X-RAY EXAM OF KNEE 1 OR 2: CPT | Mod: TC,HCNC,PO,LT

## 2022-12-21 PROCEDURE — 3288F FALL RISK ASSESSMENT DOCD: CPT | Mod: HCNC,CPTII,S$GLB, | Performed by: ORTHOPAEDIC SURGERY

## 2022-12-21 PROCEDURE — 73560 X-RAY EXAM OF KNEE 1 OR 2: CPT | Mod: 26,HCNC,LT, | Performed by: RADIOLOGY

## 2022-12-21 NOTE — PROGRESS NOTES
[facility]   Physical Therapy Treatment Note     Name: Jessica Rojas  Clinic Number: [id number]    Therapy Diagnosis:   Encounter Diagnoses   Name Primary?    Chronic pain of right knee Yes    Weakness of both lower extremities     Impaired functional mobility, balance, gait, and endurance      Physician: Jerry Lai MD    Visit Date: 12/21/2022  Physician Orders: PT Eval and Treat   Medical Diagnosis from Referral: M17.11 (ICD-10-CM) - Osteoarthritis of right knee, unspecified osteoarthritis type Z96.651 (ICD-10-CM) - S/P right unicompartmental knee replacement   Evaluation Date: 12/2/2022  Authorization Period Expiration: 02/4/2023  Plan of Care Expiration: 2/10/2023  Visit # / Visits authorized: 6 / 12    PTA Visit #: 0/5       Time In: 10:30  Time Out: 11:25  Total Billable Time: 55 minutes     Precautions: Diabetes and cancer and R unicompartmental knee replacement on 11/09/22.    SUBJECTIVE     Pt reports: She is doing well today, no pain to start the session.     She was compliant with home exercise program.    Response to previous treatment: increased soreness  Functional change: too soon to tell    Pain: 0/10  Location: right knee      OBJECTIVE     Objective Measures updated at progress report unless specified.     Treatment     Jessica received the treatments listed below:      Range of Motion: 12/14/2022    Knee Right Active assist Right passive   Flexion 108 NT   Extension 0 NT        Jessica received therapeutic exercises to develop strength, endurance, ROM, and flexibility for 47 minutes including:  Recumbent bike, 6 mins  Gastroc stretch on incline, 3x30 sec  Seated hamstring stretch, 3x 30s   Knee ext stretch w/ heel prop, 3 mins 3#  Quad set with heel prop, 1x10 w/ 5s hold   SLR, 3x10  Heel slides with strap, 3 mins  Bridges, 3x10  Supine clams w/ blue band, 2x10 B  LAQ 4#, 3x10  Mini squats, 3x10 - not performed  Heel raises, 3x10 - not performed  Precor leg press,  2x10 20#  Precor heel raise, 3x10 20#   Precor leg curl, 2x10 25#    manual therapy techniques: Joint mobilizations were applied to the: R patella for 08 minutes, including:  Patella mobs in all directions  Tibiofemoral mobs      Patient Education and Home Exercises     Home Exercises Provided and Patient Education Provided     Education provided:   - HEP    Written Home Exercises Provided: Patient instructed to cont prior HEP. Exercises were reviewed and Jessica was able to demonstrate them prior to the end of the session.  Jessica demonstrated good  understanding of the education provided. See EMR under Patient Instructions for exercises provided during therapy sessions    ASSESSMENT     Pt tolerated tx very well. Continues to demonstrates improvements in strength, endurance, and PROM into flexion. Progressed intensity of session without issue today.     Jessica Is progressing well towards her goals.   Pt prognosis is Good.     Pt will continue to benefit from skilled outpatient physical therapy to address the deficits listed in the problem list box on initial evaluation, provide pt/family education and to maximize pt's level of independence in the home and community environment.     Pt's spiritual, cultural and educational needs considered and pt agreeable to plan of care and goals.     Anticipated barriers to physical therapy: None     Goals:   Short-Term Goals: 4 weeks   - The patient will be independent with initial home exercise program.  - The patient will increase strength to at least 4+/5 to perform functional mobility.   - The patient will increase PROM of the R knee to 0-105 degrees to perform ambulation with pain < 3/10.     Long-Term Goals: 8 weeks  - Pt to achieve <48% limitation as measured by the FOTO to demonstrate decreased disability.  - The patient will be independent with home exercise program and symptom management.  - The patient will increase strength to at least 5/5 to perform functional  mobility.   - The patient will increase AROM of the R knee to 0-110 to perform ambulation with pain < 2/10.  - The patient will be independent amb with no assistive device on all surfaces for community distances    PLAN     Plan of care Certification: 12/2/2022 to 2/10/2023.     Collin Chandra, PT

## 2022-12-21 NOTE — PROGRESS NOTES
Six weeks post arthroplasty.  Doing well.  No signs of infection.  Good range of motion and stability.  X - rays look good.  Plan: Activities to tolerance, OK to discontinue dvt prophylaxis from orthopedic standpoint.  Follow up in six weeks with x-rays.

## 2022-12-27 ENCOUNTER — CLINICAL SUPPORT (OUTPATIENT)
Dept: REHABILITATION | Facility: HOSPITAL | Age: 67
End: 2022-12-27
Attending: ORTHOPAEDIC SURGERY
Payer: MEDICARE

## 2022-12-27 DIAGNOSIS — G89.29 CHRONIC PAIN OF RIGHT KNEE: Primary | ICD-10-CM

## 2022-12-27 DIAGNOSIS — M25.561 CHRONIC PAIN OF RIGHT KNEE: Primary | ICD-10-CM

## 2022-12-27 DIAGNOSIS — R29.898 WEAKNESS OF BOTH LOWER EXTREMITIES: ICD-10-CM

## 2022-12-27 DIAGNOSIS — Z74.09 IMPAIRED FUNCTIONAL MOBILITY, BALANCE, GAIT, AND ENDURANCE: ICD-10-CM

## 2022-12-27 PROCEDURE — 97110 THERAPEUTIC EXERCISES: CPT | Mod: HCNC,PO,CQ

## 2022-12-27 NOTE — PROGRESS NOTES
[facility]   Physical Therapy Treatment Note     Name: Jessica Rojas  Clinic Number: [id number]    Therapy Diagnosis:   Encounter Diagnoses   Name Primary?    Chronic pain of right knee Yes    Weakness of both lower extremities     Impaired functional mobility, balance, gait, and endurance      Physician: Jerry Lai MD    Visit Date: 12/27/2022  Physician Orders: PT Eval and Treat   Medical Diagnosis from Referral: M17.11 (ICD-10-CM) - Osteoarthritis of right knee, unspecified osteoarthritis type Z96.651 (ICD-10-CM) - S/P right unicompartmental knee replacement   Evaluation Date: 12/2/2022  Authorization Period Expiration: 02/4/2023  Plan of Care Expiration: 2/10/2023  Visit # / Visits authorized: 7 / 12    PTA Visit #: 1/5       Time In: 832  am  Time Out: 915 am  Total Billable Time: 43 minutes     Precautions: Diabetes and cancer and R unicompartmental knee replacement on 11/09/22.    SUBJECTIVE     Pt reports: She is  having increased stiffness but not much pain.  Pt reports she had cramping throughout the night in the leg.  Pt reports she would get up and move around and it would improve but then occur again.  Pt was on her feet more over the holiday which may be which she is having increased cramping.     She was compliant with home exercise program.    Response to previous treatment: increased soreness  Functional change: too soon to tell    Pain: 1/10  Location: right knee      OBJECTIVE     Objective Measures updated at progress report unless specified.     Treatment     Jessica received the treatments listed below:      Range of Motion: 12/14/2022    Knee Right Active assist Right passive   Flexion 108 NT   Extension 0 NT        Jessica received therapeutic exercises to develop strength, endurance, ROM, and flexibility for 42 minutes including:    Recumbent bike, 6 mins  Gastroc stretch on incline, 3x30 sec  Seated hamstring stretch, 3x 30s   Knee ext stretch w/ heel prop,  3 mins 3#  Quad set with heel prop, 1x10 w/ 5s hold- not performed  SLR, x 15- held due to cramp  Supine hip flexor stretch, 3x30 sec  Heel slides with strap, 3 mins  Bridges, 3x10- not performed  Supine clams w/ blue band, 2x10 B- not performed  LAQ 4#, 3x10  Mini squats, 3x10 - not performed  Heel raises, 3x10 - not performed  Precor leg press, 3x10 20#  Precor heel raise, 3x10 20#   Precor leg curl, 3x10 20#    manual therapy techniques: Joint mobilizations were applied to the: R patella for 00 minutes, including:  Patella mobs in all directions  Tibiofemoral mobs      Patient Education and Home Exercises     Home Exercises Provided and Patient Education Provided     Education provided:   - HEP    Written Home Exercises Provided: Patient instructed to cont prior HEP. Exercises were reviewed and Jessica was able to demonstrate them prior to the end of the session.  Jessica demonstrated good  understanding of the education provided. See EMR under Patient Instructions for exercises provided during therapy sessions    ASSESSMENT     Pt reported cramping in the front of the hip when performing SLR.  Added supine hip flexor stretch which patient said helped.  Pt with improved amb without the walker with improved isaac and stride length.  Will continue working on strengthening and progressing within tolerance.     Jessica Is progressing well towards her goals.   Pt prognosis is Good.     Pt will continue to benefit from skilled outpatient physical therapy to address the deficits listed in the problem list box on initial evaluation, provide pt/family education and to maximize pt's level of independence in the home and community environment.     Pt's spiritual, cultural and educational needs considered and pt agreeable to plan of care and goals.     Anticipated barriers to physical therapy: None     Goals:   Short-Term Goals: 4 weeks   - The patient will be independent with initial home exercise program.  - The patient  will increase strength to at least 4+/5 to perform functional mobility.   - The patient will increase PROM of the R knee to 0-105 degrees to perform ambulation with pain < 3/10.     Long-Term Goals: 8 weeks  - Pt to achieve <48% limitation as measured by the FOTO to demonstrate decreased disability.  - The patient will be independent with home exercise program and symptom management.  - The patient will increase strength to at least 5/5 to perform functional mobility.   - The patient will increase AROM of the R knee to 0-110 to perform ambulation with pain < 2/10.  - The patient will be independent amb with no assistive device on all surfaces for community distances    PLAN     Plan of care Certification: 12/2/2022 to 2/10/2023.     Krupa Hoagn, PTA

## 2022-12-29 ENCOUNTER — CLINICAL SUPPORT (OUTPATIENT)
Dept: REHABILITATION | Facility: HOSPITAL | Age: 67
End: 2022-12-29
Attending: ORTHOPAEDIC SURGERY
Payer: MEDICARE

## 2022-12-29 DIAGNOSIS — M25.561 CHRONIC PAIN OF RIGHT KNEE: Primary | ICD-10-CM

## 2022-12-29 DIAGNOSIS — R29.898 WEAKNESS OF BOTH LOWER EXTREMITIES: ICD-10-CM

## 2022-12-29 DIAGNOSIS — Z74.09 IMPAIRED FUNCTIONAL MOBILITY, BALANCE, GAIT, AND ENDURANCE: ICD-10-CM

## 2022-12-29 DIAGNOSIS — G89.29 CHRONIC PAIN OF RIGHT KNEE: Primary | ICD-10-CM

## 2022-12-29 PROCEDURE — 97110 THERAPEUTIC EXERCISES: CPT | Mod: KX,HCNC,PO

## 2022-12-29 NOTE — PROGRESS NOTES
[facility]   Physical Therapy Treatment Note     Name: Jessica Rojas  Clinic Number: [id number]    Therapy Diagnosis:   Encounter Diagnoses   Name Primary?    Chronic pain of right knee Yes    Weakness of both lower extremities     Impaired functional mobility, balance, gait, and endurance      Physician: Jerry Lai MD    Visit Date: 12/29/2022  Physician Orders: PT Eval and Treat   Medical Diagnosis from Referral: M17.11 (ICD-10-CM) - Osteoarthritis of right knee, unspecified osteoarthritis type Z96.651 (ICD-10-CM) - S/P right unicompartmental knee replacement   Evaluation Date: 12/2/2022  Authorization Period Expiration: 02/4/2023  Plan of Care Expiration: 2/10/2023  Visit # / Visits authorized: 8 / 12    PTA Visit #: 0/5       Time In: 8:15  Time Out: 9:10  Total Billable Time: 30 minutes     Precautions: Diabetes and cancer and R unicompartmental knee replacement on 11/09/22.    SUBJECTIVE     Pt reports: She has been doing well. No pain to start the session. She has been walking without any assistive device for almost 2 weeks now. She went grocery shopping yesterday and reports it was the best she has been able to do in a long time.     She was compliant with home exercise program.    Response to previous treatment: increased soreness  Functional change: too soon to tell    Pain: 0/10  Location: right knee      OBJECTIVE     Objective Measures updated at progress report unless specified.     Treatment     Jessica received the treatments listed below:      Jessica received therapeutic exercises to develop strength, endurance, ROM, and flexibility for 55 minutes including:    Recumbent bike, 6 mins  Gastroc stretch on incline, 3x30 sec  Seated hamstring stretch, 3x 30s   Knee ext stretch w/ heel prop, 3 mins 3#  Quad set with heel prop, 1x10 w/ 5s hold- not performed  SLR, 2x10 B  Supine hip flexor stretch, 3x30 sec  Heel slides with strap, 3 mins  Bridges, 3x10  Supine clams w/  blue band, 2x10 B  LAQ 4#, 3x10  Precor leg press, 3x10 20#  Precor heel raise, 3x10 20#   Precor leg curl, 3x10 30#  Precor leg ext, 2x10 15#    manual therapy techniques: Joint mobilizations were applied to the: R patella for 00 minutes, including:  Patella mobs in all directions  Tibiofemoral mobs      Patient Education and Home Exercises     Home Exercises Provided and Patient Education Provided     Education provided:   - HEP    Written Home Exercises Provided: Patient instructed to cont prior HEP. Exercises were reviewed and Jessica was able to demonstrate them prior to the end of the session.  Jessica demonstrated good  understanding of the education provided. See EMR under Patient Instructions for exercises provided during therapy sessions    ASSESSMENT     Pt tolerated treatment well, no complaints of pain or muscle cramping throughout the session. Ambulation continues to improve, demonstrates improved gait speed and stride length.     Jessica Is progressing well towards her goals.   Pt prognosis is Good.     Pt will continue to benefit from skilled outpatient physical therapy to address the deficits listed in the problem list box on initial evaluation, provide pt/family education and to maximize pt's level of independence in the home and community environment.     Pt's spiritual, cultural and educational needs considered and pt agreeable to plan of care and goals.     Anticipated barriers to physical therapy: None     Goals:   Short-Term Goals: 4 weeks   - The patient will be independent with initial home exercise program.  - The patient will increase strength to at least 4+/5 to perform functional mobility.   - The patient will increase PROM of the R knee to 0-105 degrees to perform ambulation with pain < 3/10.     Long-Term Goals: 8 weeks  - Pt to achieve <48% limitation as measured by the FOTO to demonstrate decreased disability.  - The patient will be independent with home exercise program and symptom  management.  - The patient will increase strength to at least 5/5 to perform functional mobility.   - The patient will increase AROM of the R knee to 0-110 to perform ambulation with pain < 2/10.  - The patient will be independent amb with no assistive device on all surfaces for community distances    PLAN     Plan of care Certification: 12/2/2022 to 2/10/2023.     Collin Chandra, PT

## 2022-12-30 ENCOUNTER — SPECIALTY PHARMACY (OUTPATIENT)
Dept: PHARMACY | Facility: CLINIC | Age: 67
End: 2022-12-30
Payer: MEDICARE

## 2022-12-30 NOTE — TELEPHONE ENCOUNTER
Specialty Pharmacy - Refill Coordination    Specialty Medication Orders Linked to Encounter      Flowsheet Row Most Recent Value   Medication #1 evolocumab (REPATHA SURECLICK) 140 mg/mL PnIj (Order#[id number], Rx#[id number])            Refill Questions - Documented Responses      Flowsheet Row Most Recent Value   Patient Availability and HIPAA Verification    Does patient want to proceed with activity? Yes   HIPAA/medical authority confirmed? Yes   Relationship to patient of person spoken to? Self   Refill Screening Questions    Would patient like to speak to a pharmacist? No   When does the patient need to receive the medication? 01/07/23   Refill Delivery Questions    How will the patient receive the medication? [facility]   When does the patient need to receive the medication? 01/07/23   Shipping Address Home   Address in [facility] confirmed and updated if neccessary? Yes   Expected Copay ($) 0   Is the patient able to afford the medication copay? Yes   Payment Method zero copay   Days supply of Refill 28   Supplies needed? No supplies needed   Refill activity completed? Yes   Refill activity plan Refill scheduled   Shipment/Pickup Date: 01/03/23            Current Outpatient Medications   Medication Sig    acetaminophen (TYLENOL) 500 MG tablet Take 1 tablet (500 mg total) by mouth every 6 (six) hours as needed for Pain (do not exceed 3000 milligrams per 24 h).    alcohol swabs (ALCOHOL WIPES) PadM Uses 5 daily    amantadine HCL (SYMMETREL) 100 mg capsule Take 1 capsule (100 mg total) by mouth 2 (two) times daily.    blood-glucose transmitter (DEXCOM G6 TRANSMITTER) Lizbeth Dispense 1 transmitter    carbidopa-levodopa  mg (SINEMET)  mg per tablet TAKE one and one-half TABLET BY MOUTH THREE TIMES DAILY    cholecalciferol, vitamin D3, 10 mcg (400 unit) Cap Take 1,200 Units by mouth once daily.    cyanocobalamin (VITAMIN B-12) 1000 MCG tablet Take 100 mcg by mouth once daily.    diclofenac sodium  (VOLTAREN) 1 % Gel Apply 2 g topically once daily.    evolocumab (REPATHA SURECLICK) 140 mg/mL PnIj Inject 1 mL (140 mg total) into the skin every 14 (fourteen) days.    fish oil-omega-3 fatty acids 300-1,000 mg capsule Take 4 capsules by mouth once daily.    fluticasone-salmeterol diskus inhaler 250-50 mcg Inhale 1 puff into the lungs 2 times daily.    gabapentin (NEURONTIN) 100 MG capsule Take 1 capsule (100 mg total) by mouth 3 (three) times daily. Takes at night    insulin aspart U-100 (NOVOLOG FLEXPEN U-100 INSULIN) 100 unit/mL (3 mL) InPn pen 30u AC + correction Max  u    lamotrigine2.5% meloxicam 0.09% LIDOcaine2% prilocaine2% topical cream Apply topically 4 (four) times daily as needed (pain).    lancing device Misc Checks bg 7-8 times a day    LANTUS SOLOSTAR U-100 INSULIN glargine 100 units/mL (3mL) SubQ pen INJECT 35 UNITS EVERY MORNING THEN 30 UNITS EVERY NIGHT AT BEDTIME    levothyroxine (SYNTHROID) 150 MCG tablet Take 1 tablet (150 mcg total) by mouth before breakfast.    melatonin 3 mg Tab Take 6 mg by mouth nightly.    montelukast (SINGULAIR) 10 mg tablet take ONE tablet BY MOUTH once DAILY EVERY EVENING    MULTIVITAMIN W-MINERALS/LUTEIN (CENTRUM SILVER ORAL) Take 1 tablet by mouth once daily.     mupirocin calcium 2% nasl oint (BACTROBAN) 2 % Oint by Nasal route 2 (two) times daily.    naproxen sodium (ALEVE) 220 mg Cap Take 220 mg by mouth 2 (two) times daily as needed.    oxyCODONE-acetaminophen (PERCOCET) 5-325 mg per tablet Take 1 tablet by mouth every 4 to 6 hours as needed for Pain.    pramipexole (MIRAPEX) 0.25 MG tablet TAKE ONE-HALF to ONE TABLET BY MOUTH THREE TIMES DAILY as directed    selegiline (ELDEPRYL) 5 mg Cap take ONE capsule BY MOUTH twice daily    simvastatin (ZOCOR) 10 MG tablet TAKE ONE TABLET BY MOUTH ONCE DAILY IN THE EVENING    UNABLE TO FIND Place 0.5 mLs under the tongue 2 (two) times a day. medication name:CBD Oil     Last dose 3 months ago     valsartan-hydrochlorothiazide (DIOVAN-HCT) 320-25 mg per tablet TAKE ONE TABLET BY MOUTH ONCE DAILY    warfarin (COUMADIN) 5 MG tablet Take 1 tablet (5 mg total) by mouth Daily.   Last reviewed on 12/21/2022  9:53 AM by Sabrina Langford LPN    Review of patient's allergies indicates:   Allergen Reactions    Propranolol Nausea And Vomiting     Drops pressure and raised sugar    Lipitor [atorvastatin] Other (See Comments)     Myalgia, muscle pain    Robaxin [methocarbamol]      Skin inside mouth started peeling.    Last reviewed on  12/29/2022 1:31 PM by Jaimie Medina      Tasks added this encounter   1/28/2023 - Refill Call (Auto Added)   Tasks due within next 3 months   No tasks due.     Jayashree Spencer, PharmD  [facility]  [address] [place] [id number]  Phone: [phone]  Fax: [phone]

## 2023-01-03 ENCOUNTER — CLINICAL SUPPORT (OUTPATIENT)
Dept: REHABILITATION | Facility: HOSPITAL | Age: 68
End: 2023-01-03
Attending: ORTHOPAEDIC SURGERY
Payer: MEDICARE

## 2023-01-03 DIAGNOSIS — G89.29 CHRONIC PAIN OF RIGHT KNEE: Primary | ICD-10-CM

## 2023-01-03 DIAGNOSIS — M25.561 CHRONIC PAIN OF RIGHT KNEE: Primary | ICD-10-CM

## 2023-01-03 DIAGNOSIS — R29.898 WEAKNESS OF BOTH LOWER EXTREMITIES: ICD-10-CM

## 2023-01-03 DIAGNOSIS — Z74.09 IMPAIRED FUNCTIONAL MOBILITY, BALANCE, GAIT, AND ENDURANCE: ICD-10-CM

## 2023-01-03 PROCEDURE — 97110 THERAPEUTIC EXERCISES: CPT | Mod: HCNC,PO,CQ

## 2023-01-03 NOTE — PROGRESS NOTES
[facility]   Physical Therapy Treatment Note     Name: Jessica Rojas  Clinic Number: [id number]    Therapy Diagnosis:   Encounter Diagnoses   Name Primary?    Chronic pain of right knee Yes    Weakness of both lower extremities     Impaired functional mobility, balance, gait, and endurance      Physician: Jerry Lai MD    Visit Date: 1/3/2023  Physician Orders: PT Eval and Treat   Medical Diagnosis from Referral: M17.11 (ICD-10-CM) - Osteoarthritis of right knee, unspecified osteoarthritis type Z96.651 (ICD-10-CM) - S/P right unicompartmental knee replacement   Evaluation Date: 12/2/2022  Authorization Period Expiration: 02/4/2023  Plan of Care Expiration: 2/10/2023  Visit # / Visits authorized: 9 / 12    PTA Visit #: 1/5       Time In: 830 am  Time Out: 917 am  Total Billable Time: 47 minutes     Precautions: Diabetes and cancer and R unicompartmental knee replacement on 11/09/22.    SUBJECTIVE     Pt reports: She is having some increased soreness in the knee but she has also noticed her knee is not as numb anymore so this may be why.  Pt is able to stand longer when cooking and when she is doing laundry.  Pt has not been having the cramping in her leg but the soreness in the knee did aggravate her throughout the night.  She was compliant with home exercise program.    Response to previous treatment: increased soreness  Functional change: too soon to tell    Pain: 0/10  Location: right knee      OBJECTIVE     Objective Measures updated at progress report unless specified.     Treatment     Jessica received the treatments listed below:      Jessica received therapeutic exercises to develop strength, endurance, ROM, and flexibility for 47 minutes including:    Recumbent bike, 6 mins, seat 7  Gastroc stretch on incline, 3x30 sec  Seated hamstring stretch, 3x 30s   Knee ext stretch w/ heel prop, 3 mins 3#- not performed  Quad set with heel prop, 1x10 w/ 5s hold- not performed  SLR,  3x10 B  Supine hip flexor stretch, 1 min  Heel slides with strap, 3 mins  Bridges, 3x10  Supine clams w/ blue band, 2x10 B- not performed  LAQ 4#, 3x10- not performed  Precor leg press, 3x10 30#  Precor heel raise, 3x10 30#   Precor leg curl, 2x10 35#  Precor leg ext, 2x10 15#    manual therapy techniques: Joint mobilizations were applied to the: R patella for 00 minutes, including:  Patella mobs in all directions  Tibiofemoral mobs      Patient Education and Home Exercises     Home Exercises Provided and Patient Education Provided     Education provided:   - HEP    Written Home Exercises Provided: Patient instructed to cont prior HEP. Exercises were reviewed and Jessica was able to demonstrate them prior to the end of the session.  Jessica demonstrated good  understanding of the education provided. See EMR under Patient Instructions for exercises provided during therapy sessions    ASSESSMENT     Pt tolerated treatment well with no reports of pain throughout treatment session.  Pt able to increase weight with machines except for leg extension.  Pt with improving function and is feeling more confident walking with an AD and with improved isaac.    Jessica Is progressing well towards her goals.   Pt prognosis is Good.     Pt will continue to benefit from skilled outpatient physical therapy to address the deficits listed in the problem list box on initial evaluation, provide pt/family education and to maximize pt's level of independence in the home and community environment.     Pt's spiritual, cultural and educational needs considered and pt agreeable to plan of care and goals.     Anticipated barriers to physical therapy: None     Goals:   Short-Term Goals: 4 weeks   - The patient will be independent with initial home exercise program.  - The patient will increase strength to at least 4+/5 to perform functional mobility.   - The patient will increase PROM of the R knee to 0-105 degrees to perform ambulation with pain  < 3/10.     Long-Term Goals: 8 weeks  - Pt to achieve <48% limitation as measured by the FOTO to demonstrate decreased disability.  - The patient will be independent with home exercise program and symptom management.  - The patient will increase strength to at least 5/5 to perform functional mobility.   - The patient will increase AROM of the R knee to 0-110 to perform ambulation with pain < 2/10.  - The patient will be independent amb with no assistive device on all surfaces for community distances    PLAN     Plan of care Certification: 12/2/2022 to 2/10/2023.     Krupa Hoang, SHRUTI

## 2023-01-05 ENCOUNTER — CLINICAL SUPPORT (OUTPATIENT)
Dept: REHABILITATION | Facility: HOSPITAL | Age: 68
End: 2023-01-05
Attending: ORTHOPAEDIC SURGERY
Payer: MEDICARE

## 2023-01-05 DIAGNOSIS — Z74.09 IMPAIRED FUNCTIONAL MOBILITY, BALANCE, GAIT, AND ENDURANCE: ICD-10-CM

## 2023-01-05 DIAGNOSIS — R29.898 WEAKNESS OF BOTH LOWER EXTREMITIES: ICD-10-CM

## 2023-01-05 DIAGNOSIS — G89.29 CHRONIC PAIN OF RIGHT KNEE: Primary | ICD-10-CM

## 2023-01-05 DIAGNOSIS — M25.561 CHRONIC PAIN OF RIGHT KNEE: Primary | ICD-10-CM

## 2023-01-05 PROCEDURE — 97110 THERAPEUTIC EXERCISES: CPT | Mod: HCNC,PO

## 2023-01-05 NOTE — PROGRESS NOTES
[facility]   Physical Therapy Treatment Note     Name: Jessica Rojas  Clinic Number: [id number]    Therapy Diagnosis:   Encounter Diagnoses   Name Primary?    Chronic pain of right knee Yes    Weakness of both lower extremities     Impaired functional mobility, balance, gait, and endurance      Physician: Jerry Lai MD    Visit Date: 1/5/2023  Physician Orders: PT Eval and Treat   Medical Diagnosis from Referral: M17.11 (ICD-10-CM) - Osteoarthritis of right knee, unspecified osteoarthritis type Z96.651 (ICD-10-CM) - S/P right unicompartmental knee replacement   Evaluation Date: 12/2/2022  Authorization Period Expiration: 3/03/2023  Plan of Care Expiration: 2/10/2023  Visit # / Visits authorized: 3 / 12    PTA Visit #: 0/5       Time In: 8:30  Time Out: 9:15  Total Billable Time: 40 minutes     Precautions: Diabetes and cancer and R unicompartmental knee replacement on 11/09/22.    SUBJECTIVE     Pt reports: She is well overall. She is having a little more discomfort in the knee because it not as numb anymore. But she feels stronger and can do more things now. She is able to stand for about 10 minutes before needing to sit down.   She was compliant with home exercise program.    Response to previous treatment: increased soreness  Functional change: too soon to tell    Pain: 0/10  Location: right knee      OBJECTIVE     Objective Measures updated at progress report unless specified.     Treatment     Jessica received the treatments listed below:      Jessica received therapeutic exercises to develop strength, endurance, ROM, and flexibility for 40 minutes including:  Recumbent bike, 6 mins, seat 7  Gastroc stretch on incline, 3x30 sec  Seated hamstring stretch, 3x 30s   SLR, 3x10 B  Supine hip flexor stretch, 1 min  Heel slides with strap, 3 mins  Bridges, 3x10  Supine clams w/ blue band, 2x10 B  Precor leg press, 3x10 30#  Precor heel raise, 3x10 30#   Precor leg curl, 2x10  35#  Precor leg ext, 2x10 15#    Next session:   Standing hip abd  Standing hip ext  Step ups  Tandem stance  SLS    manual therapy techniques: Joint mobilizations were applied to the: R patella for 00 minutes, including:  Patella mobs in all directions  Tibiofemoral mobs      Patient Education and Home Exercises     Home Exercises Provided and Patient Education Provided     Education provided:   - HEP    Written Home Exercises Provided: Patient instructed to cont prior HEP. Exercises were reviewed and Jessica was able to demonstrate them prior to the end of the session.  Jessica demonstrated good  understanding of the education provided. See EMR under Patient Instructions for exercises provided during therapy sessions    ASSESSMENT     Pt continues to tolerate tx well. No complaints of knee pain with exercise. Endurance is improving, but continues to be her main limiter at this time. Will plan to incorporate more standing exercises and balance activities to help improve endurance in standing and with ADLs / home chores.     Jessica Is progressing well towards her goals.   Pt prognosis is Good.     Pt will continue to benefit from skilled outpatient physical therapy to address the deficits listed in the problem list box on initial evaluation, provide pt/family education and to maximize pt's level of independence in the home and community environment.     Pt's spiritual, cultural and educational needs considered and pt agreeable to plan of care and goals.     Anticipated barriers to physical therapy: None     Goals:   Short-Term Goals: 4 weeks   - The patient will be independent with initial home exercise program. - MET  - The patient will increase strength to at least 4+/5 to perform functional mobility. - MET  - The patient will increase PROM of the R knee to 0-105 degrees to perform ambulation with pain < 3/10. - MET     Long-Term Goals: 8 weeks  - Pt to achieve <48% limitation as measured by the FOTO to demonstrate  decreased disability. - NOT MET  - The patient will be independent with home exercise program and symptom management. - MET  - The patient will increase strength to at least 5/5 to perform functional mobility. - MET  - The patient will increase AROM of the R knee to 0-110 to perform ambulation with pain < 2/10. - MET  - The patient will be independent amb with no assistive device on all surfaces for community distances. - MET  - Pt to be able to stand for 20 minutes before needing the sit for a break. - NOT MET    PLAN     Plan of care Certification: 12/2/2022 to 2/10/2023.     Collin Chandra, PT

## 2023-01-05 NOTE — PLAN OF CARE
[facility]  Physical Therapy Re-Assessment    Name: Jessica Rojas  Clinic Number: [id number]    Therapy Diagnosis:   Encounter Diagnoses   Name Primary?    Chronic pain of right knee Yes    Weakness of both lower extremities     Impaired functional mobility, balance, gait, and endurance      Physician: Jerry Lai MD    Physician Orders: PT Eval and Treat   Medical Diagnosis from Referral: M17.11 (ICD-10-CM) - Osteoarthritis of right knee, unspecified osteoarthritis type Z96.651 (ICD-10-CM) - S/P right unicompartmental knee replacement   Evaluation Date: 12/2/2022  Authorization Period Expiration: 3/03/2023   Plan of Care Expiration: 2/10/2023  Visit # / Visits authorized: 3 / 12    Time In: 8:30  Time Out: 9:15  Total Billable Time: 40 minutes    Precautions: Diabetes and cancer and R unicompartmental knee replacement on 11/09/22    Subjective   Date of onset: R UKR 11/9/2022  History of current condition - Jessica reports: She is well overall. She is having a little more discomfort in the knee because it not as numb anymore. But she feels stronger and can do more things now. She is able to stand for about 10 minutes before needing to sit down.       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    Malignant carcinoid tumor of the bronchus and lung 5/2014    Mediastinal lymphadenopathy 8/26/2015    Obesity, morbid 5/6/2014    Parkinsons 10/2017    Pituitary adenoma 1980's    took parladel for 3 yrs    Pneumonia     Postoperative  hypothyroidism 2017    - s/p total thyroidectomy in 2016 (parathyroids intact) with post op hypothyroidism; on synthroid    Pulmonary nodules 2018    Thyroid disease     Wheeze 2014       Surgical History:   Jessica Rojas  has a past surgical history that includes  section (, ); Bronchoscopy (2014, 2014); Tonsillectomy (); Appendectomy (); Lung removal, partial (Right, ); Thyroidectomy (2016); Breast cyst aspiration; Tubal ligation (); Esophagogastroduodenoscopy (N/A, 2021); Colonoscopy (N/A, 2021); Colonoscopy (N/A, 2021); Epidural steroid injection into lumbar spine (N/A, 2022); Eye surgery; Minimally invasive transforaminal lumbar interbody fusion (TLIF) (N/A, 2022); Colonoscopy (N/A, 2022); Adenoidectomy; and Spine surgery (2022 3-).    Medications:   Jessica has a current medication list which includes the following prescription(s): acetaminophen, alcohol swabs, amantadine hcl, dexcom g6 transmitter, carbidopa-levodopa  mg, cholecalciferol (vitamin d3), cyanocobalamin, diclofenac sodium, evolocumab, fish oil-omega-3 fatty acids, fluticasone-salmeterol 250-50 mcg/dose, gabapentin, insulin aspart u-100, lamotrigine2.5% meloxicam 0.09% LIDOcaine2% prilocaine2% topical cream, lancing device, lantus solostar u-100 insulin, levothyroxine, melatonin, montelukast, folic acid/multivit-min/lutein, mupirocin calcium 2% nasl oint, naproxen sodium, oxycodone-acetaminophen, pramipexole, selegiline, simvastatin, UNABLE TO FIND, valsartan-hydrochlorothiazide, and warfarin.    Allergies:   Review of patient's allergies indicates:   Allergen Reactions    Propranolol Nausea And Vomiting     Drops pressure and raised sugar    Lipitor [atorvastatin] Other (See Comments)     Myalgia, muscle pain    Robaxin [methocarbamol]      Skin inside mouth started peeling.        Imaging, no post op imaging available     Prior Therapy:  Yes  Social History: Pt lives with their spouse  Occupation: Retired  Prior Level of Function: Independent w/ rollator  Current Level of Function: Independent w/ rollator    Pain:  Current 0/10, worst 7/10, best 0/10   Location: right knee  Description: stinging  Aggravating Factors: Walking  Easing Factors: ice and rest    Pts goals: Get rid of the rollator     Objective     Observation: Ambulating with no AD, improved isaac and stride length.     Range of Motion:   Knee Left active Left Passive Right Active Right passive   Flexion 110  115   Extension 0 NT 0 NT       Lower Extremity Strength  Left LE  Right LE    Hip flexion: 5/5 Hip flexion: 5/5   Knee extension: 5/5 Knee extension: 5/5   Knee flexion: 5/5 Knee flexion: 5/5   Hip abduction:  5/5 Hip abduction: 5/5   Hip extension: NT/5 Hip extension: NT/5   Ankle dorsiflexion: 5/5 Ankle dorsiflexion: 5/5   Ankle plantarflexion: 5/5 Ankle plantarflexion: 5/5     Function:  - SLS R: 6s  - SLS L: 13s  - Sit <--> Stand: Independent    - Bed Mobility: Independent  - 5x STS: 24s, no hands  - TUs, no AD    Joint Mobility: Mild hypomobility of the R patella    Palpation: TTP broadly about the R patella      CMS Impairment/Limitation/Restriction for FOTO KNEE Survey    Therapist reviewed FOTO scores for Jessica Rojas on 2023.   FOTO documents entered into [employer] - see Media section.    Limitation Score: 51%  Category: Mobility         TREATMENT   Treatment Time In:  Treatment Time Out:   Total Treatment time separate from Evaluation:     See separate treatment note.       Home Exercises and Patient Education Provided    Education provided:   - Role of PT, PT POC, PT diagnosis, PT prognosis, HEP    Written Home Exercises Provided: yes.  Exercises were reviewed and Jessica was able to demonstrate them prior to the end of the session.  Jessica demonstrated good  understanding of the education provided.     See EMR under Patient Instructions for exercises  provided 12/2/2022.    Assessment   Jessica is a 67 y.o. female referred to outpatient Physical Therapy with a medical diagnosis of M17.11 (ICD-10-CM) - Osteoarthritis of right knee, unspecified osteoarthritis type Z96.651 (ICD-10-CM) - S/P right unicompartmental knee replacement. Pt has made great progress since starting PT. She demonstrates improved in ROM and demonstrates 5/5 strength in all MMT performed. Her limiting factor at this time is her endurance. Will plan to incorporate more standing exercises and balance activities to help improve endurance in standing and with ADLs / home chores. Decreasing frequency of treatment to 2x per week and will plan for discharge in 3 weeks.       Pt prognosis is Good.   Pt will benefit from skilled outpatient Physical Therapy to address the deficits stated above and in the chart below, provide pt/family education, and to maximize pt's level of independence.     Plan of care discussed with patient: Yes  Pt's spiritual, cultural and educational needs considered and patient is agreeable to the plan of care and goals as stated below:     Anticipated Barriers for therapy: None    Medical Necessity is demonstrated by the following  History  Co-morbidities and personal factors that may impact the plan of care Co-morbidities:   As noted above    Personal Factors:   no deficits     high   Examination  Body Structures and Functions, activity limitations and participation restrictions that may impact the plan of care Body Regions:   lower extremities    Body Systems:    ROM  strength  balance  gait  transfers  motor control    Participation Restrictions:   Ambulating with rollator, unable to drive    Activity limitations:   Learning and applying knowledge  no deficits    General Tasks and Commands  no deficits    Communication  no deficits    Mobility  lifting and carrying objects  walking  moving around using equipment (WC)  driving (bike, car, motorcycle)    Self  care  dressing    Domestic Life  doing house work (cleaning house, washing dishes, laundry)    Interactions/Relationships  no deficits    Life Areas  no deficits    Community and Social Life  community life  recreation and leisure         high   Clinical Presentation stable and uncomplicated low   Decision Making/ Complexity Score: low     Goals:  Short-Term Goals: 4 weeks   - The patient will be independent with initial home exercise program. - MET  - The patient will increase strength to at least 4+/5 to perform functional mobility. - MET  - The patient will increase PROM of the R knee to 0-105 degrees to perform ambulation with pain < 3/10. - MET     Long-Term Goals: 8 weeks  - Pt to achieve <48% limitation as measured by the FOTO to demonstrate decreased disability. - NOT MET  - The patient will be independent with home exercise program and symptom management. - MET  - The patient will increase strength to at least 5/5 to perform functional mobility. - MET  - The patient will increase AROM of the R knee to 0-110 to perform ambulation with pain < 2/10. - MET  - The patient will be independent amb with no assistive device on all surfaces for community distances. - MET  - Pt to be able to stand for 20 minutes before needing the sit for a break. - NOT MET    Plan   Plan of care Certification: 12/2/2022 to 2/10/2023.    Outpatient Physical Therapy 2 times weekly for 8 weeks to include the following interventions: Gait Training, Manual Therapy, Moist Heat/ Ice, Neuromuscular Re-ed, Patient Education, Therapeutic Activities, and Therapeutic Exercise.     Collin Chandra, PT

## 2023-01-09 ENCOUNTER — CLINICAL SUPPORT (OUTPATIENT)
Dept: REHABILITATION | Facility: HOSPITAL | Age: 68
End: 2023-01-09
Attending: ORTHOPAEDIC SURGERY
Payer: MEDICARE

## 2023-01-09 DIAGNOSIS — M25.561 CHRONIC PAIN OF RIGHT KNEE: Primary | ICD-10-CM

## 2023-01-09 DIAGNOSIS — G89.29 CHRONIC PAIN OF RIGHT KNEE: Primary | ICD-10-CM

## 2023-01-09 DIAGNOSIS — R29.898 WEAKNESS OF BOTH LOWER EXTREMITIES: ICD-10-CM

## 2023-01-09 DIAGNOSIS — Z74.09 IMPAIRED FUNCTIONAL MOBILITY, BALANCE, GAIT, AND ENDURANCE: ICD-10-CM

## 2023-01-09 PROCEDURE — 97110 THERAPEUTIC EXERCISES: CPT | Mod: HCNC,PO,CQ

## 2023-01-09 NOTE — PROGRESS NOTES
[facility]   Physical Therapy Treatment Note     Name: Jessica Rojas  Clinic Number: [id number]    Therapy Diagnosis:   Encounter Diagnoses   Name Primary?    Chronic pain of right knee Yes    Weakness of both lower extremities     Impaired functional mobility, balance, gait, and endurance      Physician: Jerry Lai MD    Visit Date: 1/9/2023  Physician Orders: PT Eval and Treat   Medical Diagnosis from Referral: M17.11 (ICD-10-CM) - Osteoarthritis of right knee, unspecified osteoarthritis type Z96.651 (ICD-10-CM) - S/P right unicompartmental knee replacement   Evaluation Date: 12/2/2022  Authorization Period Expiration: 3/03/2023  Plan of Care Expiration: 2/10/2023  Visit # / Visits authorized: 4 / 12    PTA Visit #: 1/5       Time In: 8:30 am  Time Out: 9:15 am  Total Billable Time: 45 minutes     Precautions: Diabetes and cancer and R unicompartmental knee replacement on 11/09/22.    SUBJECTIVE     Pt reports: She still has increased stiffness in the morning but it normally works itself out.  Pt reports she is feeling more confident not walking with an AD but does stand a few seconds before she starts walking.  She was compliant with home exercise program.    Response to previous treatment: increased soreness on the outside of the leg  Functional change: too soon to tell    Pain: 0/10  Location: right knee      OBJECTIVE     Objective Measures updated at progress report unless specified.     Treatment     Jessica received the treatments listed below:      Jessica received therapeutic exercises to develop strength, endurance, ROM, and flexibility for 45 minutes including:  Recumbent bike, 6 mins, seat 7  Gastroc stretch on incline, 3x30 sec  Seated hamstring stretch, 3x 30s   SLR, 3x10 B  Supine hip flexor stretch, 1 min  Heel slides with strap, 3 mins  Bridges, 3x10  Supine clams w/ blue band, 3x10 B  Precor leg press, 3x10 30#  Precor heel raise, 3x10 30#   Precor leg curl, 3x10  35#  Precor leg ext, 3x10 15#    Next session:   Standing hip abd  Standing hip ext  Step ups  Tandem stance  SLS    manual therapy techniques: Joint mobilizations were applied to the: R patella for 00 minutes, including:  Patella mobs in all directions  Tibiofemoral mobs      Patient Education and Home Exercises     Home Exercises Provided and Patient Education Provided     Education provided:   - HEP    Written Home Exercises Provided: Patient instructed to cont prior HEP. Exercises were reviewed and Jessica was able to demonstrate them prior to the end of the session.  Jessica demonstrated good  understanding of the education provided. See EMR under Patient Instructions for exercises provided during therapy sessions    ASSESSMENT     Pt did well with increases in reps.  Pt feels like she is able to do almost everything she needs to do.  Pt with co-morbidities that affect endurance however, she does seem to be improving with tolerance for exercises.  Pt is motivated and plans to continue using the bike at home to maintain gains.     Jessica Is progressing well towards her goals.   Pt prognosis is Good.     Pt will continue to benefit from skilled outpatient physical therapy to address the deficits listed in the problem list box on initial evaluation, provide pt/family education and to maximize pt's level of independence in the home and community environment.     Pt's spiritual, cultural and educational needs considered and pt agreeable to plan of care and goals.     Anticipated barriers to physical therapy: None     Goals:   Short-Term Goals: 4 weeks   - The patient will be independent with initial home exercise program. - MET  - The patient will increase strength to at least 4+/5 to perform functional mobility. - MET  - The patient will increase PROM of the R knee to 0-105 degrees to perform ambulation with pain < 3/10. - MET     Long-Term Goals: 8 weeks  - Pt to achieve <48% limitation as measured by the FOTO to  demonstrate decreased disability. - NOT MET  - The patient will be independent with home exercise program and symptom management. - MET  - The patient will increase strength to at least 5/5 to perform functional mobility. - MET  - The patient will increase AROM of the R knee to 0-110 to perform ambulation with pain < 2/10. - MET  - The patient will be independent amb with no assistive device on all surfaces for community distances. - MET  - Pt to be able to stand for 20 minutes before needing the sit for a break. - NOT MET    PLAN     Plan of care Certification: 12/2/2022 to 2/10/2023.     Krupa Hoang, PTA

## 2023-01-11 ENCOUNTER — CLINICAL SUPPORT (OUTPATIENT)
Dept: REHABILITATION | Facility: HOSPITAL | Age: 68
End: 2023-01-11
Attending: ORTHOPAEDIC SURGERY
Payer: MEDICARE

## 2023-01-11 DIAGNOSIS — R29.898 WEAKNESS OF BOTH LOWER EXTREMITIES: ICD-10-CM

## 2023-01-11 DIAGNOSIS — G89.29 CHRONIC PAIN OF RIGHT KNEE: Primary | ICD-10-CM

## 2023-01-11 DIAGNOSIS — Z74.09 IMPAIRED FUNCTIONAL MOBILITY, BALANCE, GAIT, AND ENDURANCE: ICD-10-CM

## 2023-01-11 DIAGNOSIS — M25.561 CHRONIC PAIN OF RIGHT KNEE: Primary | ICD-10-CM

## 2023-01-11 PROCEDURE — 97110 THERAPEUTIC EXERCISES: CPT | Mod: HCNC,PO,CQ

## 2023-01-11 NOTE — PROGRESS NOTES
[facility]   Physical Therapy Treatment Note     Name: Jessica Rojas  Clinic Number: [id number]    Therapy Diagnosis:   Encounter Diagnoses   Name Primary?    Chronic pain of right knee Yes    Weakness of both lower extremities     Impaired functional mobility, balance, gait, and endurance      Physician: Jerry Lai MD    Visit Date: 1/11/2023  Physician Orders: PT Eval and Treat   Medical Diagnosis from Referral: M17.11 (ICD-10-CM) - Osteoarthritis of right knee, unspecified osteoarthritis type Z96.651 (ICD-10-CM) - S/P right unicompartmental knee replacement   Evaluation Date: 12/2/2022  Authorization Period Expiration: 3/03/2023  Plan of Care Expiration: 2/10/2023  Visit # / Visits authorized: 4 / 12    PTA Visit #: 2/5       Time In: 8:30 am  Time Out: 9:15 am  Total Billable Time: 45 minutes     Precautions: Diabetes and cancer and R unicompartmental knee replacement on 11/09/22.    SUBJECTIVE     Pt reports: She  is up to 10 minutes at a time on the bike at home. She will take a 5 minute break in between cycles and does this 45 min-an hour at a time.  Pt is also standing more at home before taking a break,  She has noticed some pain on the outside of the knee to the hip but contributes this to her increased activity.  She was compliant with home exercise program.    Response to previous treatment: increased soreness  Functional change: too soon to tell    Pain: 0/10  Location: right knee      OBJECTIVE     Objective Measures updated at progress report unless specified.     Treatment     Jessica received the treatments listed below:      Jessica received therapeutic exercises to develop strength, endurance, ROM, and flexibility for 45minutes including:  Recumbent bike, 6 mins, seat 8, Level 2  Gastroc stretch on incline, 3x30 sec  Seated hamstring stretch, 3x 30s   SLR, 3x10 B  Supine hip flexor stretch, 1 min  Heel slides with strap, 3 mins  Bridges, 3x10  Supine clams w/ blue  band, 2x10 B  Precor leg press, 3x10 40#  Precor heel raise, 3x10 40#   Precor leg curl, 3x10 35#  Precor leg ext, 3x10 20#  Step ups, 2x10  Piriformis stretch, 2x30 sec  Massage roller to the lateral leg x 3 min  Standing hip abd, 3x10    Next session:   Standing hip ext  Tandem stance  SLS    manual therapy techniques: Joint mobilizations were applied to the: R patella for 00 minutes, including:  Patella mobs in all directions  Tibiofemoral mobs      Patient Education and Home Exercises     Home Exercises Provided and Patient Education Provided     Education provided:   - Using a roll pin on the outside of the leg    Written Home Exercises Provided: Patient instructed to cont prior HEP. Exercises were reviewed and Jessica was able to demonstrate them prior to the end of the session.  Jessica demonstrated good  understanding of the education provided. See EMR under Patient Instructions for exercises provided during therapy sessions    ASSESSMENT     Pt able to increase reps and weight today with no complaints.  Showed patient lateral hip stretches and spoke about using a massage roller at home to help with tightness on the outside of the hip.  Will continue working on standing and strengthening within endurance tolerance.    Jessica Is progressing well towards her goals.   Pt prognosis is Good.     Pt will continue to benefit from skilled outpatient physical therapy to address the deficits listed in the problem list box on initial evaluation, provide pt/family education and to maximize pt's level of independence in the home and community environment.     Pt's spiritual, cultural and educational needs considered and pt agreeable to plan of care and goals.     Anticipated barriers to physical therapy: None     Goals:   Short-Term Goals: 4 weeks   - The patient will be independent with initial home exercise program. - MET  - The patient will increase strength to at least 4+/5 to perform functional mobility. - MET  - The  patient will increase PROM of the R knee to 0-105 degrees to perform ambulation with pain < 3/10. - MET     Long-Term Goals: 8 weeks  - Pt to achieve <48% limitation as measured by the FOTO to demonstrate decreased disability. - NOT MET  - The patient will be independent with home exercise program and symptom management. - MET  - The patient will increase strength to at least 5/5 to perform functional mobility. - MET  - The patient will increase AROM of the R knee to 0-110 to perform ambulation with pain < 2/10. - MET  - The patient will be independent amb with no assistive device on all surfaces for community distances. - MET  - Pt to be able to stand for 20 minutes before needing the sit for a break. - NOT MET    PLAN     Plan of care Certification: 12/2/2022 to 2/10/2023.     Krupa Hoang, PTA

## 2023-01-17 ENCOUNTER — CLINICAL SUPPORT (OUTPATIENT)
Dept: REHABILITATION | Facility: HOSPITAL | Age: 68
End: 2023-01-17
Attending: ORTHOPAEDIC SURGERY
Payer: MEDICARE

## 2023-01-17 DIAGNOSIS — M25.561 CHRONIC PAIN OF RIGHT KNEE: Primary | ICD-10-CM

## 2023-01-17 DIAGNOSIS — R29.898 WEAKNESS OF BOTH LOWER EXTREMITIES: ICD-10-CM

## 2023-01-17 DIAGNOSIS — Z74.09 IMPAIRED FUNCTIONAL MOBILITY, BALANCE, GAIT, AND ENDURANCE: ICD-10-CM

## 2023-01-17 DIAGNOSIS — G89.29 CHRONIC PAIN OF RIGHT KNEE: Primary | ICD-10-CM

## 2023-01-17 PROCEDURE — 97110 THERAPEUTIC EXERCISES: CPT | Mod: HCNC,PO

## 2023-01-17 NOTE — PROGRESS NOTES
[facility]   Physical Therapy Treatment Note     Name: Jessica Rojas  Clinic Number: [id number]    Therapy Diagnosis:   Encounter Diagnoses   Name Primary?    Chronic pain of right knee Yes    Weakness of both lower extremities     Impaired functional mobility, balance, gait, and endurance      Physician: Jerry Lai MD    Visit Date: 1/17/2023  Physician Orders: PT Eval and Treat   Medical Diagnosis from Referral: M17.11 (ICD-10-CM) - Osteoarthritis of right knee, unspecified osteoarthritis type Z96.651 (ICD-10-CM) - S/P right unicompartmental knee replacement   Evaluation Date: 12/2/2022  Authorization Period Expiration: 3/03/2023  Plan of Care Expiration: 2/10/2023  Visit # / Visits authorized: 4 / 12    PTA Visit #: 0/5       Time In: 8:30  Time Out: 9:23  Total Billable Time: 53 minutes     Precautions: Diabetes and cancer and R unicompartmental knee replacement on 11/09/22.    SUBJECTIVE     Pt reports: She has been doing very well. She has been have minimal to no pain over the last couple of weeks. She does still have some stiffness in the knee, especially after being seated for a while.   She was compliant with home exercise program.    Response to previous treatment: increased soreness  Functional change: too soon to tell    Pain: 0/10  Location: right knee      OBJECTIVE     Objective Measures updated at progress report unless specified.     Treatment     Jessica received the treatments listed below:      Jessica received therapeutic exercises to develop strength, endurance, ROM, and flexibility for 53 minutes including:  Recumbent bike, 6 mins, seat 8, Level 2  Gastroc stretch on incline, 3x30 sec  Seated hamstring stretch, 3x 30s   Massage roller to the lateral leg x 3 min - not performed  Piriformis stretch, 2x30 sec - not performed   Supine hip flexor stretch, 1 min  SLR, 3x10 B   Heel slides with strap, 3 mins  Bridges, 3x10  Supine clams w/ blue band, 2x10 B  Sit to  stands, 2x10 6# DB  Standing hip abd, 3x10   Step ups, 2x10  Precor leg press, 3x10 40#  Precor heel raise, 3x10 40#   Precor leg curl, 3x10 35#  Precor leg ext, 3x10 20#      manual therapy techniques: Joint mobilizations were applied to the: R patella for 00 minutes, including:  Patella mobs in all directions  Tibiofemoral mobs      Patient Education and Home Exercises     Home Exercises Provided and Patient Education Provided     Education provided:   - Using a roll pin on the outside of the leg    Written Home Exercises Provided: Patient instructed to cont prior HEP. Exercises were reviewed and Jessica was able to demonstrate them prior to the end of the session.  Jessica demonstrated good  understanding of the education provided. See EMR under Patient Instructions for exercises provided during therapy sessions    ASSESSMENT     Pt tolerated tx well, no exacerbation of symptoms. Planning for discharge next week barring any set backs.     Jessica Is progressing well towards her goals.   Pt prognosis is Good.     Pt will continue to benefit from skilled outpatient physical therapy to address the deficits listed in the problem list box on initial evaluation, provide pt/family education and to maximize pt's level of independence in the home and community environment.     Pt's spiritual, cultural and educational needs considered and pt agreeable to plan of care and goals.     Anticipated barriers to physical therapy: None     Goals:   Short-Term Goals: 4 weeks   - The patient will be independent with initial home exercise program. - MET  - The patient will increase strength to at least 4+/5 to perform functional mobility. - MET  - The patient will increase PROM of the R knee to 0-105 degrees to perform ambulation with pain < 3/10. - MET     Long-Term Goals: 8 weeks  - Pt to achieve <48% limitation as measured by the FOTO to demonstrate decreased disability. - NOT MET  - The patient will be independent with home exercise  program and symptom management. - MET  - The patient will increase strength to at least 5/5 to perform functional mobility. - MET  - The patient will increase AROM of the R knee to 0-110 to perform ambulation with pain < 2/10. - MET  - The patient will be independent amb with no assistive device on all surfaces for community distances. - MET  - Pt to be able to stand for 20 minutes before needing the sit for a break. - NOT MET    PLAN     Plan of care Certification: 12/2/2022 to 2/10/2023.     Collin Chandra, PT

## 2023-01-19 ENCOUNTER — CLINICAL SUPPORT (OUTPATIENT)
Dept: REHABILITATION | Facility: HOSPITAL | Age: 68
End: 2023-01-19
Attending: ORTHOPAEDIC SURGERY
Payer: MEDICARE

## 2023-01-19 DIAGNOSIS — R29.898 WEAKNESS OF BOTH LOWER EXTREMITIES: ICD-10-CM

## 2023-01-19 DIAGNOSIS — Z74.09 IMPAIRED FUNCTIONAL MOBILITY, BALANCE, GAIT, AND ENDURANCE: ICD-10-CM

## 2023-01-19 DIAGNOSIS — G89.29 CHRONIC PAIN OF RIGHT KNEE: Primary | ICD-10-CM

## 2023-01-19 DIAGNOSIS — M25.561 CHRONIC PAIN OF RIGHT KNEE: Primary | ICD-10-CM

## 2023-01-19 PROCEDURE — 97110 THERAPEUTIC EXERCISES: CPT | Mod: HCNC,PO

## 2023-01-19 NOTE — PROGRESS NOTES
[facility]   Physical Therapy Treatment Note     Name: Jessica Rojas  Clinic Number: [id number]    Therapy Diagnosis:   Encounter Diagnoses   Name Primary?    Chronic pain of right knee Yes    Weakness of both lower extremities     Impaired functional mobility, balance, gait, and endurance      Physician: Jerry Lai MD    Visit Date: 1/19/2023  Physician Orders: PT Eval and Treat   Medical Diagnosis from Referral: M17.11 (ICD-10-CM) - Osteoarthritis of right knee, unspecified osteoarthritis type Z96.651 (ICD-10-CM) - S/P right unicompartmental knee replacement   Evaluation Date: 12/2/2022  Authorization Period Expiration: 3/03/2023  Plan of Care Expiration: 2/10/2023  Visit # / Visits authorized: 7 / 12    PTA Visit #: 0/5       Time In: 8:20  Time Out: 9:00  Total Billable Time: 25 minutes     Precautions: Diabetes and cancer and R unicompartmental knee replacement on 11/09/22.    SUBJECTIVE     Pt reports: She has been doing very well. Did have some muscle soreness following last session, but doing well overall.   She was compliant with home exercise program.    Response to previous treatment: increased soreness  Functional change: too soon to tell    Pain: 0/10  Location: right knee      OBJECTIVE     Objective Measures updated at progress report unless specified.     Treatment     Jessica received the treatments listed below:      Jessica received therapeutic exercises to develop strength, endurance, ROM, and flexibility for 40 minutes including:  Recumbent bike, 6 mins, seat 8, Level 2  Gastroc stretch on incline, 3x30 sec  Seated hamstring stretch, 3x 30s   Massage roller to the lateral leg x 3 min - not performed  Piriformis stretch, 2x30 sec - not performed   Supine hip flexor stretch, 1 min  SLR, 3x10 B   Heel slides with strap, 3 mins - not performed  Bridges, 3x10  Supine clams w/ blue band, 2x10 B  Sit to stands, 2x10 6# DB  Standing hip abd, 3x10   Step ups, 2x10 - not  performed  Precor leg press, 3x10 40#  Precor heel raise, 3x10 40#   Precor leg curl, 3x10 40#  Precor leg ext, 3x10 20#      manual therapy techniques: Joint mobilizations were applied to the: R patella for 00 minutes, including:  Patella mobs in all directions  Tibiofemoral mobs      Patient Education and Home Exercises     Home Exercises Provided and Patient Education Provided     Education provided:   - Using a roll pin on the outside of the leg    Written Home Exercises Provided: Patient instructed to cont prior HEP. Exercises were reviewed and Jessica was able to demonstrate them prior to the end of the session.  Jessica demonstrated good  understanding of the education provided. See EMR under Patient Instructions for exercises provided during therapy sessions    ASSESSMENT     Pt tolerated tx well, no exacerbation of symptoms. Pt agreeable to discharge next week.     Jessica Is progressing well towards her goals.   Pt prognosis is Good.     Pt will continue to benefit from skilled outpatient physical therapy to address the deficits listed in the problem list box on initial evaluation, provide pt/family education and to maximize pt's level of independence in the home and community environment.     Pt's spiritual, cultural and educational needs considered and pt agreeable to plan of care and goals.     Anticipated barriers to physical therapy: None     Goals:   Short-Term Goals: 4 weeks   - The patient will be independent with initial home exercise program. - MET  - The patient will increase strength to at least 4+/5 to perform functional mobility. - MET  - The patient will increase PROM of the R knee to 0-105 degrees to perform ambulation with pain < 3/10. - MET     Long-Term Goals: 8 weeks  - Pt to achieve <48% limitation as measured by the FOTO to demonstrate decreased disability. - NOT MET  - The patient will be independent with home exercise program and symptom management. - MET  - The patient will increase  strength to at least 5/5 to perform functional mobility. - MET  - The patient will increase AROM of the R knee to 0-110 to perform ambulation with pain < 2/10. - MET  - The patient will be independent amb with no assistive device on all surfaces for community distances. - MET  - Pt to be able to stand for 20 minutes before needing the sit for a break. - NOT MET    PLAN     Plan of care Certification: 12/2/2022 to 2/10/2023.     Collin Chandra, PT

## 2023-01-23 ENCOUNTER — CLINICAL SUPPORT (OUTPATIENT)
Dept: REHABILITATION | Facility: HOSPITAL | Age: 68
End: 2023-01-23
Attending: ORTHOPAEDIC SURGERY
Payer: MEDICARE

## 2023-01-23 ENCOUNTER — TELEPHONE (OUTPATIENT)
Dept: ADMINISTRATIVE | Facility: HOSPITAL | Age: 68
End: 2023-01-23
Payer: MEDICARE

## 2023-01-23 DIAGNOSIS — G89.29 CHRONIC PAIN OF RIGHT KNEE: Primary | ICD-10-CM

## 2023-01-23 DIAGNOSIS — R29.898 WEAKNESS OF BOTH LOWER EXTREMITIES: ICD-10-CM

## 2023-01-23 DIAGNOSIS — Z74.09 IMPAIRED FUNCTIONAL MOBILITY, BALANCE, GAIT, AND ENDURANCE: ICD-10-CM

## 2023-01-23 DIAGNOSIS — M25.561 CHRONIC PAIN OF RIGHT KNEE: Primary | ICD-10-CM

## 2023-01-23 PROCEDURE — 97110 THERAPEUTIC EXERCISES: CPT | Mod: HCNC,PO,CQ

## 2023-01-23 NOTE — PROGRESS NOTES
[facility]   Physical Therapy Treatment Note     Name: Jessica Rojas  Clinic Number: [id number]    Therapy Diagnosis:   Encounter Diagnoses   Name Primary?    Chronic pain of right knee Yes    Weakness of both lower extremities     Impaired functional mobility, balance, gait, and endurance      Physician: Jerry Lai MD    Visit Date: 1/23/2023  Physician Orders: PT Eval and Treat   Medical Diagnosis from Referral: M17.11 (ICD-10-CM) - Osteoarthritis of right knee, unspecified osteoarthritis type Z96.651 (ICD-10-CM) - S/P right unicompartmental knee replacement   Evaluation Date: 12/2/2022  Authorization Period Expiration: 3/03/2023  Plan of Care Expiration: 2/10/2023  Visit # / Visits authorized: 8 / 12    PTA Visit #: 1/5       Time In: 8:35 am  Time Out: 917am  Total Billable Time: 42 minutes     Precautions: Diabetes and cancer and R unicompartmental knee replacement on 11/09/22.    SUBJECTIVE     Pt reports: Increased stiffness more than normal but thinks it is part of her Parkinson's acting up.  Pt reports she is up to riding 15 minutes at a time on her bike at home.  She was compliant with home exercise program.    Response to previous treatment: no advere  Functional change: too soon to tell    Pain: 0/10  Location: right knee      OBJECTIVE     Objective Measures updated at progress report unless specified.     Treatment     Jessica received the treatments listed below:      Jessica received therapeutic exercises to develop strength, endurance, ROM, and flexibility for 40 minutes including:  Recumbent bike, 6 mins, seat 8, Level 2  Gastroc stretch on incline, 3x30 sec  Seated hamstring stretch, 3x 30s   Massage roller to the lateral leg x 3 min - not performed  Piriformis stretch, 2x30 sec - not performed   Supine hip flexor stretch, 2x30 sec  SLR, 3x10 B   Heel slides with strap, 3 mins - not performed  Bridges, 3x10  Supine clams w/ blue band, 2x10 B  Sit to stands, 2x10  6# DB- not performed  Standing hip abd, 3x10- not performed  Step ups, 2x10 - not performed  Precor leg press, 2x10 50#  Precor heel raise, 3x10 40#- not performed  Precor leg curl, 2x10 40#- decreased reps  Precor leg ext, 2x10 20#- decreased reps      manual therapy techniques: Joint mobilizations were applied to the: R patella for 00 minutes, including:  Patella mobs in all directions  Tibiofemoral mobs      Patient Education and Home Exercises     Home Exercises Provided and Patient Education Provided     Education provided:   - Using a roll pin on the outside of the leg    Written Home Exercises Provided: Patient instructed to cont prior HEP. Exercises were reviewed and Jessica was able to demonstrate them prior to the end of the session.  Jessica demonstrated good  understanding of the education provided. See EMR under Patient Instructions for exercises provided during therapy sessions    ASSESSMENT     Pt tolerated tx well, no exacerbation of symptoms. Pt with a flare up with her Parkinson's but she reports she has these about once a month and is aware she has to take it easier during the time  Recommended patient use/have an AD available when she is having these episodes since she feels weaker and that her muscles are not responding as well.  Pt veralized understanding.     Jessica Is progressing well towards her goals.   Pt prognosis is Good.     Pt will continue to benefit from skilled outpatient physical therapy to address the deficits listed in the problem list box on initial evaluation, provide pt/family education and to maximize pt's level of independence in the home and community environment.     Pt's spiritual, cultural and educational needs considered and pt agreeable to plan of care and goals.     Anticipated barriers to physical therapy: None     Goals:   Short-Term Goals: 4 weeks   - The patient will be independent with initial home exercise program. - MET  - The patient will increase strength to at  least 4+/5 to perform functional mobility. - MET  - The patient will increase PROM of the R knee to 0-105 degrees to perform ambulation with pain < 3/10. - MET     Long-Term Goals: 8 weeks  - Pt to achieve <48% limitation as measured by the FOTO to demonstrate decreased disability. - NOT MET  - The patient will be independent with home exercise program and symptom management. - MET  - The patient will increase strength to at least 5/5 to perform functional mobility. - MET  - The patient will increase AROM of the R knee to 0-110 to perform ambulation with pain < 2/10. - MET  - The patient will be independent amb with no assistive device on all surfaces for community distances. - MET  - Pt to be able to stand for 20 minutes before needing the sit for a break. - NOT MET    PLAN     Plan of care Certification: 12/2/2022 to 2/10/2023.     Krupa Hoang, SHRUTI

## 2023-01-25 ENCOUNTER — CLINICAL SUPPORT (OUTPATIENT)
Dept: REHABILITATION | Facility: HOSPITAL | Age: 68
End: 2023-01-25
Attending: ORTHOPAEDIC SURGERY
Payer: MEDICARE

## 2023-01-25 DIAGNOSIS — R29.898 WEAKNESS OF BOTH LOWER EXTREMITIES: ICD-10-CM

## 2023-01-25 DIAGNOSIS — M25.561 CHRONIC PAIN OF RIGHT KNEE: Primary | ICD-10-CM

## 2023-01-25 DIAGNOSIS — Z74.09 IMPAIRED FUNCTIONAL MOBILITY, BALANCE, GAIT, AND ENDURANCE: ICD-10-CM

## 2023-01-25 DIAGNOSIS — G89.29 CHRONIC PAIN OF RIGHT KNEE: Primary | ICD-10-CM

## 2023-01-25 PROCEDURE — 97110 THERAPEUTIC EXERCISES: CPT | Mod: HCNC,PO,CQ

## 2023-01-25 NOTE — PROGRESS NOTES
[facility]   Physical Therapy Treatment Note     Name: Jessica Rojas  Clinic Number: [id number]    Therapy Diagnosis:   Encounter Diagnoses   Name Primary?    Chronic pain of right knee Yes    Weakness of both lower extremities     Impaired functional mobility, balance, gait, and endurance      Physician: Jerry Lai MD    Visit Date: 1/25/2023  Physician Orders: PT Eval and Treat   Medical Diagnosis from Referral: M17.11 (ICD-10-CM) - Osteoarthritis of right knee, unspecified osteoarthritis type Z96.651 (ICD-10-CM) - S/P right unicompartmental knee replacement   Evaluation Date: 12/2/2022  Authorization Period Expiration: 3/03/2023  Plan of Care Expiration: 2/10/2023  Visit # / Visits authorized: 9/ 12    PTA Visit #: 2/5       Time In: 8:30 am  Time Out: 915 am  Total Billable Time: 45 minutes     Precautions: Diabetes and cancer and R unicompartmental knee replacement on 11/09/22.    SUBJECTIVE     Pt reports: She is feeling a little better than the previous session but still having some trouble due to the Parkinsons.  She only has about a quarter sized area that is still numb in the knee towards the outside.  Pt reports she still has tenderness when she touches under the knee cap.  Still has some stiffness when she first stands up but this improves with movement.  Her endurance with activities continues to improve and she is doing more than before surgery.  Pt is able to go up and down her steps outside her house.  She was compliant with home exercise program.    Response to previous treatment: no advere  Functional change: too soon to tell    Pain: 0/10  Location: right knee      OBJECTIVE     Objective Measures updated at progress report unless specified.     Treatment     Jessica received the treatments listed below:      Jessica received therapeutic exercises to develop strength, endurance, ROM, and flexibility for 45 minutes including:  Recumbent bike, 6 mins, seat 8, Level  2  Gastroc stretch on incline, 3x30 sec  Seated hamstring stretch, 3x 30s - not performed  Massage roller to the lateral leg x 3 min - not performed  Piriformis stretch, 2x30 sec - not performed   Supine hip flexor stretch, 2x30 sec  SLR, 3x10 B   Heel slides with strap, 3 mins - not performed  Bridges, 3x10  Supine clams w/ blue band, 3x10 B  Sit to stands, 2x10, more weigh through R leg  Standing hip abd, 3x10- not performed  Step ups, 2x10 - not performed  Precor leg press, 2x10 50#  Precor heel raise, 3x10 40#- not performed  Precor leg curl, 2x10 40#- decreased reps  Precor leg ext, 2x10 20#- decreased reps      manual therapy techniques: Joint mobilizations were applied to the: R patella for 00 minutes, including:  Patella mobs in all directions  Tibiofemoral mobs      Patient Education and Home Exercises     Home Exercises Provided and Patient Education Provided     Education provided:   - Increases time with reps to increase exercise difficulty    Written Home Exercises Provided: yes. Exercises were reviewed and Jessica was able to demonstrate them prior to the end of the session.  Jessica demonstrated good  understanding of the education provided. See EMR under Patient Instructions for exercises provided during therapy sessions    ASSESSMENT     Pt was given an HEP with 5 exercises to perform daily to maintain and improve current gains.  Pt is back doing things she was previously unable to do because of pain.  Pt's endurance continues to progress as based by her exercise tolerance, standing and walking tolerance, ect.  Pt to be discharged to HEP at this time.    Jessica Is progressing well towards her goals.   Pt prognosis is Good.     Pt will continue to benefit from skilled outpatient physical therapy to address the deficits listed in the problem list box on initial evaluation, provide pt/family education and to maximize pt's level of independence in the home and community environment.     Pt's spiritual,  cultural and educational needs considered and pt agreeable to plan of care and goals.     Anticipated barriers to physical therapy: None     Goals:   Short-Term Goals: 4 weeks   - The patient will be independent with initial home exercise program. - MET  - The patient will increase strength to at least 4+/5 to perform functional mobility. - MET  - The patient will increase PROM of the R knee to 0-105 degrees to perform ambulation with pain < 3/10. - MET     Long-Term Goals: 8 weeks  - Pt to achieve <48% limitation as measured by the FOTO to demonstrate decreased disability. - NOT MET  - The patient will be independent with home exercise program and symptom management. - MET  - The patient will increase strength to at least 5/5 to perform functional mobility. - MET  - The patient will increase AROM of the R knee to 0-110 to perform ambulation with pain < 2/10. - MET  - The patient will be independent amb with no assistive device on all surfaces for community distances. - MET  - Pt to be able to stand for 20 minutes before needing the sit for a break. - NOT MET    PLAN     Plan of care Certification: 12/2/2022 to 2/10/2023.     Krupa Hoang, SHRUTI

## 2023-01-27 DIAGNOSIS — Z96.651 S/P RIGHT UNICOMPARTMENTAL KNEE REPLACEMENT: Primary | ICD-10-CM

## 2023-01-30 ENCOUNTER — SPECIALTY PHARMACY (OUTPATIENT)
Dept: PHARMACY | Facility: CLINIC | Age: 68
End: 2023-01-30
Payer: MEDICARE

## 2023-01-30 NOTE — TELEPHONE ENCOUNTER
Outgoing call regarding Repatha refill. Pt due to inject late next week/weekend and agreed to call back when refill is closer. Pending accordingly.

## 2023-01-30 NOTE — PROGRESS NOTES
Patient, Jessica Rojas (MRN #[id number]), presented with a recorded BMI of 40.55 kg/m^2 consistent with the definition of morbid obesity (ICD-10 E66.01). The patient's morbid obesity was monitored, evaluated, addressed and/or treated. This addendum to the medical record is made on 01/29/2023.

## 2023-02-01 ENCOUNTER — OFFICE VISIT (OUTPATIENT)
Dept: ORTHOPEDICS | Facility: CLINIC | Age: 68
End: 2023-02-01
Payer: MEDICARE

## 2023-02-01 ENCOUNTER — HOSPITAL ENCOUNTER (OUTPATIENT)
Dept: RADIOLOGY | Facility: HOSPITAL | Age: 68
Discharge: HOME | End: 2023-02-01
Attending: ORTHOPAEDIC SURGERY
Payer: MEDICARE

## 2023-02-01 VITALS — WEIGHT: 214 LBS | BODY MASS INDEX: 40.4 KG/M2 | HEIGHT: 61 IN

## 2023-02-01 DIAGNOSIS — Z96.651 S/P RIGHT UNICOMPARTMENTAL KNEE REPLACEMENT: ICD-10-CM

## 2023-02-01 DIAGNOSIS — Z96.651 S/P RIGHT UNICOMPARTMENTAL KNEE REPLACEMENT: Primary | ICD-10-CM

## 2023-02-01 PROCEDURE — 1126F AMNT PAIN NOTED NONE PRSNT: CPT | Mod: HCNC,CPTII,S$GLB, | Performed by: ORTHOPAEDIC SURGERY

## 2023-02-01 PROCEDURE — 1126F PR PAIN SEVERITY QUANTIFIED, NO PAIN PRESENT: ICD-10-PCS | Mod: HCNC,CPTII,S$GLB, | Performed by: ORTHOPAEDIC SURGERY

## 2023-02-01 PROCEDURE — 1101F PT FALLS ASSESS-DOCD LE1/YR: CPT | Mod: HCNC,CPTII,S$GLB, | Performed by: ORTHOPAEDIC SURGERY

## 2023-02-01 PROCEDURE — 99024 PR POST-OP FOLLOW-UP VISIT: ICD-10-PCS | Mod: HCNC,S$GLB,, | Performed by: ORTHOPAEDIC SURGERY

## 2023-02-01 PROCEDURE — 73562 XR KNEE ORTHO RIGHT: ICD-10-PCS | Mod: 26,HCNC,RT, | Performed by: RADIOLOGY

## 2023-02-01 PROCEDURE — 3008F BODY MASS INDEX DOCD: CPT | Mod: HCNC,CPTII,S$GLB, | Performed by: ORTHOPAEDIC SURGERY

## 2023-02-01 PROCEDURE — 3288F FALL RISK ASSESSMENT DOCD: CPT | Mod: HCNC,CPTII,S$GLB, | Performed by: ORTHOPAEDIC SURGERY

## 2023-02-01 PROCEDURE — 99999 PR PBB SHADOW E&M-EST. PATIENT-LVL II: ICD-10-PCS | Mod: PBBFAC,HCNC,, | Performed by: ORTHOPAEDIC SURGERY

## 2023-02-01 PROCEDURE — 3288F PR FALLS RISK ASSESSMENT DOCUMENTED: ICD-10-PCS | Mod: HCNC,CPTII,S$GLB, | Performed by: ORTHOPAEDIC SURGERY

## 2023-02-01 PROCEDURE — 1101F PR PT FALLS ASSESS DOC 0-1 FALLS W/OUT INJ PAST YR: ICD-10-PCS | Mod: HCNC,CPTII,S$GLB, | Performed by: ORTHOPAEDIC SURGERY

## 2023-02-01 PROCEDURE — 73560 X-RAY EXAM OF KNEE 1 OR 2: CPT | Mod: 26,HCNC,LT, | Performed by: RADIOLOGY

## 2023-02-01 PROCEDURE — 99999 PR PBB SHADOW E&M-EST. PATIENT-LVL II: CPT | Mod: PBBFAC,HCNC,, | Performed by: ORTHOPAEDIC SURGERY

## 2023-02-01 PROCEDURE — 3008F PR BODY MASS INDEX (BMI) DOCUMENTED: ICD-10-PCS | Mod: HCNC,CPTII,S$GLB, | Performed by: ORTHOPAEDIC SURGERY

## 2023-02-01 PROCEDURE — 73562 X-RAY EXAM OF KNEE 3: CPT | Mod: 26,HCNC,RT, | Performed by: RADIOLOGY

## 2023-02-01 PROCEDURE — 99024 POSTOP FOLLOW-UP VISIT: CPT | Mod: HCNC,S$GLB,, | Performed by: ORTHOPAEDIC SURGERY

## 2023-02-01 PROCEDURE — 73560 X-RAY EXAM OF KNEE 1 OR 2: CPT | Mod: TC,HCNC,PO,LT

## 2023-02-01 PROCEDURE — 73560 XR KNEE ORTHO RIGHT: ICD-10-PCS | Mod: 26,HCNC,LT, | Performed by: RADIOLOGY

## 2023-02-01 NOTE — PROGRESS NOTES
Three months post arthroplasty.  Doing well.  No signs of infection.  Patient is instructed to continue with strengthening exercises while being mindful of the inherent limitations of the implant.  Follow up as needed.

## 2023-02-03 NOTE — TELEPHONE ENCOUNTER
Called pt for Repatha refill. $10.35 copay. Pt stated she was in the car and would like a call back for her CC info.

## 2023-02-03 NOTE — TELEPHONE ENCOUNTER
Specialty Pharmacy - Refill Coordination    Specialty Medication Orders Linked to Encounter      Flowsheet Row Most Recent Value   Medication #1 evolocumab (REPATHA SURECLICK) 140 mg/mL PnIj (Order#[id number], Rx#[id number])            Refill Questions - Documented Responses      Flowsheet Row Most Recent Value   Patient Availability and HIPAA Verification    Does patient want to proceed with activity? Yes   HIPAA/medical authority confirmed? Yes   Relationship to patient of person spoken to? Self   Refill Screening Questions    Would patient like to speak to a pharmacist? No   When does the patient need to receive the medication? 02/07/23   Refill Delivery Questions    How will the patient receive the medication? MEDRx   When does the patient need to receive the medication? 02/07/23   Shipping Address Home   Address in [facility] confirmed and updated if neccessary? Yes   Expected Copay ($) 10.35   Is the patient able to afford the medication copay? Yes   Payment Method CC on file   Days supply of Refill 28   Supplies needed? No supplies needed   Refill activity completed? Yes   Refill activity plan Refill scheduled   Shipment/Pickup Date: 02/06/23            Current Outpatient Medications   Medication Sig    acetaminophen (TYLENOL) 500 MG tablet Take 1 tablet (500 mg total) by mouth every 6 (six) hours as needed for Pain (do not exceed 3000 milligrams per 24 h).    alcohol swabs (ALCOHOL WIPES) PadM Uses 5 daily    amantadine HCL (SYMMETREL) 100 mg capsule Take 1 capsule (100 mg total) by mouth 2 (two) times daily.    blood-glucose transmitter (DEXCOM G6 TRANSMITTER) Lizbeth Dispense 1 transmitter    carbidopa-levodopa  mg (SINEMET)  mg per tablet TAKE one and one-half TABLET BY MOUTH THREE TIMES DAILY    cholecalciferol, vitamin D3, 10 mcg (400 unit) Cap Take 1,200 Units by mouth once daily.    cyanocobalamin (VITAMIN B-12) 1000 MCG tablet Take 100 mcg by mouth once daily.    diclofenac sodium  (VOLTAREN) 1 % Gel Apply 2 g topically once daily.    evolocumab (REPATHA SURECLICK) 140 mg/mL PnIj Inject 1 mL (140 mg total) into the skin every 14 (fourteen) days.    fish oil-omega-3 fatty acids 300-1,000 mg capsule Take 4 capsules by mouth once daily.    fluticasone-salmeterol diskus inhaler 250-50 mcg Inhale 1 puff into the lungs 2 times daily.    gabapentin (NEURONTIN) 100 MG capsule Take 1 capsule (100 mg total) by mouth 3 (three) times daily. Takes at night    insulin aspart U-100 (NOVOLOG FLEXPEN U-100 INSULIN) 100 unit/mL (3 mL) InPn pen 30u AC + correction Max  u    lamotrigine2.5% meloxicam 0.09% LIDOcaine2% prilocaine2% topical cream Apply topically 4 (four) times daily as needed (pain).    lancing device Misc Checks bg 7-8 times a day    LANTUS SOLOSTAR U-100 INSULIN glargine 100 units/mL (3mL) SubQ pen INJECT 35 UNITS EVERY MORNING THEN 30 UNITS EVERY NIGHT AT BEDTIME    levothyroxine (SYNTHROID) 150 MCG tablet Take 1 tablet (150 mcg total) by mouth before breakfast.    melatonin 3 mg Tab Take 6 mg by mouth nightly.    montelukast (SINGULAIR) 10 mg tablet take ONE tablet BY MOUTH once DAILY EVERY EVENING    MULTIVITAMIN W-MINERALS/LUTEIN (CENTRUM SILVER ORAL) Take 1 tablet by mouth once daily.     mupirocin calcium 2% nasl oint (BACTROBAN) 2 % Oint by Nasal route 2 (two) times daily.    naproxen sodium (ALEVE) 220 mg Cap Take 220 mg by mouth 2 (two) times daily as needed.    oxyCODONE-acetaminophen (PERCOCET) 5-325 mg per tablet Take 1 tablet by mouth every 4 to 6 hours as needed for Pain.    pramipexole (MIRAPEX) 0.25 MG tablet TAKE ONE-HALF to ONE TABLET BY MOUTH THREE TIMES DAILY as directed    selegiline (ELDEPRYL) 5 mg Cap take ONE capsule BY MOUTH twice daily    simvastatin (ZOCOR) 10 MG tablet TAKE ONE TABLET BY MOUTH ONCE DAILY IN THE EVENING    UNABLE TO FIND Place 0.5 mLs under the tongue 2 (two) times a day. medication name:CBD Oil     Last dose 3 months ago     valsartan-hydrochlorothiazide (DIOVAN-HCT) 320-25 mg per tablet TAKE ONE TABLET BY MOUTH ONCE DAILY    warfarin (COUMADIN) 5 MG tablet Take 1 tablet (5 mg total) by mouth Daily.   Last reviewed on 12/21/2022  9:53 AM by Sabrina Langford LPN    Review of patient's allergies indicates:   Allergen Reactions    Propranolol Nausea And Vomiting     Drops pressure and raised sugar    Lipitor [atorvastatin] Other (See Comments)     Myalgia, muscle pain    Robaxin [methocarbamol]      Skin inside mouth started peeling.    Last reviewed on  2/1/2023 8:52 AM by Sabrina Langford      Tasks added this encounter   2/28/2023 - Refill Call (Auto Added)   Tasks due within next 3 months   No tasks due.     Ofe Sanford, Patient Care Assistant  [facility]  [address]  [facility] [address]  Phone: [phone]  Fax: [phone]

## 2023-02-07 DIAGNOSIS — Z00.00 ENCOUNTER FOR MEDICARE ANNUAL WELLNESS EXAM: ICD-10-CM

## 2023-02-09 DIAGNOSIS — Z00.00 ENCOUNTER FOR MEDICARE ANNUAL WELLNESS EXAM: ICD-10-CM

## 2023-02-15 ENCOUNTER — TELEPHONE (OUTPATIENT)
Dept: NEUROLOGY | Facility: CLINIC | Age: 68
End: 2023-02-15
Payer: MEDICARE

## 2023-02-15 NOTE — TELEPHONE ENCOUNTER
Left a message for the patient to confirm or reschedule appointment on 4/18 if it's not a good day and time for her.

## 2023-02-17 ENCOUNTER — TELEPHONE (OUTPATIENT)
Dept: NEUROLOGY | Facility: CLINIC | Age: 68
End: 2023-02-17
Payer: MEDICARE

## 2023-02-17 NOTE — TELEPHONE ENCOUNTER
Spoke to the patient to let her know there is no sooner appointment available. Patient was advised she was added to the wait list for a sooner appointment.

## 2023-02-17 NOTE — TELEPHONE ENCOUNTER
----- Message from Valerie Guzman sent at 2/17/2023  9:40 AM CST -----  Regarding: Earlier Time and Appt  Contact: Pt @ [phone]  Pt is calling to get a sooner appt and an earlier time for appt. Asking for a call back

## 2023-02-23 ENCOUNTER — PATIENT MESSAGE (OUTPATIENT)
Dept: ENDOCRINOLOGY | Facility: CLINIC | Age: 68
End: 2023-02-23
Payer: MEDICARE

## 2023-02-28 ENCOUNTER — DOCUMENTATION ONLY (OUTPATIENT)
Dept: REHABILITATION | Facility: HOSPITAL | Age: 68
End: 2023-02-28
Payer: MEDICARE

## 2023-02-28 ENCOUNTER — PATIENT MESSAGE (OUTPATIENT)
Dept: PHARMACY | Facility: CLINIC | Age: 68
End: 2023-02-28
Payer: MEDICARE

## 2023-02-28 NOTE — PROGRESS NOTES
Outpatient Therapy Discharge Summary     Name: Jessica Rojas  Clinic Number: [id number]    Therapy Diagnosis:    Chronic pain of right knee Yes    Weakness of both lower extremities      Impaired functional mobility, balance, gait, and endurance      Physician: Jerry Lai MD     Physician Orders: PT Eval and Treat   Medical Diagnosis from Referral: M17.11 (ICD-10-CM) - Osteoarthritis of right knee, unspecified osteoarthritis type Z96.651 (ICD-10-CM) - S/P right unicompartmental knee replacement   Evaluation Date: 12/2/2022      Date of Last visit: 1/25/2023   Total Visits Received: 18  Cancelled Visits: 6  No Show Visits: 1    Assessment    Goals:   Short-Term Goals: 4 weeks   - The patient will be independent with initial home exercise program. - MET  - The patient will increase strength to at least 4+/5 to perform functional mobility. - MET  - The patient will increase PROM of the R knee to 0-105 degrees to perform ambulation with pain < 3/10. - MET     Long-Term Goals: 8 weeks  - Pt to achieve <48% limitation as measured by the FOTO to demonstrate decreased disability. - NOT MET  - The patient will be independent with home exercise program and symptom management. - MET  - The patient will increase strength to at least 5/5 to perform functional mobility. - MET  - The patient will increase AROM of the R knee to 0-110 to perform ambulation with pain < 2/10. - MET  - The patient will be independent amb with no assistive device on all surfaces for community distances. - MET  - Pt to be able to stand for 20 minutes before needing the sit for a break. - NOT MET    Discharge reason: Patient has met all of his/her goals.    Plan   This patient is discharged from Physical Therapy      Collin Chandra, PT, DPT  02/28/2023

## 2023-03-03 ENCOUNTER — PATIENT MESSAGE (OUTPATIENT)
Dept: PHARMACY | Facility: CLINIC | Age: 68
End: 2023-03-03
Payer: MEDICARE

## 2023-03-09 ENCOUNTER — SPECIALTY PHARMACY (OUTPATIENT)
Dept: PHARMACY | Facility: CLINIC | Age: 68
End: 2023-03-09
Payer: MEDICARE

## 2023-03-09 NOTE — TELEPHONE ENCOUNTER
Specialty Pharmacy - Refill Coordination    Specialty Medication Orders Linked to Encounter      Flowsheet Row Most Recent Value   Medication #1 evolocumab (REPATHA SURECLICK) 140 mg/mL PnIj (Order#[id number], Rx#[id number])          Refill Questions - Documented Responses      Flowsheet Row Most Recent Value   Patient Availability and HIPAA Verification    Does patient want to proceed with activity? Unable to Reach          We have had multiple attempts to the patient and have been unsuccessful to reach the patient. We will stop reaching out to the patient but in the event that the patient needs the med and contacts us, we will communicate and begin dispensing for the patient. At your next visit with the patient, please review the importance of being in contact with our specialty pharmacy as a part of our care team.    Interventions added this encounter   Closed: [facility] Provider Intervention - Drug therapy adherence: evolocumab (REPATHA SURECLICK) 140 mg/mL PnJing Aragon, PharmD  [facility]  [address] [place] [id number]  Phone: [phone]  Fax: [phone]

## 2023-03-30 ENCOUNTER — TELEPHONE (OUTPATIENT)
Dept: HEMATOLOGY/ONCOLOGY | Facility: CLINIC | Age: 68
End: 2023-03-30
Payer: MEDICARE

## 2023-03-30 DIAGNOSIS — C7A.090 MALIGNANT CARCINOID TUMOR OF BRONCHUS AND LUNG: Primary | ICD-10-CM

## 2023-04-03 ENCOUNTER — LAB VISIT (OUTPATIENT)
Dept: LAB | Facility: HOSPITAL | Age: 68
End: 2023-04-03
Attending: NURSE PRACTITIONER
Payer: MEDICARE

## 2023-04-03 DIAGNOSIS — E11.21 TYPE 2 DIABETES MELLITUS WITH DIABETIC NEPHROPATHY, WITH LONG-TERM CURRENT USE OF INSULIN: ICD-10-CM

## 2023-04-03 DIAGNOSIS — E78.2 MIXED HYPERLIPIDEMIA: ICD-10-CM

## 2023-04-03 DIAGNOSIS — Z79.4 TYPE 2 DIABETES MELLITUS WITH DIABETIC NEPHROPATHY, WITH LONG-TERM CURRENT USE OF INSULIN: ICD-10-CM

## 2023-04-03 DIAGNOSIS — E89.0 POST-OPERATIVE HYPOTHYROIDISM: ICD-10-CM

## 2023-04-03 LAB
ALBUMIN SERPL BCP-MCNC: 3.9 G/DL (ref 3.5–5.2)
ALP SERPL-CCNC: 95 U/L (ref 55–135)
ALT SERPL W/O P-5'-P-CCNC: 6 U/L (ref 10–44)
ANION GAP SERPL CALC-SCNC: 17 MMOL/L (ref 8–16)
AST SERPL-CCNC: 18 U/L (ref 10–40)
BILIRUB SERPL-MCNC: 0.4 MG/DL (ref 0.1–1)
BUN SERPL-MCNC: 33 MG/DL (ref 8–23)
CALCIUM SERPL-MCNC: 10.1 MG/DL (ref 8.7–10.5)
CHLORIDE SERPL-SCNC: 102 MMOL/L (ref 95–110)
CHOLEST SERPL-MCNC: 152 MG/DL (ref 120–199)
CHOLEST/HDLC SERPL: 2.8 {RATIO} (ref 2–5)
CO2 SERPL-SCNC: 23 MMOL/L (ref 23–29)
CREAT SERPL-MCNC: 1.1 MG/DL (ref 0.5–1.4)
EST. GFR  (NO RACE VARIABLE): 54.4 ML/MIN/1.73 M^2
ESTIMATED AVG GLUCOSE: 154 MG/DL (ref 68–131)
GLUCOSE SERPL-MCNC: 146 MG/DL (ref 70–110)
HBA1C MFR BLD: 7 % (ref 4–5.6)
HDLC SERPL-MCNC: 55 MG/DL (ref 40–75)
HDLC SERPL: 36.2 % (ref 20–50)
LDLC SERPL CALC-MCNC: 62.8 MG/DL (ref 63–159)
NONHDLC SERPL-MCNC: 97 MG/DL
POTASSIUM SERPL-SCNC: 3.8 MMOL/L (ref 3.5–5.1)
PROT SERPL-MCNC: 7.4 G/DL (ref 6–8.4)
SODIUM SERPL-SCNC: 142 MMOL/L (ref 136–145)
TRIGL SERPL-MCNC: 171 MG/DL (ref 30–150)
TSH SERPL DL<=0.005 MIU/L-ACNC: 0.96 UIU/ML (ref 0.4–4)

## 2023-04-03 PROCEDURE — 80053 COMPREHEN METABOLIC PANEL: CPT | Mod: HCNC | Performed by: NURSE PRACTITIONER

## 2023-04-03 PROCEDURE — 84443 ASSAY THYROID STIM HORMONE: CPT | Mod: HCNC | Performed by: NURSE PRACTITIONER

## 2023-04-03 PROCEDURE — 36415 COLL VENOUS BLD VENIPUNCTURE: CPT | Mod: HCNC,PO | Performed by: NURSE PRACTITIONER

## 2023-04-03 PROCEDURE — 83036 HEMOGLOBIN GLYCOSYLATED A1C: CPT | Mod: HCNC | Performed by: NURSE PRACTITIONER

## 2023-04-03 PROCEDURE — 80061 LIPID PANEL: CPT | Mod: HCNC | Performed by: NURSE PRACTITIONER

## 2023-04-10 ENCOUNTER — HOSPITAL ENCOUNTER (OUTPATIENT)
Dept: RADIOLOGY | Facility: HOSPITAL | Age: 68
Discharge: HOME | End: 2023-04-10
Attending: INTERNAL MEDICINE
Payer: MEDICARE

## 2023-04-10 ENCOUNTER — OFFICE VISIT (OUTPATIENT)
Dept: ENDOCRINOLOGY | Facility: CLINIC | Age: 68
End: 2023-04-10
Payer: MEDICARE

## 2023-04-10 VITALS
DIASTOLIC BLOOD PRESSURE: 75 MMHG | HEART RATE: 93 BPM | HEIGHT: 61 IN | BODY MASS INDEX: 38.75 KG/M2 | WEIGHT: 205.25 LBS | SYSTOLIC BLOOD PRESSURE: 128 MMHG

## 2023-04-10 DIAGNOSIS — I70.0 ATHEROSCLEROSIS OF AORTA: ICD-10-CM

## 2023-04-10 DIAGNOSIS — I10 BENIGN ESSENTIAL HTN: ICD-10-CM

## 2023-04-10 DIAGNOSIS — C7A.090 MALIGNANT CARCINOID TUMOR OF BRONCHUS AND LUNG: ICD-10-CM

## 2023-04-10 DIAGNOSIS — Z79.4 TYPE 2 DIABETES MELLITUS WITH DIABETIC NEPHROPATHY, WITH LONG-TERM CURRENT USE OF INSULIN: Primary | ICD-10-CM

## 2023-04-10 DIAGNOSIS — Z78.9 STATIN INTOLERANCE: ICD-10-CM

## 2023-04-10 DIAGNOSIS — E78.2 MIXED HYPERLIPIDEMIA: ICD-10-CM

## 2023-04-10 DIAGNOSIS — E89.0 POST-OPERATIVE HYPOTHYROIDISM: ICD-10-CM

## 2023-04-10 DIAGNOSIS — E11.22 TYPE 2 DIABETES MELLITUS WITH STAGE 3A CHRONIC KIDNEY DISEASE, WITH LONG-TERM CURRENT USE OF INSULIN: ICD-10-CM

## 2023-04-10 DIAGNOSIS — N18.31 TYPE 2 DIABETES MELLITUS WITH STAGE 3A CHRONIC KIDNEY DISEASE, WITH LONG-TERM CURRENT USE OF INSULIN: ICD-10-CM

## 2023-04-10 DIAGNOSIS — E11.21 TYPE 2 DIABETES MELLITUS WITH DIABETIC NEPHROPATHY, WITH LONG-TERM CURRENT USE OF INSULIN: Primary | ICD-10-CM

## 2023-04-10 DIAGNOSIS — Z79.4 TYPE 2 DIABETES MELLITUS WITH STAGE 3A CHRONIC KIDNEY DISEASE, WITH LONG-TERM CURRENT USE OF INSULIN: ICD-10-CM

## 2023-04-10 PROCEDURE — 3051F PR MOST RECENT HEMOGLOBIN A1C LEVEL 7.0 - < 8.0%: ICD-10-PCS | Mod: HCNC,CPTII,S$GLB, | Performed by: NURSE PRACTITIONER

## 2023-04-10 PROCEDURE — 3074F SYST BP LT 130 MM HG: CPT | Mod: HCNC,CPTII,S$GLB, | Performed by: NURSE PRACTITIONER

## 2023-04-10 PROCEDURE — 1126F AMNT PAIN NOTED NONE PRSNT: CPT | Mod: HCNC,CPTII,S$GLB, | Performed by: NURSE PRACTITIONER

## 2023-04-10 PROCEDURE — 99214 OFFICE O/P EST MOD 30 MIN: CPT | Mod: HCNC,S$GLB,, | Performed by: NURSE PRACTITIONER

## 2023-04-10 PROCEDURE — 1101F PT FALLS ASSESS-DOCD LE1/YR: CPT | Mod: HCNC,CPTII,S$GLB, | Performed by: NURSE PRACTITIONER

## 2023-04-10 PROCEDURE — 95251 CONT GLUC MNTR ANALYSIS I&R: CPT | Mod: HCNC,S$GLB,, | Performed by: NURSE PRACTITIONER

## 2023-04-10 PROCEDURE — 3288F FALL RISK ASSESSMENT DOCD: CPT | Mod: HCNC,CPTII,S$GLB, | Performed by: NURSE PRACTITIONER

## 2023-04-10 PROCEDURE — 1101F PR PT FALLS ASSESS DOC 0-1 FALLS W/OUT INJ PAST YR: ICD-10-PCS | Mod: HCNC,CPTII,S$GLB, | Performed by: NURSE PRACTITIONER

## 2023-04-10 PROCEDURE — 3074F PR MOST RECENT SYSTOLIC BLOOD PRESSURE < 130 MM HG: ICD-10-PCS | Mod: HCNC,CPTII,S$GLB, | Performed by: NURSE PRACTITIONER

## 2023-04-10 PROCEDURE — 1159F PR MEDICATION LIST DOCUMENTED IN MEDICAL RECORD: ICD-10-PCS | Mod: HCNC,CPTII,S$GLB, | Performed by: NURSE PRACTITIONER

## 2023-04-10 PROCEDURE — 1159F MED LIST DOCD IN RCRD: CPT | Mod: HCNC,CPTII,S$GLB, | Performed by: NURSE PRACTITIONER

## 2023-04-10 PROCEDURE — 3078F PR MOST RECENT DIASTOLIC BLOOD PRESSURE < 80 MM HG: ICD-10-PCS | Mod: HCNC,CPTII,S$GLB, | Performed by: NURSE PRACTITIONER

## 2023-04-10 PROCEDURE — 3008F BODY MASS INDEX DOCD: CPT | Mod: HCNC,CPTII,S$GLB, | Performed by: NURSE PRACTITIONER

## 2023-04-10 PROCEDURE — 71250 CT THORAX DX C-: CPT | Mod: TC,HCNC,PO

## 2023-04-10 PROCEDURE — 99999 PR PBB SHADOW E&M-EST. PATIENT-LVL II: CPT | Mod: PBBFAC,HCNC,, | Performed by: NURSE PRACTITIONER

## 2023-04-10 PROCEDURE — 3078F DIAST BP <80 MM HG: CPT | Mod: HCNC,CPTII,S$GLB, | Performed by: NURSE PRACTITIONER

## 2023-04-10 PROCEDURE — 3051F HG A1C>EQUAL 7.0%<8.0%: CPT | Mod: HCNC,CPTII,S$GLB, | Performed by: NURSE PRACTITIONER

## 2023-04-10 PROCEDURE — 95251 PR GLUCOSE MONITOR, 72 HOUR, PHYS INTERP: ICD-10-PCS | Mod: HCNC,S$GLB,, | Performed by: NURSE PRACTITIONER

## 2023-04-10 PROCEDURE — 1126F PR PAIN SEVERITY QUANTIFIED, NO PAIN PRESENT: ICD-10-PCS | Mod: HCNC,CPTII,S$GLB, | Performed by: NURSE PRACTITIONER

## 2023-04-10 PROCEDURE — 99999 PR PBB SHADOW E&M-EST. PATIENT-LVL II: ICD-10-PCS | Mod: PBBFAC,HCNC,, | Performed by: NURSE PRACTITIONER

## 2023-04-10 PROCEDURE — 3008F PR BODY MASS INDEX (BMI) DOCUMENTED: ICD-10-PCS | Mod: HCNC,CPTII,S$GLB, | Performed by: NURSE PRACTITIONER

## 2023-04-10 PROCEDURE — 71250 CT CHEST WITHOUT CONTRAST: ICD-10-PCS | Mod: 26,HCNC,, | Performed by: RADIOLOGY

## 2023-04-10 PROCEDURE — 99214 PR OFFICE/OUTPT VISIT, EST, LEVL IV, 30-39 MIN: ICD-10-PCS | Mod: HCNC,S$GLB,, | Performed by: NURSE PRACTITIONER

## 2023-04-10 PROCEDURE — 3288F PR FALLS RISK ASSESSMENT DOCUMENTED: ICD-10-PCS | Mod: HCNC,CPTII,S$GLB, | Performed by: NURSE PRACTITIONER

## 2023-04-10 PROCEDURE — 71250 CT THORAX DX C-: CPT | Mod: 26,HCNC,, | Performed by: RADIOLOGY

## 2023-04-10 RX ORDER — INSULIN GLARGINE 100 [IU]/ML
INJECTION, SOLUTION SUBCUTANEOUS
Qty: 30 ML | Refills: 11
Start: 2023-04-10 | End: 2023-07-17

## 2023-04-10 RX ORDER — TIRZEPATIDE 2.5 MG/.5ML
2.5 INJECTION, SOLUTION SUBCUTANEOUS
Qty: 4 PEN | Refills: 0 | Status: SHIPPED | OUTPATIENT
Start: 2023-04-10 | End: 2023-04-28

## 2023-04-10 NOTE — PROGRESS NOTES
CC: This 67 y.o. female presents for management of diabetes and chronic conditions pending review including HTN, HLP, morbid obesity, hypothyroidism, vitamin d deficiency     HPI: She was diagnosed with T2DM in ~ 2013. She was hospitalized r/t DM in 2016 for DKA.   Family hx of DM: mom, dad, and sisters      Wearing Dexcom G6- see download in Media tab  Time in range: 61%  Hypoglycemia: <3%   pp excursions noted, will adjust her meal insulin dose if bg lower prior to measl  She is having some hypos overnight  Has lost weight      Diet: Eats 3  Meals a day, snacks PB crackers   Breakfast- oatmeal w blueberries or scramble egg w latee daily  Lunch- salad and soup  Afternoon snack- yogurt or tomatoes  Dinner:  veg     Exercise: riding her stationary bike- 10-15 mins a few times throughout the day    CURRENT DM MEDS:  lantus 35 u qam, 30 u qhs; Humalog 30 u AC  + correction 150/25;   Timing prandial insulin 5-15 minutes before meals: before    Vial/pen:  Uses pens  Glucometer type:  Relion 2020    Standards of Care:  Eye exam: + mild DM retinopathy (Dr Macdonald)- Fall 2022     Nodules was found on on a PET scan. FNA 11/25/14 shows adenomatous nodule with cystic changes. S/p total thyroidectomy 5/24/16.       On synthroid 137 mcg once daily. Takes all alone with water 30-60 mins prior to first meal   No hoarseness, voice changes, +dysphagia, no compressive symptoms, or head/neck exposure to XRT.   No personal or FH of thyroid cancer or MEN syndrome.   Not taking biotin.   + tremors of the hands- has Parkinson's   No intolerance to the heat or cold  No hair loss       Being treated for parkinsons. No recent falls     ROS:  Gen: Appetite good,  Weight loss 13 lbs  Eyes: Denies visual disturbances  Resp: no SOB or ESPINAL, no cough  Cardiac: No palpitations, chest pain, trace BLE edema   GI: No nausea or vomiting, diarrhea, constipation, or abdominal pain.  /GYN: 1+ nocturia, no burning or pain.   MS/Neuro: + numbness in her  feet, speech clear,   Psych: Denies drug/ETOH abuse, no hx of depression.  Other systems: negative.    PE:  GENERAL: Well developed, well nourished.  PSYCH: AAOx3, appropriate mood and affect, pleasant expression, conversant, appears relaxed, well groomed.   EYES: Conjunctiva, corneas clear  NECK: Supple, trachea midline   ABDOMEN: Soft, non-tender, non-distended   SKIN:  no acanthosis nigracans.  FOOT EXAMINATION: 10/10/2022    No foot deformity, +Onychomycosis,  no interspace maceration or ulceration noted.  Decreased hair growth present over toes/feet.    Protective sensation intact with 10 gram monofilament.  +2 dorsalis pedis and posterior pulses noted.      Lab Results   Component Value Date    MICALBCREAT 13.5 10/03/2022       Hemoglobin A1C   Date Value Ref Range Status   04/03/2023 7.0 (H) 4.0 - 5.6 % Final     Comment:     ADA Screening Guidelines:  5.7-6.4%  Consistent with prediabetes  >or=6.5%  Consistent with diabetes    High levels of fetal hemoglobin interfere with the HbA1C  assay. Heterozygous hemoglobin variants (HbS, HgC, etc)do  not significantly interfere with this assay.   However, presence of multiple variants may affect accuracy.     10/03/2022 6.7 (H) 4.0 - 5.6 % Final     Comment:     ADA Screening Guidelines:  5.7-6.4%  Consistent with prediabetes  >or=6.5%  Consistent with diabetes    High levels of fetal hemoglobin interfere with the HbA1C  assay. Heterozygous hemoglobin variants (HbS, HgC, etc)do  not significantly interfere with this assay.   However, presence of multiple variants may affect accuracy.     03/10/2022 6.8 (H) 0.0 - 5.6 % Final     Comment:     Reference Interval:  5.0 - 5.6 Normal   5.7 - 6.4 High Risk   > 6.5 Diabetic      Hgb A1c results are standardized based on the (NGSP) National   Glycohemoglobin Standardization Program.      Hemoglobin A1C levels are related to mean serum/plasma glucose   during the preceding 2-3 months.             ASSESSMENT and PLAN:    1.  T2DM with nephropathy-     Change lantus to 35 u qam, 25 u qhs  Humalog 30 U AC + correction  Start mounjaro 2.5 mg weekly   At week 5 increase to 5 mg weekly    No fmh MEN or medullary thyroid cancer  No personal hx of pancreatitis    Reports that her ins will cover for any GAP issues  Upgrade to Dexcom G7- having issues w sensors going out at day 8/9    2. HTN - controlled today, continue meds as previously prescribed and monitor.     3. HLP -  Myalgia with statin, fish oil  2 tabs bid, LDL> 200;  Continue repatha     4. Post Surgical Hypothyroidism for MNG. S/p total thyroidectomy w pathology benign.    Continue levothyroxine 150 mcg daily,clinically euthyroid, lab wnl    5. Morbid obesity - Continue exercise and weight loss;   Body mass index is 38.78 kg/m².    Follow-up: in 3 months with lab prior

## 2023-04-11 ENCOUNTER — SPECIALTY PHARMACY (OUTPATIENT)
Dept: PHARMACY | Facility: CLINIC | Age: 68
End: 2023-04-11
Payer: MEDICARE

## 2023-04-11 NOTE — TELEPHONE ENCOUNTER
Specialty Pharmacy - Refill Coordination    Specialty Medication Orders Linked to Encounter      Flowsheet Row Most Recent Value   Medication #1 evolocumab (REPATHA SURECLICK) 140 mg/mL PnIj (Order#[id number], Rx#[id number])          Patient reported her last dose was at the end of February. She lost track of her doses in March due to surgery. Reviewed missed dose instructions per  labeling, patient will resume Repatha on 4/12.    Refill Questions - Documented Responses      Flowsheet Row Most Recent Value   Patient Availability and HIPAA Verification    Does patient want to proceed with activity? Yes   HIPAA/medical authority confirmed? Yes   Relationship to patient of person spoken to? Self   Refill Screening Questions    Would patient like to speak to a pharmacist? No   When does the patient need to receive the medication? 04/12/23   Refill Delivery Questions    How will the patient receive the medication? MEDRx   When does the patient need to receive the medication? 04/12/23   Shipping Address Home   Address in [facility] confirmed and updated if neccessary? Yes   Expected Copay ($) 10.35   Is the patient able to afford the medication copay? Yes   Payment Method CC on file   Days supply of Refill 28   Supplies needed? No supplies needed   Refill activity completed? Yes   Refill activity plan Refill scheduled   Shipment/Pickup Date: 04/11/23            Current Outpatient Medications   Medication Sig    acetaminophen (TYLENOL) 500 MG tablet Take 1 tablet (500 mg total) by mouth every 6 (six) hours as needed for Pain (do not exceed 3000 milligrams per 24 h).    alcohol swabs (ALCOHOL WIPES) PadM Uses 5 daily    amantadine HCL (SYMMETREL) 100 mg capsule Take 1 capsule (100 mg total) by mouth 2 (two) times daily.    ascorbic acid, vitamin C, (VITAMIN C) 100 MG tablet Take 100 mg by mouth once daily.    carbidopa-levodopa  mg (SINEMET)  mg per tablet TAKE one and one-half TABLET BY MOUTH  THREE TIMES DAILY    cholecalciferol, vitamin D3, 10 mcg (400 unit) Cap Take 1,200 Units by mouth once daily.    cyanocobalamin (VITAMIN B-12) 1000 MCG tablet Take 100 mcg by mouth once daily.    diclofenac sodium (VOLTAREN) 1 % Gel Apply 2 g topically once daily.    evolocumab (REPATHA SURECLICK) 140 mg/mL PnIj Inject 1 mL (140 mg total) into the skin every 14 (fourteen) days.    fish oil-omega-3 fatty acids 300-1,000 mg capsule Take 4 capsules by mouth once daily.    fluticasone-salmeterol diskus inhaler 250-50 mcg Inhale 1 puff into the lungs 2 times daily.    gabapentin (NEURONTIN) 100 MG capsule Take 1 capsule (100 mg total) by mouth 3 (three) times daily. Takes at night    insulin (LANTUS SOLOSTAR U-100 INSULIN) glargine 100 units/mL SubQ pen INJECT 35 UNITS EVERY MORNING THEN 25 UNITS EVERY NIGHT AT BEDTIME    insulin aspart U-100 (NOVOLOG FLEXPEN U-100 INSULIN) 100 unit/mL (3 mL) InPn pen INJECT 30 units SUBCUTANEOUSLY before meals plus correction scale. max 120 units DAILY    lamotrigine2.5% meloxicam 0.09% LIDOcaine2% prilocaine2% topical cream Apply topically 4 (four) times daily as needed (pain).    lancing device Misc Checks bg 7-8 times a day    levothyroxine (SYNTHROID) 150 MCG tablet Take 1 tablet (150 mcg total) by mouth before breakfast.    melatonin 3 mg Tab Take 6 mg by mouth nightly.    montelukast (SINGULAIR) 10 mg tablet take ONE tablet BY MOUTH once DAILY EVERY EVENING (Patient not taking: Reported on 4/10/2023)    MULTIVITAMIN W-MINERALS/LUTEIN (CENTRUM SILVER ORAL) Take 1 tablet by mouth once daily.     mupirocin calcium 2% nasl oint (BACTROBAN) 2 % Oint by Nasal route 2 (two) times daily.    naproxen sodium (ALEVE) 220 mg Cap Take 220 mg by mouth 2 (two) times daily as needed.    oxyCODONE-acetaminophen (PERCOCET) 5-325 mg per tablet Take 1 tablet by mouth every 4 to 6 hours as needed for Pain. (Patient not taking: Reported on 4/10/2023)    pramipexole (MIRAPEX) 0.25 MG tablet TAKE  ONE-HALF to ONE TABLET BY MOUTH THREE TIMES DAILY as directed    selegiline (ELDEPRYL) 5 mg Cap take ONE capsule BY MOUTH twice daily    simvastatin (ZOCOR) 10 MG tablet TAKE ONE TABLET BY MOUTH ONCE DAILY IN THE EVENING (Patient not taking: Reported on 4/10/2023)    tirzepatide (MOUNJARO) 2.5 mg/0.5 mL PnIj Inject 2.5 mg into the skin every 7 days.    UNABLE TO FIND Place 0.5 mLs under the tongue 2 (two) times a day. medication name:CBD Oil     Last dose 3 months ago    valsartan-hydrochlorothiazide (DIOVAN-HCT) 320-25 mg per tablet TAKE ONE TABLET BY MOUTH ONCE DAILY    warfarin (COUMADIN) 5 MG tablet Take 1 tablet (5 mg total) by mouth Daily. (Patient not taking: Reported on 4/10/2023)   Last reviewed on 4/10/2023  8:53 AM by Karin Sawyer    Review of patient's allergies indicates:   Allergen Reactions    Propranolol Nausea And Vomiting     Drops pressure and raised sugar    Lipitor [atorvastatin] Other (See Comments)     Myalgia, muscle pain    Robaxin [methocarbamol]      Skin inside mouth started peeling.    Last reviewed on  4/10/2023 8:49 AM by Karin Sawyer      Tasks added this encounter   5/3/2023 - Refill Call (Auto Added)   Tasks due within next 3 months   No tasks due.     Edwige Noe, PharmD  [facility]  [address] [place] [address]  Phone: [phone]  Fax: [phone]

## 2023-04-17 ENCOUNTER — PATIENT MESSAGE (OUTPATIENT)
Dept: ENDOCRINOLOGY | Facility: CLINIC | Age: 68
End: 2023-04-17
Payer: MEDICARE

## 2023-04-18 ENCOUNTER — OFFICE VISIT (OUTPATIENT)
Dept: NEUROLOGY | Facility: CLINIC | Age: 68
End: 2023-04-18
Payer: MEDICARE

## 2023-04-18 VITALS
WEIGHT: 203.25 LBS | DIASTOLIC BLOOD PRESSURE: 85 MMHG | HEART RATE: 89 BPM | SYSTOLIC BLOOD PRESSURE: 136 MMHG | HEIGHT: 61 IN | BODY MASS INDEX: 38.37 KG/M2

## 2023-04-18 DIAGNOSIS — G20.A1 PARKINSON'S DISEASE: Primary | ICD-10-CM

## 2023-04-18 DIAGNOSIS — R44.3 HALLUCINATION: ICD-10-CM

## 2023-04-18 DIAGNOSIS — K59.00 CONSTIPATION, UNSPECIFIED CONSTIPATION TYPE: ICD-10-CM

## 2023-04-18 DIAGNOSIS — R41.89 SUBJECTIVE MEMORY COMPLAINTS: ICD-10-CM

## 2023-04-18 DIAGNOSIS — R13.10 DYSPHAGIA, UNSPECIFIED TYPE: ICD-10-CM

## 2023-04-18 PROCEDURE — 3051F PR MOST RECENT HEMOGLOBIN A1C LEVEL 7.0 - < 8.0%: ICD-10-PCS | Mod: HCNC,CPTII,S$GLB, | Performed by: NURSE PRACTITIONER

## 2023-04-18 PROCEDURE — 99214 PR OFFICE/OUTPT VISIT, EST, LEVL IV, 30-39 MIN: ICD-10-PCS | Mod: HCNC,S$GLB,, | Performed by: NURSE PRACTITIONER

## 2023-04-18 PROCEDURE — 3075F PR MOST RECENT SYSTOLIC BLOOD PRESS GE 130-139MM HG: ICD-10-PCS | Mod: HCNC,CPTII,S$GLB, | Performed by: NURSE PRACTITIONER

## 2023-04-18 PROCEDURE — 1101F PT FALLS ASSESS-DOCD LE1/YR: CPT | Mod: HCNC,CPTII,S$GLB, | Performed by: NURSE PRACTITIONER

## 2023-04-18 PROCEDURE — 99214 OFFICE O/P EST MOD 30 MIN: CPT | Mod: HCNC,S$GLB,, | Performed by: NURSE PRACTITIONER

## 2023-04-18 PROCEDURE — 3051F HG A1C>EQUAL 7.0%<8.0%: CPT | Mod: HCNC,CPTII,S$GLB, | Performed by: NURSE PRACTITIONER

## 2023-04-18 PROCEDURE — 3288F PR FALLS RISK ASSESSMENT DOCUMENTED: ICD-10-PCS | Mod: HCNC,CPTII,S$GLB, | Performed by: NURSE PRACTITIONER

## 2023-04-18 PROCEDURE — 3079F DIAST BP 80-89 MM HG: CPT | Mod: HCNC,CPTII,S$GLB, | Performed by: NURSE PRACTITIONER

## 2023-04-18 PROCEDURE — 3008F PR BODY MASS INDEX (BMI) DOCUMENTED: ICD-10-PCS | Mod: HCNC,CPTII,S$GLB, | Performed by: NURSE PRACTITIONER

## 2023-04-18 PROCEDURE — 1126F AMNT PAIN NOTED NONE PRSNT: CPT | Mod: HCNC,CPTII,S$GLB, | Performed by: NURSE PRACTITIONER

## 2023-04-18 PROCEDURE — 99999 PR PBB SHADOW E&M-EST. PATIENT-LVL III: CPT | Mod: PBBFAC,HCNC,, | Performed by: NURSE PRACTITIONER

## 2023-04-18 PROCEDURE — 3075F SYST BP GE 130 - 139MM HG: CPT | Mod: HCNC,CPTII,S$GLB, | Performed by: NURSE PRACTITIONER

## 2023-04-18 PROCEDURE — 99999 PR PBB SHADOW E&M-EST. PATIENT-LVL III: ICD-10-PCS | Mod: PBBFAC,HCNC,, | Performed by: NURSE PRACTITIONER

## 2023-04-18 PROCEDURE — 1126F PR PAIN SEVERITY QUANTIFIED, NO PAIN PRESENT: ICD-10-PCS | Mod: HCNC,CPTII,S$GLB, | Performed by: NURSE PRACTITIONER

## 2023-04-18 PROCEDURE — 3008F BODY MASS INDEX DOCD: CPT | Mod: HCNC,CPTII,S$GLB, | Performed by: NURSE PRACTITIONER

## 2023-04-18 PROCEDURE — 3079F PR MOST RECENT DIASTOLIC BLOOD PRESSURE 80-89 MM HG: ICD-10-PCS | Mod: HCNC,CPTII,S$GLB, | Performed by: NURSE PRACTITIONER

## 2023-04-18 PROCEDURE — 3288F FALL RISK ASSESSMENT DOCD: CPT | Mod: HCNC,CPTII,S$GLB, | Performed by: NURSE PRACTITIONER

## 2023-04-18 PROCEDURE — 1101F PR PT FALLS ASSESS DOC 0-1 FALLS W/OUT INJ PAST YR: ICD-10-PCS | Mod: HCNC,CPTII,S$GLB, | Performed by: NURSE PRACTITIONER

## 2023-04-18 RX ORDER — CARBIDOPA AND LEVODOPA 25; 100 MG/1; MG/1
TABLET ORAL
Qty: 90 TABLET | Refills: 11 | Status: SHIPPED | OUTPATIENT
Start: 2023-04-18

## 2023-04-18 RX ORDER — AMANTADINE HYDROCHLORIDE 100 MG/1
100 CAPSULE, GELATIN COATED ORAL 2 TIMES DAILY
Qty: 60 CAPSULE | Refills: 11 | Status: SHIPPED | OUTPATIENT
Start: 2023-04-18

## 2023-04-18 NOTE — PROGRESS NOTES
Name: Jessica Rojas  MRN: [id number]   CSN: [id number]      Date: 4-18-23      Referring physician:  No referring provider defined for this encounter.    Subjective:      Chief Complaint: Parkinson's disease     History of Present Illness (HPI):    Jessica Rojas is a 67 y.o. right-handed female with hx of neck cancer 2014 who presents today for a follow-up evaluation of Parkinson's Disease (L tremor 2018, dyskinesia 2019)and is accompanied by . Last seen in office 2-14-22. She was stable at that time and meds cont without change. Was to Follow up with VV in 6 mos.     She had knee surgery 11/2022.       Stiffer  More watchful where she steps but falls    More issue with swallowing. Avoids lettuce.  Raw veggies are troublesome.   Overnight oatmeal was alarming this morning.    Most of her pills go down without trouble.     Dexterity is a little worse  Trouble opening bottles and lids.   Writiig is worse. Starts out ok but gets smaller and squiggly as she writes.     Had temor about 1.5 to 2 mos ago, but this seems to have stopped.     Quit amantadine after back surgery 3- as caused halluciantions. When started feeling better few mos ago, started it back. Moves better with it and helps tremor. Tremor is more on the left than right.   No return of hallucinations.     After had back surgery, dizziness.     Takes mylanta, metamucil or miralax for constipation. Eats cabbage and spinach.  Tried recipe for constipation, did not help. Prune juice makes nauseated.   She will have BM as long as she takes something eveyrday.  says she needs emena about every 2 days.     Had swallow study. Told me what to do. Dont really feel like it is any worse just more that may cause issues now.     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 Sees that doc this week. Had scan. Still have nodules nut not growing.       0500- 0600 AM (takes on empty stomach)   Carbidopa/levodopa 1 tab     0800  Selegiline   Mirapex    Amantadine     Lunch (usually around 12-1)  Carbidopa/levodopa-1   Mirapex      5 PM  amantadine     1900  Carbidopa/levodopa- 1  Selegiline  Mirapex      has not noted any change in her. She feel this is because she hides it well.     Seh is feeling better from back and knee,   She is not taking any pain pill, other than tylenol.     Review of Systems   HENT:  Positive for trouble swallowing.    Respiratory:  Positive for cough (at night, since had neck cancer).    Gastrointestinal:  Positive for constipation.   Neurological:  Negative for dizziness and tremors.   Psychiatric/Behavioral:  Positive for sleep disturbance (can sleep 5-6 hours and then awakes- does take little naps during the day). Negative for dysphoric mood. The patient is not nervous/anxious.      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    Social History: The patient  reports that she has never smoked. She has never used smokeless tobacco. She reports that she does not drink alcohol and does not use drugs.    Family History: Their family history includes Alcohol abuse in her sister; Breast cancer in her cousin, paternal aunt, paternal aunt, and paternal grandmother; Cancer in her maternal aunt, maternal  aunt, and maternal grandmother; Diabetes in her father, mother, and sister; Glaucoma in her mother; Heart disease in her father and mother; Hypertension in her father and mother; Macular degeneration in her sister.    Allergies: Propranolol, Lipitor [atorvastatin], and Robaxin [methocarbamol]     Meds:   Current Outpatient Medications on File Prior to Visit   Medication Sig Dispense Refill    acetaminophen (TYLENOL) 500 MG tablet Take 1 tablet (500 mg total) by mouth every 6 (six) hours as needed for Pain (do not exceed 3000 milligrams per 24 h).  0    alcohol swabs (ALCOHOL WIPES) PadM Uses 5 daily 400 each 4    ascorbic acid, vitamin C, (VITAMIN C) 100 MG tablet Take 100 mg by mouth once daily.      cholecalciferol, vitamin D3, 10 mcg (400 unit) Cap Take 1,200 Units by mouth once daily.      cyanocobalamin (VITAMIN B-12) 1000 MCG tablet Take 100 mcg by mouth once daily.      evolocumab (REPATHA SURECLICK) 140 mg/mL PnIj Inject 1 mL (140 mg total) into the skin every 14 (fourteen) days. 2 mL 12    fluticasone-salmeterol diskus inhaler 250-50 mcg Inhale 1 puff into the lungs 2 times daily. 30 each 11    gabapentin (NEURONTIN) 100 MG capsule Take 1 capsule (100 mg total) by mouth 3 (three) times daily. Takes at night 90 capsule 11    insulin (LANTUS SOLOSTAR U-100 INSULIN) glargine 100 units/mL SubQ pen INJECT 35 UNITS EVERY MORNING THEN 25 UNITS EVERY NIGHT AT BEDTIME 30 mL 11    insulin aspart U-100 (NOVOLOG FLEXPEN U-100 INSULIN) 100 unit/mL (3 mL) InPn pen INJECT 30 units SUBCUTANEOUSLY before meals plus correction scale. max 120 units DAILY 120 mL 4    lamotrigine2.5% meloxicam 0.09% LIDOcaine2% prilocaine2% topical cream Apply topically 4 (four) times daily as needed (pain). 200 g 3    lancing device Misc Checks bg 7-8 times a day 1 each 0    levothyroxine (SYNTHROID) 150 MCG tablet Take 1 tablet (150 mcg total) by mouth before breakfast. 30 tablet 11    MULTIVITAMIN W-MINERALS/LUTEIN (CENTRUM SILVER ORAL) Take  "1 tablet by mouth once daily.       mupirocin calcium 2% nasl oint (BACTROBAN) 2 % Oint by Nasal route 2 (two) times daily.      pramipexole (MIRAPEX) 0.25 MG tablet TAKE ONE-HALF to ONE TABLET BY MOUTH THREE TIMES DAILY as directed 90 tablet 11    selegiline (ELDEPRYL) 5 mg Cap take ONE capsule BY MOUTH twice daily 60 capsule 10    tirzepatide (MOUNJARO) 2.5 mg/0.5 mL PnIj Inject 2.5 mg into the skin every 7 days. 4 pen 0    UNABLE TO FIND Place 0.5 mLs under the tongue 2 (two) times a day. medication name:CBD Oil     Last dose 3 months ago      valsartan-hydrochlorothiazide (DIOVAN-HCT) 320-25 mg per tablet TAKE ONE TABLET BY MOUTH ONCE DAILY 90 tablet 1    diclofenac sodium (VOLTAREN) 1 % Gel Apply 2 g topically once daily. 100 g 0    fish oil-omega-3 fatty acids 300-1,000 mg capsule Take 4 capsules by mouth once daily.      melatonin 3 mg Tab Take 6 mg by mouth nightly.      montelukast (SINGULAIR) 10 mg tablet take ONE tablet BY MOUTH once DAILY EVERY EVENING 30 tablet 11    naproxen sodium (ALEVE) 220 mg Cap Take 220 mg by mouth 2 (two) times daily as needed.      oxyCODONE-acetaminophen (PERCOCET) 5-325 mg per tablet Take 1 tablet by mouth every 4 to 6 hours as needed for Pain. 42 tablet 0    simvastatin (ZOCOR) 10 MG tablet TAKE ONE TABLET BY MOUTH ONCE DAILY IN THE EVENING 30 tablet 5    warfarin (COUMADIN) 5 MG tablet Take 1 tablet (5 mg total) by mouth Daily. 30 tablet 0     No current facility-administered medications on file prior to visit.       Objective:     Physical Exam:    Vitals:    04/18/23 1402   BP: 136/85   Pulse: 89   Weight: 92.2 kg (203 lb 4.2 oz)   Height: 5' 1" (1.549 m)     Body mass index is 38.41 kg/m².    Constitutional  Well-developed, well-nourished, appears stated age   Cardiovascular  no LE edema bilaterally     ..III.  MOTOR EXAMINATION - last dose was 0630- forgot afternoon dose as was coming here, exam time 1500       Speech  1 - Slight loss of expression, dictation, and/or " "volumn.   Facial Expression  1 - Minimal Hypomimia, could be normal "Poker Face".   Tremor at Rest:      Face, lips, chin 0 - Absent.    Hands:      right 0 - Absent.    left 0 - Absent.    Feet:      right 1 - Slight and infrequently present.    left 1 - Slight and infrequently present.    Action or Postural Tremor of Hands      right 0 - Absent.    left 1 - Slight; present with action.   Rigidity      Neck 1 - Slight or detectable only when activated by mirror or other movements.   Upper Extremity: Right 2 - Mild or moderate.   Upper Extremity: Left 1 - Slight or detectable only when activated by mirror or other movements.   Lower Extremity: Right 1 +- Slight or detectable only when activated by mirror or other movements.   Lower Extremity: Left 1 - Slight or detectable only when activated by mirror or other movements.   Finger Taps      right 1 - Mild slowing and/or reduction in amplitude.   left 1 - Mild slowing and/or reduction in amplitude.   Hand Movements      right 0 - Normal.   left 1 - Mild slowing and/or reduction in amplitude.   Rapid Alternating Movements of Hands      right 0 - Normal.   left 1 - Mild slowing and/or reduction in amplitude.   Leg Agility      right 0 - Normal.   left 1 - Mild slowing and/or reduction in amplitude.   Arising from Chair  1 - Slow; or may need more than one attempt.   Posture  1 - Not quite erect, slightly stooped posture; could be normal for older person.   Gait  1 - Walks slowly, may shuffle with short steps, but no festination (hastening steps) or propulsion.reduced left arm swing   Postural Stability (Response to sudden, strong posterior displacement produced by pull on shoulders while patient erect with eyes open and feet slightly apart. Patient is prepared, and can have had some practice runs.)  deferred   Body Bradykinesia and Hypokinesia (Combining slowness, hesitancy, decreased armswing, small amplitude, and poverty of movement in general)  1 - Minimal slowness, " giving movement a deliberate character; could be normal for some persons. Possibly reduced amplitude.         Laboratory Results:  Lab Visit on 04/03/2023   Component Date Value Ref Range Status    TSH 04/03/2023 0.963  0.400 - 4.000 uIU/mL Final    Hemoglobin A1C 04/03/2023 7.0 (H)  4.0 - 5.6 % Final    Estimated Avg Glucose 04/03/2023 154 (H)  68 - 131 mg/dL Final    Sodium 04/03/2023 142  136 - 145 mmol/L Final    Potassium 04/03/2023 3.8  3.5 - 5.1 mmol/L Final    Chloride 04/03/2023 102  95 - 110 mmol/L Final    CO2 04/03/2023 23  23 - 29 mmol/L Final    Glucose 04/03/2023 146 (H)  70 - 110 mg/dL Final    BUN 04/03/2023 33 (H)  8 - 23 mg/dL Final    Creatinine 04/03/2023 1.1  0.5 - 1.4 mg/dL Final    Calcium 04/03/2023 10.1  8.7 - 10.5 mg/dL Final    Total Protein 04/03/2023 7.4  6.0 - 8.4 g/dL Final    Albumin 04/03/2023 3.9  3.5 - 5.2 g/dL Final    Total Bilirubin 04/03/2023 0.4  0.1 - 1.0 mg/dL Final    Alkaline Phosphatase 04/03/2023 95  55 - 135 U/L Final    AST 04/03/2023 18  10 - 40 U/L Final    ALT 04/03/2023 6 (L)  10 - 44 U/L Final    Anion Gap 04/03/2023 17 (H)  8 - 16 mmol/L Final    eGFR 04/03/2023 54.4 (A)  >60 mL/min/1.73 m^2 Final    Cholesterol 04/03/2023 152  120 - 199 mg/dL Final    Triglycerides 04/03/2023 171 (H)  30 - 150 mg/dL Final    HDL 04/03/2023 55  40 - 75 mg/dL Final    LDL Cholesterol 04/03/2023 62.8 (L)  63.0 - 159.0 mg/dL Final    HDL/Cholesterol Ratio 04/03/2023 36.2  20.0 - 50.0 % Final    Total Cholesterol/HDL Ratio 04/03/2023 2.8  2.0 - 5.0 Final    Non-HDL Cholesterol 04/03/2023 97  mg/dL Final           Imaging:   Results for orders placed or performed during the hospital encounter of 04/02/22   CT Head Without Contrast    Narrative    EXAMINATION:  CT HEAD WITHOUT CONTRAST    CLINICAL HISTORY:  Fall    TECHNIQUE:  Axial CT images were obtained through the head without contrast.  Coronal and sagittal reformations were also performed.  Total .  Automated exposure  control utilized.    COMPARISON:  MR brain 08/04/2017    FINDINGS:  No hemorrhage, mass effect or CT evidence of acute cortical infarction.  White-matter hypodensities are nonspecific but likely related to small vessel ischemic disease.  Ventricles and sulci are proportionate.    No abnormal extra-axial fluid collections.    No hyperdense artery or vein.  Intracranial arteries are partially calcified.    No skull fracture or aggressive osseous process.  Visualized paranasal sinuses and mastoid air cells are clear.      Impression    No skull fracture or acute intracranial findings.      Electronically signed by: Rik Smith MD  Date:    04/02/2022  Time:    17:39   Results for orders placed or performed during the hospital encounter of 08/04/17   MRI Brain W WO Contrast    Narrative    Exam: MRI of the brain without and with contrast    Comparison: None.    Technique: Multiplanar MR imaging of the brain was performed both before and following the intravenous administration of 9 cc of Gadavist contrast material.    Findings:     The brain is normally formed. No evidence of acute/recent major vascular distribution cerebral infarction, intraparenchymal hemorrhage, or intra-axial space occupying lesion.     There are multiple foci of t2/FLAIR signal hyperintensity present within the periventricular white matter of the corona radiata and centrum semiovale in a pattern which suggests chronic microvascular ischemic change.  There is age-appropriate cerebral volume more with associated compensatory enlargement of the ventricular system and widening of the CSF spaces over both cerebral convexities.    The ventricular system is normal in appearance for age. No extra-axial fluid collections or blood products. No abnormal dural enhancement or enhancing masses. No enhancing vascular malformations.     The skull base flow-voids are unremarkable. The sella and parasellar regions show no significant abnormality. No Chiari  malformation.  There are prominent arachnoid granulations within the bony calvarium near the vertex as an anatomic variant.      There is mucoperiosteal thickening observed within the ethmoid air cells.  The mastoid air cells are unremarkable.  There is leftward bony nasal septal deviation..    Impression         1. No acute intracranial abnormality.    2.  Age-appropriate cerebral white loss and chronic microvascular ischemic changes within the periventricular white matter.    3.  Minimal ethmoid sinus disease.    4.  Leftward bony nasal septal deviation.      Electronically signed by: Brady Martinez MD  Date:     08/04/17  Time:    10:59      *Note: Due to a large number of results and/or encounters for the requested time period, some results have not been displayed. A complete set of results can be found in Results Review.           Assessment and Plan     Parkinson's disease  -     carbidopa-levodopa  mg (SINEMET)  mg per tablet; Take 1 tablet three times daily  Dispense: 90 tablet; Refill: 11  -     amantadine HCL (SYMMETREL) 100 mg capsule; Take 1 capsule (100 mg total) by mouth 2 (two) times daily.  Dispense: 60 capsule; Refill: 11    Dysphagia, unspecified type    Constipation, unspecified constipation type    Hallucination  Comments:  after surgery - stopped amantadine just in case, restarted w/o return of hallucinations    Subjective memory complaints        Medical Decision Making:    Cont meds without change for now.   Watch amantadine. Stopped after surgery  as had hallucinations. Likely from surgery than amantadine. Restarted without return of hallucinations. Amantadine definitely helps with moving and tremor.     Offered Neuropsychological evaluation. If decides she wants to do, let me know. This can be done on NS.     Watch swallowing. Does not feel worse than last swallow study. If problems increase, let me know.     Discussed constipation.     Follow up in person one  year. VV in 6 mos.     ..Total time: 37 minutes spent on the encounter, which includes face to face time and non-face to face time preparing to see the patient (eg, review of tests), Obtaining and/or reviewing separately obtained history, Documenting clinical information in the electronic or other health record, Independently interpreting results (not separately reported) and communicating results to the patient/family/caregiver, or Care coordination (not separately reported).       Virgie Solis, CHAMP, NP-C  Division of Movement and Memory Disorders  [facility]  [phone]

## 2023-04-19 ENCOUNTER — PATIENT MESSAGE (OUTPATIENT)
Dept: ADMINISTRATIVE | Facility: HOSPITAL | Age: 68
End: 2023-04-19
Payer: MEDICARE

## 2023-04-19 NOTE — PROGRESS NOTES
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  She had yearly bouts of this until last year when she had a CT scan done which showed a right hilar mass, 3.4 cm.  A bronchoscopy was done showing an obstructive tumor in the right bronchus intermedius.  Pathology was initially read as possible small cell.  Re-review here shows and atypical carcinoid, intermediate grade, well differentiated with Ki-67 of 4%.   She underwent a right middle and right lower bilobectomy on 8/7/14.  She was found to have a T2a, N0, M0 typical carcinoid.    Interval history:  - she presents for a follow-up appointment for her bronchial carcinoid tumor.  - she underwent ct scan on 4/10/23.  - today, she endorses wheezing/coughing which had started about 6 months. She has an episode maybe 2-3 times per week.        Past 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    Malignant carcinoid tumor of the bronchus and lung 5/2014    Mediastinal lymphadenopathy 8/26/2015    Obesity, morbid 5/6/2014    Parkinsons 10/2017    Pituitary  adenoma 's    took parladel for 3 yrs    Pneumonia     Postoperative hypothyroidism 2017    - s/p total thyroidectomy in 2016 (parathyroids intact) with post op hypothyroidism; on synthroid    Pulmonary nodules 2018    Thyroid disease     Wheeze 2014       Past Surgical HIstory:   Past Surgical History:   Procedure Laterality Date    ADENOIDECTOMY      APPENDECTOMY      BREAST CYST ASPIRATION      BRONCHOSCOPY  2014, 2014     SECTION  , 1986    x2    COLONOSCOPY N/A 2021    Procedure: COLONOSCOPY;  Surgeon: Khadar Bernardo MD;  Location: [facility];  Service: Endoscopy;  Laterality: N/A;    COLONOSCOPY N/A 2021    Procedure: COLONOSCOPY;  Surgeon: Frank Lamb MD;  Location: [facility] ([address] [place] [address]);  Service: Endoscopy;  Laterality: N/A;  MD Joselin Feliciano MA  Caller: Unspecified (Yesterday,  2:54 PM)  Need colonoscopy with EMR for large (30 mm) ascending colon polyp. 90 minutes. Main. Clear liquid diet for 24 hour patient with inadequate prep last time.   Thanks!   Ed Campa MD     COLONOSCOPY N/A 2022    Procedure: COLONOSCOPY;  Surgeon: Frank Lamb MD;  Location: [facility];  Service: Endoscopy;  Laterality: N/A;  2 day prep w/suprep  instructions via portal-SC    EPIDURAL STEROID INJECTION INTO LUMBAR SPINE N/A 2022    Procedure: Injection-steroid-epidural-lumbar L5/S1;  Surgeon: Bebeto Eldridge MD;  Location: [facility] OR;  Service: Pain Management;  Laterality: N/A;    ESOPHAGOGASTRODUODENOSCOPY N/A 2021    Procedure: EGD (ESOPHAGOGASTRODUODENOSCOPY);  Surgeon: Khadar Bernardo MD;  Location: [facility];  Service: Endoscopy;  Laterality: N/A;    EYE SURGERY      cataract bilat    LUNG REMOVAL, PARTIAL Right 2014    with 17 lymph nodes; right middle and lower    MINIMALLY INVASIVE TRANSFORAMINAL LUMBAR INTERBODY FUSION (TLIF) N/A 2022    Procedure: FUSION, SPINE, LUMBAR, TLIF, MINIMALLY INVASIVE RIGHT  L4-5 TLIF, BILATERAL L4-5 LAMINECTOMY AND POSTERIOR INSTRUMENTED FUSION;  Surgeon: Ranjeet Bragg MD;  Location: [facility] OR;  Service: Neurosurgery;  Laterality: N/A;    SPINE SURGERY  2022 3-    THYROIDECTOMY  05/24/2016    TONSILLECTOMY  1969    TUBAL LIGATION  1986 1986       Family History:   Family History   Problem Relation Age of Onset    Diabetes Mother     Glaucoma Mother     Heart disease Mother     Hypertension Mother     Diabetes Father     Heart disease Father     Hypertension Father     Diabetes Sister     Macular degeneration Sister     Alcohol abuse Sister     Cancer Maternal Aunt         breast    Cancer Maternal Aunt         unknown    Breast cancer Paternal Aunt     Breast cancer Paternal Aunt     Cancer Maternal Grandmother         unknown    Breast cancer Paternal Grandmother     Breast cancer Cousin     Amblyopia Neg Hx     Blindness Neg Hx     Cataracts Neg Hx     Retinal detachment Neg Hx     Stroke Neg Hx     Strabismus Neg Hx     Thyroid disease Neg Hx        Social History:  reports that she has never smoked. She has never used smokeless tobacco. She reports that she does not drink alcohol and does not use drugs.    Allergies:  Allergies   Allergen Reactions    Propranolol Nausea And Vomiting     Drops pressure and raised sugar    Lipitor [Atorvastatin] Other (See Comments)     Myalgia, muscle pain       Medications:  Current Outpatient Medications   Medication Sig Dispense Refill    acetaminophen (TYLENOL) 500 MG tablet Take 1 tablet (500 mg total) by mouth every 6 (six) hours as needed for Pain (do not exceed 3000 milligrams per 24 h).  0    alcohol swabs (ALCOHOL WIPES) PadM Uses 5 daily 400 each 4    amantadine HCL (SYMMETREL) 100 mg capsule Take 1 capsule (100 mg total) by mouth 2 (two) times daily. 60 capsule 11    ascorbic acid, vitamin C, (VITAMIN C) 100 MG tablet Take 100 mg by mouth once daily.      carbidopa-levodopa  mg (SINEMET)  mg per tablet Take 1  tablet three times daily 90 tablet 11    cholecalciferol, vitamin D3, 10 mcg (400 unit) Cap Take 1,200 Units by mouth once daily.      cyanocobalamin (VITAMIN B-12) 1000 MCG tablet Take 100 mcg by mouth once daily.      diclofenac sodium (VOLTAREN) 1 % Gel Apply 2 g topically once daily. 100 g 0    evolocumab (REPATHA SURECLICK) 140 mg/mL PnIj Inject 1 mL (140 mg total) into the skin every 14 (fourteen) days. 2 mL 12    fish oil-omega-3 fatty acids 300-1,000 mg capsule Take 4 capsules by mouth once daily.      fluticasone-salmeterol diskus inhaler 250-50 mcg Inhale 1 puff into the lungs 2 times daily. 30 each 11    gabapentin (NEURONTIN) 100 MG capsule Take 1 capsule (100 mg total) by mouth 3 (three) times daily. Takes at night 90 capsule 11    insulin (LANTUS SOLOSTAR U-100 INSULIN) glargine 100 units/mL SubQ pen INJECT 35 UNITS EVERY MORNING THEN 25 UNITS EVERY NIGHT AT BEDTIME 30 mL 11    insulin aspart U-100 (NOVOLOG FLEXPEN U-100 INSULIN) 100 unit/mL (3 mL) InPn pen INJECT 30 units SUBCUTANEOUSLY before meals plus correction scale. max 120 units DAILY 120 mL 4    lamotrigine2.5% meloxicam 0.09% LIDOcaine2% prilocaine2% topical cream Apply topically 4 (four) times daily as needed (pain). 200 g 3    lancing device Misc Checks bg 7-8 times a day 1 each 0    levothyroxine (SYNTHROID) 150 MCG tablet Take 1 tablet (150 mcg total) by mouth before breakfast. 30 tablet 11    melatonin 3 mg Tab Take 6 mg by mouth nightly.      montelukast (SINGULAIR) 10 mg tablet take ONE tablet BY MOUTH once DAILY EVERY EVENING 30 tablet 11    MULTIVITAMIN W-MINERALS/LUTEIN (CENTRUM SILVER ORAL) Take 1 tablet by mouth once daily.       mupirocin calcium 2% nasl oint (BACTROBAN) 2 % Oint by Nasal route 2 (two) times daily.      naproxen sodium (ALEVE) 220 mg Cap Take 220 mg by mouth 2 (two) times daily as needed.      oxyCODONE-acetaminophen (PERCOCET) 5-325 mg per tablet Take 1 tablet by mouth every 4 to 6 hours as needed for Pain. 42  tablet 0    pramipexole (MIRAPEX) 0.25 MG tablet TAKE ONE-HALF to ONE TABLET BY MOUTH THREE TIMES DAILY as directed 90 tablet 11    selegiline (ELDEPRYL) 5 mg Cap take ONE capsule BY MOUTH twice daily 60 capsule 10    simvastatin (ZOCOR) 10 MG tablet TAKE ONE TABLET BY MOUTH ONCE DAILY IN THE EVENING 30 tablet 5    tirzepatide (MOUNJARO) 2.5 mg/0.5 mL PnIj Inject 2.5 mg into the skin every 7 days. 4 pen 0    UNABLE TO FIND Place 0.5 mLs under the tongue 2 (two) times a day. medication name:CBD Oil     Last dose 3 months ago      valsartan-hydrochlorothiazide (DIOVAN-HCT) 320-25 mg per tablet TAKE ONE TABLET BY MOUTH ONCE DAILY 90 tablet 1    warfarin (COUMADIN) 5 MG tablet Take 1 tablet (5 mg total) by mouth Daily. 30 tablet 0     No current facility-administered medications for this visit.     Review of Systems   Constitutional:  Negative for appetite change, chills, fatigue, fever and unexpected weight change.        No flushing   HENT:  Negative for congestion, hearing loss, nosebleeds and postnasal drip.    Eyes:  Negative for visual disturbance.   Respiratory:  Positive for cough and wheezing. Negative for shortness of breath.    Cardiovascular:  Negative for chest pain and palpitations.   Gastrointestinal:  Negative for abdominal pain, blood in stool, constipation, diarrhea, nausea and vomiting.   Genitourinary:  Negative for dysuria and frequency.   Musculoskeletal:  Negative for back pain and gait problem.        Right-sided chest wall pain   Skin:  Negative for color change and rash.   Neurological:  Negative for dizziness, tremors, weakness and headaches.   Hematological:  Negative for adenopathy. Does not bruise/bleed easily.   Psychiatric/Behavioral:  Negative for confusion. The patient is not nervous/anxious.      ECOG Performance Status: 0     Objective:      Vitals:   Vitals:    04/20/23 1101   BP: (!) 130/58   BP Location: Right arm   Patient Position: Sitting   BP Method: Large (Automatic)   Pulse:  "84   Resp: 18   SpO2: 97%   Weight: 92 kg (202 lb 13.2 oz)   Height: 5' 1" (1.549 m)     BMI: Body mass index is 38.32 kg/m².    Physical Exam  Constitutional:       General: She is not in acute distress.     Appearance: She is well-developed. She is not ill-appearing, toxic-appearing or diaphoretic.   HENT:      Head: Normocephalic.   Neck:      Thyroid: No thyromegaly.      Trachea: No tracheal deviation.   Pulmonary:      Effort: Pulmonary effort is normal. No respiratory distress.      Breath sounds: No wheezing.   Skin:     Coloration: Skin is not jaundiced.   Neurological:      Mental Status: She is alert and oriented to person, place, and time.   Psychiatric:         Mood and Affect: Mood normal.         Behavior: Behavior normal.         Thought Content: Thought content normal.         Judgment: Judgment normal.       Laboratory Data:   Labs have been reviewed.    Lab Results   Component Value Date    WBC 12.91 (H) 04/02/2022    HGB 13.6 04/02/2022    HCT 39.8 04/02/2022    MCV 92 04/02/2022     04/02/2022                 FINAL PATHOLOGIC DIAGNOSIS 8/7/14  1. Right fifth rib (demineralized), biopsy:  Bone with trilineage bone marrow.  Negative for neoplasm.    2. Right pleura, partial pleurectomy:  Pleural tissue with congested vasculature and no evidence of neoplasm.    3. Right lung, right middle and lower lobes, lobectomies:  Typical carcinoid tumor, multifocal with 2 lesions, measuring 4.5 cm and 0.5 cm in greatest dimension  respectively.  Mitotic index: 1 mitosis seen in 10 high powered fields.  No evidence of necrosis or significant nuclear pleomorphism are seen.  Ki-67 proliferation index: 4% of tumor cells staining.  Bronchial and vascular margins are negative for malignancy.  17 benign lymph nodes (0/17).  Separate lesonal area consists of lung with necrotizing graulomatous inflammation and calcified granulomata.  Special stains for mycobacterial and fungi are completed on the granulomatous " lesion and are negative for fungal  and mycobacterial organisms with satisfactory positive control.  See synoptic report in comment section for further details.     4. Right lung lymph node, station 4, biopsy:  Fibroadipose tissue, negative for lymph nodes.  No evidence of neoplasm.    5. Right lung lymph nodes, station for alpha, biopsy:  Two (2) lymph nodes, negative for neoplasm (0/2).  Comment: Synoptic report  Specimen: Lung, right middle and lower lobes  Specimen integrity: Intact  Specimen laterality: Right  Tumor site: Right bronchus intermedius  Tumor size:  Lesion#1: 4.5 x 4 x 2.5 cm  Lesion#2: 0.5 cm in greatest dimension.  Tumor focality: Multifocal  Histologic Type:  Typical carcinoid tumor  Visceral Pleural Invasion: Not identified  Tumor Extension: Tumor involves the bronchus intermedius  Margins: Bronchial & Vasular margins are uninvolved by tumor  Distance of tumor from closest bronchial margin: 0.2 cm.  Treatment effect: Unknown  Lymphovascular invasion: Not identified.  Pathologic Staging:  Primary tumor:  pT2a: Tumor greater than 3 cm but 5 cm or less in greatest dimension without evidence of invasion into the main  bronchus  Regional lymph nodes:  pN0: No regional lymph node metastasis  Number examined: 16  Number involved: 0  Distant metastasis:  pMX: Cannot be assessed.  Note: Immunohistochemical stain results:  AE1/AE3: Negative in tumor cells.  Chromogranin staining:  Intensity Positive cells (0%)  0: 0%  1+: 10%  2+: 85%  3+: 5%  Synaptophysin staining:  Intensity Positive cells (0%)  0: 0%  1+: 0%  2+: 0%  3+: 100%  CD31: 44 vessels per 1 HPF  Factor VIII: 69 vessels per 1 HPF  Ki-67: 4% cells staining  Note: This tumor has only 1 mitotic figure in 10 high power fields, no evidence of necrosis and fairly bland  appearing nulei with salt and pepper chromatin pattern and no significant nuclear pleomorphism. This is best  categorized as a typical carcinoid tumor.  All immunostains have  "satisfactory positive and negative controls.    IMAGING:  CT chest (4/10/23): I have personally reviewed the images  1. There is no detrimental change in the appearance of the chest in this patient who has previously undergone right middle lobectomy.  There are multiple bilateral solid pulmonary nodules measuring less than 7 mm.  No nodule has increased in size and there are no definite new nodules.  These nodules are also unchanged compared to an earlier study dated 02/09/2021.  2. Atherosclerosis.  3. Probable gallbladder sludge.  4. Prior granulomatous disease.  5. Interval left breast biopsy with unchanged right retro areolar right breast mass         Assessment:       1. Malignant carcinoid tumor of bronchus and lung    2. Severe obesity (BMI 35.0-39.9) with comorbidity    3. Wheezing    4. Bronchitis, chronic obstructive           Plan:       Carcinoid tumor of lung  - I have reviewed her chart. [facility]  - She had a completely resected node negative grade 2 well-differentiated bronchial carcinoid resected on 8/7/14.  - followed with surveillance since then.  - clinically,  - CT chest (4/10/23): "no detrimental change in the appearance of the chest in this patient who has previously undergone right middle lobectomy."  2.  She is not having  significant change in upper respiratory symptoms.  I would move her next follow-up imaging to 1 year.  If she develops any changes in her respiratory signs or symptoms, she knows to contact our office and we can move up her evaluation accordingly.    2. Chronic bronchitis / wheezing  - she denies the diagnosis of chronic bronchitis, but she uses inhalers irregularly.  - I offered to refer to pulmonology, but she declined.  - continue to monitor    3. Severe obesity  - Body mass index is 38.32 kg/m².  - I encouraged weight loss    - return to clinic in one year    Lito Mayer M.D.  Hematology/Oncology  [facility] [place]  [address] [place] [address], [address]  [place] [address]  Phone: [phone]  Fax: [phone]

## 2023-04-20 ENCOUNTER — OFFICE VISIT (OUTPATIENT)
Dept: HEMATOLOGY/ONCOLOGY | Facility: CLINIC | Age: 68
End: 2023-04-20
Payer: MEDICARE

## 2023-04-20 VITALS
RESPIRATION RATE: 18 BRPM | HEART RATE: 84 BPM | SYSTOLIC BLOOD PRESSURE: 130 MMHG | BODY MASS INDEX: 38.29 KG/M2 | DIASTOLIC BLOOD PRESSURE: 58 MMHG | WEIGHT: 202.81 LBS | OXYGEN SATURATION: 97 % | HEIGHT: 61 IN

## 2023-04-20 DIAGNOSIS — E66.01 SEVERE OBESITY (BMI 35.0-39.9) WITH COMORBIDITY: ICD-10-CM

## 2023-04-20 DIAGNOSIS — R06.2 WHEEZING: ICD-10-CM

## 2023-04-20 DIAGNOSIS — J44.89 BRONCHITIS, CHRONIC OBSTRUCTIVE: ICD-10-CM

## 2023-04-20 DIAGNOSIS — C7A.090 MALIGNANT CARCINOID TUMOR OF BRONCHUS AND LUNG: Primary | ICD-10-CM

## 2023-04-20 PROCEDURE — 3078F PR MOST RECENT DIASTOLIC BLOOD PRESSURE < 80 MM HG: ICD-10-PCS | Mod: HCNC,CPTII,S$GLB, | Performed by: INTERNAL MEDICINE

## 2023-04-20 PROCEDURE — 99499 RISK ADDL DX/OHS AUDIT: ICD-10-PCS | Mod: HCNC,S$GLB,, | Performed by: INTERNAL MEDICINE

## 2023-04-20 PROCEDURE — 99204 OFFICE O/P NEW MOD 45 MIN: CPT | Mod: HCNC,S$GLB,, | Performed by: INTERNAL MEDICINE

## 2023-04-20 PROCEDURE — 1159F MED LIST DOCD IN RCRD: CPT | Mod: HCNC,CPTII,S$GLB, | Performed by: INTERNAL MEDICINE

## 2023-04-20 PROCEDURE — 99499 UNLISTED E&M SERVICE: CPT | Mod: HCNC,S$GLB,, | Performed by: INTERNAL MEDICINE

## 2023-04-20 PROCEDURE — 99999 PR PBB SHADOW E&M-EST. PATIENT-LVL V: CPT | Mod: PBBFAC,HCNC,, | Performed by: INTERNAL MEDICINE

## 2023-04-20 PROCEDURE — 3008F BODY MASS INDEX DOCD: CPT | Mod: HCNC,CPTII,S$GLB, | Performed by: INTERNAL MEDICINE

## 2023-04-20 PROCEDURE — 1160F RVW MEDS BY RX/DR IN RCRD: CPT | Mod: HCNC,CPTII,S$GLB, | Performed by: INTERNAL MEDICINE

## 2023-04-20 PROCEDURE — 3008F PR BODY MASS INDEX (BMI) DOCUMENTED: ICD-10-PCS | Mod: HCNC,CPTII,S$GLB, | Performed by: INTERNAL MEDICINE

## 2023-04-20 PROCEDURE — 3288F PR FALLS RISK ASSESSMENT DOCUMENTED: ICD-10-PCS | Mod: HCNC,CPTII,S$GLB, | Performed by: INTERNAL MEDICINE

## 2023-04-20 PROCEDURE — 99999 PR PBB SHADOW E&M-EST. PATIENT-LVL V: ICD-10-PCS | Mod: PBBFAC,HCNC,, | Performed by: INTERNAL MEDICINE

## 2023-04-20 PROCEDURE — 3051F PR MOST RECENT HEMOGLOBIN A1C LEVEL 7.0 - < 8.0%: ICD-10-PCS | Mod: HCNC,CPTII,S$GLB, | Performed by: INTERNAL MEDICINE

## 2023-04-20 PROCEDURE — 3075F SYST BP GE 130 - 139MM HG: CPT | Mod: HCNC,CPTII,S$GLB, | Performed by: INTERNAL MEDICINE

## 2023-04-20 PROCEDURE — 99204 PR OFFICE/OUTPT VISIT, NEW, LEVL IV, 45-59 MIN: ICD-10-PCS | Mod: HCNC,S$GLB,, | Performed by: INTERNAL MEDICINE

## 2023-04-20 PROCEDURE — 1160F PR REVIEW ALL MEDS BY PRESCRIBER/CLIN PHARMACIST DOCUMENTED: ICD-10-PCS | Mod: HCNC,CPTII,S$GLB, | Performed by: INTERNAL MEDICINE

## 2023-04-20 PROCEDURE — 3075F PR MOST RECENT SYSTOLIC BLOOD PRESS GE 130-139MM HG: ICD-10-PCS | Mod: HCNC,CPTII,S$GLB, | Performed by: INTERNAL MEDICINE

## 2023-04-20 PROCEDURE — 1126F AMNT PAIN NOTED NONE PRSNT: CPT | Mod: HCNC,CPTII,S$GLB, | Performed by: INTERNAL MEDICINE

## 2023-04-20 PROCEDURE — 1126F PR PAIN SEVERITY QUANTIFIED, NO PAIN PRESENT: ICD-10-PCS | Mod: HCNC,CPTII,S$GLB, | Performed by: INTERNAL MEDICINE

## 2023-04-20 PROCEDURE — 1101F PT FALLS ASSESS-DOCD LE1/YR: CPT | Mod: HCNC,CPTII,S$GLB, | Performed by: INTERNAL MEDICINE

## 2023-04-20 PROCEDURE — 1101F PR PT FALLS ASSESS DOC 0-1 FALLS W/OUT INJ PAST YR: ICD-10-PCS | Mod: HCNC,CPTII,S$GLB, | Performed by: INTERNAL MEDICINE

## 2023-04-20 PROCEDURE — 3051F HG A1C>EQUAL 7.0%<8.0%: CPT | Mod: HCNC,CPTII,S$GLB, | Performed by: INTERNAL MEDICINE

## 2023-04-20 PROCEDURE — 3078F DIAST BP <80 MM HG: CPT | Mod: HCNC,CPTII,S$GLB, | Performed by: INTERNAL MEDICINE

## 2023-04-20 PROCEDURE — 3288F FALL RISK ASSESSMENT DOCD: CPT | Mod: HCNC,CPTII,S$GLB, | Performed by: INTERNAL MEDICINE

## 2023-04-20 PROCEDURE — 1159F PR MEDICATION LIST DOCUMENTED IN MEDICAL RECORD: ICD-10-PCS | Mod: HCNC,CPTII,S$GLB, | Performed by: INTERNAL MEDICINE

## 2023-04-24 ENCOUNTER — TELEPHONE (OUTPATIENT)
Dept: ADMINISTRATIVE | Facility: HOSPITAL | Age: 68
End: 2023-04-24
Payer: MEDICARE

## 2023-04-28 ENCOUNTER — PATIENT MESSAGE (OUTPATIENT)
Dept: ENDOCRINOLOGY | Facility: CLINIC | Age: 68
End: 2023-04-28
Payer: MEDICARE

## 2023-04-28 ENCOUNTER — TELEPHONE (OUTPATIENT)
Dept: ENDOCRINOLOGY | Facility: CLINIC | Age: 68
End: 2023-04-28
Payer: MEDICARE

## 2023-04-28 DIAGNOSIS — E11.21 TYPE 2 DIABETES MELLITUS WITH DIABETIC NEPHROPATHY, WITH LONG-TERM CURRENT USE OF INSULIN: Primary | ICD-10-CM

## 2023-04-28 DIAGNOSIS — Z79.4 TYPE 2 DIABETES MELLITUS WITH DIABETIC NEPHROPATHY, WITH LONG-TERM CURRENT USE OF INSULIN: Primary | ICD-10-CM

## 2023-04-28 RX ORDER — TIRZEPATIDE 5 MG/.5ML
5 INJECTION, SOLUTION SUBCUTANEOUS
Qty: 4 PEN | Refills: 3 | Status: SHIPPED | OUTPATIENT
Start: 2023-04-28 | End: 2023-07-17

## 2023-05-02 ENCOUNTER — SPECIALTY PHARMACY (OUTPATIENT)
Dept: PHARMACY | Facility: CLINIC | Age: 68
End: 2023-05-02
Payer: MEDICARE

## 2023-05-02 NOTE — TELEPHONE ENCOUNTER
Specialty Pharmacy - Refill Coordination    Specialty Medication Orders Linked to Encounter      Flowsheet Row Most Recent Value   Medication #1 evolocumab (REPATHA SURECLICK) 140 mg/mL PnIj (Order#[id number], Rx#[id number])            Refill Questions - Documented Responses      Flowsheet Row Most Recent Value   Patient Availability and HIPAA Verification    Does patient want to proceed with activity? Yes   HIPAA/medical authority confirmed? Yes   Relationship to patient of person spoken to? Self   Refill Screening Questions    Would patient like to speak to a pharmacist? No   When does the patient need to receive the medication? 05/11/23   Refill Delivery Questions    How will the patient receive the medication? MEDRx   When does the patient need to receive the medication? 05/11/23   Shipping Address Home   Address in [facility] confirmed and updated if neccessary? Yes   Expected Copay ($) 0   Is the patient able to afford the medication copay? Yes   Payment Method zero copay   Days supply of Refill 28   Supplies needed? No supplies needed   Refill activity completed? Yes   Refill activity plan Refill scheduled   Shipment/Pickup Date: 05/09/23            Current Outpatient Medications   Medication Sig    acetaminophen (TYLENOL) 500 MG tablet Take 1 tablet (500 mg total) by mouth every 6 (six) hours as needed for Pain (do not exceed 3000 milligrams per 24 h).    alcohol swabs (ALCOHOL WIPES) PadM Uses 5 daily    amantadine HCL (SYMMETREL) 100 mg capsule Take 1 capsule (100 mg total) by mouth 2 (two) times daily.    ascorbic acid, vitamin C, (VITAMIN C) 100 MG tablet Take 100 mg by mouth once daily.    carbidopa-levodopa  mg (SINEMET)  mg per tablet Take 1 tablet three times daily    cholecalciferol, vitamin D3, 10 mcg (400 unit) Cap Take 1,200 Units by mouth once daily.    cyanocobalamin (VITAMIN B-12) 1000 MCG tablet Take 100 mcg by mouth once daily.    diclofenac sodium (VOLTAREN) 1 % Gel Apply  2 g topically once daily.    evolocumab (REPATHA SURECLICK) 140 mg/mL PnIj Inject 1 mL (140 mg total) into the skin every 14 (fourteen) days.    fish oil-omega-3 fatty acids 300-1,000 mg capsule Take 4 capsules by mouth once daily.    fluticasone-salmeterol diskus inhaler 250-50 mcg Inhale 1 puff into the lungs 2 times daily.    gabapentin (NEURONTIN) 100 MG capsule Take 1 capsule (100 mg total) by mouth 3 (three) times daily. Takes at night    insulin (LANTUS SOLOSTAR U-100 INSULIN) glargine 100 units/mL SubQ pen INJECT 35 UNITS EVERY MORNING THEN 25 UNITS EVERY NIGHT AT BEDTIME    insulin aspart U-100 (NOVOLOG FLEXPEN U-100 INSULIN) 100 unit/mL (3 mL) InPn pen INJECT 30 units SUBCUTANEOUSLY before meals plus correction scale. max 120 units DAILY    lamotrigine2.5% meloxicam 0.09% LIDOcaine2% prilocaine2% topical cream Apply topically 4 (four) times daily as needed (pain).    lancing device Misc Checks bg 7-8 times a day    levothyroxine (SYNTHROID) 150 MCG tablet Take 1 tablet (150 mcg total) by mouth before breakfast.    melatonin 3 mg Tab Take 6 mg by mouth nightly.    montelukast (SINGULAIR) 10 mg tablet take ONE tablet BY MOUTH once DAILY EVERY EVENING    MULTIVITAMIN W-MINERALS/LUTEIN (CENTRUM SILVER ORAL) Take 1 tablet by mouth once daily.     mupirocin calcium 2% nasl oint (BACTROBAN) 2 % Oint by Nasal route 2 (two) times daily.    naproxen sodium (ALEVE) 220 mg Cap Take 220 mg by mouth 2 (two) times daily as needed.    oxyCODONE-acetaminophen (PERCOCET) 5-325 mg per tablet Take 1 tablet by mouth every 4 to 6 hours as needed for Pain.    pramipexole (MIRAPEX) 0.25 MG tablet TAKE ONE-HALF to ONE TABLET BY MOUTH THREE TIMES DAILY as directed    selegiline (ELDEPRYL) 5 mg Cap take ONE capsule BY MOUTH twice daily    simvastatin (ZOCOR) 10 MG tablet TAKE ONE TABLET BY MOUTH ONCE DAILY IN THE EVENING    tirzepatide (MOUNJARO) 5 mg/0.5 mL PnIj Inject 5 mg into the skin every 7 days.    UNABLE TO FIND Place 0.5  mLs under the tongue 2 (two) times a day. medication name:CBD Oil     Last dose 3 months ago    valsartan-hydrochlorothiazide (DIOVAN-HCT) 320-25 mg per tablet TAKE ONE TABLET BY MOUTH ONCE DAILY    warfarin (COUMADIN) 5 MG tablet Take 1 tablet (5 mg total) by mouth Daily.   Last reviewed on 4/20/2023 11:04 AM by Lito Mayer MD    Review of patient's allergies indicates:   Allergen Reactions    Propranolol Nausea And Vomiting     Drops pressure and raised sugar    Lipitor [atorvastatin] Other (See Comments)     Myalgia, muscle pain    Robaxin [methocarbamol]      Skin inside mouth started peeling.    Last reviewed on  4/28/2023 9:08 AM by Daniela Sauer      Tasks added this encounter   No tasks added.   Tasks due within next 3 months   No tasks due.     Edwige Noe, PharmD  [facility]  [address] [facility] [address]  Phone: [phone]  Fax: [phone]

## 2023-05-30 ENCOUNTER — SPECIALTY PHARMACY (OUTPATIENT)
Dept: PHARMACY | Facility: CLINIC | Age: 68
End: 2023-05-30
Payer: MEDICARE

## 2023-05-30 NOTE — TELEPHONE ENCOUNTER
Specialty Pharmacy - Refill Coordination    Specialty Medication Orders Linked to Encounter      Flowsheet Row Most Recent Value   Medication #1 evolocumab (REPATHA SURECLICK) 140 mg/mL PnIj (Order#[id number], Rx#[id number])            Refill Questions - Documented Responses      Flowsheet Row Most Recent Value   Patient Availability and HIPAA Verification    Does patient want to proceed with activity? Yes   HIPAA/medical authority confirmed? Yes   Relationship to patient of person spoken to? Self   Refill Screening Questions    Would patient like to speak to a pharmacist? No   When does the patient need to receive the medication? 06/08/23   Refill Delivery Questions    How will the patient receive the medication? [facility]   When does the patient need to receive the medication? 06/08/23   Shipping Address Home   Address in [facility] confirmed and updated if neccessary? Yes   Expected Copay ($) 10.35   Is the patient able to afford the medication copay? Yes   Payment Method CC on file   Days supply of Refill 28   Supplies needed? No supplies needed   Refill activity completed? Yes   Refill activity plan Refill scheduled   Shipment/Pickup Date: 06/06/23            Current Outpatient Medications   Medication Sig    acetaminophen (TYLENOL) 500 MG tablet Take 1 tablet (500 mg total) by mouth every 6 (six) hours as needed for Pain (do not exceed 3000 milligrams per 24 h).    alcohol swabs (ALCOHOL WIPES) PadM Uses 5 daily    amantadine HCL (SYMMETREL) 100 mg capsule Take 1 capsule (100 mg total) by mouth 2 (two) times daily.    ascorbic acid, vitamin C, (VITAMIN C) 100 MG tablet Take 100 mg by mouth once daily.    carbidopa-levodopa  mg (SINEMET)  mg per tablet Take 1 tablet three times daily    cholecalciferol, vitamin D3, 10 mcg (400 unit) Cap Take 1,200 Units by mouth once daily.    cyanocobalamin (VITAMIN B-12) 1000 MCG tablet Take 100 mcg by mouth once daily.    diclofenac sodium (VOLTAREN) 1 % Gel  Apply 2 g topically once daily.    evolocumab (REPATHA SURECLICK) 140 mg/mL PnIj Inject 1 mL (140 mg total) into the skin every 14 (fourteen) days.    fish oil-omega-3 fatty acids 300-1,000 mg capsule Take 4 capsules by mouth once daily.    fluticasone-salmeterol diskus inhaler 250-50 mcg Inhale 1 puff into the lungs 2 times daily.    gabapentin (NEURONTIN) 100 MG capsule Take 1 capsule (100 mg total) by mouth 3 (three) times daily. Takes at night    insulin (LANTUS SOLOSTAR U-100 INSULIN) glargine 100 units/mL SubQ pen INJECT 35 UNITS EVERY MORNING THEN 25 UNITS EVERY NIGHT AT BEDTIME    insulin aspart U-100 (NOVOLOG FLEXPEN U-100 INSULIN) 100 unit/mL (3 mL) InPn pen INJECT 30 units SUBCUTANEOUSLY before meals plus correction scale. max 120 units DAILY    lamotrigine2.5% meloxicam 0.09% LIDOcaine2% prilocaine2% topical cream Apply topically 4 (four) times daily as needed (pain).    lancing device Misc Checks bg 7-8 times a day    levothyroxine (SYNTHROID) 150 MCG tablet Take 1 tablet (150 mcg total) by mouth before breakfast.    melatonin 3 mg Tab Take 6 mg by mouth nightly.    montelukast (SINGULAIR) 10 mg tablet take ONE tablet BY MOUTH once DAILY EVERY EVENING    MULTIVITAMIN W-MINERALS/LUTEIN (CENTRUM SILVER ORAL) Take 1 tablet by mouth once daily.     mupirocin calcium 2% nasl oint (BACTROBAN) 2 % Oint by Nasal route 2 (two) times daily.    naproxen sodium (ALEVE) 220 mg Cap Take 220 mg by mouth 2 (two) times daily as needed.    oxyCODONE-acetaminophen (PERCOCET) 5-325 mg per tablet Take 1 tablet by mouth every 4 to 6 hours as needed for Pain.    pramipexole (MIRAPEX) 0.25 MG tablet TAKE ONE-HALF to ONE TABLET BY MOUTH THREE TIMES DAILY as directed    selegiline (ELDEPRYL) 5 mg Cap take ONE capsule BY MOUTH twice daily    simvastatin (ZOCOR) 10 MG tablet TAKE ONE TABLET BY MOUTH ONCE DAILY IN THE EVENING    tirzepatide (MOUNJARO) 5 mg/0.5 mL PnIj Inject 5 mg into the skin every 7 days.    UNABLE TO FIND Place  0.5 mLs under the tongue 2 (two) times a day. medication name:CBD Oil     Last dose 3 months ago    valsartan-hydrochlorothiazide (DIOVAN-HCT) 320-25 mg per tablet TAKE ONE TABLET BY MOUTH ONCE DAILY    warfarin (COUMADIN) 5 MG tablet Take 1 tablet (5 mg total) by mouth Daily.   Last reviewed on 4/20/2023 11:04 AM by Lito Mayer MD    Review of patient's allergies indicates:   Allergen Reactions    Propranolol Nausea And Vomiting     Drops pressure and raised sugar    Lipitor [atorvastatin] Other (See Comments)     Myalgia, muscle pain    Robaxin [methocarbamol]      Skin inside mouth started peeling.    Last reviewed on  4/28/2023 9:08 AM by Daniela Sauer      Tasks added this encounter   No tasks added.   Tasks due within next 3 months   5/30/2023 - Refill Coordination Outreach (1 time occurrence)     Ofe Sanford, Patient Care Assistant  [facility]  [address]  [facility] [address]  Phone: [phone]  Fax: [phone]

## 2023-05-31 ENCOUNTER — OFFICE VISIT (OUTPATIENT)
Dept: FAMILY MEDICINE | Facility: CLINIC | Age: 68
End: 2023-05-31
Payer: MEDICARE

## 2023-05-31 VITALS
HEIGHT: 61 IN | HEART RATE: 89 BPM | WEIGHT: 190 LBS | BODY MASS INDEX: 35.87 KG/M2 | DIASTOLIC BLOOD PRESSURE: 80 MMHG | OXYGEN SATURATION: 97 % | SYSTOLIC BLOOD PRESSURE: 128 MMHG

## 2023-05-31 DIAGNOSIS — R91.8 PULMONARY NODULES: ICD-10-CM

## 2023-05-31 DIAGNOSIS — N18.31 STAGE 3A CHRONIC KIDNEY DISEASE: ICD-10-CM

## 2023-05-31 DIAGNOSIS — M54.50 CHRONIC BILATERAL LOW BACK PAIN WITHOUT SCIATICA: ICD-10-CM

## 2023-05-31 DIAGNOSIS — M54.16 LUMBAR RADICULOPATHY: ICD-10-CM

## 2023-05-31 DIAGNOSIS — N18.31 TYPE 2 DIABETES MELLITUS WITH STAGE 3A CHRONIC KIDNEY DISEASE, WITH LONG-TERM CURRENT USE OF INSULIN: ICD-10-CM

## 2023-05-31 DIAGNOSIS — G89.29 CHRONIC BILATERAL LOW BACK PAIN WITHOUT SCIATICA: ICD-10-CM

## 2023-05-31 DIAGNOSIS — E11.22 TYPE 2 DIABETES MELLITUS WITH STAGE 3A CHRONIC KIDNEY DISEASE, WITH LONG-TERM CURRENT USE OF INSULIN: ICD-10-CM

## 2023-05-31 DIAGNOSIS — Z85.110 HISTORY OF MALIGNANT CARCINOID TUMOR OF BRONCHUS AND LUNG: ICD-10-CM

## 2023-05-31 DIAGNOSIS — Z98.1 S/P LUMBAR FUSION: ICD-10-CM

## 2023-05-31 DIAGNOSIS — Z00.00 ENCOUNTER FOR MEDICARE ANNUAL WELLNESS EXAM: Primary | ICD-10-CM

## 2023-05-31 DIAGNOSIS — Z13.820 OSTEOPOROSIS SCREENING: ICD-10-CM

## 2023-05-31 DIAGNOSIS — I10 BENIGN ESSENTIAL HTN: ICD-10-CM

## 2023-05-31 DIAGNOSIS — E55.9 VITAMIN D DEFICIENCY: ICD-10-CM

## 2023-05-31 DIAGNOSIS — E89.0 POST-OPERATIVE HYPOTHYROIDISM: ICD-10-CM

## 2023-05-31 DIAGNOSIS — Z79.4 TYPE 2 DIABETES MELLITUS WITH STAGE 3A CHRONIC KIDNEY DISEASE, WITH LONG-TERM CURRENT USE OF INSULIN: ICD-10-CM

## 2023-05-31 DIAGNOSIS — G25.2 RESTING TREMOR: ICD-10-CM

## 2023-05-31 DIAGNOSIS — G20.A1 PARKINSON'S DISEASE: ICD-10-CM

## 2023-05-31 DIAGNOSIS — E04.1 NODULAR THYROID DISEASE: ICD-10-CM

## 2023-05-31 DIAGNOSIS — I70.0 ATHEROSCLEROSIS OF AORTA: ICD-10-CM

## 2023-05-31 DIAGNOSIS — Z78.0 ASYMPTOMATIC MENOPAUSAL STATE: ICD-10-CM

## 2023-05-31 DIAGNOSIS — E78.2 MIXED HYPERLIPIDEMIA: ICD-10-CM

## 2023-05-31 DIAGNOSIS — Z90.2 S/P LOBECTOMY OF LUNG: ICD-10-CM

## 2023-05-31 PROBLEM — Z01.810 PREOP CARDIOVASCULAR EXAM: Status: RESOLVED | Noted: 2022-03-08 | Resolved: 2023-05-31

## 2023-05-31 PROBLEM — R50.9 FEVER: Status: RESOLVED | Noted: 2022-06-11 | Resolved: 2023-05-31

## 2023-05-31 PROCEDURE — G0439 PR MEDICARE ANNUAL WELLNESS SUBSEQUENT VISIT: ICD-10-PCS | Mod: S$GLB,,, | Performed by: NURSE PRACTITIONER

## 2023-05-31 PROCEDURE — 1101F PR PT FALLS ASSESS DOC 0-1 FALLS W/OUT INJ PAST YR: ICD-10-PCS | Mod: CPTII,S$GLB,, | Performed by: NURSE PRACTITIONER

## 2023-05-31 PROCEDURE — 1170F FXNL STATUS ASSESSED: CPT | Mod: CPTII,S$GLB,, | Performed by: NURSE PRACTITIONER

## 2023-05-31 PROCEDURE — 99999 PR PBB SHADOW E&M-EST. PATIENT-LVL V: ICD-10-PCS | Mod: PBBFAC,,, | Performed by: NURSE PRACTITIONER

## 2023-05-31 PROCEDURE — 3288F FALL RISK ASSESSMENT DOCD: CPT | Mod: CPTII,S$GLB,, | Performed by: NURSE PRACTITIONER

## 2023-05-31 PROCEDURE — 3008F PR BODY MASS INDEX (BMI) DOCUMENTED: ICD-10-PCS | Mod: CPTII,S$GLB,, | Performed by: NURSE PRACTITIONER

## 2023-05-31 PROCEDURE — 3079F DIAST BP 80-89 MM HG: CPT | Mod: CPTII,S$GLB,, | Performed by: NURSE PRACTITIONER

## 2023-05-31 PROCEDURE — 3288F PR FALLS RISK ASSESSMENT DOCUMENTED: ICD-10-PCS | Mod: CPTII,S$GLB,, | Performed by: NURSE PRACTITIONER

## 2023-05-31 PROCEDURE — 1170F PR FUNCTIONAL STATUS ASSESSED: ICD-10-PCS | Mod: CPTII,S$GLB,, | Performed by: NURSE PRACTITIONER

## 2023-05-31 PROCEDURE — 1101F PT FALLS ASSESS-DOCD LE1/YR: CPT | Mod: CPTII,S$GLB,, | Performed by: NURSE PRACTITIONER

## 2023-05-31 PROCEDURE — 1160F PR REVIEW ALL MEDS BY PRESCRIBER/CLIN PHARMACIST DOCUMENTED: ICD-10-PCS | Mod: CPTII,S$GLB,, | Performed by: NURSE PRACTITIONER

## 2023-05-31 PROCEDURE — 3051F HG A1C>EQUAL 7.0%<8.0%: CPT | Mod: CPTII,S$GLB,, | Performed by: NURSE PRACTITIONER

## 2023-05-31 PROCEDURE — 3074F PR MOST RECENT SYSTOLIC BLOOD PRESSURE < 130 MM HG: ICD-10-PCS | Mod: CPTII,S$GLB,, | Performed by: NURSE PRACTITIONER

## 2023-05-31 PROCEDURE — 1159F MED LIST DOCD IN RCRD: CPT | Mod: CPTII,S$GLB,, | Performed by: NURSE PRACTITIONER

## 2023-05-31 PROCEDURE — 3074F SYST BP LT 130 MM HG: CPT | Mod: CPTII,S$GLB,, | Performed by: NURSE PRACTITIONER

## 2023-05-31 PROCEDURE — 99999 PR PBB SHADOW E&M-EST. PATIENT-LVL V: CPT | Mod: PBBFAC,,, | Performed by: NURSE PRACTITIONER

## 2023-05-31 PROCEDURE — 1160F RVW MEDS BY RX/DR IN RCRD: CPT | Mod: CPTII,S$GLB,, | Performed by: NURSE PRACTITIONER

## 2023-05-31 PROCEDURE — 3008F BODY MASS INDEX DOCD: CPT | Mod: CPTII,S$GLB,, | Performed by: NURSE PRACTITIONER

## 2023-05-31 PROCEDURE — 3051F PR MOST RECENT HEMOGLOBIN A1C LEVEL 7.0 - < 8.0%: ICD-10-PCS | Mod: CPTII,S$GLB,, | Performed by: NURSE PRACTITIONER

## 2023-05-31 PROCEDURE — 1159F PR MEDICATION LIST DOCUMENTED IN MEDICAL RECORD: ICD-10-PCS | Mod: CPTII,S$GLB,, | Performed by: NURSE PRACTITIONER

## 2023-05-31 PROCEDURE — G0439 PPPS, SUBSEQ VISIT: HCPCS | Mod: S$GLB,,, | Performed by: NURSE PRACTITIONER

## 2023-05-31 PROCEDURE — 3079F PR MOST RECENT DIASTOLIC BLOOD PRESSURE 80-89 MM HG: ICD-10-PCS | Mod: CPTII,S$GLB,, | Performed by: NURSE PRACTITIONER

## 2023-05-31 NOTE — PROGRESS NOTES
"  Jessica Rojas presented for a  Medicare AWV and comprehensive Health Risk Assessment today. The following components were reviewed and updated:    Medical history  Family History  Social history  Allergies and Current Medications  Health Risk Assessment  Health Maintenance  Care Team     ** See Completed Assessments for Annual Wellness Visit within the encounter summary.**     The following assessments were completed:  Living Situation  CAGE  Depression Screening  Timed Get Up and Go  Whisper Test  Cognitive Function Screening  Nutrition Screening  ADL Screening  PAQ Screening    Vitals:    05/31/23 1039   BP: 128/80   BP Location: Right arm   Pulse: 89   SpO2: 97%   Weight: 86.2 kg (190 lb)   Height: 5' 1" (1.549 m)     Body mass index is 35.9 kg/m².  Physical Exam  Vitals reviewed.   Constitutional:       General: She is not in acute distress.     Appearance: Normal appearance. She is not ill-appearing.   HENT:      Head: Normocephalic and atraumatic.      Right Ear: External ear normal.      Left Ear: External ear normal.      Nose: Nose normal.   Eyes:      Extraocular Movements: Extraocular movements intact.      Conjunctiva/sclera: Conjunctivae normal.   Cardiovascular:      Rate and Rhythm: Normal rate.      Heart sounds: Normal heart sounds.   Pulmonary:      Effort: Pulmonary effort is normal. No respiratory distress.      Breath sounds: Normal breath sounds.   Abdominal:      General: Abdomen is flat. There is no distension.   Musculoskeletal:         General: Normal range of motion.      Cervical back: Normal range of motion.   Skin:     General: Skin is warm and dry.      Capillary Refill: Capillary refill takes less than 2 seconds.      Coloration: Skin is not pale.      Findings: No rash.   Neurological:      General: No focal deficit present.      Mental Status: She is alert and oriented to person, place, and time. Mental status is at baseline.      Motor: Weakness present.      Gait: Gait abnormal. "   Psychiatric:         Mood and Affect: Mood normal.         Speech: Speech normal. Speech is not rapid and pressured, delayed or slurred.         Behavior: Behavior normal. Behavior is not agitated, slowed, aggressive, withdrawn, hyperactive or combative. Behavior is cooperative.         Thought Content: Thought content normal.         Judgment: Judgment normal.         Diagnoses and health risks identified today and associated recommendations/orders:    1. Encounter for Medicare annual wellness exam  Complete history and physical was completed today.  Complete and thorough medication reconciliation was performed.  Discussed risks and benefits of medications.  Advised patient on orders and health maintenance.  We discussed old records and old labs if available.  Will request any records not available through epic.  Continue current medications listed on your summary sheet.    2. Type 2 diabetes mellitus with stage 3a chronic kidney disease, with long-term current use of insulin  Chronic. Stable.  Monitor A1C  Eye exam due- referral placed  Continue current medications and plan of care per PCP and specialists     Lab Results   Component Value Date    HGBA1C 7.0 (H) 04/03/2023     - Ambulatory referral/consult to Ophthalmology; Future    3. Osteoporosis screening  Chronic. Stable.  DEXA due     - DXA Bone Density Axial Skeleton 1 or more sites; Future    4. Asymptomatic menopausal state  DXA due   - DXA Bone Density Axial Skeleton 1 or more sites; Future    5. Lumbar radiculopathy  Chronic. Stable.   Following with neurosurgery and pain management  Continue current medications and plan of care per PCP and specialists     6. Parkinson's disease  Chronic. Stable.   Following with neurology  Continue current medications and plan of care per PCP and specialists     7. Resting tremor  Chronic. Stable.   Following with neurology  Continue current medications and plan of care per PCP and specialists     8. S/P lumbar  fusion  Chronic. Stable.   Following with neurosurgery and pain management   Continue current medications and plan of care per PCP and specialists     9. Pulmonary nodules  Chronic. Stable.   Following with pulmonology   Continue current medications and plan of care per PCP and specialists     10. S/P lobectomy of lung  Chronic. Stable.   Following with pulmonology, CVT, heme onc  Continue current medications and plan of care per PCP and specialists     11. Benign essential HTN  Chronic. Stable  BP at goal  Continue current medications and plan of care per PCP and specialists     Hypertension Medications               valsartan-hydrochlorothiazide (DIOVAN-HCT) 320-25 mg per tablet TAKE ONE TABLET BY MOUTH ONCE DAILY     12. Mixed hyperlipidemia  -chronic condition. Currently stable.    -Continue current medications and plan of care per PCP and specialists   -recent labs listed below:  Lab Results   Component Value Date    CHOL 152 04/03/2023     Lab Results   Component Value Date    HDL 55 04/03/2023     Lab Results   Component Value Date    LDLCALC 62.8 (L) 04/03/2023     Lab Results   Component Value Date    TRIG 171 (H) 04/03/2023     Lab Results   Component Value Date    ALT 6 (L) 04/03/2023    AST 18 04/03/2023    ALKPHOS 95 04/03/2023    BILITOT 0.4 04/03/2023     13. Atherosclerosis of aorta  Chronic. Continue statin  Continue current medications and plan of care per PCP and specialists     14. Stage 3a chronic kidney disease  Chronic. Stable.  Avoid NSAIDs and nephrotoxic medications  Monitor renal function  Continue current medications and plan of care per PCP and specialists     BMP  Lab Results   Component Value Date     04/03/2023    K 3.8 04/03/2023     04/03/2023    CO2 23 04/03/2023    BUN 33 (H) 04/03/2023    CREATININE 1.1 04/03/2023    CALCIUM 10.1 04/03/2023    ANIONGAP 17 (H) 04/03/2023    EGFRNORACEVR 54.4 (A) 04/03/2023     14. History of malignant carcinoid tumor of bronchus and  lung  Chronic. Stable.   Following with pulmonology and heme onc  Continue current medications and plan of care per PCP and specialists     16. Nodular thyroid disease  Chronic. Stable.  Following with endocrinology  S/p thryroidectomy 2016  On levothyroxine  Continue current medications and plan of care per PCP and specialists     17. Vitamin D deficiency  Chronic. Stable  Continue vitamin D supplement  Monitor labs  Continue current medications and plan of care per PCP and specialists   Lab Results   Component Value Date    MWGJNTOA55MI 37 10/03/2022    RCZQNFCG93UM 47 12/03/2021    HYIDRGUW23AK 32 11/30/2020     18. Post-operative hypothyroidism  Chronic. Stable.  Following with endocrinology  S/p thryroidectomy 2016  On levothyroxine  Continue current medications and plan of care per PCP and specialists     19. Chronic bilateral low back pain without sciatica  Chronic. Stable.   Following with neurosurgery and pain management   Continue current medications and plan of care per PCP and specialists     I offered to discuss end of life issues, including information on how to make advance directives that the patient could use to name someone who would make medical decisions on their behalf if they became too ill to make themselves.    ___Patient declined - already done.    _x__Patient is interested, I provided paperwork and offered to discuss.      Provided Jessica with a 5-10 year written screening schedule and personal prevention plan. Recommendations were developed using the USPSTF age appropriate recommendations. Education, counseling, and referrals were provided as needed. After Visit Summary printed and given to patient which includes a list of additional screenings\tests needed.    Follow up in about 1 year (around 5/31/2024).    Aydee Arreaga NP

## 2023-06-02 ENCOUNTER — HOSPITAL ENCOUNTER (OUTPATIENT)
Dept: RADIOLOGY | Facility: HOSPITAL | Age: 68
Discharge: HOME | End: 2023-06-02
Attending: NURSE PRACTITIONER
Payer: MEDICARE

## 2023-06-02 DIAGNOSIS — Z78.0 ASYMPTOMATIC MENOPAUSAL STATE: ICD-10-CM

## 2023-06-02 DIAGNOSIS — Z13.820 OSTEOPOROSIS SCREENING: ICD-10-CM

## 2023-06-02 PROCEDURE — 77080 DXA BONE DENSITY AXIAL: CPT | Mod: TC,PO

## 2023-06-02 PROCEDURE — 77080 DXA BONE DENSITY AXIAL: CPT | Mod: 26,,, | Performed by: RADIOLOGY

## 2023-06-02 PROCEDURE — 77080 DXA BONE DENSITY AXIAL SKELETON 1 OR MORE SITES: ICD-10-PCS | Mod: 26,,, | Performed by: RADIOLOGY

## 2023-06-10 NOTE — PROGRESS NOTES
Patient's DEXA scan results have been sent via portal. Please verify that patient has viewed results. If not, please call patient with interpretation below:     -Your recent DEXA scan shows osteopenia.    -I recommend that you start using weight bearing exercises to help reduce the risk of a fracture.    -Also, please start taking over the counter calcium 1200 mg daily and Vitamin D3 1000 units daily.  -We will plan to recheck your DEXA scan in 2 years.

## 2023-06-14 DIAGNOSIS — I10 BENIGN ESSENTIAL HTN: ICD-10-CM

## 2023-06-14 RX ORDER — VALSARTAN AND HYDROCHLOROTHIAZIDE 320; 25 MG/1; MG/1
TABLET, FILM COATED ORAL
Qty: 90 TABLET | Refills: 1 | Status: SHIPPED | OUTPATIENT
Start: 2023-06-14 | End: 2023-12-04

## 2023-06-14 NOTE — TELEPHONE ENCOUNTER
No care due was identified.  [facility] Embedded Care Due Messages. Reference number: [id number].   6/14/2023 8:01:20 AM CDT

## 2023-06-14 NOTE — TELEPHONE ENCOUNTER
Refill Decision Note   Jessica Rojas  is requesting a refill authorization.  Brief Assessment and Rationale for Refill:  Approve     Medication Therapy Plan:       Medication Reconciliation Completed: No   Comments:     No Care Gaps recommended.     Note composed:10:49 AM 06/14/2023

## 2023-06-15 ENCOUNTER — PATIENT MESSAGE (OUTPATIENT)
Dept: OPTOMETRY | Facility: CLINIC | Age: 68
End: 2023-06-15
Payer: MEDICARE

## 2023-06-16 ENCOUNTER — OFFICE VISIT (OUTPATIENT)
Dept: OPTOMETRY | Facility: CLINIC | Age: 68
End: 2023-06-16
Payer: MEDICARE

## 2023-06-16 ENCOUNTER — PATIENT MESSAGE (OUTPATIENT)
Dept: OPHTHALMOLOGY | Facility: CLINIC | Age: 68
End: 2023-06-16
Payer: MEDICARE

## 2023-06-16 DIAGNOSIS — Z96.1 PSEUDOPHAKIA OF BOTH EYES: ICD-10-CM

## 2023-06-16 DIAGNOSIS — H04.123 BILATERAL DRY EYES: ICD-10-CM

## 2023-06-16 DIAGNOSIS — E11.9 TYPE 2 DIABETES MELLITUS WITHOUT RETINOPATHY: Primary | ICD-10-CM

## 2023-06-16 DIAGNOSIS — H16.9 KERATITIS: ICD-10-CM

## 2023-06-16 PROCEDURE — 3288F FALL RISK ASSESSMENT DOCD: CPT | Mod: CPTII,S$GLB,, | Performed by: OPTOMETRIST

## 2023-06-16 PROCEDURE — 3051F HG A1C>EQUAL 7.0%<8.0%: CPT | Mod: CPTII,S$GLB,, | Performed by: OPTOMETRIST

## 2023-06-16 PROCEDURE — 1159F MED LIST DOCD IN RCRD: CPT | Mod: CPTII,S$GLB,, | Performed by: OPTOMETRIST

## 2023-06-16 PROCEDURE — 1159F PR MEDICATION LIST DOCUMENTED IN MEDICAL RECORD: ICD-10-PCS | Mod: CPTII,S$GLB,, | Performed by: OPTOMETRIST

## 2023-06-16 PROCEDURE — 1160F RVW MEDS BY RX/DR IN RCRD: CPT | Mod: CPTII,S$GLB,, | Performed by: OPTOMETRIST

## 2023-06-16 PROCEDURE — 2023F PR DILATED RETINAL EXAM W/O EVID OF RETINOPATHY: ICD-10-PCS | Mod: CPTII,S$GLB,, | Performed by: OPTOMETRIST

## 2023-06-16 PROCEDURE — 92004 PR EYE EXAM, NEW PATIENT,COMPREHESV: ICD-10-PCS | Mod: S$GLB,,, | Performed by: OPTOMETRIST

## 2023-06-16 PROCEDURE — 2023F DILAT RTA XM W/O RTNOPTHY: CPT | Mod: CPTII,S$GLB,, | Performed by: OPTOMETRIST

## 2023-06-16 PROCEDURE — 3288F PR FALLS RISK ASSESSMENT DOCUMENTED: ICD-10-PCS | Mod: CPTII,S$GLB,, | Performed by: OPTOMETRIST

## 2023-06-16 PROCEDURE — 1160F PR REVIEW ALL MEDS BY PRESCRIBER/CLIN PHARMACIST DOCUMENTED: ICD-10-PCS | Mod: CPTII,S$GLB,, | Performed by: OPTOMETRIST

## 2023-06-16 PROCEDURE — 92004 COMPRE OPH EXAM NEW PT 1/>: CPT | Mod: S$GLB,,, | Performed by: OPTOMETRIST

## 2023-06-16 PROCEDURE — 1125F PR PAIN SEVERITY QUANTIFIED, PAIN PRESENT: ICD-10-PCS | Mod: CPTII,S$GLB,, | Performed by: OPTOMETRIST

## 2023-06-16 PROCEDURE — 1101F PR PT FALLS ASSESS DOC 0-1 FALLS W/OUT INJ PAST YR: ICD-10-PCS | Mod: CPTII,S$GLB,, | Performed by: OPTOMETRIST

## 2023-06-16 PROCEDURE — 99999 PR PBB SHADOW E&M-EST. PATIENT-LVL II: CPT | Mod: PBBFAC,,, | Performed by: OPTOMETRIST

## 2023-06-16 PROCEDURE — 99999 PR PBB SHADOW E&M-EST. PATIENT-LVL II: ICD-10-PCS | Mod: PBBFAC,,, | Performed by: OPTOMETRIST

## 2023-06-16 PROCEDURE — 1125F AMNT PAIN NOTED PAIN PRSNT: CPT | Mod: CPTII,S$GLB,, | Performed by: OPTOMETRIST

## 2023-06-16 PROCEDURE — 3051F PR MOST RECENT HEMOGLOBIN A1C LEVEL 7.0 - < 8.0%: ICD-10-PCS | Mod: CPTII,S$GLB,, | Performed by: OPTOMETRIST

## 2023-06-16 PROCEDURE — 1101F PT FALLS ASSESS-DOCD LE1/YR: CPT | Mod: CPTII,S$GLB,, | Performed by: OPTOMETRIST

## 2023-06-16 RX ORDER — PREDNISOLONE ACETATE 10 MG/ML
1 SUSPENSION/ DROPS OPHTHALMIC 3 TIMES DAILY
Qty: 5 ML | Refills: 0 | Status: SHIPPED | OUTPATIENT
Start: 2023-06-16 | End: 2023-06-26

## 2023-06-16 NOTE — PROGRESS NOTES
HPI     Eye Problem     Additional comments: Eye problems           Comments    DLE: x 1 yr    Pt states started seeing a black mass that looks like ants OU x 3 days.   Eyes burning and throbbing with pain at about 6 on scale. Vision has been   blurry x 3 days also. Light sensitive.     LBSL: 140 at 3pm today  Hemoglobin A1C       Date                     Value               Ref Range             Status                04/03/2023               7.0 (H)             4.0 - 5.6 %           Final                 10/03/2022               6.7 (H)             4.0 - 5.6 %           Final                 03/10/2022               6.8 (H)             0.0 - 5.6 %           Final                    Last edited by Cheryle Quintana on 6/16/2023  3:02 PM.            Assessment /Plan     For exam results, see Encounter Report.    Type 2 diabetes mellitus without retinopathy    Bilateral dry eyes  -     prednisoLONE acetate (PRED FORTE) 1 % DrpS; Place 1 drop into both eyes 3 (three) times daily. for 10 days  Dispense: 5 mL; Refill: 0    Keratitis    Pseudophakia of both eyes      No diabetic retinopathy, no csme. Return in 1 year for dilated eye exam.   2,3.  Severe, causing blur and difficult to view fundus, start Pred Forte drops 4x/day x 10 days, RTC next week for follow up.   4. Monitor condition. Patient to report any changes. RTC 1 year recheck.   Pt also getting appt with PCP for yearly physical, due for yearly

## 2023-06-19 ENCOUNTER — PATIENT MESSAGE (OUTPATIENT)
Dept: FAMILY MEDICINE | Facility: CLINIC | Age: 68
End: 2023-06-19
Payer: MEDICARE

## 2023-06-20 ENCOUNTER — OFFICE VISIT (OUTPATIENT)
Dept: FAMILY MEDICINE | Facility: CLINIC | Age: 68
End: 2023-06-20
Payer: MEDICARE

## 2023-06-20 VITALS
TEMPERATURE: 97 F | DIASTOLIC BLOOD PRESSURE: 83 MMHG | HEART RATE: 92 BPM | BODY MASS INDEX: 34.36 KG/M2 | SYSTOLIC BLOOD PRESSURE: 128 MMHG | WEIGHT: 182 LBS | HEIGHT: 61 IN

## 2023-06-20 DIAGNOSIS — Z85.110 HISTORY OF MALIGNANT CARCINOID TUMOR OF BRONCHUS AND LUNG: ICD-10-CM

## 2023-06-20 DIAGNOSIS — H53.9 VISION CHANGES: Primary | ICD-10-CM

## 2023-06-20 DIAGNOSIS — G20.A1 PARKINSON'S DISEASE: ICD-10-CM

## 2023-06-20 PROCEDURE — 3288F PR FALLS RISK ASSESSMENT DOCUMENTED: ICD-10-PCS | Mod: CPTII,S$GLB,, | Performed by: PHYSICIAN ASSISTANT

## 2023-06-20 PROCEDURE — 3079F PR MOST RECENT DIASTOLIC BLOOD PRESSURE 80-89 MM HG: ICD-10-PCS | Mod: CPTII,S$GLB,, | Performed by: PHYSICIAN ASSISTANT

## 2023-06-20 PROCEDURE — 3079F DIAST BP 80-89 MM HG: CPT | Mod: CPTII,S$GLB,, | Performed by: PHYSICIAN ASSISTANT

## 2023-06-20 PROCEDURE — 99214 OFFICE O/P EST MOD 30 MIN: CPT | Mod: S$GLB,,, | Performed by: PHYSICIAN ASSISTANT

## 2023-06-20 PROCEDURE — 3051F PR MOST RECENT HEMOGLOBIN A1C LEVEL 7.0 - < 8.0%: ICD-10-PCS | Mod: CPTII,S$GLB,, | Performed by: PHYSICIAN ASSISTANT

## 2023-06-20 PROCEDURE — 99214 PR OFFICE/OUTPT VISIT, EST, LEVL IV, 30-39 MIN: ICD-10-PCS | Mod: S$GLB,,, | Performed by: PHYSICIAN ASSISTANT

## 2023-06-20 PROCEDURE — 1160F PR REVIEW ALL MEDS BY PRESCRIBER/CLIN PHARMACIST DOCUMENTED: ICD-10-PCS | Mod: CPTII,S$GLB,, | Performed by: PHYSICIAN ASSISTANT

## 2023-06-20 PROCEDURE — 3008F BODY MASS INDEX DOCD: CPT | Mod: CPTII,S$GLB,, | Performed by: PHYSICIAN ASSISTANT

## 2023-06-20 PROCEDURE — 3074F PR MOST RECENT SYSTOLIC BLOOD PRESSURE < 130 MM HG: ICD-10-PCS | Mod: CPTII,S$GLB,, | Performed by: PHYSICIAN ASSISTANT

## 2023-06-20 PROCEDURE — 1126F PR PAIN SEVERITY QUANTIFIED, NO PAIN PRESENT: ICD-10-PCS | Mod: CPTII,S$GLB,, | Performed by: PHYSICIAN ASSISTANT

## 2023-06-20 PROCEDURE — 1160F RVW MEDS BY RX/DR IN RCRD: CPT | Mod: CPTII,S$GLB,, | Performed by: PHYSICIAN ASSISTANT

## 2023-06-20 PROCEDURE — 1101F PR PT FALLS ASSESS DOC 0-1 FALLS W/OUT INJ PAST YR: ICD-10-PCS | Mod: CPTII,S$GLB,, | Performed by: PHYSICIAN ASSISTANT

## 2023-06-20 PROCEDURE — 3074F SYST BP LT 130 MM HG: CPT | Mod: CPTII,S$GLB,, | Performed by: PHYSICIAN ASSISTANT

## 2023-06-20 PROCEDURE — 3288F FALL RISK ASSESSMENT DOCD: CPT | Mod: CPTII,S$GLB,, | Performed by: PHYSICIAN ASSISTANT

## 2023-06-20 PROCEDURE — 99999 PR PBB SHADOW E&M-EST. PATIENT-LVL V: CPT | Mod: PBBFAC,,, | Performed by: PHYSICIAN ASSISTANT

## 2023-06-20 PROCEDURE — 1159F PR MEDICATION LIST DOCUMENTED IN MEDICAL RECORD: ICD-10-PCS | Mod: CPTII,S$GLB,, | Performed by: PHYSICIAN ASSISTANT

## 2023-06-20 PROCEDURE — 1159F MED LIST DOCD IN RCRD: CPT | Mod: CPTII,S$GLB,, | Performed by: PHYSICIAN ASSISTANT

## 2023-06-20 PROCEDURE — 3008F PR BODY MASS INDEX (BMI) DOCUMENTED: ICD-10-PCS | Mod: CPTII,S$GLB,, | Performed by: PHYSICIAN ASSISTANT

## 2023-06-20 PROCEDURE — 99999 PR PBB SHADOW E&M-EST. PATIENT-LVL V: ICD-10-PCS | Mod: PBBFAC,,, | Performed by: PHYSICIAN ASSISTANT

## 2023-06-20 PROCEDURE — 1126F AMNT PAIN NOTED NONE PRSNT: CPT | Mod: CPTII,S$GLB,, | Performed by: PHYSICIAN ASSISTANT

## 2023-06-20 PROCEDURE — 3051F HG A1C>EQUAL 7.0%<8.0%: CPT | Mod: CPTII,S$GLB,, | Performed by: PHYSICIAN ASSISTANT

## 2023-06-20 PROCEDURE — 1101F PT FALLS ASSESS-DOCD LE1/YR: CPT | Mod: CPTII,S$GLB,, | Performed by: PHYSICIAN ASSISTANT

## 2023-06-20 NOTE — PROGRESS NOTES
Assessment/Plan:    Problem List Items Addressed This Visit          Neuro    Parkinson's disease    Overview     -managed by neurology  -currently on carbidopa/levodopa  mg TID, pramipexole 0.25 mg TID, selegiline 5 mg BID, and amantidine 100 mg BID  -patient reports symptoms remain stable            Oncology    History of malignant carcinoid tumor of bronchus and lung    Overview     -followed by oncology and neuroendocrine  -s/p R middle and lower lung resection  -followed with surveillance CT          Other Visit Diagnoses       Vision changes    -  Primary    Relevant Orders    MRI Brain W WO Contrast        -acute vision changes >1 week  -hx of carcinoid tumor of the lungs; also hx of Parkinson's  -recent eye exam normal  -will obtain MRI brain w/ and w/o contrast to r/o structural cause  -recommend close follow up with neurology  -ER precautions for severe or worsening of symptoms     Follow up if symptoms worsen or fail to improve.    Magali Cardozo PA-C  _____________________________________________________________________________________________________________________________________________________    CC: vision changes    HPI: Patient is in clinic today as an established patient here for vision changes. Symptom onset was >1 week ago, but has improved since that time. She reports seeing tiny black dots that look like ants. She also reports blurry vision. Denies fever, chills, numbness, tingling, weakness, headaches, confusion, or speech difficulties. She recently saw her optometrist in which she was given steroid drops for dry eyes otherwise had a normal eye exam. Patient has a history of Parkinson's disease in which she is followed by neurology. She remains on Carbidopa/levodopa, Selegiline, Mirapex, and Amantadine daily. She also has a history of carcinoid tumor of the lung s/p R middle and lower lung resection. She is followed by oncology and neuroendocrine with surveillance CTs. No other  complaints today.     Past Medical History:   Diagnosis Date    Abdominal pain 2015    Arthritis     Diabetes mellitus, type 2     DM (diabetes mellitus)     on insulin    Elevated transaminase level 2016    Encounter for blood transfusion     History of malignant carcinoid tumor of bronchus and lung 10/25/2017    History of neuroendocrine cancer     HTN (hypertension) 2014    Hypercalcemia 2016    Lung cancer, hilus 2014    Malignant carcinoid tumor of bronchus and lung 2014    Malignant carcinoid tumor of the bronchus and lung 2014    Mediastinal lymphadenopathy 2015    Obesity, morbid 2014    Parkinsons 10/2017    Pituitary adenoma 's    took parladel for 3 yrs    Pneumonia     Postoperative hypothyroidism 2017    - s/p total thyroidectomy in  (parathyroids intact) with post op hypothyroidism; on synthroid    Pulmonary nodules 2018    Thyroid disease     Wheeze 2014     Past Surgical History:   Procedure Laterality Date    ADENOIDECTOMY      APPENDECTOMY      BREAST CYST ASPIRATION      BRONCHOSCOPY  2014, 2014    CATARACT EXTRACTION W/  INTRAOCULAR LENS IMPLANT Bilateral     2020     SECTION  , 1986    x2    COLONOSCOPY N/A 2021    Procedure: COLONOSCOPY;  Surgeon: Khadar Bernardo MD;  Location: [facility];  Service: Endoscopy;  Laterality: N/A;    COLONOSCOPY N/A 2021    Procedure: COLONOSCOPY;  Surgeon: Frank Lamb MD;  Location: [facility] ([address] [place] [address]);  Service: Endoscopy;  Laterality: N/A;  Ed Campa MD  Joselin Totsean MA  Caller: Unspecified (Yesterday,  2:54 PM)  Need colonoscopy with EMR for large (30 mm) ascending colon polyp. 90 minutes. Main. Clear liquid diet for 24 hour patient with inadequate prep last time.   Thanks!   Ed Campa MD     COLONOSCOPY N/A 2022    Procedure: COLONOSCOPY;  Surgeon: Frank Lamb MD;  Location: [facility];  Service: Endoscopy;   Laterality: N/A;  2 day prep w/suprep  instructions via portal-SC    EPIDURAL STEROID INJECTION INTO LUMBAR SPINE N/A 02/02/2022    Procedure: Injection-steroid-epidural-lumbar L5/S1;  Surgeon: Bebeto Eldridge MD;  Location: [facility] OR;  Service: Pain Management;  Laterality: N/A;    ESOPHAGOGASTRODUODENOSCOPY N/A 09/16/2021    Procedure: EGD (ESOPHAGOGASTRODUODENOSCOPY);  Surgeon: Khadar Bernardo MD;  Location: [facility] ENDO;  Service: Endoscopy;  Laterality: N/A;    EYE SURGERY      cataract bilat    LUNG REMOVAL, PARTIAL Right 2014    with 17 lymph nodes; right middle and lower    MINIMALLY INVASIVE TRANSFORAMINAL LUMBAR INTERBODY FUSION (TLIF) N/A 03/17/2022    Procedure: FUSION, SPINE, LUMBAR, TLIF, MINIMALLY INVASIVE RIGHT L4-5 TLIF, BILATERAL L4-5 LAMINECTOMY AND POSTERIOR INSTRUMENTED FUSION;  Surgeon: Ranjeet Bragg MD;  Location: [facility];  Service: Neurosurgery;  Laterality: N/A;    SPINE SURGERY  2022 3-    THYROIDECTOMY  05/24/2016    TONSILLECTOMY  1969    TUBAL LIGATION  1986 1986     Review of patient's allergies indicates:   Allergen Reactions    Propranolol Nausea And Vomiting     Drops pressure and raised sugar    Lipitor [atorvastatin] Other (See Comments)     Myalgia, muscle pain    Robaxin [methocarbamol]      Skin inside mouth started peeling.     Social History     Tobacco Use    Smoking status: Never    Smokeless tobacco: Never   Substance Use Topics    Alcohol use: Never     Comment: I rarely drink    Drug use: Never     Family History   Problem Relation Age of Onset    Diabetes Mother     Glaucoma Mother     Heart disease Mother     Hypertension Mother     Diabetes Father     Heart disease Father     Hypertension Father     Diabetes Sister     Macular degeneration Sister     Alcohol abuse Sister     Cancer Maternal Aunt         breast    Cancer Maternal Aunt         unknown    Breast cancer Paternal Aunt     Breast cancer Paternal Aunt     Cancer Maternal Grandmother          unknown    Breast cancer Paternal Grandmother     Breast cancer Cousin     Amblyopia Neg Hx     Blindness Neg Hx     Cataracts Neg Hx     Retinal detachment Neg Hx     Stroke Neg Hx     Strabismus Neg Hx     Thyroid disease Neg Hx      Current Outpatient Medications on File Prior to Visit   Medication Sig Dispense Refill    alcohol swabs (ALCOHOL WIPES) PadM Uses 5 daily 400 each 4    amantadine HCL (SYMMETREL) 100 mg capsule Take 1 capsule (100 mg total) by mouth 2 (two) times daily. 60 capsule 11    ascorbic acid, vitamin C, (VITAMIN C) 100 MG tablet Take 100 mg by mouth once daily.      carbidopa-levodopa  mg (SINEMET)  mg per tablet Take 1 tablet three times daily 90 tablet 11    cholecalciferol, vitamin D3, 10 mcg (400 unit) Cap Take 1,200 Units by mouth once daily.      cyanocobalamin (VITAMIN B-12) 1000 MCG tablet Take 100 mcg by mouth once daily.      diclofenac sodium (VOLTAREN) 1 % Gel Apply 2 g topically once daily. 100 g 0    evolocumab (REPATHA SURECLICK) 140 mg/mL PnIj Inject 1 mL (140 mg total) into the skin every 14 (fourteen) days. 2 mL 12    fluticasone-salmeterol diskus inhaler 250-50 mcg Inhale 1 puff into the lungs 2 times daily. 30 each 11    gabapentin (NEURONTIN) 100 MG capsule Take 1 capsule (100 mg total) by mouth 3 (three) times daily. Takes at night 90 capsule 11    insulin (LANTUS SOLOSTAR U-100 INSULIN) glargine 100 units/mL SubQ pen INJECT 35 UNITS EVERY MORNING THEN 25 UNITS EVERY NIGHT AT BEDTIME 30 mL 11    insulin aspart U-100 (NOVOLOG FLEXPEN U-100 INSULIN) 100 unit/mL (3 mL) InPn pen INJECT 30 units SUBCUTANEOUSLY before meals plus correction scale. max 120 units DAILY 120 mL 4    lancing device Misc Checks bg 7-8 times a day 1 each 0    levothyroxine (SYNTHROID) 150 MCG tablet Take 1 tablet (150 mcg total) by mouth before breakfast. 30 tablet 11    melatonin 3 mg Tab Take 6 mg by mouth nightly.      montelukast (SINGULAIR) 10 mg tablet take ONE tablet BY MOUTH once  DAILY EVERY EVENING 30 tablet 11    MULTIVITAMIN W-MINERALS/LUTEIN (CENTRUM SILVER ORAL) Take 1 tablet by mouth once daily.       naproxen sodium (ALEVE) 220 mg Cap Take 220 mg by mouth 2 (two) times daily as needed.      pramipexole (MIRAPEX) 0.25 MG tablet TAKE ONE-HALF to ONE TABLET BY MOUTH THREE TIMES DAILY as directed 90 tablet 11    prednisoLONE acetate (PRED FORTE) 1 % DrpS Place 1 drop into both eyes 3 (three) times daily. for 10 days 5 mL 0    selegiline (ELDEPRYL) 5 mg Cap take ONE capsule BY MOUTH twice daily 60 capsule 10    simvastatin (ZOCOR) 10 MG tablet TAKE ONE TABLET BY MOUTH ONCE DAILY IN THE EVENING 30 tablet 5    tirzepatide (MOUNJARO) 5 mg/0.5 mL PnIj Inject 5 mg into the skin every 7 days. 4 pen 3    valsartan-hydrochlorothiazide (DIOVAN-HCT) 320-25 mg per tablet TAKE ONE TABLET BY MOUTH ONCE DAILY 90 tablet 1    acetaminophen (TYLENOL) 500 MG tablet Take 1 tablet (500 mg total) by mouth every 6 (six) hours as needed for Pain (do not exceed 3000 milligrams per 24 h).  0    fish oil-omega-3 fatty acids 300-1,000 mg capsule Take 4 capsules by mouth once daily.      lamotrigine2.5% meloxicam 0.09% LIDOcaine2% prilocaine2% topical cream Apply topically 4 (four) times daily as needed (pain). 200 g 3    mupirocin calcium 2% nasl oint (BACTROBAN) 2 % Oint by Nasal route 2 (two) times daily.      oxyCODONE-acetaminophen (PERCOCET) 5-325 mg per tablet Take 1 tablet by mouth every 4 to 6 hours as needed for Pain. 42 tablet 0    UNABLE TO FIND Place 0.5 mLs under the tongue 2 (two) times a day. medication name:CBD Oil     Last dose 3 months ago       No current facility-administered medications on file prior to visit.       Review of Systems   Constitutional:  Negative for chills, diaphoresis, fatigue and fever.   HENT:  Negative for congestion, ear pain, postnasal drip, sinus pain and sore throat.    Eyes:  Positive for visual disturbance. Negative for pain and redness.   Respiratory:  Negative for  "cough, chest tightness and shortness of breath.    Cardiovascular:  Negative for chest pain and leg swelling.   Gastrointestinal:  Negative for abdominal pain, constipation, diarrhea, nausea and vomiting.   Genitourinary:  Negative for dysuria and hematuria.   Musculoskeletal:  Negative for arthralgias and joint swelling.   Skin:  Negative for rash.   Neurological:  Negative for dizziness, syncope and headaches.   Psychiatric/Behavioral:  Negative for dysphoric mood. The patient is not nervous/anxious.      Vitals:    06/20/23 0853   BP: 128/83   Pulse: 92   Temp: 97.2 °F (36.2 °C)   TempSrc: Temporal   Weight: 82.6 kg (182 lb)   Height: 5' 1" (1.549 m)       Wt Readings from Last 3 Encounters:   06/20/23 82.6 kg (182 lb)   05/31/23 86.2 kg (190 lb)   04/20/23 92 kg (202 lb 13.2 oz)       Physical Exam  Constitutional:       General: She is not in acute distress.     Appearance: Normal appearance. She is well-developed.   HENT:      Head: Normocephalic and atraumatic.   Eyes:      Extraocular Movements: Extraocular movements intact.      Conjunctiva/sclera: Conjunctivae normal.      Pupils: Pupils are equal, round, and reactive to light.   Cardiovascular:      Rate and Rhythm: Normal rate and regular rhythm.      Pulses: Normal pulses.      Heart sounds: Normal heart sounds. No murmur heard.  Pulmonary:      Effort: Pulmonary effort is normal. No respiratory distress.      Breath sounds: Normal breath sounds.   Abdominal:      General: Bowel sounds are normal. There is no distension.      Palpations: Abdomen is soft.      Tenderness: There is no abdominal tenderness.   Musculoskeletal:         General: Normal range of motion.      Cervical back: Normal range of motion and neck supple.   Skin:     General: Skin is warm and dry.      Findings: No rash.   Neurological:      General: No focal deficit present.      Mental Status: She is alert and oriented to person, place, and time.      Cranial Nerves: No cranial nerve " deficit.   Psychiatric:         Mood and Affect: Mood normal.         Behavior: Behavior normal.       Health Maintenance   Topic Date Due    Hemoglobin A1c  10/03/2023    Foot Exam  10/10/2023    Mammogram  12/07/2023    Lipid Panel  04/03/2024    Eye Exam  06/16/2024    TETANUS VACCINE  02/16/2026    DEXA Scan  06/02/2026    Hepatitis C Screening  Completed    High Dose Statin  Discontinued

## 2023-06-23 ENCOUNTER — OFFICE VISIT (OUTPATIENT)
Dept: OPTOMETRY | Facility: CLINIC | Age: 68
End: 2023-06-23
Payer: MEDICARE

## 2023-06-23 DIAGNOSIS — Z79.4 TYPE 2 DIABETES MELLITUS WITH STAGE 3A CHRONIC KIDNEY DISEASE, WITH LONG-TERM CURRENT USE OF INSULIN: ICD-10-CM

## 2023-06-23 DIAGNOSIS — H43.393 VITREOUS FLOATERS, BILATERAL: ICD-10-CM

## 2023-06-23 DIAGNOSIS — N18.31 TYPE 2 DIABETES MELLITUS WITH STAGE 3A CHRONIC KIDNEY DISEASE, WITH LONG-TERM CURRENT USE OF INSULIN: ICD-10-CM

## 2023-06-23 DIAGNOSIS — E11.22 TYPE 2 DIABETES MELLITUS WITH STAGE 3A CHRONIC KIDNEY DISEASE, WITH LONG-TERM CURRENT USE OF INSULIN: ICD-10-CM

## 2023-06-23 DIAGNOSIS — H16.9 KERATITIS: Primary | ICD-10-CM

## 2023-06-23 PROCEDURE — 1160F RVW MEDS BY RX/DR IN RCRD: CPT | Mod: HCNC,CPTII,S$GLB, | Performed by: OPTOMETRIST

## 2023-06-23 PROCEDURE — 1126F AMNT PAIN NOTED NONE PRSNT: CPT | Mod: HCNC,CPTII,S$GLB, | Performed by: OPTOMETRIST

## 2023-06-23 PROCEDURE — 1101F PT FALLS ASSESS-DOCD LE1/YR: CPT | Mod: HCNC,CPTII,S$GLB, | Performed by: OPTOMETRIST

## 2023-06-23 PROCEDURE — 3288F PR FALLS RISK ASSESSMENT DOCUMENTED: ICD-10-PCS | Mod: HCNC,CPTII,S$GLB, | Performed by: OPTOMETRIST

## 2023-06-23 PROCEDURE — 1160F PR REVIEW ALL MEDS BY PRESCRIBER/CLIN PHARMACIST DOCUMENTED: ICD-10-PCS | Mod: HCNC,CPTII,S$GLB, | Performed by: OPTOMETRIST

## 2023-06-23 PROCEDURE — 3051F PR MOST RECENT HEMOGLOBIN A1C LEVEL 7.0 - < 8.0%: ICD-10-PCS | Mod: HCNC,CPTII,S$GLB, | Performed by: OPTOMETRIST

## 2023-06-23 PROCEDURE — 99213 OFFICE O/P EST LOW 20 MIN: CPT | Mod: HCNC,S$GLB,, | Performed by: OPTOMETRIST

## 2023-06-23 PROCEDURE — 1159F PR MEDICATION LIST DOCUMENTED IN MEDICAL RECORD: ICD-10-PCS | Mod: HCNC,CPTII,S$GLB, | Performed by: OPTOMETRIST

## 2023-06-23 PROCEDURE — 99213 PR OFFICE/OUTPT VISIT, EST, LEVL III, 20-29 MIN: ICD-10-PCS | Mod: HCNC,S$GLB,, | Performed by: OPTOMETRIST

## 2023-06-23 PROCEDURE — 99999 PR PBB SHADOW E&M-EST. PATIENT-LVL II: CPT | Mod: PBBFAC,HCNC,, | Performed by: OPTOMETRIST

## 2023-06-23 PROCEDURE — 99999 PR PBB SHADOW E&M-EST. PATIENT-LVL II: ICD-10-PCS | Mod: PBBFAC,HCNC,, | Performed by: OPTOMETRIST

## 2023-06-23 PROCEDURE — 3288F FALL RISK ASSESSMENT DOCD: CPT | Mod: HCNC,CPTII,S$GLB, | Performed by: OPTOMETRIST

## 2023-06-23 PROCEDURE — 1126F PR PAIN SEVERITY QUANTIFIED, NO PAIN PRESENT: ICD-10-PCS | Mod: HCNC,CPTII,S$GLB, | Performed by: OPTOMETRIST

## 2023-06-23 PROCEDURE — 1159F MED LIST DOCD IN RCRD: CPT | Mod: HCNC,CPTII,S$GLB, | Performed by: OPTOMETRIST

## 2023-06-23 PROCEDURE — 3051F HG A1C>EQUAL 7.0%<8.0%: CPT | Mod: HCNC,CPTII,S$GLB, | Performed by: OPTOMETRIST

## 2023-06-23 PROCEDURE — 1101F PR PT FALLS ASSESS DOC 0-1 FALLS W/OUT INJ PAST YR: ICD-10-PCS | Mod: HCNC,CPTII,S$GLB, | Performed by: OPTOMETRIST

## 2023-06-23 NOTE — PROGRESS NOTES
HPI    DLS  06/16/23  1 WK F/U KERATITIS, BRITTNY OU    Pt states her VA OU has improved after using the drops.  Ou are more   comfortable.  She still sees the black spots in her VA.    Eyes feel much better, no redness OU, has drops left    Drops: PF QID OU  Last edited by Dirk Nova, OD on 6/23/2023  2:20 PM.            Assessment /Plan     For exam results, see Encounter Report.    Keratitis    Type 2 diabetes mellitus with stage 3a chronic kidney disease, with long-term current use of insulin  -     Ambulatory referral/consult to Ophthalmology    Vitreous floaters, bilateral      Much improved with drops, almost fully healed, cont steroid drops for 3 more days, then switch to systane drops 2x/day long term. RTC or call with flare ups  3. Pt has MRI at end of month, will hold off on referral until results come in

## 2023-06-29 DIAGNOSIS — H53.9 VISION CHANGES: Primary | ICD-10-CM

## 2023-06-30 ENCOUNTER — HOSPITAL ENCOUNTER (OUTPATIENT)
Dept: RADIOLOGY | Facility: HOSPITAL | Age: 68
Discharge: HOME | End: 2023-06-30
Attending: PHYSICIAN ASSISTANT
Payer: MEDICARE

## 2023-06-30 ENCOUNTER — SPECIALTY PHARMACY (OUTPATIENT)
Dept: PHARMACY | Facility: CLINIC | Age: 68
End: 2023-06-30
Payer: MEDICARE

## 2023-06-30 DIAGNOSIS — H53.9 VISION CHANGES: ICD-10-CM

## 2023-06-30 PROCEDURE — 70553 MRI BRAIN W WO CONTRAST: ICD-10-PCS | Mod: 26,HCNC,, | Performed by: RADIOLOGY

## 2023-06-30 PROCEDURE — 25500020 PHARM REV CODE 255: Mod: HCNC,PO | Performed by: PHYSICIAN ASSISTANT

## 2023-06-30 PROCEDURE — 70553 MRI BRAIN STEM W/O & W/DYE: CPT | Mod: TC,HCNC,PO

## 2023-06-30 PROCEDURE — 70553 MRI BRAIN STEM W/O & W/DYE: CPT | Mod: 26,HCNC,, | Performed by: RADIOLOGY

## 2023-06-30 PROCEDURE — A9585 GADOBUTROL INJECTION: HCPCS | Mod: HCNC,PO | Performed by: PHYSICIAN ASSISTANT

## 2023-06-30 RX ORDER — GADOBUTROL 604.72 MG/ML
8 INJECTION INTRAVENOUS
Status: COMPLETED | OUTPATIENT
Start: 2023-06-30 | End: 2023-06-30

## 2023-06-30 RX ADMIN — GADOBUTROL 8 ML: 604.72 INJECTION INTRAVENOUS at 10:06

## 2023-06-30 NOTE — TELEPHONE ENCOUNTER
Specialty Pharmacy - Refill Coordination    Specialty Medication Orders Linked to Encounter      Flowsheet Row Most Recent Value   Medication #1 evolocumab (REPATHA SURECLICK) 140 mg/mL PnIj (Order#[id number], Rx#[id number])            Refill Questions - Documented Responses      Flowsheet Row Most Recent Value   Patient Availability and HIPAA Verification    Does patient want to proceed with activity? Yes   HIPAA/medical authority confirmed? Yes   Relationship to patient of person spoken to? Self   Refill Screening Questions    Would patient like to speak to a pharmacist? No   When does the patient need to receive the medication? 07/06/23   Refill Delivery Questions    How will the patient receive the medication? MEDRx   When does the patient need to receive the medication? 07/06/23   Shipping Address Home   Address in [facility] confirmed and updated if neccessary? Yes   Expected Copay ($) 0   Is the patient able to afford the medication copay? Yes   Payment Method zero copay   Days supply of Refill 28   Supplies needed? No supplies needed   Refill activity completed? Yes   Refill activity plan Refill scheduled   Shipment/Pickup Date: 07/03/23            Current Outpatient Medications   Medication Sig    acetaminophen (TYLENOL) 500 MG tablet Take 1 tablet (500 mg total) by mouth every 6 (six) hours as needed for Pain (do not exceed 3000 milligrams per 24 h).    alcohol swabs (ALCOHOL WIPES) PadM Uses 5 daily    amantadine HCL (SYMMETREL) 100 mg capsule Take 1 capsule (100 mg total) by mouth 2 (two) times daily.    ascorbic acid, vitamin C, (VITAMIN C) 100 MG tablet Take 100 mg by mouth once daily.    carbidopa-levodopa  mg (SINEMET)  mg per tablet Take 1 tablet three times daily    cholecalciferol, vitamin D3, 10 mcg (400 unit) Cap Take 1,200 Units by mouth once daily.    cyanocobalamin (VITAMIN B-12) 1000 MCG tablet Take 100 mcg by mouth once daily.    diclofenac sodium (VOLTAREN) 1 % Gel Apply  2 g topically once daily.    evolocumab (REPATHA SURECLICK) 140 mg/mL PnIj Inject 1 mL (140 mg total) into the skin every 14 (fourteen) days.    fish oil-omega-3 fatty acids 300-1,000 mg capsule Take 4 capsules by mouth once daily.    fluticasone-salmeterol diskus inhaler 250-50 mcg Inhale 1 puff into the lungs 2 times daily.    gabapentin (NEURONTIN) 100 MG capsule Take 1 capsule (100 mg total) by mouth 3 (three) times daily. Takes at night    insulin (LANTUS SOLOSTAR U-100 INSULIN) glargine 100 units/mL SubQ pen INJECT 35 UNITS EVERY MORNING THEN 25 UNITS EVERY NIGHT AT BEDTIME    insulin aspart U-100 (NOVOLOG FLEXPEN U-100 INSULIN) 100 unit/mL (3 mL) InPn pen INJECT 30 units SUBCUTANEOUSLY before meals plus correction scale. max 120 units DAILY    lamotrigine2.5% meloxicam 0.09% LIDOcaine2% prilocaine2% topical cream Apply topically 4 (four) times daily as needed (pain).    lancing device Misc Checks bg 7-8 times a day    levothyroxine (SYNTHROID) 150 MCG tablet Take 1 tablet (150 mcg total) by mouth before breakfast.    melatonin 3 mg Tab Take 6 mg by mouth nightly.    montelukast (SINGULAIR) 10 mg tablet take ONE tablet BY MOUTH once DAILY EVERY EVENING    MULTIVITAMIN W-MINERALS/LUTEIN (CENTRUM SILVER ORAL) Take 1 tablet by mouth once daily.     mupirocin calcium 2% nasl oint (BACTROBAN) 2 % Oint by Nasal route 2 (two) times daily.    naproxen sodium (ALEVE) 220 mg Cap Take 220 mg by mouth 2 (two) times daily as needed.    oxyCODONE-acetaminophen (PERCOCET) 5-325 mg per tablet Take 1 tablet by mouth every 4 to 6 hours as needed for Pain.    pramipexole (MIRAPEX) 0.25 MG tablet TAKE ONE-HALF to ONE TABLET BY MOUTH THREE TIMES DAILY as directed    selegiline (ELDEPRYL) 5 mg Cap take ONE capsule BY MOUTH twice daily    simvastatin (ZOCOR) 10 MG tablet TAKE ONE TABLET BY MOUTH ONCE DAILY IN THE EVENING    tirzepatide (MOUNJARO) 5 mg/0.5 mL PnIj Inject 5 mg into the skin every 7 days.    UNABLE TO FIND Place 0.5  mLs under the tongue 2 (two) times a day. medication name:CBD Oil     Last dose 3 months ago    valsartan-hydrochlorothiazide (DIOVAN-HCT) 320-25 mg per tablet TAKE ONE TABLET BY MOUTH ONCE DAILY   Last reviewed on 6/23/2023  1:54 PM by Dirk Nova, CATHIE    Review of patient's allergies indicates:   Allergen Reactions    Propranolol Nausea And Vomiting     Drops pressure and raised sugar    Lipitor [atorvastatin] Other (See Comments)     Myalgia, muscle pain    Robaxin [methocarbamol]      Skin inside mouth started peeling.    Last reviewed on  6/30/2023 9:25 AM by Izabela Slater      Tasks added this encounter   No tasks added.   Tasks due within next 3 months   No tasks due.     Belia Sanford, Patient Care Assistant  [facility]  [address] [place] [id number]  Phone: [phone]  Fax: [phone]

## 2023-06-30 NOTE — PROGRESS NOTES
Results have been released via [employer]User. Please verify that these have been viewed by patient. If not, please call patient with results.     I have sent a message to them with the following interpretation (see below).    I have reviewed your recent MRI brain which showed chronic small vessel ischemic changes as noted on previous imaging. There were no acute findings or significant abnormalities seen. Please follow up with neurology.      Please do not hesitate to call or message with any additional questions or concerns.    Magali Cardozo PA-C

## 2023-07-06 ENCOUNTER — PATIENT MESSAGE (OUTPATIENT)
Dept: OPTOMETRY | Facility: CLINIC | Age: 68
End: 2023-07-06
Payer: MEDICARE

## 2023-07-06 ENCOUNTER — PATIENT MESSAGE (OUTPATIENT)
Dept: NEUROLOGY | Facility: CLINIC | Age: 68
End: 2023-07-06
Payer: MEDICARE

## 2023-07-10 ENCOUNTER — LAB VISIT (OUTPATIENT)
Dept: LAB | Facility: HOSPITAL | Age: 68
End: 2023-07-10
Attending: NURSE PRACTITIONER
Payer: MEDICARE

## 2023-07-10 DIAGNOSIS — Z79.4 TYPE 2 DIABETES MELLITUS WITH DIABETIC NEPHROPATHY, WITH LONG-TERM CURRENT USE OF INSULIN: ICD-10-CM

## 2023-07-10 DIAGNOSIS — E11.21 TYPE 2 DIABETES MELLITUS WITH DIABETIC NEPHROPATHY, WITH LONG-TERM CURRENT USE OF INSULIN: ICD-10-CM

## 2023-07-10 DIAGNOSIS — E89.0 POST-OPERATIVE HYPOTHYROIDISM: ICD-10-CM

## 2023-07-10 LAB
ESTIMATED AVG GLUCOSE: 120 MG/DL (ref 68–131)
HBA1C MFR BLD: 5.8 % (ref 4–5.6)
T4 FREE SERPL-MCNC: 1.54 NG/DL (ref 0.71–1.51)
TSH SERPL DL<=0.005 MIU/L-ACNC: 0.02 UIU/ML (ref 0.4–4)

## 2023-07-10 PROCEDURE — 36415 COLL VENOUS BLD VENIPUNCTURE: CPT | Mod: HCNC,PO | Performed by: NURSE PRACTITIONER

## 2023-07-10 PROCEDURE — 84439 ASSAY OF FREE THYROXINE: CPT | Mod: HCNC | Performed by: NURSE PRACTITIONER

## 2023-07-10 PROCEDURE — 84443 ASSAY THYROID STIM HORMONE: CPT | Mod: HCNC | Performed by: NURSE PRACTITIONER

## 2023-07-10 PROCEDURE — 83036 HEMOGLOBIN GLYCOSYLATED A1C: CPT | Mod: HCNC | Performed by: NURSE PRACTITIONER

## 2023-07-14 NOTE — PROGRESS NOTES
CC: This 67 y.o. female presents for management of diabetes and chronic conditions pending review including HTN, HLP, morbid obesity, hypothyroidism, vitamin d deficiency     HPI: She was diagnosed with T2DM in ~ 2013. She was hospitalized r/t DM in 2016 for DKA.   Family hx of DM: mom, dad, and sisters      Wearing Dexcom G7- see download in Media tab  1% Very High   7% High   91% In Range   1% Low   <1% Very Low   Rare pp excursions, bg are very well controlled  She is having some hypoglycemia - not patterned     Diet: Eats 2  Meals a day, snacks PB crackers       Exercise: riding her stationary bike- 10-15 mins a few times throughout the day    CURRENT DM MEDS:  lantus 12 u qam;  Humalog 10 u AC  + correction 150/25; Mounjaro 5 mg weekly (Wednesday)  Timing prandial insulin 5-15 minutes before meals: before    Vial/pen:  Uses pens  Glucometer type:  [employer] 2020    Standards of Care:  Eye exam: + mild DM retinopathy  6/2023 Dr Nova     Regarding thyroid:  Takes LT4 150 mcg, correctly qam  Nodules was found on on a PET scan. FNA 11/25/14 shows adenomatous nodule with cystic changes. S/p total thyroidectomy 5/24/16.       No hoarseness, voice changes, +dysphagia- from Parkinson's, no compressive symptoms, or head/neck exposure to XRT.   No personal or FH of thyroid cancer or MEN syndrome.   Not taking biotin.   + tremors of the hands- has Parkinson's   No intolerance to the heat or cold  + hair loss       ROS:  Gen: Appetite good,  Weight 28 loss lbs  Eyes:+ visual disturbances  Resp: no SOB or ESPINAL, no cough  Cardiac: No palpitations, chest pain   GI: No nausea or vomiting, diarrhea, + constipation   /GYN: 1+ nocturia, no burning or pain.   MS/Neuro:no  numbness in her feet, speech clear,   Psych: Denies drug/ETOH abuse, no hx of depression.  Other systems: negative.    PE:  GENERAL: Well developed, well nourished.  PSYCH: AAOx3, appropriate mood and affect, pleasant expression, conversant, appears relaxed,  well groomed.   EYES: Conjunctiva, corneas clear  NECK: Supple, trachea midline   ABDOMEN: Soft, non-tender, non-distended   SKIN:  no acanthosis nigracans.  FOOT EXAMINATION: 10/10/2022    No foot deformity, +Onychomycosis,  no interspace maceration or ulceration noted.  Decreased hair growth present over toes/feet.    Protective sensation intact with 10 gram monofilament.  +2 dorsalis pedis and posterior pulses noted.      Lab Results   Component Value Date    MICALBCREAT 13.5 10/03/2022       Hemoglobin A1C   Date Value Ref Range Status   07/10/2023 5.8 (H) 4.0 - 5.6 % Final     Comment:     ADA Screening Guidelines:  5.7-6.4%  Consistent with prediabetes  >or=6.5%  Consistent with diabetes    High levels of fetal hemoglobin interfere with the HbA1C  assay. Heterozygous hemoglobin variants (HbS, HgC, etc)do  not significantly interfere with this assay.   However, presence of multiple variants may affect accuracy.     04/03/2023 7.0 (H) 4.0 - 5.6 % Final     Comment:     ADA Screening Guidelines:  5.7-6.4%  Consistent with prediabetes  >or=6.5%  Consistent with diabetes    High levels of fetal hemoglobin interfere with the HbA1C  assay. Heterozygous hemoglobin variants (HbS, HgC, etc)do  not significantly interfere with this assay.   However, presence of multiple variants may affect accuracy.     10/03/2022 6.7 (H) 4.0 - 5.6 % Final     Comment:     ADA Screening Guidelines:  5.7-6.4%  Consistent with prediabetes  >or=6.5%  Consistent with diabetes    High levels of fetal hemoglobin interfere with the HbA1C  assay. Heterozygous hemoglobin variants (HbS, HgC, etc)do  not significantly interfere with this assay.   However, presence of multiple variants may affect accuracy.          ASSESSMENT and PLAN:    1. T2DM with nephropathy-     Continue lantus 12 u qam  D/c Novolog, Increase Mounjaro to 7.5 mg weekly  Continue Dexcom G7- Notify me for any hypo/hyper issues  BG 67 in office, treated w juice, bg 88 on departure      2. HTN - controlled today, continue meds as previously prescribed and monitor.     3. HLP -   unable to tolerate statins r/t myalgia; Continue repatha     4. Post Surgical Hypothyroidism for MNG. S/p total thyroidectomy w pathology benign.    Decrease levothyroxine to 125 mcg daily, repeat TSH in 6 weeks    5. Obesity - Continue exercise and weight loss;   Body mass index is 33.57 kg/m².    Follow-up: in 3 months with lab prior

## 2023-07-17 ENCOUNTER — OFFICE VISIT (OUTPATIENT)
Dept: ENDOCRINOLOGY | Facility: CLINIC | Age: 68
End: 2023-07-17
Payer: MEDICARE

## 2023-07-17 ENCOUNTER — TELEPHONE (OUTPATIENT)
Dept: ENDOCRINOLOGY | Facility: CLINIC | Age: 68
End: 2023-07-17

## 2023-07-17 VITALS
HEART RATE: 85 BPM | DIASTOLIC BLOOD PRESSURE: 70 MMHG | BODY MASS INDEX: 33.55 KG/M2 | SYSTOLIC BLOOD PRESSURE: 120 MMHG | HEIGHT: 61 IN | WEIGHT: 177.69 LBS

## 2023-07-17 DIAGNOSIS — N18.31 TYPE 2 DIABETES MELLITUS WITH STAGE 3A CHRONIC KIDNEY DISEASE, WITH LONG-TERM CURRENT USE OF INSULIN: Primary | ICD-10-CM

## 2023-07-17 DIAGNOSIS — E11.22 TYPE 2 DIABETES MELLITUS WITH STAGE 3A CHRONIC KIDNEY DISEASE, WITH LONG-TERM CURRENT USE OF INSULIN: Primary | ICD-10-CM

## 2023-07-17 DIAGNOSIS — E04.1 NODULAR THYROID DISEASE: ICD-10-CM

## 2023-07-17 DIAGNOSIS — Z79.4 TYPE 2 DIABETES MELLITUS WITH DIABETIC NEPHROPATHY, WITH LONG-TERM CURRENT USE OF INSULIN: ICD-10-CM

## 2023-07-17 DIAGNOSIS — I10 BENIGN ESSENTIAL HTN: ICD-10-CM

## 2023-07-17 DIAGNOSIS — E78.2 MIXED HYPERLIPIDEMIA: ICD-10-CM

## 2023-07-17 DIAGNOSIS — Z79.4 TYPE 2 DIABETES MELLITUS WITH STAGE 3A CHRONIC KIDNEY DISEASE, WITH LONG-TERM CURRENT USE OF INSULIN: Primary | ICD-10-CM

## 2023-07-17 DIAGNOSIS — E66.09 CLASS 1 OBESITY DUE TO EXCESS CALORIES WITH SERIOUS COMORBIDITY AND BODY MASS INDEX (BMI) OF 33.0 TO 33.9 IN ADULT: ICD-10-CM

## 2023-07-17 DIAGNOSIS — E11.21 TYPE 2 DIABETES MELLITUS WITH DIABETIC NEPHROPATHY, WITH LONG-TERM CURRENT USE OF INSULIN: ICD-10-CM

## 2023-07-17 DIAGNOSIS — E89.0 POST-OPERATIVE HYPOTHYROIDISM: ICD-10-CM

## 2023-07-17 LAB
GLUCOSE SERPL-MCNC: 64 MG/DL (ref 70–110)
GLUCOSE SERPL-MCNC: 88 MG/DL (ref 70–110)

## 2023-07-17 PROCEDURE — 1126F AMNT PAIN NOTED NONE PRSNT: CPT | Mod: HCNC,CPTII,S$GLB, | Performed by: NURSE PRACTITIONER

## 2023-07-17 PROCEDURE — 3044F PR MOST RECENT HEMOGLOBIN A1C LEVEL <7.0%: ICD-10-PCS | Mod: HCNC,CPTII,S$GLB, | Performed by: NURSE PRACTITIONER

## 2023-07-17 PROCEDURE — 3288F FALL RISK ASSESSMENT DOCD: CPT | Mod: HCNC,CPTII,S$GLB, | Performed by: NURSE PRACTITIONER

## 2023-07-17 PROCEDURE — 3074F PR MOST RECENT SYSTOLIC BLOOD PRESSURE < 130 MM HG: ICD-10-PCS | Mod: HCNC,CPTII,S$GLB, | Performed by: NURSE PRACTITIONER

## 2023-07-17 PROCEDURE — 1159F PR MEDICATION LIST DOCUMENTED IN MEDICAL RECORD: ICD-10-PCS | Mod: HCNC,CPTII,S$GLB, | Performed by: NURSE PRACTITIONER

## 2023-07-17 PROCEDURE — 1159F MED LIST DOCD IN RCRD: CPT | Mod: HCNC,CPTII,S$GLB, | Performed by: NURSE PRACTITIONER

## 2023-07-17 PROCEDURE — 3078F PR MOST RECENT DIASTOLIC BLOOD PRESSURE < 80 MM HG: ICD-10-PCS | Mod: HCNC,CPTII,S$GLB, | Performed by: NURSE PRACTITIONER

## 2023-07-17 PROCEDURE — 3044F HG A1C LEVEL LT 7.0%: CPT | Mod: HCNC,CPTII,S$GLB, | Performed by: NURSE PRACTITIONER

## 2023-07-17 PROCEDURE — 82962 GLUCOSE BLOOD TEST: CPT | Mod: HCNC,S$GLB,, | Performed by: NURSE PRACTITIONER

## 2023-07-17 PROCEDURE — 95251 PR GLUCOSE MONITOR, 72 HOUR, PHYS INTERP: ICD-10-PCS | Mod: HCNC,S$GLB,, | Performed by: NURSE PRACTITIONER

## 2023-07-17 PROCEDURE — 95251 CONT GLUC MNTR ANALYSIS I&R: CPT | Mod: HCNC,S$GLB,, | Performed by: NURSE PRACTITIONER

## 2023-07-17 PROCEDURE — 3008F PR BODY MASS INDEX (BMI) DOCUMENTED: ICD-10-PCS | Mod: HCNC,CPTII,S$GLB, | Performed by: NURSE PRACTITIONER

## 2023-07-17 PROCEDURE — 3078F DIAST BP <80 MM HG: CPT | Mod: HCNC,CPTII,S$GLB, | Performed by: NURSE PRACTITIONER

## 2023-07-17 PROCEDURE — 3288F PR FALLS RISK ASSESSMENT DOCUMENTED: ICD-10-PCS | Mod: HCNC,CPTII,S$GLB, | Performed by: NURSE PRACTITIONER

## 2023-07-17 PROCEDURE — 1101F PR PT FALLS ASSESS DOC 0-1 FALLS W/OUT INJ PAST YR: ICD-10-PCS | Mod: HCNC,CPTII,S$GLB, | Performed by: NURSE PRACTITIONER

## 2023-07-17 PROCEDURE — 82962 POCT GLUCOSE, HAND-HELD DEVICE: ICD-10-PCS | Mod: HCNC,S$GLB,, | Performed by: NURSE PRACTITIONER

## 2023-07-17 PROCEDURE — 1101F PT FALLS ASSESS-DOCD LE1/YR: CPT | Mod: HCNC,CPTII,S$GLB, | Performed by: NURSE PRACTITIONER

## 2023-07-17 PROCEDURE — 99999 PR PBB SHADOW E&M-EST. PATIENT-LVL IV: ICD-10-PCS | Mod: PBBFAC,HCNC,, | Performed by: NURSE PRACTITIONER

## 2023-07-17 PROCEDURE — 99999 PR PBB SHADOW E&M-EST. PATIENT-LVL IV: CPT | Mod: PBBFAC,HCNC,, | Performed by: NURSE PRACTITIONER

## 2023-07-17 PROCEDURE — 99214 PR OFFICE/OUTPT VISIT, EST, LEVL IV, 30-39 MIN: ICD-10-PCS | Mod: HCNC,S$GLB,, | Performed by: NURSE PRACTITIONER

## 2023-07-17 PROCEDURE — 1126F PR PAIN SEVERITY QUANTIFIED, NO PAIN PRESENT: ICD-10-PCS | Mod: HCNC,CPTII,S$GLB, | Performed by: NURSE PRACTITIONER

## 2023-07-17 PROCEDURE — 3074F SYST BP LT 130 MM HG: CPT | Mod: HCNC,CPTII,S$GLB, | Performed by: NURSE PRACTITIONER

## 2023-07-17 PROCEDURE — 3008F BODY MASS INDEX DOCD: CPT | Mod: HCNC,CPTII,S$GLB, | Performed by: NURSE PRACTITIONER

## 2023-07-17 PROCEDURE — 99214 OFFICE O/P EST MOD 30 MIN: CPT | Mod: HCNC,S$GLB,, | Performed by: NURSE PRACTITIONER

## 2023-07-17 RX ORDER — INSULIN GLARGINE 100 [IU]/ML
INJECTION, SOLUTION SUBCUTANEOUS
Qty: 30 ML | Refills: 11
Start: 2023-07-17 | End: 2023-12-24 | Stop reason: SDUPTHER

## 2023-07-17 RX ORDER — LEVOTHYROXINE SODIUM 125 UG/1
125 TABLET ORAL
Qty: 30 TABLET | Refills: 11 | Status: SHIPPED | OUTPATIENT
Start: 2023-07-17 | End: 2023-09-14

## 2023-07-24 ENCOUNTER — SPECIALTY PHARMACY (OUTPATIENT)
Dept: PHARMACY | Facility: CLINIC | Age: 68
End: 2023-07-24
Payer: MEDICARE

## 2023-07-24 NOTE — TELEPHONE ENCOUNTER
Specialty Pharmacy - Refill Coordination    Specialty Medication Orders Linked to Encounter      Flowsheet Row Most Recent Value   Medication #1 evolocumab (REPATHA SURECLICK) 140 mg/mL PnIj (Order#[id number], Rx#[id number])            Refill Questions - Documented Responses      Flowsheet Row Most Recent Value   Patient Availability and HIPAA Verification    Does patient want to proceed with activity? Yes   HIPAA/medical authority confirmed? Yes   Relationship to patient of person spoken to? Self   Refill Screening Questions    Would patient like to speak to a pharmacist? No   When does the patient need to receive the medication? 08/03/23   Refill Delivery Questions    How will the patient receive the medication? [facility]   When does the patient need to receive the medication? 08/03/23   Shipping Address Home   Address in [facility] confirmed and updated if neccessary? Yes   Expected Copay ($) 0   Is the patient able to afford the medication copay? Yes   Payment Method zero copay   Days supply of Refill 28   Supplies needed? No supplies needed   Refill activity completed? Yes   Refill activity plan Refill scheduled   Shipment/Pickup Date: 08/01/23            Current Outpatient Medications   Medication Sig    acetaminophen (TYLENOL) 500 MG tablet Take 1 tablet (500 mg total) by mouth every 6 (six) hours as needed for Pain (do not exceed 3000 milligrams per 24 h).    alcohol swabs (ALCOHOL WIPES) PadM Uses 5 daily    amantadine HCL (SYMMETREL) 100 mg capsule Take 1 capsule (100 mg total) by mouth 2 (two) times daily.    ascorbic acid, vitamin C, (VITAMIN C) 100 MG tablet Take 100 mg by mouth once daily.    carbidopa-levodopa  mg (SINEMET)  mg per tablet Take 1 tablet three times daily    cholecalciferol, vitamin D3, 10 mcg (400 unit) Cap Take 1,200 Units by mouth once daily.    cyanocobalamin (VITAMIN B-12) 1000 MCG tablet Take 100 mcg by mouth once daily.    diclofenac sodium (VOLTAREN) 1 % Gel Apply  2 g topically once daily.    evolocumab (REPATHA SURECLICK) 140 mg/mL PnIj Inject 1 mL (140 mg total) into the skin every 14 (fourteen) days.    fish oil-omega-3 fatty acids 300-1,000 mg capsule Take 4 capsules by mouth once daily.    fluticasone-salmeterol diskus inhaler 250-50 mcg Inhale 1 puff into the lungs 2 times daily. (Patient not taking: Reported on 7/17/2023)    gabapentin (NEURONTIN) 100 MG capsule Take 1 capsule (100 mg total) by mouth 3 (three) times daily. Takes at night (Patient taking differently: Take 100 mg by mouth 3 (three) times daily. Takes at night-takes PRN)    insulin (LANTUS SOLOSTAR U-100 INSULIN) glargine 100 units/mL SubQ pen 12 u qam    lamotrigine2.5% meloxicam 0.09% LIDOcaine2% prilocaine2% topical cream Apply topically 4 (four) times daily as needed (pain). (Patient not taking: Reported on 7/17/2023)    lancing device Misc Checks bg 7-8 times a day    levothyroxine (SYNTHROID) 125 MCG tablet Take 1 tablet (125 mcg total) by mouth before breakfast.    melatonin 3 mg Tab Take 6 mg by mouth nightly.    montelukast (SINGULAIR) 10 mg tablet take ONE tablet BY MOUTH once DAILY EVERY EVENING    MULTIVITAMIN W-MINERALS/LUTEIN (CENTRUM SILVER ORAL) Take 1 tablet by mouth once daily.     naproxen sodium (ALEVE) 220 mg Cap Take 220 mg by mouth 2 (two) times daily as needed.    pramipexole (MIRAPEX) 0.25 MG tablet TAKE ONE-HALF to ONE TABLET BY MOUTH THREE TIMES DAILY as directed    selegiline (ELDEPRYL) 5 mg Cap take ONE capsule BY MOUTH twice daily    tirzepatide 7.5 mg/0.5 mL PnIj Inject 7.5 mg into the skin every 7 days.    UNABLE TO FIND Place 0.5 mLs under the tongue 2 (two) times a day. medication name:CBD Oil     Last dose 3 months ago    valsartan-hydrochlorothiazide (DIOVAN-HCT) 320-25 mg per tablet TAKE ONE TABLET BY MOUTH ONCE DAILY   Last reviewed on 7/17/2023 11:01 AM by Gillian Jamison LPN    Review of patient's allergies indicates:   Allergen Reactions    Propranolol Nausea And  Vomiting     Drops pressure and raised sugar    Lipitor [atorvastatin] Other (See Comments)     Myalgia, muscle pain    Robaxin [methocarbamol]      Skin inside mouth started peeling.    Last reviewed on  7/17/2023 10:57 AM by Gillian Jamison      Tasks added this encounter   No tasks added.   Tasks due within next 3 months   7/24/2023 - Refill Coordination Outreach (1 time occurrence)     [facility]  [address]  [facility] [address]  Phone: [phone]  Fax: [phone]

## 2023-08-02 ENCOUNTER — PATIENT MESSAGE (OUTPATIENT)
Dept: NEUROLOGY | Facility: CLINIC | Age: 68
End: 2023-08-02
Payer: MEDICARE

## 2023-08-30 ENCOUNTER — LAB VISIT (OUTPATIENT)
Dept: LAB | Facility: HOSPITAL | Age: 68
End: 2023-08-30
Attending: NURSE PRACTITIONER
Payer: MEDICARE

## 2023-08-30 DIAGNOSIS — E89.0 POST-OPERATIVE HYPOTHYROIDISM: ICD-10-CM

## 2023-08-30 LAB
T4 FREE SERPL-MCNC: 0.59 NG/DL (ref 0.71–1.51)
TSH SERPL DL<=0.005 MIU/L-ACNC: 10.93 UIU/ML (ref 0.4–4)

## 2023-08-30 PROCEDURE — 84443 ASSAY THYROID STIM HORMONE: CPT | Mod: HCNC | Performed by: NURSE PRACTITIONER

## 2023-08-30 PROCEDURE — 36415 COLL VENOUS BLD VENIPUNCTURE: CPT | Mod: HCNC,PO | Performed by: NURSE PRACTITIONER

## 2023-08-30 PROCEDURE — 84439 ASSAY OF FREE THYROXINE: CPT | Mod: HCNC | Performed by: NURSE PRACTITIONER

## 2023-09-14 ENCOUNTER — TELEPHONE (OUTPATIENT)
Dept: ENDOCRINOLOGY | Facility: CLINIC | Age: 68
End: 2023-09-14
Payer: MEDICARE

## 2023-09-14 DIAGNOSIS — E89.0 POST-OPERATIVE HYPOTHYROIDISM: Primary | ICD-10-CM

## 2023-09-14 RX ORDER — LEVOTHYROXINE SODIUM 150 UG/1
150 TABLET ORAL
Qty: 30 TABLET | Refills: 11 | Status: SHIPPED | OUTPATIENT
Start: 2023-09-14 | End: 2024-01-29

## 2023-09-18 ENCOUNTER — PATIENT MESSAGE (OUTPATIENT)
Dept: ADMINISTRATIVE | Facility: OTHER | Age: 68
End: 2023-09-18
Payer: MEDICARE

## 2023-10-12 DIAGNOSIS — E78.2 MIXED HYPERLIPIDEMIA: ICD-10-CM

## 2023-10-12 RX ORDER — EVOLOCUMAB 140 MG/ML
140 INJECTION, SOLUTION SUBCUTANEOUS
Qty: 2 ML | Refills: 12 | Status: ACTIVE | OUTPATIENT
Start: 2023-10-12

## 2023-11-02 LAB
LEFT EYE DM RETINOPATHY: NEGATIVE
RIGHT EYE DM RETINOPATHY: NEGATIVE

## 2023-11-03 ENCOUNTER — PATIENT MESSAGE (OUTPATIENT)
Dept: ENDOCRINOLOGY | Facility: CLINIC | Age: 68
End: 2023-11-03
Payer: MEDICARE

## 2023-11-03 ENCOUNTER — TELEPHONE (OUTPATIENT)
Dept: ENDOCRINOLOGY | Facility: CLINIC | Age: 68
End: 2023-11-03
Payer: MEDICARE

## 2023-11-06 ENCOUNTER — TELEPHONE (OUTPATIENT)
Dept: ENDOCRINOLOGY | Facility: CLINIC | Age: 68
End: 2023-11-06
Payer: MEDICARE

## 2023-11-06 DIAGNOSIS — E11.22 TYPE 2 DIABETES MELLITUS WITH STAGE 3A CHRONIC KIDNEY DISEASE, WITH LONG-TERM CURRENT USE OF INSULIN: Primary | ICD-10-CM

## 2023-11-06 DIAGNOSIS — N18.31 TYPE 2 DIABETES MELLITUS WITH STAGE 3A CHRONIC KIDNEY DISEASE, WITH LONG-TERM CURRENT USE OF INSULIN: Primary | ICD-10-CM

## 2023-11-06 DIAGNOSIS — Z79.4 TYPE 2 DIABETES MELLITUS WITH STAGE 3A CHRONIC KIDNEY DISEASE, WITH LONG-TERM CURRENT USE OF INSULIN: Primary | ICD-10-CM

## 2023-11-13 ENCOUNTER — LAB VISIT (OUTPATIENT)
Dept: LAB | Facility: HOSPITAL | Age: 68
End: 2023-11-13
Attending: NURSE PRACTITIONER
Payer: MEDICARE

## 2023-11-13 DIAGNOSIS — E11.22 TYPE 2 DIABETES MELLITUS WITH STAGE 3A CHRONIC KIDNEY DISEASE, WITH LONG-TERM CURRENT USE OF INSULIN: ICD-10-CM

## 2023-11-13 DIAGNOSIS — Z79.4 TYPE 2 DIABETES MELLITUS WITH STAGE 3A CHRONIC KIDNEY DISEASE, WITH LONG-TERM CURRENT USE OF INSULIN: ICD-10-CM

## 2023-11-13 DIAGNOSIS — N18.31 TYPE 2 DIABETES MELLITUS WITH STAGE 3A CHRONIC KIDNEY DISEASE, WITH LONG-TERM CURRENT USE OF INSULIN: ICD-10-CM

## 2023-11-13 LAB
ALBUMIN/CREAT UR: 10 UG/MG (ref 0–30)
CREAT UR-MCNC: 170 MG/DL (ref 15–325)
MICROALBUMIN UR DL<=1MG/L-MCNC: 17 UG/ML

## 2023-11-13 PROCEDURE — 82570 ASSAY OF URINE CREATININE: CPT | Mod: HCNC | Performed by: NURSE PRACTITIONER

## 2023-11-14 ENCOUNTER — PATIENT OUTREACH (OUTPATIENT)
Dept: ADMINISTRATIVE | Facility: HOSPITAL | Age: 68
End: 2023-11-14
Payer: MEDICARE

## 2023-12-03 DIAGNOSIS — I10 BENIGN ESSENTIAL HTN: ICD-10-CM

## 2023-12-03 NOTE — TELEPHONE ENCOUNTER
Care Due:                  Date            Visit Type   Department     Provider  --------------------------------------------------------------------------------                                EP -                              PRIMARY      [facility] FAMILY  Last Visit: 12-      CARE ([facility])   MEDICINE       Estela Grace  Next Visit: None Scheduled  None         None Found                                                            Last  Test          Frequency    Reason                     Performed    Due Date  --------------------------------------------------------------------------------    Office Visit  12 months..  naproxen.................  12- 12-    CBC.........  12 months..  naproxen.................  04- 03-    [employer] Embedded Care Due Messages. Reference number: [id number].   12/03/2023 8:02:10 AM CST

## 2023-12-04 RX ORDER — VALSARTAN AND HYDROCHLOROTHIAZIDE 320; 25 MG/1; MG/1
TABLET, FILM COATED ORAL
Qty: 90 TABLET | Refills: 0 | Status: SHIPPED | OUTPATIENT
Start: 2023-12-04 | End: 2023-12-24 | Stop reason: SDUPTHER

## 2023-12-04 NOTE — TELEPHONE ENCOUNTER
Provider Staff:  Action required for this patient    Requires appointment   Requires labs      Please see care gap opportunities below in Care Due Message.    Thanks!  [facility]     Appointments      Date Provider   Last Visit   12/12/2022 Estela Grace MD   Next Visit   Visit date not found Estela Grace MD     Refill Decision Note   Jessica Rojas  is requesting a refill authorization.  Brief Assessment and Rationale for Refill:  Approve     Medication Therapy Plan:         Comments:     Note composed:10:10 AM 12/04/2023

## 2023-12-11 ENCOUNTER — PATIENT OUTREACH (OUTPATIENT)
Dept: ADMINISTRATIVE | Facility: HOSPITAL | Age: 68
End: 2023-12-11
Payer: MEDICARE

## 2023-12-11 ENCOUNTER — PATIENT MESSAGE (OUTPATIENT)
Dept: ADMINISTRATIVE | Facility: HOSPITAL | Age: 68
End: 2023-12-11
Payer: MEDICARE

## 2023-12-11 DIAGNOSIS — Z12.31 ENCOUNTER FOR SCREENING MAMMOGRAM FOR MALIGNANT NEOPLASM OF BREAST: Primary | ICD-10-CM

## 2023-12-23 DIAGNOSIS — G20.A1 PARKINSON DISEASE: ICD-10-CM

## 2023-12-24 ENCOUNTER — PATIENT MESSAGE (OUTPATIENT)
Dept: ADMINISTRATIVE | Facility: OTHER | Age: 68
End: 2023-12-24
Payer: MEDICARE

## 2023-12-24 DIAGNOSIS — G89.29 CHRONIC PAIN OF RIGHT KNEE: ICD-10-CM

## 2023-12-24 DIAGNOSIS — M54.16 LUMBAR RADICULOPATHY: ICD-10-CM

## 2023-12-24 DIAGNOSIS — I10 BENIGN ESSENTIAL HTN: ICD-10-CM

## 2023-12-24 DIAGNOSIS — G20.A1 PARKINSON'S DISEASE: ICD-10-CM

## 2023-12-24 DIAGNOSIS — M25.561 CHRONIC PAIN OF RIGHT KNEE: ICD-10-CM

## 2023-12-24 DIAGNOSIS — E11.21 TYPE 2 DIABETES MELLITUS WITH DIABETIC NEPHROPATHY, WITH LONG-TERM CURRENT USE OF INSULIN: ICD-10-CM

## 2023-12-24 DIAGNOSIS — G20.A1 PARKINSON DISEASE: ICD-10-CM

## 2023-12-24 DIAGNOSIS — Z79.4 TYPE 2 DIABETES MELLITUS WITH DIABETIC NEPHROPATHY, WITH LONG-TERM CURRENT USE OF INSULIN: ICD-10-CM

## 2023-12-25 NOTE — TELEPHONE ENCOUNTER
No care due was identified.  [facility] Embedded Care Due Messages. Reference number: [id number].   12/24/2023 9:44:54 PM CST

## 2023-12-26 ENCOUNTER — HOSPITAL ENCOUNTER (OUTPATIENT)
Dept: RADIOLOGY | Facility: HOSPITAL | Age: 68
Discharge: HOME | End: 2023-12-26
Attending: INTERNAL MEDICINE
Payer: MEDICARE

## 2023-12-26 DIAGNOSIS — Z12.31 ENCOUNTER FOR SCREENING MAMMOGRAM FOR MALIGNANT NEOPLASM OF BREAST: ICD-10-CM

## 2023-12-26 PROCEDURE — 77067 MAMMO DIGITAL SCREENING BILAT WITH TOMO: ICD-10-PCS | Mod: 26,HCNC,, | Performed by: RADIOLOGY

## 2023-12-26 PROCEDURE — 77067 SCR MAMMO BI INCL CAD: CPT | Mod: 26,HCNC,, | Performed by: RADIOLOGY

## 2023-12-26 PROCEDURE — 77063 MAMMO DIGITAL SCREENING BILAT WITH TOMO: ICD-10-PCS | Mod: 26,HCNC,, | Performed by: RADIOLOGY

## 2023-12-26 PROCEDURE — 77067 SCR MAMMO BI INCL CAD: CPT | Mod: TC,HCNC,PO

## 2023-12-26 PROCEDURE — 77063 BREAST TOMOSYNTHESIS BI: CPT | Mod: 26,HCNC,, | Performed by: RADIOLOGY

## 2023-12-26 RX ORDER — VALSARTAN AND HYDROCHLOROTHIAZIDE 320; 25 MG/1; MG/1
1 TABLET, FILM COATED ORAL DAILY
Qty: 90 TABLET | Refills: 0 | Status: SHIPPED | OUTPATIENT
Start: 2023-12-26

## 2023-12-26 RX ORDER — DICLOFENAC SODIUM 10 MG/G
2 GEL TOPICAL DAILY
Qty: 100 G | Refills: 0 | Status: SHIPPED | OUTPATIENT
Start: 2023-12-26 | End: 2024-03-25

## 2023-12-26 RX ORDER — INSULIN GLARGINE 100 [IU]/ML
INJECTION, SOLUTION SUBCUTANEOUS
Qty: 15 ML | Refills: 11 | Status: SHIPPED | OUTPATIENT
Start: 2023-12-26

## 2023-12-26 RX ORDER — PRAMIPEXOLE DIHYDROCHLORIDE 0.25 MG/1
0.25 TABLET ORAL 3 TIMES DAILY
Qty: 90 TABLET | Refills: 11 | Status: SHIPPED | OUTPATIENT
Start: 2023-12-26

## 2023-12-26 RX ORDER — PRAMIPEXOLE DIHYDROCHLORIDE 0.25 MG/1
TABLET ORAL
Qty: 90 TABLET | Refills: 11 | Status: SHIPPED | OUTPATIENT
Start: 2023-12-26 | End: 2024-03-25 | Stop reason: SDUPTHER

## 2023-12-26 RX ORDER — GABAPENTIN 100 MG/1
100 CAPSULE ORAL 3 TIMES DAILY
Qty: 270 CAPSULE | Refills: 1 | Status: SHIPPED | OUTPATIENT
Start: 2023-12-26

## 2023-12-26 RX ORDER — MONTELUKAST SODIUM 10 MG/1
TABLET ORAL
Qty: 30 TABLET | Refills: 11 | OUTPATIENT
Start: 2023-12-26

## 2023-12-26 RX ORDER — SELEGILINE HYDROCHLORIDE 5 MG/1
5 CAPSULE ORAL 2 TIMES DAILY
Qty: 60 CAPSULE | Refills: 10 | Status: SHIPPED | OUTPATIENT
Start: 2023-12-26

## 2023-12-26 NOTE — TELEPHONE ENCOUNTER
Refill Routing Note   Medication(s) are not appropriate for processing by [facility] for the following reason(s):        Outside of protocol ( GABAPENTIN )    [facility] action(s):  Quick Discontinue  Route  Approve        Medication Therapy Plan: Pharmacy is requesting new scripts for the following medications without required information, (sig/ frequency/qty/etc) ; SINGULAR      Appointments  past 12m or future 3m with PCP    Date Provider   Last Visit   12/12/2022 Estela Grace MD   Next Visit   12/24/2023 Estela Grace MD   ED visits in past 90 days: 0        Note composed:4:26 PM 12/26/2023

## 2023-12-26 NOTE — TELEPHONE ENCOUNTER
Refill Routing Note   Medication(s) are not appropriate for processing by [facility] for the following reason(s):        Outside of protocol    [facility] action(s):  Route               Appointments  past 12m or future 3m with PCP    Date Provider   Last Visit   12/12/2022 Estela Grace MD   Next Visit   Visit date not found Estela Grace MD   ED visits in past 90 days: 0        Note composed:9:07 AM 12/26/2023

## 2023-12-27 NOTE — PROGRESS NOTES
Normal mammogram, repeat in 1 year, results released through [employer]. Please verify that patient has viewed results. If not, please call patient with interpretation below:    I have reviewed the results of your mammogram and it appears that everything was read as normal.  Based on this, the radiologist has recommended that you recheck a mammogram in 1 year.     Also please see below health maintenance items that are due:    Foot Exam due on 10/10/2023

## 2024-01-26 ENCOUNTER — LAB VISIT (OUTPATIENT)
Dept: LAB | Facility: HOSPITAL | Age: 69
End: 2024-01-26
Attending: NURSE PRACTITIONER
Payer: MEDICARE

## 2024-01-26 ENCOUNTER — OFFICE VISIT (OUTPATIENT)
Dept: ENDOCRINOLOGY | Facility: CLINIC | Age: 69
End: 2024-01-26
Payer: MEDICARE

## 2024-01-26 VITALS
HEIGHT: 61 IN | BODY MASS INDEX: 32.3 KG/M2 | WEIGHT: 171.06 LBS | OXYGEN SATURATION: 98 % | HEART RATE: 82 BPM | SYSTOLIC BLOOD PRESSURE: 116 MMHG | DIASTOLIC BLOOD PRESSURE: 80 MMHG

## 2024-01-26 DIAGNOSIS — E89.0 POST-OPERATIVE HYPOTHYROIDISM: ICD-10-CM

## 2024-01-26 DIAGNOSIS — Z79.4 TYPE 2 DIABETES MELLITUS WITH STAGE 3A CHRONIC KIDNEY DISEASE, WITH LONG-TERM CURRENT USE OF INSULIN: Primary | ICD-10-CM

## 2024-01-26 DIAGNOSIS — E11.22 TYPE 2 DIABETES MELLITUS WITH STAGE 3A CHRONIC KIDNEY DISEASE, WITH LONG-TERM CURRENT USE OF INSULIN: Primary | ICD-10-CM

## 2024-01-26 DIAGNOSIS — E11.22 TYPE 2 DIABETES MELLITUS WITH STAGE 3A CHRONIC KIDNEY DISEASE, WITH LONG-TERM CURRENT USE OF INSULIN: ICD-10-CM

## 2024-01-26 DIAGNOSIS — N18.31 TYPE 2 DIABETES MELLITUS WITH STAGE 3A CHRONIC KIDNEY DISEASE, WITH LONG-TERM CURRENT USE OF INSULIN: ICD-10-CM

## 2024-01-26 DIAGNOSIS — E78.2 MIXED HYPERLIPIDEMIA: ICD-10-CM

## 2024-01-26 DIAGNOSIS — E66.01 SEVERE OBESITY (BMI 35.0-39.9) WITH COMORBIDITY: ICD-10-CM

## 2024-01-26 DIAGNOSIS — N18.31 TYPE 2 DIABETES MELLITUS WITH STAGE 3A CHRONIC KIDNEY DISEASE, WITH LONG-TERM CURRENT USE OF INSULIN: Primary | ICD-10-CM

## 2024-01-26 DIAGNOSIS — Z79.4 TYPE 2 DIABETES MELLITUS WITH STAGE 3A CHRONIC KIDNEY DISEASE, WITH LONG-TERM CURRENT USE OF INSULIN: ICD-10-CM

## 2024-01-26 DIAGNOSIS — I10 BENIGN ESSENTIAL HTN: ICD-10-CM

## 2024-01-26 LAB
ALBUMIN SERPL BCP-MCNC: 3.8 G/DL (ref 3.5–5.2)
ALP SERPL-CCNC: 83 U/L (ref 55–135)
ALT SERPL W/O P-5'-P-CCNC: 9 U/L (ref 10–44)
ANION GAP SERPL CALC-SCNC: 11 MMOL/L (ref 8–16)
AST SERPL-CCNC: 17 U/L (ref 10–40)
BILIRUB SERPL-MCNC: 0.5 MG/DL (ref 0.1–1)
BUN SERPL-MCNC: 20 MG/DL (ref 8–23)
CALCIUM SERPL-MCNC: 10.5 MG/DL (ref 8.7–10.5)
CHLORIDE SERPL-SCNC: 101 MMOL/L (ref 95–110)
CHOLEST SERPL-MCNC: 141 MG/DL (ref 120–199)
CHOLEST/HDLC SERPL: 2.5 {RATIO} (ref 2–5)
CO2 SERPL-SCNC: 29 MMOL/L (ref 23–29)
CREAT SERPL-MCNC: 1.2 MG/DL (ref 0.5–1.4)
EST. GFR  (NO RACE VARIABLE): 48.7 ML/MIN/1.73 M^2
ESTIMATED AVG GLUCOSE: 117 MG/DL (ref 68–131)
GLUCOSE SERPL-MCNC: 128 MG/DL (ref 70–110)
HBA1C MFR BLD: 5.7 % (ref 4–5.6)
HDLC SERPL-MCNC: 57 MG/DL (ref 40–75)
HDLC SERPL: 40.4 % (ref 20–50)
LDLC SERPL CALC-MCNC: 57.8 MG/DL (ref 63–159)
NONHDLC SERPL-MCNC: 84 MG/DL
POTASSIUM SERPL-SCNC: 4.4 MMOL/L (ref 3.5–5.1)
PROT SERPL-MCNC: 7.2 G/DL (ref 6–8.4)
SODIUM SERPL-SCNC: 141 MMOL/L (ref 136–145)
T4 FREE SERPL-MCNC: 1.2 NG/DL (ref 0.71–1.51)
TRIGL SERPL-MCNC: 131 MG/DL (ref 30–150)
TSH SERPL DL<=0.005 MIU/L-ACNC: 0.11 UIU/ML (ref 0.4–4)

## 2024-01-26 PROCEDURE — 84443 ASSAY THYROID STIM HORMONE: CPT | Mod: HCNC | Performed by: NURSE PRACTITIONER

## 2024-01-26 PROCEDURE — 1159F MED LIST DOCD IN RCRD: CPT | Mod: HCNC,CPTII,S$GLB, | Performed by: NURSE PRACTITIONER

## 2024-01-26 PROCEDURE — 80053 COMPREHEN METABOLIC PANEL: CPT | Mod: HCNC | Performed by: NURSE PRACTITIONER

## 2024-01-26 PROCEDURE — 99214 OFFICE O/P EST MOD 30 MIN: CPT | Mod: HCNC,S$GLB,, | Performed by: NURSE PRACTITIONER

## 2024-01-26 PROCEDURE — 80061 LIPID PANEL: CPT | Mod: HCNC | Performed by: NURSE PRACTITIONER

## 2024-01-26 PROCEDURE — 1101F PT FALLS ASSESS-DOCD LE1/YR: CPT | Mod: HCNC,CPTII,S$GLB, | Performed by: NURSE PRACTITIONER

## 2024-01-26 PROCEDURE — 36415 COLL VENOUS BLD VENIPUNCTURE: CPT | Mod: HCNC,PO | Performed by: NURSE PRACTITIONER

## 2024-01-26 PROCEDURE — 3079F DIAST BP 80-89 MM HG: CPT | Mod: HCNC,CPTII,S$GLB, | Performed by: NURSE PRACTITIONER

## 2024-01-26 PROCEDURE — 3008F BODY MASS INDEX DOCD: CPT | Mod: HCNC,CPTII,S$GLB, | Performed by: NURSE PRACTITIONER

## 2024-01-26 PROCEDURE — 84439 ASSAY OF FREE THYROXINE: CPT | Mod: HCNC | Performed by: NURSE PRACTITIONER

## 2024-01-26 PROCEDURE — 1126F AMNT PAIN NOTED NONE PRSNT: CPT | Mod: HCNC,CPTII,S$GLB, | Performed by: NURSE PRACTITIONER

## 2024-01-26 PROCEDURE — 83036 HEMOGLOBIN GLYCOSYLATED A1C: CPT | Mod: HCNC | Performed by: NURSE PRACTITIONER

## 2024-01-26 PROCEDURE — 3074F SYST BP LT 130 MM HG: CPT | Mod: HCNC,CPTII,S$GLB, | Performed by: NURSE PRACTITIONER

## 2024-01-26 PROCEDURE — 99999 PR PBB SHADOW E&M-EST. PATIENT-LVL III: CPT | Mod: PBBFAC,HCNC,, | Performed by: NURSE PRACTITIONER

## 2024-01-26 PROCEDURE — 3288F FALL RISK ASSESSMENT DOCD: CPT | Mod: HCNC,CPTII,S$GLB, | Performed by: NURSE PRACTITIONER

## 2024-01-26 RX ORDER — INSULIN ASPART 100 [IU]/ML
INJECTION, SOLUTION INTRAVENOUS; SUBCUTANEOUS
COMMUNITY
Start: 2023-12-11 | End: 2024-01-26

## 2024-01-26 NOTE — PROGRESS NOTES
CC: This 68 y.o. female presents for management of diabetes and chronic conditions pending review including HTN, HLP, morbid obesity, hypothyroidism, vitamin d deficiency     HPI: She was diagnosed with T2DM in ~ 2013. She was hospitalized r/t DM in 2016 for DKA.   Family hx of DM: mom, dad, and sisters      Left Dexcom  at home   Reports bg 120-250     Diet: Eats 2  Meals a day, snacks PB crackers    grapefruit juice as breakfast   Will take an extra 10 units if Bg rises > 250 after a meal   Hypoglycemia in the middle of the night a few times a week       Exercise: walking 30 mins/day, 5 days a week     CURRENT DM MEDS:  lantus 10-20 u qam;  Humalog 10  u AC  + correction 150/25; Mounjaro 10 mg weekly (Wednesday)  Timing prandial insulin 5-15 minutes before meals: before    Vial/pen:  Uses pens  Glucometer type:  [employer] 2020    Standards of Care:  Eye exam: + mild DM retinopathy  6/2023 Dr Nova     Regarding thyroid:  Takes LT4 150 mcg, correctly qam  Nodules was found on on a PET scan. FNA 11/25/14 shows adenomatous nodule with cystic changes. S/p total thyroidectomy 5/24/16.       No hoarseness, voice changes, +dysphagia- from Parkinson's, no compressive symptoms, or head/neck exposure to XRT.   No personal or FH of thyroid cancer or MEN syndrome.   Not taking biotin.   + tremors of the hands- has Parkinson's   No intolerance to the heat or cold  + hair loss       ROS:  Gen: Appetite good,  Weight loss 6 lbs  Eyes:+ visual disturbances  Resp: no SOB or ESPINAL, no cough  Cardiac: No palpitations, chest pain   GI: No nausea or vomiting, diarrhea, + constipation   /GYN: 1+ nocturia, no burning or pain.   MS/Neuro: + tingling in her feet, speech clear,   Psych: Denies drug/ETOH abuse, no hx of depression.  Other systems: negative.    PE:  GENERAL: Well developed, well nourished.  PSYCH: AAOx3, appropriate mood and affect, pleasant expression, conversant, appears relaxed, well groomed.   EYES: Conjunctiva,  corneas clear  NECK: Supple, trachea midline   ABDOMEN: Soft, non-tender, non-distended   SKIN:  no acanthosis nigracans.  FOOT EXAMINATION:  1/26/2024   No foot deformity, +Onychomycosis,  no interspace maceration or ulceration noted.  Decreased hair growth present over toes/feet.    Protective sensation intact with 10 gram monofilament.  +2 dorsalis pedis and posterior pulses noted.      Lab Results   Component Value Date    MICALBCREAT 10.0 11/13/2023       Hemoglobin A1C   Date Value Ref Range Status   11/13/2023 5.8 (H) 4.0 - 5.6 % Final     Comment:     ADA Screening Guidelines:  5.7-6.4%  Consistent with prediabetes  >or=6.5%  Consistent with diabetes    High levels of fetal hemoglobin interfere with the HbA1C  assay. Heterozygous hemoglobin variants (HbS, HgC, etc)do  not significantly interfere with this assay.   However, presence of multiple variants may affect accuracy.     07/10/2023 5.8 (H) 4.0 - 5.6 % Final     Comment:     ADA Screening Guidelines:  5.7-6.4%  Consistent with prediabetes  >or=6.5%  Consistent with diabetes    High levels of fetal hemoglobin interfere with the HbA1C  assay. Heterozygous hemoglobin variants (HbS, HgC, etc)do  not significantly interfere with this assay.   However, presence of multiple variants may affect accuracy.     04/03/2023 7.0 (H) 4.0 - 5.6 % Final     Comment:     ADA Screening Guidelines:  5.7-6.4%  Consistent with prediabetes  >or=6.5%  Consistent with diabetes    High levels of fetal hemoglobin interfere with the HbA1C  assay. Heterozygous hemoglobin variants (HbS, HgC, etc)do  not significantly interfere with this assay.   However, presence of multiple variants may affect accuracy.          ASSESSMENT and PLAN:    1. T2DM with nephropathy-     Fasting labs today  Continue lantus 12 u qam- Do NOT titrate  Dexcom download next week  D/c  Novolog,  Continue Memeevhy52 mg weekly  Continue Dexcom G7-     2. HTN - controlled today, continue meds as previously  prescribed and monitor.     3. HLP -   unable to tolerate statins r/t myalgia; Continue repatha     4. Post Surgical Hypothyroidism for MNG. S/p total thyroidectomy w pathology benign.  Check TSH today    levothyroxine 150 mcg daily,     5. Obesity - Continue exercise and weight loss;   Body mass index is 32.32 kg/m².    Follow-up: in 4 months with A1C

## 2024-01-29 ENCOUNTER — TELEPHONE (OUTPATIENT)
Dept: ENDOCRINOLOGY | Facility: CLINIC | Age: 69
End: 2024-01-29
Payer: MEDICARE

## 2024-01-29 DIAGNOSIS — E89.0 POST-OPERATIVE HYPOTHYROIDISM: Primary | ICD-10-CM

## 2024-01-29 RX ORDER — LEVOTHYROXINE SODIUM 150 UG/1
TABLET ORAL
Qty: 30 TABLET | Refills: 11
Start: 2024-01-29 | End: 2024-03-15

## 2024-01-30 ENCOUNTER — TELEPHONE (OUTPATIENT)
Dept: ENDOCRINOLOGY | Facility: CLINIC | Age: 69
End: 2024-01-30

## 2024-01-30 ENCOUNTER — CLINICAL SUPPORT (OUTPATIENT)
Dept: ENDOCRINOLOGY | Facility: CLINIC | Age: 69
End: 2024-01-30
Payer: MEDICARE

## 2024-01-30 DIAGNOSIS — Z79.4 TYPE 2 DIABETES MELLITUS WITH STAGE 3A CHRONIC KIDNEY DISEASE, WITH LONG-TERM CURRENT USE OF INSULIN: Primary | ICD-10-CM

## 2024-01-30 DIAGNOSIS — E11.22 TYPE 2 DIABETES MELLITUS WITH STAGE 3A CHRONIC KIDNEY DISEASE, WITH LONG-TERM CURRENT USE OF INSULIN: Primary | ICD-10-CM

## 2024-01-30 DIAGNOSIS — N18.31 TYPE 2 DIABETES MELLITUS WITH STAGE 3A CHRONIC KIDNEY DISEASE, WITH LONG-TERM CURRENT USE OF INSULIN: Primary | ICD-10-CM

## 2024-01-30 NOTE — PROGRESS NOTES
Patient returned to clinic today to upload Dexcom.      The Dexcom will be scanned and downloaded. All reports will be imported into the patient's electronic medical record.     Endocrine provider will complete data interpretation and make recommendations.

## 2024-01-30 NOTE — TELEPHONE ENCOUNTER
Pt came to clinic for Dexcom upload. Has d/c Novolog and doing 12 units of Lantus as recommended at 1/26 visit. Please review & advise.

## 2024-02-07 ENCOUNTER — PATIENT MESSAGE (OUTPATIENT)
Dept: RESEARCH | Facility: HOSPITAL | Age: 69
End: 2024-02-07
Payer: MEDICARE

## 2024-02-19 ENCOUNTER — PATIENT MESSAGE (OUTPATIENT)
Dept: ADMINISTRATIVE | Facility: OTHER | Age: 69
End: 2024-02-19
Payer: MEDICARE

## 2024-02-28 DIAGNOSIS — N18.31 TYPE 2 DIABETES MELLITUS WITH STAGE 3A CHRONIC KIDNEY DISEASE, WITH LONG-TERM CURRENT USE OF INSULIN: ICD-10-CM

## 2024-02-28 DIAGNOSIS — E11.22 TYPE 2 DIABETES MELLITUS WITH STAGE 3A CHRONIC KIDNEY DISEASE, WITH LONG-TERM CURRENT USE OF INSULIN: ICD-10-CM

## 2024-02-28 DIAGNOSIS — Z79.4 TYPE 2 DIABETES MELLITUS WITH STAGE 3A CHRONIC KIDNEY DISEASE, WITH LONG-TERM CURRENT USE OF INSULIN: ICD-10-CM

## 2024-02-28 RX ORDER — TIRZEPATIDE 10 MG/.5ML
INJECTION, SOLUTION SUBCUTANEOUS
Qty: 4 PEN | Refills: 6 | Status: SHIPPED | OUTPATIENT
Start: 2024-02-28

## 2024-03-13 ENCOUNTER — LAB VISIT (OUTPATIENT)
Dept: LAB | Facility: HOSPITAL | Age: 69
End: 2024-03-13
Attending: NURSE PRACTITIONER
Payer: MEDICARE

## 2024-03-13 DIAGNOSIS — E89.0 POST-OPERATIVE HYPOTHYROIDISM: ICD-10-CM

## 2024-03-13 LAB
T4 FREE SERPL-MCNC: 1.31 NG/DL (ref 0.71–1.51)
TSH SERPL DL<=0.005 MIU/L-ACNC: 0.14 UIU/ML (ref 0.4–4)

## 2024-03-13 PROCEDURE — 84439 ASSAY OF FREE THYROXINE: CPT | Mod: HCNC | Performed by: NURSE PRACTITIONER

## 2024-03-13 PROCEDURE — 84443 ASSAY THYROID STIM HORMONE: CPT | Mod: HCNC | Performed by: NURSE PRACTITIONER

## 2024-03-13 PROCEDURE — 36415 COLL VENOUS BLD VENIPUNCTURE: CPT | Mod: HCNC,PO | Performed by: NURSE PRACTITIONER

## 2024-03-15 ENCOUNTER — TELEPHONE (OUTPATIENT)
Dept: ENDOCRINOLOGY | Facility: CLINIC | Age: 69
End: 2024-03-15
Payer: MEDICARE

## 2024-03-15 DIAGNOSIS — E89.0 POST-OPERATIVE HYPOTHYROIDISM: Primary | ICD-10-CM

## 2024-03-15 RX ORDER — LEVOTHYROXINE SODIUM 125 UG/1
125 TABLET ORAL
Qty: 30 TABLET | Refills: 11 | Status: SHIPPED | OUTPATIENT
Start: 2024-03-15 | End: 2025-03-15

## 2024-03-15 NOTE — TELEPHONE ENCOUNTER
TSH remains suppressed  - please add TSH to lab prior to next visit  Decrease LT4 to 125 mcg daily

## 2024-03-16 ENCOUNTER — PATIENT MESSAGE (OUTPATIENT)
Dept: ADMINISTRATIVE | Facility: OTHER | Age: 69
End: 2024-03-16
Payer: MEDICARE

## 2024-03-25 ENCOUNTER — TELEPHONE (OUTPATIENT)
Dept: FAMILY MEDICINE | Facility: CLINIC | Age: 69
End: 2024-03-25
Payer: MEDICARE

## 2024-03-25 ENCOUNTER — OFFICE VISIT (OUTPATIENT)
Dept: FAMILY MEDICINE | Facility: CLINIC | Age: 69
End: 2024-03-25
Payer: MEDICARE

## 2024-03-25 VITALS
SYSTOLIC BLOOD PRESSURE: 97 MMHG | BODY MASS INDEX: 31.34 KG/M2 | HEIGHT: 61 IN | HEART RATE: 85 BPM | WEIGHT: 166 LBS | DIASTOLIC BLOOD PRESSURE: 57 MMHG | TEMPERATURE: 98 F

## 2024-03-25 DIAGNOSIS — G20.A1 PARKINSON'S DISEASE, UNSPECIFIED WHETHER DYSKINESIA PRESENT, UNSPECIFIED WHETHER MANIFESTATIONS FLUCTUATE: ICD-10-CM

## 2024-03-25 DIAGNOSIS — K21.9 GASTROESOPHAGEAL REFLUX DISEASE, UNSPECIFIED WHETHER ESOPHAGITIS PRESENT: ICD-10-CM

## 2024-03-25 DIAGNOSIS — E11.22 TYPE 2 DIABETES MELLITUS WITH STAGE 3A CHRONIC KIDNEY DISEASE, WITH LONG-TERM CURRENT USE OF INSULIN: Primary | ICD-10-CM

## 2024-03-25 DIAGNOSIS — N18.31 TYPE 2 DIABETES MELLITUS WITH STAGE 3A CHRONIC KIDNEY DISEASE, WITH LONG-TERM CURRENT USE OF INSULIN: Primary | ICD-10-CM

## 2024-03-25 DIAGNOSIS — I70.0 ATHEROSCLEROSIS OF AORTA: ICD-10-CM

## 2024-03-25 DIAGNOSIS — E89.0 POST-OPERATIVE HYPOTHYROIDISM: ICD-10-CM

## 2024-03-25 DIAGNOSIS — I10 BENIGN ESSENTIAL HTN: ICD-10-CM

## 2024-03-25 DIAGNOSIS — Z79.4 TYPE 2 DIABETES MELLITUS WITH STAGE 3A CHRONIC KIDNEY DISEASE, WITH LONG-TERM CURRENT USE OF INSULIN: Primary | ICD-10-CM

## 2024-03-25 DIAGNOSIS — Z85.110 HISTORY OF MALIGNANT CARCINOID TUMOR OF BRONCHUS AND LUNG: ICD-10-CM

## 2024-03-25 DIAGNOSIS — E78.2 MIXED HYPERLIPIDEMIA: ICD-10-CM

## 2024-03-25 DIAGNOSIS — K59.09 CHRONIC CONSTIPATION: ICD-10-CM

## 2024-03-25 DIAGNOSIS — J30.9 ALLERGIC RHINITIS, UNSPECIFIED SEASONALITY, UNSPECIFIED TRIGGER: ICD-10-CM

## 2024-03-25 PROCEDURE — 3078F DIAST BP <80 MM HG: CPT | Mod: HCNC,CPTII,S$GLB, | Performed by: INTERNAL MEDICINE

## 2024-03-25 PROCEDURE — 3066F NEPHROPATHY DOC TX: CPT | Mod: HCNC,CPTII,S$GLB, | Performed by: INTERNAL MEDICINE

## 2024-03-25 PROCEDURE — 99214 OFFICE O/P EST MOD 30 MIN: CPT | Mod: HCNC,S$GLB,, | Performed by: INTERNAL MEDICINE

## 2024-03-25 PROCEDURE — 3288F FALL RISK ASSESSMENT DOCD: CPT | Mod: HCNC,CPTII,S$GLB, | Performed by: INTERNAL MEDICINE

## 2024-03-25 PROCEDURE — 3074F SYST BP LT 130 MM HG: CPT | Mod: HCNC,CPTII,S$GLB, | Performed by: INTERNAL MEDICINE

## 2024-03-25 PROCEDURE — 3008F BODY MASS INDEX DOCD: CPT | Mod: HCNC,CPTII,S$GLB, | Performed by: INTERNAL MEDICINE

## 2024-03-25 PROCEDURE — 1126F AMNT PAIN NOTED NONE PRSNT: CPT | Mod: HCNC,CPTII,S$GLB, | Performed by: INTERNAL MEDICINE

## 2024-03-25 PROCEDURE — 3044F HG A1C LEVEL LT 7.0%: CPT | Mod: HCNC,CPTII,S$GLB, | Performed by: INTERNAL MEDICINE

## 2024-03-25 PROCEDURE — 3061F NEG MICROALBUMINURIA REV: CPT | Mod: HCNC,CPTII,S$GLB, | Performed by: INTERNAL MEDICINE

## 2024-03-25 PROCEDURE — 1101F PT FALLS ASSESS-DOCD LE1/YR: CPT | Mod: HCNC,CPTII,S$GLB, | Performed by: INTERNAL MEDICINE

## 2024-03-25 PROCEDURE — 99999 PR PBB SHADOW E&M-EST. PATIENT-LVL V: CPT | Mod: PBBFAC,HCNC,, | Performed by: INTERNAL MEDICINE

## 2024-03-25 RX ORDER — TIRZEPATIDE 7.5 MG/.5ML
7.5 INJECTION, SOLUTION SUBCUTANEOUS
COMMUNITY
Start: 2024-03-04 | End: 2024-05-20

## 2024-03-25 RX ORDER — FLUTICASONE PROPIONATE 50 MCG
1 SPRAY, SUSPENSION (ML) NASAL DAILY
Qty: 18.2 ML | Refills: 0 | Status: SHIPPED | OUTPATIENT
Start: 2024-03-25

## 2024-03-25 RX ORDER — PANTOPRAZOLE SODIUM 20 MG/1
20 TABLET, DELAYED RELEASE ORAL DAILY
Qty: 30 TABLET | Refills: 1 | Status: SHIPPED | OUTPATIENT
Start: 2024-03-25 | End: 2024-05-17

## 2024-03-25 RX ORDER — INSULIN ASPART 100 [IU]/ML
5 INJECTION, SOLUTION INTRAVENOUS; SUBCUTANEOUS NIGHTLY
COMMUNITY
End: 2024-05-20

## 2024-03-25 RX ORDER — LEVOCETIRIZINE DIHYDROCHLORIDE 5 MG/1
5 TABLET, FILM COATED ORAL NIGHTLY
Qty: 30 TABLET | Refills: 0 | Status: SHIPPED | OUTPATIENT
Start: 2024-03-25 | End: 2025-03-25

## 2024-03-25 NOTE — TELEPHONE ENCOUNTER
----- Message from Erica Laboy sent at 3/25/2024  1:44 PM CDT -----  Type:  Sooner Apoointment Request    Caller is requesting a sooner appointment.  Caller declined first available appointment listed below.  Caller will not accept being placed on the waitlist and is requesting a message be sent to doctor.  Name of Caller:pt   When is the first available appointment?  Symptoms:nurse visit   Would the patient rather a call back or a response via [employer]? Call   Best Call Back Number:[phone]  Additional Information: states she canceled nurse visit for eliana as it was supposed to be scheduled for 04/02 between 8:30 and 10

## 2024-03-25 NOTE — TELEPHONE ENCOUNTER
----- Message from Erica Laboy sent at 3/25/2024  1:44 PM CDT -----  Type:  Sooner Apoointment Request    Caller is requesting a sooner appointment.  Caller declined first available appointment listed below.  Caller will not accept being placed on the waitlist and is requesting a message be sent to doctor.  Name of Caller:pt   When is the first available appointment?  Symptoms:nurse visit   Would the patient rather a call back or a response via [employer]? Call   Best Call Back Number:[phone]  Additional Information: states she canceled nurse visit for eliana as it was supposed to be scheduled for 04/02 between 8:30 and 10

## 2024-04-04 ENCOUNTER — CLINICAL SUPPORT (OUTPATIENT)
Dept: FAMILY MEDICINE | Facility: CLINIC | Age: 69
End: 2024-04-04
Payer: MEDICARE

## 2024-04-04 VITALS — SYSTOLIC BLOOD PRESSURE: 127 MMHG | HEART RATE: 87 BPM | DIASTOLIC BLOOD PRESSURE: 68 MMHG

## 2024-04-04 DIAGNOSIS — Z01.30 BP CHECK: Primary | ICD-10-CM

## 2024-04-04 PROCEDURE — 99999 PR PBB SHADOW E&M-EST. PATIENT-LVL III: CPT | Mod: PBBFAC,HCNC,,

## 2024-04-04 NOTE — PROGRESS NOTES
Pt in clinic for orthostatic BP check. Lying down BP was 131/66 and HR was 87. Sitting down BP was 138/76 and HR is 85. Standing BP is 127/68 and HR was 87. Message routed to pt PCP.

## 2024-04-04 NOTE — Clinical Note
Pt in clinic for orthostatic BP check. Lying down BP was 131/66 and HR was 87. Sitting down BP was 138/76 and HR is 85. Standing BP is 127/68 and HR was 87

## 2024-04-08 ENCOUNTER — TELEPHONE (OUTPATIENT)
Dept: FAMILY MEDICINE | Facility: CLINIC | Age: 69
End: 2024-04-08
Payer: MEDICARE

## 2024-04-08 PROBLEM — C80.1 CANCER: Status: RESOLVED | Noted: 2022-06-11 | Resolved: 2024-04-08

## 2024-04-08 PROBLEM — R13.10 DYSPHAGIA: Status: RESOLVED | Noted: 2021-09-16 | Resolved: 2024-04-08

## 2024-04-08 PROBLEM — U07.1 COVID-19: Status: RESOLVED | Noted: 2022-05-30 | Resolved: 2024-04-08

## 2024-04-08 PROBLEM — C7A.090 MALIGNANT CARCINOID TUMOR OF BRONCHUS AND LUNG: Status: RESOLVED | Noted: 2022-03-20 | Resolved: 2024-04-08

## 2024-04-08 PROBLEM — E66.01 SEVERE OBESITY (BMI 35.0-39.9) WITH COMORBIDITY: Status: RESOLVED | Noted: 2024-01-26 | Resolved: 2024-04-08

## 2024-04-08 PROBLEM — Z79.4 TYPE 2 DIABETES MELLITUS WITH DIABETIC NEPHROPATHY, WITH LONG-TERM CURRENT USE OF INSULIN: Status: RESOLVED | Noted: 2018-08-07 | Resolved: 2024-04-08

## 2024-04-08 PROBLEM — K59.09 CHRONIC CONSTIPATION: Status: ACTIVE | Noted: 2019-03-31

## 2024-04-08 PROBLEM — E11.21 TYPE 2 DIABETES MELLITUS WITH DIABETIC NEPHROPATHY, WITH LONG-TERM CURRENT USE OF INSULIN: Status: RESOLVED | Noted: 2018-08-07 | Resolved: 2024-04-08

## 2024-04-08 NOTE — PROGRESS NOTES
Assessment/Plan:    Problem List Items Addressed This Visit          Neuro    Parkinson's disease    Overview     -managed by neurology  -currently on carbidopa/levodopa  mg TID, pramipexole 0.25 mg TID, selegiline 5 mg BID, and amantidine 100 mg BID  -patient reports symptoms remain stable            Cardiac/Vascular    Mixed hyperlipidemia    Overview     Hyperlipidemia Medications               evolocumab (REPATHA SURECLICK) 140 mg/mL PnIj Inject 1 mL (140 mg total) into the skin every 14 (fourteen) days.        -chronic condition. Currently stable.    -reports compliance with hyperlipidemia treatment as prescribed  -denies any known adverse effects of medications  -most recent labs listed below:  Lab Results   Component Value Date    CHOL 141 01/26/2024     Lab Results   Component Value Date    HDL 57 01/26/2024     Lab Results   Component Value Date    LDLCALC 57.8 (L) 01/26/2024     Lab Results   Component Value Date    TRIG 131 01/26/2024     Lab Results   Component Value Date    ALT 9 (L) 01/26/2024    AST 17 01/26/2024    ALKPHOS 83 01/26/2024    BILITOT 0.5 01/26/2024             Benign essential HTN    Overview     -at goal today but recent complaint of postural dizziness/lightheadedness  -+orthostatic vitals in clinic  -currently on valsartan-HCTZ 320-12.5 mg daily  -plan to decrease dose by 1/2 and RTC for repeat orthostatics in 1-2 weeks  -continue lifestyle modification with low sodium diet and exercise   -discussed hypertension disease course and importance of treating high blood pressure  -patient understood and advised of risk of untreated blood pressure.  ER precautions were given   for symptoms of hypertensive urgency and emergency.         Atherosclerosis of aorta    Overview     -noted on imaging  -started on repatha with improvement of lipids            Oncology    History of malignant carcinoid tumor of bronchus and lung    Overview     -followed by oncology and neuroendocrine  -s/p R  middle and lower lung resection  -followed with surveillance CT            Endocrine    Type 2 diabetes mellitus with stage 3 chronic kidney disease, with long-term current use of insulin - Primary    Overview     Diabetes Medications               insulin (LANTUS SOLOSTAR U-100 INSULIN) glargine 100 units/mL SubQ pen 12 u BID    tirzepatide (MOUNJARO) 10 mg/0.5 mL PnIj inject 10mg SUBCUTANEOUSLY EVERY 7 DAYS   -condition is currently controlled  -followed by endocrinology  -see diabetic health maintenance listed below  -on statin: No-intolerant  -on ACE-I/ARB: Yes  -counseling provided on importance of diabetic diet and medication compliance in order to treat diabetes  -discussed diabetes disease course and potential complications         Relevant Medications    insulin aspart U-100 (NOVOLOG) 100 unit/mL injection    Post-operative hypothyroidism    Overview     Lab Results   Component Value Date    TSH 0.143 (L) 03/13/2024   -s/p total thyroidectomy in 2016 (parathyroids intact) with post op hypothyroidism  -levothyroxine recently decrease to 125 mcg and has repeat labs scheduled            GI    Chronic constipation    Overview     Likely non motor sx of PD         Relevant Medications    linaCLOtide (LINZESS) 145 mcg Cap capsule     Other Visit Diagnoses       Allergic rhinitis, unspecified seasonality, unspecified trigger        Relevant Medications    levocetirizine (XYZAL) 5 MG tablet    fluticasone propionate (FLONASE) 50 mcg/actuation nasal spray    Gastroesophageal reflux disease, unspecified whether esophagitis present        Relevant Medications    pantoprazole (PROTONIX) 20 MG tablet            Follow up for nurse visit BP with orthostatic vitals in 1 week; Add Cristiane TSH to labs in May.    Estela Grace MD  _____________________________________________________________________________________________________________________________________________________    CC: follow up of chronic medical  conditions     HPI:    Patient is in clinic today as an established patient.    HTN: The patient is currently being treated for essential hypertension. This condition is chronic. She reports good compliance with medications. The patient denies headache, vision changes, chest pain, palpitations, shortness of breath, or lower extremity edema, however she has been experiencing some lightheadedness/dizziness upon standing. Only occurring with postural changes. She has not been checking BP at home. Denies any other associated symptoms. Counseling was offered regarding low sodium diet.  The patient has a reduced salt intake. Routine exercise recommended.     DM2: Patient presents for follow up of diabetes. Condition is chronic and stable. Patient denies symptoms, including foot ulcerations, hyperglycemia, hypoglycemia , nausea, paresthesia of the feet, polydipsia, polyuria and visual disturbances.  Evaluation to date has been included: fasting blood sugar, fasting lipid panel, hemoglobin A1C and microalbuminuria. Denies adverse effects of current medications.     Diabetes Management Status    Statin: Not taking  ACE/ARB: Taking    Screening or Prevention Patient's value Goal Complete/Controlled?   HgA1C Testing and Control   Lab Results   Component Value Date    HGBA1C 5.7 (H) 01/26/2024      Annually/Less than 8% Yes   Lipid profile : 01/26/2024 Annually Yes   LDL control Lab Results   Component Value Date    LDLCALC 57.8 (L) 01/26/2024    Annually/Less than 100 mg/dl  Yes   Nephropathy screening Lab Results   Component Value Date    LABMICR 19.0 01/26/2024     Lab Results   Component Value Date    PROTEINUA Negative 04/02/2022    Annually Yes   Blood pressure BP Readings from Last 1 Encounters:   04/04/24 127/68    Less than 140/90 Yes   Dilated retinal exam : 11/02/2023 Annually Yes   Foot exam   : 01/26/2024 Annually Yes     No other new complaints today.  Remaining chronic conditions have been reviewed and remain  stable. Further detail as stated above.     HM reviewed.     No recent changes to medical/surgical history.    Current Outpatient Medications on File Prior to Visit   Medication Sig Dispense Refill    alcohol swabs (ALCOHOL WIPES) PadM Uses 5 daily 400 each 4    amantadine HCL (SYMMETREL) 100 mg capsule Take 1 capsule (100 mg total) by mouth 2 (two) times daily. 60 capsule 11    ascorbic acid, vitamin C, (VITAMIN C) 100 MG tablet Take 100 mg by mouth once daily.      carbidopa-levodopa  mg (SINEMET)  mg per tablet Take 1 tablet three times daily 90 tablet 11    cholecalciferol, vitamin D3, 10 mcg (400 unit) Cap Take 1,200 Units by mouth once daily.      cyanocobalamin (VITAMIN B-12) 1000 MCG tablet Take 100 mcg by mouth once daily.      diclofenac sodium (VOLTAREN) 1 % Gel Apply 2 g topically once daily. 100 g 0    evolocumab (REPATHA SURECLICK) 140 mg/mL PnIj Inject 1 mL (140 mg total) into the skin every 14 (fourteen) days. 2 mL 12    gabapentin (NEURONTIN) 100 MG capsule Take 1 capsule (100 mg total) by mouth 3 (three) times daily. Takes at night-takes  capsule 1    insulin (LANTUS SOLOSTAR U-100 INSULIN) glargine 100 units/mL SubQ pen 12 u qam 15 mL 11    lancing device Misc Checks bg 7-8 times a day 1 each 0    levothyroxine (SYNTHROID) 125 MCG tablet Take 1 tablet (125 mcg total) by mouth before breakfast. 30 tablet 11    MULTIVITAMIN W-MINERALS/LUTEIN (CENTRUM SILVER ORAL) Take 1 tablet by mouth once daily.       pramipexole (MIRAPEX) 0.25 MG tablet Take 1 tablet (0.25 mg total) by mouth 3 (three) times daily. 90 tablet 11    selegiline (ELDEPRYL) 5 mg Cap Take 1 capsule (5 mg total) by mouth 2 (two) times daily. 60 capsule 10    tirzepatide (MOUNJARO) 10 mg/0.5 mL PnIj inject 10mg SUBCUTANEOUSLY EVERY 7 DAYS 4 Pen 6    valsartan-hydrochlorothiazide (DIOVAN-HCT) 320-25 mg per tablet Take 1 tablet by mouth once daily. 90 tablet 0    insulin aspart U-100 (NOVOLOG) 100  "unit/mL injection Inject 5 Units into the skin every evening.      MOUNJARO 7.5 mg/0.5 mL PnIj Inject 7.5 mg into the skin.       No current facility-administered medications on file prior to visit.       Review of Systems   Constitutional:  Negative for activity change, chills, diaphoresis, fatigue, fever and unexpected weight change.   HENT:  Positive for hearing loss, rhinorrhea and trouble swallowing. Negative for congestion, ear pain, postnasal drip, sinus pain and sore throat.    Eyes:  Negative for pain, discharge, redness and visual disturbance.   Respiratory:  Negative for cough, chest tightness, shortness of breath and wheezing.    Cardiovascular:  Negative for chest pain, palpitations and leg swelling.   Gastrointestinal:  Positive for constipation. Negative for abdominal pain, diarrhea, nausea and vomiting.   Endocrine: Negative for polydipsia and polyuria.   Genitourinary:  Negative for difficulty urinating, dysuria, hematuria and menstrual problem.   Musculoskeletal:  Positive for arthralgias and neck pain. Negative for joint swelling.   Skin:  Negative for rash.   Neurological:  Positive for weakness and light-headedness. Negative for dizziness and syncope.   Psychiatric/Behavioral:  Negative for confusion and dysphoric mood. The patient is not nervous/anxious.      Vitals:    03/25/24 0824 03/25/24 0828 03/25/24 0830 03/25/24 0831   BP: 139/80 130/81 119/81 (!) 97/57   BP Location:  Right arm     Patient Position:  Lying Sitting Standing   Pulse: 85 84 86 85   Temp: 98.2 °F (36.8 °C)      Weight: 75.3 kg (166 lb)      Height: 5' 1" (1.549 m)          Wt Readings from Last 3 Encounters:   03/25/24 75.3 kg (166 lb)   01/26/24 77.6 kg (171 lb 1.2 oz)   07/17/23 80.6 kg (177 lb 11.1 oz)       Physical Exam  Constitutional:       General: She is not in acute distress.     Appearance: Normal appearance. She is well-developed.   HENT:      Head: Normocephalic and atraumatic.   Eyes:      Extraocular " Movements: Extraocular movements intact.      Conjunctiva/sclera: Conjunctivae normal.      Pupils: Pupils are equal, round, and reactive to light.   Cardiovascular:      Rate and Rhythm: Normal rate and regular rhythm.      Pulses: Normal pulses.      Heart sounds: Normal heart sounds. No murmur heard.  Pulmonary:      Effort: Pulmonary effort is normal. No respiratory distress.      Breath sounds: Normal breath sounds.   Abdominal:      General: Bowel sounds are normal. There is no distension.      Palpations: Abdomen is soft.      Tenderness: There is no abdominal tenderness.   Musculoskeletal:         General: Normal range of motion.      Cervical back: Normal range of motion and neck supple.   Skin:     General: Skin is warm and dry.      Findings: No rash.   Neurological:      General: No focal deficit present.      Mental Status: She is alert and oriented to person, place, and time.      Cranial Nerves: No cranial nerve deficit.      Sensory: No sensory deficit.      Motor: No weakness.      Coordination: Coordination normal.   Psychiatric:         Mood and Affect: Mood normal.         Behavior: Behavior normal.     Health Maintenance   Topic Date Due    Hemoglobin A1c  07/26/2024    Eye Exam  11/02/2024    Mammogram  12/26/2024    Foot Exam  01/26/2025    Lipid Panel  01/26/2025    Colorectal Cancer Screening  05/27/2025    TETANUS VACCINE  02/16/2026    DEXA Scan  06/02/2026    Hepatitis C Screening  Completed    Shingles Vaccine  Completed    High Dose Statin  Discontinued

## 2024-04-08 NOTE — TELEPHONE ENCOUNTER
----- Message from Estela Grace MD sent at 4/8/2024 10:35 AM CDT -----  BP appears to be improved. Not having significant drop as she was before. Continue current BP medication  ----- Message -----  From: Mazin York LPN  Sent: 4/4/2024   1:35 PM CDT  To: Estela Grace MD    Pt in clinic for orthostatic BP check. Lying down BP was 131/66 and HR was 87. Sitting down BP was 138/76 and HR is 85. Standing BP is 127/68 and HR was 87

## 2024-04-16 ENCOUNTER — PATIENT MESSAGE (OUTPATIENT)
Dept: ADMINISTRATIVE | Facility: OTHER | Age: 69
End: 2024-04-16
Payer: MEDICARE

## 2024-04-19 ENCOUNTER — HOSPITAL ENCOUNTER (OUTPATIENT)
Dept: RADIOLOGY | Facility: HOSPITAL | Age: 69
Discharge: HOME | End: 2024-04-19
Attending: INTERNAL MEDICINE
Payer: MEDICARE

## 2024-04-19 DIAGNOSIS — C7A.090 MALIGNANT CARCINOID TUMOR OF BRONCHUS AND LUNG: ICD-10-CM

## 2024-04-19 PROCEDURE — 71250 CT THORAX DX C-: CPT | Mod: 26,,, | Performed by: RADIOLOGY

## 2024-04-19 PROCEDURE — 71250 CT THORAX DX C-: CPT | Mod: TC,PO

## 2024-04-20 NOTE — PROGRESS NOTES
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, N0, M0    Oncologic Treatment RML, RLL bilobectomy in 8/14    Pathology Well differentiated, intermediate grade, Ki-67 4%        Telemedicine visit:  The patient location is: home  The chief complaint leading to consultation is: carcinoid tumor of lung    Visit type: audiovisual    Face to Face time with patient: 10 minutes  15 minutes of total time spent on the encounter, which includes face to face time and non-face to face time preparing to see the patient (eg, review of tests), Obtaining and/or reviewing separately obtained history, Documenting clinical information in the electronic or other health record, Independently interpreting results (not separately reported) and communicating results to the patient/family/caregiver, or Care coordination (not separately reported).     Each patient to whom he or she provides medical services by telemedicine is:  (1) informed of the relationship between the physician and patient and the respective role of any other health care provider with respect to management of the patient; and (2) notified that he or she may decline to receive medical services by telemedicine and may withdraw from such care at any time.    Notes: see below    Subjective:    History of Present Illness:   Her history dates to 2009 when she was diagnosed with pneumonia.  She had yearly bouts of this until last year when she had a CT scan done which showed a right hilar mass, 3.4 cm.  A bronchoscopy was done showing an obstructive tumor in the right bronchus intermedius.  Pathology was initially read as possible small cell.  Re-review here shows and atypical carcinoid, intermediate grade, well differentiated with Ki-67 of 4%.    She underwent a right middle and right lower bilobectomy on 8/7/14.  She was found to have a T2a, N0, M0 typical carcinoid.    - she underwent ct scan on 4/10/23.    Interval history:  - she presents for a follow-up appointment for her bronchial carcinoid tumor.  - she underwent ct scan on 4/19/24  - she states her Parkinson's has worsened slightly, mostly with increased fatigue  - she notes some recent allergy issues. She denies wheezing, flushing, cough.      Past Medical History:   Past Medical History:   Diagnosis Date    Abdominal pain 02/27/2015    Acute bronchitis due to Haemophilus influenzae 04/22/2014    Arthritis     COVID-19 05/30/2022    Patient reports COVID for approximately two weeks.  She had the infusion and has been doing better with her symptoms.  However she still has a low-grade fever that is not controlled with Tylenol.  She is currently taking 1000 milligrams every 8 hours.  No other symptoms.    Diabetes mellitus, type 2     DM (diabetes mellitus)     on insulin    Elevated transaminase level 04/18/2016    Encounter for blood transfusion     History of malignant carcinoid tumor of bronchus and lung 10/25/2017    History of neuroendocrine cancer     HTN (hypertension) 05/06/2014    Hypercalcemia 04/18/2016    Lung cancer, hilus 05/06/2014    Malignant carcinoid tumor of bronchus and lung 06/23/2014    Malignant carcinoid tumor of the bronchus and lung 05/2014    Mediastinal lymphadenopathy 08/26/2015    Obesity, morbid 05/06/2014    Parkinsons 10/2017    Pituitary adenoma 1980's    took parladel for 3 yrs    Pneumonia     Postoperative hypothyroidism 07/25/2017    - s/p total thyroidectomy in 2016 (parathyroids intact) with post op hypothyroidism; on synthroid    Pulmonary nodules 09/11/2018    Thyroid disease     Wheeze 06/23/2014       Past Surgical HIstory:   Past Surgical History:   Procedure Laterality Date    ADENOIDECTOMY      APPENDECTOMY  1959    BREAST BIOPSY      BREAST CYST  ASPIRATION      BRONCHOSCOPY  2014, 2014    CATARACT EXTRACTION W/  INTRAOCULAR LENS IMPLANT Bilateral     2020     SECTION  , 1986    x2    COLONOSCOPY N/A 2021    Procedure: COLONOSCOPY;  Surgeon: Khadar Bernardo MD;  Location: [facility] ENDO;  Service: Endoscopy;  Laterality: N/A;    COLONOSCOPY N/A 2021    Procedure: COLONOSCOPY;  Surgeon: Frank Lamb MD;  Location: [facility] (2ND FLR);  Service: Endoscopy;  Laterality: N/A;  Ed Campa MD  [facility]  Caller: Unspecified (Yesterday,  2:54 PM)  Need colonoscopy with EMR for large (30 mm) ascending colon polyp. 90 minutes. Main. Clear liquid diet for 24 hour patient with inadequate prep last time.   Thanks!   Ed Campa MD     COLONOSCOPY N/A 2022    Procedure: COLONOSCOPY;  Surgeon: Frank Lamb MD;  Location: [facility];  Service: Endoscopy;  Laterality: N/A;  2 day prep w/suprep  instructions via portal-SC    EPIDURAL STEROID INJECTION INTO LUMBAR SPINE N/A 2022    Procedure: Injection-steroid-epidural-lumbar L5/S1;  Surgeon: Bebeto Eldridge MD;  Location: [facility];  Service: Pain Management;  Laterality: N/A;    ESOPHAGOGASTRODUODENOSCOPY N/A 2021    Procedure: EGD (ESOPHAGOGASTRODUODENOSCOPY);  Surgeon: Khadar Bernardo MD;  Location: [facility];  Service: Endoscopy;  Laterality: N/A;    EYE SURGERY      cataract bilat    LUNG REMOVAL, PARTIAL Right     with 17 lymph nodes; right middle and lower    MINIMALLY INVASIVE TRANSFORAMINAL LUMBAR INTERBODY FUSION (TLIF) N/A 2022    Procedure: FUSION, SPINE, LUMBAR, TLIF, MINIMALLY INVASIVE RIGHT L4-5 TLIF, BILATERAL L4-5 LAMINECTOMY AND POSTERIOR INSTRUMENTED FUSION;  Surgeon: Ranjeet Bragg MD;  Location: [facility];  Service: Neurosurgery;  Laterality: N/A;    SPINE SURGERY  2022 3-    THYROIDECTOMY  2016    TONSILLECTOMY  1969    TUBAL LIGATION  1986       Family History:   Family History   Problem  Relation Name Age of Onset    Diabetes Mother Bianka Chappell     Glaucoma Mother Bianka Chappell     Heart disease Mother Bianka Chappell     Hypertension Mother Bianka Chappell     Diabetes Father Corbin Chappell     Heart disease Father Corbin Chappell     Hypertension Father Corbin Chappell     Diabetes Sister Sallie Chappell     Macular degeneration Sister Sallie Chappell     Alcohol abuse Sister Sallie Chappell     Cancer Maternal Aunt          breast    Cancer Maternal Aunt          unknown    Breast cancer Paternal Aunt      Breast cancer Paternal Aunt      Cancer Maternal Grandmother          unknown    Breast cancer Paternal Grandmother      Breast cancer Cousin      Amblyopia Neg Hx      Blindness Neg Hx      Cataracts Neg Hx      Retinal detachment Neg Hx      Stroke Neg Hx      Strabismus Neg Hx      Thyroid disease Neg Hx         Social History:  reports that she has never smoked. She has never used smokeless tobacco. She reports that she does not drink alcohol and does not use drugs.    Allergies:  Allergies   Allergen Reactions    Propranolol Nausea And Vomiting     Drops pressure and raised sugar    Lipitor [Atorvastatin] Other (See Comments)     Myalgia, muscle pain       Medications:  Current Outpatient Medications   Medication Sig Dispense Refill    alcohol swabs (ALCOHOL WIPES) PadM Uses 5 daily 400 each 4    amantadine HCL (SYMMETREL) 100 mg capsule Take 1 capsule (100 mg total) by mouth 2 (two) times daily. 60 capsule 11    ascorbic acid, vitamin C, (VITAMIN C) 100 MG tablet Take 100 mg by mouth once daily.      carbidopa-levodopa  mg (SINEMET)  mg per tablet Take 1 tablet three times daily 90 tablet 11    cholecalciferol, vitamin D3, 10 mcg (400 unit) Cap Take 1,200 Units by mouth once daily.      cyanocobalamin (VITAMIN B-12) 1000 MCG tablet Take 100 mcg by mouth once daily.      diclofenac sodium (VOLTAREN) 1 % Gel Apply 2 g topically once daily. 100 g 0    evolocumab (REPATHA SURECLICK) 140  mg/mL PnIj Inject 1 mL (140 mg total) into the skin every 14 (fourteen) days. 2 mL 12    fluticasone propionate (FLONASE) 50 mcg/actuation nasal spray 1 spray (50 mcg total) by Each Nostril route once daily. 18.2 mL 0    gabapentin (NEURONTIN) 100 MG capsule Take 1 capsule (100 mg total) by mouth 3 (three) times daily. Takes at night-takes  capsule 1    insulin (LANTUS SOLOSTAR U-100 INSULIN) glargine 100 units/mL SubQ pen 12 u qam 15 mL 11    insulin aspart U-100 (NOVOLOG) 100 unit/mL injection Inject 5 Units into the skin every evening.      lancing device Misc Checks bg 7-8 times a day 1 each 0    levocetirizine (XYZAL) 5 MG tablet Take 1 tablet (5 mg total) by mouth every evening. 30 tablet 0    levothyroxine (SYNTHROID) 125 MCG tablet Take 1 tablet (125 mcg total) by mouth before breakfast. 30 tablet 11    linaCLOtide (LINZESS) 145 mcg Cap capsule Take 1 capsule (145 mcg total) by mouth before breakfast. 30 capsule 1    MOUNJARO 7.5 mg/0.5 mL PnIj Inject 7.5 mg into the skin.      MULTIVITAMIN W-MINERALS/LUTEIN (CENTRUM SILVER ORAL) Take 1 tablet by mouth once daily.       pantoprazole (PROTONIX) 20 MG tablet Take 1 tablet (20 mg total) by mouth once daily. 30 tablet 1    pramipexole (MIRAPEX) 0.25 MG tablet Take 1 tablet (0.25 mg total) by mouth 3 (three) times daily. 90 tablet 11    selegiline (ELDEPRYL) 5 mg Cap Take 1 capsule (5 mg total) by mouth 2 (two) times daily. 60 capsule 10    tirzepatide (MOUNJARO) 10 mg/0.5 mL PnIj inject 10mg SUBCUTANEOUSLY EVERY 7 DAYS 4 Pen 6    valsartan-hydrochlorothiazide (DIOVAN-HCT) 320-25 mg per tablet Take 1 tablet by mouth once daily. 90 tablet 0     No current facility-administered medications for this visit.     Review of Systems   Constitutional:  Positive for fatigue. Negative for appetite change, chills, fever and unexpected weight change.        No flushing   HENT:  Negative for congestion, hearing loss, nosebleeds and postnasal drip.    Eyes:  Negative for  visual disturbance.   Respiratory:  Negative for shortness of breath.    Cardiovascular:  Negative for chest pain and palpitations.   Gastrointestinal:  Negative for abdominal pain, blood in stool, constipation, diarrhea, nausea and vomiting.   Genitourinary:  Negative for dysuria and frequency.   Musculoskeletal:  Negative for back pain and gait problem.            Skin:  Negative for color change and rash.   Neurological:  Negative for dizziness, tremors, weakness and headaches.   Hematological:  Negative for adenopathy. Does not bruise/bleed easily.   Psychiatric/Behavioral:  Negative for confusion. The patient is not nervous/anxious.        ECOG Performance Status: 0     Objective:      Vitals:   There were no vitals filed for this visit.    BMI: There is no height or weight on file to calculate BMI.  Deferred due to telemedicine visit.    Physical Exam  Deferred due to telemedicine visit.      Laboratory Data:   Labs have been reviewed.    Lab Results   Component Value Date    WBC 12.91 (H) 04/02/2022    HGB 13.6 04/02/2022    HCT 39.8 04/02/2022    MCV 92 04/02/2022     04/02/2022                 FINAL PATHOLOGIC DIAGNOSIS 8/7/14  1. Right fifth rib (demineralized), biopsy:  Bone with trilineage bone marrow.  Negative for neoplasm.    2. Right pleura, partial pleurectomy:  Pleural tissue with congested vasculature and no evidence of neoplasm.    3. Right lung, right middle and lower lobes, lobectomies:  Typical carcinoid tumor, multifocal with 2 lesions, measuring 4.5 cm and 0.5 cm in greatest dimension  respectively.  Mitotic index: 1 mitosis seen in 10 high powered fields.  No evidence of necrosis or significant nuclear pleomorphism are seen.  Ki-67 proliferation index: 4% of tumor cells staining.  Bronchial and vascular margins are negative for malignancy.  17 benign lymph nodes (0/17).  Separate lesonal area consists of lung with necrotizing graulomatous inflammation and calcified  granulomata.  Special stains for mycobacterial and fungi are completed on the granulomatous lesion and are negative for fungal  and mycobacterial organisms with satisfactory positive control.  See synoptic report in comment section for further details.     4. Right lung lymph node, station 4, biopsy:  Fibroadipose tissue, negative for lymph nodes.  No evidence of neoplasm.    5. Right lung lymph nodes, station for alpha, biopsy:  Two (2) lymph nodes, negative for neoplasm (0/2).  Comment: Synoptic report  Specimen: Lung, right middle and lower lobes  Specimen integrity: Intact  Specimen laterality: Right  Tumor site: Right bronchus intermedius  Tumor size:  Lesion#1: 4.5 x 4 x 2.5 cm  Lesion#2: 0.5 cm in greatest dimension.  Tumor focality: Multifocal  Histologic Type:  Typical carcinoid tumor  Visceral Pleural Invasion: Not identified  Tumor Extension: Tumor involves the bronchus intermedius  Margins: Bronchial & Vasular margins are uninvolved by tumor  Distance of tumor from closest bronchial margin: 0.2 cm.  Treatment effect: Unknown  Lymphovascular invasion: Not identified.  Pathologic Staging:  Primary tumor:  pT2a: Tumor greater than 3 cm but 5 cm or less in greatest dimension without evidence of invasion into the main  bronchus  Regional lymph nodes:  pN0: No regional lymph node metastasis  Number examined: 16  Number involved: 0  Distant metastasis:  pMX: Cannot be assessed.  Note: Immunohistochemical stain results:  AE1/AE3: Negative in tumor cells.  Chromogranin staining:  Intensity Positive cells (0%)  0: 0%  1+: 10%  2+: 85%  3+: 5%  Synaptophysin staining:  Intensity Positive cells (0%)  0: 0%  1+: 0%  2+: 0%  3+: 100%  CD31: 44 vessels per 1 HPF  Factor VIII: 69 vessels per 1 HPF  Ki-67: 4% cells staining  Note: This tumor has only 1 mitotic figure in 10 high power fields, no evidence of necrosis and fairly bland  appearing nulei with salt and pepper chromatin pattern and no significant nuclear  "pleomorphism. This is best  categorized as a typical carcinoid tumor.  All immunostains have satisfactory positive and negative controls.    IMAGING:  CT chest (4/20/24): I have personally reviewed the images  Stable examination status post right lung lobectomy, with numerous pulmonary nodules showing no significant change. Stability is demonstrated since at least 03/14/2022.     CT chest (4/10/23): I have personally reviewed the images  1. There is no detrimental change in the appearance of the chest in this patient who has previously undergone right middle lobectomy.  There are multiple bilateral solid pulmonary nodules measuring less than 7 mm.  No nodule has increased in size and there are no definite new nodules.  These nodules are also unchanged compared to an earlier study dated 02/09/2021.  2. Atherosclerosis.  3. Probable gallbladder sludge.  4. Prior granulomatous disease.  5. Interval left breast biopsy with unchanged right retro areolar right breast mass         Assessment:       1. Malignant carcinoid tumor of bronchus and lung    2. Bronchitis, chronic obstructive    3. Pulmonary nodules           Plan:       Carcinoid tumor of lung  - I have reviewed her chart. She was previously seen in the [facility] clinic  - She had a completely resected node negative grade 2 well-differentiated bronchial carcinoid resected on 8/7/14.  - followed with surveillance since then.  - CT chest (4/10/23): "no detrimental change in the appearance of the chest in this patient who has previously undergone right middle lobectomy."  - CT chest (4/20/24): "Stable examination status post right lung lobectomy, with numerous pulmonary nodules showing no significant change. Stability is demonstrated since at least 03/14/2022."  - it has been 10 years since her initial diagnosis and surgery. Clinically she is doing well. We discussed continued surveillance (of nodules). She would like to change frequency of imaging to every 2 " years.  - return to clinic in two years with repeat imaging    2. Chronic bronchitis   - she notes some recent allergy symptoms but otherwise is doing well.   - continue to monitor         - return to clinic in two years with repeat imaging    Lito Mayer M.D.  Hematology/Oncology  [facility] - [address], [address]  [place] [address]  Phone: [phone]  Fax: [phone]

## 2024-04-23 ENCOUNTER — OFFICE VISIT (OUTPATIENT)
Dept: HEMATOLOGY/ONCOLOGY | Facility: CLINIC | Age: 69
End: 2024-04-23
Payer: MEDICARE

## 2024-04-23 DIAGNOSIS — J44.89 BRONCHITIS, CHRONIC OBSTRUCTIVE: ICD-10-CM

## 2024-04-23 DIAGNOSIS — C7A.090 MALIGNANT CARCINOID TUMOR OF BRONCHUS AND LUNG: Primary | ICD-10-CM

## 2024-04-23 DIAGNOSIS — R91.8 PULMONARY NODULES: ICD-10-CM

## 2024-04-23 PROCEDURE — 99214 OFFICE O/P EST MOD 30 MIN: CPT | Mod: HCNC,95,, | Performed by: INTERNAL MEDICINE

## 2024-04-23 PROCEDURE — 3066F NEPHROPATHY DOC TX: CPT | Mod: HCNC,CPTII,95, | Performed by: INTERNAL MEDICINE

## 2024-04-23 PROCEDURE — 1159F MED LIST DOCD IN RCRD: CPT | Mod: HCNC,CPTII,95, | Performed by: INTERNAL MEDICINE

## 2024-04-23 PROCEDURE — 3061F NEG MICROALBUMINURIA REV: CPT | Mod: HCNC,CPTII,95, | Performed by: INTERNAL MEDICINE

## 2024-04-23 PROCEDURE — 3044F HG A1C LEVEL LT 7.0%: CPT | Mod: HCNC,CPTII,95, | Performed by: INTERNAL MEDICINE

## 2024-04-23 PROCEDURE — 1160F RVW MEDS BY RX/DR IN RCRD: CPT | Mod: HCNC,CPTII,95, | Performed by: INTERNAL MEDICINE

## 2024-05-13 ENCOUNTER — LAB VISIT (OUTPATIENT)
Dept: LAB | Facility: HOSPITAL | Age: 69
End: 2024-05-13
Attending: NURSE PRACTITIONER
Payer: MEDICARE

## 2024-05-13 DIAGNOSIS — Z79.4 TYPE 2 DIABETES MELLITUS WITH STAGE 3A CHRONIC KIDNEY DISEASE, WITH LONG-TERM CURRENT USE OF INSULIN: ICD-10-CM

## 2024-05-13 DIAGNOSIS — N18.31 TYPE 2 DIABETES MELLITUS WITH STAGE 3A CHRONIC KIDNEY DISEASE, WITH LONG-TERM CURRENT USE OF INSULIN: ICD-10-CM

## 2024-05-13 DIAGNOSIS — E89.0 POST-OPERATIVE HYPOTHYROIDISM: ICD-10-CM

## 2024-05-13 DIAGNOSIS — E11.22 TYPE 2 DIABETES MELLITUS WITH STAGE 3A CHRONIC KIDNEY DISEASE, WITH LONG-TERM CURRENT USE OF INSULIN: ICD-10-CM

## 2024-05-13 LAB
ESTIMATED AVG GLUCOSE: 134 MG/DL (ref 68–131)
HBA1C MFR BLD: 6.3 % (ref 4–5.6)
TSH SERPL DL<=0.005 MIU/L-ACNC: 1.75 UIU/ML (ref 0.4–4)

## 2024-05-13 PROCEDURE — 36415 COLL VENOUS BLD VENIPUNCTURE: CPT | Mod: HCNC,PO | Performed by: NURSE PRACTITIONER

## 2024-05-13 PROCEDURE — 83036 HEMOGLOBIN GLYCOSYLATED A1C: CPT | Mod: HCNC | Performed by: NURSE PRACTITIONER

## 2024-05-13 PROCEDURE — 84443 ASSAY THYROID STIM HORMONE: CPT | Mod: HCNC | Performed by: NURSE PRACTITIONER

## 2024-05-17 DIAGNOSIS — K21.9 GASTROESOPHAGEAL REFLUX DISEASE, UNSPECIFIED WHETHER ESOPHAGITIS PRESENT: ICD-10-CM

## 2024-05-17 DIAGNOSIS — K59.09 CHRONIC CONSTIPATION: ICD-10-CM

## 2024-05-17 RX ORDER — PANTOPRAZOLE SODIUM 20 MG/1
20 TABLET, DELAYED RELEASE ORAL
Qty: 90 TABLET | Refills: 3 | Status: SHIPPED | OUTPATIENT
Start: 2024-05-17

## 2024-05-17 NOTE — TELEPHONE ENCOUNTER
Care Due:                  Date            Visit Type   Department     Provider  --------------------------------------------------------------------------------                                MYCHART                              ANNUAL                              CHECKUP/PHY  [facility] FAMILY  Last Visit: 03-      S            MIKEL Grace  Next Visit: None Scheduled  None         None Found                                                            Last  Test          Frequency    Reason                     Performed    Due Date  --------------------------------------------------------------------------------    CBC.........  12 months..  diclofenac...............  Not Found    Overdue    [employer] Embedded Care Due Messages. Reference number: [id number].   5/17/2024 8:04:09 AM CDT

## 2024-05-18 NOTE — TELEPHONE ENCOUNTER
Refill Routing Note   Medication(s) are not appropriate for processing by [facility] for the following reason(s):        Outside of protocol    [facility] action(s):  Approve  Route     Requires labs : Yes             Appointments  past 12m or future 3m with PCP    Date Provider   Last Visit   3/25/2024 Estela Grace MD   Next Visit   Visit date not found Estela Grace MD   ED visits in past 90 days: 0        Note composed:10:19 PM 05/17/2024

## 2024-05-19 RX ORDER — LINACLOTIDE 145 UG/1
CAPSULE, GELATIN COATED ORAL
Qty: 30 CAPSULE | Refills: 11 | Status: SHIPPED | OUTPATIENT
Start: 2024-05-19

## 2024-05-20 ENCOUNTER — OFFICE VISIT (OUTPATIENT)
Dept: ENDOCRINOLOGY | Facility: CLINIC | Age: 69
End: 2024-05-20
Payer: MEDICARE

## 2024-05-20 VITALS
OXYGEN SATURATION: 100 % | WEIGHT: 166.56 LBS | DIASTOLIC BLOOD PRESSURE: 78 MMHG | SYSTOLIC BLOOD PRESSURE: 144 MMHG | HEIGHT: 61 IN | BODY MASS INDEX: 31.45 KG/M2 | HEART RATE: 83 BPM

## 2024-05-20 DIAGNOSIS — E11.22 TYPE 2 DIABETES MELLITUS WITH STAGE 3A CHRONIC KIDNEY DISEASE, WITH LONG-TERM CURRENT USE OF INSULIN: Primary | ICD-10-CM

## 2024-05-20 DIAGNOSIS — E78.2 MIXED HYPERLIPIDEMIA: ICD-10-CM

## 2024-05-20 DIAGNOSIS — Z78.9 STATIN INTOLERANCE: ICD-10-CM

## 2024-05-20 DIAGNOSIS — E89.0 POST-OPERATIVE HYPOTHYROIDISM: ICD-10-CM

## 2024-05-20 DIAGNOSIS — Z79.4 TYPE 2 DIABETES MELLITUS WITH STAGE 3A CHRONIC KIDNEY DISEASE, WITH LONG-TERM CURRENT USE OF INSULIN: Primary | ICD-10-CM

## 2024-05-20 DIAGNOSIS — Z79.4 TYPE 2 DIABETES MELLITUS WITH DIABETIC NEPHROPATHY, WITH LONG-TERM CURRENT USE OF INSULIN: ICD-10-CM

## 2024-05-20 DIAGNOSIS — I10 BENIGN ESSENTIAL HTN: ICD-10-CM

## 2024-05-20 DIAGNOSIS — E11.21 TYPE 2 DIABETES MELLITUS WITH DIABETIC NEPHROPATHY, WITH LONG-TERM CURRENT USE OF INSULIN: ICD-10-CM

## 2024-05-20 DIAGNOSIS — I70.0 ATHEROSCLEROSIS OF AORTA: ICD-10-CM

## 2024-05-20 DIAGNOSIS — N18.31 TYPE 2 DIABETES MELLITUS WITH STAGE 3A CHRONIC KIDNEY DISEASE, WITH LONG-TERM CURRENT USE OF INSULIN: Primary | ICD-10-CM

## 2024-05-20 PROCEDURE — 3078F DIAST BP <80 MM HG: CPT | Mod: HCNC,CPTII,S$GLB, | Performed by: NURSE PRACTITIONER

## 2024-05-20 PROCEDURE — 3066F NEPHROPATHY DOC TX: CPT | Mod: HCNC,CPTII,S$GLB, | Performed by: NURSE PRACTITIONER

## 2024-05-20 PROCEDURE — 1159F MED LIST DOCD IN RCRD: CPT | Mod: HCNC,CPTII,S$GLB, | Performed by: NURSE PRACTITIONER

## 2024-05-20 PROCEDURE — 3061F NEG MICROALBUMINURIA REV: CPT | Mod: HCNC,CPTII,S$GLB, | Performed by: NURSE PRACTITIONER

## 2024-05-20 PROCEDURE — 3077F SYST BP >= 140 MM HG: CPT | Mod: HCNC,CPTII,S$GLB, | Performed by: NURSE PRACTITIONER

## 2024-05-20 PROCEDURE — 3044F HG A1C LEVEL LT 7.0%: CPT | Mod: HCNC,CPTII,S$GLB, | Performed by: NURSE PRACTITIONER

## 2024-05-20 PROCEDURE — 3008F BODY MASS INDEX DOCD: CPT | Mod: HCNC,CPTII,S$GLB, | Performed by: NURSE PRACTITIONER

## 2024-05-20 PROCEDURE — 99214 OFFICE O/P EST MOD 30 MIN: CPT | Mod: HCNC,S$GLB,, | Performed by: NURSE PRACTITIONER

## 2024-05-20 PROCEDURE — 95251 CONT GLUC MNTR ANALYSIS I&R: CPT | Mod: HCNC,S$GLB,, | Performed by: NURSE PRACTITIONER

## 2024-05-20 PROCEDURE — 99999 PR PBB SHADOW E&M-EST. PATIENT-LVL IV: CPT | Mod: PBBFAC,HCNC,, | Performed by: NURSE PRACTITIONER

## 2024-05-20 PROCEDURE — 1101F PT FALLS ASSESS-DOCD LE1/YR: CPT | Mod: HCNC,CPTII,S$GLB, | Performed by: NURSE PRACTITIONER

## 2024-05-20 PROCEDURE — 1126F AMNT PAIN NOTED NONE PRSNT: CPT | Mod: HCNC,CPTII,S$GLB, | Performed by: NURSE PRACTITIONER

## 2024-05-20 PROCEDURE — 3288F FALL RISK ASSESSMENT DOCD: CPT | Mod: HCNC,CPTII,S$GLB, | Performed by: NURSE PRACTITIONER

## 2024-05-20 RX ORDER — INSULIN GLARGINE 100 [IU]/ML
INJECTION, SOLUTION SUBCUTANEOUS
Start: 2024-05-20

## 2024-05-20 RX ORDER — CYCLOSPORINE 0.5 MG/ML
EMULSION OPHTHALMIC
COMMUNITY
Start: 2024-05-06

## 2024-05-20 NOTE — PROGRESS NOTES
CC: This 68 y.o. female presents for management of diabetes and chronic conditions pending review including HTN, HLP, morbid obesity, hypothyroidism, vitamin d deficiency     HPI: She was diagnosed with T2DM in ~ 2013. She was hospitalized r/t DM in 2016 for DKA.   Family hx of DM: mom, dad, and sisters      No acute events since her last visit  See Dexcom report in media tab  3% Very High  28% High  67% In Range  1% Low  <1% Very Low   GMI 7.1%  Very small and occasional pp excursions which typically resolve quickly  Is having hypos after dinner   Diet: Eats 3  Meals a day, Cheese and crackers  Drinks 6oz milk w supper      Exercise: stationary bicycle- 10 mins twice a day     CURRENT DM MEDS:  lantus 12u bid u qam;  Humalog 5 u AC  + correction 150/25; Mounjaro 10 mg weekly (Thursday)  Timing prandial insulin 5-15 minutes before meals: before    Vial/pen:  Uses pens  Glucometer type:  Relion 2020    Standards of Care:  Eye exam: + mild DM retinopathy  6/2023 Dr Nova  5/2024 Dr Jackson (in [place])      Regarding thyroid:  Takes LT4 125 mcg, correctly qam  Nodules was found on on a PET scan. FNA 11/25/14 shows adenomatous nodule with cystic changes. S/p total thyroidectomy 5/24/16.       No hoarseness, voice changes, +dysphagia- from Parkinson's, no compressive symptoms, or head/neck exposure to XRT.   No personal or FH of thyroid cancer or MEN syndrome.   Not taking biotin.   + tremors of the hands- has Parkinson's L>R in the afternoon  + intolerance to the cold  +fatigue       ROS:  Gen: Appetite good,  Weight loss 5 lbs  Eyes:+ visual disturbances  Resp: no SOB or ESPINAL, no cough  Cardiac: No palpitations, chest pain   GI: No nausea or vomiting, diarrhea, + constipation   /GYN: 1+ nocturia, no burning or pain.   MS/Neuro: + tingling/cramping in her feet, speech clear,   Psych: Denies drug/ETOH abuse, no hx of depression.  Other systems: negative.    PE:  GENERAL: Well developed, well nourished.  PSYCH:  AAOx3, appropriate mood and affect, pleasant expression, conversant, appears relaxed, well groomed.   EYES: Conjunctiva, corneas clear  NECK: Supple, trachea midline   ABDOMEN: Soft, non-tender, non-distended   SKIN:  no acanthosis nigracans.  FOOT EXAMINATION:  1/26/2024   No foot deformity, +Onychomycosis,  no interspace maceration or ulceration noted.  Decreased hair growth present over toes/feet.    Protective sensation intact with 10 gram monofilament.  +2 dorsalis pedis and posterior pulses noted.      Lab Results   Component Value Date    MICALBCREAT 15.0 01/26/2024       Hemoglobin A1C   Date Value Ref Range Status   05/13/2024 6.3 (H) 4.0 - 5.6 % Final     Comment:     ADA Screening Guidelines:  5.7-6.4%  Consistent with prediabetes  >or=6.5%  Consistent with diabetes    High levels of fetal hemoglobin interfere with the HbA1C  assay. Heterozygous hemoglobin variants (HbS, HgC, etc)do  not significantly interfere with this assay.   However, presence of multiple variants may affect accuracy.     01/26/2024 5.7 (H) 4.0 - 5.6 % Final     Comment:     ADA Screening Guidelines:  5.7-6.4%  Consistent with prediabetes  >or=6.5%  Consistent with diabetes    High levels of fetal hemoglobin interfere with the HbA1C  assay. Heterozygous hemoglobin variants (HbS, HgC, etc)do  not significantly interfere with this assay.   However, presence of multiple variants may affect accuracy.     11/13/2023 5.8 (H) 4.0 - 5.6 % Final     Comment:     ADA Screening Guidelines:  5.7-6.4%  Consistent with prediabetes  >or=6.5%  Consistent with diabetes    High levels of fetal hemoglobin interfere with the HbA1C  assay. Heterozygous hemoglobin variants (HbS, HgC, etc)do  not significantly interfere with this assay.   However, presence of multiple variants may affect accuracy.          ASSESSMENT and PLAN:    1. T2DM with nephropathy-     Stop Novolog with supper  Continue lantus 12 u qam bid  Continue [id number] mg weekly  Continue  Dexcom G7- nurse visit in 2 weeks to review    2. HTN - uncontrolled today, continue meds as previously prescribed and monitor.     3. HLP -   unable to tolerate statins r/t myalgia; Continue repatha     4. Post Surgical Hypothyroidism for MNG. S/p total thyroidectomy w pathology benign.  TSH WNL , Continue levothyroxine 125 mcg daily,     5. Obesity - Continue exercise and weight loss;   Body mass index is 31.47 kg/m².    Follow-up: in 4 months with A1C, TSH

## 2024-05-21 ENCOUNTER — PATIENT MESSAGE (OUTPATIENT)
Dept: ADMINISTRATIVE | Facility: HOSPITAL | Age: 69
End: 2024-05-21
Payer: MEDICARE

## 2024-05-22 ENCOUNTER — PATIENT MESSAGE (OUTPATIENT)
Dept: NEUROLOGY | Facility: CLINIC | Age: 69
End: 2024-05-22
Payer: MEDICARE

## 2024-05-26 DIAGNOSIS — I10 BENIGN ESSENTIAL HTN: ICD-10-CM

## 2024-05-26 NOTE — TELEPHONE ENCOUNTER
No care due was identified.  [facility] Embedded Care Due Messages. Reference number: [id number].   5/26/2024 8:05:53 AM CDT

## 2024-05-27 NOTE — TELEPHONE ENCOUNTER
Refill Routing Note   Medication(s) are not appropriate for processing by [facility] for the following reason(s):        Required vitals abnormal    [facility] action(s):  Defer               Appointments  past 12m or future 3m with PCP    Date Provider   Last Visit   3/25/2024 Estela Grace MD   Next Visit   Visit date not found Estela Grace MD   ED visits in past 90 days: 0        Note composed:2:22 AM 05/27/2024

## 2024-05-28 RX ORDER — VALSARTAN AND HYDROCHLOROTHIAZIDE 320; 25 MG/1; MG/1
1 TABLET, FILM COATED ORAL
Qty: 90 TABLET | Refills: 0 | Status: SHIPPED | OUTPATIENT
Start: 2024-05-28

## 2024-06-06 ENCOUNTER — CLINICAL SUPPORT (OUTPATIENT)
Dept: ENDOCRINOLOGY | Facility: CLINIC | Age: 69
End: 2024-06-06
Payer: MEDICARE

## 2024-06-06 ENCOUNTER — TELEPHONE (OUTPATIENT)
Dept: ENDOCRINOLOGY | Facility: CLINIC | Age: 69
End: 2024-06-06

## 2024-06-06 DIAGNOSIS — N18.31 TYPE 2 DIABETES MELLITUS WITH STAGE 3A CHRONIC KIDNEY DISEASE, WITH LONG-TERM CURRENT USE OF INSULIN: Primary | ICD-10-CM

## 2024-06-06 DIAGNOSIS — E11.22 TYPE 2 DIABETES MELLITUS WITH STAGE 3A CHRONIC KIDNEY DISEASE, WITH LONG-TERM CURRENT USE OF INSULIN: Primary | ICD-10-CM

## 2024-06-06 DIAGNOSIS — Z79.4 TYPE 2 DIABETES MELLITUS WITH STAGE 3A CHRONIC KIDNEY DISEASE, WITH LONG-TERM CURRENT USE OF INSULIN: Primary | ICD-10-CM

## 2024-06-18 DIAGNOSIS — M25.561 CHRONIC PAIN OF RIGHT KNEE: ICD-10-CM

## 2024-06-18 DIAGNOSIS — G89.29 CHRONIC PAIN OF RIGHT KNEE: ICD-10-CM

## 2024-06-18 DIAGNOSIS — M54.16 LUMBAR RADICULOPATHY: ICD-10-CM

## 2024-06-18 DIAGNOSIS — G20.A1 PARKINSON'S DISEASE: ICD-10-CM

## 2024-06-18 RX ORDER — GABAPENTIN 100 MG/1
100 CAPSULE ORAL 3 TIMES DAILY
Qty: 270 CAPSULE | Refills: 1 | Status: SHIPPED | OUTPATIENT
Start: 2024-06-18

## 2024-06-18 NOTE — TELEPHONE ENCOUNTER
No care due was identified.  [facility] Embedded Care Due Messages. Reference number: [id number].   6/18/2024 8:04:03 AM CDT

## 2024-06-25 ENCOUNTER — PATIENT MESSAGE (OUTPATIENT)
Dept: FAMILY MEDICINE | Facility: CLINIC | Age: 69
End: 2024-06-25
Payer: MEDICARE

## 2024-08-16 ENCOUNTER — PATIENT MESSAGE (OUTPATIENT)
Dept: ADMINISTRATIVE | Facility: OTHER | Age: 69
End: 2024-08-16
Payer: MEDICARE

## 2024-09-16 ENCOUNTER — LAB VISIT (OUTPATIENT)
Dept: LAB | Facility: HOSPITAL | Age: 69
End: 2024-09-16
Attending: NURSE PRACTITIONER
Payer: MEDICARE

## 2024-09-16 DIAGNOSIS — Z79.4 TYPE 2 DIABETES MELLITUS WITH STAGE 3A CHRONIC KIDNEY DISEASE, WITH LONG-TERM CURRENT USE OF INSULIN: ICD-10-CM

## 2024-09-16 DIAGNOSIS — E11.22 TYPE 2 DIABETES MELLITUS WITH STAGE 3A CHRONIC KIDNEY DISEASE, WITH LONG-TERM CURRENT USE OF INSULIN: ICD-10-CM

## 2024-09-16 DIAGNOSIS — E89.0 POST-OPERATIVE HYPOTHYROIDISM: ICD-10-CM

## 2024-09-16 DIAGNOSIS — N18.31 TYPE 2 DIABETES MELLITUS WITH STAGE 3A CHRONIC KIDNEY DISEASE, WITH LONG-TERM CURRENT USE OF INSULIN: ICD-10-CM

## 2024-09-16 LAB
ESTIMATED AVG GLUCOSE: 134 MG/DL (ref 68–131)
HBA1C MFR BLD: 6.3 % (ref 4–5.6)
T4 FREE SERPL-MCNC: 0.8 NG/DL (ref 0.71–1.51)
TSH SERPL DL<=0.005 MIU/L-ACNC: 10.99 UIU/ML (ref 0.4–4)

## 2024-09-16 PROCEDURE — 83036 HEMOGLOBIN GLYCOSYLATED A1C: CPT | Mod: HCNC | Performed by: NURSE PRACTITIONER

## 2024-09-16 PROCEDURE — 36415 COLL VENOUS BLD VENIPUNCTURE: CPT | Mod: HCNC,PO | Performed by: NURSE PRACTITIONER

## 2024-09-16 PROCEDURE — 84443 ASSAY THYROID STIM HORMONE: CPT | Mod: HCNC | Performed by: NURSE PRACTITIONER

## 2024-09-16 PROCEDURE — 84439 ASSAY OF FREE THYROXINE: CPT | Mod: HCNC | Performed by: NURSE PRACTITIONER

## 2024-09-17 DIAGNOSIS — I10 BENIGN ESSENTIAL HTN: ICD-10-CM

## 2024-09-17 NOTE — TELEPHONE ENCOUNTER
Care Due:                  Date            Visit Type   Department     Provider  --------------------------------------------------------------------------------                                [facility]                              ANNUAL                              CHECKUP/PHY  [facility] FAMILY  Last Visit: 03-      [facility]       Estela Grace                              [facility]                              ANNUAL                              CHECKUP/PHY  [facility] FAMILY  Next Visit: 10-      S            MEDICINE       Estela  Grace                                                            Last  Test          Frequency    Reason                     Performed    Due Date  --------------------------------------------------------------------------------    CBC.........  12 months..  diclofenac...............  Not Found    Overdue    [employer] Embedded Care Due Messages. Reference number: [id number].   9/17/2024 11:31:07 AM CDT

## 2024-09-18 RX ORDER — VALSARTAN AND HYDROCHLOROTHIAZIDE 320; 25 MG/1; MG/1
1 TABLET, FILM COATED ORAL
Qty: 90 TABLET | Refills: 1 | Status: SHIPPED | OUTPATIENT
Start: 2024-09-18

## 2024-09-18 NOTE — TELEPHONE ENCOUNTER
Refill Routing Note   Medication(s) are not appropriate for processing by [facility] for the following reason(s):        Required vitals abnormal  No active prescription written by provider    [facility] action(s):  Defer     Requires labs : Yes             Appointments  past 12m or future 3m with PCP    Date Provider   Last Visit   3/25/2024 Estela Grace MD   Next Visit   10/7/2024 Estela Grace MD   ED visits in past 90 days: 0        Note composed:4:54 AM 09/18/2024

## 2024-09-18 NOTE — TELEPHONE ENCOUNTER
The patient's blood pressure was recently high when last checked.    BP Readings from Last 3 Encounters:   05/20/24 (!) 144/78   04/04/24 127/68   03/25/24 (!) 97/57       Please call patient and document a home blood pressure reading OR schedule a follow up with Magali in clinic

## 2024-09-19 ENCOUNTER — PATIENT OUTREACH (OUTPATIENT)
Dept: ADMINISTRATIVE | Facility: HOSPITAL | Age: 69
End: 2024-09-19
Payer: MEDICARE

## 2024-09-19 NOTE — PROGRESS NOTES
STATIN REPORT: per chart review pt has myalgia/statin intolerance, dx code needed, pt has appt 10.7.24, will add blue sticky note day before scheduled appt as a reminder.

## 2024-09-23 ENCOUNTER — OFFICE VISIT (OUTPATIENT)
Dept: ENDOCRINOLOGY | Facility: CLINIC | Age: 69
End: 2024-09-23
Payer: MEDICARE

## 2024-09-23 VITALS
DIASTOLIC BLOOD PRESSURE: 70 MMHG | SYSTOLIC BLOOD PRESSURE: 124 MMHG | HEIGHT: 61 IN | BODY MASS INDEX: 29.55 KG/M2 | WEIGHT: 156.5 LBS | OXYGEN SATURATION: 97 % | HEART RATE: 84 BPM

## 2024-09-23 DIAGNOSIS — N18.31 TYPE 2 DIABETES MELLITUS WITH STAGE 3A CHRONIC KIDNEY DISEASE, WITH LONG-TERM CURRENT USE OF INSULIN: Primary | ICD-10-CM

## 2024-09-23 DIAGNOSIS — Z79.4 TYPE 2 DIABETES MELLITUS WITH STAGE 3A CHRONIC KIDNEY DISEASE, WITH LONG-TERM CURRENT USE OF INSULIN: Primary | ICD-10-CM

## 2024-09-23 DIAGNOSIS — E11.22 TYPE 2 DIABETES MELLITUS WITH STAGE 3A CHRONIC KIDNEY DISEASE, WITH LONG-TERM CURRENT USE OF INSULIN: Primary | ICD-10-CM

## 2024-09-23 DIAGNOSIS — I70.0 ATHEROSCLEROSIS OF AORTA: ICD-10-CM

## 2024-09-23 DIAGNOSIS — I10 BENIGN ESSENTIAL HTN: ICD-10-CM

## 2024-09-23 DIAGNOSIS — E89.0 POST-OPERATIVE HYPOTHYROIDISM: ICD-10-CM

## 2024-09-23 DIAGNOSIS — E78.2 MIXED HYPERLIPIDEMIA: ICD-10-CM

## 2024-09-23 PROCEDURE — 99999 PR PBB SHADOW E&M-EST. PATIENT-LVL III: CPT | Mod: PBBFAC,HCNC,, | Performed by: NURSE PRACTITIONER

## 2024-09-23 PROCEDURE — 3078F DIAST BP <80 MM HG: CPT | Mod: HCNC,CPTII,S$GLB, | Performed by: NURSE PRACTITIONER

## 2024-09-23 PROCEDURE — 3044F HG A1C LEVEL LT 7.0%: CPT | Mod: HCNC,CPTII,S$GLB, | Performed by: NURSE PRACTITIONER

## 2024-09-23 PROCEDURE — 3061F NEG MICROALBUMINURIA REV: CPT | Mod: HCNC,CPTII,S$GLB, | Performed by: NURSE PRACTITIONER

## 2024-09-23 PROCEDURE — 1159F MED LIST DOCD IN RCRD: CPT | Mod: HCNC,CPTII,S$GLB, | Performed by: NURSE PRACTITIONER

## 2024-09-23 PROCEDURE — 3066F NEPHROPATHY DOC TX: CPT | Mod: HCNC,CPTII,S$GLB, | Performed by: NURSE PRACTITIONER

## 2024-09-23 PROCEDURE — 3074F SYST BP LT 130 MM HG: CPT | Mod: HCNC,CPTII,S$GLB, | Performed by: NURSE PRACTITIONER

## 2024-09-23 PROCEDURE — 99214 OFFICE O/P EST MOD 30 MIN: CPT | Mod: HCNC,S$GLB,, | Performed by: NURSE PRACTITIONER

## 2024-09-23 PROCEDURE — 1125F AMNT PAIN NOTED PAIN PRSNT: CPT | Mod: HCNC,CPTII,S$GLB, | Performed by: NURSE PRACTITIONER

## 2024-09-23 PROCEDURE — 95251 CONT GLUC MNTR ANALYSIS I&R: CPT | Mod: HCNC,S$GLB,, | Performed by: NURSE PRACTITIONER

## 2024-09-23 PROCEDURE — 3008F BODY MASS INDEX DOCD: CPT | Mod: HCNC,CPTII,S$GLB, | Performed by: NURSE PRACTITIONER

## 2024-09-23 PROCEDURE — 3288F FALL RISK ASSESSMENT DOCD: CPT | Mod: HCNC,CPTII,S$GLB, | Performed by: NURSE PRACTITIONER

## 2024-09-23 PROCEDURE — 1101F PT FALLS ASSESS-DOCD LE1/YR: CPT | Mod: HCNC,CPTII,S$GLB, | Performed by: NURSE PRACTITIONER

## 2024-09-23 RX ORDER — LEVOTHYROXINE SODIUM 125 UG/1
125 TABLET ORAL
Qty: 30 TABLET | Refills: 11 | Status: SHIPPED | OUTPATIENT
Start: 2024-09-23 | End: 2025-09-23

## 2024-09-23 RX ORDER — TIRZEPATIDE 12.5 MG/.5ML
12.5 INJECTION, SOLUTION SUBCUTANEOUS
Qty: 4 PEN | Refills: 6 | Status: SHIPPED | OUTPATIENT
Start: 2024-09-23

## 2024-09-23 NOTE — PROGRESS NOTES
CC: This 68 y.o. female presents for management of diabetes and chronic conditions pending review including HTN, HLP, morbid obesity, hypothyroidism, vitamin d deficiency     HPI: She was diagnosed with T2DM in ~ 2013. She was hospitalized r/t DM in 2016 for DKA.   Family hx of DM: mom, dad, and sisters      Her  passed away unexpectedly ~ a month ago    See Dexcom report in media tab  2% Very High  32% High  66% In Range  0% Low  <1% Very Low  GMI 7.2%  Some pp excursions noted, not daily and large, resolve quickly    Diet: she eats crackers every now and them, grieving not eating much       Exercise:  none     CURRENT DM MEDS:  lantus 10u bid u qam;  Humalog 5 u AC  + correction 150/25; Mounjaro 10 mg weekly (Thursday)  Timing prandial insulin 5-15 minutes before meals: before    Vial/pen:  Uses pens  Glucometer type:  Relion 2020    Standards of Care:  Eye exam: + mild DM retinopathy  6/2023 Dr Nova  5/2024 Dr Jackson (in [place])      Regarding thyroid:  Takes LT4 125 mcg, correctly qam Monday- Saturday  Nodules was found on on a PET scan. FNA 11/25/14 shows adenomatous nodule with cystic changes. S/p total thyroidectomy 5/24/16.       No hoarseness, voice changes, +dysphagia- from Parkinson's, no compressive symptoms, or head/neck exposure to XRT.   No personal or FH of thyroid cancer or MEN syndrome.   Not taking biotin.   + tremors of the hands- has Parkinson's L>R in the afternoon  + intolerance to the cold  +fatigue  + hair loss       ROS:  Gen: Appetite good, weight loss 10 lbs  Eyes:+ visual disturbances  Resp: no SOB or ESPINAL, no cough  Cardiac: No palpitations, chest pain   GI: No nausea or vomiting, diarrhea, constipation   /GYN: 1+ nocturia, no burning or pain.   MS/Neuro: + tingling/cramping in her feet, speech clear,   Psych: Denies drug/ETOH abuse,  + grieving, tearful in office   Other systems: negative.    PE:  GENERAL: Well developed, well nourished.  PSYCH: AAOx3, appropriate mood  and affect, pleasant expression, conversant, appears relaxed, well groomed.   EYES: Conjunctiva, corneas clear  NECK: Supple, trachea midline   ABDOMEN: Soft, non-tender, non-distended   SKIN:  no acanthosis nigracans.  FOOT EXAMINATION:  1/26/2024   No foot deformity, +Onychomycosis,  no interspace maceration or ulceration noted.  Decreased hair growth present over toes/feet.    Protective sensation intact with 10 gram monofilament.  +2 dorsalis pedis and posterior pulses noted.      Lab Results   Component Value Date    MICALBCREAT 15.0 01/26/2024       Hemoglobin A1C   Date Value Ref Range Status   09/16/2024 6.3 (H) 4.0 - 5.6 % Final     Comment:     ADA Screening Guidelines:  5.7-6.4%  Consistent with prediabetes  >or=6.5%  Consistent with diabetes    High levels of fetal hemoglobin interfere with the HbA1C  assay. Heterozygous hemoglobin variants (HbS, HgC, etc)do  not significantly interfere with this assay.   However, presence of multiple variants may affect accuracy.     05/13/2024 6.3 (H) 4.0 - 5.6 % Final     Comment:     ADA Screening Guidelines:  5.7-6.4%  Consistent with prediabetes  >or=6.5%  Consistent with diabetes    High levels of fetal hemoglobin interfere with the HbA1C  assay. Heterozygous hemoglobin variants (HbS, HgC, etc)do  not significantly interfere with this assay.   However, presence of multiple variants may affect accuracy.     01/26/2024 5.7 (H) 4.0 - 5.6 % Final     Comment:     ADA Screening Guidelines:  5.7-6.4%  Consistent with prediabetes  >or=6.5%  Consistent with diabetes    High levels of fetal hemoglobin interfere with the HbA1C  assay. Heterozygous hemoglobin variants (HbS, HgC, etc)do  not significantly interfere with this assay.   However, presence of multiple variants may affect accuracy.          ASSESSMENT and PLAN:    1. T2DM with nephropathy-      Continue lantus 10 u bid for now  Increase Mounjaro to 12.5 mg weekly in attempts to wean lantus off  Continue Dexcom G7-  Notify me for any issues  Continue to hold Humalog for now    2. HTN - controlled today, continue meds as previously prescribed and monitor.     3. HLP -   unable to tolerate statins r/t myalgia; Continue repatha     4. Post Surgical Hypothyroidism for MNG. S/p total thyroidectomy w pathology benign. Resume levothyroxine 125 mcg daily, Recheck TSH in 6 weeks     5. Obesity - Resume exercise when she's ready    Body mass index is 29.58 kg/m².    Follow-up: in 6 months with A1C, TSH, CMP, lipid, [facility]

## 2024-09-26 ENCOUNTER — PATIENT MESSAGE (OUTPATIENT)
Dept: FAMILY MEDICINE | Facility: CLINIC | Age: 69
End: 2024-09-26
Payer: MEDICARE

## 2024-10-07 ENCOUNTER — OFFICE VISIT (OUTPATIENT)
Dept: FAMILY MEDICINE | Facility: CLINIC | Age: 69
End: 2024-10-07
Payer: MEDICARE

## 2024-10-07 ENCOUNTER — LAB VISIT (OUTPATIENT)
Dept: LAB | Facility: HOSPITAL | Age: 69
End: 2024-10-07
Attending: INTERNAL MEDICINE
Payer: MEDICARE

## 2024-10-07 VITALS
WEIGHT: 153 LBS | HEART RATE: 83 BPM | BODY MASS INDEX: 28.89 KG/M2 | SYSTOLIC BLOOD PRESSURE: 136 MMHG | TEMPERATURE: 98 F | HEIGHT: 61 IN | DIASTOLIC BLOOD PRESSURE: 72 MMHG

## 2024-10-07 DIAGNOSIS — Z23 IMMUNIZATION DUE: ICD-10-CM

## 2024-10-07 DIAGNOSIS — N18.31 TYPE 2 DIABETES MELLITUS WITH STAGE 3A CHRONIC KIDNEY DISEASE, WITH LONG-TERM CURRENT USE OF INSULIN: ICD-10-CM

## 2024-10-07 DIAGNOSIS — E11.22 TYPE 2 DIABETES MELLITUS WITH STAGE 3A CHRONIC KIDNEY DISEASE, WITH LONG-TERM CURRENT USE OF INSULIN: ICD-10-CM

## 2024-10-07 DIAGNOSIS — Z79.4 TYPE 2 DIABETES MELLITUS WITH STAGE 3A CHRONIC KIDNEY DISEASE, WITH LONG-TERM CURRENT USE OF INSULIN: ICD-10-CM

## 2024-10-07 DIAGNOSIS — Z12.31 BREAST CANCER SCREENING BY MAMMOGRAM: Primary | ICD-10-CM

## 2024-10-07 DIAGNOSIS — R42 ORTHOSTATIC LIGHTHEADEDNESS: ICD-10-CM

## 2024-10-07 LAB
ALBUMIN SERPL BCP-MCNC: 3.7 G/DL (ref 3.5–5.2)
ANION GAP SERPL CALC-SCNC: 12 MMOL/L (ref 8–16)
BASOPHILS # BLD AUTO: 0.06 K/UL (ref 0–0.2)
BASOPHILS NFR BLD: 0.7 % (ref 0–1.9)
BUN SERPL-MCNC: 24 MG/DL (ref 8–23)
CALCIUM SERPL-MCNC: 10.1 MG/DL (ref 8.7–10.5)
CHLORIDE SERPL-SCNC: 104 MMOL/L (ref 95–110)
CO2 SERPL-SCNC: 26 MMOL/L (ref 23–29)
CREAT SERPL-MCNC: 0.8 MG/DL (ref 0.5–1.4)
DIFFERENTIAL METHOD BLD: ABNORMAL
EOSINOPHIL # BLD AUTO: 0 K/UL (ref 0–0.5)
EOSINOPHIL NFR BLD: 0.5 % (ref 0–8)
ERYTHROCYTE [DISTWIDTH] IN BLOOD BY AUTOMATED COUNT: 12.1 % (ref 11.5–14.5)
EST. GFR  (NO RACE VARIABLE): >60 ML/MIN/1.73 M^2
GLUCOSE SERPL-MCNC: 104 MG/DL (ref 70–110)
HCT VFR BLD AUTO: 43.6 % (ref 37–48.5)
HGB BLD-MCNC: 13.9 G/DL (ref 12–16)
IMM GRANULOCYTES # BLD AUTO: 0.05 K/UL (ref 0–0.04)
IMM GRANULOCYTES NFR BLD AUTO: 0.6 % (ref 0–0.5)
LYMPHOCYTES # BLD AUTO: 1.8 K/UL (ref 1–4.8)
LYMPHOCYTES NFR BLD: 21.7 % (ref 18–48)
MCH RBC QN AUTO: 29.3 PG (ref 27–31)
MCHC RBC AUTO-ENTMCNC: 31.9 G/DL (ref 32–36)
MCV RBC AUTO: 92 FL (ref 82–98)
MONOCYTES # BLD AUTO: 0.8 K/UL (ref 0.3–1)
MONOCYTES NFR BLD: 9.1 % (ref 4–15)
NEUTROPHILS # BLD AUTO: 5.7 K/UL (ref 1.8–7.7)
NEUTROPHILS NFR BLD: 67.4 % (ref 38–73)
NRBC BLD-RTO: 0 /100 WBC
PHOSPHATE SERPL-MCNC: 3.2 MG/DL (ref 2.7–4.5)
PLATELET # BLD AUTO: 211 K/UL (ref 150–450)
PMV BLD AUTO: 10.6 FL (ref 9.2–12.9)
POTASSIUM SERPL-SCNC: 3.9 MMOL/L (ref 3.5–5.1)
RBC # BLD AUTO: 4.74 M/UL (ref 4–5.4)
SODIUM SERPL-SCNC: 142 MMOL/L (ref 136–145)
WBC # BLD AUTO: 8.44 K/UL (ref 3.9–12.7)

## 2024-10-07 PROCEDURE — 1159F MED LIST DOCD IN RCRD: CPT | Mod: HCNC,CPTII,S$GLB, | Performed by: INTERNAL MEDICINE

## 2024-10-07 PROCEDURE — 3078F DIAST BP <80 MM HG: CPT | Mod: HCNC,CPTII,S$GLB, | Performed by: INTERNAL MEDICINE

## 2024-10-07 PROCEDURE — 99214 OFFICE O/P EST MOD 30 MIN: CPT | Mod: HCNC,S$GLB,, | Performed by: INTERNAL MEDICINE

## 2024-10-07 PROCEDURE — 1126F AMNT PAIN NOTED NONE PRSNT: CPT | Mod: HCNC,CPTII,S$GLB, | Performed by: INTERNAL MEDICINE

## 2024-10-07 PROCEDURE — 1101F PT FALLS ASSESS-DOCD LE1/YR: CPT | Mod: HCNC,CPTII,S$GLB, | Performed by: INTERNAL MEDICINE

## 2024-10-07 PROCEDURE — 85025 COMPLETE CBC W/AUTO DIFF WBC: CPT | Mod: HCNC | Performed by: INTERNAL MEDICINE

## 2024-10-07 PROCEDURE — 80069 RENAL FUNCTION PANEL: CPT | Mod: HCNC | Performed by: INTERNAL MEDICINE

## 2024-10-07 PROCEDURE — 3066F NEPHROPATHY DOC TX: CPT | Mod: HCNC,CPTII,S$GLB, | Performed by: INTERNAL MEDICINE

## 2024-10-07 PROCEDURE — 3008F BODY MASS INDEX DOCD: CPT | Mod: HCNC,CPTII,S$GLB, | Performed by: INTERNAL MEDICINE

## 2024-10-07 PROCEDURE — 3075F SYST BP GE 130 - 139MM HG: CPT | Mod: HCNC,CPTII,S$GLB, | Performed by: INTERNAL MEDICINE

## 2024-10-07 PROCEDURE — 36415 COLL VENOUS BLD VENIPUNCTURE: CPT | Mod: HCNC,PO | Performed by: INTERNAL MEDICINE

## 2024-10-07 PROCEDURE — 3061F NEG MICROALBUMINURIA REV: CPT | Mod: HCNC,CPTII,S$GLB, | Performed by: INTERNAL MEDICINE

## 2024-10-07 PROCEDURE — 3288F FALL RISK ASSESSMENT DOCD: CPT | Mod: HCNC,CPTII,S$GLB, | Performed by: INTERNAL MEDICINE

## 2024-10-07 PROCEDURE — 99999 PR PBB SHADOW E&M-EST. PATIENT-LVL V: CPT | Mod: PBBFAC,HCNC,, | Performed by: INTERNAL MEDICINE

## 2024-10-07 PROCEDURE — 3044F HG A1C LEVEL LT 7.0%: CPT | Mod: HCNC,CPTII,S$GLB, | Performed by: INTERNAL MEDICINE

## 2024-10-07 RX ORDER — VALSARTAN AND HYDROCHLOROTHIAZIDE 320; 25 MG/1; MG/1
0.5 TABLET, FILM COATED ORAL DAILY
COMMUNITY

## 2024-10-17 NOTE — PROGRESS NOTES
Assessment/Plan:  Assessment & Plan  1. Dizziness.  Her blood pressure was slightly elevated during today's visit. She reports increased dizziness over the past few weeks, likely exacerbated by poor eating and sleeping habits. She has lost approximately 10 pounds since the last visit. A repeat blood pressure check will be performed today, both in sitting and standing positions. If the blood pressure remains high, the diuretic component of her medication may be removed. A blood test will be ordered to assess her kidney function. She is advised to monitor her blood pressure at home.    2. Hypertension.  She is currently taking half a tablet of Valsartan 320/25 mg. The dosage was previously reduced due to hypotension/lightheadedness. If her blood pressure remains elevated, adjustments to her medication may be necessary. She is advised to continue monitoring her blood pressure at home.    3. Hypothyroidism.  She is currently on Levothyroxine 125 mcg daily, skipping the dose on Sundays. A repeat thyroid function test is scheduled for November.    4. Health Maintenance.  She is due for a flu shot, but the clinic has run out of the higher dose flu shots. She is advised to get the flu shot at her pharmacy.       Follow up if symptoms worsen or fail to improve.    Estela Grace MD  _____________________________________________________________________________________________________________________________________________________    CC: dizziness    Patient is in clinic today as an established patient.    History of Present Illness  The patient is a 68-year-old female who presents for evaluation of dizziness.    She reports experiencing lightheadedness, which she attributes to her poor eating and sleeping habits. These issues have been exacerbated by the recent loss of her , affecting her sleep and appetite. Despite these challenges, she believes her overall coping mechanisms are improving. Her episodes of  lightheadedness typically resolve within a minute.    She has lost approximately 10 pounds since her last visit. Although she owns a blood pressure monitor, she has not been using it recently. Her current medication regimen includes half a tablet of valsartan.    She takes her thyroid medication in the early morning hours, between 5:00 and 6:00 AM, and refrains from eating or drinking anything for at least 15 minutes afterward. Her previous dose of thyroid medication was reduced from 137 to 125, which she now takes daily, except on Sundays. She is scheduled to have her thyroid levels rechecked in a few weeks.     No other new complaints today.  Remaining chronic conditions have been reviewed and remain stable. Further detail as stated above.      Current Outpatient Medications on File Prior to Visit   Medication Sig Dispense Refill    alcohol swabs (ALCOHOL WIPES) PadM Uses 5 daily 400 each 4    amantadine HCL (SYMMETREL) 100 mg capsule Take 1 capsule (100 mg total) by mouth 2 (two) times daily. 60 capsule 11    ascorbic acid, vitamin C, (VITAMIN C) 100 MG tablet Take 100 mg by mouth once daily.      carbidopa-levodopa  mg (SINEMET)  mg per tablet Take 1 tablet three times daily 90 tablet 11    cyanocobalamin (VITAMIN B-12) 1000 MCG tablet Take 100 mcg by mouth once daily.      diclofenac sodium (VOLTAREN) 1 % Gel Apply 2 g topically once daily. 100 g 0    evolocumab (REPATHA SURECLICK) 140 mg/mL PnIj Inject 1 mL (140 mg total) into the skin every 14 (fourteen) days. 2 mL 12    gabapentin (NEURONTIN) 100 MG capsule Take 1 capsule (100 mg total) by mouth 3 (three) times daily. Takes at night-takes  capsule 1    insulin glargine U-100, Lantus, (LANTUS SOLOSTAR U-100 INSULIN) 100 unit/mL (3 mL) InPn pen 12 u qam and qpm      lancing device Misc Checks bg 7-8 times a day 1 each 0    levothyroxine (SYNTHROID) 125 MCG tablet Take 1 tablet (125 mcg total) by mouth before breakfast. 30 tablet  11    LINZESS 145 mcg Cap capsule TAKE ONE CAPSULE BY MOUTH ONCE DAILY BEFORE BREAKFAST 30 capsule 11    MULTIVITAMIN W-MINERALS/LUTEIN (CENTRUM SILVER ORAL) Take 1 tablet by mouth once daily.       pantoprazole (PROTONIX) 20 MG tablet TAKE ONE TABLET BY MOUTH ONCE DAILY 90 tablet 3    pramipexole (MIRAPEX) 0.25 MG tablet Take 1 tablet (0.25 mg total) by mouth 3 (three) times daily. 90 tablet 11    selegiline (ELDEPRYL) 5 mg Cap Take 1 capsule (5 mg total) by mouth 2 (two) times daily. 60 capsule 10    tirzepatide (MOUNJARO) 12.5 mg/0.5 mL PnIj Inject 12.5 mg into the skin every 7 days. 4 Pen 6    valsartan-hydrochlorothiazide (DIOVAN-HCT) 320-25 mg per tablet Take 0.5 tablets by mouth once daily.       No current facility-administered medications on file prior to visit.       Review of Systems   Constitutional:  Positive for activity change. Negative for chills, diaphoresis, fatigue, fever and unexpected weight change.   HENT:  Positive for hearing loss and trouble swallowing. Negative for congestion, ear pain, postnasal drip, rhinorrhea, sinus pain and sore throat.    Eyes:  Positive for visual disturbance. Negative for pain, discharge and redness.   Respiratory:  Negative for cough, chest tightness, shortness of breath and wheezing.    Cardiovascular:  Negative for chest pain, palpitations and leg swelling.   Gastrointestinal:  Positive for constipation. Negative for abdominal pain, blood in stool, diarrhea, nausea and vomiting.   Endocrine: Negative for polydipsia and polyuria.   Genitourinary:  Negative for difficulty urinating, dysuria, hematuria and menstrual problem.   Musculoskeletal:  Positive for arthralgias. Negative for joint swelling and neck pain.   Skin:  Negative for rash.   Neurological:  Positive for weakness and headaches. Negative for dizziness and syncope.   Psychiatric/Behavioral:  Negative for confusion and dysphoric mood. The patient is not nervous/anxious.      Vitals:    10/07/24  "0902 10/07/24 0904 10/07/24 0946 10/07/24 0947   BP: (!) 161/88 137/78 139/82 136/72   BP Location: Right arm  Right arm Right arm   Patient Position: Sitting Standing Sitting Standing   Pulse:  86 83    Temp: 98.1 °F (36.7 °C)      Weight: 69.4 kg (153 lb)      Height: 5' 1" (1.549 m)          Wt Readings from Last 3 Encounters:   10/07/24 69.4 kg (153 lb)   09/23/24 71 kg (156 lb 8.4 oz)   05/20/24 75.5 kg (166 lb 8.9 oz)       Physical Exam  Constitutional:       General: She is not in acute distress.     Appearance: Normal appearance. She is well-developed.   HENT:      Head: Normocephalic and atraumatic.   Eyes:      Extraocular Movements: Extraocular movements intact.      Conjunctiva/sclera: Conjunctivae normal.      Pupils: Pupils are equal, round, and reactive to light.   Cardiovascular:      Rate and Rhythm: Normal rate and regular rhythm.      Pulses: Normal pulses.      Heart sounds: Normal heart sounds. No murmur heard.  Pulmonary:      Effort: Pulmonary effort is normal. No respiratory distress.      Breath sounds: Normal breath sounds.   Abdominal:      General: Bowel sounds are normal. There is no distension.      Palpations: Abdomen is soft.      Tenderness: There is no abdominal tenderness.   Musculoskeletal:         General: Normal range of motion.      Cervical back: Normal range of motion and neck supple.   Skin:     General: Skin is warm and dry.      Findings: No rash.   Neurological:      General: No focal deficit present.      Mental Status: She is alert and oriented to person, place, and time.      Cranial Nerves: No cranial nerve deficit.      Sensory: No sensory deficit.      Motor: No weakness.      Coordination: Coordination normal.      Gait: Gait normal.   Psychiatric:         Mood and Affect: Mood normal.         Behavior: Behavior normal.     DISCLAIMER: This note was compiled by using a speech recognition dictation system and therefore please be aware that typographical / speech " recognition errors can and do occur.  Please contact me if you see any errors specifically.  Consent was obtained for GAMAL recording system prior to the visit.

## 2024-11-04 ENCOUNTER — LAB VISIT (OUTPATIENT)
Dept: LAB | Facility: HOSPITAL | Age: 69
End: 2024-11-04
Payer: MEDICARE

## 2024-11-04 DIAGNOSIS — E89.0 POST-OPERATIVE HYPOTHYROIDISM: ICD-10-CM

## 2024-11-04 LAB
T4 FREE SERPL-MCNC: 1.14 NG/DL (ref 0.71–1.51)
TSH SERPL DL<=0.005 MIU/L-ACNC: 0.26 UIU/ML (ref 0.4–4)

## 2024-11-04 PROCEDURE — 84439 ASSAY OF FREE THYROXINE: CPT | Mod: HCNC | Performed by: NURSE PRACTITIONER

## 2024-11-04 PROCEDURE — 84443 ASSAY THYROID STIM HORMONE: CPT | Mod: HCNC | Performed by: NURSE PRACTITIONER

## 2024-11-04 PROCEDURE — 36415 COLL VENOUS BLD VENIPUNCTURE: CPT | Mod: HCNC,PO | Performed by: NURSE PRACTITIONER

## 2024-11-05 DIAGNOSIS — E78.2 MIXED HYPERLIPIDEMIA: ICD-10-CM

## 2024-11-07 RX ORDER — EVOLOCUMAB 140 MG/ML
140 INJECTION, SOLUTION SUBCUTANEOUS
Qty: 2 ML | Refills: 11 | Status: ACTIVE | OUTPATIENT
Start: 2024-11-07

## 2024-11-13 ENCOUNTER — TELEPHONE (OUTPATIENT)
Dept: ENDOCRINOLOGY | Facility: CLINIC | Age: 69
End: 2024-11-13
Payer: MEDICARE

## 2024-11-13 NOTE — TELEPHONE ENCOUNTER
----- Message from Daniela Sauer DNP, NP sent at 11/11/2024 12:47 PM CST -----    ----- Message -----  From: Dion Mcgill, Robby  Sent: 11/11/2024  12:47 PM CST  To: Daniela Sauer DNP, NP    Good afternoon!  We have attempted multiple times to reach this patient for the Repatha refill.  The patient has not answered any of our call attempts.  The patient will need to call us back at [phone] in order to set up the refill for Repatha.  This was last dispensed on 10/11/24 for a 28 day supply.  Thanks!

## 2024-12-26 ENCOUNTER — HOSPITAL ENCOUNTER (OUTPATIENT)
Dept: RADIOLOGY | Facility: HOSPITAL | Age: 69
Discharge: HOME | End: 2024-12-26
Attending: INTERNAL MEDICINE
Payer: MEDICARE

## 2024-12-26 DIAGNOSIS — Z12.31 BREAST CANCER SCREENING BY MAMMOGRAM: ICD-10-CM

## 2024-12-26 PROCEDURE — 77067 SCR MAMMO BI INCL CAD: CPT | Mod: 26,HCNC,, | Performed by: RADIOLOGY

## 2024-12-26 PROCEDURE — 77067 SCR MAMMO BI INCL CAD: CPT | Mod: TC,HCNC,PO

## 2024-12-26 PROCEDURE — 77063 BREAST TOMOSYNTHESIS BI: CPT | Mod: 26,HCNC,, | Performed by: RADIOLOGY

## 2025-01-06 ENCOUNTER — OFFICE VISIT (OUTPATIENT)
Dept: FAMILY MEDICINE | Facility: CLINIC | Age: 70
End: 2025-01-06
Payer: MEDICARE

## 2025-01-06 VITALS
TEMPERATURE: 98 F | HEART RATE: 78 BPM | HEIGHT: 61 IN | SYSTOLIC BLOOD PRESSURE: 177 MMHG | WEIGHT: 152 LBS | DIASTOLIC BLOOD PRESSURE: 86 MMHG | BODY MASS INDEX: 28.7 KG/M2

## 2025-01-06 DIAGNOSIS — F51.01 PRIMARY INSOMNIA: ICD-10-CM

## 2025-01-06 DIAGNOSIS — E89.0 POST-OPERATIVE HYPOTHYROIDISM: ICD-10-CM

## 2025-01-06 DIAGNOSIS — E11.22 TYPE 2 DIABETES MELLITUS WITH STAGE 3A CHRONIC KIDNEY DISEASE, WITH LONG-TERM CURRENT USE OF INSULIN: ICD-10-CM

## 2025-01-06 DIAGNOSIS — Z23 IMMUNIZATION DUE: Primary | ICD-10-CM

## 2025-01-06 DIAGNOSIS — G20.A1 PARKINSON'S DISEASE, UNSPECIFIED WHETHER DYSKINESIA PRESENT, UNSPECIFIED WHETHER MANIFESTATIONS FLUCTUATE: ICD-10-CM

## 2025-01-06 DIAGNOSIS — Z85.110 HISTORY OF MALIGNANT CARCINOID TUMOR OF BRONCHUS AND LUNG: ICD-10-CM

## 2025-01-06 DIAGNOSIS — Z79.4 TYPE 2 DIABETES MELLITUS WITH STAGE 3A CHRONIC KIDNEY DISEASE, WITH LONG-TERM CURRENT USE OF INSULIN: ICD-10-CM

## 2025-01-06 DIAGNOSIS — N18.31 TYPE 2 DIABETES MELLITUS WITH STAGE 3A CHRONIC KIDNEY DISEASE, WITH LONG-TERM CURRENT USE OF INSULIN: ICD-10-CM

## 2025-01-06 DIAGNOSIS — I10 BENIGN ESSENTIAL HTN: ICD-10-CM

## 2025-01-06 PROCEDURE — 1101F PT FALLS ASSESS-DOCD LE1/YR: CPT | Mod: CPTII,S$GLB,, | Performed by: INTERNAL MEDICINE

## 2025-01-06 PROCEDURE — 1126F AMNT PAIN NOTED NONE PRSNT: CPT | Mod: CPTII,S$GLB,, | Performed by: INTERNAL MEDICINE

## 2025-01-06 PROCEDURE — 3079F DIAST BP 80-89 MM HG: CPT | Mod: CPTII,S$GLB,, | Performed by: INTERNAL MEDICINE

## 2025-01-06 PROCEDURE — 3288F FALL RISK ASSESSMENT DOCD: CPT | Mod: CPTII,S$GLB,, | Performed by: INTERNAL MEDICINE

## 2025-01-06 PROCEDURE — 99214 OFFICE O/P EST MOD 30 MIN: CPT | Mod: S$GLB,,, | Performed by: INTERNAL MEDICINE

## 2025-01-06 PROCEDURE — G0008 ADMIN INFLUENZA VIRUS VAC: HCPCS | Mod: S$GLB,,, | Performed by: INTERNAL MEDICINE

## 2025-01-06 PROCEDURE — G2211 COMPLEX E/M VISIT ADD ON: HCPCS | Mod: S$GLB,,, | Performed by: INTERNAL MEDICINE

## 2025-01-06 PROCEDURE — 90653 IIV ADJUVANT VACCINE IM: CPT | Mod: S$GLB,,, | Performed by: INTERNAL MEDICINE

## 2025-01-06 PROCEDURE — 99999 PR PBB SHADOW E&M-EST. PATIENT-LVL IV: CPT | Mod: PBBFAC,,, | Performed by: INTERNAL MEDICINE

## 2025-01-06 PROCEDURE — 3077F SYST BP >= 140 MM HG: CPT | Mod: CPTII,S$GLB,, | Performed by: INTERNAL MEDICINE

## 2025-01-06 PROCEDURE — 1159F MED LIST DOCD IN RCRD: CPT | Mod: CPTII,S$GLB,, | Performed by: INTERNAL MEDICINE

## 2025-01-06 PROCEDURE — 3008F BODY MASS INDEX DOCD: CPT | Mod: CPTII,S$GLB,, | Performed by: INTERNAL MEDICINE

## 2025-01-06 RX ORDER — TRAZODONE HYDROCHLORIDE 50 MG/1
50 TABLET ORAL NIGHTLY
Qty: 30 TABLET | Refills: 0 | Status: SHIPPED | OUTPATIENT
Start: 2025-01-06 | End: 2026-01-06

## 2025-01-26 PROBLEM — F51.01 PRIMARY INSOMNIA: Status: ACTIVE | Noted: 2019-03-31

## 2025-01-26 PROBLEM — C7A.090 MALIGNANT CARCINOID TUMOR OF BRONCHUS AND LUNG: Status: RESOLVED | Noted: 2022-03-20 | Resolved: 2025-01-26

## 2025-01-27 NOTE — PROGRESS NOTES
Assessment/Plan:    1. Immunization due  -     influenza (adjuvanted) (Fluad) 45 mcg/0.5 mL IM vaccine (> or = 66 yo) 0.5 mL    2. Primary insomnia  Overview:  -has tried multiple OTC medication with minimal benefit  -discussed importance of good sleep hygiene practices   -plan to start trial of trazodone 50 mg QHS    Orders:  -     traZODone (DESYREL) 50 MG tablet; Take 1 tablet (50 mg total) by mouth every evening.  Dispense: 30 tablet; Refill: 0    3. Type 2 diabetes mellitus with stage 3a chronic kidney disease, with long-term current use of insulin  Overview:  Diabetes Medications               insulin (LANTUS SOLOSTAR U-100 INSULIN) glargine 100 units/mL SubQ pen 12 u BID    tirzepatide (MOUNJARO) 10 mg/0.5 mL PnIj inject 10mg SUBCUTANEOUSLY EVERY 7 DAYS     -condition is currently controlled  -followed by endocrinology  -see diabetic health maintenance listed below  -on statin: No-intolerant  -on ACE-I/ARB: Yes  -counseling provided on importance of diabetic diet and medication compliance in order to treat diabetes  -discussed diabetes disease course and potential complications      4. Parkinson's disease, unspecified whether dyskinesia present, unspecified whether manifestations fluctuate  Overview:  -managed by neurology  -currently on carbidopa/levodopa  mg TID, pramipexole 0.25 mg TID, selegiline 5 mg BID, and amantidine 100 mg BID  -patient reports symptoms remain stable      5. Post-operative hypothyroidism  Overview:  Lab Results   Component Value Date    TSH 0.262 (L) 11/04/2024   -s/p total thyroidectomy in 2016 (parathyroids intact) with post op hypothyroidism  -levothyroxine dosage recently adjusted to 125 mcg M-F and 1/2 tablet on Sat/Sun  -repeat labs scheduled      6. History of malignant carcinoid tumor of bronchus and lung  Overview:  -followed by oncology  -s/p R middle and lower lung resection  -followed with surveillance CT      7. Benign essential HTN  Overview:  Hypertension  Medications               valsartan-hydrochlorothiazide (DIOVAN-HCT) 320-25 mg per tablet Take 0.5 tablets by mouth once daily.     -above goal today   -increase valsartan-HCTZ to a whole tablet daily  -BP check in 2 weeks  -continue lifestyle modification with low sodium diet and exercise   -discussed hypertension disease course and importance of treating high blood pressure  -patient understood and advised of risk of untreated blood pressure.  ER precautions were given   for symptoms of hypertensive urgency and emergency.          Visit today included increased complexity associated with the care of the episodic problem(s) addressed and managing the longitudinal care of the patient due to the serious and/or complex managed problem(s). See above assessment/plan.    Follow up in about 2 weeks (around 1/20/2025) for BP check.    Estela Grace MD  _____________________________________________________________________________________________________________________________________________________    CC: follow up of chronic medical conditions     Patient is in clinic today as an established patient.    History of Present Illness  The patient is a 69-year-old female who presents for a routine follow-up.    She reports an improvement in her overall health status. However, she has been experiencing elevated blood pressure, which she attributes to her lack of sleep at night. She has not been monitoring her blood pressure at home recently but used to do so regularly before her 's demise. She has been taking half of her prescribed blood pressure medication and is considering resuming the full dose.    She experiences severe leg cramps at night, described as muscle spasms that extend to the bone and persist for approximately 10 minutes. These cramps are absent during the day. She manages these cramps by rubbing the affected area and applying a salve. She has been supplementing with magnesium and potassium and performs  leg stretches while in bed due to balance issues related to her Parkinson's disease.    Her sleep pattern is disrupted, characterized by periods of wakefulness after an hour of sleep, followed by another hour of sleep. She has not sought pharmacological intervention for her sleep issues, instead opting for a glass of milk. Previous trials of melatonin were ineffective. She has not tried trazodone for sleep and expresses concern about potential excessive drowsiness from medications, citing an incident where Benadryl caused prolonged drowsiness.    She has been compliant with her thyroid medication regimen, taking one tablet of 125 mcg from Monday to Saturday and half a tablet on Sunday. She has a scheduled appointment for lab work on 03/20/2024, followed by a clinic visit on 03/27/2024.     No other new complaints today.  Remaining chronic conditions have been reviewed and remain stable. Further detail as stated above.    Health Maintenance reviewed.    No recent changes to medical/surgical history.    Current Outpatient Medications on File Prior to Visit   Medication Sig Dispense Refill    alcohol swabs (ALCOHOL WIPES) PadM Uses 5 daily 400 each 4    amantadine HCL (SYMMETREL) 100 mg capsule Take 1 capsule (100 mg total) by mouth 2 (two) times daily. 60 capsule 11    ascorbic acid, vitamin C, (VITAMIN C) 100 MG tablet Take 100 mg by mouth once daily.      carbidopa-levodopa  mg (SINEMET)  mg per tablet Take 1 tablet three times daily 90 tablet 11    cyanocobalamin (VITAMIN B-12) 1000 MCG tablet Take 100 mcg by mouth once daily.      diclofenac sodium (VOLTAREN) 1 % Gel Apply 2 g topically once daily. 100 g 0    gabapentin (NEURONTIN) 100 MG capsule Take 1 capsule (100 mg total) by mouth 3 (three) times daily. Takes at night-takes  capsule 1    insulin glargine U-100, Lantus, (LANTUS SOLOSTAR U-100 INSULIN) 100 unit/mL (3 mL) InPn pen 12 u qam and qpm      lancing device Misc Checks bg 7-8  times a day 1 each 0    levothyroxine (SYNTHROID) 125 MCG tablet Take 1 tablet (125 mcg total) by mouth before breakfast. 30 tablet 11    LINZESS 145 mcg Cap capsule TAKE ONE CAPSULE BY MOUTH ONCE DAILY BEFORE BREAKFAST 30 capsule 11    MULTIVITAMIN W-MINERALS/LUTEIN (CENTRUM SILVER ORAL) Take 1 tablet by mouth once daily.       pantoprazole (PROTONIX) 20 MG tablet TAKE ONE TABLET BY MOUTH ONCE DAILY 90 tablet 3    pramipexole (MIRAPEX) 0.25 MG tablet Take 1 tablet (0.25 mg total) by mouth 3 (three) times daily. 90 tablet 11    selegiline (ELDEPRYL) 5 mg Cap Take 1 capsule (5 mg total) by mouth 2 (two) times daily. 60 capsule 10    tirzepatide (MOUNJARO) 12.5 mg/0.5 mL PnIj Inject 12.5 mg into the skin every 7 days. 4 Pen 6    valsartan-hydrochlorothiazide (DIOVAN-HCT) 320-25 mg per tablet Take 0.5 tablets by mouth once daily.       No current facility-administered medications on file prior to visit.       Review of Systems   Constitutional:  Positive for activity change. Negative for chills, diaphoresis, fatigue, fever and unexpected weight change.   HENT:  Positive for hearing loss and trouble swallowing. Negative for congestion, ear pain, postnasal drip, rhinorrhea, sinus pain and sore throat.    Eyes:  Positive for visual disturbance. Negative for pain, discharge and redness.   Respiratory:  Negative for cough, chest tightness, shortness of breath and wheezing.    Cardiovascular:  Negative for chest pain, palpitations and leg swelling.   Gastrointestinal:  Positive for constipation. Negative for abdominal pain, blood in stool, diarrhea, nausea and vomiting.   Endocrine: Negative for polydipsia and polyuria.   Genitourinary:  Negative for difficulty urinating, dysuria, hematuria and menstrual problem.   Musculoskeletal:  Negative for arthralgias, joint swelling and neck pain.   Skin:  Negative for rash.   Neurological:  Positive for weakness and headaches. Negative for dizziness and syncope.  "  Psychiatric/Behavioral:  Positive for sleep disturbance. Negative for confusion, dysphoric mood and suicidal ideas. The patient is not nervous/anxious.      Vitals:    01/06/25 0851   BP: (!) 177/86   BP Location: Right arm   Patient Position: Sitting   Pulse: 78   Temp: 98.2 °F (36.8 °C)   Weight: 68.9 kg (152 lb)   Height: 5' 1" (1.549 m)       Wt Readings from Last 3 Encounters:   01/06/25 68.9 kg (152 lb)   10/07/24 69.4 kg (153 lb)   09/23/24 71 kg (156 lb 8.4 oz)       Physical Exam  Constitutional:       General: She is not in acute distress.     Appearance: Normal appearance. She is well-developed.   HENT:      Head: Normocephalic and atraumatic.   Eyes:      Conjunctiva/sclera: Conjunctivae normal.   Cardiovascular:      Rate and Rhythm: Normal rate and regular rhythm.      Pulses: Normal pulses.      Heart sounds: Normal heart sounds. No murmur heard.  Pulmonary:      Effort: Pulmonary effort is normal. No respiratory distress.      Breath sounds: Normal breath sounds.   Abdominal:      General: Bowel sounds are normal. There is no distension.      Palpations: Abdomen is soft.      Tenderness: There is no abdominal tenderness.   Musculoskeletal:         General: Normal range of motion.      Cervical back: Normal range of motion and neck supple.   Skin:     General: Skin is warm and dry.      Findings: No rash.   Neurological:      General: No focal deficit present.      Mental Status: She is alert and oriented to person, place, and time.   Psychiatric:         Mood and Affect: Mood normal.         Behavior: Behavior normal.     DISCLAIMER: This note was compiled by using a speech recognition dictation system and therefore please be aware that typographical / speech recognition errors can and do occur.  Please contact me if you see any errors specifically.  Consent was obtained for [facility] recording system prior to the visit.  "

## 2025-02-14 DIAGNOSIS — F51.01 PRIMARY INSOMNIA: ICD-10-CM

## 2025-02-14 NOTE — TELEPHONE ENCOUNTER
Refill Routing Note   Medication(s) are not appropriate for processing by [facility] for the following reason(s):        New or recently adjusted medication    [facility] action(s):  Defer             Appointments  past 12m or future 3m with PCP    Date Provider   Last Visit   1/6/2025 Estela Grace MD   Next Visit   Visit date not found Estela Grace MD   ED visits in past 90 days: 0        Note composed:5:06 PM 02/14/2025

## 2025-02-14 NOTE — TELEPHONE ENCOUNTER
No care due was identified.  [facility] Embedded Care Due Messages. Reference number: [id number].   2/14/2025 1:30:56 PM CST

## 2025-02-17 RX ORDER — TRAZODONE HYDROCHLORIDE 50 MG/1
50 TABLET ORAL NIGHTLY
Qty: 30 TABLET | Refills: 11 | Status: SHIPPED | OUTPATIENT
Start: 2025-02-17

## 2025-02-21 DIAGNOSIS — Z79.4 TYPE 2 DIABETES MELLITUS WITH DIABETIC NEPHROPATHY, WITH LONG-TERM CURRENT USE OF INSULIN: ICD-10-CM

## 2025-02-21 DIAGNOSIS — E11.21 TYPE 2 DIABETES MELLITUS WITH DIABETIC NEPHROPATHY, WITH LONG-TERM CURRENT USE OF INSULIN: ICD-10-CM

## 2025-02-24 RX ORDER — INSULIN GLARGINE 100 [IU]/ML
12 INJECTION, SOLUTION SUBCUTANEOUS EVERY MORNING
Qty: 15 ML | Refills: 11 | Status: SHIPPED | OUTPATIENT
Start: 2025-02-24

## 2025-03-06 ENCOUNTER — PATIENT MESSAGE (OUTPATIENT)
Dept: ADMINISTRATIVE | Facility: HOSPITAL | Age: 70
End: 2025-03-06
Payer: MEDICARE

## 2025-03-07 ENCOUNTER — PATIENT MESSAGE (OUTPATIENT)
Dept: FAMILY MEDICINE | Facility: CLINIC | Age: 70
End: 2025-03-07
Payer: MEDICARE

## 2025-03-09 DIAGNOSIS — I10 ESSENTIAL (PRIMARY) HYPERTENSION: ICD-10-CM

## 2025-03-09 NOTE — TELEPHONE ENCOUNTER
No care due was identified.  [facility] Embedded Care Due Messages. Reference number: [id number].   3/09/2025 8:03:07 AM CDT

## 2025-03-10 RX ORDER — VALSARTAN AND HYDROCHLOROTHIAZIDE 320; 25 MG/1; MG/1
1 TABLET, FILM COATED ORAL
Qty: 90 TABLET | Refills: 3 | Status: SHIPPED | OUTPATIENT
Start: 2025-03-10

## 2025-03-10 NOTE — TELEPHONE ENCOUNTER
Refill Routing Note   Medication(s) are not appropriate for processing by [facility] for the following reason(s):        No active prescription written by provider  Required vitals abnormal    [facility] action(s):  Defer               Appointments  past 12m or future 3m with PCP    Date Provider   Last Visit   1/6/2025 Estela Grace MD   Next Visit   Visit date not found Estela Grace MD   ED visits in past 90 days: 0        Note composed:5:19 AM 03/10/2025

## 2025-03-18 ENCOUNTER — PATIENT OUTREACH (OUTPATIENT)
Dept: ADMINISTRATIVE | Facility: HOSPITAL | Age: 70
End: 2025-03-18
Payer: MEDICARE

## 2025-03-18 NOTE — PROGRESS NOTES
Lab visit report: Patient has upcoming lab appointment on 3/20/25 for recheck of diabetic labs.

## 2025-03-18 NOTE — PROGRESS NOTES
DM LABS: per chart review pt is OVERDUE for HA1C, CMP, LIPID, MICROALBUMIN, labs already linked to lab appt 3.20.25.

## 2025-03-20 ENCOUNTER — LAB VISIT (OUTPATIENT)
Dept: LAB | Facility: HOSPITAL | Age: 70
End: 2025-03-20
Attending: NURSE PRACTITIONER
Payer: MEDICARE

## 2025-03-20 DIAGNOSIS — E11.22 TYPE 2 DIABETES MELLITUS WITH STAGE 3A CHRONIC KIDNEY DISEASE, WITH LONG-TERM CURRENT USE OF INSULIN: ICD-10-CM

## 2025-03-20 DIAGNOSIS — N18.31 TYPE 2 DIABETES MELLITUS WITH STAGE 3A CHRONIC KIDNEY DISEASE, WITH LONG-TERM CURRENT USE OF INSULIN: ICD-10-CM

## 2025-03-20 DIAGNOSIS — Z79.4 TYPE 2 DIABETES MELLITUS WITH STAGE 3A CHRONIC KIDNEY DISEASE, WITH LONG-TERM CURRENT USE OF INSULIN: ICD-10-CM

## 2025-03-20 LAB
ALBUMIN/CREAT UR: 14.4 UG/MG (ref 0–30)
CREAT UR-MCNC: 111 MG/DL (ref 15–325)
MICROALBUMIN UR DL<=1MG/L-MCNC: 16 UG/ML

## 2025-03-20 PROCEDURE — 82570 ASSAY OF URINE CREATININE: CPT | Performed by: NURSE PRACTITIONER

## 2025-03-27 ENCOUNTER — OFFICE VISIT (OUTPATIENT)
Dept: ENDOCRINOLOGY | Facility: CLINIC | Age: 70
End: 2025-03-27
Payer: MEDICARE

## 2025-03-27 VITALS
DIASTOLIC BLOOD PRESSURE: 70 MMHG | OXYGEN SATURATION: 100 % | HEART RATE: 86 BPM | BODY MASS INDEX: 29.05 KG/M2 | WEIGHT: 153.88 LBS | SYSTOLIC BLOOD PRESSURE: 112 MMHG | HEIGHT: 61 IN

## 2025-03-27 DIAGNOSIS — I70.0 ATHEROSCLEROSIS OF AORTA: ICD-10-CM

## 2025-03-27 DIAGNOSIS — E11.22 TYPE 2 DIABETES MELLITUS WITH STAGE 3A CHRONIC KIDNEY DISEASE, WITH LONG-TERM CURRENT USE OF INSULIN: Primary | ICD-10-CM

## 2025-03-27 DIAGNOSIS — I10 BENIGN ESSENTIAL HTN: ICD-10-CM

## 2025-03-27 DIAGNOSIS — G20.A1 PARKINSON'S DISEASE: ICD-10-CM

## 2025-03-27 DIAGNOSIS — E78.2 MIXED HYPERLIPIDEMIA: ICD-10-CM

## 2025-03-27 DIAGNOSIS — E11.21 TYPE 2 DIABETES MELLITUS WITH DIABETIC NEPHROPATHY, WITH LONG-TERM CURRENT USE OF INSULIN: ICD-10-CM

## 2025-03-27 DIAGNOSIS — E89.0 POST-OPERATIVE HYPOTHYROIDISM: ICD-10-CM

## 2025-03-27 DIAGNOSIS — Z79.4 TYPE 2 DIABETES MELLITUS WITH STAGE 3A CHRONIC KIDNEY DISEASE, WITH LONG-TERM CURRENT USE OF INSULIN: Primary | ICD-10-CM

## 2025-03-27 DIAGNOSIS — N18.31 TYPE 2 DIABETES MELLITUS WITH STAGE 3A CHRONIC KIDNEY DISEASE, WITH LONG-TERM CURRENT USE OF INSULIN: Primary | ICD-10-CM

## 2025-03-27 DIAGNOSIS — Z79.4 TYPE 2 DIABETES MELLITUS WITH DIABETIC NEPHROPATHY, WITH LONG-TERM CURRENT USE OF INSULIN: ICD-10-CM

## 2025-03-27 PROCEDURE — 99999 PR PBB SHADOW E&M-EST. PATIENT-LVL III: CPT | Mod: PBBFAC,,, | Performed by: NURSE PRACTITIONER

## 2025-03-27 RX ORDER — TIRZEPATIDE 12.5 MG/.5ML
12.5 INJECTION, SOLUTION SUBCUTANEOUS
Qty: 4 PEN | Refills: 6 | Status: SHIPPED | OUTPATIENT
Start: 2025-03-27

## 2025-03-27 NOTE — Clinical Note
Dexcom reviewed, excursion w main mid day meal, hypos in the evening Change lantus to 10 u qam, Continue Mounjaro  12.5 mg weekly Start prandin 0.5 mg with main meal every day

## 2025-03-27 NOTE — PROGRESS NOTES
CC: This 69 y.o. female presents for management of diabetes and chronic conditions pending review including HTN, HLP, morbid obesity, hypothyroidism, vitamin d deficiency     HPI: She was diagnosed with T2DM in ~ 2013. She was hospitalized r/t DM in 2016 for DKA.   Family hx of DM: mom, dad, and sisters   Her  passed away 8/2024      Wearing Dexcom- see download in media tab  6% Very High  23% High  70% In Range  1% Low  0% Very Low  Prandial excursions with main meal, some hypos noted in the evening     Diet:  Eating breakfast, snacks on grapes and bananas, kiwi, apple, nuts      Exercise:  joining a gym soon w her sister    CURRENT DM MEDS:  lantus 12 u qam; Mounjaro 12.5 mg weekly ( Tuesday )  Vial/pen:  Uses pens    Standards of Care:  Eye exam: + mild DM retinopathy  6/2023 Dr Nova  5/2024 Dr Jackson (in [place]) - has an appt on 4/15      Regarding thyroid:  Takes LT4 125 mcg, correctly qam Monday- Saturday  Nodules was found on on a PET scan. FNA 11/25/14 shows adenomatous nodule with cystic changes. S/p total thyroidectomy 5/24/16.       No hoarseness, voice changes, +dysphagia- from Parkinson's, no compressive symptoms, or head/neck exposure to XRT.   No personal or FH of thyroid cancer or MEN syndrome.   Not taking biotin.   + tremors of the hands- has Parkinson's L>R in the afternoon  + intolerance to the cold  +fatigue  + hair thinning        ROS:  Gen: Appetite good, weight loss 3 lbs  Eyes:+ visual disturbances  Resp: no SOB or ESPNIAL   Cardiac: No palpitations, chest pain   GI: No nausea or vomiting, diarrhea, constipation   /GYN: 1-2+ nocturia, no burning or pain.   MS/Neuro: +  numbness in her feet, speech clear,   Psych: Denies drug/ETOH abuse,  + grieving   Other systems: negative.    PE:  GENERAL: Well developed, well nourished.  PSYCH: AAOx3, appropriate mood and affect, pleasant expression, conversant, appears relaxed, well groomed.   EYES: Conjunctiva, corneas clear  NECK: Supple,  trachea midline   ABDOMEN: Soft, non-tender, non-distended   SKIN:  no acanthosis nigracans.  FOOT EXAMINATION:  3/27/2025   No foot deformity, +Onychomycosis,  no interspace maceration or ulceration noted.  Decreased hair growth present over toes/feet.    Protective sensation intact with 10 gram monofilament.  +2 dorsalis pedis and posterior pulses noted.      Lab Results   Component Value Date    MICALBCREAT 14.4 03/20/2025       Hemoglobin A1C   Date Value Ref Range Status   03/20/2025 7.1 (H) 4.0 - 5.6 % Final     Comment:     ADA Screening Guidelines:  5.7-6.4%  Consistent with prediabetes  >or=6.5%  Consistent with diabetes    High levels of fetal hemoglobin interfere with the HbA1C  assay. Heterozygous hemoglobin variants (HbS, HgC, etc)do  not significantly interfere with this assay.   However, presence of multiple variants may affect accuracy.     09/16/2024 6.3 (H) 4.0 - 5.6 % Final     Comment:     ADA Screening Guidelines:  5.7-6.4%  Consistent with prediabetes  >or=6.5%  Consistent with diabetes    High levels of fetal hemoglobin interfere with the HbA1C  assay. Heterozygous hemoglobin variants (HbS, HgC, etc)do  not significantly interfere with this assay.   However, presence of multiple variants may affect accuracy.     05/13/2024 6.3 (H) 4.0 - 5.6 % Final     Comment:     ADA Screening Guidelines:  5.7-6.4%  Consistent with prediabetes  >or=6.5%  Consistent with diabetes    High levels of fetal hemoglobin interfere with the HbA1C  assay. Heterozygous hemoglobin variants (HbS, HgC, etc)do  not significantly interfere with this assay.   However, presence of multiple variants may affect accuracy.          ASSESSMENT and PLAN:    1. T2DM with nephropathy-     Change lantus to 10 u qam, Mounjaro  12.5 mg weekly  Start prandin 0.5 mg with main meal every day  Continue Dexcom G7- Notify me for any issues  Appt tomorrow am for Dexcom download- changes to be based on findings   Would like [employer], dexterity  issues placing dexcom/Juany- will research to whom to refer     2. HTN - controlled today, continue meds as previously prescribed and monitor.     3. HLP -   unable to tolerate statins r/t myalgia; Off repatha r/t joint pain     4. Post Surgical Hypothyroidism for MNG. S/p total thyroidectomy w pathology benign. Resume levothyroxine 125 mcg daily, Recheck TSH in 6 weeks     Follow-up: in 3 months with A1C, lipid

## 2025-03-28 ENCOUNTER — PATIENT MESSAGE (OUTPATIENT)
Dept: ENDOCRINOLOGY | Facility: CLINIC | Age: 70
End: 2025-03-28

## 2025-03-28 ENCOUNTER — TELEPHONE (OUTPATIENT)
Dept: ENDOCRINOLOGY | Facility: CLINIC | Age: 70
End: 2025-03-28
Payer: MEDICARE

## 2025-03-28 RX ORDER — INSULIN GLARGINE 100 [IU]/ML
10 INJECTION, SOLUTION SUBCUTANEOUS EVERY MORNING
Qty: 15 ML | Refills: 11 | Status: SHIPPED | OUTPATIENT
Start: 2025-03-28

## 2025-03-28 RX ORDER — REPAGLINIDE 0.5 MG/1
TABLET ORAL
Qty: 90 TABLET | Refills: 3 | Status: SHIPPED | OUTPATIENT
Start: 2025-03-28

## 2025-03-31 RX ORDER — CARBIDOPA AND LEVODOPA 25; 100 MG/1; MG/1
1 TABLET ORAL 3 TIMES DAILY
Qty: 90 TABLET | Refills: 0 | Status: SHIPPED | OUTPATIENT
Start: 2025-03-31

## 2025-04-04 DIAGNOSIS — C7A.090 MALIGNANT CARCINOID TUMOR OF BRONCHUS AND LUNG: Primary | ICD-10-CM

## 2025-04-08 ENCOUNTER — TELEPHONE (OUTPATIENT)
Dept: ENDOCRINOLOGY | Facility: CLINIC | Age: 70
End: 2025-04-08
Payer: MEDICARE

## 2025-05-16 ENCOUNTER — PATIENT MESSAGE (OUTPATIENT)
Facility: CLINIC | Age: 70
End: 2025-05-16
Payer: MEDICARE

## 2025-05-21 DIAGNOSIS — G20.A1 PARKINSON DISEASE: ICD-10-CM

## 2025-05-21 DIAGNOSIS — K59.09 CHRONIC CONSTIPATION: ICD-10-CM

## 2025-05-21 DIAGNOSIS — G20.A1 PARKINSON'S DISEASE: ICD-10-CM

## 2025-05-21 RX ORDER — PRAMIPEXOLE DIHYDROCHLORIDE 0.25 MG/1
0.25 TABLET ORAL 3 TIMES DAILY
Qty: 90 TABLET | Refills: 0 | Status: SHIPPED | OUTPATIENT
Start: 2025-05-21

## 2025-05-21 RX ORDER — SELEGILINE HYDROCHLORIDE 5 MG/1
5 CAPSULE ORAL 2 TIMES DAILY
Qty: 60 CAPSULE | Refills: 0 | Status: SHIPPED | OUTPATIENT
Start: 2025-05-21

## 2025-05-21 RX ORDER — CARBIDOPA AND LEVODOPA 25; 100 MG/1; MG/1
1 TABLET ORAL 3 TIMES DAILY
Qty: 90 TABLET | Refills: 0 | Status: SHIPPED | OUTPATIENT
Start: 2025-05-21

## 2025-05-21 NOTE — TELEPHONE ENCOUNTER
----- Message from Any sent at 5/21/2025 10:25 AM CDT -----  Type:  RX Refill RequestWho Called: BrendaRefill or New Rx:RefillRX Name and Strength:levothyroxine (SYNTHROID) 125 MCG tabletHow is the patient currently taking it? (ex. 1XDay):Is this a 30 day or 90 day RX:Preferred Pharmacy with phone number:[place], [place] - [address] [facility] [phone] [facility] [address] [address]Hlugi: [phone] Fax: [phone] Local or Mail Order:LocalOrdering Provider:Natividad Reisould the patient rather a call back or a response via [facility]? CallbackBest Call Back Number:[phone] Information: Need refills out of medicationType:  RX Refill RequestWho Called: BrendaRefill or New Rx:RefillRX Name and Strength:carbidopa-levodopa  mg (SINEMET)  mg per tabletHow is the patient currently taking it? (ex. 1XDay):Is this a 30 day or 90 day RX:Preferred Pharmacy with phone number:[facility] [address] [facility] [phone] [facility] [address]Mmlrh: [phone] Fax: [phone] Local or Mail Order:LocalOrdering Provider:Virgie Reisould the patient rather a call back or a response via [facility]? CallbackBest Call Back Number:[phone] Information: Need refills out of medicationType:  RX Refill RequestWho Called: BrendaRefill or New Rx:RefillRX Name and Strength:amantadine HCL (SYMMETREL) 100 mg capsuleHow is the patient currently taking it? (ex. 1XDay):Is this a 30 day or 90 day RX:Preferred Pharmacy with phone number:[facility] [place] - [address] [phone] [address]Lcsdx: [phone] Fax: [phone] Local or Mail Order:LocalOrdering Provider:Natividad Ghosh the patient rather a call back or a response via [facility]? CallbackBest Call Back Number:[phone] Information: Need refills out of medicationPlease call patient once called in

## 2025-05-21 NOTE — TELEPHONE ENCOUNTER
----- Message from Any sent at 5/21/2025 10:25 AM CDT -----  Type:  RX Refill RequestWho Called: BrendaRefill or New Rx:RefillRX Name and Strength:levothyroxine (SYNTHROID) 125 MCG tabletHow is the patient currently taking it? (ex. 1XDay):Is this a 30 day or 90 day RX:Preferred Pharmacy with phone number:[place], [place] - [address] [facility] [phone] [facility] [address] [address]Xtopc: [phone] Fax: [phone] Local or Mail Order:LocalOrdering Provider:Natividad Reisould the patient rather a call back or a response via [facility]? CallbackBest Call Back Number:[phone] Information: Need refills out of medicationType:  RX Refill RequestWho Called: BrendaRefill or New Rx:RefillRX Name and Strength:carbidopa-levodopa  mg (SINEMET)  mg per tabletHow is the patient currently taking it? (ex. 1XDay):Is this a 30 day or 90 day RX:Preferred Pharmacy with phone number:[facility] [address] [facility] [phone] [facility] [address] [address]Wylfu: [phone] Fax: [phone] Local or Mail Order:LocalOrdering Provider:Virgie Reisould the patient rather a call back or a response via [facility]? CallbackBest Call Back Number:[phone] Information: Need refills out of medicationType:  RX Refill RequestWho Called: BrendaRefill or New Rx:RefillRX Name and Strength:amantadine HCL (SYMMETREL) 100 mg capsuleHow is the patient currently taking it? (ex. 1XDay):Is this a 30 day or 90 day RX:Preferred Pharmacy with phone number:[facility] [place] - [address] [phone] [address]Kddln: [phone] Fax: [phone] Local or Mail Order:LocalOrdering Provider:Natividad Ghosh the patient rather a call back or a response via [facility]? CallbackBest Call Back Number:[phone] Information: Need refills out of medicationPlease call patient once called in

## 2025-05-21 NOTE — TELEPHONE ENCOUNTER
No care due was identified.  [facility] Embedded Care Due Messages. Reference number: [id number].   5/21/2025 11:34:49 AM CDT

## 2025-05-21 NOTE — TELEPHONE ENCOUNTER
Spoke to patient. She in is need of med refills.LOV 4.18.23  Spouse passed away 9 mos ago. Has d/c Amantadine.    Offered that CK is away and does not typically refill meds over 1 year since visit. Patient recent dx with lung cancer. Scheduled for VV 6.4.25. Verbalized understanding and thanks.      Phoned in below Rxs x 1 only as a courtesy. Patient admits to having a difficult time since her spouse passed. I strongly enc to keep her VV. Verbalized understanding and thanks.

## 2025-05-21 NOTE — TELEPHONE ENCOUNTER
Refill Routing Note   Medication(s) are not appropriate for processing by [facility] for the following reason(s):        Outside of protocol    [facility] action(s):  Route             Appointments  past 12m or future 3m with PCP    Date Provider   Last Visit   1/6/2025 Estela Grace MD   Next Visit   5/26/2025 Estela Grace MD   ED visits in past 90 days: 0        Note composed:11:39 AM 05/21/2025

## 2025-05-23 RX ORDER — LINACLOTIDE 145 UG/1
145 CAPSULE, GELATIN COATED ORAL
Qty: 30 CAPSULE | Refills: 11 | Status: SHIPPED | OUTPATIENT
Start: 2025-05-23

## 2025-05-26 ENCOUNTER — OFFICE VISIT (OUTPATIENT)
Dept: FAMILY MEDICINE | Facility: CLINIC | Age: 70
End: 2025-05-26
Payer: MEDICARE

## 2025-05-26 VITALS
SYSTOLIC BLOOD PRESSURE: 110 MMHG | RESPIRATION RATE: 14 BRPM | HEART RATE: 72 BPM | OXYGEN SATURATION: 99 % | WEIGHT: 141 LBS | HEIGHT: 61 IN | DIASTOLIC BLOOD PRESSURE: 70 MMHG | BODY MASS INDEX: 26.62 KG/M2

## 2025-05-26 DIAGNOSIS — Z79.4 TYPE 2 DIABETES MELLITUS WITH STAGE 3A CHRONIC KIDNEY DISEASE, WITH LONG-TERM CURRENT USE OF INSULIN: Primary | ICD-10-CM

## 2025-05-26 DIAGNOSIS — F51.01 PRIMARY INSOMNIA: ICD-10-CM

## 2025-05-26 DIAGNOSIS — E55.9 VITAMIN D DEFICIENCY: ICD-10-CM

## 2025-05-26 DIAGNOSIS — E11.22 TYPE 2 DIABETES MELLITUS WITH STAGE 3A CHRONIC KIDNEY DISEASE, WITH LONG-TERM CURRENT USE OF INSULIN: Primary | ICD-10-CM

## 2025-05-26 DIAGNOSIS — I10 BENIGN ESSENTIAL HTN: ICD-10-CM

## 2025-05-26 DIAGNOSIS — E53.8 B12 DEFICIENCY: ICD-10-CM

## 2025-05-26 DIAGNOSIS — N18.31 TYPE 2 DIABETES MELLITUS WITH STAGE 3A CHRONIC KIDNEY DISEASE, WITH LONG-TERM CURRENT USE OF INSULIN: Primary | ICD-10-CM

## 2025-05-26 PROCEDURE — 99999 PR PBB SHADOW E&M-EST. PATIENT-LVL IV: CPT | Mod: PBBFAC,,, | Performed by: INTERNAL MEDICINE

## 2025-05-26 PROCEDURE — 3051F HG A1C>EQUAL 7.0%<8.0%: CPT | Mod: CPTII,S$GLB,, | Performed by: INTERNAL MEDICINE

## 2025-05-26 PROCEDURE — 3066F NEPHROPATHY DOC TX: CPT | Mod: CPTII,S$GLB,, | Performed by: INTERNAL MEDICINE

## 2025-05-26 PROCEDURE — 3288F FALL RISK ASSESSMENT DOCD: CPT | Mod: CPTII,S$GLB,, | Performed by: INTERNAL MEDICINE

## 2025-05-26 PROCEDURE — 3061F NEG MICROALBUMINURIA REV: CPT | Mod: CPTII,S$GLB,, | Performed by: INTERNAL MEDICINE

## 2025-05-26 PROCEDURE — 3074F SYST BP LT 130 MM HG: CPT | Mod: CPTII,S$GLB,, | Performed by: INTERNAL MEDICINE

## 2025-05-26 PROCEDURE — 4010F ACE/ARB THERAPY RXD/TAKEN: CPT | Mod: CPTII,S$GLB,, | Performed by: INTERNAL MEDICINE

## 2025-05-26 PROCEDURE — 3078F DIAST BP <80 MM HG: CPT | Mod: CPTII,S$GLB,, | Performed by: INTERNAL MEDICINE

## 2025-05-26 PROCEDURE — 99214 OFFICE O/P EST MOD 30 MIN: CPT | Mod: S$GLB,,, | Performed by: INTERNAL MEDICINE

## 2025-05-26 PROCEDURE — 1126F AMNT PAIN NOTED NONE PRSNT: CPT | Mod: CPTII,S$GLB,, | Performed by: INTERNAL MEDICINE

## 2025-05-26 PROCEDURE — 1101F PT FALLS ASSESS-DOCD LE1/YR: CPT | Mod: CPTII,S$GLB,, | Performed by: INTERNAL MEDICINE

## 2025-05-26 PROCEDURE — G2211 COMPLEX E/M VISIT ADD ON: HCPCS | Mod: S$GLB,,, | Performed by: INTERNAL MEDICINE

## 2025-05-26 PROCEDURE — 1159F MED LIST DOCD IN RCRD: CPT | Mod: CPTII,S$GLB,, | Performed by: INTERNAL MEDICINE

## 2025-05-26 PROCEDURE — 3008F BODY MASS INDEX DOCD: CPT | Mod: CPTII,S$GLB,, | Performed by: INTERNAL MEDICINE

## 2025-05-26 RX ORDER — VALSARTAN 320 MG/1
320 TABLET ORAL DAILY
Qty: 30 TABLET | Refills: 1 | Status: SHIPPED | OUTPATIENT
Start: 2025-05-26 | End: 2026-05-26

## 2025-05-26 RX ORDER — TRAZODONE HYDROCHLORIDE 50 MG/1
TABLET ORAL
Qty: 60 TABLET | Refills: 11 | Status: SHIPPED | OUTPATIENT
Start: 2025-05-26

## 2025-05-26 RX ORDER — VARENICLINE 0.03 MG/.05ML
SPRAY NASAL
COMMUNITY
Start: 2025-05-23

## 2025-05-27 ENCOUNTER — TELEPHONE (OUTPATIENT)
Dept: FAMILY MEDICINE | Facility: CLINIC | Age: 70
End: 2025-05-27
Payer: MEDICARE

## 2025-05-27 NOTE — TELEPHONE ENCOUNTER
----- Message from Jesusitablayne sent at 5/27/2025 10:27 AM CDT -----  Contact: @[facility] [phone]  Would like to receive medical advice.Would they like a call back or a response via [facility]:  call back Additional information:  Calling to speak with the office about traZODone (DESYREL) 50 MG tablet and valsartan (DIOVAN) 320 MG tablet.

## 2025-05-27 NOTE — TELEPHONE ENCOUNTER
Pharmacy asking if combination valsartan-hctz was switched to plain valsartan. Per med list, Dr. Grace discontinued the combination drug and changed to plain Valsartan. Pharmacy informed.

## 2025-06-17 DIAGNOSIS — G20.A1 PARKINSON'S DISEASE: ICD-10-CM

## 2025-06-17 DIAGNOSIS — G20.A1 PARKINSON DISEASE: ICD-10-CM

## 2025-06-17 RX ORDER — CARBIDOPA AND LEVODOPA 25; 100 MG/1; MG/1
1 TABLET ORAL 3 TIMES DAILY
Qty: 90 TABLET | Refills: 0 | Status: SHIPPED | OUTPATIENT
Start: 2025-06-17

## 2025-06-17 RX ORDER — SELEGILINE HYDROCHLORIDE 5 MG/1
5 CAPSULE ORAL 2 TIMES DAILY
Qty: 60 CAPSULE | Refills: 0 | Status: SHIPPED | OUTPATIENT
Start: 2025-06-17

## 2025-06-17 RX ORDER — PRAMIPEXOLE DIHYDROCHLORIDE 0.25 MG/1
0.25 TABLET ORAL 3 TIMES DAILY
Qty: 90 TABLET | Refills: 0 | Status: SHIPPED | OUTPATIENT
Start: 2025-06-17

## 2025-06-17 NOTE — TELEPHONE ENCOUNTER
Pt notified that she has an appt with Virgie Solis on 8/25/25 at 1600 in [place].  Informed Virgie would give her enough medication to make to the appt date.  She v/u.

## 2025-06-23 DIAGNOSIS — Z00.00 ENCOUNTER FOR MEDICARE ANNUAL WELLNESS EXAM: ICD-10-CM

## 2025-06-24 ENCOUNTER — LAB VISIT (OUTPATIENT)
Dept: LAB | Facility: HOSPITAL | Age: 70
End: 2025-06-24
Attending: NURSE PRACTITIONER
Payer: MEDICARE

## 2025-06-24 ENCOUNTER — TELEPHONE (OUTPATIENT)
Dept: FAMILY MEDICINE | Facility: CLINIC | Age: 70
End: 2025-06-24

## 2025-06-24 ENCOUNTER — CLINICAL SUPPORT (OUTPATIENT)
Dept: FAMILY MEDICINE | Facility: CLINIC | Age: 70
End: 2025-06-24
Payer: MEDICARE

## 2025-06-24 VITALS — HEART RATE: 88 BPM | SYSTOLIC BLOOD PRESSURE: 120 MMHG | DIASTOLIC BLOOD PRESSURE: 71 MMHG

## 2025-06-24 DIAGNOSIS — E11.22 TYPE 2 DIABETES MELLITUS WITH STAGE 3A CHRONIC KIDNEY DISEASE, WITH LONG-TERM CURRENT USE OF INSULIN: ICD-10-CM

## 2025-06-24 DIAGNOSIS — Z01.30 BP CHECK: Primary | ICD-10-CM

## 2025-06-24 DIAGNOSIS — N18.31 TYPE 2 DIABETES MELLITUS WITH STAGE 3A CHRONIC KIDNEY DISEASE, WITH LONG-TERM CURRENT USE OF INSULIN: ICD-10-CM

## 2025-06-24 DIAGNOSIS — Z79.4 TYPE 2 DIABETES MELLITUS WITH STAGE 3A CHRONIC KIDNEY DISEASE, WITH LONG-TERM CURRENT USE OF INSULIN: ICD-10-CM

## 2025-06-24 DIAGNOSIS — E53.8 B12 DEFICIENCY: ICD-10-CM

## 2025-06-24 DIAGNOSIS — E55.9 VITAMIN D DEFICIENCY: ICD-10-CM

## 2025-06-24 DIAGNOSIS — I10 BENIGN ESSENTIAL HTN: ICD-10-CM

## 2025-06-24 LAB
25(OH)D3+25(OH)D2 SERPL-MCNC: 27 NG/ML (ref 30–96)
ALBUMIN SERPL BCP-MCNC: 4.1 G/DL (ref 3.5–5.2)
ANION GAP (OHS): 10 MMOL/L (ref 8–16)
BUN SERPL-MCNC: 27 MG/DL (ref 8–23)
CALCIUM SERPL-MCNC: 9.6 MG/DL (ref 8.7–10.5)
CHLORIDE SERPL-SCNC: 101 MMOL/L (ref 95–110)
CHOLEST SERPL-MCNC: 294 MG/DL (ref 120–199)
CHOLEST/HDLC SERPL: 5.3 {RATIO} (ref 2–5)
CO2 SERPL-SCNC: 29 MMOL/L (ref 23–29)
CREAT SERPL-MCNC: 1 MG/DL (ref 0.5–1.4)
EAG (OHS): 137 MG/DL (ref 68–131)
GFR SERPLBLD CREATININE-BSD FMLA CKD-EPI: 60 ML/MIN/1.73/M2
GLUCOSE SERPL-MCNC: 120 MG/DL (ref 70–110)
HBA1C MFR BLD: 6.4 % (ref 4–5.6)
HDLC SERPL-MCNC: 55 MG/DL (ref 40–75)
HDLC SERPL: 18.7 % (ref 20–50)
LDLC SERPL CALC-MCNC: 204.4 MG/DL (ref 63–159)
NONHDLC SERPL-MCNC: 239 MG/DL
PHOSPHATE SERPL-MCNC: 3.8 MG/DL (ref 2.7–4.5)
POTASSIUM SERPL-SCNC: 4.2 MMOL/L (ref 3.5–5.1)
SODIUM SERPL-SCNC: 140 MMOL/L (ref 136–145)
TRIGL SERPL-MCNC: 173 MG/DL (ref 30–150)
VIT B12 SERPL-MCNC: 383 PG/ML (ref 210–950)

## 2025-06-24 PROCEDURE — 82465 ASSAY BLD/SERUM CHOLESTEROL: CPT | Mod: HCNC

## 2025-06-24 PROCEDURE — 36415 COLL VENOUS BLD VENIPUNCTURE: CPT | Mod: HCNC,PO

## 2025-06-24 PROCEDURE — 82306 VITAMIN D 25 HYDROXY: CPT | Mod: HCNC

## 2025-06-24 PROCEDURE — 99999 PR PBB SHADOW E&M-EST. PATIENT-LVL III: CPT | Mod: PBBFAC,HCNC,,

## 2025-06-24 PROCEDURE — 83036 HEMOGLOBIN GLYCOSYLATED A1C: CPT | Mod: HCNC

## 2025-06-24 PROCEDURE — 82607 VITAMIN B-12: CPT | Mod: HCNC

## 2025-06-24 PROCEDURE — 80069 RENAL FUNCTION PANEL: CPT | Mod: HCNC

## 2025-06-24 NOTE — TELEPHONE ENCOUNTER
----- Message from Estela Grace MD sent at 6/24/2025  9:15 AM CDT -----  Blood pressure check okay.  Continue current treatment.  ----- Message -----  From: Tammie Estrada LPN  Sent: 6/24/2025   8:36 AM CDT  To: Estela Grace MD    /71 HR 88

## 2025-06-25 ENCOUNTER — RESULTS FOLLOW-UP (OUTPATIENT)
Dept: ENDOCRINOLOGY | Facility: CLINIC | Age: 70
End: 2025-06-25

## 2025-06-25 ENCOUNTER — RESULTS FOLLOW-UP (OUTPATIENT)
Dept: FAMILY MEDICINE | Facility: CLINIC | Age: 70
End: 2025-06-25

## 2025-06-30 ENCOUNTER — TELEPHONE (OUTPATIENT)
Dept: ENDOCRINOLOGY | Facility: CLINIC | Age: 70
End: 2025-06-30
Payer: MEDICARE

## 2025-07-01 ENCOUNTER — OFFICE VISIT (OUTPATIENT)
Dept: ENDOCRINOLOGY | Facility: CLINIC | Age: 70
End: 2025-07-01
Payer: MEDICARE

## 2025-07-01 ENCOUNTER — PATIENT MESSAGE (OUTPATIENT)
Dept: ENDOCRINOLOGY | Facility: CLINIC | Age: 70
End: 2025-07-01

## 2025-07-01 VITALS
HEIGHT: 61 IN | SYSTOLIC BLOOD PRESSURE: 124 MMHG | DIASTOLIC BLOOD PRESSURE: 82 MMHG | BODY MASS INDEX: 27.53 KG/M2 | OXYGEN SATURATION: 96 % | WEIGHT: 145.81 LBS | HEART RATE: 85 BPM

## 2025-07-01 DIAGNOSIS — E11.22 TYPE 2 DIABETES MELLITUS WITH STAGE 3A CHRONIC KIDNEY DISEASE, WITH LONG-TERM CURRENT USE OF INSULIN: Primary | ICD-10-CM

## 2025-07-01 DIAGNOSIS — E89.0 POST-OPERATIVE HYPOTHYROIDISM: ICD-10-CM

## 2025-07-01 DIAGNOSIS — E78.2 MIXED HYPERLIPIDEMIA: ICD-10-CM

## 2025-07-01 DIAGNOSIS — I10 BENIGN ESSENTIAL HTN: ICD-10-CM

## 2025-07-01 DIAGNOSIS — N18.31 TYPE 2 DIABETES MELLITUS WITH STAGE 3A CHRONIC KIDNEY DISEASE, WITH LONG-TERM CURRENT USE OF INSULIN: Primary | ICD-10-CM

## 2025-07-01 DIAGNOSIS — Z79.4 TYPE 2 DIABETES MELLITUS WITH STAGE 3A CHRONIC KIDNEY DISEASE, WITH LONG-TERM CURRENT USE OF INSULIN: Primary | ICD-10-CM

## 2025-07-01 PROCEDURE — 3079F DIAST BP 80-89 MM HG: CPT | Mod: CPTII,HCNC,S$GLB, | Performed by: NURSE PRACTITIONER

## 2025-07-01 PROCEDURE — 3066F NEPHROPATHY DOC TX: CPT | Mod: CPTII,HCNC,S$GLB, | Performed by: NURSE PRACTITIONER

## 2025-07-01 PROCEDURE — 99999 PR PBB SHADOW E&M-EST. PATIENT-LVL IV: CPT | Mod: PBBFAC,HCNC,, | Performed by: NURSE PRACTITIONER

## 2025-07-01 PROCEDURE — 1101F PT FALLS ASSESS-DOCD LE1/YR: CPT | Mod: CPTII,HCNC,S$GLB, | Performed by: NURSE PRACTITIONER

## 2025-07-01 PROCEDURE — 99214 OFFICE O/P EST MOD 30 MIN: CPT | Mod: HCNC,S$GLB,, | Performed by: NURSE PRACTITIONER

## 2025-07-01 PROCEDURE — 3044F HG A1C LEVEL LT 7.0%: CPT | Mod: CPTII,HCNC,S$GLB, | Performed by: NURSE PRACTITIONER

## 2025-07-01 PROCEDURE — G2211 COMPLEX E/M VISIT ADD ON: HCPCS | Mod: HCNC,S$GLB,, | Performed by: NURSE PRACTITIONER

## 2025-07-01 PROCEDURE — 95251 CONT GLUC MNTR ANALYSIS I&R: CPT | Mod: HCNC,S$GLB,, | Performed by: NURSE PRACTITIONER

## 2025-07-01 PROCEDURE — 3288F FALL RISK ASSESSMENT DOCD: CPT | Mod: CPTII,HCNC,S$GLB, | Performed by: NURSE PRACTITIONER

## 2025-07-01 PROCEDURE — 3008F BODY MASS INDEX DOCD: CPT | Mod: CPTII,HCNC,S$GLB, | Performed by: NURSE PRACTITIONER

## 2025-07-01 PROCEDURE — 4010F ACE/ARB THERAPY RXD/TAKEN: CPT | Mod: CPTII,HCNC,S$GLB, | Performed by: NURSE PRACTITIONER

## 2025-07-01 PROCEDURE — 3061F NEG MICROALBUMINURIA REV: CPT | Mod: CPTII,HCNC,S$GLB, | Performed by: NURSE PRACTITIONER

## 2025-07-01 PROCEDURE — 3074F SYST BP LT 130 MM HG: CPT | Mod: CPTII,HCNC,S$GLB, | Performed by: NURSE PRACTITIONER

## 2025-07-01 PROCEDURE — 1159F MED LIST DOCD IN RCRD: CPT | Mod: CPTII,HCNC,S$GLB, | Performed by: NURSE PRACTITIONER

## 2025-07-01 PROCEDURE — 1125F AMNT PAIN NOTED PAIN PRSNT: CPT | Mod: CPTII,HCNC,S$GLB, | Performed by: NURSE PRACTITIONER

## 2025-07-01 RX ORDER — VALSARTAN 320 MG/1
320 TABLET ORAL DAILY
Start: 2025-07-01 | End: 2026-07-01

## 2025-07-01 RX ORDER — BLOOD-GLUCOSE,RECEIVER,CONT
EACH MISCELLANEOUS
Qty: 1 EACH | Refills: 0 | Status: SHIPPED | OUTPATIENT
Start: 2025-07-01

## 2025-07-01 RX ORDER — BLOOD-GLUCOSE SENSOR
EACH MISCELLANEOUS
Qty: 6 EACH | Refills: 3 | Status: SHIPPED | OUTPATIENT
Start: 2025-07-01

## 2025-07-01 RX ORDER — EZETIMIBE 10 MG/1
10 TABLET ORAL DAILY
Qty: 90 TABLET | Refills: 3 | Status: SHIPPED | OUTPATIENT
Start: 2025-07-01 | End: 2026-07-01

## 2025-07-01 NOTE — PROGRESS NOTES
CC: This 69 y.o. female presents for management of diabetes and chronic conditions pending review including HTN, HLP, morbid obesity, hypothyroidism, vitamin d deficiency     HPI: She was diagnosed with T2DM in ~ 2013. She was hospitalized r/t DM in 2016 for DKA.   Family hx of DM: mom, dad, and sisters   Her  passed away 8/2024      Wearing Dexcom- see download in media tab  6% Very High  27% High  66% In Range  1% Low  <1% Very Low  GMI 7.2%  Some early am hypos, also some pp excursions  Not patterned and not daily     Diet: Eating breakfast- kiwi, yogurt or berries  Lunch - salad, - avocado, tomatoes and cucumbers   Supper- home cooked- beans or post roast       Exercise: walking 3 days a week, 15 minutes     CURRENT DM MEDS:  lantus 10u qam; Mounjaro 12.5 mg weekly ( Tuesday ), prandin 0.5 mg with supper  Vial/pen:  Uses pens    Standards of Care:  Eye exam: + h/o  mild DM retinopathy   4/2025      Regarding thyroid:  Takes LT4 125 mcg, correctly qam Monday- Saturday  Nodules was found on on a PET scan. FNA 11/25/14 shows adenomatous nodule with cystic changes. S/p total thyroidectomy 5/24/16.       No hoarseness, voice changes, +dysphagia- from Parkinson's, no compressive symptoms, or head/neck exposure to XRT.   No personal or FH of thyroid cancer or MEN syndrome.   Not taking biotin.   + tremors of the hands- has Parkinson's L>R in the afternoon  + intolerance to the cold  +fatigue  No hair thinning        ROS:  Gen: Appetite fair, weight loss 8 lbs  Eyes:wears glasses   Resp: no SOB or ESPINAL   Cardiac: No palpitations, chest pain   GI: No nausea or vomiting, diarrhea, + constipation   /GYN: 1-+ nocturia, no burning or pain.   MS/Neuro: +  numbness in her feet- magnesium cream helping , speech clear,   Psych: Denies drug/ETOH abuse,  + grieving   Other systems: negative.    PE:  GENERAL: Well developed, well nourished.  PSYCH: AAOx3, appropriate mood and affect, pleasant expression, conversant, appears  relaxed, well groomed.   EYES: Conjunctiva, corneas clear  NECK: Supple, trachea midline   ABDOMEN: Soft, non-tender, non-distended   SKIN:  no acanthosis nigracans.  FOOT EXAMINATION:  3/27/2025   No foot deformity, +Onychomycosis,  no interspace maceration or ulceration noted.  Decreased hair growth present over toes/feet.    Protective sensation intact with 10 gram monofilament.  +2 dorsalis pedis and posterior pulses noted.      Lab Results   Component Value Date    MICALBCREAT 14.4 03/20/2025       Hemoglobin A1C   Date Value Ref Range Status   03/20/2025 7.1 (H) 4.0 - 5.6 % Final     Comment:     ADA Screening Guidelines:  5.7-6.4%  Consistent with prediabetes  >or=6.5%  Consistent with diabetes    High levels of fetal hemoglobin interfere with the HbA1C  assay. Heterozygous hemoglobin variants (HbS, HgC, etc)do  not significantly interfere with this assay.   However, presence of multiple variants may affect accuracy.     09/16/2024 6.3 (H) 4.0 - 5.6 % Final     Comment:     ADA Screening Guidelines:  5.7-6.4%  Consistent with prediabetes  >or=6.5%  Consistent with diabetes    High levels of fetal hemoglobin interfere with the HbA1C  assay. Heterozygous hemoglobin variants (HbS, HgC, etc)do  not significantly interfere with this assay.   However, presence of multiple variants may affect accuracy.     05/13/2024 6.3 (H) 4.0 - 5.6 % Final     Comment:     ADA Screening Guidelines:  5.7-6.4%  Consistent with prediabetes  >or=6.5%  Consistent with diabetes    High levels of fetal hemoglobin interfere with the HbA1C  assay. Heterozygous hemoglobin variants (HbS, HgC, etc)do  not significantly interfere with this assay.   However, presence of multiple variants may affect accuracy.       Hemoglobin A1c   Date Value Ref Range Status   06/24/2025 6.4 (H) 4.0 - 5.6 % Final     Comment:     ADA Screening Guidelines:  5.7-6.4%  Consistent with prediabetes  >=6.5%  Consistent with diabetes    High levels of fetal  hemoglobin interfere with the HbA1C  assay. Heterozygous hemoglobin variants (HbS, HgC, etc)do  not significantly interfere with this assay.   However, presence of multiple variants may affect accuracy.        ASSESSMENT and PLAN:    1. T2DM with nephropathy-     Change lantus to 8u qam,Continue Mounjaro  12.5 mg weekly  Continue prandin 0.5 mg with main meal every day  Change Dexcom to Juany 3 plus for dexterity issues      2. HTN - controlled today, continue meds as previously prescribed and monitor.     3. HLP -   unable to tolerate statins r/t myalgia; Off repatha r/t joint pain, trial zetia, stop if she devlops mylagias     4. Post Surgical Hypothyroidism for MNG. S/p total thyroidectomy w pathology benign. Continue levothyroxine 125 mcg M-Saturday, holds Sunday Recheck TSH w RTC     Follow-up: in 3 months with A1C, TSH

## 2025-07-09 ENCOUNTER — TELEPHONE (OUTPATIENT)
Dept: ENDOCRINOLOGY | Facility: CLINIC | Age: 70
End: 2025-07-09
Payer: MEDICARE

## 2025-07-19 DIAGNOSIS — I10 BENIGN ESSENTIAL HTN: ICD-10-CM

## 2025-07-19 DIAGNOSIS — G20.A1 PARKINSON'S DISEASE: ICD-10-CM

## 2025-07-19 DIAGNOSIS — G20.A1 PARKINSON DISEASE: ICD-10-CM

## 2025-07-19 NOTE — TELEPHONE ENCOUNTER
Refill Routing Note   Medication(s) are not appropriate for processing by [facility] for the following reason(s):        New or recently adjusted medication    [facility] action(s):  Defer               Appointments  past 12m or future 3m with PCP    Date Provider   Last Visit   5/26/2025 Estela Grace MD   Next Visit   Visit date not found Estela Grace MD   ED visits in past 90 days: 0        Note composed:1:41 PM 07/19/2025

## 2025-07-19 NOTE — TELEPHONE ENCOUNTER
No care due was identified.  [facility] Embedded Care Due Messages. Reference number: [id number].   7/19/2025 8:02:46 AM CDT

## 2025-07-21 RX ORDER — VALSARTAN 320 MG/1
320 TABLET ORAL DAILY
Qty: 90 TABLET | Refills: 3 | Status: SHIPPED | OUTPATIENT
Start: 2025-07-21

## 2025-07-21 RX ORDER — CARBIDOPA AND LEVODOPA 25; 100 MG/1; MG/1
1 TABLET ORAL 3 TIMES DAILY
Qty: 90 TABLET | Refills: 0 | Status: SHIPPED | OUTPATIENT
Start: 2025-07-21

## 2025-07-21 RX ORDER — PRAMIPEXOLE DIHYDROCHLORIDE 0.25 MG/1
0.25 TABLET ORAL 3 TIMES DAILY
Qty: 90 TABLET | Refills: 0 | Status: SHIPPED | OUTPATIENT
Start: 2025-07-21

## 2025-07-21 RX ORDER — SELEGILINE HYDROCHLORIDE 5 MG/1
5 CAPSULE ORAL 2 TIMES DAILY
Qty: 60 CAPSULE | Refills: 0 | Status: SHIPPED | OUTPATIENT
Start: 2025-07-21

## 2025-07-21 NOTE — TELEPHONE ENCOUNTER
LOV 4.18 2023  Scheduled 8/25/25    Renewal requested for all PD meds.  pramipexole di-HCl 0.25 mg Oral 3 times daily    carbidopa/levodopa 1 tablet Oral 3 times daily    selegiline HCl 5 mg Oral 2 times daily    Pended

## 2025-08-21 DIAGNOSIS — G20.A1 PARKINSON DISEASE: ICD-10-CM

## 2025-08-21 DIAGNOSIS — G20.A1 PARKINSON'S DISEASE: ICD-10-CM

## 2025-08-21 RX ORDER — SELEGILINE HYDROCHLORIDE 5 MG/1
5 CAPSULE ORAL 2 TIMES DAILY
Qty: 60 CAPSULE | Refills: 0 | Status: SHIPPED | OUTPATIENT
Start: 2025-08-21

## 2025-08-21 RX ORDER — PRAMIPEXOLE DIHYDROCHLORIDE 0.25 MG/1
0.25 TABLET ORAL 3 TIMES DAILY
Qty: 90 TABLET | Refills: 0 | Status: SHIPPED | OUTPATIENT
Start: 2025-08-21

## 2025-08-21 RX ORDER — CARBIDOPA AND LEVODOPA 25; 100 MG/1; MG/1
1 TABLET ORAL 3 TIMES DAILY
Qty: 90 TABLET | Refills: 0 | Status: SHIPPED | OUTPATIENT
Start: 2025-08-21

## 2025-08-25 ENCOUNTER — OFFICE VISIT (OUTPATIENT)
Dept: NEUROLOGY | Facility: CLINIC | Age: 70
End: 2025-08-25
Payer: MEDICARE

## 2025-08-25 VITALS
HEART RATE: 82 BPM | SYSTOLIC BLOOD PRESSURE: 148 MMHG | HEIGHT: 61 IN | BODY MASS INDEX: 27.43 KG/M2 | WEIGHT: 145.31 LBS | DIASTOLIC BLOOD PRESSURE: 65 MMHG

## 2025-08-25 DIAGNOSIS — G20.B2 PARKINSON'S DISEASE WITH DYSKINESIA AND FLUCTUATING MANIFESTATIONS: Primary | ICD-10-CM

## 2025-08-25 DIAGNOSIS — R13.10 DYSPHAGIA, UNSPECIFIED TYPE: ICD-10-CM

## 2025-08-25 PROCEDURE — 3061F NEG MICROALBUMINURIA REV: CPT | Mod: CPTII,HCNC,S$GLB, | Performed by: NURSE PRACTITIONER

## 2025-08-25 PROCEDURE — 3077F SYST BP >= 140 MM HG: CPT | Mod: CPTII,HCNC,S$GLB, | Performed by: NURSE PRACTITIONER

## 2025-08-25 PROCEDURE — 1160F RVW MEDS BY RX/DR IN RCRD: CPT | Mod: CPTII,HCNC,S$GLB, | Performed by: NURSE PRACTITIONER

## 2025-08-25 PROCEDURE — 1101F PT FALLS ASSESS-DOCD LE1/YR: CPT | Mod: CPTII,HCNC,S$GLB, | Performed by: NURSE PRACTITIONER

## 2025-08-25 PROCEDURE — 3288F FALL RISK ASSESSMENT DOCD: CPT | Mod: CPTII,HCNC,S$GLB, | Performed by: NURSE PRACTITIONER

## 2025-08-25 PROCEDURE — 3078F DIAST BP <80 MM HG: CPT | Mod: CPTII,HCNC,S$GLB, | Performed by: NURSE PRACTITIONER

## 2025-08-25 PROCEDURE — 4010F ACE/ARB THERAPY RXD/TAKEN: CPT | Mod: CPTII,HCNC,S$GLB, | Performed by: NURSE PRACTITIONER

## 2025-08-25 PROCEDURE — 99999 PR PBB SHADOW E&M-EST. PATIENT-LVL V: CPT | Mod: PBBFAC,HCNC,, | Performed by: NURSE PRACTITIONER

## 2025-08-25 PROCEDURE — 1126F AMNT PAIN NOTED NONE PRSNT: CPT | Mod: CPTII,HCNC,S$GLB, | Performed by: NURSE PRACTITIONER

## 2025-08-25 PROCEDURE — 1159F MED LIST DOCD IN RCRD: CPT | Mod: CPTII,HCNC,S$GLB, | Performed by: NURSE PRACTITIONER

## 2025-08-25 PROCEDURE — 3044F HG A1C LEVEL LT 7.0%: CPT | Mod: CPTII,HCNC,S$GLB, | Performed by: NURSE PRACTITIONER

## 2025-08-25 PROCEDURE — 3008F BODY MASS INDEX DOCD: CPT | Mod: CPTII,HCNC,S$GLB, | Performed by: NURSE PRACTITIONER

## 2025-08-25 PROCEDURE — G2211 COMPLEX E/M VISIT ADD ON: HCPCS | Mod: HCNC,S$GLB,, | Performed by: NURSE PRACTITIONER

## 2025-08-25 PROCEDURE — 3066F NEPHROPATHY DOC TX: CPT | Mod: CPTII,HCNC,S$GLB, | Performed by: NURSE PRACTITIONER

## 2025-08-25 PROCEDURE — 99215 OFFICE O/P EST HI 40 MIN: CPT | Mod: HCNC,S$GLB,, | Performed by: NURSE PRACTITIONER

## 2025-08-25 RX ORDER — SELEGILINE HYDROCHLORIDE 5 MG/1
5 CAPSULE ORAL 2 TIMES DAILY
Qty: 60 CAPSULE | Refills: 11 | Status: SHIPPED | OUTPATIENT
Start: 2025-08-25

## 2025-08-25 RX ORDER — PRAMIPEXOLE DIHYDROCHLORIDE 0.25 MG/1
TABLET ORAL
Qty: 90 TABLET | Refills: 11 | Status: SHIPPED | OUTPATIENT
Start: 2025-08-25

## 2025-08-25 RX ORDER — CARBIDOPA AND LEVODOPA 25; 100 MG/1; MG/1
1 TABLET ORAL 4 TIMES DAILY
Qty: 120 TABLET | Refills: 11 | Status: SHIPPED | OUTPATIENT
Start: 2025-08-25

## (undated) DEVICE — GLOVE 7.5 PROTEXIS PI MICRO

## (undated) DEVICE — SOL SOD CHLORIDE 0.9% 10ML

## (undated) DEVICE — APPLICATOR CHLORAPREP CLR 10.5

## (undated) DEVICE — TRAY NERVE BLOCK